# Patient Record
Sex: MALE | Race: WHITE | NOT HISPANIC OR LATINO | Employment: OTHER | URBAN - METROPOLITAN AREA
[De-identification: names, ages, dates, MRNs, and addresses within clinical notes are randomized per-mention and may not be internally consistent; named-entity substitution may affect disease eponyms.]

---

## 2017-02-14 ENCOUNTER — OFFICE VISIT (OUTPATIENT)
Dept: CARDIOLOGY | Facility: MEDICAL CENTER | Age: 77
End: 2017-02-14
Payer: MEDICARE

## 2017-02-14 VITALS
BODY MASS INDEX: 25.45 KG/M2 | HEIGHT: 73 IN | HEART RATE: 70 BPM | OXYGEN SATURATION: 90 % | SYSTOLIC BLOOD PRESSURE: 110 MMHG | DIASTOLIC BLOOD PRESSURE: 70 MMHG | WEIGHT: 192 LBS

## 2017-02-14 DIAGNOSIS — I49.3 PVC (PREMATURE VENTRICULAR CONTRACTION): Primary | ICD-10-CM

## 2017-02-14 DIAGNOSIS — E78.2 MIXED HYPERLIPIDEMIA: ICD-10-CM

## 2017-02-14 DIAGNOSIS — I25.10 CORONARY ARTERY CALCIFICATION SEEN ON CAT SCAN: ICD-10-CM

## 2017-02-14 DIAGNOSIS — I10 ESSENTIAL HYPERTENSION: ICD-10-CM

## 2017-02-14 PROCEDURE — G8420 CALC BMI NORM PARAMETERS: HCPCS | Performed by: INTERNAL MEDICINE

## 2017-02-14 PROCEDURE — G8432 DEP SCR NOT DOC, RNG: HCPCS | Performed by: INTERNAL MEDICINE

## 2017-02-14 PROCEDURE — 4040F PNEUMOC VAC/ADMIN/RCVD: CPT | Performed by: INTERNAL MEDICINE

## 2017-02-14 PROCEDURE — 1036F TOBACCO NON-USER: CPT | Performed by: INTERNAL MEDICINE

## 2017-02-14 PROCEDURE — 99213 OFFICE O/P EST LOW 20 MIN: CPT | Performed by: INTERNAL MEDICINE

## 2017-02-14 PROCEDURE — 1101F PT FALLS ASSESS-DOCD LE1/YR: CPT | Performed by: INTERNAL MEDICINE

## 2017-02-14 PROCEDURE — G8599 NO ASA/ANTIPLAT THER USE RNG: HCPCS | Performed by: INTERNAL MEDICINE

## 2017-02-14 PROCEDURE — G8482 FLU IMMUNIZE ORDER/ADMIN: HCPCS | Performed by: INTERNAL MEDICINE

## 2017-02-14 ASSESSMENT — ENCOUNTER SYMPTOMS
PND: 0
SHORTNESS OF BREATH: 1
PALPITATIONS: 0
SPUTUM PRODUCTION: 1
HEARTBURN: 1
CLAUDICATION: 0
WEAKNESS: 1
ORTHOPNEA: 0
BRUISES/BLEEDS EASILY: 1

## 2017-02-14 NOTE — Clinical Note
Name:          Barry Torres   YOB: 1940  Date:     2/14/2017      Lizzie Soto M.D.  [address]  [place] [address]     Maxwell Krishnamurthy MD  [address], [address]  [place] [address]  Phone: [phone]  Back Line: [phone]  Fax: [phone]  E-mail: [email or web address]   Dear Dr. Soto,    We had the pleasure of seeing your patient, Barry Torres, in Cardiology Clinic at [facility] today.    As you know, he is a 76-year-old man with a history of chronic kidney disease stage IIIB, rheumatoid arthritis, dyslipidemia, and essential hypertension referred for evaluation of frequent PVCs on his Holter.    He is doing very well today on his cardiovascular regimen including Crestor, and atenolol. His blood pressure is well controlled, and his renal function is stable by at least his last chemistry panel in November. I have not changed his cardiovascular regimen at this time.     We will have the patient follow-up in 6 months.    Thank you for the referral and please do not hesitate to contact me at any time. My contact information is listed above.    This note was dictated using Dragon speech recognition software.     A full note including my physical examination and a full list of rectified medications is available in our medical record, and can be faxed as well.    Maxwell Krishnamurthy MD  Cardiologist  [facility]

## 2017-02-14 NOTE — MR AVS SNAPSHOT
"        Barry Tran Brian   2017 8:30 AM   Office Visit   MRN: [id number]    Department:  [facility]   Dept Phone:  [phone]    Description:  Male : 1940   Provider:  Maxwell Krishnamurthy M.D.           Reason for Visit     Follow-Up           Allergies as of 2017     Allergen Noted Reactions    Cipro Xr 2010       Muscle aches.    Levaquin 2010       Muscle aches    Pcn [Penicillins] 09/15/2009   Hives      Vital Signs     Blood Pressure Pulse Height Weight Body Mass Index Oxygen Saturation    110/70 mmHg 70 1.854 m (6' 0.99\") 87.091 kg (192 lb) 25.34 kg/m2 90%    Smoking Status                   Former Smoker           Basic Information     Date Of Birth Sex Race Ethnicity Preferred Language    1940 Male White Non- English      Your appointments     Mar 06, 2017  8:40 AM   Established Patient with Lizzie Soto M.D.   [facility] - Internal Medicine (--)    [address] [place] [id number] [phone]           You will be receiving a confirmation call a few days before your appointment from our automated call confirmation system.              Problem List              ICD-10-CM Priority Class Noted - Resolved    Hypoxia R09.02   2013 - Present    Pleural plaque J92.9   2013 - Present    Chronic obstructive pulmonary disease (CMS-HCC) J44.9   Unknown - Present    Antrochoanal polyp J33.0   10/6/2015 - Present    Hyperlipidemia E78.5   6/3/2016 - Present    RA (rheumatoid arthritis) (CMS-HCC) M06.9   6/3/2016 - Present    SOB (shortness of breath) R06.02   6/3/2016 - Present    Fatigue R53.83   6/3/2016 - Present    Orthostasis I95.1   2016 - Present    Hyperlipidemia E78.5   2016 - Present    Essential hypertension I10   2016 - Present    CKD (chronic kidney disease), stage III N18.3   2016 - Present    Coronary artery calcification seen on CAT scan I25.10   2016 - Present    Elevated TSH R94.6 "   12/6/2016 - Present    Mixed dyslipidemia E78.2   12/6/2016 - Present      Health Maintenance        Date Due Completion Dates    IMM DTaP/Tdap/Td Vaccine (1 - Tdap) 4/14/1958 ---    IMM ZOSTER VACCINE 4/14/1999 ---    IMM PNEUMOCOCCAL 65+ (ADULT) LOW/MEDIUM RISK SERIES (2 of 2 - PCV13) 5/8/2016 5/8/2015    COLONOSCOPY 11/20/2022 11/20/2012 (Done)    Override on 11/20/2012: Done (3 polyps, DHA)            Current Immunizations     Influenza Vaccine Adult HD 9/26/2016    Pneumococcal polysaccharide vaccine (PPSV-23) 5/8/2015    Tuberculin Skin Test 2/23/2015      Below and/or attached are the medications your provider expects you to take. Review all of your home medications and newly ordered medications with your provider and/or pharmacist. Follow medication instructions as directed by your provider and/or pharmacist. Please keep your medication list with you and share with your provider. Update the information when medications are discontinued, doses are changed, or new medications (including over-the-counter products) are added; and carry medication information at all times in the event of emergency situations     Allergies:  CIPRO XR - (reactions not documented)     LEVAQUIN - (reactions not documented)     PCN - Hives               Medications  Valid as of: February 14, 2017 -  8:54 AM    Generic Name Brand Name Tablet Size Instructions for use    Acetaminophen (Tab) TYLENOL 325 MG Take 2 Tabs by mouth 3 times a day as needed.        Albuterol Sulfate (Aero Soln) albuterol 108 (90 BASE) MCG/ACT Inhale 2 Puffs by mouth every 6 hours as needed for Shortness of Breath.        Atenolol (Tab) TENORMIN 25 MG Take 1 Tab by mouth every day.        Budesonide-Formoterol Fumarate (Aerosol) SYMBICORT 80-4.5 MCG/ACT Inhale 2 Puffs by mouth 2 Times a Day.        Calcium Carb-Cholecalciferol (Tab) Calcium-Vitamin D 600-400 MG-UNIT Take 1 Tab by mouth 2 Times a Day.        Certolizumab Pegol   Inject  as instructed.         Cholecalciferol (Cap) Vitamin D3 2000 UNIT Take  by mouth.        Clotrimazole-Betamethasone (Cream) LOTRISONE 1-0.05 % To affected area bid PRN        Ferrous Sulfate (Tablet Delayed Response) ferrous sulfate 325 (65 FE) MG Take 1 Tab by mouth every day.        Fexofenadine HCl (Tab) ALLEGRA 180 MG Take 180 mg by mouth every day.        Fluticasone Propionate (Suspension) FLONASE 50 MCG/ACT Spray 1 Spray in nose every day.        Gabapentin (Cap) NEURONTIN 300 MG TAKE 1 CAPSULE BY MOUTH THREE TIMES A DAY        Glucosamine-Chondroitin   Take  by mouth.        GuaiFENesin (TABLET SR 12 HR) MUCINEX 600 MG Take 600 mg by mouth every 12 hours.        Influenza Vac Split High-Dose (Suspension Prefilled Syringe) FLUZONE HIGH-DOSE 0.5 ML ADM 0.5ML IM UTD        Leflunomide (Tab) ARAVA 20 MG Take 20 mg by mouth every day.        Loperamide HCl (Tab) IMODIUM A-D 2 MG Take 2 mg by mouth 4 times a day as needed for Diarrhea.        Magnesium (Tab) Magnesium 250 MG Take 1 Tab by mouth every day.        Multiple Vitamins-Minerals   Take  by mouth.        Omega-3 Fatty Acids (Cap) Fish Oil 1200 MG 1 tab orally daily        Rosuvastatin Calcium (Tab) CRESTOR 20 MG Take 1 Tab by mouth every evening.        Tiotropium Bromide Monohydrate (Cap) SPIRIVA 18 MCG Inhale 1 Cap by mouth every day.        .                 Medicines prescribed today were sent to:     [facility] [id number] [place] [address] - [address] [place] [address] OF [facility]. & [facility]    [address] [facility] [address]    Phone: [phone] Fax: [phone]    Open 24 Hours?: No    [facility] - [place], [place] - [address] [place] [address] [facility] [address]    Phone: [phone] Fax: [phone]    Open 24 Hours?: No      Medication refill instructions:       If your prescription bottle indicates you have medication refills left, it is not necessary to call your provider’s office. Please contact your  pharmacy and they will refill your medication.    If your prescription bottle indicates you do not have any refills left, you may request refills at any time through one of the following ways: The online [facility] system (except Urgent Care), by calling your provider’s office, or by asking your pharmacy to contact your provider’s office with a refill request. Medication refills are processed only during regular business hours and may not be available until the next business day. Your provider may request additional information or to have a follow-up visit with you prior to refilling your medication.   *Please Note: Medication refills are assigned a new Rx number when refilled electronically. Your pharmacy may indicate that no refills were authorized even though a new prescription for the same medication is available at the pharmacy. Please request the medicine by name with the pharmacy before contacting your provider for a refill.           [facility] Access Code: Activation code not generated  Current [facility] Status: Active

## 2017-02-15 NOTE — PROGRESS NOTES
Name:          Barry Torres   YOB: 1940  Date:     2/14/2017      Lizzie Soto M.D.  [address]  [place] [address]     Maxwell Krishnamurthy MD  [address]  [place] [address]  Phone: [phone]  Back Line: [phone]  Fax: [phone]  E-mail: [email or web address]   Dear Dr. Soto,    We had the pleasure of seeing your patient, Barry Torres, in Cardiology Clinic at [facility] today.    As you know, he is a 76-year-old man with a history of chronic kidney disease stage IIIB, rheumatoid arthritis, dyslipidemia, and essential hypertension referred for evaluation of frequent PVCs on his Holter.    He is doing very well today on his cardiovascular regimen including Crestor, and atenolol. His blood pressure is well controlled, and his renal function is stable by at least his last chemistry panel in November. I have not changed his cardiovascular regimen at this time.     We will have the patient follow-up in 6 months.    Thank you for the referral and please do not hesitate to contact me at any time. My contact information is listed above.    This note was dictated using Dragon speech recognition software.     A full note including my physical examination and a full list of rectified medications is available in our medical record, and can be faxed as well.    Maxwell Krishnamurthy MD  Cardiologist  [facility]

## 2017-02-15 NOTE — PROGRESS NOTES
Subjective:   Barry Torres is a 76-year-old man with a history of chronic kidney disease stage IIIB, rheumatoid arthritis, dyslipidemia, and essential hypertension referred for evaluation of frequent PVCs on his Holter.    He is doing very well today, and has no cardiovascular complaints. He is tolerating his medication regimen well, and has no side effects with that. He is exercising without limitation.    Past Medical History   Diagnosis Date   • Hypertension    • Hypercholesteremia    • Hypertriglyceridemia    • [place] (hard of hearing)      declines hearing aids   • GERD (gastroesophageal reflux disease)    • History of hemorrhoids    • Solitary kidney      history of kidney cyst 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-functioning kidney s/p nephrectomy    • Epididymitis 2009     h/o listed in chart   • Pleural plaque 2005      CT [place]   • Chronic lung disease      asbestos related   • Sleep apnea      obstructive and central - not adherent to autopap   • RA (rheumatoid arthritis) (CMS-[facility])    • History of skin cancer      non melanoma   • Indigestion    • Light headedness      2/2 orthostasis?   • Peripheral neuropathy (CMS-[facility])    • Abnormal thyroid stimulating hormone (TSH) level    • CKD (chronic kidney disease), stage III    • Chronic diarrhea      declines eval; takes immodium once a day; see previous notes; aware of risk    • Lightheadedness 6/23/2016   • Orthostasis 6/23/2016   • Coronary artery calcification seen on CAT scan 8/2/2016     Past Surgical History   Procedure Laterality Date   • Testicle exploration  2004   • Cystoscopy  2/1/2010     Performed by ANGIE VERDUZCO at SURGERY [facility] ORS   • Retrogrades  2/1/2010     Performed by ANGIE VERDUZCO at SURGERY [facility] ORS   • Pyelogram  2/1/2010     Performed by ANGIE VERDUZCO at SURGERY [facility] ORS   • Ureteroscopy  2/1/2010     Performed by ANGIE VERDUZCO at SURGERY [facility] ORS   • Nephrectomy laparoscopic  2009      left kidney   • Nasal polypectomy Bilateral 10/6/2015     Procedure: NASAL POLYPECTOMY WITH SCOTT ENDOSCOPIC NASAL DEBRIDEMENT;  Surgeon: Param Maurer M.D.;  Location: SURGERY SAME DAY [facility];  Service:      Family History   Problem Relation Age of Onset   • Genetic Father      ALS   • Heart Attack Neg Hx    • Heart Disease Neg Hx    • Heart Failure Neg Hx      History   Smoking status   • Former Smoker -- 1.00 packs/day for 31 years   Smokeless tobacco   • Never Used     Comment: 1 pk a day for 35 yrs     Allergies   Allergen Reactions   • Cipro Xr      Muscle aches.   • Levaquin      Muscle aches   • Pcn [Penicillins] Hives     Outpatient Encounter Prescriptions as of 2/14/2017   Medication Sig Dispense Refill   • gabapentin (NEURONTIN) 300 MG Cap TAKE 1 CAPSULE BY MOUTH THREE TIMES A  Cap 3   • budesonide-formoterol (SYMBICORT) 80-4.5 MCG/ACT Aerosol Inhale 2 Puffs by mouth 2 Times a Day. 3 Inhaler 1   • rosuvastatin (CRESTOR) 20 MG Tab Take 1 Tab by mouth every evening. 90 Tab 3   • Omega-3 Fatty Acids (FISH OIL) 1200 MG Cap 1 tab orally daily     • clotrimazole-betamethasone (LOTRISONE) 1-0.05 % Cream To affected area bid PRN     • Calcium Carb-Cholecalciferol (CALCIUM-VITAMIN D) 600-400 MG-UNIT Tab Take 1 Tab by mouth 2 Times a Day.     • Magnesium 250 MG Tab Take 1 Tab by mouth every day.     • ferrous sulfate (FE TABS) 325 (65 FE) MG EC tablet Take 1 Tab by mouth every day.     • acetaminophen (TYLENOL) 325 MG Tab Take 2 Tabs by mouth 3 times a day as needed.     • atenolol (TENORMIN) 25 MG Tab Take 1 Tab by mouth every day. 90 Tab 3   • albuterol 108 (90 BASE) MCG/ACT Aero Soln inhalation aerosol Inhale 2 Puffs by mouth every 6 hours as needed for Shortness of Breath. 8.5 g 3   • fluticasone (FLONASE) 50 MCG/ACT nasal spray Spray 1 Spray in nose every day.     • tiotropium (SPIRIVA HANDIHALER) 18 MCG Cap Inhale 1 Cap by mouth every day. 90 Cap 3   • Cholecalciferol (VITAMIN D3) 2000 UNIT Cap  "Take  by mouth.     • loperamide (IMODIUM A-D) 2 MG tablet Take 2 mg by mouth 4 times a day as needed for Diarrhea.     • leflunomide (ARAVA) 20 MG Tab Take 20 mg by mouth every day.     • guaifenesin LA (MUCINEX) 600 MG TABLET SR 12 HR Take 600 mg by mouth every 12 hours.     • Certolizumab Pegol (CIMZIA PREFILLED SC) Inject  as instructed.     • Multiple Vitamin (MULTIVITAMIN PO) Take  by mouth.     • fexofenadine (ALLEGRA) 180 MG tablet Take 180 mg by mouth every day.     • FLUZONE HIGH-DOSE 0.5 ML Suspension Prefilled Syringe injection ADM 0.5ML IM UTD  0   • Glucosamine-Chondroitin (GLUCOSAMINE CHONDR COMPLEX PO) Take  by mouth.       No facility-administered encounter medications on file as of 2/14/2017.     Review of Systems   HENT: Positive for hearing loss and tinnitus.    Respiratory: Positive for sputum production and shortness of breath.    Cardiovascular: Positive for leg swelling (mild and unchanged for 20 years). Negative for chest pain, palpitations, orthopnea, claudication and PND.   Gastrointestinal: Positive for heartburn.   Musculoskeletal: Positive for joint pain.   Skin: Positive for rash.   Neurological: Positive for weakness.   Endo/Heme/Allergies: Bruises/bleeds easily.   All other systems reviewed and are negative.       Objective:   /70 mmHg  Pulse 70  Ht 1.854 m (6' 0.99\")  Wt 87.091 kg (192 lb)  BMI 25.34 kg/m2  SpO2 90%    Physical Exam   Constitutional: He is oriented to person, place, and time. He appears well-developed and well-nourished. No distress.   Very pleasant, elderly man accompanied by his wife in no distress   HENT:   Head: Normocephalic and atraumatic.   Eyes: Conjunctivae and EOM are normal. Pupils are equal, round, and reactive to light. No scleral icterus.   Neck: Neck supple. No JVD present. No tracheal deviation present.   Cardiovascular: Normal rate, regular rhythm, normal heart sounds and intact distal pulses.  Exam reveals no gallop and no friction rub.  "   No murmur heard.  Pulses:       Dorsalis pedis pulses are 2+ on the right side, and 2+ on the left side.   No carotid bruits   Pulmonary/Chest: Effort normal and breath sounds normal. No stridor. No respiratory distress. He has no wheezes. He has no rales.   Abdominal: Soft. Bowel sounds are normal. He exhibits no distension.   Musculoskeletal: He exhibits no edema.   Neurological: He is alert and oriented to person, place, and time.   Skin: Skin is warm and dry. No rash noted. He is not diaphoretic. No erythema. No pallor.   Psychiatric: He has a normal mood and affect. Judgment and thought content normal.   Vitals reviewed.       8/25/2016 11/1/2016 11/23/2016    Sodium 138  138   Potassium 4.7  4.6   Chloride 106  106   Co2 25  24   Anion Gap 7.0  8.0   Glucose 83  100 (H)   Bun 21  19   Creatinine 1.64 (H) 1.64 (H) 1.61 (H)   Calcium 9.6  9.4        11/13/2015 06:28 1/4/2016 06:38 3/1/2016 06:40 4/15/2016 07:19 7/19/2016 07:23   Cholesterol,Tot 231 (H) 220 (H) 206 (H) 200 (H) 114   Triglycerides 327 (H) 416 (H) 345 (H) 429 (H) 240 (H)   HDL 34 (A) 34 (A) 30 (A) 32 (A) 33 (A)    (H) see below 107 (H) see below 33       Assessment:     1. PVC (premature ventricular contraction)     2. Mixed hyperlipidemia     3. Essential hypertension     4. Coronary artery calcification seen on CAT scan         Medical Decision Making:  Today's Assessment / Status / Plan:     Medical Decision Making:    He is doing very well today on his cardiovascular regimen including Crestor, and atenolol. His blood pressure is well controlled, and his renal function is stable by at least his last chemistry panel in November. I have not changed his cardiovascular regimen at this time.     Maxwell Krishnamurthy MD  Cardiologist, [facility] [place]

## 2017-02-16 ENCOUNTER — HOSPITAL ENCOUNTER (OUTPATIENT)
Dept: LAB | Facility: MEDICAL CENTER | Age: 77
End: 2017-02-16
Attending: INTERNAL MEDICINE
Payer: MEDICARE

## 2017-02-16 LAB
25(OH)D3 SERPL-MCNC: 62 NG/ML (ref 30–100)
ALBUMIN SERPL BCP-MCNC: 3.9 G/DL (ref 3.2–4.9)
APPEARANCE UR: CLEAR
BASOPHILS # BLD AUTO: 0.07 K/UL (ref 0–0.12)
BASOPHILS NFR BLD AUTO: 1.5 % (ref 0–1.8)
BILIRUB UR QL STRIP.AUTO: NEGATIVE
BUN SERPL-MCNC: 21 MG/DL (ref 8–22)
CALCIUM SERPL-MCNC: 9 MG/DL (ref 8.5–10.5)
CHLORIDE SERPL-SCNC: 109 MMOL/L (ref 96–112)
CO2 SERPL-SCNC: 23 MMOL/L (ref 20–33)
COLOR UR AUTO: YELLOW
CREAT SERPL-MCNC: 1.5 MG/DL (ref 0.5–1.4)
CREAT UR-MCNC: 113.7 MG/DL
EOSINOPHIL # BLD: 0.42 K/UL (ref 0–0.51)
EOSINOPHIL NFR BLD AUTO: 8.8 % (ref 0–6.9)
ERYTHROCYTE [DISTWIDTH] IN BLOOD BY AUTOMATED COUNT: 56.9 FL (ref 35.9–50)
FERRITIN SERPL-MCNC: 154 NG/ML (ref 22–322)
GLUCOSE SERPL-MCNC: 93 MG/DL (ref 65–99)
GLUCOSE UR STRIP.AUTO-MCNC: NEGATIVE MG/DL
HCT VFR BLD AUTO: 43.1 % (ref 42–52)
HGB BLD-MCNC: 13.5 G/DL (ref 14–18)
IMM GRANULOCYTES # BLD AUTO: 0.01 K/UL (ref 0–0.11)
IMM GRANULOCYTES NFR BLD AUTO: 0.2 % (ref 0–0.9)
IRON SATN MFR SERPL: 23 % (ref 15–55)
IRON SERPL-MCNC: 73 UG/DL (ref 50–180)
KETONES UR STRIP.AUTO-MCNC: NEGATIVE MG/DL
LEUKOCYTE ESTERASE UR QL STRIP.AUTO: NEGATIVE
LYMPHOCYTES # BLD: 1.49 K/UL (ref 1–4.8)
LYMPHOCYTES NFR BLD AUTO: 31.2 % (ref 22–41)
MAGNESIUM SERPL-MCNC: 2 MG/DL (ref 1.5–2.5)
MCH RBC QN AUTO: 31.8 PG (ref 27–33)
MCHC RBC AUTO-ENTMCNC: 31.3 G/DL (ref 33.7–35.3)
MCV RBC AUTO: 101.7 FL (ref 81.4–97.8)
MICRO URNS: NORMAL
MONOCYTES # BLD: 0.81 K/UL (ref 0–0.85)
MONOCYTES NFR BLD AUTO: 16.9 % (ref 0–13.4)
NEUTROPHILS # BLD: 1.98 K/UL (ref 1.82–7.42)
NEUTROPHILS NFR BLD AUTO: 41.4 % (ref 44–72)
NITRITE UR QL STRIP.AUTO: NEGATIVE
NRBC # BLD AUTO: 0 K/UL
NRBC BLD-RTO: 0 /100 WBC
PH UR: 5.5 [PH]
PHOSPHATE SERPL-MCNC: 2.7 MG/DL (ref 2.5–4.5)
PLATELET # BLD AUTO: 206 K/UL (ref 164–446)
PMV BLD AUTO: 9.3 FL (ref 9–12.9)
POTASSIUM SERPL-SCNC: 4.5 MMOL/L (ref 3.6–5.5)
PROT 24H UR-MRATE: 19.3 MG/DL (ref 0–15)
PROT UR QL STRIP: NEGATIVE MG/DL
PROT/CREAT UR: 170 MG/G (ref 15–68)
PTH-INTACT SERPL-MCNC: 86.1 PG/ML (ref 14–72)
RBC # BLD AUTO: 4.24 M/UL (ref 4.7–6.1)
RBC UR QL AUTO: NEGATIVE
SODIUM SERPL-SCNC: 142 MMOL/L (ref 135–145)
SP GR UR STRIP.AUTO: 1.02
TIBC SERPL-MCNC: 322 UG/DL (ref 250–450)
URATE SERPL-MCNC: 4.8 MG/DL (ref 2.5–8.3)
WBC # BLD AUTO: 4.8 K/UL (ref 4.8–10.8)

## 2017-02-16 PROCEDURE — 83735 ASSAY OF MAGNESIUM: CPT

## 2017-02-16 PROCEDURE — 83550 IRON BINDING TEST: CPT

## 2017-02-16 PROCEDURE — 81003 URINALYSIS AUTO W/O SCOPE: CPT

## 2017-02-16 PROCEDURE — 82728 ASSAY OF FERRITIN: CPT

## 2017-02-16 PROCEDURE — 83540 ASSAY OF IRON: CPT

## 2017-02-16 PROCEDURE — 85025 COMPLETE CBC W/AUTO DIFF WBC: CPT

## 2017-02-16 PROCEDURE — 84550 ASSAY OF BLOOD/URIC ACID: CPT

## 2017-02-16 PROCEDURE — 82306 VITAMIN D 25 HYDROXY: CPT

## 2017-02-16 PROCEDURE — 83970 ASSAY OF PARATHORMONE: CPT

## 2017-02-16 PROCEDURE — 36415 COLL VENOUS BLD VENIPUNCTURE: CPT

## 2017-02-16 PROCEDURE — 82570 ASSAY OF URINE CREATININE: CPT

## 2017-02-16 PROCEDURE — 80069 RENAL FUNCTION PANEL: CPT

## 2017-02-16 PROCEDURE — 84156 ASSAY OF PROTEIN URINE: CPT

## 2017-03-06 ENCOUNTER — OFFICE VISIT (OUTPATIENT)
Dept: INTERNAL MEDICINE | Facility: MEDICAL CENTER | Age: 77
End: 2017-03-06
Payer: MEDICARE

## 2017-03-06 VITALS
WEIGHT: 198.25 LBS | HEART RATE: 66 BPM | DIASTOLIC BLOOD PRESSURE: 84 MMHG | HEIGHT: 73 IN | OXYGEN SATURATION: 94 % | SYSTOLIC BLOOD PRESSURE: 106 MMHG | TEMPERATURE: 96.6 F | BODY MASS INDEX: 26.27 KG/M2

## 2017-03-06 DIAGNOSIS — M06.9 RHEUMATOID ARTHRITIS, INVOLVING UNSPECIFIED SITE, UNSPECIFIED RHEUMATOID FACTOR PRESENCE: ICD-10-CM

## 2017-03-06 DIAGNOSIS — J44.9 CHRONIC OBSTRUCTIVE PULMONARY DISEASE, UNSPECIFIED COPD TYPE (HCC): ICD-10-CM

## 2017-03-06 DIAGNOSIS — D64.9 ANEMIA, UNSPECIFIED TYPE: ICD-10-CM

## 2017-03-06 DIAGNOSIS — D75.89 MACROCYTOSIS: ICD-10-CM

## 2017-03-06 DIAGNOSIS — R79.89 ELEVATED TSH: ICD-10-CM

## 2017-03-06 PROCEDURE — G8420 CALC BMI NORM PARAMETERS: HCPCS | Performed by: INTERNAL MEDICINE

## 2017-03-06 PROCEDURE — G8482 FLU IMMUNIZE ORDER/ADMIN: HCPCS | Performed by: INTERNAL MEDICINE

## 2017-03-06 PROCEDURE — 4040F PNEUMOC VAC/ADMIN/RCVD: CPT | Performed by: INTERNAL MEDICINE

## 2017-03-06 PROCEDURE — 1101F PT FALLS ASSESS-DOCD LE1/YR: CPT | Performed by: INTERNAL MEDICINE

## 2017-03-06 PROCEDURE — 1036F TOBACCO NON-USER: CPT | Performed by: INTERNAL MEDICINE

## 2017-03-06 PROCEDURE — 99214 OFFICE O/P EST MOD 30 MIN: CPT | Performed by: INTERNAL MEDICINE

## 2017-03-06 PROCEDURE — G8599 NO ASA/ANTIPLAT THER USE RNG: HCPCS | Performed by: INTERNAL MEDICINE

## 2017-03-06 PROCEDURE — G8432 DEP SCR NOT DOC, RNG: HCPCS | Performed by: INTERNAL MEDICINE

## 2017-03-06 NOTE — PROGRESS NOTES
Follow-up    Chief Complaint   Patient presents with   • Labs Only     results   • Patient Question     RE: Shingles immunization       HPI   History was obtained from the patient, family (wife), and a medical record review.   Doing well.   Wants to talk about vaccines.  Since last visit saw cards, rheum.  Able to walk more than 200# - no more SOB.     No dizziness/LH.      THREE CHRONIC CONDITIONS  HTN, stable  Lung disease, stable  DL stable    Past Medical History   Diagnosis Date   • Hypertension    • Hypercholesteremia    • Hypertriglyceridemia    • [place] (hard of hearing)      declines hearing aids   • GERD (gastroesophageal reflux disease)    • History of hemorrhoids    • Solitary kidney      history of kidney cyst 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-functioning kidney s/p nephrectomy    • Epididymitis 2009     h/o listed in chart   • Pleural plaque 2005      CT [place]   • Chronic lung disease      asbestos related   • Sleep apnea      obstructive and central - not adherent to autopap   • RA (rheumatoid arthritis) (CMS-HCC)      on meds, sees rheum   • History of skin cancer      non melanoma   • Indigestion    • Light headedness      2/2 orthostasis? now better off hctz   • Peripheral neuropathy (CMS-[facility])    • Abnormal thyroid stimulating hormone (TSH) level    • CKD (chronic kidney disease), stage III      sees neph   • Chronic diarrhea      declines eval; takes immodium once a day usually and sometimes less; see previous notes; aware of risk    • Lightheadedness 6/23/2016   • Orthostasis 6/23/2016     better off HCTZ   • Coronary artery calcification seen on CAT scan 8/2/2016     sees cards       Past Surgical History   Procedure Laterality Date   • Testicle exploration  2004   • Cystoscopy  2/1/2010     Performed by ANGIE VERDUZCO at SURGERY [facility] ORS   • Retrogrades  2/1/2010     Performed by ANGIE VERDUZCO at SURGERY [facility] ORS   • Pyelogram  2/1/2010     Performed by ANGIE VERDUZCO at SURGERY  [facility] ORS   • Ureteroscopy  2/1/2010     Performed by ANGIE VERDUZCO at SURGERY [facility] ORS   • Nephrectomy laparoscopic  2009     left kidney   • Nasal polypectomy Bilateral 10/6/2015     Procedure: NASAL POLYPECTOMY WITH SCOTT ENDOSCOPIC NASAL DEBRIDEMENT;  Surgeon: Param Maurer M.D.;  Location: SURGERY SAME DAY [facility];  Service:        Current Outpatient Prescriptions   Medication Sig Dispense Refill   • gabapentin (NEURONTIN) 300 MG Cap TAKE 1 CAPSULE BY MOUTH THREE TIMES A  Cap 3   • budesonide-formoterol (SYMBICORT) 80-4.5 MCG/ACT Aerosol Inhale 2 Puffs by mouth 2 Times a Day. 3 Inhaler 1   • rosuvastatin (CRESTOR) 20 MG Tab Take 1 Tab by mouth every evening. 90 Tab 3   • clotrimazole-betamethasone (LOTRISONE) 1-0.05 % Cream To affected area bid PRN     • Calcium Carb-Cholecalciferol (CALCIUM-VITAMIN D) 600-400 MG-UNIT Tab Take 1 Tab by mouth 2 Times a Day.     • Magnesium 250 MG Tab Take 1 Tab by mouth every day.     • ferrous sulfate (FE TABS) 325 (65 FE) MG EC tablet Take 1 Tab by mouth every day.     • acetaminophen (TYLENOL) 325 MG Tab Take 2 Tabs by mouth 3 times a day as needed.     • atenolol (TENORMIN) 25 MG Tab Take 1 Tab by mouth every day. 90 Tab 3   • albuterol 108 (90 BASE) MCG/ACT Aero Soln inhalation aerosol Inhale 2 Puffs by mouth every 6 hours as needed for Shortness of Breath. 8.5 g 3   • fluticasone (FLONASE) 50 MCG/ACT nasal spray Spray 1 Spray in nose every day.     • tiotropium (SPIRIVA HANDIHALER) 18 MCG Cap Inhale 1 Cap by mouth every day. 90 Cap 3   • Cholecalciferol (VITAMIN D3) 2000 UNIT Cap Take  by mouth.     • loperamide (IMODIUM A-D) 2 MG tablet Take 2 mg by mouth 4 times a day as needed for Diarrhea.     • leflunomide (ARAVA) 20 MG Tab Take 20 mg by mouth every day.     • guaifenesin LA (MUCINEX) 600 MG TABLET SR 12 HR Take 600 mg by mouth every 12 hours.     • Certolizumab Pegol (CIMZIA PREFILLED SC) Inject  as instructed.     • Multiple Vitamin  "(MULTIVITAMIN PO) Take  by mouth.     • fexofenadine (ALLEGRA) 180 MG tablet Take 180 mg by mouth every day.       No current facility-administered medications for this visit.       Allergies as of 03/06/2017 - Sanju as Reviewed 03/06/2017   Allergen Reaction Noted   • Cipro xr  01/25/2010   • Levaquin  01/25/2010   • Pcn [penicillins] Hives 09/15/2009        Social History     Social History   • Marital Status:      Spouse Name: N/A   • Number of Children: N/A   • Years of Education: N/A     Occupational History   • Not on file.     Social History Main Topics   • Smoking status: Former Smoker -- 1.00 packs/day for 31 years   • Smokeless tobacco: Never Used      Comment: 1 pk a day for 35 yrs   • Alcohol Use: No      Comment: 2 a week   • Drug Use: No   • Sexual Activity: Not on file      Comment: retired      Other Topics Concern   • Not on file     Social History Narrative    See H&P    Lives with wife       Family History   Problem Relation Age of Onset   • Genetic Father      ALS   • Heart Attack Neg Hx    • Heart Disease Neg Hx    • Heart Failure Neg Hx        ROS:   No cough or SOB  No CP or heart palp   No dizziness/LH     /84 mmHg  Pulse 66  Temp(Src) 35.9 °C (96.6 °F)  Ht 1.854 m (6' 0.99\")  Wt 89.926 kg (198 lb 4 oz)  BMI 26.16 kg/m2  SpO2 94%    Physical Exam  General:  Alert and oriented.  No apparent distress.    Eyes: No scleral icterus. No conjunctival injection.      ENMT:  MMM OP clear    Neck: Neck supple.  Trachea midline.      Resp: Clear to auscultation bilaterally. No rales, rhonchi, or wheezes.    Cardiovascular: Regular rate and normal rhythm. No murmurs, rubs or gallops. 2+ radial pulses.     Abdomen: Soft, nontender, nondistended. Obese.  Positive bowel sounds.     Musculoskeletal: No clubbing, cyanosis. Trace edema present    Skin: Sun exposure changes on head    Lymph:     Neuro:     Psych: Mood euthymic. Affect congruent.    Other:     Labs/Studies  Hospital Outpatient " Visit on 02/16/2017   Component Date Value Ref Range Status   • Uric Acid 02/16/2017 4.8  2.5 - 8.3 mg/dL Final   • Magnesium 02/16/2017 2.0  1.5 - 2.5 mg/dL Final   • Color 02/16/2017 Yellow   Final   • Character 02/16/2017 Clear   Final   • Specific Gravity 02/16/2017 1.020  <1.035 Final   • Ph 02/16/2017 5.5  5.0-8.0 Final   • Glucose 02/16/2017 Negative  Negative mg/dL Final   • Ketones 02/16/2017 Negative  Negative mg/dL Final   • Protein 02/16/2017 Negative  Negative mg/dL Final   • Bilirubin 02/16/2017 Negative  Negative Final   • Nitrite 02/16/2017 Negative  Negative Final   • Leukocyte Esterase 02/16/2017 Negative  Negative Final   • Occult Blood 02/16/2017 Negative  Negative Final   • Micro Urine Req 02/16/2017 see below   Final    Comment: Microscopic examination not performed when specimen is clear  and chemically negative for protein, blood, leukocyte esterase  and nitrite.     • WBC 02/16/2017 4.8  4.8 - 10.8 K/uL Final   • RBC 02/16/2017 4.24* 4.70 - 6.10 M/uL Final   • Hemoglobin 02/16/2017 13.5* 14.0 - 18.0 g/dL Final   • Hematocrit 02/16/2017 43.1  42.0 - 52.0 % Final   • MCV 02/16/2017 101.7* 81.4 - 97.8 fL Final   • MCH 02/16/2017 31.8  27.0 - 33.0 pg Final   • MCHC 02/16/2017 31.3* 33.7 - 35.3 g/dL Final   • RDW 02/16/2017 56.9* 35.9 - 50.0 fL Final   • Platelet Count 02/16/2017 206  164 - 446 K/uL Final   • MPV 02/16/2017 9.3  9.0 - 12.9 fL Final   • Neutrophils-Polys 02/16/2017 41.40* 44.00 - 72.00 % Final   • Lymphocytes 02/16/2017 31.20  22.00 - 41.00 % Final   • Monocytes 02/16/2017 16.90* 0.00 - 13.40 % Final   • Eosinophils 02/16/2017 8.80* 0.00 - 6.90 % Final   • Basophils 02/16/2017 1.50  0.00 - 1.80 % Final   • Immature Granulocytes 02/16/2017 0.20  0.00 - 0.90 % Final   • Nucleated RBC 02/16/2017 0.00   Final   • Neutrophils (Absolute) 02/16/2017 1.98  1.82 - 7.42 K/uL Final    Includes immature neutrophils, if present.   • Lymphs (Absolute) 02/16/2017 1.49  1.00 - 4.80 K/uL Final   •  Monos (Absolute) 02/16/2017 0.81  0.00 - 0.85 K/uL Final   • Eos (Absolute) 02/16/2017 0.42  0.00 - 0.51 K/uL Final   • Baso (Absolute) 02/16/2017 0.07  0.00 - 0.12 K/uL Final   • Immature Granulocytes (abs) 02/16/2017 0.01  0.00 - 0.11 K/uL Final   • NRBC (Absolute) 02/16/2017 0.00   Final   • Pth, Intact 02/16/2017 86.1* 14.0 - 72.0 pg/mL Final   • Calcium 02/16/2017 9.0  8.5 - 10.5 mg/dL Final   • 25-Hydroxy   Vitamin D 25 02/16/2017 62  30 - 100 ng/mL Final    Comment: Adult Ranges:   <20 ng/mL - Deficiency  20-29 ng/mL - Insufficiency   ng/mL - Sufficiency  The [employer] Vitamin D Assay is standardized to the  [facility] reference measurement procedures, a  reference method for the Vitamin D Standardization Program  (VDSP).  The VDSP aligns patient results among 25 (OH)  Vitamin D methods.     • Sodium 02/16/2017 142  135 - 145 mmol/L Final   • Potassium 02/16/2017 4.5  3.6 - 5.5 mmol/L Final   • Chloride 02/16/2017 109  96 - 112 mmol/L Final   • Co2 02/16/2017 23  20 - 33 mmol/L Final   • Glucose 02/16/2017 93  65 - 99 mg/dL Final   • Creatinine 02/16/2017 1.50* 0.50 - 1.40 mg/dL Final   • Bun 02/16/2017 21  8 - 22 mg/dL Final   • Phosphorus 02/16/2017 2.7  2.5 - 4.5 mg/dL Final   • Albumin 02/16/2017 3.9  3.2 - 4.9 g/dL Final   • Total Protein, Urine 02/16/2017 19.3* 0.0 - 15.0 mg/dL Final   • Creatinine, Random Urine 02/16/2017 113.70   Final    Comment: Reference ranges have not been established for this specimen type.  Result interpretation should include consideration of patient's  medical condition and clinical presentation.     • Protein Creatinine Ratio 02/16/2017 170* 15 - 68 mg/g Final   • Ferritin 02/16/2017 154.0  22.0 - 322.0 ng/mL Final   • Iron 02/16/2017 73  50 - 180 ug/dL Final   • Total Iron Binding 02/16/2017 322  250 - 450 ug/dL Final   • % Saturation 02/16/2017 23  15 - 55 % Final   • GFR If  02/16/2017 55* >60 mL/min/1.73 m 2 Final   • GFR If Non African  American 02/16/2017 45* >60 mL/min/1.73 m 2 Final       Assessment and Plan   Elevated TSH  With normal t4  Monitoring for now  - TSH WITH REFLEX TO FT4; Future    Anemia, unspecified type  Macrocytosis  Re: anemia - Hgb13.5 with normal iron studies although on iron that he has been taking for years per his preference  No e/o gross bleeding  Due for B12 and folic acid level   - VITAMIN B12; Future  - FOLATE; Future  He declines further eval of the anemia  Monitor for now    Rheumatoid arthritis, involving unspecified site, unspecified rheumatoid factor presence (CMS-HCC)  Stable on meds  Sees rheum    Chronic obstructive pulmonary disease, unspecified COPD type (CMS-HCC)  Asbestos induced b/l diffuse pleural thickening   NYASIA  Sees pulm -- needs to f/u with them  Cont current inhalers  Declines CPAP    Essential hypertension  Orthostasis  BP controlled and pt without orthostatic sxs  Off HCTZ  On ANI    SOB (shortness of breath)  Resolved    Mixed dyslipidemia  TAG was high, HDL still low   Cont Crestor    Debility  Using DMV handicap placard form - unable to walk more than 200 feet without resting at times 2/2 RA     CKD (chronic kidney disease), stage 3 (moderate)  Sees neph  Avoid nephrotox agents  Monitor    Neuropathy  Seen by neuro  Cont presley    Loose stool  30+ years controlled with Immodium  Declines further eval (e.g, GI eval, colo, stool studies) aware of risk    Preventative health care  Sees derm and eye doctor regularly  Declines hearing aid  Flu annually  PNA x 2 2014  TDAP 2014  Shingles 2010  [place] 2009 - told to repeat in 10 years  PSA 2014; in past, reviewed risks and benefits and will hold off  DEXA done 2014 was normal per pt     Patient Instructions   Labs in coming months.   Come back in 3 months or sooner if needed.    Follow up with the lung doctors.        Follow-up  Return in about 3 months (around 6/6/2017).    Signed by: Lizzie Soto M.D.

## 2017-03-06 NOTE — MR AVS SNAPSHOT
"        Barry Chinnolvia   3/6/2017 8:40 AM   Office Visit   MRN: [id number]    Department:  [facility] Med - Internal Med   Dept Phone:  [phone]    Description:  Male : 1940   Provider:  Lizzie Soto M.D.           Reason for Visit     Labs Only results    Patient Question RE: Shingles immunization      Allergies as of 3/6/2017     Allergen Noted Reactions    Cipro Xr 2010       Muscle aches.    Levaquin 2010       Muscle aches    Pcn [Penicillins] 09/15/2009   Hives      You were diagnosed with     Elevated TSH   [591871]       Anemia, unspecified type   [5452997]       Macrocytosis   [039232]         Vital Signs     Blood Pressure Pulse Temperature Height Weight Body Mass Index    106/84 mmHg 66 35.9 °C (96.6 °F) 1.854 m (6' 0.99\") 89.926 kg (198 lb 4 oz) 26.16 kg/m2    Oxygen Saturation Smoking Status                94% Former Smoker          Basic Information     Date Of Birth Sex Race Ethnicity Preferred Language    1940 Male White Non- English      Your appointments     Jul 10, 2017  8:20 AM   Established Patient with Lizzie Soto M.D.   [facility] Med - Internal Medicine (--)    [address] [place] [id number] [phone]           You will be receiving a confirmation call a few days before your appointment from our automated call confirmation system.              Problem List              ICD-10-CM Priority Class Noted - Resolved    Hypoxia R09.02   2013 - Present    Pleural plaque J92.9   2013 - Present    Chronic obstructive pulmonary disease (CMS-HCC) J44.9   Unknown - Present    Antrochoanal polyp J33.0   10/6/2015 - Present    Hyperlipidemia E78.5   6/3/2016 - Present    RA (rheumatoid arthritis) (CMS-HCC) M06.9   6/3/2016 - Present    SOB (shortness of breath) R06.02   6/3/2016 - Present    Fatigue R53.83   6/3/2016 - Present    Orthostasis I95.1   2016 - Present    Hyperlipidemia E78.5   2016 - Present   " Essential hypertension I10   8/2/2016 - Present    CKD (chronic kidney disease), stage III N18.3   8/2/2016 - Present    Coronary artery calcification seen on CAT scan I25.10   8/2/2016 - Present    Elevated TSH R94.6   12/6/2016 - Present    Mixed dyslipidemia E78.2   12/6/2016 - Present      Health Maintenance        Date Due Completion Dates    IMM DTaP/Tdap/Td Vaccine (1 - Tdap) 4/14/1958 ---    IMM ZOSTER VACCINE 4/14/1999 ---    IMM PNEUMOCOCCAL 65+ (ADULT) LOW/MEDIUM RISK SERIES (2 of 2 - PCV13) 5/8/2016 5/8/2015    COLONOSCOPY 11/20/2022 11/20/2012 (Done)    Override on 11/20/2012: Done (3 polyps, DHA)            Current Immunizations     13-VALENT PCV PREVNAR 10/13/2014    Dtap Vaccine 10/13/2014    Influenza Vaccine Adult HD 9/26/2016    Pneumococcal polysaccharide vaccine (PPSV-23) 5/8/2015    SHINGLES VACCINE 3/8/2010    Tuberculin Skin Test 2/23/2015      Below and/or attached are the medications your provider expects you to take. Review all of your home medications and newly ordered medications with your provider and/or pharmacist. Follow medication instructions as directed by your provider and/or pharmacist. Please keep your medication list with you and share with your provider. Update the information when medications are discontinued, doses are changed, or new medications (including over-the-counter products) are added; and carry medication information at all times in the event of emergency situations     Allergies:  CIPRO XR - (reactions not documented)     LEVAQUIN - (reactions not documented)     PCN - Hives               Medications  Valid as of: March 06, 2017 -  9:00 AM    Generic Name Brand Name Tablet Size Instructions for use    Acetaminophen (Tab) TYLENOL 325 MG Take 2 Tabs by mouth 3 times a day as needed.        Albuterol Sulfate (Aero Soln) albuterol 108 (90 BASE) MCG/ACT Inhale 2 Puffs by mouth every 6 hours as needed for Shortness of Breath.        Atenolol (Tab) TENORMIN 25 MG Take 1  Tab by mouth every day.        Budesonide-Formoterol Fumarate (Aerosol) SYMBICORT 80-4.5 MCG/ACT Inhale 2 Puffs by mouth 2 Times a Day.        Calcium Carb-Cholecalciferol (Tab) Calcium-Vitamin D 600-400 MG-UNIT Take 1 Tab by mouth 2 Times a Day.        Certolizumab Pegol   Inject  as instructed.        Cholecalciferol (Cap) Vitamin D3 2000 UNIT Take  by mouth.        Clotrimazole-Betamethasone (Cream) LOTRISONE 1-0.05 % To affected area bid PRN        Ferrous Sulfate (Tablet Delayed Response) ferrous sulfate 325 (65 FE) MG Take 1 Tab by mouth every day.        Fexofenadine HCl (Tab) ALLEGRA 180 MG Take 180 mg by mouth every day.        Fluticasone Propionate (Suspension) FLONASE 50 MCG/ACT Spray 1 Spray in nose every day.        Gabapentin (Cap) NEURONTIN 300 MG TAKE 1 CAPSULE BY MOUTH THREE TIMES A DAY        GuaiFENesin (TABLET SR 12 HR) MUCINEX 600 MG Take 600 mg by mouth every 12 hours.        Leflunomide (Tab) ARAVA 20 MG Take 20 mg by mouth every day.        Loperamide HCl (Tab) IMODIUM A-D 2 MG Take 2 mg by mouth 4 times a day as needed for Diarrhea.        Magnesium (Tab) Magnesium 250 MG Take 1 Tab by mouth every day.        Multiple Vitamins-Minerals   Take  by mouth.        Rosuvastatin Calcium (Tab) CRESTOR 20 MG Take 1 Tab by mouth every evening.        Tiotropium Bromide Monohydrate (Cap) SPIRIVA 18 MCG Inhale 1 Cap by mouth every day.        .                 Medicines prescribed today were sent to:     [id number] - [place] - [address] AT [facility]    [address] [facility] "[facility] [address]    Phone: [phone] Fax: [phone]    Open 24 Hours?: No    [facility] - [place], [place] - [address] [place] [address] [address] [facility] [address]    Phone: [phone] Fax: [phone]    Open 24 Hours?: No      Medication refill instructions:       If your prescription bottle indicates you have medication refills  left, it is not necessary to call your provider’s office. Please contact your pharmacy and they will refill your medication.    If your prescription bottle indicates you do not have any refills left, you may request refills at any time through one of the following ways: The online Snapsort system (except Urgent Care), by calling your provider’s office, or by asking your pharmacy to contact your provider’s office with a refill request. Medication refills are processed only during regular business hours and may not be available until the next business day. Your provider may request additional information or to have a follow-up visit with you prior to refilling your medication.   *Please Note: Medication refills are assigned a new Rx number when refilled electronically. Your pharmacy may indicate that no refills were authorized even though a new prescription for the same medication is available at the pharmacy. Please request the medicine by name with the pharmacy before contacting your provider for a refill.        Your To Do List     Future Labs/Procedures Complete By Expires    FOLATE  As directed 3/6/2018    TSH WITH REFLEX TO FT4  As directed 3/6/2018    VITAMIN B12  As directed 3/6/2018      Instructions    Labs in coming months.   Come back in 3 months or sooner if needed.    Follow up with the lung doctors.            Snapsort Access Code: Activation code not generated  Current Snapsort Status: Active

## 2017-03-06 NOTE — PATIENT INSTRUCTIONS
Labs in coming months.   Come back in 3 months or sooner if needed.    Follow up with the lung doctors.

## 2017-04-27 ENCOUNTER — HOSPITAL ENCOUNTER (OUTPATIENT)
Dept: LAB | Facility: MEDICAL CENTER | Age: 77
End: 2017-04-27
Attending: INTERNAL MEDICINE
Payer: MEDICARE

## 2017-04-27 LAB
ALBUMIN SERPL BCP-MCNC: 4.1 G/DL (ref 3.2–4.9)
ALP SERPL-CCNC: 65 U/L (ref 30–99)
ALT SERPL-CCNC: 26 U/L (ref 2–50)
AST SERPL-CCNC: 29 U/L (ref 12–45)
BASOPHILS # BLD AUTO: 1.1 % (ref 0–1.8)
BASOPHILS # BLD: 0.09 K/UL (ref 0–0.12)
BILIRUB CONJ SERPL-MCNC: 0.2 MG/DL (ref 0.1–0.5)
BILIRUB INDIRECT SERPL-MCNC: 0.9 MG/DL (ref 0–1)
BILIRUB SERPL-MCNC: 1.1 MG/DL (ref 0.1–1.5)
CREAT SERPL-MCNC: 1.64 MG/DL (ref 0.5–1.4)
CRP SERPL HS-MCNC: 0.11 MG/DL (ref 0–0.75)
EOSINOPHIL # BLD AUTO: 0.28 K/UL (ref 0–0.51)
EOSINOPHIL NFR BLD: 3.5 % (ref 0–6.9)
ERYTHROCYTE [DISTWIDTH] IN BLOOD BY AUTOMATED COUNT: 53 FL (ref 35.9–50)
ERYTHROCYTE [SEDIMENTATION RATE] IN BLOOD BY WESTERGREN METHOD: 10 MM/HOUR (ref 0–20)
GFR SERPL CREATININE-BSD FRML MDRD: 41 ML/MIN/1.73 M 2
HCT VFR BLD AUTO: 42.4 % (ref 42–52)
HGB BLD-MCNC: 13.9 G/DL (ref 14–18)
IMM GRANULOCYTES # BLD AUTO: 0.02 K/UL (ref 0–0.11)
IMM GRANULOCYTES NFR BLD AUTO: 0.3 % (ref 0–0.9)
LYMPHOCYTES # BLD AUTO: 1.54 K/UL (ref 1–4.8)
LYMPHOCYTES NFR BLD: 19.4 % (ref 22–41)
MCH RBC QN AUTO: 32.1 PG (ref 27–33)
MCHC RBC AUTO-ENTMCNC: 32.8 G/DL (ref 33.7–35.3)
MCV RBC AUTO: 97.9 FL (ref 81.4–97.8)
MONOCYTES # BLD AUTO: 0.97 K/UL (ref 0–0.85)
MONOCYTES NFR BLD AUTO: 12.2 % (ref 0–13.4)
NEUTROPHILS # BLD AUTO: 5.02 K/UL (ref 1.82–7.42)
NEUTROPHILS NFR BLD: 63.5 % (ref 44–72)
NRBC # BLD AUTO: 0 K/UL
NRBC BLD AUTO-RTO: 0 /100 WBC
PLATELET # BLD AUTO: 209 K/UL (ref 164–446)
PMV BLD AUTO: 9.3 FL (ref 9–12.9)
PROT SERPL-MCNC: 6.8 G/DL (ref 6–8.2)
RBC # BLD AUTO: 4.33 M/UL (ref 4.7–6.1)
WBC # BLD AUTO: 7.9 K/UL (ref 4.8–10.8)

## 2017-04-27 PROCEDURE — 85025 COMPLETE CBC W/AUTO DIFF WBC: CPT

## 2017-04-27 PROCEDURE — 82565 ASSAY OF CREATININE: CPT

## 2017-04-27 PROCEDURE — 36415 COLL VENOUS BLD VENIPUNCTURE: CPT

## 2017-04-27 PROCEDURE — 86140 C-REACTIVE PROTEIN: CPT

## 2017-04-27 PROCEDURE — 85652 RBC SED RATE AUTOMATED: CPT

## 2017-04-27 PROCEDURE — 80076 HEPATIC FUNCTION PANEL: CPT

## 2017-05-25 RX ORDER — DILTIAZEM HYDROCHLORIDE 60 MG/1
TABLET, FILM COATED ORAL
Qty: 3 INHALER | Refills: 1 | Status: SHIPPED | OUTPATIENT
Start: 2017-05-25 | End: 2017-10-26 | Stop reason: SDUPTHER

## 2017-05-25 NOTE — TELEPHONE ENCOUNTER
Last seen: 03/06/17 by Dr. Soto  Next appt: 07/410/17 with Dr. Soto    Was the patient seen in the last year in this department? Yes   Does patient have an active prescription for medications requested? No   Received Request Via: Pharmacy

## 2017-06-26 RX ORDER — ROSUVASTATIN CALCIUM 20 MG/1
TABLET, COATED ORAL
Qty: 90 TAB | Refills: 0 | Status: SHIPPED | OUTPATIENT
Start: 2017-06-26 | End: 2017-08-24 | Stop reason: SDUPTHER

## 2017-06-26 NOTE — TELEPHONE ENCOUNTER
Last seen: 03/06/17 by Dr. Soto  Next appt: 07/10/17 with Dr. Soto    Was the patient seen in the last year in this department? Yes   Does patient have an active prescription for medications requested? No   Received Request Via: Pharmacy

## 2017-06-27 ENCOUNTER — HOSPITAL ENCOUNTER (OUTPATIENT)
Dept: LAB | Facility: MEDICAL CENTER | Age: 77
End: 2017-06-27
Attending: INTERNAL MEDICINE
Payer: MEDICARE

## 2017-06-27 DIAGNOSIS — D75.89 MACROCYTOSIS: ICD-10-CM

## 2017-06-27 DIAGNOSIS — D64.9 ANEMIA, UNSPECIFIED TYPE: ICD-10-CM

## 2017-06-27 DIAGNOSIS — R79.89 ELEVATED TSH: ICD-10-CM

## 2017-06-27 LAB
FOLATE SERPL-MCNC: >23.7 NG/ML
TSH SERPL DL<=0.005 MIU/L-ACNC: 3.57 UIU/ML (ref 0.3–3.7)
VIT B12 SERPL-MCNC: >1500 PG/ML (ref 211–911)

## 2017-06-27 PROCEDURE — 84443 ASSAY THYROID STIM HORMONE: CPT

## 2017-06-27 PROCEDURE — 36415 COLL VENOUS BLD VENIPUNCTURE: CPT

## 2017-06-27 PROCEDURE — 82607 VITAMIN B-12: CPT

## 2017-06-27 PROCEDURE — 82746 ASSAY OF FOLIC ACID SERUM: CPT

## 2017-07-03 RX ORDER — TIOTROPIUM BROMIDE 18 UG/1
CAPSULE ORAL; RESPIRATORY (INHALATION)
Qty: 90 CAP | Refills: 3 | Status: SHIPPED | OUTPATIENT
Start: 2017-07-03 | End: 2018-04-28 | Stop reason: SDUPTHER

## 2017-07-10 ENCOUNTER — OFFICE VISIT (OUTPATIENT)
Dept: INTERNAL MEDICINE | Facility: MEDICAL CENTER | Age: 77
End: 2017-07-10
Payer: MEDICARE

## 2017-07-10 VITALS
SYSTOLIC BLOOD PRESSURE: 110 MMHG | HEIGHT: 73 IN | DIASTOLIC BLOOD PRESSURE: 78 MMHG | TEMPERATURE: 96.8 F | BODY MASS INDEX: 25.84 KG/M2 | WEIGHT: 195 LBS | HEART RATE: 65 BPM | OXYGEN SATURATION: 92 %

## 2017-07-10 DIAGNOSIS — D64.9 ANEMIA, UNSPECIFIED TYPE: ICD-10-CM

## 2017-07-10 DIAGNOSIS — I10 BENIGN ESSENTIAL HTN: ICD-10-CM

## 2017-07-10 DIAGNOSIS — M70.22 OLECRANON BURSITIS OF LEFT ELBOW: ICD-10-CM

## 2017-07-10 DIAGNOSIS — R26.9 IMPAIRED GAIT: ICD-10-CM

## 2017-07-10 DIAGNOSIS — M54.50 ACUTE LEFT-SIDED LOW BACK PAIN WITHOUT SCIATICA: ICD-10-CM

## 2017-07-10 DIAGNOSIS — M79.672 BILATERAL FOOT PAIN: ICD-10-CM

## 2017-07-10 DIAGNOSIS — M79.671 BILATERAL FOOT PAIN: ICD-10-CM

## 2017-07-10 DIAGNOSIS — G62.9 NEUROPATHY: ICD-10-CM

## 2017-07-10 PROCEDURE — 99214 OFFICE O/P EST MOD 30 MIN: CPT | Performed by: INTERNAL MEDICINE

## 2017-07-10 ASSESSMENT — PATIENT HEALTH QUESTIONNAIRE - PHQ9: CLINICAL INTERPRETATION OF PHQ2 SCORE: 0

## 2017-07-10 NOTE — PATIENT INSTRUCTIONS
Let me know if back pain does not improve.  Ok to use ice or heat packs - which ever is most comfortable.    Ok to use Tylenol.    Ok to hold atenolol.    Check your blood pressure four times a week the same time every day and at least 30 to 60 minutes after you have taken your blood pressure medications if you are on any. Keep the log and bring it to your next appointment. Call the office sooner if your blood pressure is > 180/90 on 1 day, > 160/90 on 3 days, or > 140/90 persistently. Follow a low-salt diet.  Consider the DASH diet as well.    Watch heart rate.  If heart rate > 90, please call.      Follow up with lung clinic.   Come back in 3 months or sooner if needed.

## 2017-07-10 NOTE — PROGRESS NOTES
Follow-up    Chief Complaint   Patient presents with   • Labs Only     Results       HPI   History was obtained from the patient, family (wife), and a medical record review.   Here for FU.   Notes some L sided back pain since doing yard work several days ago.  Getting better.  Worse with movement.  No rads.  No b/b incont.  No numbness.  Notes occ dizziness dav after being over.  BP 110s-130s at home.   Got b12 injections into feet to help with pain.  Got orthotics after seeing pods.  Foot pain better.   Note L elbow swelling.  Applies pressure to L elbow while using comp.    THREE CHRONIC CONDITIONS  HTN, stable  Lung disease, stable  DL stable    Past Medical History   Diagnosis Date   • Hypertension    • Hypercholesteremia    • Hypertriglyceridemia    • [place] (hard of hearing)      declines hearing aids   • GERD (gastroesophageal reflux disease)    • History of hemorrhoids    • Solitary kidney      history of kidney cyst 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-functioning kidney s/p nephrectomy    • Epididymitis 2009     h/o listed in chart   • Pleural plaque 2005      CT [place]   • Chronic lung disease      asbestos related   • Sleep apnea      obstructive and central - not adherent to autopap   • RA (rheumatoid arthritis) (CMS-HCC)      on meds, sees rheum   • History of skin cancer      non melanoma   • Indigestion    • Light headedness      2/2 orthostasis? now better off hctz   • Peripheral neuropathy    • Abnormal thyroid stimulating hormone (TSH) level    • CKD (chronic kidney disease), stage III      sees neph   • Chronic diarrhea      declines eval; takes immodium once a day usually and sometimes less; see previous notes; aware of risk    • Lightheadedness 6/23/2016   • Orthostasis 6/23/2016     better off HCTZ   • Coronary artery calcification seen on CAT scan 8/2/2016     sees cards       Past Surgical History   Procedure Laterality Date   • Testicle exploration  2004   • Cystoscopy  2/1/2010     Performed by ANGIE VERDUZCO  S at [facility]   • Retrogrades  2/1/2010     Performed by ANGIE VERDUZCO at [facility]   • Pyelogram  2/1/2010     Performed by ANGIE VERDUZCO at [facility]   • Ureteroscopy  2/1/2010     Performed by ANGIE VERDUZCO at [facility]   • Nephrectomy laparoscopic  2009     left kidney   • Nasal polypectomy Bilateral 10/6/2015     Procedure: NASAL POLYPECTOMY WITH SCOTT ENDOSCOPIC NASAL DEBRIDEMENT;  Surgeon: Param Maurer M.D.;  Location: SURGERY SAME DAY [facility];  Service:        Current Outpatient Prescriptions   Medication Sig Dispense Refill   • NON SPECIFIED EB N5 - has L methylfolate, ALA, B12, B6, and D3.     • SPIRIVA HANDIHALER 18 MCG Cap INHALE THE CONTENTS OF 1 CAPSULE VIA HANDIHALER BY MOUTH DAILY 90 Cap 3   • rosuvastatin (CRESTOR) 20 MG Tab TAKE 1 TABLET BY MOUTH EVERY EVENING 90 Tab 0   • SYMBICORT 80-4.5 MCG/ACT Aerosol INHALE 2 PUFFS BY MOUTH TWO TIMES A DAY 3 Inhaler 1   • gabapentin (NEURONTIN) 300 MG Cap TAKE 1 CAPSULE BY MOUTH THREE TIMES A  Cap 3   • clotrimazole-betamethasone (LOTRISONE) 1-0.05 % Cream To affected area bid PRN     • Calcium Carb-Cholecalciferol (CALCIUM-VITAMIN D) 600-400 MG-UNIT Tab Take 1 Tab by mouth 2 Times a Day.     • Magnesium 250 MG Tab Take 1 Tab by mouth every day.     • ferrous sulfate (FE TABS) 325 (65 FE) MG EC tablet Take 1 Tab by mouth every day.     • acetaminophen (TYLENOL) 325 MG Tab Take 2 Tabs by mouth 3 times a day as needed.     • albuterol 108 (90 BASE) MCG/ACT Aero Soln inhalation aerosol Inhale 2 Puffs by mouth every 6 hours as needed for Shortness of Breath. 8.5 g 3   • fluticasone (FLONASE) 50 MCG/ACT nasal spray Spray 1 Spray in nose every day.     • Cholecalciferol (VITAMIN D3) 2000 UNIT Cap Take  by mouth.     • loperamide (IMODIUM A-D) 2 MG tablet Take 2 mg by mouth every day.     • leflunomide (ARAVA) 20 MG Tab Take 20 mg by mouth every day.     • guaifenesin LA  "(MUCINEX) 600 MG TABLET SR 12 HR Take 600 mg by mouth every 12 hours.     • Certolizumab Pegol (CIMZIA PREFILLED SC) Inject  as instructed.     • Multiple Vitamin (MULTIVITAMIN PO) Take  by mouth.     • fexofenadine (ALLEGRA) 180 MG tablet Take 180 mg by mouth every day.     • atenolol (TENORMIN) 25 MG Tab Take 1 Tab by mouth every day. 90 Tab 3     No current facility-administered medications for this visit.       Allergies as of 07/10/2017 - Sanju as Reviewed 07/10/2017   Allergen Reaction Noted   • Cipro xr  01/25/2010   • Levaquin  01/25/2010   • Pcn [penicillins] Hives 09/15/2009        Social History     Social History   • Marital Status:      Spouse Name: N/A   • Number of Children: N/A   • Years of Education: N/A     Occupational History   • Not on file.     Social History Main Topics   • Smoking status: Former Smoker -- 1.00 packs/day for 31 years   • Smokeless tobacco: Never Used      Comment: 1 pk a day for 35 yrs   • Alcohol Use: No      Comment: 2 a week   • Drug Use: No   • Sexual Activity: Not on file      Comment: retired      Other Topics Concern   • Not on file     Social History Narrative    See H&P    Lives with wife       Family History   Problem Relation Age of Onset   • Genetic Father      ALS   • Heart Attack Neg Hx    • Heart Disease Neg Hx    • Heart Failure Neg Hx        ROS:   No cough or SOB  No CP or heart palp   Above re: dizziness and LH     /78 mmHg  Pulse 65  Temp(Src) 36 °C (96.8 °F)  Ht 1.854 m (6' 0.99\")  Wt 88.451 kg (195 lb)  BMI 25.73 kg/m2  SpO2 92%    Physical Exam  General:  Alert and oriented.  No apparent distress.    Eyes: No scleral icterus. No conjunctival injection.      ENMT:  MMM OP clear    Neck: Neck supple.  Trachea midline.      Resp: Clear to auscultation bilaterally. No rales, rhonchi, or wheezes.    Cardiovascular: Regular rate and normal rhythm. No murmurs, rubs or gallops. 2+ radial pulses.     Abdomen: Soft, nontender, nondistended. " Obese.  Positive bowel sounds.     Musculoskeletal: No clubbing, cyanosis. Trace edema present  No point TTP paraspinal or spinal muscles  Soft moveable lump L elbow    Skin: Sun exposure changes on head    Lymph:     Neuro:     Psych: Mood euthymic. Affect congruent.    Skin: no back rash    Other:     Labs/Studies  Hospital Outpatient Visit on 06/27/2017   Component Date Value Ref Range Status   • TSH 06/27/2017 3.570  0.300 - 3.700 uIU/mL Final   • Vitamin B12 -True Cobalamin 06/27/2017 >1500* 211 - 911 pg/mL Final   • Folate -Folic Acid 06/27/2017 >23.7  >4.0 ng/mL Final       Hospital Outpatient Visit on 02/16/2017   Component Date Value Ref Range Status   • Uric Acid 02/16/2017 4.8  2.5 - 8.3 mg/dL Final   • Magnesium 02/16/2017 2.0  1.5 - 2.5 mg/dL Final   • Color 02/16/2017 Yellow   Final   • Character 02/16/2017 Clear   Final   • Specific Gravity 02/16/2017 1.020  <1.035 Final   • Ph 02/16/2017 5.5  5.0-8.0 Final   • Glucose 02/16/2017 Negative  Negative mg/dL Final   • Ketones 02/16/2017 Negative  Negative mg/dL Final   • Protein 02/16/2017 Negative  Negative mg/dL Final   • Bilirubin 02/16/2017 Negative  Negative Final   • Nitrite 02/16/2017 Negative  Negative Final   • Leukocyte Esterase 02/16/2017 Negative  Negative Final   • Occult Blood 02/16/2017 Negative  Negative Final   • Micro Urine Req 02/16/2017 see below   Final    Comment: Microscopic examination not performed when specimen is clear  and chemically negative for protein, blood, leukocyte esterase  and nitrite.     • WBC 02/16/2017 4.8  4.8 - 10.8 K/uL Final   • RBC 02/16/2017 4.24* 4.70 - 6.10 M/uL Final   • Hemoglobin 02/16/2017 13.5* 14.0 - 18.0 g/dL Final   • Hematocrit 02/16/2017 43.1  42.0 - 52.0 % Final   • MCV 02/16/2017 101.7* 81.4 - 97.8 fL Final   • MCH 02/16/2017 31.8  27.0 - 33.0 pg Final   • MCHC 02/16/2017 31.3* 33.7 - 35.3 g/dL Final   • RDW 02/16/2017 56.9* 35.9 - 50.0 fL Final   • Platelet Count 02/16/2017 206  164 - 446 K/uL  Final   • MPV 02/16/2017 9.3  9.0 - 12.9 fL Final   • Neutrophils-Polys 02/16/2017 41.40* 44.00 - 72.00 % Final   • Lymphocytes 02/16/2017 31.20  22.00 - 41.00 % Final   • Monocytes 02/16/2017 16.90* 0.00 - 13.40 % Final   • Eosinophils 02/16/2017 8.80* 0.00 - 6.90 % Final   • Basophils 02/16/2017 1.50  0.00 - 1.80 % Final   • Immature Granulocytes 02/16/2017 0.20  0.00 - 0.90 % Final   • Nucleated RBC 02/16/2017 0.00   Final   • Neutrophils (Absolute) 02/16/2017 1.98  1.82 - 7.42 K/uL Final    Includes immature neutrophils, if present.   • Lymphs (Absolute) 02/16/2017 1.49  1.00 - 4.80 K/uL Final   • Monos (Absolute) 02/16/2017 0.81  0.00 - 0.85 K/uL Final   • Eos (Absolute) 02/16/2017 0.42  0.00 - 0.51 K/uL Final   • Baso (Absolute) 02/16/2017 0.07  0.00 - 0.12 K/uL Final   • Immature Granulocytes (abs) 02/16/2017 0.01  0.00 - 0.11 K/uL Final   • NRBC (Absolute) 02/16/2017 0.00   Final   • Pth, Intact 02/16/2017 86.1* 14.0 - 72.0 pg/mL Final   • Calcium 02/16/2017 9.0  8.5 - 10.5 mg/dL Final   • 25-Hydroxy   Vitamin D 25 02/16/2017 62  30 - 100 ng/mL Final    Comment: Adult Ranges:   <20 ng/mL - Deficiency  20-29 ng/mL - Insufficiency   ng/mL - Sufficiency  The [employer] Vitamin D Assay is standardized to the  [employer] reference measurement procedures, a  reference method for the Vitamin D Standardization Program  (VDSP).  The VDSP aligns patient results among 25 (OH)  Vitamin D methods.     • Sodium 02/16/2017 142  135 - 145 mmol/L Final   • Potassium 02/16/2017 4.5  3.6 - 5.5 mmol/L Final   • Chloride 02/16/2017 109  96 - 112 mmol/L Final   • Co2 02/16/2017 23  20 - 33 mmol/L Final   • Glucose 02/16/2017 93  65 - 99 mg/dL Final   • Creatinine 02/16/2017 1.50* 0.50 - 1.40 mg/dL Final   • Bun 02/16/2017 21  8 - 22 mg/dL Final   • Phosphorus 02/16/2017 2.7  2.5 - 4.5 mg/dL Final   • Albumin 02/16/2017 3.9  3.2 - 4.9 g/dL Final   • Total Protein, Urine 02/16/2017 19.3* 0.0 - 15.0 mg/dL Final   •  Creatinine, Random Urine 02/16/2017 113.70   Final    Comment: Reference ranges have not been established for this specimen type.  Result interpretation should include consideration of patient's  medical condition and clinical presentation.     • Protein Creatinine Ratio 02/16/2017 170* 15 - 68 mg/g Final   • Ferritin 02/16/2017 154.0  22.0 - 322.0 ng/mL Final   • Iron 02/16/2017 73  50 - 180 ug/dL Final   • Total Iron Binding 02/16/2017 322  250 - 450 ug/dL Final   • % Saturation 02/16/2017 23  15 - 55 % Final   • GFR If  02/16/2017 55* >60 mL/min/1.73 m 2 Final   • GFR If Non  02/16/2017 45* >60 mL/min/1.73 m 2 Final       Assessment and Plan  1. Acute left-sided low back pain without sciatica  No alarm sxs  Muscle strain  Watch for now  Return and er ppx discussed  Avoid triggers  Tylenol for pain    2. Olecranon bursitis of left elbow  Getting better  Watch for now  Avoid triggers    3. Neuropathy (CMS-HCC)  In feet   Better after B12 injections and orthotics  tsh and folate fine  Watch for now  Sees pods and neuro  On [place]    4. Bilateral foot pain  See above    5. Impaired gait  Gait imbalance after change in position   Not orthostatic now but not to say it doesn't happen other times  BP low normal  DC ANI   Declines coming in in few weeks for bp check  Will monitor BP and HR at home and call if issue  - ORTHOSTATIC BLOOD PRESSURE    6. Benign essential HTN  See above    7. Anemia, unspecified type  Macrocytosis B12 and folate fine  Re: anemia - Hgb13s with normal iron studies although on iron that he has been taking for years per his preference  No e/o gross bleeding  He declines further eval of the anemia aware of risk of missing malignancy  Monitor for now    Elevated TSH  better    Rheumatoid arthritis, involving unspecified site, unspecified rheumatoid factor presence (CMS-HCC)  Stable on meds  Sees rheum    Chronic obstructive pulmonary disease, unspecified COPD type  (CMS-[facility])  Asbestos induced b/l diffuse pleural thickening   NYASIA  Sees pulm -- needs to f/u with them  Cont current inhalers  Declines CPAP    Mixed dyslipidemia  TAG was high, HDL still low   Cont Crestor    Debility  Using DMV handicap placard form - unable to walk more than 200 feet without resting at times 2/2 RA     CKD (chronic kidney disease), stage 3 (moderate)  Sees neph  Avoid nephrotox agents  Monitor    Loose stool  30+ years controlled with Immodium  Declines further eval (e.g, GI eval, colo, stool studies) aware of risk    Preventative health care  Sees derm and eye doctor regularly  Declines hearing aid  Flu annually  PNA x 2 2014  TDAP 2014  Shingles 2010  [place] 2009 - told to repeat in 10 years  PSA 2014; in past, reviewed risks and benefits and will hold off  DEXA done 2014 was normal per pt     Patient Instructions   Let me know if back pain does not improve.  Ok to use ice or heat packs - which ever is most comfortable.    Ok to use Tylenol.    Ok to hold atenolol.    Check your blood pressure four times a week the same time every day and at least 30 to 60 minutes after you have taken your blood pressure medications if you are on any. Keep the log and bring it to your next appointment. Call the office sooner if your blood pressure is > 180/90 on 1 day, > 160/90 on 3 days, or > 140/90 persistently. Follow a low-salt diet.  Consider the DASH diet as well.    Watch heart rate.  If heart rate > 90, please call.      Follow up with lung clinic.   Come back in 3 months or sooner if needed.          Follow-up  Return in about 3 months (around 10/10/2017).    Signed by: Lizzie Soto M.D.

## 2017-07-10 NOTE — MR AVS SNAPSHOT
"        Barry Chineramanuel   7/10/2017 8:20 AM   Office Visit   MRN: [id number]    Department:  [facility] - Internal Med   Dept Phone:  [phone]    Description:  Male : 1940   Provider:  Lizzie Soto M.D.           Reason for Visit     Labs Only Results      Allergies as of 7/10/2017     Allergen Noted Reactions    Cipro Xr 2010       Muscle aches.    Levaquin 2010       Muscle aches    Pcn [Penicillins] 09/15/2009   Hives      You were diagnosed with     Acute left-sided low back pain without sciatica   [9656752]       Olecranon bursitis of left elbow   [363005]       Neuropathy (CMS-HCC)   [311882]       Bilateral foot pain   [768995]       Impaired gait   [063983]       Benign essential HTN   [505524]       Anemia, unspecified type   [5738241]         Vital Signs     Blood Pressure Pulse Temperature Height Weight Body Mass Index    110/78 mmHg 65 36 °C (96.8 °F) 1.854 m (6' 0.99\") 88.451 kg (195 lb) 25.73 kg/m2    Oxygen Saturation Smoking Status                92% Former Smoker          Basic Information     Date Of Birth Sex Race Ethnicity Preferred Language    1940 Male White Non- English      Your appointments     Aug 14, 2017  8:15 AM   FOLLOW UP with Maxwell Krishnamurthy M.D.   [facility] (--)    [address] [place] [address] [address]  [facility] [address] [phone]            2017  8:20 AM   Established Patient with Lizzie Soto M.D.   [facility] - Internal Medicine (--)    [address] [place] [address] [address]  [facility] [address] [phone]           You will be receiving a confirmation call a few days before your appointment from our automated call confirmation system.              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   10/6/2015 - Present    Hyperlipidemia E78.5   6/3/2016 - Present    RA (rheumatoid arthritis) (CMS-HCC) M06.9   6/3/2016 - Present    SOB (shortness of breath) R06.02   6/3/2016 - Present    Fatigue R53.83   6/3/2016 - Present    Orthostasis I95.1   6/23/2016 - Present    Hyperlipidemia E78.5   6/23/2016 - Present    Essential hypertension I10   8/2/2016 - Present    CKD (chronic kidney disease), stage III N18.3   8/2/2016 - Present    Coronary artery calcification seen on CAT scan I25.10   8/2/2016 - Present    Elevated TSH R94.6   12/6/2016 - Present    Mixed dyslipidemia E78.2   12/6/2016 - Present      Health Maintenance        Date Due Completion Dates    IMM INFLUENZA (1) 9/1/2017 9/26/2016    COLONOSCOPY 11/20/2022 11/20/2012 (Done)    Override on 11/20/2012: Done (3 polyps, DHA)    IMM DTaP/Tdap/Td Vaccine (2 - Td) 10/13/2024 10/13/2014            Current Immunizations     13-VALENT PCV PREVNAR 10/13/2014    Dtap Vaccine 10/13/2014    Influenza Vaccine Adult HD 9/26/2016    Pneumococcal polysaccharide vaccine (PPSV-23) 5/8/2015    SHINGLES VACCINE 3/8/2010    Tuberculin Skin Test 2/23/2015      Below and/or attached are the medications your provider expects you to take. Review all of your home medications and newly ordered medications with your provider and/or pharmacist. Follow medication instructions as directed by your provider and/or pharmacist. Please keep your medication list with you and share with your provider. Update the information when medications are discontinued, doses are changed, or new medications (including over-the-counter products) are added; and carry medication information at all times in the event of emergency situations     Allergies:  CIPRO XR - (reactions not documented)     LEVAQUIN - (reactions not documented)     PCN - Hives               Medications  Valid as of: July 10, 2017 -  9:21 AM    Generic Name Brand Name Tablet Size Instructions for use     Acetaminophen (Tab) TYLENOL 325 MG Take 2 Tabs by mouth 3 times a day as needed.        Albuterol Sulfate (Aero Soln) albuterol 108 (90 BASE) MCG/ACT Inhale 2 Puffs by mouth every 6 hours as needed for Shortness of Breath.        Atenolol (Tab) TENORMIN 25 MG Take 1 Tab by mouth every day.        Budesonide-Formoterol Fumarate (Aerosol) SYMBICORT 80-4.5 MCG/ACT INHALE 2 PUFFS BY MOUTH TWO TIMES A DAY        Calcium Carb-Cholecalciferol (Tab) Calcium-Vitamin D 600-400 MG-UNIT Take 1 Tab by mouth 2 Times a Day.        Certolizumab Pegol   Inject  as instructed.        Cholecalciferol (Cap) Vitamin D3 2000 UNIT Take  by mouth.        Clotrimazole-Betamethasone (Cream) LOTRISONE 1-0.05 % To affected area bid PRN        Ferrous Sulfate (Tablet Delayed Response) ferrous sulfate 325 (65 FE) MG Take 1 Tab by mouth every day.        Fexofenadine HCl (Tab) ALLEGRA 180 MG Take 180 mg by mouth every day.        Fluticasone Propionate (Suspension) FLONASE 50 MCG/ACT Spray 1 Spray in nose every day.        Gabapentin (Cap) NEURONTIN 300 MG TAKE 1 CAPSULE BY MOUTH THREE TIMES A DAY        GuaiFENesin (TABLET SR 12 HR) MUCINEX 600 MG Take 600 mg by mouth every 12 hours.        Leflunomide (Tab) ARAVA 20 MG Take 20 mg by mouth every day.        Loperamide HCl (Tab) IMODIUM A-D 2 MG Take 2 mg by mouth every day.        Magnesium (Tab) Magnesium 250 MG Take 1 Tab by mouth every day.        Multiple Vitamins-Minerals   Take  by mouth.        NON SPECIFIED   EB N5 - has L methylfolate, ALA, B12, B6, and D3.        Rosuvastatin Calcium (Tab) CRESTOR 20 MG TAKE 1 TABLET BY MOUTH EVERY EVENING        Tiotropium Bromide Monohydrate (Cap) SPIRIVA HANDIHALER 18 MCG INHALE THE CONTENTS OF 1 CAPSULE VIA HANDIHALER BY MOUTH DAILY        .                 Medicines prescribed today were sent to:     [id number] - [place], [place] - [address] AT [facility] OF [facility] & [address]    [address] [employer] [place] [address]    Phone:  [phone] Fax: [phone]    Open 24 Hours?: No    [facility] - [place], [place] - [address] [facility]    [address] [facility] [address]    Phone: [phone] Fax: [phone]    Open 24 Hours?: No      Medication refill instructions:       If your prescription bottle indicates you have medication refills left, it is not necessary to call your provider’s office. Please contact your pharmacy and they will refill your medication.    If your prescription bottle indicates you do not have any refills left, you may request refills at any time through one of the following ways: The online [employer] system (except Urgent Care), by calling your provider’s office, or by asking your pharmacy to contact your provider’s office with a refill request. Medication refills are processed only during regular business hours and may not be available until the next business day. Your provider may request additional information or to have a follow-up visit with you prior to refilling your medication.   *Please Note: Medication refills are assigned a new Rx number when refilled electronically. Your pharmacy may indicate that no refills were authorized even though a new prescription for the same medication is available at the pharmacy. Please request the medicine by name with the pharmacy before contacting your provider for a refill.        Instructions    Let me know if back pain does not improve.  Ok to use ice or heat packs - which ever is most comfortable.    Ok to use Tylenol.    Ok to hold atenolol.    Check your blood pressure four times a week the same time every day and at least 30 to 60 minutes after you have taken your blood pressure medications if you are on any. Keep the log and bring it to your next appointment. Call the office sooner if your blood pressure is > 180/90 on 1 day, > 160/90 on 3 days, or > 140/90 persistently. Follow a low-salt diet.  Consider the DASH diet as well.       Watch heart rate.  If heart rate > 90, please call.      Follow up with lung clinic.   Come back in 3 months or sooner if needed.              [employer] Access Code: Activation code not generated  Current [employer] Status: Active

## 2017-07-24 RX ORDER — TIOTROPIUM BROMIDE 18 UG/1
CAPSULE ORAL; RESPIRATORY (INHALATION)
Refills: 3 | OUTPATIENT
Start: 2017-07-24

## 2017-07-24 RX ORDER — GABAPENTIN 300 MG/1
CAPSULE ORAL
Qty: 180 CAP | Refills: 2 | Status: SHIPPED | OUTPATIENT
Start: 2017-07-24 | End: 2017-08-28

## 2017-07-24 NOTE — TELEPHONE ENCOUNTER
Last seen: 07/10/17 by Dr. Soto  Next appt: 11/17/17 with Dr. Soto    Was the patient seen in the last year in this department? Yes   Does patient have an active prescription for medications requested? No   Received Request Via: Pharmacy

## 2017-07-25 ENCOUNTER — HOSPITAL ENCOUNTER (OUTPATIENT)
Dept: LAB | Facility: MEDICAL CENTER | Age: 77
End: 2017-07-25
Attending: INTERNAL MEDICINE
Payer: MEDICARE

## 2017-07-25 PROCEDURE — 84550 ASSAY OF BLOOD/URIC ACID: CPT

## 2017-07-25 PROCEDURE — 85025 COMPLETE CBC W/AUTO DIFF WBC: CPT

## 2017-07-25 PROCEDURE — 80069 RENAL FUNCTION PANEL: CPT

## 2017-07-25 PROCEDURE — 83970 ASSAY OF PARATHORMONE: CPT

## 2017-07-25 PROCEDURE — 84156 ASSAY OF PROTEIN URINE: CPT

## 2017-07-25 PROCEDURE — 82570 ASSAY OF URINE CREATININE: CPT

## 2017-07-25 PROCEDURE — 81003 URINALYSIS AUTO W/O SCOPE: CPT

## 2017-07-25 PROCEDURE — 82306 VITAMIN D 25 HYDROXY: CPT

## 2017-07-25 PROCEDURE — 82728 ASSAY OF FERRITIN: CPT

## 2017-07-25 PROCEDURE — 36415 COLL VENOUS BLD VENIPUNCTURE: CPT

## 2017-07-25 PROCEDURE — 83735 ASSAY OF MAGNESIUM: CPT

## 2017-07-25 PROCEDURE — 80061 LIPID PANEL: CPT

## 2017-07-25 PROCEDURE — 83540 ASSAY OF IRON: CPT

## 2017-07-25 PROCEDURE — 83550 IRON BINDING TEST: CPT

## 2017-07-26 LAB
25(OH)D3 SERPL-MCNC: 61 NG/ML (ref 30–100)
ALBUMIN SERPL BCP-MCNC: 3.1 G/DL (ref 3.2–4.9)
APPEARANCE UR: CLEAR
BASOPHILS # BLD AUTO: 1.5 % (ref 0–1.8)
BASOPHILS # BLD: 0.07 K/UL (ref 0–0.12)
BILIRUB UR QL STRIP.AUTO: NEGATIVE
BUN SERPL-MCNC: 21 MG/DL (ref 8–22)
CALCIUM SERPL-MCNC: 9.1 MG/DL (ref 8.5–10.5)
CHLORIDE SERPL-SCNC: 105 MMOL/L (ref 96–112)
CHOLEST SERPL-MCNC: 106 MG/DL (ref 100–199)
CO2 SERPL-SCNC: 26 MMOL/L (ref 20–33)
COLOR UR: YELLOW
CREAT SERPL-MCNC: 1.43 MG/DL (ref 0.5–1.4)
CREAT UR-MCNC: 108.4 MG/DL
EOSINOPHIL # BLD AUTO: 0.28 K/UL (ref 0–0.51)
EOSINOPHIL NFR BLD: 6.1 % (ref 0–6.9)
ERYTHROCYTE [DISTWIDTH] IN BLOOD BY AUTOMATED COUNT: 55.8 FL (ref 35.9–50)
FERRITIN SERPL-MCNC: 147.1 NG/ML (ref 22–322)
GFR SERPL CREATININE-BSD FRML MDRD: 48 ML/MIN/1.73 M 2
GLUCOSE SERPL-MCNC: 89 MG/DL (ref 65–99)
GLUCOSE UR STRIP.AUTO-MCNC: NEGATIVE MG/DL
HCT VFR BLD AUTO: 40.9 % (ref 42–52)
HDLC SERPL-MCNC: 39 MG/DL
HGB BLD-MCNC: 13.2 G/DL (ref 14–18)
IMM GRANULOCYTES # BLD AUTO: 0.01 K/UL (ref 0–0.11)
IMM GRANULOCYTES NFR BLD AUTO: 0.2 % (ref 0–0.9)
IRON SATN MFR SERPL: 21 % (ref 15–55)
IRON SERPL-MCNC: 62 UG/DL (ref 50–180)
KETONES UR STRIP.AUTO-MCNC: NEGATIVE MG/DL
LDLC SERPL CALC-MCNC: 41 MG/DL
LEUKOCYTE ESTERASE UR QL STRIP.AUTO: NEGATIVE
LYMPHOCYTES # BLD AUTO: 1.26 K/UL (ref 1–4.8)
LYMPHOCYTES NFR BLD: 27.6 % (ref 22–41)
MAGNESIUM SERPL-MCNC: 2 MG/DL (ref 1.5–2.5)
MCH RBC QN AUTO: 32.5 PG (ref 27–33)
MCHC RBC AUTO-ENTMCNC: 32.3 G/DL (ref 33.7–35.3)
MCV RBC AUTO: 100.7 FL (ref 81.4–97.8)
MICRO URNS: NORMAL
MONOCYTES # BLD AUTO: 0.81 K/UL (ref 0–0.85)
MONOCYTES NFR BLD AUTO: 17.8 % (ref 0–13.4)
NEUTROPHILS # BLD AUTO: 2.13 K/UL (ref 1.82–7.42)
NEUTROPHILS NFR BLD: 46.8 % (ref 44–72)
NITRITE UR QL STRIP.AUTO: NEGATIVE
NRBC # BLD AUTO: 0 K/UL
NRBC BLD AUTO-RTO: 0 /100 WBC
PH UR STRIP.AUTO: 5.5 [PH]
PHOSPHATE SERPL-MCNC: 2.7 MG/DL (ref 2.5–4.5)
PLATELET # BLD AUTO: 195 K/UL (ref 164–446)
PMV BLD AUTO: 9.3 FL (ref 9–12.9)
POTASSIUM SERPL-SCNC: 4.2 MMOL/L (ref 3.6–5.5)
PROT UR QL STRIP: NEGATIVE MG/DL
PROT UR-MCNC: 23.3 MG/DL (ref 0–15)
PROT/CREAT UR: 215 MG/G (ref 15–68)
PTH-INTACT SERPL-MCNC: 58 PG/ML (ref 14–72)
RBC # BLD AUTO: 4.06 M/UL (ref 4.7–6.1)
RBC UR QL AUTO: NEGATIVE
SODIUM SERPL-SCNC: 138 MMOL/L (ref 135–145)
SP GR UR STRIP.AUTO: 1.02
TIBC SERPL-MCNC: 293 UG/DL (ref 250–450)
TRIGL SERPL-MCNC: 131 MG/DL (ref 0–149)
URATE SERPL-MCNC: 4.3 MG/DL (ref 2.5–8.3)
UROBILINOGEN UR STRIP.AUTO-MCNC: 0.2 MG/DL
WBC # BLD AUTO: 4.6 K/UL (ref 4.8–10.8)

## 2017-08-14 ENCOUNTER — OFFICE VISIT (OUTPATIENT)
Dept: CARDIOLOGY | Facility: MEDICAL CENTER | Age: 77
End: 2017-08-14
Payer: MEDICARE

## 2017-08-14 VITALS
SYSTOLIC BLOOD PRESSURE: 140 MMHG | HEIGHT: 73 IN | WEIGHT: 195 LBS | OXYGEN SATURATION: 90 % | HEART RATE: 80 BPM | DIASTOLIC BLOOD PRESSURE: 80 MMHG | BODY MASS INDEX: 25.84 KG/M2

## 2017-08-14 DIAGNOSIS — E78.2 MIXED DYSLIPIDEMIA: ICD-10-CM

## 2017-08-14 DIAGNOSIS — I10 ESSENTIAL HYPERTENSION, BENIGN: ICD-10-CM

## 2017-08-14 DIAGNOSIS — I49.3 PVC (PREMATURE VENTRICULAR CONTRACTION): Primary | ICD-10-CM

## 2017-08-14 PROCEDURE — 99214 OFFICE O/P EST MOD 30 MIN: CPT | Performed by: INTERNAL MEDICINE

## 2017-08-14 ASSESSMENT — ENCOUNTER SYMPTOMS
BRUISES/BLEEDS EASILY: 1
CLAUDICATION: 0
PND: 0
WEAKNESS: 1
SPUTUM PRODUCTION: 1
PALPITATIONS: 0
HEARTBURN: 1
SHORTNESS OF BREATH: 1
ORTHOPNEA: 0

## 2017-08-14 NOTE — MR AVS SNAPSHOT
"        Barry Chinnolvia   2017 8:15 AM   Office Visit   MRN: [id number]    Department:  [facility]   Dept Phone:  [phone]    Description:  Male : 1940   Provider:  Maxwell Krishnamurthy M.D.           Reason for Visit     Follow-Up           Allergies as of 2017     Allergen Noted Reactions    Cipro Xr 2010       Muscle aches.    Levaquin 2010       Muscle aches    Pcn [Penicillins] 09/15/2009   Hives      You were diagnosed with     PVC (premature ventricular contraction)   [212993]         Vital Signs     Blood Pressure Pulse Height Weight Body Mass Index Oxygen Saturation    140/80 mmHg 80 1.854 m (6' 0.99\") 88.451 kg (195 lb) 25.73 kg/m2 90%    Smoking Status                   Former Smoker           Basic Information     Date Of Birth Sex Race Ethnicity Preferred Language    1940 Male White Non- English      Your appointments     Aug 25, 2017  9:00 AM   HOLTER MONITOR 24 HOURS with [facility]TER[facility]   [facility] (--)    [address] [place] [address] [address]  [facility] [address] [phone]            2017  8:20 AM   Established Patient with Lizzie Soto M.D.   [facility] / [facility] - Internal Medicine (--)    [address] [place] [address]  [address]  [facility] [id number] [phone]           You will be receiving a confirmation call a few days before your appointment from our automated call confirmation system.              Problem List              ICD-10-CM Priority Class Noted - Resolved    Hypoxia R09.02   2013 - Present    Pleural plaque J92.9   2013 - Present    Chronic obstructive pulmonary disease (CMS-HCC) J44.9   Unknown - Present    Antrochoanal polyp J33.0   10/6/2015 - Present    Hyperlipidemia E78.5   6/3/2016 - Present    RA (rheumatoid arthritis) (CMS-HCC) M06.9   6/3/2016 - Present    SOB (shortness of breath) R06.02   6/3/2016 - Present    Fatigue R53.83   6/3/2016 - Present    Orthostasis I95.1   " 6/23/2016 - Present    Hyperlipidemia E78.5   6/23/2016 - Present    Essential hypertension I10   8/2/2016 - Present    CKD (chronic kidney disease), stage III N18.3   8/2/2016 - Present    Coronary artery calcification seen on CAT scan I25.10   8/2/2016 - Present    Elevated TSH R94.6   12/6/2016 - Present    Mixed dyslipidemia E78.2   12/6/2016 - Present    PVC (premature ventricular contraction) I49.3   8/14/2017 - Present      Health Maintenance        Date Due Completion Dates    IMM INFLUENZA (1) 9/1/2017 9/26/2016    COLONOSCOPY 11/20/2022 11/20/2012 (Done)    Override on 11/20/2012: Done (3 polyps, DHA)    IMM DTaP/Tdap/Td Vaccine (2 - Td) 10/13/2024 10/13/2014            Current Immunizations     13-VALENT PCV PREVNAR 10/13/2014    Dtap Vaccine 10/13/2014    Influenza Vaccine Adult HD 9/26/2016    Pneumococcal polysaccharide vaccine (PPSV-23) 5/8/2015    SHINGLES VACCINE 3/8/2010    Tuberculin Skin Test 2/23/2015      Below and/or attached are the medications your provider expects you to take. Review all of your home medications and newly ordered medications with your provider and/or pharmacist. Follow medication instructions as directed by your provider and/or pharmacist. Please keep your medication list with you and share with your provider. Update the information when medications are discontinued, doses are changed, or new medications (including over-the-counter products) are added; and carry medication information at all times in the event of emergency situations     Allergies:  CIPRO XR - (reactions not documented)     LEVAQUIN - (reactions not documented)     PCN - Hives               Medications  Valid as of: August 14, 2017 -  8:25 AM    Generic Name Brand Name Tablet Size Instructions for use    Acetaminophen (Tab) TYLENOL 325 MG Take 2 Tabs by mouth 3 times a day as needed.        Albuterol Sulfate (Aero Soln) albuterol 108 (90 BASE) MCG/ACT Inhale 2 Puffs by mouth every 6 hours as needed for  Shortness of Breath.        Budesonide-Formoterol Fumarate (Aerosol) SYMBICORT 80-4.5 MCG/ACT INHALE 2 PUFFS BY MOUTH TWO TIMES A DAY        Calcium Carb-Cholecalciferol (Tab) Calcium-Vitamin D 600-400 MG-UNIT Take 1 Tab by mouth 2 Times a Day.        Certolizumab Pegol   Inject  as instructed.        Cholecalciferol (Cap) Vitamin D3 2000 UNIT Take  by mouth.        Clotrimazole-Betamethasone (Cream) LOTRISONE 1-0.05 % To affected area bid PRN        Ferrous Sulfate (Tablet Delayed Response) ferrous sulfate 325 (65 FE) MG Take 1 Tab by mouth every day.        Fexofenadine HCl (Tab) ALLEGRA 180 MG Take 180 mg by mouth every day.        Fluticasone Propionate (Suspension) FLONASE 50 MCG/ACT Spray 1 Spray in nose every day.        Gabapentin (Cap) NEURONTIN 300 MG TAKE 1 CAPSULE BY MOUTH THREE TIMES A DAY        GuaiFENesin (TABLET SR 12 HR) MUCINEX 600 MG Take 600 mg by mouth every 12 hours.        Leflunomide (Tab) ARAVA 20 MG Take 20 mg by mouth every day.        Loperamide HCl (Tab) IMODIUM A-D 2 MG Take 2 mg by mouth every day.        Magnesium (Tab) Magnesium 250 MG Take 1 Tab by mouth every day.        Multiple Vitamins-Minerals   Take  by mouth.        NON SPECIFIED   EB N5 - has L methylfolate, ALA, B12, B6, and D3.        Rosuvastatin Calcium (Tab) CRESTOR 20 MG TAKE 1 TABLET BY MOUTH EVERY EVENING        Tiotropium Bromide Monohydrate (Cap) SPIRIVA HANDIHALER 18 MCG INHALE THE CONTENTS OF 1 CAPSULE VIA HANDIHALER BY MOUTH DAILY        .                 Medicines prescribed today were sent to:     [id number] - [place], [place] - [address] AT [facility] [address]    [address] [place] [address]    Phone: [phone] Fax: [phone]    Open 24 Hours?: No    [facility] - [place], [place] -  [address] [address] [address] [facility] [address]    Phone: [phone] Fax: [phone]    Open 24 Hours?: No      Medication refill  instructions:       If your prescription bottle indicates you have medication refills left, it is not necessary to call your provider’s office. Please contact your pharmacy and they will refill your medication.    If your prescription bottle indicates you do not have any refills left, you may request refills at any time through one of the following ways: The online [employer] system (except Urgent Care), by calling your provider’s office, or by asking your pharmacy to contact your provider’s office with a refill request. Medication refills are processed only during regular business hours and may not be available until the next business day. Your provider may request additional information or to have a follow-up visit with you prior to refilling your medication.   *Please Note: Medication refills are assigned a new Rx number when refilled electronically. Your pharmacy may indicate that no refills were authorized even though a new prescription for the same medication is available at the pharmacy. Please request the medicine by name with the pharmacy before contacting your provider for a refill.           [employer] Access Code: Activation code not generated  Current [employer] Status: Active

## 2017-08-14 NOTE — PROGRESS NOTES
Subjective:   Barry Torres is a 77 -year-old man with a history of chronic kidney disease stage IIIB, rheumatoid arthritis, dyslipidemia, and essential hypertension referred for evaluation of frequent PVCs on his Holter.    He continues to do well, and again has no cardiovascular complaint with the exception of moderate lower extremity edema that is unchanged. He exercises for 25 minutes at a time 3 days a week on a recumbent bicycle.    He did stop his atenolol in conjunction with some fatigue and in discussion with his primary care physician. He has felt better since then, and brings in a log of his blood pressures documenting predominantly blood pressures in the 120s to 130s mmHg systolic.    Past Medical History   Diagnosis Date   • Hypertension    • Hypercholesteremia    • Hypertriglyceridemia    • [place] (hard of hearing)      declines hearing aids   • GERD (gastroesophageal reflux disease)    • History of hemorrhoids    • Solitary kidney      history of kidney cyst 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-functioning kidney s/p nephrectomy    • Epididymitis 2009     h/o listed in chart   • Pleural plaque 2005      CT [place]   • Chronic lung disease      asbestos related   • Sleep apnea      obstructive and central - not adherent to autopap   • RA (rheumatoid arthritis) (CMS-HCC)      on meds, sees rheum   • History of skin cancer      non melanoma   • Indigestion    • Light headedness      2/2 orthostasis? now better off hctz   • Peripheral neuropathy    • Abnormal thyroid stimulating hormone (TSH) level    • CKD (chronic kidney disease), stage III      sees neph   • Chronic diarrhea      declines eval; takes immodium once a day usually and sometimes less; see previous notes; aware of risk    • Lightheadedness 6/23/2016   • Orthostasis 6/23/2016     better off HCTZ   • Coronary artery calcification seen on CAT scan 8/2/2016     sees cards     Past Surgical History   Procedure Laterality Date   • Testicle exploration  2004   •  Cystoscopy  2/1/2010     Performed by ANGIE VERDUZCO at SURGERY [facility] ORS   • Retrogrades  2/1/2010     Performed by ANGIE VERDUZCO at SURGERY [facility] ORS   • Pyelogram  2/1/2010     Performed by ANGIE VERDUZCO at SURGERY [facility] ORS   • Ureteroscopy  2/1/2010     Performed by ANGIE VERDUZCO at SURGERY [facility] ORS   • Nephrectomy laparoscopic  2009     left kidney   • Nasal polypectomy Bilateral 10/6/2015     Procedure: NASAL POLYPECTOMY WITH SCOTT ENDOSCOPIC NASAL DEBRIDEMENT;  Surgeon: Param Maurer M.D.;  Location: SURGERY SAME DAY [facility];  Service:      Family History   Problem Relation Age of Onset   • Genetic Father      ALS   • Heart Attack Neg Hx    • Heart Disease Neg Hx    • Heart Failure Neg Hx      History   Smoking status   • Former Smoker -- 1.00 packs/day for 31 years   Smokeless tobacco   • Never Used     Comment: 1 pk a day for 35 yrs     Allergies   Allergen Reactions   • Cipro Xr      Muscle aches.   • Levaquin      Muscle aches   • Pcn [Penicillins] Hives     Outpatient Encounter Prescriptions as of 8/14/2017   Medication Sig Dispense Refill   • gabapentin (NEURONTIN) 300 MG Cap TAKE 1 CAPSULE BY MOUTH THREE TIMES A  Cap 2   • NON SPECIFIED EB N5 - has L methylfolate, ALA, B12, B6, and D3.     • SPIRIVA HANDIHALER 18 MCG Cap INHALE THE CONTENTS OF 1 CAPSULE VIA HANDIHALER BY MOUTH DAILY 90 Cap 3   • rosuvastatin (CRESTOR) 20 MG Tab TAKE 1 TABLET BY MOUTH EVERY EVENING 90 Tab 0   • SYMBICORT 80-4.5 MCG/ACT Aerosol INHALE 2 PUFFS BY MOUTH TWO TIMES A DAY 3 Inhaler 1   • clotrimazole-betamethasone (LOTRISONE) 1-0.05 % Cream To affected area bid PRN     • Calcium Carb-Cholecalciferol (CALCIUM-VITAMIN D) 600-400 MG-UNIT Tab Take 1 Tab by mouth 2 Times a Day.     • Magnesium 250 MG Tab Take 1 Tab by mouth every day.     • acetaminophen (TYLENOL) 325 MG Tab Take 2 Tabs by mouth 3 times a day as needed.     • albuterol 108 (90 BASE) MCG/ACT  "Aero Soln inhalation aerosol Inhale 2 Puffs by mouth every 6 hours as needed for Shortness of Breath. 8.5 g 3   • fluticasone (FLONASE) 50 MCG/ACT nasal spray Spray 1 Spray in nose every day.     • loperamide (IMODIUM A-D) 2 MG tablet Take 2 mg by mouth every day.     • leflunomide (ARAVA) 20 MG Tab Take 20 mg by mouth every day.     • guaifenesin LA (MUCINEX) 600 MG TABLET SR 12 HR Take 600 mg by mouth every 12 hours.     • Certolizumab Pegol (CIMZIA PREFILLED SC) Inject  as instructed.     • Multiple Vitamin (MULTIVITAMIN PO) Take  by mouth.     • fexofenadine (ALLEGRA) 180 MG tablet Take 180 mg by mouth every day.     • ferrous sulfate (FE TABS) 325 (65 FE) MG EC tablet Take 1 Tab by mouth every day.     • [DISCONTINUED] atenolol (TENORMIN) 25 MG Tab Take 1 Tab by mouth every day. (Patient not taking: Reported on 8/14/2017) 90 Tab 3   • Cholecalciferol (VITAMIN D3) 2000 UNIT Cap Take  by mouth.       No facility-administered encounter medications on file as of 8/14/2017.     Review of Systems   HENT: Positive for hearing loss and tinnitus.    Respiratory: Positive for sputum production and shortness of breath.    Cardiovascular: Positive for leg swelling (mild and unchanged for 20 years). Negative for chest pain, palpitations, orthopnea, claudication and PND.   Gastrointestinal: Positive for heartburn.   Musculoskeletal: Positive for joint pain.   Skin: Positive for rash.   Neurological: Positive for weakness.   Endo/Heme/Allergies: Bruises/bleeds easily.   All other systems reviewed and are negative.       Objective:   /80 mmHg  Pulse 80  Ht 1.854 m (6' 0.99\")  Wt 88.451 kg (195 lb)  BMI 25.73 kg/m2  SpO2 90%    Physical Exam   Constitutional: He is oriented to person, place, and time. He appears well-developed and well-nourished. No distress.   Very pleasant, elderly man accompanied by his wife in no distress   HENT:   Head: Normocephalic and atraumatic.   Eyes: Conjunctivae and EOM are normal. " "Pupils are equal, round, and reactive to light. No scleral icterus.   Neck: Neck supple. No JVD present. No tracheal deviation present.   Cardiovascular: Normal rate, regular rhythm, normal heart sounds and intact distal pulses.  Exam reveals no gallop and no friction rub.    No murmur heard.  Pulses:       Dorsalis pedis pulses are 2+ on the right side, and 2+ on the left side.   No carotid bruits   Pulmonary/Chest: Effort normal and breath sounds normal. No stridor. No respiratory distress. He has no wheezes. He has no rales.   Abdominal: Soft. Bowel sounds are normal. He exhibits no distension.   Musculoskeletal: He exhibits no edema.   Neurological: He is alert and oriented to person, place, and time.   Skin: Skin is warm and dry. No rash noted. He is not diaphoretic. No erythema. No pallor.   Psychiatric: He has a normal mood and affect. Judgment and thought content normal.   Vitals reviewed.    Lab Results   Component Value Date/Time    WBC 4.6* 07/25/2017 06:50 AM    RBC 4.06* 07/25/2017 06:50 AM    HEMOGLOBIN 13.2* 07/25/2017 06:50 AM    HEMATOCRIT 40.9* 07/25/2017 06:50 AM    .7* 07/25/2017 06:50 AM    MCH 32.5 07/25/2017 06:50 AM    MCHC 32.3* 07/25/2017 06:50 AM    MPV 9.3 07/25/2017 06:50 AM        Lab Results   Component Value Date/Time    SODIUM 138 07/25/2017 06:50 AM    POTASSIUM 4.2 07/25/2017 06:50 AM    CHLORIDE 105 07/25/2017 06:50 AM    CO2 26 07/25/2017 06:50 AM    GLUCOSE 89 07/25/2017 06:50 AM    BUN 21 07/25/2017 06:50 AM    CREATININE 1.43* 07/25/2017 06:50 AM        Lab Results   Component Value Date/Time    AST(SGOT) 29 04/27/2017 01:46 PM    ALT(SGPT) 26 04/27/2017 01:46 PM        Lab Results   Component Value Date/Time    CHOLESTEROL, 07/25/2017 06:50 AM    LDL 41 07/25/2017 06:50 AM    HDL 39* 07/25/2017 06:50 AM    TRIGLYCERIDES 131 07/25/2017 06:50 AM       Holter monitor, 6/14/2016:  \"Interpretive Statements   *Monitoring started at 2:05 PM and continued for 23 hr 59 " "min. The   underlying rhythm was sinus with first degree   atrioventricular block. The average heart rate was 69 BPM. The minimum heart   rate was 50 BPM, occurring at 4:03:37 AM. The   maximum heart rate was 89 BPM, occurring at 6:02:58 AM. The minimum and   maximum heart rates, were gradual onset and   offsets.   The longest R-R interval was 1.6 seconds occurring at 4:27:47 AM.   *Supraventricular ectopic activity consisted of 4686 beats, of which, 11   were in 3 runs, 454 were in atrial couplets, 40 were   late beats, 4181 were single PACs. The longest supraventricular run occurred   at 5:25:03 PM consisting of 5 beats, with   maximum heart rate of 107 BPM. The fastest supraventricular run occurred at   11:34:57 PM, consisting of 3 beats, with   maximum heart rate of 113 BPM.   *Ventricular ectopic activity consisted of 3024 beats, of which, 2 were in   couplets, 2591 were in single PVCs, 336 were in   interpolated PVCs, 5 were single VEs, 10 were in bigeminy, 80 were in   trigeminy.   *The patient's rhythm included 3 hr 34 min 45 sec of bradycardia. The   slowest single episode of bradycardia occurred at   4:02:10 AM, lasting 4 min 46 sec, with minimum heart rate of 50 BPM.   *No diary entries.\"    Echocardiogram, 7/1/2016:  \"CONCLUSIONS  Normal left ventricular chamber size and systolic function. Left   ventricular ejection fraction is 60%.   Mild left ventricular hypertrophy.  Mild mitral regurgitation.    Mild tricuspid regurgitation.   Mild to moderate pulmonic insufficiency.  Right ventricular systolic pressure is mildly elevated \"    CT chest, 11/12/2015, images reviewed: Demonstrates moderate calcification of his left main, LAD and circumflex coronary arteries    Assessment:     1. PVC (premature ventricular contraction)  HOLTER MONITOR STUDY   2. Mixed dyslipidemia     3. Essential hypertension         Medical Decision Making:  Today's Assessment / Status / Plan:     He is doing well today, and has no " cardiovascular complaint I think with quite reasonable control of his blood pressures in the 120s and 130s mmHg range predominantly after having stopped atenolol. I did repeat a 24-hour Holter monitor to reevaluate the frequency of his PVCs. I reviewed with him his lipids with an LDL most recently measured at 41 mg/dL on 20 mg by mouth daily at bedtime of Crestor, which I have not changed. I also again reviewed the images of his CT chest from November 2015 demonstrating moderate calcification of his coronary arteries. In the setting of no angina, and normal left ventricular ejection fraction I do not think that we need further evaluation of his coronaries though he probably does have at least moderate coronary artery disease.    Maxwell Krishnamurthy MD  Cardiologist, [facility] [place]

## 2017-08-14 NOTE — PROGRESS NOTES
Name:          Barry Torres   YOB: 1940  Date:     8/14/2017      Lizzie Soto M.D.  [address]  [place] [address]     Maxwell Krishnamurthy MD  [address]  [place] [address]  Phone: [phone]  Back Line: [phone]  Fax: [phone]  E-mail: [email or web address]   Dear Dr. Soto,    We had the pleasure of seeing your patient, Barry Torres, in Cardiology Clinic at [facility] today.    As you know, he is a 77-year-old man with a history of chronic kidney disease stage IIIB, rheumatoid arthritis, dyslipidemia, and essential hypertension referred for evaluation of frequent PVCs on his Holter.    He is doing well today, and has no cardiovascular complaint I think with quite reasonable control of his blood pressures in the 120s and 130s mmHg range predominantly after having stopped atenolol. I did repeat a 24-hour Holter monitor to reevaluate the frequency of his PVCs. I reviewed with him his lipids with an LDL most recently measured at 41 mg/dL on 20 mg by mouth daily at bedtime of Crestor, which I have not changed. I also again reviewed the images of his CT chest from November 2015 demonstrating moderate calcification of his coronary arteries. In the setting of no angina, and normal left ventricular ejection fraction I do not think that we need further evaluation of his coronaries though he probably does have at least moderate coronary artery disease.    We will have the patient follow-up in one year.    Thank you for the referral and please do not hesitate to contact me at any time. My contact information is listed above.    This note was dictated using Dragon speech recognition software.     A full note including my physical examination and a full list of rectified medications is available in our medical record, and can be faxed as well.    Maxwell Krishnamurthy MD  Cardiologist  [facility]

## 2017-08-14 NOTE — Clinical Note
Name:          Barry Torres   YOB: 1940  Date:     8/14/2017      Lizzie Soto M.D.  [address]  [place] [address]     Maxwell Krishnamurthy MD  [address]  [place] [address]  Phone: [phone]  Back Line: [phone]  Fax: [phone]  E-mail: [email or web address]   Dear Dr. Soto,    We had the pleasure of seeing your patient, Barry Torres, in Cardiology Clinic at [facility] today.    As you know, he is a 77-year-old man with a history of chronic kidney disease stage IIIB, rheumatoid arthritis, dyslipidemia, and essential hypertension referred for evaluation of frequent PVCs on his Holter.    He is doing well today, and has no cardiovascular complaint I think with quite reasonable control of his blood pressures in the 120s and 130s mmHg range predominantly after having stopped atenolol. I did repeat a 24-hour Holter monitor to reevaluate the frequency of his PVCs. I reviewed with him his lipids with an LDL most recently measured at 41 mg/dL on 20 mg by mouth daily at bedtime of Crestor, which I have not changed. I also again reviewed the images of his CT chest from November 2015 demonstrating moderate calcification of his coronary arteries. In the setting of no angina, and normal left ventricular ejection fraction I do not think that we need further evaluation of his coronaries though he probably does have at least moderate coronary artery disease.    We will have the patient follow-up in one year.    Thank you for the referral and please do not hesitate to contact me at any time. My contact information is listed above.    This note was dictated using Dragon speech recognition software.     A full note including my physical examination and a full list of rectified medications is available in our medical record, and can be faxed as well.    Maxwell Krishnamurthy MD  Cardiologist  [facility]

## 2017-08-24 RX ORDER — ROSUVASTATIN CALCIUM 20 MG/1
20 TABLET, COATED ORAL
Qty: 90 TAB | Refills: 2 | Status: SHIPPED | OUTPATIENT
Start: 2017-08-24 | End: 2018-04-28 | Stop reason: SDUPTHER

## 2017-08-24 NOTE — TELEPHONE ENCOUNTER
Was the patient seen in the last year in this department? Yes   Last seen: 07/10/17 by Dr. Soto  Next appt: 11/17/17 with Dr. Soto      Does patient have an active prescription for medications requested? No -Pt has no more refills.      Received Request Via: Patient

## 2017-08-28 ENCOUNTER — APPOINTMENT (OUTPATIENT)
Dept: RADIOLOGY | Facility: MEDICAL CENTER | Age: 77
DRG: 558 | End: 2017-08-28
Attending: EMERGENCY MEDICINE
Payer: MEDICARE

## 2017-08-28 ENCOUNTER — RESOLUTE PROFESSIONAL BILLING HOSPITAL PROF FEE (OUTPATIENT)
Dept: HOSPITALIST | Facility: MEDICAL CENTER | Age: 77
End: 2017-08-28
Payer: MEDICARE

## 2017-08-28 ENCOUNTER — HOSPITAL ENCOUNTER (INPATIENT)
Facility: MEDICAL CENTER | Age: 77
LOS: 1 days | DRG: 558 | End: 2017-08-29
Attending: EMERGENCY MEDICINE | Admitting: INTERNAL MEDICINE
Payer: MEDICARE

## 2017-08-28 DIAGNOSIS — M71.122 SEPTIC BURSITIS OF ELBOW, LEFT: ICD-10-CM

## 2017-08-28 PROBLEM — M00.9 SEPTIC ARTHRITIS (HCC): Status: ACTIVE | Noted: 2017-08-28

## 2017-08-28 LAB
ALBUMIN SERPL BCP-MCNC: 4 G/DL (ref 3.2–4.9)
ALBUMIN/GLOB SERPL: 1.3 G/DL
ALP SERPL-CCNC: 71 U/L (ref 30–99)
ALT SERPL-CCNC: 30 U/L (ref 2–50)
ANION GAP SERPL CALC-SCNC: 9 MMOL/L (ref 0–11.9)
AST SERPL-CCNC: 37 U/L (ref 12–45)
BASOPHILS # BLD AUTO: 1 % (ref 0–1.8)
BASOPHILS # BLD: 0.07 K/UL (ref 0–0.12)
BILIRUB SERPL-MCNC: 0.8 MG/DL (ref 0.1–1.5)
BUN SERPL-MCNC: 21 MG/DL (ref 8–22)
CALCIUM SERPL-MCNC: 9.2 MG/DL (ref 8.5–10.5)
CHLORIDE SERPL-SCNC: 106 MMOL/L (ref 96–112)
CK SERPL-CCNC: 329 U/L (ref 0–154)
CO2 SERPL-SCNC: 22 MMOL/L (ref 20–33)
CREAT SERPL-MCNC: 1.51 MG/DL (ref 0.5–1.4)
CRP SERPL HS-MCNC: 17.34 MG/DL (ref 0–0.75)
EOSINOPHIL # BLD AUTO: 0.1 K/UL (ref 0–0.51)
EOSINOPHIL NFR BLD: 1.4 % (ref 0–6.9)
ERYTHROCYTE [DISTWIDTH] IN BLOOD BY AUTOMATED COUNT: 53.5 FL (ref 35.9–50)
ERYTHROCYTE [SEDIMENTATION RATE] IN BLOOD BY WESTERGREN METHOD: 39 MM/HOUR (ref 0–20)
GFR SERPL CREATININE-BSD FRML MDRD: 45 ML/MIN/1.73 M 2
GLOBULIN SER CALC-MCNC: 3.2 G/DL (ref 1.9–3.5)
GLUCOSE SERPL-MCNC: 95 MG/DL (ref 65–99)
HCT VFR BLD AUTO: 39.8 % (ref 42–52)
HGB BLD-MCNC: 12.7 G/DL (ref 14–18)
IMM GRANULOCYTES # BLD AUTO: 0 K/UL (ref 0–0.11)
IMM GRANULOCYTES NFR BLD AUTO: 0 % (ref 0–0.9)
LACTATE BLD-SCNC: 1.3 MMOL/L (ref 0.5–2)
LYMPHOCYTES # BLD AUTO: 1.55 K/UL (ref 1–4.8)
LYMPHOCYTES NFR BLD: 21.7 % (ref 22–41)
MAGNESIUM SERPL-MCNC: 2 MG/DL (ref 1.5–2.5)
MCH RBC QN AUTO: 31.6 PG (ref 27–33)
MCHC RBC AUTO-ENTMCNC: 31.9 G/DL (ref 33.7–35.3)
MCV RBC AUTO: 99 FL (ref 81.4–97.8)
MONOCYTES # BLD AUTO: 1.23 K/UL (ref 0–0.85)
MONOCYTES NFR BLD AUTO: 17.2 % (ref 0–13.4)
NEUTROPHILS # BLD AUTO: 4.19 K/UL (ref 1.82–7.42)
NEUTROPHILS NFR BLD: 58.7 % (ref 44–72)
NRBC # BLD AUTO: 0 K/UL
NRBC BLD AUTO-RTO: 0 /100 WBC
PLATELET # BLD AUTO: 190 K/UL (ref 164–446)
PMV BLD AUTO: 9.6 FL (ref 9–12.9)
POTASSIUM SERPL-SCNC: 3.9 MMOL/L (ref 3.6–5.5)
PROT SERPL-MCNC: 7.2 G/DL (ref 6–8.2)
RBC # BLD AUTO: 4.02 M/UL (ref 4.7–6.1)
SODIUM SERPL-SCNC: 137 MMOL/L (ref 135–145)
WBC # BLD AUTO: 7.1 K/UL (ref 4.8–10.8)

## 2017-08-28 PROCEDURE — 770001 HCHG ROOM/CARE - MED/SURG/GYN PRIV*

## 2017-08-28 PROCEDURE — 71010 DX-CHEST-PORTABLE (1 VIEW): CPT

## 2017-08-28 PROCEDURE — 96365 THER/PROPH/DIAG IV INF INIT: CPT

## 2017-08-28 PROCEDURE — 82550 ASSAY OF CK (CPK): CPT

## 2017-08-28 PROCEDURE — A9270 NON-COVERED ITEM OR SERVICE: HCPCS | Performed by: INTERNAL MEDICINE

## 2017-08-28 PROCEDURE — 87040 BLOOD CULTURE FOR BACTERIA: CPT | Mod: 91

## 2017-08-28 PROCEDURE — 700105 HCHG RX REV CODE 258: Performed by: EMERGENCY MEDICINE

## 2017-08-28 PROCEDURE — 85025 COMPLETE CBC W/AUTO DIFF WBC: CPT

## 2017-08-28 PROCEDURE — 36415 COLL VENOUS BLD VENIPUNCTURE: CPT

## 2017-08-28 PROCEDURE — 86140 C-REACTIVE PROTEIN: CPT

## 2017-08-28 PROCEDURE — 83605 ASSAY OF LACTIC ACID: CPT

## 2017-08-28 PROCEDURE — 99285 EMERGENCY DEPT VISIT HI MDM: CPT

## 2017-08-28 PROCEDURE — 700102 HCHG RX REV CODE 250 W/ 637 OVERRIDE(OP): Performed by: INTERNAL MEDICINE

## 2017-08-28 PROCEDURE — 96375 TX/PRO/DX INJ NEW DRUG ADDON: CPT

## 2017-08-28 PROCEDURE — 83735 ASSAY OF MAGNESIUM: CPT

## 2017-08-28 PROCEDURE — 700111 HCHG RX REV CODE 636 W/ 250 OVERRIDE (IP): Performed by: EMERGENCY MEDICINE

## 2017-08-28 PROCEDURE — 80053 COMPREHEN METABOLIC PANEL: CPT

## 2017-08-28 PROCEDURE — 85652 RBC SED RATE AUTOMATED: CPT

## 2017-08-28 RX ORDER — CEFTRIAXONE 2 G/1
2 INJECTION, POWDER, FOR SOLUTION INTRAMUSCULAR; INTRAVENOUS ONCE
Status: COMPLETED | OUTPATIENT
Start: 2017-08-28 | End: 2017-08-28

## 2017-08-28 RX ORDER — FLUTICASONE PROPIONATE 50 MCG
1 SPRAY, SUSPENSION (ML) NASAL DAILY
Status: DISCONTINUED | OUTPATIENT
Start: 2017-08-29 | End: 2017-08-29 | Stop reason: HOSPADM

## 2017-08-28 RX ORDER — POLYETHYLENE GLYCOL 3350 17 G/17G
1 POWDER, FOR SOLUTION ORAL
Status: DISCONTINUED | OUTPATIENT
Start: 2017-08-28 | End: 2017-08-29 | Stop reason: HOSPADM

## 2017-08-28 RX ORDER — ACETAMINOPHEN 325 MG/1
650 TABLET ORAL EVERY 6 HOURS PRN
Status: DISCONTINUED | OUTPATIENT
Start: 2017-08-28 | End: 2017-08-29 | Stop reason: HOSPADM

## 2017-08-28 RX ORDER — CEFTRIAXONE 2 G/1
2 INJECTION, POWDER, FOR SOLUTION INTRAMUSCULAR; INTRAVENOUS EVERY 12 HOURS
Status: DISCONTINUED | OUTPATIENT
Start: 2017-08-28 | End: 2017-08-28

## 2017-08-28 RX ORDER — AMOXICILLIN 250 MG
2 CAPSULE ORAL 2 TIMES DAILY
Status: DISCONTINUED | OUTPATIENT
Start: 2017-08-28 | End: 2017-08-29 | Stop reason: HOSPADM

## 2017-08-28 RX ORDER — BUDESONIDE AND FORMOTEROL FUMARATE DIHYDRATE 80; 4.5 UG/1; UG/1
2 AEROSOL RESPIRATORY (INHALATION)
Status: DISCONTINUED | OUTPATIENT
Start: 2017-08-28 | End: 2017-08-29 | Stop reason: HOSPADM

## 2017-08-28 RX ORDER — LABETALOL HYDROCHLORIDE 5 MG/ML
10 INJECTION, SOLUTION INTRAVENOUS EVERY 4 HOURS PRN
Status: DISCONTINUED | OUTPATIENT
Start: 2017-08-28 | End: 2017-08-29 | Stop reason: HOSPADM

## 2017-08-28 RX ORDER — FEXOFENADINE HCL 60 MG/1
180 TABLET, FILM COATED ORAL DAILY
Status: DISCONTINUED | OUTPATIENT
Start: 2017-08-29 | End: 2017-08-29 | Stop reason: HOSPADM

## 2017-08-28 RX ORDER — ROSUVASTATIN CALCIUM 20 MG/1
20 TABLET, COATED ORAL
Status: DISCONTINUED | OUTPATIENT
Start: 2017-08-28 | End: 2017-08-29 | Stop reason: HOSPADM

## 2017-08-28 RX ORDER — ONDANSETRON 4 MG/1
4 TABLET, ORALLY DISINTEGRATING ORAL EVERY 4 HOURS PRN
Status: DISCONTINUED | OUTPATIENT
Start: 2017-08-28 | End: 2017-08-29 | Stop reason: HOSPADM

## 2017-08-28 RX ORDER — TIOTROPIUM BROMIDE 18 UG/1
1 CAPSULE ORAL; RESPIRATORY (INHALATION)
Status: DISCONTINUED | OUTPATIENT
Start: 2017-08-29 | End: 2017-08-29 | Stop reason: HOSPADM

## 2017-08-28 RX ORDER — BISACODYL 10 MG
10 SUPPOSITORY, RECTAL RECTAL
Status: DISCONTINUED | OUTPATIENT
Start: 2017-08-28 | End: 2017-08-29 | Stop reason: HOSPADM

## 2017-08-28 RX ORDER — LEFLUNOMIDE 20 MG/1
20 TABLET ORAL DAILY
Status: DISCONTINUED | OUTPATIENT
Start: 2017-08-29 | End: 2017-08-29 | Stop reason: HOSPADM

## 2017-08-28 RX ORDER — GABAPENTIN 300 MG/1
300 CAPSULE ORAL 3 TIMES DAILY
Status: DISCONTINUED | OUTPATIENT
Start: 2017-08-28 | End: 2017-08-29 | Stop reason: HOSPADM

## 2017-08-28 RX ORDER — SODIUM CHLORIDE, SODIUM LACTATE, POTASSIUM CHLORIDE, CALCIUM CHLORIDE 600; 310; 30; 20 MG/100ML; MG/100ML; MG/100ML; MG/100ML
1000 INJECTION, SOLUTION INTRAVENOUS CONTINUOUS
Status: CANCELLED | OUTPATIENT
Start: 2017-08-28

## 2017-08-28 RX ORDER — GABAPENTIN 300 MG/1
300 CAPSULE ORAL 3 TIMES DAILY
COMMUNITY
End: 2018-01-22

## 2017-08-28 RX ORDER — ONDANSETRON 2 MG/ML
4 INJECTION INTRAMUSCULAR; INTRAVENOUS EVERY 4 HOURS PRN
Status: DISCONTINUED | OUTPATIENT
Start: 2017-08-28 | End: 2017-08-29 | Stop reason: HOSPADM

## 2017-08-28 RX ADMIN — ROSUVASTATIN CALCIUM 20 MG: 20 TABLET, FILM COATED ORAL at 23:38

## 2017-08-28 RX ADMIN — CEFTRIAXONE SODIUM 2 G: 2 INJECTION, POWDER, FOR SOLUTION INTRAMUSCULAR; INTRAVENOUS at 16:00

## 2017-08-28 RX ADMIN — GABAPENTIN 300 MG: 300 CAPSULE ORAL at 23:38

## 2017-08-28 RX ADMIN — BUDESONIDE AND FORMOTEROL FUMARATE DIHYDRATE 2 PUFF: 80; 4.5 AEROSOL RESPIRATORY (INHALATION) at 23:39

## 2017-08-28 RX ADMIN — VANCOMYCIN HYDROCHLORIDE 2200 MG: 100 INJECTION, POWDER, LYOPHILIZED, FOR SOLUTION INTRAVENOUS at 17:23

## 2017-08-28 ASSESSMENT — ENCOUNTER SYMPTOMS
VOMITING: 0
CHILLS: 0
FEVER: 1
PHOTOPHOBIA: 0
NAUSEA: 0
CHILLS: 1
STRIDOR: 0
BACK PAIN: 0
FALLS: 0
ORTHOPNEA: 0
HEARTBURN: 0
SHORTNESS OF BREATH: 0
TINGLING: 0
DOUBLE VISION: 0
SORE THROAT: 0
DEPRESSION: 0
DIAPHORESIS: 0
WEIGHT LOSS: 0
TREMORS: 0
BLURRED VISION: 0
DIZZINESS: 0
PALPITATIONS: 0
BRUISES/BLEEDS EASILY: 0
COUGH: 0
HEADACHES: 0

## 2017-08-28 ASSESSMENT — PAIN SCALES - GENERAL: PAINLEVEL_OUTOF10: 2

## 2017-08-28 NOTE — ED PROVIDER NOTES
ED Provider Note    Scribed for Arcadio Bright M.D. by Tyshawn Lawrence. 8/28/2017, 2:55 PM.    Primary care provider: Lizzie Soto M.D.  Means of arrival: Walk In  History obtained from: Patient   History limited by: None    CHIEF COMPLAINT  Chief Complaint   Patient presents with   • Elbow Pain     swelling left elbow since thursday       HPI  Barry Torres is a 77 y.o. male who presents to the Emergency Department referred from [facility] for left elbow bursitis with possible infection. Patient reports pain worsened and swelling increased 4 days ago. There is redness to the area. No alleviating or exacerbating factors noted. He has experienced associated fevers and chills. Per family, fever started 5 days ago which has been treated. Patient denies chest pain, shortness of breath, cough, or sore throat.  Patient denies any out patient antibiotics  He notes Ciproflaxin and Penicillin allergy. Current past medical history can be reviewed under nursing notes. Patient denies any other acute concerns or complaints.    REVIEW OF SYSTEMS  Review of Systems   Constitutional: Positive for chills and fever.   HENT: Negative for sore throat.    Respiratory: Negative for cough and shortness of breath.    Cardiovascular: Negative for chest pain.   Musculoskeletal: Positive for joint pain.        + Left elbow pain  + Left elbow swelling  + Left elbow redness   All other systems reviewed and are negative.      PAST MEDICAL HISTORY   has a past medical history of Abnormal thyroid stimulating hormone (TSH) level; Chronic diarrhea; Chronic lung disease; CKD (chronic kidney disease), stage III; Coronary artery calcification seen on CAT scan (8/2/2016); Epididymitis (2009); GERD (gastroesophageal reflux disease); History of hemorrhoids; History of skin cancer; Tribal (hard of hearing); Hypercholesteremia; Hypertension; Hypertriglyceridemia; Indigestion; Light headedness; Lightheadedness (6/23/2016); Orthostasis (6/23/2016);  Peripheral neuropathy; Pleural plaque (2005 ); RA (rheumatoid arthritis) (CMS-[facility]); Sleep apnea; and Solitary kidney.    SURGICAL HISTORY   has a past surgical history that includes testicle exploration (2004); cystoscopy (2/1/2010); retrogrades (2/1/2010); pyelogram (2/1/2010); ureteroscopy (2/1/2010); nephrectomy laparoscopic (2009); and nasal polypectomy (Bilateral, 10/6/2015).    SOCIAL HISTORY  Social History   Substance Use Topics   • Smoking status: Former Smoker     Packs/day: 1.00     Years: 31.00   • Smokeless tobacco: Never Used      Comment: 1 pk a day for 35 yrs   • Alcohol use No      Comment: 2 a week      History   Drug Use No       FAMILY HISTORY  Family History   Problem Relation Age of Onset   • Genetic Father      ALS   • Heart Attack Neg Hx    • Heart Disease Neg Hx    • Heart Failure Neg Hx        CURRENT MEDICATIONS  No current facility-administered medications on file prior to encounter.      Current Outpatient Prescriptions on File Prior to Encounter   Medication Sig Dispense Refill   • rosuvastatin (CRESTOR) 20 MG Tab Take 1 Tab by mouth every bedtime. 90 Tab 2   • gabapentin (NEURONTIN) 300 MG Cap TAKE 1 CAPSULE BY MOUTH THREE TIMES A  Cap 2   • NON SPECIFIED EB N5 - has L methylfolate, ALA, B12, B6, and D3.     • SPIRIVA HANDIHALER 18 MCG Cap INHALE THE CONTENTS OF 1 CAPSULE VIA HANDIHALER BY MOUTH DAILY 90 Cap 3   • SYMBICORT 80-4.5 MCG/ACT Aerosol INHALE 2 PUFFS BY MOUTH TWO TIMES A DAY 3 Inhaler 1   • clotrimazole-betamethasone (LOTRISONE) 1-0.05 % Cream To affected area bid PRN     • Calcium Carb-Cholecalciferol (CALCIUM-VITAMIN D) 600-400 MG-UNIT Tab Take 1 Tab by mouth 2 Times a Day.     • Magnesium 250 MG Tab Take 1 Tab by mouth every day.     • ferrous sulfate (FE TABS) 325 (65 FE) MG EC tablet Take 1 Tab by mouth every day.     • acetaminophen (TYLENOL) 325 MG Tab Take 2 Tabs by mouth 3 times a day as needed.     • albuterol 108 (90 BASE) MCG/ACT Aero Soln inhalation  "aerosol Inhale 2 Puffs by mouth every 6 hours as needed for Shortness of Breath. 8.5 g 3   • fluticasone (FLONASE) 50 MCG/ACT nasal spray Spray 1 Spray in nose every day.     • Cholecalciferol (VITAMIN D3) 2000 UNIT Cap Take  by mouth.     • loperamide (IMODIUM A-D) 2 MG tablet Take 2 mg by mouth every day.     • leflunomide (ARAVA) 20 MG Tab Take 20 mg by mouth every day.     • guaifenesin LA (MUCINEX) 600 MG TABLET SR 12 HR Take 600 mg by mouth every 12 hours.     • Certolizumab Pegol (CIMZIA PREFILLED SC) Inject  as instructed.     • Multiple Vitamin (MULTIVITAMIN PO) Take  by mouth.     • fexofenadine (ALLEGRA) 180 MG tablet Take 180 mg by mouth every day.         ALLERGIES  Allergies   Allergen Reactions   • Cipro Xr      Muscle aches.   • Levaquin      Muscle aches   • Pcn [Penicillins] Hives       PHYSICAL EXAM  VITAL SIGNS: /76   Pulse 94   Temp 36.9 °C (98.5 °F) (Temporal)   Resp 18   Ht 1.854 m (6' 1\")   Wt 87.4 kg (192 lb 10.9 oz)   SpO2 92%   BMI 25.42 kg/m²   Vitals reviewed.  Constitutional: Awake, alert, appears in Mild Distress  HENT: Normocephalic, Atraumatic, Bilateral external ears normal, Oropharynx moist, No oral exudates, Nose normal.   Eyes: PERRL, EOMI, Conjunctiva normal, No discharge.   Neck: Normal range of motion, No tenderness, Supple, No stridor.   Cardiovascular: Normal heart rate, Normal rhythm, No murmurs, No rubs, No gallops.   Thorax & Lungs: Normal breath sounds, No respiratory distress, No wheezing, No chest tenderness.   Abdomen: Bowel sounds normal, Soft, No tenderness  Skin: Warm, Dry, No erythema, No rash.   Back: No tenderness, No CVA tenderness.   Musculoskeletal: LUE: Tenderness, swelling and redness to leftOlecranon bursa with surrounding cellulitis.  Range of motion normal neurovascular exam.  Remainder of his exam is unremarkable.    Neurologic: Alert, No focal deficits noted.   Psychiatric: Affect normal    LABS  DX-CHEST-PORTABLE (1 VIEW)   Final Result "      Bilateral linear scarring versus atelectasis.          All labs reviewed by me.    Results for orders placed or performed during the hospital encounter of 08/28/17   LACTIC ACID   Result Value Ref Range    Lactic Acid 1.3 0.5 - 2.0 mmol/L   CBC WITH DIFFERENTIAL   Result Value Ref Range    WBC 7.1 4.8 - 10.8 K/uL    RBC 4.02 (L) 4.70 - 6.10 M/uL    Hemoglobin 12.7 (L) 14.0 - 18.0 g/dL    Hematocrit 39.8 (L) 42.0 - 52.0 %    MCV 99.0 (H) 81.4 - 97.8 fL    MCH 31.6 27.0 - 33.0 pg    MCHC 31.9 (L) 33.7 - 35.3 g/dL    RDW 53.5 (H) 35.9 - 50.0 fL    Platelet Count 190 164 - 446 K/uL    MPV 9.6 9.0 - 12.9 fL    Neutrophils-Polys 58.70 44.00 - 72.00 %    Lymphocytes 21.70 (L) 22.00 - 41.00 %    Monocytes 17.20 (H) 0.00 - 13.40 %    Eosinophils 1.40 0.00 - 6.90 %    Basophils 1.00 0.00 - 1.80 %    Immature Granulocytes 0.00 0.00 - 0.90 %    Nucleated RBC 0.00 /100 WBC    Neutrophils (Absolute) 4.19 1.82 - 7.42 K/uL    Lymphs (Absolute) 1.55 1.00 - 4.80 K/uL    Monos (Absolute) 1.23 (H) 0.00 - 0.85 K/uL    Eos (Absolute) 0.10 0.00 - 0.51 K/uL    Baso (Absolute) 0.07 0.00 - 0.12 K/uL    Immature Granulocytes (abs) 0.00 0.00 - 0.11 K/uL    NRBC (Absolute) 0.00 K/uL   COMP METABOLIC PANEL   Result Value Ref Range    Sodium 137 135 - 145 mmol/L    Potassium 3.9 3.6 - 5.5 mmol/L    Chloride 106 96 - 112 mmol/L    Co2 22 20 - 33 mmol/L    Anion Gap 9.0 0.0 - 11.9    Glucose 95 65 - 99 mg/dL    Bun 21 8 - 22 mg/dL    Creatinine 1.51 (H) 0.50 - 1.40 mg/dL    Calcium 9.2 8.5 - 10.5 mg/dL    AST(SGOT) 37 12 - 45 U/L    ALT(SGPT) 30 2 - 50 U/L    Alkaline Phosphatase 71 30 - 99 U/L    Total Bilirubin 0.8 0.1 - 1.5 mg/dL    Albumin 4.0 3.2 - 4.9 g/dL    Total Protein 7.2 6.0 - 8.2 g/dL    Globulin 3.2 1.9 - 3.5 g/dL    A-G Ratio 1.3 g/dL   CREATINE KINASE   Result Value Ref Range    CPK Total 329 (H) 0 - 154 U/L   CRP QUANTITIVE (NON-CARDIAC)   Result Value Ref Range    Stat C-Reactive Protein 17.34 (H) 0.00 - 0.75 mg/dL    ESTIMATED GFR   Result Value Ref Range    GFR If  54 (A) >60 mL/min/1.73 m 2    GFR If Non African American 45 (A) >60 mL/min/1.73 m 2      RADIOLOGY  DX-CHEST-PORTABLE (1 VIEW)   Final Result      Bilateral linear scarring versus atelectasis.        The radiologist's interpretation of all radiological studies have been reviewed by me.    COURSE & MEDICAL DECISION MAKING  Pertinent Labs & Imaging studies reviewed. (See chart for details)    2:55 PM Patient seen and examined at bedside. The patient presents with left elbow pain, and the differential diagnosis includes but is not limited to , Septic bursitis versus inflammatory bursitis.. Ordered for chest x-ray, lactic acid, CBC with differential, CMP, UA, Urine culture, blood culture, Creatine Kinase, westergren sed rate, CRP quantitive to evaluate. Patient will be treated with *appropriate antibiotics per the septic protocol.  For his symptoms. Patient advised I will consult with Dr. Yates and order appropriate blood work.     2:58 PM Paged Dr. Yates, Orthopedics     3:09 PM I consulted with Dr. Yates, Orthopedics.     She is admitted [facility] for workup and treatment.    DISPOSITION:  Patient will be [facility] to [facility] with orthopedic consultation in guarded condition.      FINAL IMPRESSION  1. Septic bursitis of elbow, left          ITyshawn (Aiibe), am scribing for, and in the presence of, Arcadio Bright M.D..    Electronically signed by: Tyshawn Lawrence (Kalani), 8/28/2017    Arcadio GUO M.D. personally performed the services described in this documentation, as scribed by Tyshawn Lawrence in my presence, and it is both accurate and complete.    The note accurately reflects work and decisions made by me.  Arcadio Bright  8/28/2017  4:23 PM

## 2017-08-28 NOTE — ED NOTES
Chief Complaint   Patient presents with   • Elbow Pain     swelling left elbow since thursday     Pt ambulated to triage , sent here from [facility] for left elbow bursitis possible infection . Per pt ,  is expecting him.   Informed charge rn.

## 2017-08-28 NOTE — ED NOTES
Patient ambulatory to [facility]. Patient ambulatory w/ steady gait. Patient alert and oriented x 4. Patient states 7 week history of left elbow pain and swelling, diagnosed w/ bursitis. Patient states that swelling and pain had been improving over the last 7 weeks, but received flu shot on Wednesday and on Thursday noted increased redness, swelling, pain, and area was warm to touch. Patient states redness began to radiate down left forearm. Patient states he has had fever and chills at home.

## 2017-08-29 VITALS
RESPIRATION RATE: 17 BRPM | BODY MASS INDEX: 25.54 KG/M2 | WEIGHT: 192.68 LBS | OXYGEN SATURATION: 98 % | DIASTOLIC BLOOD PRESSURE: 90 MMHG | HEIGHT: 73 IN | TEMPERATURE: 97.6 F | SYSTOLIC BLOOD PRESSURE: 162 MMHG | HEART RATE: 98 BPM

## 2017-08-29 PROBLEM — M71.129 SEPTIC BURSITIS OF ELBOW: Status: ACTIVE | Noted: 2017-08-28

## 2017-08-29 LAB
ANION GAP SERPL CALC-SCNC: 7 MMOL/L (ref 0–11.9)
BASOPHILS # BLD AUTO: 0.8 % (ref 0–1.8)
BASOPHILS # BLD: 0.04 K/UL (ref 0–0.12)
BUN SERPL-MCNC: 19 MG/DL (ref 8–22)
CALCIUM SERPL-MCNC: 8.4 MG/DL (ref 8.5–10.5)
CHLORIDE SERPL-SCNC: 108 MMOL/L (ref 96–112)
CO2 SERPL-SCNC: 24 MMOL/L (ref 20–33)
CREAT SERPL-MCNC: 1.36 MG/DL (ref 0.5–1.4)
EOSINOPHIL # BLD AUTO: 0.13 K/UL (ref 0–0.51)
EOSINOPHIL NFR BLD: 2.5 % (ref 0–6.9)
ERYTHROCYTE [DISTWIDTH] IN BLOOD BY AUTOMATED COUNT: 53.1 FL (ref 35.9–50)
GFR SERPL CREATININE-BSD FRML MDRD: 51 ML/MIN/1.73 M 2
GLUCOSE SERPL-MCNC: 88 MG/DL (ref 65–99)
HCT VFR BLD AUTO: 34.5 % (ref 42–52)
HGB BLD-MCNC: 11.2 G/DL (ref 14–18)
IMM GRANULOCYTES # BLD AUTO: 0.01 K/UL (ref 0–0.11)
IMM GRANULOCYTES NFR BLD AUTO: 0.2 % (ref 0–0.9)
LYMPHOCYTES # BLD AUTO: 1.47 K/UL (ref 1–4.8)
LYMPHOCYTES NFR BLD: 28.3 % (ref 22–41)
MCH RBC QN AUTO: 32.5 PG (ref 27–33)
MCHC RBC AUTO-ENTMCNC: 32.5 G/DL (ref 33.7–35.3)
MCV RBC AUTO: 100 FL (ref 81.4–97.8)
MONOCYTES # BLD AUTO: 1.12 K/UL (ref 0–0.85)
MONOCYTES NFR BLD AUTO: 21.6 % (ref 0–13.4)
NEUTROPHILS # BLD AUTO: 2.42 K/UL (ref 1.82–7.42)
NEUTROPHILS NFR BLD: 46.6 % (ref 44–72)
NRBC # BLD AUTO: 0 K/UL
NRBC BLD AUTO-RTO: 0 /100 WBC
PLATELET # BLD AUTO: 167 K/UL (ref 164–446)
PMV BLD AUTO: 9.7 FL (ref 9–12.9)
POTASSIUM SERPL-SCNC: 4 MMOL/L (ref 3.6–5.5)
RBC # BLD AUTO: 3.45 M/UL (ref 4.7–6.1)
SODIUM SERPL-SCNC: 139 MMOL/L (ref 135–145)
WBC # BLD AUTO: 5.2 K/UL (ref 4.8–10.8)

## 2017-08-29 PROCEDURE — 36415 COLL VENOUS BLD VENIPUNCTURE: CPT

## 2017-08-29 PROCEDURE — 85025 COMPLETE CBC W/AUTO DIFF WBC: CPT

## 2017-08-29 PROCEDURE — 99223 1ST HOSP IP/OBS HIGH 75: CPT | Mod: AI,GC | Performed by: INTERNAL MEDICINE

## 2017-08-29 PROCEDURE — 80048 BASIC METABOLIC PNL TOTAL CA: CPT

## 2017-08-29 PROCEDURE — 700105 HCHG RX REV CODE 258: Performed by: INTERNAL MEDICINE

## 2017-08-29 PROCEDURE — 94760 N-INVAS EAR/PLS OXIMETRY 1: CPT

## 2017-08-29 PROCEDURE — 700111 HCHG RX REV CODE 636 W/ 250 OVERRIDE (IP): Performed by: INTERNAL MEDICINE

## 2017-08-29 PROCEDURE — 700102 HCHG RX REV CODE 250 W/ 637 OVERRIDE(OP): Performed by: INTERNAL MEDICINE

## 2017-08-29 PROCEDURE — A9270 NON-COVERED ITEM OR SERVICE: HCPCS | Performed by: INTERNAL MEDICINE

## 2017-08-29 RX ORDER — ALBUTEROL SULFATE 90 UG/1
2 AEROSOL, METERED RESPIRATORY (INHALATION) EVERY 4 HOURS PRN
Status: DISCONTINUED | OUTPATIENT
Start: 2017-08-29 | End: 2017-08-29 | Stop reason: HOSPADM

## 2017-08-29 RX ORDER — LINEZOLID 600 MG/1
600 TABLET, FILM COATED ORAL EVERY 12 HOURS
Status: DISCONTINUED | OUTPATIENT
Start: 2017-08-29 | End: 2017-08-29 | Stop reason: HOSPADM

## 2017-08-29 RX ORDER — LINEZOLID 600 MG/1
600 TABLET, FILM COATED ORAL 2 TIMES DAILY
Qty: 20 TAB | Refills: 0 | Status: SHIPPED | OUTPATIENT
Start: 2017-08-29 | End: 2017-09-01

## 2017-08-29 RX ORDER — CEFDINIR 300 MG/1
300 CAPSULE ORAL 2 TIMES DAILY
Qty: 20 CAP | Refills: 0 | Status: SHIPPED | OUTPATIENT
Start: 2017-08-29 | End: 2017-11-16

## 2017-08-29 RX ADMIN — LINEZOLID 600 MG: 600 TABLET, FILM COATED ORAL at 09:43

## 2017-08-29 RX ADMIN — BUDESONIDE AND FORMOTEROL FUMARATE DIHYDRATE 2 PUFF: 80; 4.5 AEROSOL RESPIRATORY (INHALATION) at 09:42

## 2017-08-29 RX ADMIN — GABAPENTIN 300 MG: 300 CAPSULE ORAL at 09:44

## 2017-08-29 RX ADMIN — FEXOFENADINE HCL 180 MG: 60 TABLET, FILM COATED ORAL at 09:44

## 2017-08-29 RX ADMIN — TIOTROPIUM BROMIDE 1 CAPSULE: 18 CAPSULE ORAL; RESPIRATORY (INHALATION) at 09:42

## 2017-08-29 RX ADMIN — CEFTRIAXONE 2 G: 2 INJECTION, POWDER, FOR SOLUTION INTRAMUSCULAR; INTRAVENOUS at 09:45

## 2017-08-29 RX ADMIN — LEFLUNOMIDE 20 MG: 20 TABLET, FILM COATED ORAL at 09:44

## 2017-08-29 RX ADMIN — FLUTICASONE PROPIONATE 50 MCG: 50 SPRAY, METERED NASAL at 09:45

## 2017-08-29 ASSESSMENT — COPD QUESTIONNAIRES
COPD SCREENING SCORE: 6
HAVE YOU SMOKED AT LEAST 100 CIGARETTES IN YOUR ENTIRE LIFE: YES
DO YOU EVER COUGH UP ANY MUCUS OR PHLEGM?: NO/ONLY WITH OCCASIONAL COLDS OR INFECTIONS
DURING THE PAST 4 WEEKS HOW MUCH DID YOU FEEL SHORT OF BREATH: SOME OF THE TIME

## 2017-08-29 ASSESSMENT — PATIENT HEALTH QUESTIONNAIRE - PHQ9
2. FEELING DOWN, DEPRESSED, IRRITABLE, OR HOPELESS: NOT AT ALL
SUM OF ALL RESPONSES TO PHQ QUESTIONS 1-9: 0
1. LITTLE INTEREST OR PLEASURE IN DOING THINGS: NOT AT ALL
SUM OF ALL RESPONSES TO PHQ9 QUESTIONS 1 AND 2: 0

## 2017-08-29 ASSESSMENT — LIFESTYLE VARIABLES: EVER_SMOKED: YES

## 2017-08-29 ASSESSMENT — PAIN SCALES - GENERAL
PAINLEVEL_OUTOF10: 0
PAINLEVEL_OUTOF10: 2

## 2017-08-29 NOTE — CONSULTS
DATE OF SERVICE:  08/28/2017    CHIEF COMPLAINT:  Left elbow pain.    HISTORY OF PRESENT ILLNESS:  The patient is a pleasant 77-year-old male who   was sent over from our urgent care today for an olecranon bursitis.  He has   been having problems with this over the past few weeks and initially been   treated conservatively with ice and compression.  This resolved, but on   Thursday August 24th, he began having more swelling and erythema on the   posterior aspect of his elbow.  He is beginning to feel weak with malaise and   chills and also notes subjective fevers.  He presented to our clinic today and   was referred to the ER.  He has not had any attempted antibiotic management.    He has some erythema spreading over his forearm.  Other than elbow   tenderness, he does not have any other complaints aside from fever and chills.    REVIEW OF SYSTEMS:  As per HPI, otherwise negative.    PAST MEDICAL HISTORY:  1.  Coronary artery disease.  2.  Chronic diarrhea.  3.  Chronic lung disease.  4.  CKD stage III.  5.  GERD.  6.  Hypercholesterolemia.  7.  Hypertension.  8.  Hypertriglyceridemia.  9.  Peripheral neuropathy.  10.  Rheumatoid arthritis.  11.  Sleep apnea.  12.  Solitary kidney.    PAST SURGICAL HISTORY:  1.  Nasal polypectomy.  2.  Cystoscopy.  3.  Laparoscopic nephrectomy.  4.  _____.    HOME MEDICATIONS:  1.  Albuterol.  2.  Cimzia.  3.  Cholecalciferol.  4.  Fexofenadine.  5.  Fluticasone.  6.  Gabapentin.  7.  Leflunomide.  8.  Loperamide.  9.  Magnesium.  10.  Multivitamin.  11.  Rosuvastatin.  12.  Spiriva HandiHaler.  13.  Symbicort    ALLERGIES:  1.  CIPROFLOXACIN.  2.  LEVAQUIN.  3.  PENICILLIN.    The first 2 cause muscle aches, penicillin causes a rash.  He has had Keflex   in the past with no problems.    FAMILY HISTORY:  History of ALS in his father.    SOCIAL HISTORY:  , former smoker with 31-pack-year history of smoking.    Two alcoholic beverages a week.  No illicit drugs.  Retired, lives  with his   wife.    PHYSICAL EXAMINATION:  VITAL SIGNS:  Blood pressure 150/76, pulse 101, temperature 98.5 degrees   Fahrenheit, respirations 21, O2 saturation 96% on supplemental oxygen.  Height   6 feet 1 inches tall, weight 192 pounds.  GENERAL:  Well-appearing, in no distress.  PSYCHIATRIC:  Appropriate response to question.  Alert, oriented x3, pleasant   mood and affect.  HEENT:  Normocephalic, atraumatic.  Eyes:  Pupils equal, round, reactive to   light.  Extraocular muscles intact.  NECK:  Pain free range of motion.  Midline trachea.  CHEST:  Symmetric expansion.  No respiratory distress.  HEART:  Regular rate and rhythm.  ABDOMEN:  Soft, nontender, nondistended.  LYMPHATICS:  No bilateral upper extremity lymphedema.  SKIN:  Skin overlying the left elbow is remarkable for posterior swelling and   erythema consistent with olecranon bursitis.  There is also some faint   erythema spreading under the forearm.  No erythema spreading proximally.  VASCULAR:  Warm, well perfused feet and hand.  Palpable radial pulse.  Brisk   capillary refill.  NEUROLOGIC:  Intact light touch and motor function in the median, radial,   ulnar, and axillary nerve distribution of left upper extremity.  MUSCULOSKELETAL:  Pain free elbow range of motion.  No pain with pronation,   supination, flexion and extension.  Maximal flexion causes some posterior   discomfort, but otherwise passive range of motion is nonpainful.  He is tender   to palpation posteriorly over the area of swelling.  There is a fluid filled   area overlying the olecranon posteriorly consistent with olecranon bursitis.    There is no drainage from this.    IMPRESSION:  Left elbow olecranon bursitis.    PLAN:  Despite reports otherwise in the chart, there is no plan for surgery   for this patient.  He should not be kept n.p.o. and should be allowed a diet.    Septic olecranon bursitis is typically quite amenable to nonoperative   management and there is no need to rush  to surgery particularly when   conservative measures have not been exhausted.  I recommend placing the   patient on IV antibiotic regimen and if there is clinical improvement,   transition this to oral regimen.  I would recommend a brief period of   immobilization in a sling with an Ace wrap for edema control and regular use   of ice.  If the patient can tolerate, then nonsteroidal anti-inflammatory   medications are helpful, but with his kidney disease is probably not the best   option.  Otherwise, I did advise the patient that if he worsens clinically or   if he is not improving over a few days on antibiotics, we could always   consider surgery later, but that medical management first is typically   appropriate and the vast majority these can be treated successfully medically.    He is happy with the plan.       ____________________________________     MD VINCENT Gomez / NTS    DD:  08/28/2017 20:45:26  DT:  08/28/2017 21:51:57    D#:  [id number]  Job#:  [id number]

## 2017-08-29 NOTE — PROGRESS NOTES
Seen and examined, consult dictated.    #1 Left septic olecranon bursitis    Plan:  - Non-operative management - ABX, compression, rest, ICE  - Diet OK  - See consult note for further details

## 2017-08-29 NOTE — ASSESSMENT & PLAN NOTE
- PMH of Rheumatoid arthritis   - left elbow joint pain, and swelling.  - Orthopedic surgeon on board.  Plan  - Discharge the patient on oral ABX with outpatient follow up. Dr. Yates (orthopedic surgeon) recommend using sling, ice, compression and no need to do any surgical intervention, the patient has been adviced to come back to the hospital if the condition did not improved.

## 2017-08-29 NOTE — CARE PLAN
Problem: Bowel/Gastric:  Goal: Normal bowel function is maintained or improved  Outcome: PROGRESSING AS EXPECTED  Pt refused stool softeners pt reports chronic diarrhea     Problem: Pain Management  Goal: Pain level will decrease to patient's comfort goal    Intervention: Follow pain managment plan developed in collaboration with patient and Interdisciplinary Team  Pt reports pain manageable declines. Pharmacological intervention

## 2017-08-29 NOTE — DISCHARGE PLANNING
Care Transition Team Assessment    SW met with pt at bedside to complete assessment. Pt plans to dc home with his spouse. No needs anticipated.     Information Source  Orientation : Oriented x 4  Information Given By: Patient  Informant's Name:  (Barry Torres)  Who is responsible for making decisions for patient? : Patient    Readmission Evaluation  Is this a readmission?: No    Elopement Risk  Legal Hold: No  Ambulatory or Self Mobile in Wheelchair: No-Not an Elopement Risk    Interdisciplinary Discharge Planning  Primary Care Physician: Lizzie Soto  Lives with - Patient's Self Care Capacity: Spouse  Support Systems: Spouse / Significant Other  Patient Expects to be Discharged to:: Home  Durable Medical Equipment: Not Applicable  DME Provider / Phone: N/A    Discharge Preparedness  What is your plan after discharge?: Home with help  What are your discharge supports?: Spouse  Prior Functional Level: Ambulatory, Independent with Activities of Daily Living, Independent with Medication Management  Difficulity with ADLs: None  Difficulity with IADLs: None    Functional Assesment  Prior Functional Level: Ambulatory, Independent with Activities of Daily Living, Independent with Medication Management    Finances  Financial Barriers to Discharge: No  Prescription Coverage: Yes (Mail orders prescriptions through [facility])    Advance Directive  Advance Directive?: None  Advance Directive offered?: AD Booklet refused    Psychological Assessment  History of Substance Abuse: None  History of Psychiatric Problems: No    Discharge Risks or Barriers  Discharge risks or barriers?: No    Anticipated Discharge Information  Anticipated discharge disposition: Home  Discharge Address: [address] [place], [place] [address] [address]  Discharge Contact Phone Number: Lola ANTHONY: [phone] C: [phone]

## 2017-08-29 NOTE — PROGRESS NOTES
"Seen and examined.  Doing OK today.  Pain controlled, not feeling ill.  Would like to d/c home in care of his wife, a retired nurse practitioner.    Blood pressure (!) 162/90, pulse 98, temperature 36.4 °C (97.6 °F), resp. rate 17, height 1.854 m (6' 1\"), weight 87.4 kg (192 lb 10.9 oz), SpO2 98 %.      Exam:  L arm erythema and olecranon swelling stable.    #1 Left septic olecranon bursitis    Plan:  - ACE for compression, sling for immobilization x1 week  - Transition to PO antibiotics when appropriate  - Patient would like to d/c home to make Cardiology appointment tomorrow.  I advised patient that I would leave that decision to the medical team, but I would be OK with it given he would be monitored closely by his wife  - Specifically recommend AGAINST aspiration, as this can lead to persistent draining sinus formation  "

## 2017-08-29 NOTE — NON-PROVIDER
[facility] Admitting History and Physical    Name Barry Torres     1940   Age/Sex 77 y.o. male   MRN [id number]   Code Status Full code     After 5PM or if no immediate response to page, please call for cross-coverage  Attending/Team: Dr. Onofre Call [phone] to page   1st Call - Day Intern (R1):   Dr. Moran 2nd Call - Day Sr. Resident (R2/R3):   Dr. Wilson       Chief Complaint:  Elbow pain    HPI:  Barry Torres is a 77 year old male who presents to the ED referred from [facility] with left elbow bursitis and possible infection. Mr. Torres had visited the [facility] for bursitis 6 weeks ago and was instructed to treat it with icepack application. The bursitis resolved until Thursday, , when he began to have a return of swelling and erythema in his elbow. By the following day, he felt weak, subjectively feverish, and was having chills and difficulty ambulating. His wife stated that she wanted to bring him to the hospital but he had refused at that time. Mr. Torres's systemic symptoms have improved somewhat and he is now capable of ambulation and generally feels better. However, the erythema has expanded from his elbow down to his mid-forearm over the past day and feels hot to the touch. His elbow is mildly tender and he states that he has restricted range of movement and pain with flexion.    No other joints are affected and he does not have a history of septic arthritis.     Review of Systems   Constitutional: Positive for chills and fever.   Respiratory: Negative for shortness of breath.    Cardiovascular: Negative for chest pain.   Gastrointestinal: Negative for nausea and vomiting.             Past Medical History:   Past Medical History:   Diagnosis Date   • Coronary artery calcification seen on CAT scan 2016    sees cards   • Lightheadedness 2016   • Orthostasis 2016    better off HCTZ   • Epididymitis     h/o listed in chart   • Pleural  plaque 2005     CT [place]   • Abnormal thyroid stimulating hormone (TSH) level    • Chronic diarrhea     declines eval; takes immodium once a day usually and sometimes less; see previous notes; aware of risk    • Chronic lung disease     asbestos related   • CKD (chronic kidney disease), stage III     sees neph   • GERD (gastroesophageal reflux disease)    • History of hemorrhoids    • History of skin cancer     non melanoma   • [place] (hard of hearing)     declines hearing aids   • Hypercholesteremia    • Hypertension    • Hypertriglyceridemia    • Indigestion    • Light headedness     2/2 orthostasis? now better off hctz   • Peripheral neuropathy    • RA (rheumatoid arthritis) (CMS-HCC)     on meds, sees rheum   • Sleep apnea     obstructive and central - not adherent to autopap   • Solitary kidney     history of kidney cyst 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-functioning kidney s/p nephrectomy        Past Surgical History:  Past Surgical History:   Procedure Laterality Date   • NASAL POLYPECTOMY Bilateral 10/6/2015    Procedure: NASAL POLYPECTOMY WITH SCOTT ENDOSCOPIC NASAL DEBRIDEMENT;  Surgeon: Param Maurer M.D.;  Location: SURGERY SAME DAY [facility] ORS;  Service:    • CYSTOSCOPY  2/1/2010    Performed by ANGIE VERDUZCO at SURGERY [facility] ORS   • RETROGRADES  2/1/2010    Performed by ANGIE VERDUZCO at SURGERY [facility] ORS   • PYELOGRAM  2/1/2010    Performed by ANGIE VERDUZCO at SURGERY [facility] ORS   • URETEROSCOPY  2/1/2010    Performed by ANGIE VERDUZCO at SURGERY [facility] ORS   • NEPHRECTOMY LAPAROSCOPIC  2009    left kidney   • TESTICLE EXPLORATION  2004       Current Outpatient Medications:  Home Medications     Reviewed by Beto Tony (Pharmacy Tech) on 08/28/17 at 1606  Med List Status: Complete   Medication Last Dose Status   albuterol 108 (90 BASE) MCG/ACT Aero Soln inhalation aerosol >week Active   certolizumab pegol (CIMZIA PREFILLED) 2 X 200 MG/ML Kit 8/19/2017 Active  "  Cholecalciferol (VITAMIN D3) 2000 UNIT Cap 8/28/2017 Active   fexofenadine (ALLEGRA) 180 MG tablet 8/28/2017 Active   fluticasone (FLONASE) 50 MCG/ACT nasal spray 8/28/2017 Active   gabapentin (NEURONTIN) 300 MG Cap 8/28/2017 Active   leflunomide (ARAVA) 20 MG Tab 8/28/2017 Active   loperamide (IMODIUM A-D) 2 MG tablet 8/28/2017 Active   Magnesium 250 MG Tab 8/28/2017 Active   Multiple Vitamin (MULTIVITAMIN PO) 8/28/2017 Active   rosuvastatin (CRESTOR) 20 MG Tab 8/27/2017 Active   SPIRIVA HANDIHALER 18 MCG Cap 8/27/2017 Active   SYMBICORT 80-4.5 MCG/ACT Aerosol 8/28/2017 Active                Medication Allergy/Sensitivities:  Allergies   Allergen Reactions   • Cipro Xr      Muscle aches.   • Levaquin      Muscle aches   • Pcn [Penicillins] Hives     Rash and asthma attack when a child - wife states he has had Keflex in the past and tolerated         Family History:  Family History   Problem Relation Age of Onset   • Genetic Father      ALS   • Heart Attack Neg Hx    • Heart Disease Neg Hx    • Heart Failure Neg Hx        Social History:  Social History     Social History   • Marital status:      Spouse name: N/A   • Number of children: N/A   • Years of education: N/A     Occupational History   • Not on file.     Social History Main Topics   • Smoking status: Former Smoker     Packs/day: 1.00     Years: 31.00   • Smokeless tobacco: Never Used      Comment: 1 pk a day for 35 yrs   • Alcohol use No      Comment: 2 a week   • Drug use: No   • Sexual activity: Not on file      Comment: retired      Other Topics Concern   • Not on file     Social History Narrative    See H&P    Lives with wife     Living situation: Lives with wife  PCP : Lizzie Soto M.D.        Physical Exam     Vitals:    08/28/17 1422 08/28/17 1600 08/28/17 1700 08/28/17 1730   BP:       Pulse:  85 87 (!) 101   Resp:  16 18 (!) 21   Temp:       TempSrc:       SpO2:  90% 93% 96%   Weight: 87.4 kg (192 lb 10.9 oz)      Height: 1.854 m (6' 1\")  " "      Body mass index is 25.42 kg/m².  /76   Pulse (!) 101   Temp 36.9 °C (98.5 °F) (Temporal)   Resp (!) 21   Ht 1.854 m (6' 1\")   Wt 87.4 kg (192 lb 10.9 oz)   SpO2 96%   BMI 25.42 kg/m²   O2 therapy: Pulse Oximetry: 96 %    Physical Exam  Gen: NAD  CV: Regular rate and rhythm, no murmurs. 2+ pulses in bilateral upper extremities  Resp: Rhonchi in bases, diminished breath sounds throughout  Abd: +BS, distended  MSK: Edematous, erythematous left elbow. Hot to the touch. Erythema extends to the mid forearm.  Extremities: 1+ Pitting edema to mid-shin        Data Review       Old Records Request:   Deferred  Current Records review and summary: Completed    Lab Data Review:  Recent Results (from the past 24 hour(s))   LACTIC ACID    Collection Time: 08/28/17  3:07 PM   Result Value Ref Range    Lactic Acid 1.3 0.5 - 2.0 mmol/L   CBC WITH DIFFERENTIAL    Collection Time: 08/28/17  3:07 PM   Result Value Ref Range    WBC 7.1 4.8 - 10.8 K/uL    RBC 4.02 (L) 4.70 - 6.10 M/uL    Hemoglobin 12.7 (L) 14.0 - 18.0 g/dL    Hematocrit 39.8 (L) 42.0 - 52.0 %    MCV 99.0 (H) 81.4 - 97.8 fL    MCH 31.6 27.0 - 33.0 pg    MCHC 31.9 (L) 33.7 - 35.3 g/dL    RDW 53.5 (H) 35.9 - 50.0 fL    Platelet Count 190 164 - 446 K/uL    MPV 9.6 9.0 - 12.9 fL    Neutrophils-Polys 58.70 44.00 - 72.00 %    Lymphocytes 21.70 (L) 22.00 - 41.00 %    Monocytes 17.20 (H) 0.00 - 13.40 %    Eosinophils 1.40 0.00 - 6.90 %    Basophils 1.00 0.00 - 1.80 %    Immature Granulocytes 0.00 0.00 - 0.90 %    Nucleated RBC 0.00 /100 WBC    Neutrophils (Absolute) 4.19 1.82 - 7.42 K/uL    Lymphs (Absolute) 1.55 1.00 - 4.80 K/uL    Monos (Absolute) 1.23 (H) 0.00 - 0.85 K/uL    Eos (Absolute) 0.10 0.00 - 0.51 K/uL    Baso (Absolute) 0.07 0.00 - 0.12 K/uL    Immature Granulocytes (abs) 0.00 0.00 - 0.11 K/uL    NRBC (Absolute) 0.00 K/uL   COMP METABOLIC PANEL    Collection Time: 08/28/17  3:07 PM   Result Value Ref Range    Sodium 137 135 - 145 mmol/L    " Potassium 3.9 3.6 - 5.5 mmol/L    Chloride 106 96 - 112 mmol/L    Co2 22 20 - 33 mmol/L    Anion Gap 9.0 0.0 - 11.9    Glucose 95 65 - 99 mg/dL    Bun 21 8 - 22 mg/dL    Creatinine 1.51 (H) 0.50 - 1.40 mg/dL    Calcium 9.2 8.5 - 10.5 mg/dL    AST(SGOT) 37 12 - 45 U/L    ALT(SGPT) 30 2 - 50 U/L    Alkaline Phosphatase 71 30 - 99 U/L    Total Bilirubin 0.8 0.1 - 1.5 mg/dL    Albumin 4.0 3.2 - 4.9 g/dL    Total Protein 7.2 6.0 - 8.2 g/dL    Globulin 3.2 1.9 - 3.5 g/dL    A-G Ratio 1.3 g/dL   CREATINE KINASE    Collection Time: 08/28/17  3:07 PM   Result Value Ref Range    CPK Total 329 (H) 0 - 154 U/L   WESTERGREN SED RATE    Collection Time: 08/28/17  3:07 PM   Result Value Ref Range    Sed Rate Westergren 39 (H) 0 - 20 mm/hour   CRP QUANTITIVE (NON-CARDIAC)    Collection Time: 08/28/17  3:07 PM   Result Value Ref Range    Stat C-Reactive Protein 17.34 (H) 0.00 - 0.75 mg/dL   ESTIMATED GFR    Collection Time: 08/28/17  3:07 PM   Result Value Ref Range    GFR If  54 (A) >60 mL/min/1.73 m 2    GFR If Non African American 45 (A) >60 mL/min/1.73 m 2       Imaging/Procedures Review:    ndependant Imaging Review: Completed     DX-CHEST-PORTABLE (1 VIEW)   Final Result      Bilateral linear scarring versus atelectasis.                 Assessment/Plan     #Sepsis 2/2 left elbow Septic Arthritis  --Systemic symptoms over the past week, restricted ROM, and spreading erythema, warmth, and edema indicative combined with monoarticular presentation indicates likely septic arthritis  -SIRS 2/4 (RR 21 + ) + source (left elbow)  -Lactic acid normal at 1.3. WBC normal  -Will give 1L bolus LR for sepsis  -NPO at midnight for surgery tomorrow  -Blood cultures/urine cultures pending  -Start Vancomycin/Ceftriaxone    #Dyslipidemia  -Continue home rosuvastatin 20mg    #COPD  -Continue home tiotropium, symbicort    #Rheumatoid arthritis  -continue home leflunomide    #CKD Stage III  -Creatinine at baseline  -Avoid  NSAIDs    Anticipated Hospital stay:  >2 midnights        Quality Measures    Reviewed items::  Labs reviewed and Medications reviewed  Lozano catheter::  No Lozano  DVT prophylaxis - mechanical:  SCDs

## 2017-08-29 NOTE — PROGRESS NOTES
Patient discharged home with wife. All belongings with patient. Patient and wife verbalized understanding of discharge and follow up appointments. RX sent to patients pharmacy by MD. D/C complete.

## 2017-08-29 NOTE — DISCHARGE INSTRUCTIONS
Please visit your primary care doctor to follow up on your elbow joint swelling after the discharge  - ACE for compression, sling for immobilization x1 week  - Transition to PO antibiotics when appropriate  - Patient would like to d/c home to make Cardiology appointment tomorrow.  I advised patient that I would leave that decision to the medical team, but I would be OK with it given he would be monitored closely by his wife  - Specifically recommend AGAINST aspiration, as this can lead to persistent draining sinus formation      Discharge Instructions    Discharged to home by car with relative. Discharged via wheelchair, hospital escort: Yes.  Special equipment needed: Wheelchair    Be sure to schedule a follow-up appointment with your primary care doctor or any specialists as instructed.     Discharge Plan:   Diet Plan: Discussed  Activity Level: Discussed  Confirmed Follow up Appointment: Patient to Call and Schedule Appointment  Medication Reconciliation Updated: Yes    I understand that a diet low in cholesterol, fat, and sodium is recommended for good health. Unless I have been given specific instructions below for another diet, I accept this instruction as my diet prescription.   Other diet: As tolerated    Special Instructions: Discharge instructions for the Orthopedic Patient    Follow up with Primary Care Physician within 2 weeks of discharge to home, regarding:  Review of medications and diagnostic testing.  Surveillance for medical complications.  Workup and treatment of osteoporosis, if appropriate.     -Is this a Joint Replacement patient? No    -Is this patient being discharged with medication to prevent blood clots?  No    · Is patient discharged on Warfarin / Coumadin?   No     · Is patient Post Blood Transfusion?  No    Depression / Suicide Risk    As you are discharged from this [facility] facility, it is important to learn how to keep safe from harming yourself.    Recognize the warning  signs:  · Abrupt changes in personality, positive or negative- including increase in energy   · Giving away possessions  · Change in eating patterns- significant weight changes-  positive or negative  · Change in sleeping patterns- unable to sleep or sleeping all the time   · Unwillingness or inability to communicate  · Depression  · Unusual sadness, discouragement and loneliness  · Talk of wanting to die  · Neglect of personal appearance   · Rebelliousness- reckless behavior  · Withdrawal from people/activities they love  · Confusion- inability to concentrate     If you or a loved one observes any of these behaviors or has concerns about self-harm, here's what you can do:  · Talk about it- your feelings and reasons for harming yourself  · Remove any means that you might use to hurt yourself (examples: pills, rope, extension cords, firearm)  · Get professional help from the community (Mental Health, Substance Abuse, psychological counseling)  · Do not be alone:Call your Safe Contact- someone whom you trust who will be there for you.  · Call your local CRISIS HOTLINE 916-1403 or 475-612-4217  · Call your local Children's Mobile Crisis Response Team [place] (167) 529-7987 or www.[facility]  · Call the toll free National Suicide Prevention Hotlines   · National Suicide Prevention Lifeline 825-474-NFNT (7189)  · National Hope Line Network 800-SUICIDE (707-2747)

## 2017-08-29 NOTE — ASSESSMENT & PLAN NOTE
- PMH of COPD.  - NOT on O2 therapy with WNL O2 saturation  - no wheezes or rhonchi on examination  Plan  Follow up

## 2017-08-29 NOTE — PROGRESS NOTES
Patient denies need for pain medications at this time, left elbow red, swollen and warm. IV and PO abx. Patients wife at bedside Patient calls for assistance.

## 2017-08-29 NOTE — H&P
Internal Medicine Admitting History and Physical    Name Barry Torres     1940   Age/Sex 77 y.o. male   MRN [id number]   Code Status FULL     After 5PM or if no immediate response to page, please call for cross-coverage  Attending/Team: Rickey Onofre/ Romario See Patient List for primary contact information  Call [phone] to page    1st Call - Day Intern (R1):   Francisco Barrett 2nd Call - Day Sr. Resident (R2/R3):   Steve       Chief Complaint:  Left elbow pain and swelling     HPI:  76 yo male with past medical history of COPD, and rheumatoid arthritis. Presented to the ER for left elbow pain and swelling. The swelling and pain started about 6 weeks ago when the patient visited the [facility]. The diagnosis was bursitis and the patient received conservative treatment in which his left elbow swelling and tenderness subsides.  On the 2017, the patient start having the same swelling and pain in the same left elbow joint. The patient came in to the hospital for getting help in treating the swelling and pain in his left elbow. The left elbow joint is painful, swelling (especially on the back of the joint, circular to oval lump of 3 cm radius), limitation of movement especially the elbow extension, 5 out of 10 in in pain severity scale, warm on touch, red skin that extended below the elbow joint reaching the wrist joint, no radiation of the pain, the pain aggravated by movement and improve with rest.  Patient is positive for mild fever at home (99) and chills.  Patient denies trauma, similar previous episodes.    Review of Systems   Constitutional: Negative for chills, diaphoresis and weight loss.   HENT: Negative for hearing loss, sore throat and tinnitus.    Eyes: Negative for blurred vision, double vision and photophobia.   Respiratory: Negative for cough and stridor.    Cardiovascular: Negative for chest pain, palpitations and orthopnea.   Gastrointestinal: Negative for heartburn and nausea.    Genitourinary: Negative for dysuria, frequency and urgency.   Musculoskeletal: Positive for joint pain. Negative for back pain and falls.   Skin: Negative for itching.   Neurological: Negative for dizziness, tingling, tremors and headaches.   Endo/Heme/Allergies: Negative for environmental allergies. Does not bruise/bleed easily.   Psychiatric/Behavioral: Negative for depression.             Past Medical History:   Past Medical History:   Diagnosis Date   • Coronary artery calcification seen on CAT scan 8/2/2016    sees cards   • Lightheadedness 6/23/2016   • Orthostasis 6/23/2016    better off HCTZ   • Epididymitis 2009    h/o listed in chart   • Pleural plaque 2005     CT [place]   • Abnormal thyroid stimulating hormone (TSH) level    • Chronic diarrhea     declines eval; takes immodium once a day usually and sometimes less; see previous notes; aware of risk    • Chronic lung disease     asbestos related   • CKD (chronic kidney disease), stage III     sees neph   • GERD (gastroesophageal reflux disease)    • History of hemorrhoids    • History of skin cancer     non melanoma   • [place] (hard of hearing)     declines hearing aids   • Hypercholesteremia    • Hypertension    • Hypertriglyceridemia    • Indigestion    • Light headedness     2/2 orthostasis? now better off hctz   • Peripheral neuropathy    • RA (rheumatoid arthritis) (CMS-HCC)     on meds, sees rheum   • Sleep apnea     obstructive and central - not adherent to autopap   • Solitary kidney     history of kidney cyst 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-functioning kidney s/p nephrectomy        Past Surgical History:  Past Surgical History:   Procedure Laterality Date   • NASAL POLYPECTOMY Bilateral 10/6/2015    Procedure: NASAL POLYPECTOMY WITH SCOTT ENDOSCOPIC NASAL DEBRIDEMENT;  Surgeon: Param Maurer M.D.;  Location: SURGERY SAME DAY [facility];  Service:    • CYSTOSCOPY  2/1/2010    Performed by ANGIE VERDUZCO at SURGERY [facility] ORS   • RETROGRADES  2/1/2010     Performed by ANGIE VERDUZCO at SURGERY [facility] ORS   • PYELOGRAM  2/1/2010    Performed by ANGIE VERDUZCO at SURGERY [facility] ORS   • URETEROSCOPY  2/1/2010    Performed by ANGIE VERDUZCO at SURGERY [facility] ORS   • NEPHRECTOMY LAPAROSCOPIC  2009    left kidney   • TESTICLE EXPLORATION  2004       Current Outpatient Medications:  Home Medications     Reviewed by Beto Tony (Pharmacy Tech) on 08/28/17 at 1606  Med List Status: Complete   Medication Last Dose Status   albuterol 108 (90 BASE) MCG/ACT Aero Soln inhalation aerosol >week Active   certolizumab pegol (CIMZIA PREFILLED) 2 X 200 MG/ML Kit 8/19/2017 Active   Cholecalciferol (VITAMIN D3) 2000 UNIT Cap 8/28/2017 Active   fexofenadine (ALLEGRA) 180 MG tablet 8/28/2017 Active   fluticasone (FLONASE) 50 MCG/ACT nasal spray 8/28/2017 Active   gabapentin (NEURONTIN) 300 MG Cap 8/28/2017 Active   leflunomide (ARAVA) 20 MG Tab 8/28/2017 Active   loperamide (IMODIUM A-D) 2 MG tablet 8/28/2017 Active   Magnesium 250 MG Tab 8/28/2017 Active   Multiple Vitamin (MULTIVITAMIN PO) 8/28/2017 Active   rosuvastatin (CRESTOR) 20 MG Tab 8/27/2017 Active   SPIRIVA HANDIHALER 18 MCG Cap 8/27/2017 Active   SYMBICORT 80-4.5 MCG/ACT Aerosol 8/28/2017 Active                Medication Allergy/Sensitivities:  Allergies   Allergen Reactions   • Cipro Xr      Muscle aches.   • Levaquin      Muscle aches   • Pcn [Penicillins] Hives     Rash and asthma attack when a child - wife states he has had Keflex in the past and tolerated         Family History:  Family History   Problem Relation Age of Onset   • Genetic Father      ALS   • Heart Attack Neg Hx    • Heart Disease Neg Hx    • Heart Failure Neg Hx        Social History:  Social History     Social History   • Marital status:      Spouse name: N/A   • Number of children: N/A   • Years of education: N/A     Occupational History   • Not on file.     Social History Main Topics   • Smoking  "status: Former Smoker     Packs/day: 1.00     Years: 31.00   • Smokeless tobacco: Never Used      Comment: 1 pk a day for 35 yrs   • Alcohol use No      Comment: 2 a week   • Drug use: No   • Sexual activity: Not on file      Comment: retired      Other Topics Concern   • Not on file     Social History Narrative    See H&P    Lives with wife     Living situation: home  PCP : Lizzie Soto M.D.        Physical Exam     Vitals:    08/28/17 1730 08/28/17 1800 08/28/17 1830 08/28/17 1915   BP:    (!) 162/92   Pulse: (!) 101 84 88 63   Resp: (!) 21 15 16 18   Temp:    36.2 °C (97.1 °F)   TempSrc:       SpO2: 96% 90% 92% 91%   Weight:       Height:         Body mass index is 25.42 kg/m².  BP (!) 162/92   Pulse 63   Temp 36.2 °C (97.1 °F)   Resp 18   Ht 1.854 m (6' 1\")   Wt 87.4 kg (192 lb 10.9 oz)   SpO2 91%   BMI 25.42 kg/m²   O2 therapy: Pulse Oximetry: 91 %, O2 (LPM): 0, O2 Delivery: None (Room Air)    Physical Exam   Constitutional: He is oriented to person, place, and time.   HENT:   Head: Normocephalic.   Eyes: Pupils are equal, round, and reactive to light. Right eye exhibits no discharge. Left eye exhibits no discharge. No scleral icterus.   Neck: Neck supple. No JVD present. No tracheal deviation present. No thyromegaly present.   Cardiovascular: Normal rate.    No murmur heard.  Pulmonary/Chest: No stridor. No respiratory distress. He has no wheezes. He has no rales.   Abdominal: He exhibits no distension. There is no tenderness. There is no rebound.   Genitourinary: Rectal exam shows guaiac negative stool. No discharge found.   Musculoskeletal: He exhibits edema and tenderness.   Neurological: He is alert and oriented to person, place, and time.   Skin: Skin is warm. He is not diaphoretic. There is erythema.             Data Review       Old Records Request:   Completed  Current Records review and summary: Completed    Lab Data Review:  Recent Results (from the past 24 hour(s))   LACTIC ACID    " Collection Time: 08/28/17  3:07 PM   Result Value Ref Range    Lactic Acid 1.3 0.5 - 2.0 mmol/L   CBC WITH DIFFERENTIAL    Collection Time: 08/28/17  3:07 PM   Result Value Ref Range    WBC 7.1 4.8 - 10.8 K/uL    RBC 4.02 (L) 4.70 - 6.10 M/uL    Hemoglobin 12.7 (L) 14.0 - 18.0 g/dL    Hematocrit 39.8 (L) 42.0 - 52.0 %    MCV 99.0 (H) 81.4 - 97.8 fL    MCH 31.6 27.0 - 33.0 pg    MCHC 31.9 (L) 33.7 - 35.3 g/dL    RDW 53.5 (H) 35.9 - 50.0 fL    Platelet Count 190 164 - 446 K/uL    MPV 9.6 9.0 - 12.9 fL    Neutrophils-Polys 58.70 44.00 - 72.00 %    Lymphocytes 21.70 (L) 22.00 - 41.00 %    Monocytes 17.20 (H) 0.00 - 13.40 %    Eosinophils 1.40 0.00 - 6.90 %    Basophils 1.00 0.00 - 1.80 %    Immature Granulocytes 0.00 0.00 - 0.90 %    Nucleated RBC 0.00 /100 WBC    Neutrophils (Absolute) 4.19 1.82 - 7.42 K/uL    Lymphs (Absolute) 1.55 1.00 - 4.80 K/uL    Monos (Absolute) 1.23 (H) 0.00 - 0.85 K/uL    Eos (Absolute) 0.10 0.00 - 0.51 K/uL    Baso (Absolute) 0.07 0.00 - 0.12 K/uL    Immature Granulocytes (abs) 0.00 0.00 - 0.11 K/uL    NRBC (Absolute) 0.00 K/uL   COMP METABOLIC PANEL    Collection Time: 08/28/17  3:07 PM   Result Value Ref Range    Sodium 137 135 - 145 mmol/L    Potassium 3.9 3.6 - 5.5 mmol/L    Chloride 106 96 - 112 mmol/L    Co2 22 20 - 33 mmol/L    Anion Gap 9.0 0.0 - 11.9    Glucose 95 65 - 99 mg/dL    Bun 21 8 - 22 mg/dL    Creatinine 1.51 (H) 0.50 - 1.40 mg/dL    Calcium 9.2 8.5 - 10.5 mg/dL    AST(SGOT) 37 12 - 45 U/L    ALT(SGPT) 30 2 - 50 U/L    Alkaline Phosphatase 71 30 - 99 U/L    Total Bilirubin 0.8 0.1 - 1.5 mg/dL    Albumin 4.0 3.2 - 4.9 g/dL    Total Protein 7.2 6.0 - 8.2 g/dL    Globulin 3.2 1.9 - 3.5 g/dL    A-G Ratio 1.3 g/dL   CREATINE KINASE    Collection Time: 08/28/17  3:07 PM   Result Value Ref Range    CPK Total 329 (H) 0 - 154 U/L   WESTERGREN SED RATE    Collection Time: 08/28/17  3:07 PM   Result Value Ref Range    Sed Rate Westergren 39 (H) 0 - 20 mm/hour   CRP QUANTITIVE  (NON-CARDIAC)    Collection Time: 08/28/17  3:07 PM   Result Value Ref Range    Stat C-Reactive Protein 17.34 (H) 0.00 - 0.75 mg/dL   ESTIMATED GFR    Collection Time: 08/28/17  3:07 PM   Result Value Ref Range    GFR If  54 (A) >60 mL/min/1.73 m 2    GFR If Non African American 45 (A) >60 mL/min/1.73 m 2   Magnesium    Collection Time: 08/28/17  3:07 PM   Result Value Ref Range    Magnesium 2.0 1.5 - 2.5 mg/dL       Imaging/Procedures Review:    ndependant Imaging Review: Completed  DX-CHEST-PORTABLE (1 VIEW)   Final Result      Bilateral linear scarring versus atelectasis.                 Assessment/Plan     Septic Bursitis   Assessment & Plan    - PMH of Rheumatoid arthritis   - left elbow joint pain, swelling (especially on the back of the joint, circular to oval lump of 3 cm radius), tender, limitation of movement especially the elbow extension, 5 out of 10 in severity, warm on touch, skin redness that extended below the elbow joint reaching the wrist joint, no radiation of the pain, the pain aggravated by movement and improve with rest.  - No trauma history, consider infection  - Orthopedic surgeon on board.  Plan  - IV antibiotics of Ceftriaxone and Vancomycin (Pt is allergic to penicillin)  - By Dr. Salinas (Orthopedic surgeon), Non-operative management - ABX, compression, rest, ICE  - follow up        RA (rheumatoid arthritis) (CMS-HCC)- (present on admission)   Assessment & Plan    - PMH of RA.  - RA is followed up by his PCP Dr. Lizzie Soto M.D.  Plan  Follow up          Chronic obstructive pulmonary disease (CMS-HCC)- (present on admission)   Assessment & Plan    - PMH of COPD.  - NOT on O2 therapy with WNL O2 saturation  - no wheezes or rhonchi on examination  Plan  Follow up            Anticipated Hospital stay:  >2 midnights        Quality Measures    Reviewed items::  Labs reviewed, Radiology images reviewed and Medications reviewed  Lozano catheter::  No Lozano  DVT prophylaxis  pharmacological::  Not indicated at this time, ambulatory

## 2017-08-29 NOTE — DISCHARGE SUMMARY
Internal Medicine Discharge Summary      Admit Date:  8/28/2017       Discharge Date:   8/29/2017    Service:   [facility] Internal Medicine [place] Team  Attending Physician(s):   Rickey Onofre       Senior Resident(s):   Juvencio Wilson  Ramirez Resident(s):   Francisco Barrett      Primary Diagnosis:   Left elbow pain and swelling     Secondary Diagnoses:                Active Problems:    Chronic obstructive pulmonary disease (CMS-HCC) POA: Stable      Overview: absestos exposure      ICD-10 transition    RA (rheumatoid arthritis) (CMS-HCC) POA: Stable      Overview: BOTH HANDS     Mixed dyslipidemia POA: Yes    Septic bursitis of elbow POA: Improving        Hospital Summary (Brief Narrative):       Barry Olivares is 78 yo male with past medical history of chronic obstructive pulmonary disease, and rheumatoid arthritis. Presented to the ER for left elbow pain and swelling. The swelling and pain started about 6 weeks ago when the patient visited [place] orthopedic clinic. The diagnosis was bursitis and the patient received conservative treatment in which his left elbow swelling and tenderness subsides.  On the 8/24/2017, the patient start having the same swelling and pain in the same left elbow joint. The patient came in to the hospital for getting help in treating the swelling and pain. The left elbow joint is painful, swollen (especially on the back of the joint), mild limitation of movement, the patient rated his pain as 5 out of 10 in severity, warm on touch, red skin that extended below the elbow joint reaching the middle arm, no radiation of the pain, the pain aggravated by movement and improve with rest.  Patient is positive for mild fever at home (99) and chills.  Patient denies trauma, or similar previous episodes.  The patient received antibiotics and pain medications and now his condition is improving, the orthopedic surgeon (Dr. Yates) did not recommend the surgery and recommend treating the  condition with oral antibiotics, ice, compression if the patient can tolerate that, he also recommend out patient follow up on his condition. The patient has been advised to come to the hospital if the condition did not improve or if it is getting worse.  The patient is fully aware about his condition and he is agreeing with the plan and he is eager to go home.    Patient /Hospital Summary (Details -- Problem Oriented) :          Septic bursitis of elbow   Assessment & Plan    - PMH of Rheumatoid arthritis   - left elbow joint pain, and swelling.  - Orthopedic surgeon on board.  Plan  - Discharge the patient on oral ABX with outpatient follow up. Dr. Yates (orthopedic surgeon) recommend using sling, ice, compression and no need to do any surgical intervention, the patient has been adviced to come back to the hospital if the condition did not improved.          RA (rheumatoid arthritis) (CMS-HCC)   Assessment & Plan    - PMH of RA.  - RA is followed up by his PCP Dr. Lizzie Soto M.D.  Plan  Follow up          Chronic obstructive pulmonary disease (CMS-HCC)   Assessment & Plan    - PMH of COPD.  - NOT on O2 therapy with WNL O2 saturation  - no wheezes or rhonchi on examination  Plan  Follow up            Consultants:     Orthopedic sugeon    Procedures:        none    Imaging/ Testing:      DX-CHEST-PORTABLE (1 VIEW)   Final Result      Bilateral linear scarring versus atelectasis.            Discharge Medications:         Medication Reconciliation: Completed       Medication List      START taking these medications      Instructions   cefdinir 300 MG Caps  Commonly known as:  OMNICEF   Doctor's comments:  Oral Capsule BID for 10 days.  Take 1 Cap by mouth 2 times a day.  Dose:  300 mg     linezolid 600 MG Tabs  Commonly known as:  ZYVOX   Doctor's comments:  Take one tab BID for 10 days  Take 1 Tab by mouth 2 times a day.  Dose:  600 mg        CONTINUE taking these medications      Instructions   albuterol 108 (90  Base) MCG/ACT Aers inhalation aerosol   Inhale 2 Puffs by mouth every 6 hours as needed for Shortness of Breath.  Dose:  2 Puff     CIMZIA PREFILLED 2 X 200 MG/ML Kit  Generic drug:  certolizumab pegol   Inject 200 mg as instructed every 14 days.  Dose:  200 mg     fexofenadine 180 MG tablet  Commonly known as:  ALLEGRA   Take 180 mg by mouth every day.  Dose:  180 mg     fluticasone 50 MCG/ACT nasal spray  Commonly known as:  FLONASE   Spray 1 Spray in nose every day.  Dose:  1 Spray     gabapentin 300 MG Caps  Commonly known as:  NEURONTIN   Take 300 mg by mouth 3 times a day.  Dose:  300 mg     leflunomide 20 MG Tabs  Commonly known as:  ARAVA   Take 20 mg by mouth every day.  Dose:  20 mg     loperamide 2 MG tablet  Commonly known as:  IMODIUM A-D   Take 2 mg by mouth every day.  Dose:  2 mg     Magnesium 250 MG Tabs   Take 1 Tab by mouth every day.  Dose:  250 mg     MULTIVITAMIN PO   Take  by mouth.     rosuvastatin 20 MG Tabs  Commonly known as:  CRESTOR   Take 1 Tab by mouth every bedtime.  Dose:  20 mg     SPIRIVA HANDIHALER 18 MCG Caps  Generic drug:  tiotropium   INHALE THE CONTENTS OF 1 CAPSULE VIA HANDIHALER BY MOUTH DAILY     SYMBICORT 80-4.5 MCG/ACT Aero  Generic drug:  budesonide-formoterol   INHALE 2 PUFFS BY MOUTH TWO TIMES A DAY     Vitamin D3 2000 UNIT Caps   Take 1 Cap by mouth every day.  Dose:  1 Cap                Disposition:   Home    Diet:   Healthy    Activity:   As tolerated    Instructions:      The patient has been advised to follow up as an out patient with his primary care doctor   The patient was instructed to return to the ER in the event of worsening symptoms. I have counseled the patient on the importance of compliance and the patient has agreed to proceed with all medical recommendations and follow up plan indicated above.   The patient understands that all medications come with benefits and risks. Risks may include permanent injury or death and these risks can be minimized with  close reassessment and monitoring.        Primary Care Provider:    Lizzie Soto M.D.    Discharge summary faxed to primary care provider:  Completed  Copy of discharge summary given to the patient: Deferred      Pending Studies:        none    Time spent on discharge day patient visit, preparing discharge paperwork and arranging for patient follow up.      Discharge Time (Minutes) :    60 minutes        Condition on Discharge    ______________________________________________________________________    Interval history/exam for day of discharge:     The patient is sitting comfortable on the bed with mild to moderate pain induced on movement, the patient condition is improving and he has been discharged on oral antibiotics. The patient is doing well and he is eager to go home.    Vitals:    08/29/17 0326 08/29/17 0440 08/29/17 0800 08/29/17 1200   BP:  131/68 151/82 (!) 162/90   Pulse: 81 83 84 98   Resp: 18 18 17 17   Temp:  36.2 °C (97.1 °F) 36.2 °C (97.1 °F) 36.4 °C (97.6 °F)   TempSrc:       SpO2: 89% 94% 97% 98%   Weight:       Height:         Weight/BMI: Body mass index is 25.42 kg/m².  Pulse Oximetry: 98 %, O2 (LPM): 0, O2 Delivery: None (Room Air)    General: NAD  CVS: RRR  PULM: CTAD      Most Recent Labs:    Lab Results   Component Value Date/Time    WBC 5.2 08/29/2017 01:43 AM    RBC 3.45 (L) 08/29/2017 01:43 AM    HEMOGLOBIN 11.2 (L) 08/29/2017 01:43 AM    HEMATOCRIT 34.5 (L) 08/29/2017 01:43 AM    .0 (H) 08/29/2017 01:43 AM    MCH 32.5 08/29/2017 01:43 AM    MCHC 32.5 (L) 08/29/2017 01:43 AM    MPV 9.7 08/29/2017 01:43 AM    NEUTSPOLYS 46.60 08/29/2017 01:43 AM    LYMPHOCYTES 28.30 08/29/2017 01:43 AM    MONOCYTES 21.60 (H) 08/29/2017 01:43 AM    EOSINOPHILS 2.50 08/29/2017 01:43 AM    BASOPHILS 0.80 08/29/2017 01:43 AM      Lab Results   Component Value Date/Time    SODIUM 139 08/29/2017 01:43 AM    POTASSIUM 4.0 08/29/2017 01:43 AM    CHLORIDE 108 08/29/2017 01:43 AM    CO2 24 08/29/2017 01:43  AM    GLUCOSE 88 08/29/2017 01:43 AM    BUN 19 08/29/2017 01:43 AM    CREATININE 1.36 08/29/2017 01:43 AM    CREATININE 1.5 (H) 04/11/2005 10:13 AM      Lab Results   Component Value Date/Time    ALTSGPT 30 08/28/2017 03:07 PM    ASTSGOT 37 08/28/2017 03:07 PM    ALKPHOSPHAT 71 08/28/2017 03:07 PM    TBILIRUBIN 0.8 08/28/2017 03:07 PM    DBILIRUBIN 0.2 04/27/2017 01:46 PM    ALBUMIN 4.0 08/28/2017 03:07 PM    ALBUMIN 4.24 09/25/2013 08:11 AM    GLOBULIN 3.2 08/28/2017 03:07 PM     No results found for: PROTHROMBTM, INR

## 2017-08-30 ENCOUNTER — PATIENT OUTREACH (OUTPATIENT)
Dept: HEALTH INFORMATION MANAGEMENT | Facility: OTHER | Age: 77
End: 2017-08-30

## 2017-08-30 ENCOUNTER — NON-PROVIDER VISIT (OUTPATIENT)
Dept: CARDIOLOGY | Facility: MEDICAL CENTER | Age: 77
End: 2017-08-30
Payer: MEDICARE

## 2017-08-30 DIAGNOSIS — I49.3 PVC (PREMATURE VENTRICULAR CONTRACTION): ICD-10-CM

## 2017-08-30 DIAGNOSIS — I44.0 FIRST DEGREE AV BLOCK: ICD-10-CM

## 2017-08-30 DIAGNOSIS — R00.0 SINUS TACHYCARDIA: ICD-10-CM

## 2017-09-01 ENCOUNTER — OFFICE VISIT (OUTPATIENT)
Dept: INTERNAL MEDICINE | Facility: MEDICAL CENTER | Age: 77
End: 2017-09-01
Payer: MEDICARE

## 2017-09-01 VITALS
SYSTOLIC BLOOD PRESSURE: 140 MMHG | BODY MASS INDEX: 25.58 KG/M2 | RESPIRATION RATE: 16 BRPM | HEART RATE: 70 BPM | WEIGHT: 193 LBS | HEIGHT: 73 IN | OXYGEN SATURATION: 90 % | TEMPERATURE: 97.4 F | DIASTOLIC BLOOD PRESSURE: 88 MMHG

## 2017-09-01 DIAGNOSIS — M71.122 SEPTIC BURSITIS OF ELBOW, LEFT: ICD-10-CM

## 2017-09-01 PROCEDURE — 99213 OFFICE O/P EST LOW 20 MIN: CPT | Mod: GE | Performed by: INTERNAL MEDICINE

## 2017-09-01 RX ORDER — SULFAMETHOXAZOLE AND TRIMETHOPRIM 800; 160 MG/1; MG/1
1 TABLET ORAL 2 TIMES DAILY
Qty: 20 TAB | Refills: 0 | Status: SHIPPED | OUTPATIENT
Start: 2017-09-01 | End: 2017-09-11

## 2017-09-01 RX ORDER — INFLUENZA A VIRUS A/MICHIGAN/45/2015 X-275 (H1N1) ANTIGEN (FORMALDEHYDE INACTIVATED), INFLUENZA A VIRUS A/SINGAPORE/INFIMH-16-0019/2016 IVR-186 (H3N2) ANTIGEN (FORMALDEHYDE INACTIVATED), AND INFLUENZA B VIRUS B/MARYLAND/15/2016 BX-69A (A B/COLORADO/6/2017-LIKE VIRUS) ANTIGEN (FORMALDEHYDE INACTIVATED) 60; 60; 60 UG/.5ML; UG/.5ML; UG/.5ML
INJECTION, SUSPENSION INTRAMUSCULAR
Refills: 0 | COMMUNITY
Start: 2017-08-24 | End: 2017-11-16

## 2017-09-01 ASSESSMENT — PAIN SCALES - GENERAL: PAINLEVEL: 1=MINIMAL PAIN

## 2017-09-01 NOTE — PROGRESS NOTES
Established Patient    Barry presents today with the following:    CC: Septic bursitis.    HPI: 77 y.o male recently DCed from [facility] where he was treated for septic bursitis is here because of trouble filling the Linezolid prescription. Linezolid requires prior auth and is not covered by Medicare. The pt is here seeking an alternative therapy that is covered.     Otherwise the pt is ok. Reports mild improvement in his elbow with Cefdinir - the swelling is less than before and the pain has subsided. Reports worsening of discomfort with physical activity and improvement with rest. Denies fever, chills, nausea, vomiting, abdominal pain/cramping.    Patient Active Problem List    Diagnosis Date Noted   • RA (rheumatoid arthritis) (CMS-HCC) 06/03/2016     Priority: Low   • Chronic obstructive pulmonary disease (CMS-HCC)      Priority: Low   • Septic bursitis of elbow 08/28/2017   • PVC (premature ventricular contraction) 08/14/2017   • Elevated TSH 12/06/2016   • Mixed dyslipidemia 12/06/2016   • Essential hypertension, benign 08/02/2016   • CKD (chronic kidney disease), stage III 08/02/2016   • Coronary artery calcification seen on CAT scan 08/02/2016   • Orthostasis 06/23/2016   • Hyperlipidemia 06/23/2016   • Hyperlipidemia 06/03/2016   • SOB (shortness of breath) 06/03/2016   • Fatigue 06/03/2016   • Antrochoanal polyp 10/06/2015   • Hypoxia 06/25/2013   • Pleural plaque 06/25/2013       Current Outpatient Prescriptions   Medication Sig Dispense Refill   • sulfamethoxazole-trimethoprim (BACTRIM DS) 800-160 MG tablet Take 1 Tab by mouth 2 times a day for 10 days. 20 Tab 0   • cefdinir (OMNICEF) 300 MG Cap Take 1 Cap by mouth 2 times a day. 20 Cap 0   • certolizumab pegol (CIMZIA PREFILLED) 2 X 200 MG/ML Kit Inject 200 mg as instructed every 14 days.     • gabapentin (NEURONTIN) 300 MG Cap Take 300 mg by mouth 3 times a day.     • rosuvastatin (CRESTOR) 20 MG Tab Take 1 Tab by mouth every bedtime. 90 Tab 2   •  "SPIRIVA HANDIHALER 18 MCG Cap INHALE THE CONTENTS OF 1 CAPSULE VIA HANDIHALER BY MOUTH DAILY 90 Cap 3   • SYMBICORT 80-4.5 MCG/ACT Aerosol INHALE 2 PUFFS BY MOUTH TWO TIMES A DAY 3 Inhaler 1   • Magnesium 250 MG Tab Take 1 Tab by mouth every day.     • albuterol 108 (90 BASE) MCG/ACT Aero Soln inhalation aerosol Inhale 2 Puffs by mouth every 6 hours as needed for Shortness of Breath. 8.5 g 3   • fluticasone (FLONASE) 50 MCG/ACT nasal spray Spray 1 Spray in nose every day.     • Cholecalciferol (VITAMIN D3) 2000 UNIT Cap Take 1 Cap by mouth every day.     • loperamide (IMODIUM A-D) 2 MG tablet Take 2 mg by mouth every day.     • leflunomide (ARAVA) 20 MG Tab Take 20 mg by mouth every day.     • Multiple Vitamin (MULTIVITAMIN PO) Take  by mouth.     • fexofenadine (ALLEGRA) 180 MG tablet Take 180 mg by mouth every day.       No current facility-administered medications for this visit.        ROS: As per HPI. Additional pertinent symptoms as noted below.    All others negative    /88   Pulse 70   Temp 36.3 °C (97.4 °F)   Resp 16   Ht 1.854 m (6' 1\")   Wt 87.5 kg (193 lb)   SpO2 90%   BMI 25.46 kg/m²     Physical Exam   Constitutional:  oriented to person, place, and time. No distress.   Eyes: Pupils are equal, round, and reactive to light. No scleral icterus.  Neck: Neck supple. No thyromegaly present.   Cardiovascular: Normal rate, regular rhythm and normal heart sounds.  Exam reveals no gallop and no friction rub.  No murmur heard.  Pulmonary/Chest: Breath sounds normal. Chest wall is not dull to percussion.   Musculoskeletal:   no edema.   Lymphadenopathy: no cervical adenopathy  Neurological: alert and oriented to person, place, and time.   Skin: No cyanosis. Nails show no clubbing.  Other: Left elbow with swelling the size of a golf ball, erythematous, hot to touch, non tender to palpation. Full ROM.      Note: I have reviewed all pertinent labs and diagnostic tests associated with this visit with " specific comments listed under the assessment and plan below    Assessment and Plan    1. Septic bursitis of elbow, left  Pt is a recent DC from hospital where he was diagnosed with septic bursitis and treated with IV ABT. Discharged in stable condition on Cefdinir and Linezolid. As linezolid requires prior authorization and is not covered by pt's insurance, they were not able to get it from pharmacy.  Spoke with [facility] pharmacist about alternative treatment and Bactrim 800-160mg is an acceptable substitute for gram positive coverage.  Start Bactrim BID x 10 days.  Continue Cefdinir.  Continue probiotic. Pt instructed to take probiotic 1-2 hours after ABT dose.      Followup: Return if symptoms worsen or fail to improve.      Signed by: Bella Munson M.D..

## 2017-09-02 LAB
BACTERIA BLD CULT: NORMAL
BACTERIA BLD CULT: NORMAL
SIGNIFICANT IND 70042: NORMAL
SIGNIFICANT IND 70042: NORMAL
SITE SITE: NORMAL
SITE SITE: NORMAL
SOURCE SOURCE: NORMAL
SOURCE SOURCE: NORMAL

## 2017-09-05 ENCOUNTER — TELEPHONE (OUTPATIENT)
Dept: CARDIOLOGY | Facility: MEDICAL CENTER | Age: 77
End: 2017-09-05

## 2017-09-05 ENCOUNTER — OFFICE VISIT (OUTPATIENT)
Dept: INTERNAL MEDICINE | Facility: MEDICAL CENTER | Age: 77
End: 2017-09-05
Payer: MEDICARE

## 2017-09-05 VITALS
DIASTOLIC BLOOD PRESSURE: 90 MMHG | HEART RATE: 73 BPM | WEIGHT: 193.25 LBS | TEMPERATURE: 97.1 F | SYSTOLIC BLOOD PRESSURE: 146 MMHG | OXYGEN SATURATION: 89 % | HEIGHT: 73 IN | BODY MASS INDEX: 25.61 KG/M2

## 2017-09-05 DIAGNOSIS — E78.5 DYSLIPIDEMIA: ICD-10-CM

## 2017-09-05 DIAGNOSIS — I49.3 PVC (PREMATURE VENTRICULAR CONTRACTION): ICD-10-CM

## 2017-09-05 DIAGNOSIS — M71.122 SEPTIC BURSITIS OF ELBOW, LEFT: ICD-10-CM

## 2017-09-05 DIAGNOSIS — G62.9 NEUROPATHY: ICD-10-CM

## 2017-09-05 DIAGNOSIS — J44.9 CHRONIC OBSTRUCTIVE PULMONARY DISEASE, UNSPECIFIED COPD TYPE (HCC): ICD-10-CM

## 2017-09-05 DIAGNOSIS — M06.9 RHEUMATOID ARTHRITIS, INVOLVING UNSPECIFIED SITE, UNSPECIFIED RHEUMATOID FACTOR PRESENCE: ICD-10-CM

## 2017-09-05 LAB — EKG IMPRESSION: NORMAL

## 2017-09-05 PROCEDURE — 99213 OFFICE O/P EST LOW 20 MIN: CPT | Mod: GE | Performed by: INTERNAL MEDICINE

## 2017-09-05 PROCEDURE — 93224 XTRNL ECG REC UP TO 48 HRS: CPT | Performed by: INTERNAL MEDICINE

## 2017-09-05 NOTE — PATIENT INSTRUCTIONS
Please continue antibiotics for total of 10 days each.  Stop Cefdinir after 10 days of total duration (9/7/17)  Stop Bactrim after 10 days of total duration ( 9/11/17)  Follow up with PCP Dr Soto in 2-4 weeks.      Elbow Bursitis  Elbow bursitis is inflammation of the fluid-filled sac (bursa) between the tip of your elbow bone (olecranon) and your skin. Elbow bursitis may also be called olecranon bursitis.  Normally, the olecranon bursa has only a small amount of fluid in it to cushion and protect your elbow bone. Elbow bursitis causes fluid to build up inside the bursa. Over time, this swelling and inflammation can cause pain when you bend or lean on your elbow.   CAUSES  Elbow bursitis may be caused by:   · Elbow injury (acute trauma).  · Leaning on hard surfaces for long periods of time.  · Infection from an injury that breaks the skin near your elbow.  · A bone growth (spur) that forms at the tip of your elbow.  · A medical condition that causes inflammation in your body, such as gout or rheumatoid arthritis.    The cause may also be unknown.   SIGNS AND SYMPTOMS   The first sign of elbow bursitis is usually swelling over the tip of your elbow. This can grow to be the size of a golf ball. This may start suddenly or develop gradually. You may also have:  · Pain when bending or leaning on your elbow.  · Restricted movement of your elbow.    If your bursitis is caused by an infection, symptoms may also include:  · Redness, warmth, and tenderness of the elbow.  · Drainage of pus from the swollen area over your elbow, if the skin breaks open.  DIAGNOSIS   Your health care provider may be able to diagnose elbow bursitis based on your signs and symptoms, especially if you have recently been injured. Your health care provider will also do a physical exam. This may include:  · X-rays to look for a bone spur or a bone fracture.  · Draining fluid from the bursa to test it for infection.  · Blood tests to rule out gout or  rheumatoid arthritis.  TREATMENT   Treatment for elbow bursitis depends on the cause. Treatment may include:  · Medicines. These may include:  ¨ Over-the-counter medicines to relieve pain and inflammation.  ¨ Antibiotic medicines to fight infection.  ¨ Injections of anti-inflammatory medicines (steroids).  · Wrapping your elbow with a bandage.  · Draining fluid from the bursa.  · Wearing elbow pads.    If your bursitis does not get better with treatment, surgery may be needed to remove the bursa.   HOME CARE INSTRUCTIONS   · Take medicines only as directed by your health care provider.  · If you were prescribed an antibiotic medicine, finish all of it even if you start to feel better.  · If your bursitis is caused by an injury, rest your elbow and wear your bandage as directed by your health care provider. You may also apply ice to the injured area as directed by your health care provider:  ¨ Put ice in a plastic bag.  ¨ Place a towel between your skin and the bag.  ¨ Leave the ice on for 20 minutes, 2-3 times per day.  · Avoid any activities that cause elbow pain.  · Use elbow pads or elbow wraps to cushion your elbow.  SEEK MEDICAL CARE IF:  · You have a fever.    · Your symptoms do not get better with treatment.  · Your pain or swelling gets worse.  · Your elbow pain or swelling goes away and then returns.  · You have drainage of pus from the swollen area over your elbow.     This information is not intended to replace advice given to you by your health care provider. Make sure you discuss any questions you have with your health care provider.     Document Released: 01/17/2008 Document Revised: 01/08/2016 Document Reviewed: 08/26/2015  [employer] Interactive Patient Education ©2016 [employer]

## 2017-09-05 NOTE — TELEPHONE ENCOUNTER
Holter monitor from 8/30/2017 read by Dr. Maxwell Krishnamurthy:    Summary: Frequent PVCs (extrasystoles), not dangerous     Electronically Signed On 9-5-2017 13:53:47 PDT by Maxwell Krishnamurthy MD     Called patient and advised him of results.    DIANE GONZALEZ

## 2017-09-05 NOTE — PROGRESS NOTES
Established Patient    Barry presents today with the following:    CC: left elbow swelling    HPI: 77 yr old male patient with PMHx of rheumatoid arthritis, COPD, CKD, dyslipidemia discharged from [facility] last week for septic bursitis of left elbow comes in today for follow up for the same.  Patient was discharged on Cefdinir and Linezolid for total duration of 10 days but had trouble filling the Linezolid prescription last week.So patient was here on Friday for alternative prescription and was started on bactrim.   Otherwise the patient reports improvement in his elbow with both antibiotics - the swelling is less than before, discoloration has gone and the pain is improving. Reports worsening of discomfort with physical activity and improvement with rest. Denies fever, chills, nausea, vomiting, abdominal pain/cramping. States he moves his bowels 2-3 times on average since on the antibiotics, with no blood or mucous in the stools and continues to take probiotics ( started on Wednesday last week)    Patient Active Problem List    Diagnosis Date Noted   • RA (rheumatoid arthritis) (CMS-HCC) 06/03/2016     Priority: Low   • Chronic obstructive pulmonary disease (CMS-HCC)      Priority: Low   • Septic bursitis of elbow 08/28/2017   • PVC (premature ventricular contraction) 08/14/2017   • Elevated TSH 12/06/2016   • Mixed dyslipidemia 12/06/2016   • Essential hypertension, benign 08/02/2016   • CKD (chronic kidney disease), stage III 08/02/2016   • Coronary artery calcification seen on CAT scan 08/02/2016   • Orthostasis 06/23/2016   • Hyperlipidemia 06/23/2016   • Hyperlipidemia 06/03/2016   • SOB (shortness of breath) 06/03/2016   • Fatigue 06/03/2016   • Antrochoanal polyp 10/06/2015   • Hypoxia 06/25/2013   • Pleural plaque 06/25/2013       Current Outpatient Prescriptions   Medication Sig Dispense Refill   • Probiotic Product (PROBIOTIC PO) Take  by mouth.     • sulfamethoxazole-trimethoprim (BACTRIM DS) 800-160 MG  tablet Take 1 Tab by mouth 2 times a day for 10 days. 20 Tab 0   • cefdinir (OMNICEF) 300 MG Cap Take 1 Cap by mouth 2 times a day. 20 Cap 0   • certolizumab pegol (CIMZIA PREFILLED) 2 X 200 MG/ML Kit Inject 200 mg as instructed every 14 days.     • gabapentin (NEURONTIN) 300 MG Cap Take 300 mg by mouth 3 times a day.     • rosuvastatin (CRESTOR) 20 MG Tab Take 1 Tab by mouth every bedtime. 90 Tab 2   • SPIRIVA HANDIHALER 18 MCG Cap INHALE THE CONTENTS OF 1 CAPSULE VIA HANDIHALER BY MOUTH DAILY 90 Cap 3   • SYMBICORT 80-4.5 MCG/ACT Aerosol INHALE 2 PUFFS BY MOUTH TWO TIMES A DAY 3 Inhaler 1   • Magnesium 250 MG Tab Take 1 Tab by mouth every day.     • albuterol 108 (90 BASE) MCG/ACT Aero Soln inhalation aerosol Inhale 2 Puffs by mouth every 6 hours as needed for Shortness of Breath. 8.5 g 3   • fluticasone (FLONASE) 50 MCG/ACT nasal spray Spray 1 Spray in nose every day.     • leflunomide (ARAVA) 20 MG Tab Take 20 mg by mouth every day.     • Multiple Vitamin (MULTIVITAMIN PO) Take  by mouth.     • fexofenadine (ALLEGRA) 180 MG tablet Take 180 mg by mouth every day.     • FLUZONE HIGH-DOSE 0.5 ML Suspension Prefilled Syringe injection ADM 0.5ML IM UTD  0   • Cholecalciferol (VITAMIN D3) 2000 UNIT Cap Take 1 Cap by mouth every day.     • loperamide (IMODIUM A-D) 2 MG tablet Take 2 mg by mouth every day.       No current facility-administered medications for this visit.        Social History     Social History   • Marital status:      Spouse name: N/A   • Number of children: N/A   • Years of education: N/A     Occupational History   • Not on file.     Social History Main Topics   • Smoking status: Former Smoker     Packs/day: 1.00     Years: 31.00   • Smokeless tobacco: Never Used      Comment: 1 pk a day for 35 yrs   • Alcohol use No      Comment: 2 a week   • Drug use: No   • Sexual activity: Not on file      Comment: retired      Other Topics Concern   • Not on file     Social History Narrative    See H&P     "Lives with wife       Family History   Problem Relation Age of Onset   • Genetic Father      ALS   • Heart Attack Neg Hx    • Heart Disease Neg Hx    • Heart Failure Neg Hx        ROS: As per HPI. Additional pertinent symptoms as noted below.    Constitutional: Denies fever/chills. Positive for fatigue  Eyes: Denies changes/pain in vision  ENT: Denies sore throat, congestion, ear pain  Cardiovascular: Denies chest pain /Palpitations/Edema  Respiratory: Positive for baseline SOB, cough which is unchanged  Abdomen: Denies abdominal pain/difficulty swallowing/constipation.   Genitourinary: Normal urinary habits  Musculo-skeletal: Denies joint/muscle pain  Skin: Denies rash/lesions.   Neurological: Denies weakness/tingling/numbness  Psychological: Normal mood, Cooperative.     /90   Pulse 73   Temp 36.2 °C (97.1 °F)   Ht 1.854 m (6' 1\")   Wt 87.7 kg (193 lb 4 oz)   SpO2 89%   BMI 25.50 kg/m²     Physical Exam  General:  Alert and oriented, No apparent distress.    Eyes: Pupils equal and reactive. No scleral icterus.    Throat: Clear no erythema or exudates noted.    Neck: Supple. No lymphadenopathy noted. Thyroid not enlarged.    Lungs: Decreased bilateral bibasilar breath sounds    Cardiovascular: Regular rate and rhythm. No murmurs, rubs or gallops.    Abdomen:  Benign. No rebound or guarding noted.    Extremities: No clubbing, cyanosis, edema.    Skin: Clear. No rash or suspicious skin lesions noted.    Neurological: Oriented to time, place, and person .Cranial nerves intact. No motor or sensory deficits.Reflexes were normal and symmetrical in both upper and lower extremities     Musculoskeletal :  Left elbow with swelling, mild warmth to touch, non tender to palpation. NROM of all other extremities. No spinal/vertebral tenderness.          Assessment and Plan    1. Septic bursitis of elbow, left  - symptoms/signs are improving  - will continue both antibiotics for total of 10 days each  - patient is " currently on day 5th of bactrim and 9th of cefdinir with no reported side effects  - educated patient to discontinue bactrim after 9/11/17 and cefdinir after 9/7/17.  - advised to continue to take probiotics  - follow up with PCP in 2-4 weeks.    2. Rheumatoid arthritis, involving unspecified site, unspecified rheumatoid factor presence (CMS-[facility])  - stable and at baseline  - currently on leflunomide, Cimzia and continued  - will continue to follow    3. Neuropathy (CMS-HCC)  - patient is currently on gabapentin and symptoms unchanged from the baseline.    4. Chronic obstructive pulmonary disease, unspecified COPD type (CMS-HCC)  - symptoms are at baseline and unchanged  - continued on spiriva ,symbicort, albuterol inhalers.    5. Dyslipidemia  - no related symptoms  - last lipid profile 7/25/17 with LDL 41  - currently on crestor 20 mg tab which is continued.    Follow up with PCP, Dr Soto in 2-4 weeks or early if symptoms worsen    Signed by: Gosia Murillo M.D.

## 2017-09-25 ENCOUNTER — OFFICE VISIT (OUTPATIENT)
Dept: INTERNAL MEDICINE | Facility: MEDICAL CENTER | Age: 77
End: 2017-09-25
Payer: MEDICARE

## 2017-09-25 VITALS
HEART RATE: 71 BPM | OXYGEN SATURATION: 89 % | BODY MASS INDEX: 25.84 KG/M2 | WEIGHT: 195 LBS | TEMPERATURE: 97.2 F | SYSTOLIC BLOOD PRESSURE: 148 MMHG | DIASTOLIC BLOOD PRESSURE: 96 MMHG | HEIGHT: 73 IN

## 2017-09-25 DIAGNOSIS — R79.82 ELEVATED C-REACTIVE PROTEIN (CRP): ICD-10-CM

## 2017-09-25 DIAGNOSIS — M71.122 SEPTIC BURSITIS OF ELBOW, LEFT: ICD-10-CM

## 2017-09-25 DIAGNOSIS — J92.9 PLEURAL PLAQUE: ICD-10-CM

## 2017-09-25 DIAGNOSIS — I10 ESSENTIAL HYPERTENSION, BENIGN: ICD-10-CM

## 2017-09-25 DIAGNOSIS — J98.4 CHRONIC LUNG DISEASE: ICD-10-CM

## 2017-09-25 PROCEDURE — 99214 OFFICE O/P EST MOD 30 MIN: CPT | Mod: GC | Performed by: INTERNAL MEDICINE

## 2017-09-25 NOTE — PATIENT INSTRUCTIONS
Elbow Bursitis  Elbow bursitis is inflammation of the fluid-filled sac (bursa) between the tip of your elbow bone (olecranon) and your skin. Elbow bursitis may also be called olecranon bursitis.  Normally, the olecranon bursa has only a small amount of fluid in it to cushion and protect your elbow bone. Elbow bursitis causes fluid to build up inside the bursa. Over time, this swelling and inflammation can cause pain when you bend or lean on your elbow.   CAUSES  Elbow bursitis may be caused by:   · Elbow injury (acute trauma).  · Leaning on hard surfaces for long periods of time.  · Infection from an injury that breaks the skin near your elbow.  · A bone growth (spur) that forms at the tip of your elbow.  · A medical condition that causes inflammation in your body, such as gout or rheumatoid arthritis.    The cause may also be unknown.   SIGNS AND SYMPTOMS   The first sign of elbow bursitis is usually swelling over the tip of your elbow. This can grow to be the size of a golf ball. This may start suddenly or develop gradually. You may also have:  · Pain when bending or leaning on your elbow.  · Restricted movement of your elbow.    If your bursitis is caused by an infection, symptoms may also include:  · Redness, warmth, and tenderness of the elbow.  · Drainage of pus from the swollen area over your elbow, if the skin breaks open.  DIAGNOSIS   Your health care provider may be able to diagnose elbow bursitis based on your signs and symptoms, especially if you have recently been injured. Your health care provider will also do a physical exam. This may include:  · X-rays to look for a bone spur or a bone fracture.  · Draining fluid from the bursa to test it for infection.  · Blood tests to rule out gout or rheumatoid arthritis.  TREATMENT   Treatment for elbow bursitis depends on the cause. Treatment may include:  · Medicines. These may include:  ¨ Over-the-counter medicines to relieve pain and  inflammation.  ¨ Antibiotic medicines to fight infection.  ¨ Injections of anti-inflammatory medicines (steroids).  · Wrapping your elbow with a bandage.  · Draining fluid from the bursa.  · Wearing elbow pads.    If your bursitis does not get better with treatment, surgery may be needed to remove the bursa.   HOME CARE INSTRUCTIONS   · Take medicines only as directed by your health care provider.  · If you were prescribed an antibiotic medicine, finish all of it even if you start to feel better.  · If your bursitis is caused by an injury, rest your elbow and wear your bandage as directed by your health care provider. You may also apply ice to the injured area as directed by your health care provider:  ¨ Put ice in a plastic bag.  ¨ Place a towel between your skin and the bag.  ¨ Leave the ice on for 20 minutes, 2-3 times per day.  · Avoid any activities that cause elbow pain.  · Use elbow pads or elbow wraps to cushion your elbow.  SEEK MEDICAL CARE IF:  · You have a fever.    · Your symptoms do not get better with treatment.  · Your pain or swelling gets worse.  · Your elbow pain or swelling goes away and then returns.  · You have drainage of pus from the swollen area over your elbow.     This information is not intended to replace advice given to you by your health care provider. Make sure you discuss any questions you have with your health care provider.     Document Released: 01/17/2008 Document Revised: 01/08/2016 Document Reviewed: 08/26/2015  [employer] Interactive Patient Education ©2016 [employer]      Follow up with pulmonary clinic.

## 2017-09-25 NOTE — PROGRESS NOTES
Established Patient    Barry presents today with the following:    CC: Follow up visit.  Elbow welling    HPI: 78 yo  with PMH of RA, COPD, HTN, presents to the clinic accompanied with his wife for f/u on the Left elbow septic bursitis.  History was obtained from the patient, family, and a medical record review.     L elbow septic bursitis: The infection started following he received flu shot this year. As per the patient he already finished his po antibiotic course ( bactrim and cefdinir for ~ 10 days) almost 2 weeks ago. He c/o diarrhea following the abx course which is now resolving.His swelling, erythema and pain is improving slowly day by day. He is using wraps for the edema of the ext. He denies any fever, chills. He mentioned that the orthopedic surgeon () who evaluated him during the hospitalization recommend against the drainage of the abscess/infected bursitis and conservative treatment. He added that it may take 6 mo to resolve. He advised him to f/u with Rheumatology for further management.    He offers no acute issues at this point. He is reluctant to go to the pulmonary clinic for his lung issues due to his previous experience in 's office.   Past Medical History:   Diagnosis Date   • Coronary artery calcification seen on CAT scan 8/2/2016    sees cards   • Lightheadedness 6/23/2016   • Orthostasis 6/23/2016    better off HCTZ   • Epididymitis 2009    h/o listed in chart   • Pleural plaque 2005     CT [place]   • Abnormal thyroid stimulating hormone (TSH) level    • Chronic diarrhea     declines eval; takes immodium once a day usually and sometimes less; see previous notes; aware of risk    • Chronic lung disease     asbestos related   • CKD (chronic kidney disease), stage III     sees neph   • GERD (gastroesophageal reflux disease)    • History of hemorrhoids    • History of skin cancer     non melanoma   • [place] (hard of hearing)     declines hearing aids   •  Hypercholesteremia    • Hypertension    • Hypertriglyceridemia    • Indigestion    • Light headedness     2/2 orthostasis? 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     • certolizumab pegol (CIMZIA PREFILLED) 2 X 200 MG/ML Kit Inject 200 mg as instructed every 14 days.     • gabapentin (NEURONTIN) 300 MG Cap Take 300 mg by mouth 3 times a day.     • rosuvastatin (CRESTOR) 20 MG Tab Take 1 Tab by mouth every bedtime. 90 Tab 2   • SPIRIVA HANDIHALER 18 MCG Cap INHALE THE CONTENTS OF 1 CAPSULE VIA HANDIHALER BY MOUTH DAILY 90 Cap 3   • SYMBICORT 80-4.5 MCG/ACT Aerosol INHALE 2 PUFFS BY MOUTH TWO TIMES A DAY 3 Inhaler 1   • Magnesium 250 MG Tab Take 1 Tab by mouth every day.     • albuterol 108 (90 BASE) MCG/ACT Aero Soln inhalation aerosol Inhale 2 Puffs by mouth every 6 hours as needed for Shortness of Breath. 8.5 g 3   • fluticasone (FLONASE) 50 MCG/ACT nasal spray Spray 1 Spray in nose every day.     • loperamide (IMODIUM A-D) 2 MG tablet Take 2 mg by mouth every day.     • leflunomide (ARAVA) 20 MG Tab Take 20 mg by mouth every day.     • Multiple Vitamin (MULTIVITAMIN PO) Take  by mouth.     • fexofenadine (ALLEGRA) 180 MG tablet Take 180 mg by mouth every day.     • FLUZONE HIGH-DOSE 0.5 ML Suspension Prefilled Syringe injection ADM 0.5ML IM UTD  0   • cefdinir (OMNICEF) 300 MG Cap Take 1 Cap by mouth 2 times a day. 20 Cap 0   • Cholecalciferol (VITAMIN D3) 2000 UNIT Cap Take 1 Cap by mouth every day.       No current facility-administered medications for this visit.        ROS: As per HPI.     /96   Pulse 71   Temp 36.2 °C (97.2 °F)   Ht 1.854 m  "(6' 1\")   Wt 88.5 kg (195 lb)   SpO2 89%   BMI 25.73 kg/m²     Physical Exam   Physical Exam   Constitutional: He is oriented to person, place, and time.   Pleasant,  elderly male, not in acute distress.   HENT:   Head: Normocephalic and atraumatic.   Eyes: EOM are normal. Pupils are equal, round, and reactive to light.   Neck: Neck supple.   Cardiovascular: Normal rate and regular rhythm.    Pulmonary/Chest: Effort normal and breath sounds normal. He has no wheezes.   Musculoskeletal:   Presence of erythema and localized fluctuating swelling over the posterior aspect of the Left elbow joint. No skin breakdown or red streak noted. Presence of overall 2+ pitting edema present in the LUE. Peripheral pulse intact.   Neurological: He is alert and oriented to person, place, and time.   No gross motor or sensory deficit noted.   Skin: Skin is warm and dry.   Psychiatric: Affect normal.         Note: I have reviewed all pertinent labs and diagnostic tests associated with this visit with specific comments listed under the assessment and plan below  crp 14    Assessment and Plan    1. Septic bursitis of elbow, left  Improving.  Cont to apply crepe bandage and limb elevation for edema of the ext  F/U with Rheum as scheduled.    2. Chronic obstructive pulmonary disease, unspecified COPD type (CMS-HCC)  No sign of acute exacerbation.  Referral made to pulmonary clinic for the CT findings.    3. Essential hypertension, benign  Diastolic BP is slightly elevated.  Asked patient to monitor BP at home.      Followup: Return for f/u with pcp as scheduled..      Signed by: Elzbieta Rockwell M.D.  "

## 2017-10-03 VITALS
BODY MASS INDEX: 25.71 KG/M2 | RESPIRATION RATE: 16 BRPM | SYSTOLIC BLOOD PRESSURE: 118 MMHG | WEIGHT: 194 LBS | TEMPERATURE: 97.5 F | HEIGHT: 73 IN | HEART RATE: 79 BPM | DIASTOLIC BLOOD PRESSURE: 60 MMHG

## 2017-10-09 ENCOUNTER — OFFICE VISIT (OUTPATIENT)
Dept: PULMONOLOGY | Facility: HOSPICE | Age: 77
End: 2017-10-09
Payer: MEDICARE

## 2017-10-09 VITALS
SYSTOLIC BLOOD PRESSURE: 120 MMHG | HEART RATE: 82 BPM | OXYGEN SATURATION: 90 % | HEIGHT: 73 IN | RESPIRATION RATE: 16 BRPM | BODY MASS INDEX: 25.84 KG/M2 | DIASTOLIC BLOOD PRESSURE: 76 MMHG | TEMPERATURE: 97.2 F | WEIGHT: 195 LBS

## 2017-10-09 DIAGNOSIS — G47.33 OSA (OBSTRUCTIVE SLEEP APNEA): ICD-10-CM

## 2017-10-09 DIAGNOSIS — J44.9 CHRONIC OBSTRUCTIVE PULMONARY DISEASE, UNSPECIFIED COPD TYPE (HCC): ICD-10-CM

## 2017-10-09 DIAGNOSIS — M06.9 RHEUMATOID ARTHRITIS OF HAND, UNSPECIFIED LATERALITY, UNSPECIFIED RHEUMATOID FACTOR PRESENCE: ICD-10-CM

## 2017-10-09 PROCEDURE — 99214 OFFICE O/P EST MOD 30 MIN: CPT | Performed by: INTERNAL MEDICINE

## 2017-10-09 NOTE — PROGRESS NOTES
Chief Complaint   Patient presents with   • COPD     Referred by        HPI: This patient is a 77 y.o. male returns after a prolonged hiatus for follow-up of COPD. His last visit with Dr. Higginbotham was in December 2015 at which time pulmonary function testing showed stage III COPD with FEV1 1.49 L or 44% predicted, DLCO 49% predicted. He had been on Spiriva and was started on Symbicort 160 µg at that time, which he feels has significantly improved his respiratory symptoms. He denies AECOPD over the past year. He exercises on a stationary bicycle for 25 minutes daily without difficulty. He has periodic wheezing, and mild cough in the morning without any significant sputum production. He feels his exertional dyspnea is stable. Chest CAT scan from 2015 showed mild bronchiectasis the left lower lobe. Chest x-ray from August 2017 showed stable bibasilar atelectasis/scarring.  He has rheumatoid arthritis, on Cimzia.  He has a history of obstructive sleep apnea, with intolerance to CPAP. He denies supplemental oxygen use.    Past Medical History:   Diagnosis Date   • Coronary artery calcification seen on CAT scan 8/2/2016    sees cards   • Lightheadedness 6/23/2016   • Orthostasis 6/23/2016    better off HCTZ   • Epididymitis 2009    h/o listed in chart   • Pleural plaque 2005     CT [place]   • Abnormal thyroid stimulating hormone (TSH) level    • Allergic rhinitis    • Asbestosis (CMS-[facility])    • Asthma    • Bronchitis    • Chronic diarrhea     declines eval; takes immodium once a day usually and sometimes less; see previous notes; aware of risk    • Chronic lung disease     asbestos related   • CKD (chronic kidney disease), stage III     sees neph   • COPD (chronic obstructive pulmonary disease) (CMS-[facility])    • GERD (gastroesophageal reflux disease)    • History of hemorrhoids    • History of skin cancer     non melanoma   • [place] (hard of hearing)     declines hearing aids   • Hypercholesteremia    • Hypertension    •  Hypertriglyceridemia    • Indigestion    • Light headedness     2/2 orthostasis? now better off hctz   • Low back pain    • Nasal drainage    • Peripheral neuropathy (CMS-[facility])    • Post-nasal drip    • Pulmonary emphysema (CMS-[facility])    • RA (rheumatoid arthritis) (CMS-[facility])     on meds, sees rheum   • Restless leg syndrome    • Rheumatoid arthritis (CMS-[facility])    • Sleep apnea     obstructive and central - not adherent to autopap   • Solitary kidney     history of kidney cyst 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-functioning kidney s/p nephrectomy        Social History     Social History   • Marital status:      Spouse name: N/A   • Number of children: N/A   • Years of education: N/A     Occupational History   • Not on file.     Social History Main Topics   • Smoking status: Former Smoker     Packs/day: 1.00     Years: 40.00     Types: Cigarettes     Quit date: 1/1/1980   • Smokeless tobacco: Never Used      Comment: 1 pk a day for 40 yrs   • Alcohol use No      Comment: 2 a week   • Drug use: No   • Sexual activity: Not on file      Comment: retired      Other Topics Concern   • Not on file     Social History Narrative    See H&P    Lives with wife       Family History   Problem Relation Age of Onset   • Genetic Father      ALS   • No Known Problems Sister    • No Known Problems Son    • No Known Problems Daughter    • Heart Attack Neg Hx    • Heart Disease Neg Hx    • Heart Failure Neg Hx        Current Outpatient Prescriptions on File Prior to Visit   Medication Sig Dispense Refill   • Probiotic Product (PROBIOTIC PO) Take  by mouth.     • certolizumab pegol (CIMZIA PREFILLED) 2 X 200 MG/ML Kit Inject 200 mg as instructed every 14 days.     • gabapentin (NEURONTIN) 300 MG Cap Take 300 mg by mouth 3 times a day.     • rosuvastatin (CRESTOR) 20 MG Tab Take 1 Tab by mouth every bedtime. 90 Tab 2   • SPIRIVA HANDIHALER 18 MCG Cap INHALE THE CONTENTS OF 1 CAPSULE VIA HANDIHALER BY MOUTH DAILY 90 Cap 3   • SYMBICORT 80-4.5 MCG/ACT Aerosol  "INHALE 2 PUFFS BY MOUTH TWO TIMES A DAY 3 Inhaler 1   • Magnesium 250 MG Tab Take 1 Tab by mouth every day.     • albuterol 108 (90 BASE) MCG/ACT Aero Soln inhalation aerosol Inhale 2 Puffs by mouth every 6 hours as needed for Shortness of Breath. 8.5 g 3   • fluticasone (FLONASE) 50 MCG/ACT nasal spray Spray 1 Spray in nose every day.     • Cholecalciferol (VITAMIN D3) 2000 UNIT Cap Take 1 Cap by mouth every day.     • loperamide (IMODIUM A-D) 2 MG tablet Take 2 mg by mouth every day.     • leflunomide (ARAVA) 20 MG Tab Take 20 mg by mouth every day.     • Multiple Vitamin (MULTIVITAMIN PO) Take  by mouth.     • fexofenadine (ALLEGRA) 180 MG tablet Take 180 mg by mouth every day.     • FLUZONE HIGH-DOSE 0.5 ML Suspension Prefilled Syringe injection ADM 0.5ML IM UTD  0   • cefdinir (OMNICEF) 300 MG Cap Take 1 Cap by mouth 2 times a day. 20 Cap 0     No current facility-administered medications on file prior to visit.        Allergies: Cipro xr; Levaquin; and Pcn [penicillins]    ROS:   Constitutional: Denies fevers, chills, night sweats, fatigue or weight loss  Eyes: Denies vision loss, pain, drainage, double vision  Ears, Nose, Throat: Denies earache, difficulty hearing, +tinnitus, denies nasal congestion, hoarseness  Cardiovascular: Denies chest pain, tightness, palpitations, orthopnea or edema  Respiratory: As in history of present illness  Sleep: + daytime sleepiness, +snoring, +apneas, insomnia, morning headaches  GI: +heartburn, dysphagia, nausea, abdominal pain, diarrhea or constipation  : Denies frequent urination, hematuria, discharge or painful urination  Musculoskeletal: +back pain, painful joints, sore muscles  Neurological: +weakness, denies headaches  Skin: No rashes    Blood pressure 120/76, pulse 82, temperature 36.2 °C (97.2 °F), resp. rate 16, height 1.854 m (6' 1\"), weight 88.5 kg (195 lb), SpO2 90 %.    Physical Exam:  Appearance: Well-nourished, well-developed, in no acute distress  HEENT: " Normocephalic, atraumatic, white sclera, PERRLA, oropharynx clear  Neck: No adenopathy or masses  Respiratory: no intercostal retractions or accessory muscle use  Lungs auscultation: Bibasilar rales  Cardiovascular: Regular rate rhythm. No murmurs, rubs or gallops.  No LE edema  Abdomen: soft, nondistended  Gait: Normal  Digits: No clubbing, cyanosis  Motor: No focal deficits  Orientation: Oriented to time, person and place    Diagnosis:  1. Chronic obstructive pulmonary disease, unspecified COPD type (CMS-HCC)  AMB PULMONARY FUNCTION TEST/LAB    OVERNIGHT OXIMETRY   2. NYASIA (obstructive sleep apnea)      intolerant CPAP   3. Rheumatoid arthritis of hand, unspecified laterality, unspecified rheumatoid factor presence (CMS-[facility])         Plan:  The patient has severe COPD, additionally with findings of bronchiectasis on former chest CAT scan. He has a history of asthma since childhood. Clinically, his pulmonary status appears stable.  Recommend updating pulmonary function testing with DLCO as well as overnight oximetry on room air. He declines CPAP therapy, however is likely a candidate for supplemental oxygen.  He is up-to-date on influenza and pneumococcal vaccines.  Continue Symbicort 160 µg and Spiriva.  Return for after PFT, after CNOX.

## 2017-10-26 RX ORDER — DILTIAZEM HYDROCHLORIDE 60 MG/1
TABLET, FILM COATED ORAL
Qty: 3 INHALER | Refills: 1 | Status: SHIPPED | OUTPATIENT
Start: 2017-10-26 | End: 2019-01-07 | Stop reason: SDUPTHER

## 2017-10-26 NOTE — TELEPHONE ENCOUNTER
Last seen: 09/25/17 by Dr. Soto  Next appt: 11/17/17 with Dr. Soto    Was the patient seen in the last year in this department? Yes   Does patient have an active prescription for medications requested? No   Received Request Via: Pharmacy

## 2017-11-02 ENCOUNTER — HOSPITAL ENCOUNTER (OUTPATIENT)
Dept: LAB | Facility: MEDICAL CENTER | Age: 77
End: 2017-11-02
Attending: INTERNAL MEDICINE
Payer: MEDICARE

## 2017-11-02 LAB
ALBUMIN SERPL BCP-MCNC: 3.9 G/DL (ref 3.2–4.9)
ALT SERPL-CCNC: 22 U/L (ref 2–50)
AST SERPL-CCNC: 26 U/L (ref 12–45)
BASOPHILS # BLD AUTO: 1.3 % (ref 0–1.8)
BASOPHILS # BLD: 0.06 K/UL (ref 0–0.12)
CREAT SERPL-MCNC: 1.41 MG/DL (ref 0.5–1.4)
CRP SERPL HS-MCNC: 0.09 MG/DL (ref 0–0.75)
EOSINOPHIL # BLD AUTO: 0.4 K/UL (ref 0–0.51)
EOSINOPHIL NFR BLD: 8.7 % (ref 0–6.9)
ERYTHROCYTE [DISTWIDTH] IN BLOOD BY AUTOMATED COUNT: 57.7 FL (ref 35.9–50)
ERYTHROCYTE [SEDIMENTATION RATE] IN BLOOD BY WESTERGREN METHOD: 10 MM/HOUR (ref 0–20)
GFR SERPL CREATININE-BSD FRML MDRD: 49 ML/MIN/1.73 M 2
HCT VFR BLD AUTO: 39.7 % (ref 42–52)
HGB BLD-MCNC: 12.7 G/DL (ref 14–18)
IMM GRANULOCYTES # BLD AUTO: 0.01 K/UL (ref 0–0.11)
IMM GRANULOCYTES NFR BLD AUTO: 0.2 % (ref 0–0.9)
LYMPHOCYTES # BLD AUTO: 1.31 K/UL (ref 1–4.8)
LYMPHOCYTES NFR BLD: 28.6 % (ref 22–41)
MCH RBC QN AUTO: 32.6 PG (ref 27–33)
MCHC RBC AUTO-ENTMCNC: 32 G/DL (ref 33.7–35.3)
MCV RBC AUTO: 101.8 FL (ref 81.4–97.8)
MONOCYTES # BLD AUTO: 0.69 K/UL (ref 0–0.85)
MONOCYTES NFR BLD AUTO: 15.1 % (ref 0–13.4)
NEUTROPHILS # BLD AUTO: 2.11 K/UL (ref 1.82–7.42)
NEUTROPHILS NFR BLD: 46.1 % (ref 44–72)
NRBC # BLD AUTO: 0 K/UL
NRBC BLD AUTO-RTO: 0 /100 WBC
PLATELET # BLD AUTO: 214 K/UL (ref 164–446)
PMV BLD AUTO: 9.5 FL (ref 9–12.9)
RBC # BLD AUTO: 3.9 M/UL (ref 4.7–6.1)
WBC # BLD AUTO: 4.6 K/UL (ref 4.8–10.8)

## 2017-11-02 PROCEDURE — 85652 RBC SED RATE AUTOMATED: CPT

## 2017-11-02 PROCEDURE — 86140 C-REACTIVE PROTEIN: CPT

## 2017-11-02 PROCEDURE — 84460 ALANINE AMINO (ALT) (SGPT): CPT

## 2017-11-02 PROCEDURE — 85025 COMPLETE CBC W/AUTO DIFF WBC: CPT

## 2017-11-02 PROCEDURE — 82565 ASSAY OF CREATININE: CPT

## 2017-11-02 PROCEDURE — 84450 TRANSFERASE (AST) (SGOT): CPT

## 2017-11-02 PROCEDURE — 82040 ASSAY OF SERUM ALBUMIN: CPT

## 2017-11-02 PROCEDURE — 36415 COLL VENOUS BLD VENIPUNCTURE: CPT

## 2017-11-13 ENCOUNTER — APPOINTMENT (RX ONLY)
Dept: URBAN - METROPOLITAN AREA CLINIC 20 | Facility: CLINIC | Age: 77
Setting detail: DERMATOLOGY
End: 2017-11-13

## 2017-11-13 DIAGNOSIS — D18.0 HEMANGIOMA: ICD-10-CM

## 2017-11-13 DIAGNOSIS — D22 MELANOCYTIC NEVI: ICD-10-CM

## 2017-11-13 DIAGNOSIS — L81.4 OTHER MELANIN HYPERPIGMENTATION: ICD-10-CM

## 2017-11-13 DIAGNOSIS — L57.0 ACTINIC KERATOSIS: ICD-10-CM

## 2017-11-13 DIAGNOSIS — L82.1 OTHER SEBORRHEIC KERATOSIS: ICD-10-CM

## 2017-11-13 PROBLEM — I10 ESSENTIAL (PRIMARY) HYPERTENSION: Status: ACTIVE | Noted: 2017-11-13

## 2017-11-13 PROBLEM — D22.62 MELANOCYTIC NEVI OF LEFT UPPER LIMB, INCLUDING SHOULDER: Status: ACTIVE | Noted: 2017-11-13

## 2017-11-13 PROBLEM — D22.5 MELANOCYTIC NEVI OF TRUNK: Status: ACTIVE | Noted: 2017-11-13

## 2017-11-13 PROBLEM — J45.909 UNSPECIFIED ASTHMA, UNCOMPLICATED: Status: ACTIVE | Noted: 2017-11-13

## 2017-11-13 PROBLEM — D48.5 NEOPLASM OF UNCERTAIN BEHAVIOR OF SKIN: Status: ACTIVE | Noted: 2017-11-13

## 2017-11-13 PROBLEM — E78.5 HYPERLIPIDEMIA, UNSPECIFIED: Status: ACTIVE | Noted: 2017-11-13

## 2017-11-13 PROBLEM — J44.9 CHRONIC OBSTRUCTIVE PULMONARY DISEASE, UNSPECIFIED: Status: ACTIVE | Noted: 2017-11-13

## 2017-11-13 PROBLEM — D18.01 HEMANGIOMA OF SKIN AND SUBCUTANEOUS TISSUE: Status: ACTIVE | Noted: 2017-11-13

## 2017-11-13 PROBLEM — D22.61 MELANOCYTIC NEVI OF RIGHT UPPER LIMB, INCLUDING SHOULDER: Status: ACTIVE | Noted: 2017-11-13

## 2017-11-13 PROCEDURE — 17000 DESTRUCT PREMALG LESION: CPT

## 2017-11-13 PROCEDURE — 17003 DESTRUCT PREMALG LES 2-14: CPT

## 2017-11-13 PROCEDURE — 99214 OFFICE O/P EST MOD 30 MIN: CPT | Mod: 25

## 2017-11-13 PROCEDURE — ? LIQUID NITROGEN

## 2017-11-13 PROCEDURE — ? BIOPSY BY SHAVE METHOD

## 2017-11-13 PROCEDURE — ? MEDICATION COUNSELING

## 2017-11-13 PROCEDURE — ? SUNSCREEN RECOMMENDATIONS

## 2017-11-13 PROCEDURE — ? COUNSELING

## 2017-11-13 PROCEDURE — ? PRESCRIPTION

## 2017-11-13 PROCEDURE — 11100: CPT | Mod: 59

## 2017-11-13 RX ORDER — FLUOROURACIL 50 MG/G
CREAM TOPICAL
Qty: 1 | Refills: 0 | Status: ERX | COMMUNITY
Start: 2017-11-13

## 2017-11-13 RX ADMIN — FLUOROURACIL: 50 CREAM TOPICAL at 00:00

## 2017-11-13 ASSESSMENT — LOCATION DETAILED DESCRIPTION DERM
LOCATION DETAILED: LEFT PROXIMAL DORSAL FOREARM
LOCATION DETAILED: RIGHT PROXIMAL DORSAL FOREARM
LOCATION DETAILED: LEFT SUPERIOR CENTRAL MALAR CHEEK
LOCATION DETAILED: RIGHT DORSAL WRIST
LOCATION DETAILED: LEFT MEDIAL FRONTAL SCALP
LOCATION DETAILED: RIGHT RADIAL DORSAL HAND
LOCATION DETAILED: RIGHT FOREHEAD
LOCATION DETAILED: LEFT RADIAL DORSAL HAND
LOCATION DETAILED: LEFT DISTAL DORSAL FOREARM
LOCATION DETAILED: LEFT PROXIMAL RADIAL DORSAL FOREARM
LOCATION DETAILED: LEFT VENTRAL PROXIMAL FOREARM
LOCATION DETAILED: RIGHT MEDIAL UPPER BACK
LOCATION DETAILED: RIGHT INFERIOR MEDIAL FOREHEAD
LOCATION DETAILED: RIGHT CENTRAL POSTERIOR NECK
LOCATION DETAILED: SUPERIOR THORACIC SPINE
LOCATION DETAILED: INFERIOR THORACIC SPINE
LOCATION DETAILED: RIGHT VENTRAL DISTAL FOREARM
LOCATION DETAILED: RIGHT INFERIOR UPPER BACK

## 2017-11-13 ASSESSMENT — LOCATION ZONE DERM
LOCATION ZONE: SCALP
LOCATION ZONE: FACE
LOCATION ZONE: TRUNK
LOCATION ZONE: ARM
LOCATION ZONE: NECK
LOCATION ZONE: HAND

## 2017-11-13 ASSESSMENT — LOCATION SIMPLE DESCRIPTION DERM
LOCATION SIMPLE: RIGHT UPPER BACK
LOCATION SIMPLE: LEFT HAND
LOCATION SIMPLE: RIGHT WRIST
LOCATION SIMPLE: NECK
LOCATION SIMPLE: LEFT FOREARM
LOCATION SIMPLE: UPPER BACK
LOCATION SIMPLE: RIGHT FOREARM
LOCATION SIMPLE: LEFT SCALP
LOCATION SIMPLE: LEFT CHEEK
LOCATION SIMPLE: RIGHT HAND
LOCATION SIMPLE: RIGHT FOREHEAD

## 2017-11-13 NOTE — PROCEDURE: BIOPSY BY SHAVE METHOD
Type Of Destruction Used: Curettage
X Size Of Lesion In Cm: 0.7
Anesthesia Volume In Cc: 1
Additional Anesthesia Volume In Cc (Will Not Render If 0): 0
Anesthesia Type: 1% lidocaine with 1:100,000 epinephrine and 408mcg clindamycin/ml and a 1:10 solution of 8.4% sodium bicarbonate
Biopsy Type: H and E
Destruction After The Procedure: No
Biopsy Method: Personna blade
Post-Care Instructions: I reviewed with the patient in detail post-care instructions. Patient is to keep the biopsy site clean once daily and then apply petroleum and bandaid  until healed.
Hemostasis: Drysol and Electrocautery
Size Of Lesion In Cm: 0.8
Render Post-Care Instructions In Note?: yes
Lab Facility: 
Dressing: Band-Aid
Billing Type: Third-Party Bill
Consent: Written consent was obtained and risks were reviewed including but not limited to scarring, infection, bleeding, scabbing, incomplete removal, nerve damage and allergy to anesthesia.
Wound Care: Bacitracin
Detail Level: Detailed
Lab: [id number]
Notification Instructions: Patient will be notified of biopsy results. However, patient instructed to call the office if not contacted within 2 weeks.

## 2017-11-13 NOTE — PROCEDURE: LIQUID NITROGEN
Number Of Freeze-Thaw Cycles: 2 freeze-thaw cycles
Detail Level: Detailed
Post-Care Instructions: I reviewed with the patient in detail post-care instructions. Patient is to wear sunprotection, and avoid picking at any of the treated lesions. Pt may apply Vaseline to crusted or scabbing areas.
Consent: The patient's consent was obtained including but not limited to risks of crusting, scabbing, blistering, scarring, darker or lighter pigmentary change, recurrence, incomplete removal and infection. RTC in 2 months if lesion(s) persistent.
Render Post-Care Instructions In Note?: yes
Duration Of Freeze Thaw-Cycle (Seconds): 10

## 2017-11-16 ENCOUNTER — OFFICE VISIT (OUTPATIENT)
Dept: INTERNAL MEDICINE | Facility: MEDICAL CENTER | Age: 77
End: 2017-11-16
Payer: MEDICARE

## 2017-11-16 VITALS
HEART RATE: 79 BPM | OXYGEN SATURATION: 92 % | TEMPERATURE: 97 F | DIASTOLIC BLOOD PRESSURE: 98 MMHG | SYSTOLIC BLOOD PRESSURE: 150 MMHG | BODY MASS INDEX: 26.24 KG/M2 | HEIGHT: 73 IN | WEIGHT: 198 LBS

## 2017-11-16 DIAGNOSIS — J98.4 CHRONIC LUNG DISEASE: ICD-10-CM

## 2017-11-16 DIAGNOSIS — G62.9 NEUROPATHY: ICD-10-CM

## 2017-11-16 DIAGNOSIS — E78.5 DYSLIPIDEMIA: ICD-10-CM

## 2017-11-16 DIAGNOSIS — I10 BENIGN ESSENTIAL HTN: ICD-10-CM

## 2017-11-16 PROCEDURE — 99214 OFFICE O/P EST MOD 30 MIN: CPT | Performed by: INTERNAL MEDICINE

## 2017-11-16 RX ORDER — FLUOROURACIL 50 MG/G
CREAM TOPICAL 2 TIMES DAILY
Status: ON HOLD | COMMUNITY
End: 2018-09-29

## 2017-11-16 NOTE — PROGRESS NOTES
Follow-up    Chief Complaint   Patient presents with   • Hyperlipidemia       HPI   History was obtained from the patient, family (wife), and a medical record review.   Here for FU.   Doing ok.   Notes no longer driving as pt with neuropathy --- diff feeling pedals.  Told him I could refer to OT --- he is not interested.    Saw pulm.    Olecranon bursitis better.     THREE CHRONIC CONDITIONS  HTN, stable  Lung disease, stable  DL stable    Past Medical History:   asdfasdfads Date   • Abnormal thyroid stimulating hormone (TSH) level    • Allergic rhinitis    • Asbestosis (CMS-[facility])    • Asthma    • Bronchitis    • Chronic diarrhea     declines eval; takes immodium once a day usually and sometimes less; see previous notes; aware of risk    • Chronic lung disease     asbestos related   • CKD (chronic kidney disease), stage III     sees neph   • COPD (chronic obstructive pulmonary disease) (CMS-HCC)    • Coronary artery calcification seen on CAT scan 8/2/2016    sees cards   • Epididymitis 2009    h/o listed in chart   • GERD (gastroesophageal reflux disease)    • History of hemorrhoids    • History of skin cancer     non melanoma   • [place] (hard of hearing)     declines hearing aids   • Hypercholesteremia    • Hypertension    • Hypertriglyceridemia    • Indigestion    • Light headedness     2/2 orthostasis? now better off hctz   • Lightheadedness 6/23/2016   • Low back pain    • Nasal drainage    • Orthostasis 6/23/2016    better off HCTZ   • Peripheral neuropathy (CMS-HCC)    • Pleural plaque 2005     CT [place]   • Post-nasal drip    • Pulmonary emphysema (CMS-HCC)    • RA (rheumatoid arthritis) (CMS-HCC)     on meds, sees rheum   • Restless leg syndrome    • Rheumatoid arthritis (CMS-HCC)    • Sleep apnea     obstructive and central - not adherent to autopap   • Solitary kidney     history of kidney cyst 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-functioning kidney s/p nephrectomy        Past Surgical History:   Procedure Laterality Date   •  CYSTOSCOPY  2/1/2010    Performed by ANGIE VERDUZCO at SURGERY [facility]   • NASAL POLYPECTOMY Bilateral 10/6/2015    Procedure: NASAL POLYPECTOMY WITH SCOTT ENDOSCOPIC NASAL DEBRIDEMENT;  Surgeon: Param Maurer M.D.;  Location: SURGERY SAME DAY [facility];  Service:    • NEPHRECTOMY LAPAROSCOPIC  2009    left kidney   • PB REMV 2ND CATARACT,CORN-SCLER SECTN     • PYELOGRAM  2/1/2010    Performed by ANGIE VERDUZCO at SURGERY [facility]   • RETROGRADES  2/1/2010    Performed by ANGIE VERDUZCO at [facility]   • TESTICLE EXPLORATION  2004   • TONSILLECTOMY     • URETEROSCOPY  2/1/2010    Performed by ANGIE VERDUZCO at SURGERY [facility]       Current Outpatient Prescriptions   Medication Sig Dispense Refill   • fluorouracil (EFUDEX) 5 % cream Apply  to affected area(s) 2 times a day. For 28 days. One week on and one week off     • SYMBICORT 80-4.5 MCG/ACT Aerosol INHALE 2 PUFFS BY MOUTH TWO TIMES A DAY 3 Inhaler 1   • Probiotic Product (PROBIOTIC PO) Take  by mouth.     • gabapentin (NEURONTIN) 300 MG Cap Take 300 mg by mouth 3 times a day.     • rosuvastatin (CRESTOR) 20 MG Tab Take 1 Tab by mouth every bedtime. 90 Tab 2   • SPIRIVA HANDIHALER 18 MCG Cap INHALE THE CONTENTS OF 1 CAPSULE VIA HANDIHALER BY MOUTH DAILY 90 Cap 3   • Magnesium 250 MG Tab Take 1 Tab by mouth every day.     • albuterol 108 (90 BASE) MCG/ACT Aero Soln inhalation aerosol Inhale 2 Puffs by mouth every 6 hours as needed for Shortness of Breath. 8.5 g 3   • fluticasone (FLONASE) 50 MCG/ACT nasal spray Spray 1 Spray in nose every day.     • Cholecalciferol (VITAMIN D3) 2000 UNIT Cap Take 1 Cap by mouth every day.     • loperamide (IMODIUM A-D) 2 MG tablet Take 2 mg by mouth every day.     • leflunomide (ARAVA) 20 MG Tab Take 20 mg by mouth every day.     • Multiple Vitamin (MULTIVITAMIN PO) Take  by mouth.     • fexofenadine (ALLEGRA) 180 MG tablet Take 180 mg by mouth every day.     •  "certolizumab pegol (CIMZIA PREFILLED) 2 X 200 MG/ML Kit Inject 200 mg as instructed every 14 days.       No current facility-administered medications for this visit.        Allergies as of 11/16/2017 - Reviewed 11/16/2017   Allergen Reaction Noted   • Cipro xr  01/25/2010   • Levaquin  01/25/2010   • Pcn [penicillins] Hives 09/15/2009        Social History     Social History   • Marital status:      Spouse name: N/A   • Number of children: N/A   • Years of education: N/A     Occupational History   • Not on file.     Social History Main Topics   • Smoking status: Former Smoker     Packs/day: 1.00     Years: 40.00     Types: Cigarettes     Quit date: 1/1/1980   • Smokeless tobacco: Never Used      Comment: 1 pk a day for 40 yrs   • Alcohol use No      Comment: 2 a week   • Drug use: No   • Sexual activity: Not on file      Comment: retired      Other Topics Concern   • Not on file     Social History Narrative    See H&P    Lives with wife       Family History   Problem Relation Age of Onset   • Genetic Father      ALS   • No Known Problems Sister    • No Known Problems Son    • No Known Problems Daughter    • Heart Attack Neg Hx    • Heart Disease Neg Hx    • Heart Failure Neg Hx        ROS:   No cough or SOB  No CP or heart palp   No dizziness or LH     /98   Pulse 79   Temp 36.1 °C (97 °F)   Ht 1.854 m (6' 1\")   Wt 89.8 kg (198 lb)   SpO2 92%   BMI 26.12 kg/m²     Physical Exam  General:  Alert and oriented.  No apparent distress.    Eyes: No scleral icterus. No conjunctival injection.      ENMT:  MMM OP clear    Neck: Neck supple.  Trachea midline.      Resp: Clear to auscultation bilaterally. No rales, rhonchi, or wheezes.    Cardiovascular: Regular rate and normal rhythm. No murmurs, rubs or gallops. 2+ radial pulses.     Abdomen:     Musculoskeletal: No clubbing, cyanosis. Trace edema present  No point TTP paraspinal or spinal muscles  Soft moveable lump L elbow    Skin: Sun exposure changes " on head    Lymph:     Neuro: did not want to take off shoes    Psych: Mood euthymic. Affect congruent.    Skin:     Other:     Labs/Studies  Hospital Outpatient Visit on 11/02/2017   Component Date Value Ref Range Status   • WBC 11/02/2017 4.6* 4.8 - 10.8 K/uL Final   • RBC 11/02/2017 3.90* 4.70 - 6.10 M/uL Final   • Hemoglobin 11/02/2017 12.7* 14.0 - 18.0 g/dL Final   • Hematocrit 11/02/2017 39.7* 42.0 - 52.0 % Final   • MCV 11/02/2017 101.8* 81.4 - 97.8 fL Final   • MCH 11/02/2017 32.6  27.0 - 33.0 pg Final   • MCHC 11/02/2017 32.0* 33.7 - 35.3 g/dL Final   • RDW 11/02/2017 57.7* 35.9 - 50.0 fL Final   • Platelet Count 11/02/2017 214  164 - 446 K/uL Final   • MPV 11/02/2017 9.5  9.0 - 12.9 fL Final   • Neutrophils-Polys 11/02/2017 46.10  44.00 - 72.00 % Final   • Lymphocytes 11/02/2017 28.60  22.00 - 41.00 % Final   • Monocytes 11/02/2017 15.10* 0.00 - 13.40 % Final   • Eosinophils 11/02/2017 8.70* 0.00 - 6.90 % Final   • Basophils 11/02/2017 1.30  0.00 - 1.80 % Final   • Immature Granulocytes 11/02/2017 0.20  0.00 - 0.90 % Final   • Nucleated RBC 11/02/2017 0.00  /100 WBC Final   • Neutrophils (Absolute) 11/02/2017 2.11  1.82 - 7.42 K/uL Final   • Lymphs (Absolute) 11/02/2017 1.31  1.00 - 4.80 K/uL Final   • Monos (Absolute) 11/02/2017 0.69  0.00 - 0.85 K/uL Final   • Eos (Absolute) 11/02/2017 0.40  0.00 - 0.51 K/uL Final   • Baso (Absolute) 11/02/2017 0.06  0.00 - 0.12 K/uL Final   • Immature Granulocytes (abs) 11/02/2017 0.01  0.00 - 0.11 K/uL Final   • NRBC (Absolute) 11/02/2017 0.00  K/uL Final   • Sed Rate Westergren 11/02/2017 10  0 - 20 mm/hour Final   • Stat C-Reactive Protein 11/02/2017 0.09  0.00 - 0.75 mg/dL Final   • Creatinine 11/02/2017 1.41* 0.50 - 1.40 mg/dL Final   • ALT(SGPT) 11/02/2017 22  2 - 50 U/L Final   • AST(SGOT) 11/02/2017 26  12 - 45 U/L Final   • Albumin 11/02/2017 3.9  3.2 - 4.9 g/dL Final   • GFR If  11/02/2017 59* >60 mL/min/1.73 m 2 Final   • GFR If Non African  American 11/02/2017 49* >60 mL/min/1.73 m 2 Final         Hospital Outpatient Visit on 06/27/2017   Component Date Value Ref Range Status   • TSH 06/27/2017 3.570  0.300 - 3.700 uIU/mL Final   • Vitamin B12 -True Cobalamin 06/27/2017 >1500* 211 - 911 pg/mL Final   • Folate -Folic Acid 06/27/2017 >23.7  >4.0 ng/mL Final       Hospital Outpatient Visit on 02/16/2017   Component Date Value Ref Range Status   • Uric Acid 02/16/2017 4.8  2.5 - 8.3 mg/dL Final   • Magnesium 02/16/2017 2.0  1.5 - 2.5 mg/dL Final   • Color 02/16/2017 Yellow   Final   • Character 02/16/2017 Clear   Final   • Specific Gravity 02/16/2017 1.020  <1.035 Final   • Ph 02/16/2017 5.5  5.0-8.0 Final   • Glucose 02/16/2017 Negative  Negative mg/dL Final   • Ketones 02/16/2017 Negative  Negative mg/dL Final   • Protein 02/16/2017 Negative  Negative mg/dL Final   • Bilirubin 02/16/2017 Negative  Negative Final   • Nitrite 02/16/2017 Negative  Negative Final   • Leukocyte Esterase 02/16/2017 Negative  Negative Final   • Occult Blood 02/16/2017 Negative  Negative Final   • Micro Urine Req 02/16/2017 see below   Final    Comment: Microscopic examination not performed when specimen is clear  and chemically negative for protein, blood, leukocyte esterase  and nitrite.     • WBC 02/16/2017 4.8  4.8 - 10.8 K/uL Final   • RBC 02/16/2017 4.24* 4.70 - 6.10 M/uL Final   • Hemoglobin 02/16/2017 13.5* 14.0 - 18.0 g/dL Final   • Hematocrit 02/16/2017 43.1  42.0 - 52.0 % Final   • MCV 02/16/2017 101.7* 81.4 - 97.8 fL Final   • MCH 02/16/2017 31.8  27.0 - 33.0 pg Final   • MCHC 02/16/2017 31.3* 33.7 - 35.3 g/dL Final   • RDW 02/16/2017 56.9* 35.9 - 50.0 fL Final   • Platelet Count 02/16/2017 206  164 - 446 K/uL Final   • MPV 02/16/2017 9.3  9.0 - 12.9 fL Final   • Neutrophils-Polys 02/16/2017 41.40* 44.00 - 72.00 % Final   • Lymphocytes 02/16/2017 31.20  22.00 - 41.00 % Final   • Monocytes 02/16/2017 16.90* 0.00 - 13.40 % Final   • Eosinophils 02/16/2017 8.80* 0.00 -  6.90 % Final   • Basophils 02/16/2017 1.50  0.00 - 1.80 % Final   • Immature Granulocytes 02/16/2017 0.20  0.00 - 0.90 % Final   • Nucleated RBC 02/16/2017 0.00   Final   • Neutrophils (Absolute) 02/16/2017 1.98  1.82 - 7.42 K/uL Final    Includes immature neutrophils, if present.   • Lymphs (Absolute) 02/16/2017 1.49  1.00 - 4.80 K/uL Final   • Monos (Absolute) 02/16/2017 0.81  0.00 - 0.85 K/uL Final   • Eos (Absolute) 02/16/2017 0.42  0.00 - 0.51 K/uL Final   • Baso (Absolute) 02/16/2017 0.07  0.00 - 0.12 K/uL Final   • Immature Granulocytes (abs) 02/16/2017 0.01  0.00 - 0.11 K/uL Final   • NRBC (Absolute) 02/16/2017 0.00   Final   • Pth, Intact 02/16/2017 86.1* 14.0 - 72.0 pg/mL Final   • Calcium 02/16/2017 9.0  8.5 - 10.5 mg/dL Final   • 25-Hydroxy   Vitamin D 25 02/16/2017 62  30 - 100 ng/mL Final    Comment: Adult Ranges:   <20 ng/mL - Deficiency  20-29 ng/mL - Insufficiency   ng/mL - Sufficiency  The [employer] Centaur Vitamin D Assay is standardized to the  [facility] reference measurement procedures, a  reference method for the Vitamin D Standardization Program  (VDSP).  The VDSP aligns patient results among 25 (OH)  Vitamin D methods.     • Sodium 02/16/2017 142  135 - 145 mmol/L Final   • Potassium 02/16/2017 4.5  3.6 - 5.5 mmol/L Final   • Chloride 02/16/2017 109  96 - 112 mmol/L Final   • Co2 02/16/2017 23  20 - 33 mmol/L Final   • Glucose 02/16/2017 93  65 - 99 mg/dL Final   • Creatinine 02/16/2017 1.50* 0.50 - 1.40 mg/dL Final   • Bun 02/16/2017 21  8 - 22 mg/dL Final   • Phosphorus 02/16/2017 2.7  2.5 - 4.5 mg/dL Final   • Albumin 02/16/2017 3.9  3.2 - 4.9 g/dL Final   • Total Protein, Urine 02/16/2017 19.3* 0.0 - 15.0 mg/dL Final   • Creatinine, Random Urine 02/16/2017 113.70   Final    Comment: Reference ranges have not been established for this specimen type.  Result interpretation should include consideration of patient's  medical condition and clinical presentation.     • Protein  Creatinine Ratio 02/16/2017 170* 15 - 68 mg/g Final   • Ferritin 02/16/2017 154.0  22.0 - 322.0 ng/mL Final   • Iron 02/16/2017 73  50 - 180 ug/dL Final   • Total Iron Binding 02/16/2017 322  250 - 450 ug/dL Final   • % Saturation 02/16/2017 23  15 - 55 % Final   • GFR If  02/16/2017 55* >60 mL/min/1.73 m 2 Final   • GFR If Non  02/16/2017 45* >60 mL/min/1.73 m 2 Final       Assessment and Plan  1. Benign essential HTN  High today but generally fine at home   Allow for a little of permissive HTN given orthostasis sxs  Off hctz and other meds at this time    2. Dyslipidemia  Stable on statin     3. Neuropathy (CMS-[facility])  B12, folate, tsh are fine  Getting b12 injections by pods at times  Declines repeat eval at this time  rec'd OT for drivers eval given neuropathy makes it diff to drive--- he declines and is no longer driving  On [place]     4. Chronic lung disease  Chronic obstructive pulmonary disease, unspecified COPD type (CMS-[facility])  Asbestos induced b/l diffuse pleural thickening   NYASIA  Declines CPAP  est'd with szot  Needs a CNOx and pfts  Cont current meds for now       Acute left-sided low back pain without sciatica  better     Olecranon bursitis of left elbow  better    Bilateral foot pain  Better with orthotics     Impaired gait  Gait imbalance after change in position   Not orthostatic now but not to say it doesn't happen other times  Off all bp meds  OA/RA and neuropathy could be playing rolae    Anemia, unspecified type  Macrocytosis B12 and folate fine  Re: anemia - Liq44-15h with normal iron studies although on iron that he has been taking for years per his preference  No e/o gross bleeding  He declines further eval of the anemia aware of risk of missing malignancy  Monitor for now    Elevated TSH  better    Rheumatoid arthritis, involving unspecified site, unspecified rheumatoid factor presence (CMS-[facility])  Stable on meds  Sees rheum    Debility  Using DMV handicap placard  form - unable to walk more than 200 feet without resting at times 2/2 RA     CKD (chronic kidney disease), stage 3 (moderate)  Sees neph  Avoid nephrotox agents  Monitor    Loose stool  30+ years controlled with Immodium  Declines further eval (e.g, GI eval, colo, stool studies) aware of risk    Preventative health care  Sees derm and eye doctor regularly  Declines hearing aid  Flu annually  PNA x 2 2014  TDAP 2014  Shingles 2010  [place] 2009 - told to repeat in 10 years  PSA 2014; in past, reviewed risks and benefits and will hold off  DEXA done 2014 was normal per pt     Patient Instructions   Check your blood pressure four times a week the same time every day and at least 30 to 60 minutes after you have taken your blood pressure medications if you are on any. Keep the log and bring it to your next appointment. Call the office sooner if your blood pressure is > 180/90 on 1 day, > 160/90 on 3 days, or > 140/90 persistently. Follow a low-salt diet.  Consider the DASH diet as well.      Consider getting lung tests     Labs maybe next visit.      Come back in 4 months or sooner if needed.               Follow-up  Return in about 3 months (around 2/16/2018).    Signed by: Lizzie Soto M.D.

## 2017-11-16 NOTE — PATIENT INSTRUCTIONS
Check your blood pressure four times a week the same time every day and at least 30 to 60 minutes after you have taken your blood pressure medications if you are on any. Keep the log and bring it to your next appointment. Call the office sooner if your blood pressure is > 180/90 on 1 day, > 160/90 on 3 days, or > 140/90 persistently. Follow a low-salt diet.  Consider the DASH diet as well.      Consider getting lung tests     Labs maybe next visit.      Come back in 4 months or sooner if needed.

## 2017-12-07 ENCOUNTER — HOME STUDY (OUTPATIENT)
Dept: PULMONOLOGY | Facility: HOSPICE | Age: 77
End: 2017-12-07
Attending: INTERNAL MEDICINE
Payer: MEDICARE

## 2017-12-07 DIAGNOSIS — J44.9 CHRONIC OBSTRUCTIVE PULMONARY DISEASE, UNSPECIFIED COPD TYPE (HCC): ICD-10-CM

## 2017-12-07 PROCEDURE — 94762 N-INVAS EAR/PLS OXIMTRY CONT: CPT | Performed by: INTERNAL MEDICINE

## 2017-12-08 ENCOUNTER — NON-PROVIDER VISIT (OUTPATIENT)
Dept: PULMONOLOGY | Facility: HOSPICE | Age: 77
End: 2017-12-08
Payer: MEDICARE

## 2017-12-08 VITALS — HEIGHT: 72 IN | WEIGHT: 200 LBS | BODY MASS INDEX: 27.09 KG/M2

## 2017-12-08 DIAGNOSIS — J44.9 CHRONIC OBSTRUCTIVE PULMONARY DISEASE, UNSPECIFIED COPD TYPE (HCC): ICD-10-CM

## 2017-12-08 PROCEDURE — 94060 EVALUATION OF WHEEZING: CPT | Performed by: INTERNAL MEDICINE

## 2017-12-08 PROCEDURE — 94729 DIFFUSING CAPACITY: CPT | Performed by: INTERNAL MEDICINE

## 2017-12-08 PROCEDURE — 94726 PLETHYSMOGRAPHY LUNG VOLUMES: CPT | Performed by: INTERNAL MEDICINE

## 2017-12-08 ASSESSMENT — PULMONARY FUNCTION TESTS
FEV1_PERCENT_PREDICTED: 66
FEV1/FVC_PERCENT_CHANGE: 167
FEV1: 1.92
FEV1/FVC: 62.54
FEV1: 1.66
FEV1/FVC_PERCENT_PREDICTED: 84
FVC: 2.8
FEV1/FVC: 59
FEV1_PERCENT_CHANGE: 9
FVC: 3.07
FEV1/FVC_PERCENT_PREDICTED: 75
FVC_PERCENT_PREDICTED: 72
FEV1/FVC_PERCENT_PREDICTED: 79
FEV1_PERCENT_PREDICTED: 57
FEV1_PERCENT_CHANGE: 15
FVC_PREDICTED: 3.85
FEV1_PREDICTED: 2.9
FVC_PERCENT_PREDICTED: 79

## 2017-12-08 NOTE — PROCEDURES
Overnight oximetry was completed on December 7, 2017 for duration of 6 hours and 30 minutes. This was done on room air. Significant desaturation below 90% almost the entire time monitored. Clustering suggest sleep disordered breathing but there could also be baseline pulmonary contribution to hypoxemia, clinical correlation required

## 2017-12-08 NOTE — PROCEDURES
Technician: LISANDRO Mon    Technician Comment:  Good patient effort & cooperation.  The results of this test meet the ATS/ERS standards for acceptability & reproducibility.  Test was performed on the Define My Style Body Plethysmograph-Elite DX system.  Predicted values were [facility]-3 for spirometry, [facility] for DLCO, ITS for Lung Volumes.  The DLCO was uncorrected for Hgb.  A bronchodilator of Ventolin HFA -2puffs via spacer administered.  DLCO performed during dilation period.  Interpretation:    Lung function testing completed on December 8, 2017 reveals moderate to severe airflow obstruction, mid flows 40% predicted FEV1 is low at 1.66 L, 57% predicted moderate hyperinflation was seen on lung volume measurements. Bronchodilator response was seen. Oxygen transfer was mildly reduced at 61% predicted volume loop confirms the predominately obstructive pattern

## 2017-12-12 ENCOUNTER — OFFICE VISIT (OUTPATIENT)
Dept: PULMONOLOGY | Facility: HOSPICE | Age: 77
End: 2017-12-12
Payer: MEDICARE

## 2017-12-12 VITALS
HEART RATE: 68 BPM | BODY MASS INDEX: 27.09 KG/M2 | OXYGEN SATURATION: 88 % | DIASTOLIC BLOOD PRESSURE: 72 MMHG | SYSTOLIC BLOOD PRESSURE: 135 MMHG | HEIGHT: 72 IN | WEIGHT: 200 LBS | RESPIRATION RATE: 15 BRPM

## 2017-12-12 DIAGNOSIS — J96.11 CHRONIC RESPIRATORY FAILURE WITH HYPOXIA (HCC): ICD-10-CM

## 2017-12-12 DIAGNOSIS — J44.9 CHRONIC OBSTRUCTIVE PULMONARY DISEASE, UNSPECIFIED COPD TYPE (HCC): ICD-10-CM

## 2017-12-12 DIAGNOSIS — G47.33 OSA (OBSTRUCTIVE SLEEP APNEA): ICD-10-CM

## 2017-12-12 PROCEDURE — 99999 AMB MULTIPLE OXIMETRY: CPT | Performed by: NURSE PRACTITIONER

## 2017-12-12 PROCEDURE — 99214 OFFICE O/P EST MOD 30 MIN: CPT | Performed by: NURSE PRACTITIONER

## 2017-12-12 NOTE — PROGRESS NOTES
Chief Complaint   Patient presents with   • Results     PFT/ CNOX         HPI: This patient is a 77 y.o. male, who presents for Follow-up COPD/emphysema, PFT, overnight oximetry results. His wife accompanies him today. She is a former nurse practitioner. 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 Pt recently returned after a prolonged hiatus. He is a former smoker, 40 pk year hx quit in 1980, also has history of lifelong asthma.     In regards to COPD, PFTs indicate moderate to severe airflow obstruction with an FEV1 of 1.66 L 57% predicted, FEV1 FVC ratio of 59, DLCO 61% predicted. Positive bronchodilator response. Multiple oximetry confirms need for daytime O2. Patient drops to 87% with exertion. Patient is compliant with Spiriva daily, Symbicort 160/4.5 twice a day. He exercises regularly. He denies pulmonary complaints. Denies URIs. Chest x-ray August showed bilateral linear scarring versus atelectasis.Patient is up-to-date with influenza, Pneumovax 23 and Prevnar 13 vaccinations. Patient has been encouraged O2 use with exertion. He declines with exertion and nocturnal use. He is aware of potential risks of chronic hypoxia.    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 OPO reveals basal SPO2 of 87%, cyclic desaturations consistent with NYASIA.     Past Medical History:   Diagnosis Date   • Abnormal thyroid stimulating hormone (TSH) level    • Allergic rhinitis    • Asbestosis (CMS-[facility])    • Asthma    • Bronchitis    • Chronic diarrhea     declines eval; takes immodium once a day usually and sometimes less; see previous notes; aware of risk    • Chronic lung disease     asbestos related   • CKD (chronic kidney disease), stage III     sees neph   • COPD (chronic obstructive pulmonary disease) (CMS-[facility])    • Coronary artery calcification seen on CAT scan 8/2/2016    sees cards   • Epididymitis 2009    h/o listed in chart   • GERD (gastroesophageal reflux disease)    • History of hemorrhoids    •  History of skin cancer     non melanoma   • [place] (hard of hearing)     declines hearing aids   • Hypercholesteremia    • Hypertension    • Hypertriglyceridemia    • Indigestion    • Light headedness     2/2 orthostasis? now better off hctz   • Lightheadedness 6/23/2016   • Low back pain    • Nasal drainage    • Orthostasis 6/23/2016    better off HCTZ   • Peripheral neuropathy (CMS-[facility])    • Pleural plaque 2005     CT [place]   • Post-nasal drip    • Pulmonary emphysema (CMS-[facility])    • RA (rheumatoid arthritis) (CMS-HCC)     on meds, sees rheum   • Restless leg syndrome    • Rheumatoid arthritis (CMS-[facility])    • Sleep apnea     obstructive and central - not adherent to autopap   • Solitary kidney     history of kidney cyst 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-functioning kidney s/p nephrectomy        Social History   Substance Use Topics   • Smoking status: Former Smoker     Packs/day: 1.00     Years: 40.00     Types: Cigarettes     Quit date: 1/1/1980   • Smokeless tobacco: Never Used      Comment: 1 pk a day for 40 yrs   • Alcohol use No      Comment: 2 a week       Family History   Problem Relation Age of Onset   • Genetic Father      ALS   • No Known Problems Sister    • No Known Problems Son    • No Known Problems Daughter    • Heart Attack Neg Hx    • Heart Disease Neg Hx    • Heart Failure Neg Hx        Current medications as of today   Current Outpatient Prescriptions   Medication Sig Dispense Refill   • fluorouracil (EFUDEX) 5 % cream Apply  to affected area(s) 2 times a day. For 28 days. One week on and one week off     • SYMBICORT 80-4.5 MCG/ACT Aerosol INHALE 2 PUFFS BY MOUTH TWO TIMES A DAY 3 Inhaler 1   • Probiotic Product (PROBIOTIC PO) Take  by mouth.     • gabapentin (NEURONTIN) 300 MG Cap Take 300 mg by mouth 3 times a day.     • rosuvastatin (CRESTOR) 20 MG Tab Take 1 Tab by mouth every bedtime. 90 Tab 2   • SPIRIVA HANDIHALER 18 MCG Cap INHALE THE CONTENTS OF 1 CAPSULE VIA HANDIHALER BY MOUTH DAILY 90 Cap 3   • Magnesium 250  MG Tab Take 1 Tab by mouth every day.     • albuterol 108 (90 BASE) MCG/ACT Aero Soln inhalation aerosol Inhale 2 Puffs by mouth every 6 hours as needed for Shortness of Breath. 8.5 g 3   • fluticasone (FLONASE) 50 MCG/ACT nasal spray Spray 1 Spray in nose every day.     • loperamide (IMODIUM A-D) 2 MG tablet Take 2 mg by mouth every day.     • leflunomide (ARAVA) 20 MG Tab Take 20 mg by mouth every day.     • Multiple Vitamin (MULTIVITAMIN PO) Take  by mouth.     • fexofenadine (ALLEGRA) 180 MG tablet Take 180 mg by mouth every day.     • certolizumab pegol (CIMZIA PREFILLED) 2 X 200 MG/ML Kit Inject 200 mg as instructed every 14 days.     • Cholecalciferol (VITAMIN D3) 2000 UNIT Cap Take 1 Cap by mouth every day.       No current facility-administered medications for this visit.        Allergies: Cipro xr; Levaquin; and Pcn [penicillins]    Blood pressure 135/72, pulse 68, resp. rate 15, height 1.829 m (6'), weight 90.7 kg (200 lb), SpO2 88 %.      ROS:   Constitutional: Denies fevers, chills, night sweats, weight loss or fatigue  Eyes: Denies pain, discharge/drainage  ENT: Denies tinnitus, hearing loss, sinusitis, hoarseness, epistaxis  Allergic: Denies Allergic rhinitis or hayfever  Respiratory: See HPI  Cardiovascular: Denies chest pain, tightness, palpitations, orthopnea or edema  Sleep: Denies snoring, resuscitative gasps, frequent nocturnal awakenings, insomnia  GI/: Denies heartburn, nausea, vomiting, urinary incontinence, hematuria  Musculoskeletal: Denies back pain, painful joints  Neurological: Denies vertigo or headaches  Skin: Denies rashes, lesions  Psychiatric: Denies depression or anxiety      Physical exam:   Constitutional: Well-nourished, well-developed, in no acute distress  Eyes: PERRL, glasses  Mouth/Throat: Oropharynx is dry, clear, no lesions, Mallampati 3  Neck: supple, no masses  Respiratory: no intercostal retractions or accessory muscle use   Lungs auscultation: Clear to auscultation  bilaterally  Cardiovascular: Regular rate rhythm no murmurs, rubs or gallops  Musculoskeletal: Normal gait, no clubbing or cyanosis  Skin: No rashes or lesions noted on exposed skin  Neuro: No focal deficit noted  Psychiatric: Oriented to time, person and place.     Diagnosis:  1. Chronic obstructive pulmonary disease, unspecified COPD type (CMS-HCC)     2. Chronic respiratory failure with hypoxia (CMS-[facility])  AMB MULTIPLE OXIMETRY    AMB MULTIPLE OXIMETRY   3. NYASIA (obstructive sleep apnea)         Plan:  1.Continue Symbicort 160/4.5, 2 puffs, twice a day  2. Continue Spiriva Respimat 2 actuations daily  3. Continue albuterol if needed  4. Continue routine exercise  5. Permanent complete smoking cessation recommended  6. Multiple oximetry today confirms the need for O2 with exertion in addition to nocturnally. Patient declines O2 use. He understands the potential cardiovascular and neurologic risks associated with chronic hypoxia.  7. Patient is up-to-date with influenza, Pneumovax 23 and Prevnar 13 vaccinations.  8. Follow-up annually

## 2017-12-12 NOTE — PROCEDURES
Durable Medical Equipment-Specific Vitals  Vitals  Blood Pressure : 135/72  Pulse: 68  Respiration: 15  Pulse Oximetry: 88 %  Height: 182.9 cm (6')  Weight: 90.7 kg (200 lb)        Multi-Ox Readings  Multi Ox #1 Room air   O2 sat % at rest 88   O2 sat % on exertion 87   O2 sat average on exertion 87   Multi Ox #2 Room air   O2 sat % at rest     O2 sat % on exertion     O2 sat average on exertion       Oxygen Use     Oxygen Frequency     Duration of need     Is the patient mobile within the home?     CPAP Use?     BIPAP Use?     Servo Titration

## 2017-12-18 ENCOUNTER — APPOINTMENT (RX ONLY)
Dept: URBAN - METROPOLITAN AREA CLINIC 20 | Facility: CLINIC | Age: 77
Setting detail: DERMATOLOGY
End: 2017-12-18

## 2017-12-18 PROBLEM — C44.629 SQUAMOUS CELL CARCINOMA OF SKIN OF LEFT UPPER LIMB, INCLUDING SHOULDER: Status: ACTIVE | Noted: 2017-12-18

## 2017-12-18 PROCEDURE — 17272 DSTR MAL LES S/N/H/F/G 1.1-2: CPT

## 2017-12-18 PROCEDURE — ? CURETTAGE AND DESTRUCTION

## 2017-12-18 NOTE — PROCEDURE: CURETTAGE AND DESTRUCTION
Bill As A Line Item Or As Units: Line Item
Consent was obtained from the patient. The risks, benefits and alternatives to therapy were discussed in detail. Specifically, the risks of infection, scarring, bleeding, prolonged wound healing, nerve injury, incomplete removal, allergy to anesthesia and recurrence were addressed. Alternatives to ED&C, such as: surgical removal was also discussed.  Prior to the procedure, the treatment site was clearly identified and confirmed by the patient. All components of Universal Protocol/PAUSE Rule completed.
Bill For Surgical Tray: no
What Was Performed First?: Curettage
Size Of Lesion After Curettage: 1.2
Cautery Type: electrodesiccation
Additional Information: (Optional): The wound was cleaned, and a pressure dressing was applied.  The patient received detailed post-op instructions.
Number Of Curettages: 3
Size Of Lesion In Cm: 1
Detail Level: Detailed
Render Post-Care Instructions In Note?: yes
Anesthesia Type: 1% lidocaine with 1:100,000 epinephrine and 408mcg clindamycin/ml and a 1:10 solution of 8.4% sodium bicarbonate
Post-Care Instructions: I reviewed with the patient in detail post-care instructions. Patient is to keep the area dry for 48 hours, and not to engage in any swimming until the area is healed. Should the patient develop any fevers, chills, bleeding, severe pain patient will contact the office immediately.

## 2018-01-16 ENCOUNTER — HOSPITAL ENCOUNTER (OUTPATIENT)
Dept: LAB | Facility: MEDICAL CENTER | Age: 78
End: 2018-01-16
Attending: INTERNAL MEDICINE
Payer: MEDICARE

## 2018-01-16 LAB
ALBUMIN SERPL BCP-MCNC: 3.9 G/DL (ref 3.2–4.9)
ALP SERPL-CCNC: 58 U/L (ref 30–99)
ALT SERPL-CCNC: 23 U/L (ref 2–50)
AST SERPL-CCNC: 24 U/L (ref 12–45)
BASOPHILS # BLD AUTO: 1.7 % (ref 0–1.8)
BASOPHILS # BLD: 0.08 K/UL (ref 0–0.12)
BILIRUB CONJ SERPL-MCNC: 0.2 MG/DL (ref 0.1–0.5)
BILIRUB INDIRECT SERPL-MCNC: 0.5 MG/DL (ref 0–1)
BILIRUB SERPL-MCNC: 0.7 MG/DL (ref 0.1–1.5)
CREAT SERPL-MCNC: 1.65 MG/DL (ref 0.5–1.4)
CRP SERPL HS-MCNC: 0.19 MG/DL (ref 0–0.75)
EOSINOPHIL # BLD AUTO: 0.39 K/UL (ref 0–0.51)
EOSINOPHIL NFR BLD: 8.4 % (ref 0–6.9)
ERYTHROCYTE [DISTWIDTH] IN BLOOD BY AUTOMATED COUNT: 52.5 FL (ref 35.9–50)
ERYTHROCYTE [SEDIMENTATION RATE] IN BLOOD BY WESTERGREN METHOD: 7 MM/HOUR (ref 0–20)
HCT VFR BLD AUTO: 40.4 % (ref 42–52)
HGB BLD-MCNC: 13 G/DL (ref 14–18)
IMM GRANULOCYTES # BLD AUTO: 0 K/UL (ref 0–0.11)
IMM GRANULOCYTES NFR BLD AUTO: 0 % (ref 0–0.9)
LYMPHOCYTES # BLD AUTO: 1.55 K/UL (ref 1–4.8)
LYMPHOCYTES NFR BLD: 33.5 % (ref 22–41)
MCH RBC QN AUTO: 31.6 PG (ref 27–33)
MCHC RBC AUTO-ENTMCNC: 32.2 G/DL (ref 33.7–35.3)
MCV RBC AUTO: 98.1 FL (ref 81.4–97.8)
MONOCYTES # BLD AUTO: 0.63 K/UL (ref 0–0.85)
MONOCYTES NFR BLD AUTO: 13.6 % (ref 0–13.4)
NEUTROPHILS # BLD AUTO: 1.97 K/UL (ref 1.82–7.42)
NEUTROPHILS NFR BLD: 42.8 % (ref 44–72)
NRBC # BLD AUTO: 0 K/UL
NRBC BLD-RTO: 0 /100 WBC
PLATELET # BLD AUTO: 188 K/UL (ref 164–446)
PMV BLD AUTO: 9 FL (ref 9–12.9)
PROT SERPL-MCNC: 6.3 G/DL (ref 6–8.2)
RBC # BLD AUTO: 4.12 M/UL (ref 4.7–6.1)
WBC # BLD AUTO: 4.6 K/UL (ref 4.8–10.8)

## 2018-01-16 PROCEDURE — 86140 C-REACTIVE PROTEIN: CPT

## 2018-01-16 PROCEDURE — 85652 RBC SED RATE AUTOMATED: CPT

## 2018-01-16 PROCEDURE — 82565 ASSAY OF CREATININE: CPT

## 2018-01-16 PROCEDURE — 85025 COMPLETE CBC W/AUTO DIFF WBC: CPT

## 2018-01-16 PROCEDURE — 80076 HEPATIC FUNCTION PANEL: CPT

## 2018-01-16 PROCEDURE — 36415 COLL VENOUS BLD VENIPUNCTURE: CPT

## 2018-01-18 ENCOUNTER — APPOINTMENT (RX ONLY)
Dept: URBAN - METROPOLITAN AREA CLINIC 20 | Facility: CLINIC | Age: 78
Setting detail: DERMATOLOGY
End: 2018-01-18

## 2018-01-18 DIAGNOSIS — L57.0 ACTINIC KERATOSIS: ICD-10-CM

## 2018-01-18 DIAGNOSIS — Z85.828 PERSONAL HISTORY OF OTHER MALIGNANT NEOPLASM OF SKIN: ICD-10-CM

## 2018-01-18 PROBLEM — M12.9 ARTHROPATHY, UNSPECIFIED: Status: ACTIVE | Noted: 2018-01-18

## 2018-01-18 PROCEDURE — ? OBSERVATION

## 2018-01-18 PROCEDURE — 99213 OFFICE O/P EST LOW 20 MIN: CPT | Mod: 25

## 2018-01-18 PROCEDURE — 17003 DESTRUCT PREMALG LES 2-14: CPT

## 2018-01-18 PROCEDURE — ? LIQUID NITROGEN

## 2018-01-18 PROCEDURE — 17000 DESTRUCT PREMALG LESION: CPT

## 2018-01-18 ASSESSMENT — LOCATION DETAILED DESCRIPTION DERM
LOCATION DETAILED: LEFT FOREHEAD
LOCATION DETAILED: LEFT MEDIAL FRONTAL SCALP
LOCATION DETAILED: NASAL ROOT
LOCATION DETAILED: RIGHT RADIAL DORSAL HAND
LOCATION DETAILED: LEFT SUPERIOR LATERAL FOREHEAD
LOCATION DETAILED: LEFT SUPERIOR POSTAURICULAR SKIN
LOCATION DETAILED: LEFT RADIAL DORSAL HAND
LOCATION DETAILED: LEFT CENTRAL FRONTAL SCALP

## 2018-01-18 ASSESSMENT — LOCATION SIMPLE DESCRIPTION DERM
LOCATION SIMPLE: LEFT HAND
LOCATION SIMPLE: POSTERIOR SCALP
LOCATION SIMPLE: SCALP
LOCATION SIMPLE: NOSE
LOCATION SIMPLE: LEFT FOREHEAD
LOCATION SIMPLE: LEFT SCALP
LOCATION SIMPLE: RIGHT HAND

## 2018-01-18 ASSESSMENT — LOCATION ZONE DERM
LOCATION ZONE: FACE
LOCATION ZONE: HAND
LOCATION ZONE: NOSE
LOCATION ZONE: SCALP

## 2018-01-18 NOTE — PROCEDURE: LIQUID NITROGEN
Detail Level: Detailed
Duration Of Freeze Thaw-Cycle (Seconds): 10
Render Post-Care Instructions In Note?: yes
Post-Care Instructions: I reviewed with the patient in detail post-care instructions. Patient is to wear sunprotection, and avoid picking at any of the treated lesions. Pt may apply Vaseline to crusted or scabbing areas.
Number Of Freeze-Thaw Cycles: 2 freeze-thaw cycles
Consent: The patient's consent was obtained including but not limited to risks of crusting, scabbing, blistering, scarring, darker or lighter pigmentary change, recurrence, incomplete removal and infection. RTC in 2 months if lesion(s) persistent.

## 2018-01-22 RX ORDER — GABAPENTIN 300 MG/1
CAPSULE ORAL
Qty: 180 CAP | Refills: 1 | Status: SHIPPED | OUTPATIENT
Start: 2018-01-22 | End: 2018-06-24 | Stop reason: SDUPTHER

## 2018-01-23 ENCOUNTER — HOSPITAL ENCOUNTER (OUTPATIENT)
Dept: LAB | Facility: MEDICAL CENTER | Age: 78
End: 2018-01-23
Attending: INTERNAL MEDICINE
Payer: MEDICARE

## 2018-01-23 LAB
25(OH)D3 SERPL-MCNC: 82 NG/ML (ref 30–100)
ALBUMIN SERPL BCP-MCNC: 4 G/DL (ref 3.2–4.9)
APPEARANCE UR: ABNORMAL
BACTERIA #/AREA URNS HPF: NEGATIVE /HPF
BASOPHILS # BLD AUTO: 1.3 % (ref 0–1.8)
BASOPHILS # BLD: 0.06 K/UL (ref 0–0.12)
BILIRUB UR QL STRIP.AUTO: NEGATIVE
BUN SERPL-MCNC: 24 MG/DL (ref 8–22)
CALCIUM SERPL-MCNC: 9 MG/DL (ref 8.5–10.5)
CHLORIDE SERPL-SCNC: 106 MMOL/L (ref 96–112)
CO2 SERPL-SCNC: 28 MMOL/L (ref 20–33)
COLOR UR: YELLOW
CREAT SERPL-MCNC: 1.62 MG/DL (ref 0.5–1.4)
CREAT UR-MCNC: 173.8 MG/DL
EOSINOPHIL # BLD AUTO: 0.31 K/UL (ref 0–0.51)
EOSINOPHIL NFR BLD: 6.5 % (ref 0–6.9)
EPI CELLS #/AREA URNS HPF: NEGATIVE /HPF
ERYTHROCYTE [DISTWIDTH] IN BLOOD BY AUTOMATED COUNT: 52.9 FL (ref 35.9–50)
FERRITIN SERPL-MCNC: 113.4 NG/ML (ref 22–322)
GLUCOSE SERPL-MCNC: 99 MG/DL (ref 65–99)
GLUCOSE UR STRIP.AUTO-MCNC: NEGATIVE MG/DL
HCT VFR BLD AUTO: 41.3 % (ref 42–52)
HGB BLD-MCNC: 13.2 G/DL (ref 14–18)
HYALINE CASTS #/AREA URNS LPF: ABNORMAL /LPF
IMM GRANULOCYTES # BLD AUTO: 0.01 K/UL (ref 0–0.11)
IMM GRANULOCYTES NFR BLD AUTO: 0.2 % (ref 0–0.9)
IRON SATN MFR SERPL: 22 % (ref 15–55)
IRON SERPL-MCNC: 74 UG/DL (ref 50–180)
KETONES UR STRIP.AUTO-MCNC: NEGATIVE MG/DL
LEUKOCYTE ESTERASE UR QL STRIP.AUTO: NEGATIVE
LYMPHOCYTES # BLD AUTO: 1.21 K/UL (ref 1–4.8)
LYMPHOCYTES NFR BLD: 25.5 % (ref 22–41)
MAGNESIUM SERPL-MCNC: 2.1 MG/DL (ref 1.5–2.5)
MCH RBC QN AUTO: 31.9 PG (ref 27–33)
MCHC RBC AUTO-ENTMCNC: 32 G/DL (ref 33.7–35.3)
MCV RBC AUTO: 99.8 FL (ref 81.4–97.8)
MICRO URNS: ABNORMAL
MONOCYTES # BLD AUTO: 0.73 K/UL (ref 0–0.85)
MONOCYTES NFR BLD AUTO: 15.4 % (ref 0–13.4)
NEUTROPHILS # BLD AUTO: 2.43 K/UL (ref 1.82–7.42)
NEUTROPHILS NFR BLD: 51.1 % (ref 44–72)
NITRITE UR QL STRIP.AUTO: NEGATIVE
NRBC # BLD AUTO: 0 K/UL
NRBC BLD-RTO: 0 /100 WBC
PH UR STRIP.AUTO: 5 [PH]
PHOSPHATE SERPL-MCNC: 2.8 MG/DL (ref 2.5–4.5)
PLATELET # BLD AUTO: 206 K/UL (ref 164–446)
PMV BLD AUTO: 9.6 FL (ref 9–12.9)
POTASSIUM SERPL-SCNC: 4.8 MMOL/L (ref 3.6–5.5)
PROT UR QL STRIP: 30 MG/DL
PROT UR-MCNC: 42.8 MG/DL (ref 0–15)
PROT/CREAT UR: 246 MG/G (ref 15–68)
PTH-INTACT SERPL-MCNC: 78 PG/ML (ref 14–72)
RBC # BLD AUTO: 4.14 M/UL (ref 4.7–6.1)
RBC # URNS HPF: ABNORMAL /HPF
RBC UR QL AUTO: NEGATIVE
SODIUM SERPL-SCNC: 139 MMOL/L (ref 135–145)
SP GR UR STRIP.AUTO: 1.03
TIBC SERPL-MCNC: 340 UG/DL (ref 250–450)
URATE SERPL-MCNC: 5 MG/DL (ref 2.5–8.3)
UROBILINOGEN UR STRIP.AUTO-MCNC: 0.2 MG/DL
WBC # BLD AUTO: 4.8 K/UL (ref 4.8–10.8)
WBC #/AREA URNS HPF: ABNORMAL /HPF

## 2018-01-23 PROCEDURE — 82570 ASSAY OF URINE CREATININE: CPT

## 2018-01-23 PROCEDURE — 83970 ASSAY OF PARATHORMONE: CPT

## 2018-01-23 PROCEDURE — 85025 COMPLETE CBC W/AUTO DIFF WBC: CPT

## 2018-01-23 PROCEDURE — 36415 COLL VENOUS BLD VENIPUNCTURE: CPT

## 2018-01-23 PROCEDURE — 83540 ASSAY OF IRON: CPT | Mod: GA

## 2018-01-23 PROCEDURE — 83735 ASSAY OF MAGNESIUM: CPT

## 2018-01-23 PROCEDURE — 82728 ASSAY OF FERRITIN: CPT | Mod: GA

## 2018-01-23 PROCEDURE — 84550 ASSAY OF BLOOD/URIC ACID: CPT

## 2018-01-23 PROCEDURE — 81001 URINALYSIS AUTO W/SCOPE: CPT

## 2018-01-23 PROCEDURE — 82306 VITAMIN D 25 HYDROXY: CPT

## 2018-01-23 PROCEDURE — 83550 IRON BINDING TEST: CPT | Mod: GA

## 2018-01-23 PROCEDURE — 80069 RENAL FUNCTION PANEL: CPT

## 2018-01-23 PROCEDURE — 84156 ASSAY OF PROTEIN URINE: CPT

## 2018-03-02 ENCOUNTER — OFFICE VISIT (OUTPATIENT)
Dept: URGENT CARE | Facility: PHYSICIAN GROUP | Age: 78
End: 2018-03-02
Payer: MEDICARE

## 2018-03-02 ENCOUNTER — HOSPITAL ENCOUNTER (OUTPATIENT)
Dept: LAB | Facility: MEDICAL CENTER | Age: 78
End: 2018-03-02
Attending: EMERGENCY MEDICINE
Payer: MEDICARE

## 2018-03-02 ENCOUNTER — HOSPITAL ENCOUNTER (OUTPATIENT)
Dept: RADIOLOGY | Facility: MEDICAL CENTER | Age: 78
End: 2018-03-02
Attending: EMERGENCY MEDICINE
Payer: MEDICARE

## 2018-03-02 VITALS
OXYGEN SATURATION: 87 % | HEIGHT: 74 IN | WEIGHT: 200 LBS | BODY MASS INDEX: 25.67 KG/M2 | DIASTOLIC BLOOD PRESSURE: 84 MMHG | RESPIRATION RATE: 18 BRPM | TEMPERATURE: 98.2 F | HEART RATE: 97 BPM | SYSTOLIC BLOOD PRESSURE: 162 MMHG

## 2018-03-02 DIAGNOSIS — J41.0 SIMPLE CHRONIC BRONCHITIS (HCC): ICD-10-CM

## 2018-03-02 LAB
ALBUMIN SERPL BCP-MCNC: 4.2 G/DL (ref 3.2–4.9)
ALBUMIN/GLOB SERPL: 1.4 G/DL
ALP SERPL-CCNC: 73 U/L (ref 30–99)
ALT SERPL-CCNC: 28 U/L (ref 2–50)
ANION GAP SERPL CALC-SCNC: 11 MMOL/L (ref 0–11.9)
AST SERPL-CCNC: 35 U/L (ref 12–45)
BASOPHILS # BLD AUTO: 0.3 % (ref 0–1.8)
BASOPHILS # BLD: 0.03 K/UL (ref 0–0.12)
BILIRUB SERPL-MCNC: 1.1 MG/DL (ref 0.1–1.5)
BUN SERPL-MCNC: 22 MG/DL (ref 8–22)
CALCIUM SERPL-MCNC: 9.3 MG/DL (ref 8.5–10.5)
CHLORIDE SERPL-SCNC: 104 MMOL/L (ref 96–112)
CO2 SERPL-SCNC: 24 MMOL/L (ref 20–33)
CREAT SERPL-MCNC: 1.56 MG/DL (ref 0.5–1.4)
EOSINOPHIL # BLD AUTO: 0.01 K/UL (ref 0–0.51)
EOSINOPHIL NFR BLD: 0.1 % (ref 0–6.9)
ERYTHROCYTE [DISTWIDTH] IN BLOOD BY AUTOMATED COUNT: 53.5 FL (ref 35.9–50)
GLOBULIN SER CALC-MCNC: 2.9 G/DL (ref 1.9–3.5)
GLUCOSE SERPL-MCNC: 116 MG/DL (ref 65–99)
HCT VFR BLD AUTO: 40.6 % (ref 42–52)
HGB BLD-MCNC: 13.3 G/DL (ref 14–18)
IMM GRANULOCYTES # BLD AUTO: 0.01 K/UL (ref 0–0.11)
IMM GRANULOCYTES NFR BLD AUTO: 0.1 % (ref 0–0.9)
LYMPHOCYTES # BLD AUTO: 1.12 K/UL (ref 1–4.8)
LYMPHOCYTES NFR BLD: 12.1 % (ref 22–41)
MCH RBC QN AUTO: 32.1 PG (ref 27–33)
MCHC RBC AUTO-ENTMCNC: 32.8 G/DL (ref 33.7–35.3)
MCV RBC AUTO: 98.1 FL (ref 81.4–97.8)
MONOCYTES # BLD AUTO: 0.92 K/UL (ref 0–0.85)
MONOCYTES NFR BLD AUTO: 10 % (ref 0–13.4)
NEUTROPHILS # BLD AUTO: 7.14 K/UL (ref 1.82–7.42)
NEUTROPHILS NFR BLD: 77.4 % (ref 44–72)
NRBC # BLD AUTO: 0 K/UL
NRBC BLD-RTO: 0 /100 WBC
PLATELET # BLD AUTO: 156 K/UL (ref 164–446)
PMV BLD AUTO: 9.4 FL (ref 9–12.9)
POTASSIUM SERPL-SCNC: 4.3 MMOL/L (ref 3.6–5.5)
PROT SERPL-MCNC: 7.1 G/DL (ref 6–8.2)
RBC # BLD AUTO: 4.14 M/UL (ref 4.7–6.1)
SODIUM SERPL-SCNC: 139 MMOL/L (ref 135–145)
WBC # BLD AUTO: 9.2 K/UL (ref 4.8–10.8)

## 2018-03-02 PROCEDURE — 80053 COMPREHEN METABOLIC PANEL: CPT

## 2018-03-02 PROCEDURE — 99214 OFFICE O/P EST MOD 30 MIN: CPT | Performed by: EMERGENCY MEDICINE

## 2018-03-02 PROCEDURE — 36415 COLL VENOUS BLD VENIPUNCTURE: CPT

## 2018-03-02 PROCEDURE — 71046 X-RAY EXAM CHEST 2 VIEWS: CPT

## 2018-03-02 PROCEDURE — 85025 COMPLETE CBC W/AUTO DIFF WBC: CPT

## 2018-03-02 RX ORDER — BENZONATATE 100 MG/1
100 CAPSULE ORAL 3 TIMES DAILY PRN
Qty: 60 CAP | Refills: 0 | Status: SHIPPED | OUTPATIENT
Start: 2018-03-02 | End: 2018-08-14

## 2018-03-02 NOTE — PROGRESS NOTES
Subjective:      Barry Torres is a 77 y.o. male who presents with Cough (cough, congestion x1 week)            HPI  Pt with a cough  For the past week , always has low O2  High 80s  Recently increased albuterol  Pulmonary wanted him on O2. Smoked 30-35 yrs 1ppd Pt declined O2 2 months ago after recommendation made.  Allergies   Allergen Reactions   • Cipro Xr      Muscle aches.   • Levaquin      Muscle aches   • Pcn [Penicillins] Hives     Rash and asthma attack when a child - wife states he has had Keflex in the past and tolerated     Social History     Social History   • Marital status:      Spouse name: N/A   • Number of children: N/A   • Years of education: N/A     Occupational History   • Not on file.     Social History Main Topics   • Smoking status: Former Smoker     Packs/day: 1.00     Years: 40.00     Types: Cigarettes     Quit date: 1/1/1980   • Smokeless tobacco: Never Used      Comment: 1 pk a day for 40 yrs   • Alcohol use No      Comment: 2 a week   • Drug use: No   • Sexual activity: Not on file      Comment: retired      Other Topics Concern   • Not on file     Social History Narrative    See H&P    Lives with wife     Past Medical History:   Diagnosis Date   • Abnormal thyroid stimulating hormone (TSH) level    • Allergic rhinitis    • Asbestosis (CMS-[facility])    • Asthma    • Bronchitis    • Chronic diarrhea     declines eval; takes immodium once a day usually and sometimes less; see previous notes; aware of risk    • Chronic lung disease     asbestos related   • CKD (chronic kidney disease), stage III     sees neph   • COPD (chronic obstructive pulmonary disease) (CMS-[facility])    • Coronary artery calcification seen on CAT scan 8/2/2016    sees cards   • Epididymitis 2009    h/o listed in chart   • GERD (gastroesophageal reflux disease)    • History of hemorrhoids    • History of skin cancer     non melanoma   • [place] (hard of hearing)     declines hearing aids   • Hypercholesteremia    •  Hypertension    • Hypertriglyceridemia    • Indigestion    • Light headedness     2/2 orthostasis? 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 For 28 days. One week on and one week off     • SYMBICORT 80-4.5 MCG/ACT Aerosol INHALE 2 PUFFS BY MOUTH TWO TIMES A DAY 3 Inhaler 1   • Probiotic Product (PROBIOTIC PO) Take  by mouth.     • certolizumab pegol (CIMZIA PREFILLED) 2 X 200 MG/ML Kit Inject 200 mg as instructed every 14 days.     • rosuvastatin (CRESTOR) 20 MG Tab Take 1 Tab by mouth every bedtime. 90 Tab 2   • SPIRIVA HANDIHALER 18 MCG Cap INHALE THE CONTENTS OF 1 CAPSULE VIA HANDIHALER BY MOUTH DAILY 90 Cap 3   • Magnesium 250 MG Tab Take 1 Tab by mouth every day.     • albuterol 108 (90 BASE) MCG/ACT Aero Soln inhalation aerosol Inhale 2 Puffs by mouth every 6 hours as needed for Shortness of Breath. 8.5 g 3   • fluticasone (FLONASE) 50 MCG/ACT nasal spray Spray 1 Spray in nose every day.     • Cholecalciferol (VITAMIN D3) 2000 UNIT Cap Take 1 Cap by mouth every day.     • loperamide (IMODIUM A-D) 2 MG tablet Take 2 mg by mouth every day.     • leflunomide (ARAVA) 20 MG Tab Take 20 mg by mouth every day.     • Multiple Vitamin (MULTIVITAMIN PO) Take  " by mouth.     • fexofenadine (ALLEGRA) 180 MG tablet Take 180 mg by mouth every day.       No current facility-administered medications on file prior to visit.      Family History   Problem Relation Age of Onset   • Genetic Father      ALS   • No Known Problems Sister    • No Known Problems Son    • No Known Problems Daughter    • Heart Attack Neg Hx    • Heart Disease Neg Hx    • Heart Failure Neg Hx      Review of Systems   Constitutional: Positive for malaise/fatigue. Negative for chills and fever.   HENT: Negative for congestion and sinus pain.    Eyes: Negative for discharge and redness.   Respiratory: Positive for cough and shortness of breath.    Cardiovascular: Negative for chest pain and palpitations.   Gastrointestinal: Negative for diarrhea, nausea and vomiting.   Genitourinary: Positive for dysuria.   Skin: Negative for rash.   Neurological: Positive for weakness. Negative for sensory change, speech change and focal weakness.   Psychiatric/Behavioral: The patient is nervous/anxious.           Objective:     BP (!) 162/84   Pulse 97   Temp 36.8 °C (98.2 °F)   Resp 18   Ht 1.88 m (6' 2\")   Wt 90.7 kg (200 lb)   SpO2 (!) 87%   BMI 25.68 kg/m²      Physical Exam   Constitutional: He appears well-developed and well-nourished.   HENT:   Head: Normocephalic and atraumatic.   Right Ear: External ear normal.   Left Ear: External ear normal.   Eyes: Conjunctivae are normal. Right eye exhibits no discharge. Left eye exhibits no discharge.   Cardiovascular: Normal rate and regular rhythm.    Pulmonary/Chest: Effort normal.   Decreased breath sounds   Abdominal: He exhibits no distension. There is no tenderness.   Musculoskeletal: Normal range of motion.   Neurological: He is alert. Coordination normal.   Skin: Skin is warm and dry. No pallor.   Psychiatric: He has a normal mood and affect. His behavior is normal.   Nursing note and vitals reviewed.         CXR no change     Assessment/Plan:     DX: COPD      "      Chronic hypoxemia    I am recommending the patient initiate/ continue hydration efforts including the use of a vaporizer/humidifier/ netti pot. I also recommend the pt, initiate Mucinex . In addition the patient will initiate the prescribed prescription medication/s: medrol. If the patient's condition exacerbates with worsening dysphagia, shortness of breath, uncontrolled fever, headache or chest pressure he/she will return immediately to the urgent care or go to  the emergency department for further evaluation.    I consulted pulmonary he needs new visit and one was put in on urgent basis with Kourtney.  Piero De La Cruz    Addendum CBC and Metabolic no change, sees Nephrology for decreased GFR. I called pt and he has Pulmonary appointment in two days.

## 2018-03-03 ENCOUNTER — TELEPHONE (OUTPATIENT)
Dept: URGENT CARE | Facility: PHYSICIAN GROUP | Age: 78
End: 2018-03-03

## 2018-03-03 RX ORDER — METHYLPREDNISOLONE 4 MG/1
TABLET ORAL
Qty: 1 KIT | Refills: 0 | Status: SHIPPED | OUTPATIENT
Start: 2018-03-03 | End: 2018-04-04

## 2018-03-03 RX ORDER — AZITHROMYCIN 250 MG/1
TABLET, FILM COATED ORAL
Qty: 6 TAB | Refills: 0 | Status: SHIPPED | OUTPATIENT
Start: 2018-03-03 | End: 2018-04-04

## 2018-03-03 ASSESSMENT — ENCOUNTER SYMPTOMS
SENSORY CHANGE: 0
FEVER: 0
CHILLS: 0
EYE DISCHARGE: 0
DIARRHEA: 0
EYE REDNESS: 0
COUGH: 1
FOCAL WEAKNESS: 0
NERVOUS/ANXIOUS: 1
NAUSEA: 0
SHORTNESS OF BREATH: 1
SINUS PAIN: 0
WEAKNESS: 1
SPEECH CHANGE: 0
PALPITATIONS: 0
VOMITING: 0

## 2018-03-05 ENCOUNTER — OFFICE VISIT (OUTPATIENT)
Dept: PULMONOLOGY | Facility: HOSPICE | Age: 78
End: 2018-03-05
Payer: MEDICARE

## 2018-03-05 VITALS
HEART RATE: 94 BPM | DIASTOLIC BLOOD PRESSURE: 78 MMHG | OXYGEN SATURATION: 90 % | SYSTOLIC BLOOD PRESSURE: 149 MMHG | HEIGHT: 74 IN | RESPIRATION RATE: 15 BRPM | BODY MASS INDEX: 25.67 KG/M2 | WEIGHT: 200 LBS

## 2018-03-05 DIAGNOSIS — R09.02 HYPOXIA: ICD-10-CM

## 2018-03-05 DIAGNOSIS — G47.33 OSA (OBSTRUCTIVE SLEEP APNEA): ICD-10-CM

## 2018-03-05 DIAGNOSIS — J96.11 CHRONIC RESPIRATORY FAILURE WITH HYPOXIA (HCC): ICD-10-CM

## 2018-03-05 DIAGNOSIS — J44.9 CHRONIC OBSTRUCTIVE PULMONARY DISEASE, UNSPECIFIED COPD TYPE (HCC): ICD-10-CM

## 2018-03-05 PROCEDURE — 99214 OFFICE O/P EST MOD 30 MIN: CPT | Performed by: NURSE PRACTITIONER

## 2018-03-05 RX ORDER — ALBUTEROL SULFATE 2.5 MG/3ML
2.5 SOLUTION RESPIRATORY (INHALATION) EVERY 4 HOURS PRN
Qty: 120 ML | Refills: 11 | Status: SHIPPED | OUTPATIENT
Start: 2018-03-05 | End: 2018-04-04

## 2018-03-05 NOTE — PATIENT INSTRUCTIONS
1. Start oxygen 2 L 24/7 order to [facility]  2. Continue Medrol as prescribed  3. Continue Symbicort twice a day, Spiriva daily  4. Add albuterol nebulizer. Rx to pharmacy. Use nebulized treatment every 4-6 hours as needed for dyspnea or wheeze.  5. Permanent and complete smoking cessation recommended  6. Follow-up in December, sooner if needed

## 2018-03-05 NOTE — PROGRESS NOTES
Chief Complaint   Patient presents with   • Follow-Up     SOB / COUGH / AFTER URGENT CARE VISIT         HPI: This patient is a 77 y.o. male, who presents for follow-up COPD, chronic respiratory failure. Recently seen at urgent care for cough.  His wife accompanies him today. She is a former nurse practitioner. She worked endocrinology at the [employer] for 42 years.       In regards to COPD, He is a former smoker, 40 pk year hx quit in 1980, also has history of lifelong asthma.   PFTs indicate moderate to severe airflow obstruction with an FEV1 of 1.66 L 57% predicted, FEV1 FVC ratio of 59, DLCO 61% predicted. Positive bronchodilator response. Multiple oximetry confirms need for daytime O2. He declined O2 at his last visit but is willing to accept today. Patient is compliant with Spiriva daily, Symbicort 160/4.5 twice a day. He exercises regularly. He reports worsening cough and dyspnea over the past week. It is improving with Medrol Kalyan that he is taking. He has occasional wheeze. Denies fever, chills, night sweats, chest pain or pressure.       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 OPO reveals basal SPO2 of 87%, cyclic desaturations consistent with NYASIA.     Past Medical History:   Diagnosis Date   • Abnormal thyroid stimulating hormone (TSH) level    • Allergic rhinitis    • Asbestosis (CMS-[facility])    • Asthma    • Bronchitis    • Chronic diarrhea     declines eval; takes immodium once a day usually and sometimes less; see previous notes; aware of risk    • Chronic lung disease     asbestos related   • CKD (chronic kidney disease), stage III     sees neph   • COPD (chronic obstructive pulmonary disease) (CMS-[facility])    • Coronary artery calcification seen on CAT scan 8/2/2016    sees cards   • Epididymitis 2009    h/o listed in chart   • GERD (gastroesophageal reflux disease)    • History of hemorrhoids    • History of skin cancer     non melanoma   • [place] (hard of hearing)     declines hearing aids   •  Hypercholesteremia    • Hypertension    • Hypertriglyceridemia    • Indigestion    • Light headedness     2/2 orthostasis? now better off hctz   • Lightheadedness 6/23/2016   • Low back pain    • Nasal drainage    • Orthostasis 6/23/2016    better off HCTZ   • Peripheral neuropathy (CMS-[facility])    • Pleural plaque 2005     CT [place]   • Post-nasal drip    • Pulmonary emphysema (CMS-[facility])    • RA (rheumatoid arthritis) (CMS-[facility])     on meds, sees rheum   • Restless leg syndrome    • Rheumatoid arthritis (CMS-[facility])    • Sleep apnea     obstructive and central - not adherent to autopap   • Solitary kidney     history of kidney cyst 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-functioning kidney s/p nephrectomy        Social History   Substance Use Topics   • Smoking status: Former Smoker     Packs/day: 1.00     Years: 40.00     Types: Cigarettes     Quit date: 1/1/1980   • Smokeless tobacco: Never Used      Comment: 1 pk a day for 40 yrs   • Alcohol use No      Comment: 2 a week       Family History   Problem Relation Age of Onset   • Genetic Father      ALS   • No Known Problems Sister    • No Known Problems Son    • No Known Problems Daughter    • Heart Attack Neg Hx    • Heart Disease Neg Hx    • Heart Failure Neg Hx        Current medications as of today   Current Outpatient Prescriptions   Medication Sig Dispense Refill   • albuterol (PROVENTIL) 2.5mg/3ml Nebu Soln solution for nebulization 3 mL by Nebulization route every four hours as needed for Shortness of Breath. 120 mL 11   • MethylPREDNISolone (MEDROL DOSEPAK) 4 MG Tablet Therapy Pack Follow the directions on the pack. 1 Kit 0   • azithromycin (ZITHROMAX) 250 MG Tab UAD: 2 tabs  po on day 1 and 1 tab on days 2-5. 6 Tab 0   • benzonatate (TESSALON) 100 MG Cap Take 1 Cap by mouth 3 times a day as needed for Cough. 60 Cap 0   • gabapentin (NEURONTIN) 300 MG Cap TAKE 1 CAPSULE BY MOUTH THREE TIMES A  Cap 1   • fluorouracil (EFUDEX) 5 % cream Apply  to affected area(s) 2 times a day. For 28  "days. One week on and one week off     • SYMBICORT 80-4.5 MCG/ACT Aerosol INHALE 2 PUFFS BY MOUTH TWO TIMES A DAY 3 Inhaler 1   • Probiotic Product (PROBIOTIC PO) Take  by mouth.     • certolizumab pegol (CIMZIA PREFILLED) 2 X 200 MG/ML Kit Inject 200 mg as instructed every 14 days.     • rosuvastatin (CRESTOR) 20 MG Tab Take 1 Tab by mouth every bedtime. 90 Tab 2   • SPIRIVA HANDIHALER 18 MCG Cap INHALE THE CONTENTS OF 1 CAPSULE VIA HANDIHALER BY MOUTH DAILY 90 Cap 3   • Magnesium 250 MG Tab Take 1 Tab by mouth every day.     • albuterol 108 (90 BASE) MCG/ACT Aero Soln inhalation aerosol Inhale 2 Puffs by mouth every 6 hours as needed for Shortness of Breath. 8.5 g 3   • fluticasone (FLONASE) 50 MCG/ACT nasal spray Spray 1 Spray in nose every day.     • loperamide (IMODIUM A-D) 2 MG tablet Take 2 mg by mouth every day.     • leflunomide (ARAVA) 20 MG Tab Take 20 mg by mouth every day.     • Multiple Vitamin (MULTIVITAMIN PO) Take  by mouth.     • fexofenadine (ALLEGRA) 180 MG tablet Take 180 mg by mouth every day.     • Cholecalciferol (VITAMIN D3) 2000 UNIT Cap Take 1 Cap by mouth every day.       No current facility-administered medications for this visit.        Allergies: Cipro xr; Levaquin; and Pcn [penicillins]    Blood pressure 149/78, pulse 94, resp. rate 15, height 1.88 m (6' 2\"), weight 90.7 kg (200 lb), SpO2 90 %.      ROS: As per HPI and otherwise negative if not stated.    Physical exam:   Constitutional: Well-nourished, well-developed, in no acute distress  Eyes: PERRL  Mouth/Throat: Oropharynx is dry, clear, no lesions  Neck: supple, trachea midline  Respiratory: no intercostal retractions or accessory muscle use   Lungs auscultation: Faint expiratory wheezes bilateral bases, otherwise clear, prolonged expiratory phase  Cardiovascular: Regular rate rhythm no murmurs, rubs or gallops  Musculoskeletal:  no clubbing or cyanosis  Skin: No rashes or lesions noted on exposed skin  Neuro: No focal deficit " noted  Psychiatric: Oriented to time, person and place.     Diagnosis:  1. Hypoxia  AMB MULTIPLE OXIMETRY    CANCELED: DME O2 NEW SET UP   2. Chronic respiratory failure with hypoxia (CMS-HCC)  DME O2 NEW SET UP    CANCELED: DME O2 NEW SET UP   3. Chronic obstructive pulmonary disease, unspecified COPD type (CMS-[facility])  albuterol (PROVENTIL) 2.5mg/3ml Nebu Soln solution for nebulization    DME NEBULIZER    CANCELED: DME O2 NEW SET UP   4. NYASIA (obstructive sleep apnea)         Plan:  Patient was treated in urgent care for COPD exacerbation, his symptoms are improving gradually. He is willing to start O2. He understands the cardiovascular neurologic implications of chronic hypoxemia.    1. Start oxygen 2 L 24/7, order to [facility]  2. Continue Medrol as prescribed  3. Continue Symbicort twice a day, Spiriva daily  4. Add albuterol nebulizer. Rx to pharmacy. Use nebulized treatment every 4-6 hours as needed for dyspnea or wheeze.  5. Permanent and complete smoking cessation recommended  6. Follow-up in December, sooner if needed

## 2018-04-04 ENCOUNTER — OFFICE VISIT (OUTPATIENT)
Dept: INTERNAL MEDICINE | Facility: MEDICAL CENTER | Age: 78
End: 2018-04-04
Payer: MEDICARE

## 2018-04-04 VITALS
DIASTOLIC BLOOD PRESSURE: 78 MMHG | BODY MASS INDEX: 25.19 KG/M2 | HEIGHT: 74 IN | WEIGHT: 196.25 LBS | HEART RATE: 76 BPM | TEMPERATURE: 97.3 F | SYSTOLIC BLOOD PRESSURE: 120 MMHG | OXYGEN SATURATION: 91 %

## 2018-04-04 DIAGNOSIS — R09.02 HYPOXIA: ICD-10-CM

## 2018-04-04 DIAGNOSIS — E78.5 DYSLIPIDEMIA: ICD-10-CM

## 2018-04-04 DIAGNOSIS — G62.9 NEUROPATHY: ICD-10-CM

## 2018-04-04 DIAGNOSIS — E55.9 VITAMIN D DEFICIENCY: ICD-10-CM

## 2018-04-04 DIAGNOSIS — I10 BENIGN ESSENTIAL HTN: ICD-10-CM

## 2018-04-04 DIAGNOSIS — M06.9 RHEUMATOID ARTHRITIS, INVOLVING UNSPECIFIED SITE, UNSPECIFIED RHEUMATOID FACTOR PRESENCE: ICD-10-CM

## 2018-04-04 DIAGNOSIS — J98.4 CHRONIC LUNG DISEASE: ICD-10-CM

## 2018-04-04 PROCEDURE — 99214 OFFICE O/P EST MOD 30 MIN: CPT | Performed by: INTERNAL MEDICINE

## 2018-04-04 NOTE — PROGRESS NOTES
Follow-up    Chief Complaint   Patient presents with   • Hyperlipidemia       HPI   History was obtained from the patient, family (wife), and a medical record review.   Here for FU.   Doing ok.   Had flu.  Hypoxic so started on O2.  Still on it.   Slowly getting better.     THREE CHRONIC CONDITIONS  HTN, stable  Lung disease, stable  DL stable    Past Medical History:   Diagnosis Date   • Abnormal thyroid stimulating hormone (TSH) level    • Allergic rhinitis    • Asbestosis (CMS-Formerly [facility])    • Asthma    • Bronchitis    • Chronic diarrhea     declines eval; takes immodium once a day usually and sometimes less; see previous notes; aware of risk    • Chronic low back pain    • Chronic lung disease     asbestos related   • CKD (chronic kidney disease), stage III     sees neph   • COPD (chronic obstructive pulmonary disease) (CMS-Formerly [facility])    • Coronary artery calcification seen on CAT scan 8/2/2016    sees cards   • Epididymitis 2009    h/o listed in chart   • GERD (gastroesophageal reflux disease)    • History of hemorrhoids    • History of skin cancer     non melanoma   • [place] (hard of hearing)     declines hearing aids   • Hypercholesteremia    • Hypertension    • Hypertriglyceridemia    • Indigestion    • Light headedness     2/2 orthostasis? now better off hctz   • Lightheadedness 6/23/2016   • Low back pain    • Nasal drainage    • Olecranon bursitis    • Orthostasis 6/23/2016    better off HCTZ   • Peripheral neuropathy (CMS-Formerly [facility])    • Pleural plaque 2005     CT [place]   • Post-nasal drip    • Pulmonary emphysema (CMS-Formerly [facility])    • RA (rheumatoid arthritis) (CMS-Formerly [facility])     on meds, sees rheum   • Restless leg syndrome    • Rheumatoid arthritis (CMS-HCC)    • Sleep apnea     obstructive and central - not adherent to autopap   • Solitary kidney     history of kidney cyst 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-functioning kidney s/p nephrectomy        Past Surgical History:   Procedure Laterality Date   • CYSTOSCOPY  2/1/2010    Performed by LUBA  ANGIE HAYNES at SURGERY [facility] ORS   • NASAL POLYPECTOMY Bilateral 10/6/2015    Procedure: NASAL POLYPECTOMY WITH SCOTT ENDOSCOPIC NASAL DEBRIDEMENT;  Surgeon: Param Maurer M.D.;  Location: SURGERY SAME DAY [facility];  Service:    • NEPHRECTOMY LAPAROSCOPIC  2009    left kidney   • PB REMV 2ND CATARACT,CORN-SCLER SECTN     • PYELOGRAM  2/1/2010    Performed by ANGIE VERDUZCO at SURGERY [facility]   • RETROGRADES  2/1/2010    Performed by ANGIE VERDUZCO at SURGERY [facility]   • TESTICLE EXPLORATION  2004   • TONSILLECTOMY     • URETEROSCOPY  2/1/2010    Performed by ANGIE VERDUZCO at SURGERY [facility]       Current Outpatient Prescriptions   Medication Sig Dispense Refill   • benzonatate (TESSALON) 100 MG Cap Take 1 Cap by mouth 3 times a day as needed for Cough. 60 Cap 0   • gabapentin (NEURONTIN) 300 MG Cap TAKE 1 CAPSULE BY MOUTH THREE TIMES A DAY (Patient taking differently: TAKE 2 CAPSULE BY MOUTH ONCE DAY) 180 Cap 1   • fluorouracil (EFUDEX) 5 % cream Apply  to affected area(s) 2 times a day. For 28 days. One week on and one week off     • SYMBICORT 80-4.5 MCG/ACT Aerosol INHALE 2 PUFFS BY MOUTH TWO TIMES A DAY 3 Inhaler 1   • Probiotic Product (PROBIOTIC PO) Take  by mouth.     • certolizumab pegol (CIMZIA PREFILLED) 2 X 200 MG/ML Kit Inject 200 mg as instructed every 14 days.     • rosuvastatin (CRESTOR) 20 MG Tab Take 1 Tab by mouth every bedtime. 90 Tab 2   • SPIRIVA HANDIHALER 18 MCG Cap INHALE THE CONTENTS OF 1 CAPSULE VIA HANDIHALER BY MOUTH DAILY 90 Cap 3   • Magnesium 250 MG Tab Take 1 Tab by mouth every day.     • albuterol 108 (90 BASE) MCG/ACT Aero Soln inhalation aerosol Inhale 2 Puffs by mouth every 6 hours as needed for Shortness of Breath. 8.5 g 3   • fluticasone (FLONASE) 50 MCG/ACT nasal spray Spray 1 Spray in nose every day.     • Cholecalciferol (VITAMIN D3) 2000 UNIT Cap Take 1 Cap by mouth every day.     • loperamide (IMODIUM A-D) 2 MG tablet  "Take 2 mg by mouth every day.     • leflunomide (ARAVA) 20 MG Tab Take 20 mg by mouth every day.     • Multiple Vitamin (MULTIVITAMIN PO) Take  by mouth.     • fexofenadine (ALLEGRA) 180 MG tablet Take 180 mg by mouth every day.       No current facility-administered medications for this visit.        Allergies as of 04/04/2018 - Reviewed 04/04/2018   Allergen Reaction Noted   • Cipro xr  01/25/2010   • Levaquin  01/25/2010   • Pcn [penicillins] Hives 09/15/2009        Social History     Social History   • Marital status:      Spouse name: N/A   • Number of children: N/A   • Years of education: N/A     Occupational History   • Not on file.     Social History Main Topics   • Smoking status: Former Smoker     Packs/day: 1.00     Years: 40.00     Types: Cigarettes     Quit date: 1/1/1980   • Smokeless tobacco: Never Used      Comment: 1 pk a day for 40 yrs   • Alcohol use No      Comment: 2 a week   • Drug use: No   • Sexual activity: Not on file      Comment: retired      Other Topics Concern   • Not on file     Social History Narrative    See H&P    Lives with wife       Family History   Problem Relation Age of Onset   • Genetic Father      ALS   • No Known Problems Sister    • No Known Problems Son    • No Known Problems Daughter    • Heart Attack Neg Hx    • Heart Disease Neg Hx    • Heart Failure Neg Hx        ROS:   No cough or SOB  No CP or heart palp   No dizziness or LH  Fatigue getting better.     /78   Pulse 76   Temp 36.3 °C (97.3 °F)   Ht 1.88 m (6' 2\")   Wt 89 kg (196 lb 4 oz)   SpO2 91% Comment: at 2L with Nasal cannula  BMI 25.20 kg/m²     Physical Exam  General:  Alert and oriented.  No apparent distress.    Eyes: No scleral icterus. No conjunctival injection.      ENMT:  MMM OP clear    Neck: Neck supple.  Trachea midline.      Resp: Clear to auscultation bilaterally. No rales, rhonchi, or wheezes.    Cardiovascular: Regular rate and normal rhythm. No murmurs, rubs or gallops. 2+ " radial pulses.     Abdomen:     Musculoskeletal: No clubbing, cyanosis. Trace edema present  No point TTP paraspinal or spinal muscles  Soft moveable lump L elbow    Skin: Sun exposure changes on head    Lymph:     Neuro: did not want to take off shoes    Psych: Mood euthymic. Affect congruent.    Skin:     Other:     Labs/Studies  Hospital Outpatient Visit on 03/02/2018   Component Date Value Ref Range Status   • WBC 03/02/2018 9.2  4.8 - 10.8 K/uL Final   • RBC 03/02/2018 4.14* 4.70 - 6.10 M/uL Final   • Hemoglobin 03/02/2018 13.3* 14.0 - 18.0 g/dL Final   • Hematocrit 03/02/2018 40.6* 42.0 - 52.0 % Final   • MCV 03/02/2018 98.1* 81.4 - 97.8 fL Final   • MCH 03/02/2018 32.1  27.0 - 33.0 pg Final   • MCHC 03/02/2018 32.8* 33.7 - 35.3 g/dL Final   • RDW 03/02/2018 53.5* 35.9 - 50.0 fL Final   • Platelet Count 03/02/2018 156* 164 - 446 K/uL Final   • MPV 03/02/2018 9.4  9.0 - 12.9 fL Final   • Neutrophils-Polys 03/02/2018 77.40* 44.00 - 72.00 % Final   • Lymphocytes 03/02/2018 12.10* 22.00 - 41.00 % Final   • Monocytes 03/02/2018 10.00  0.00 - 13.40 % Final   • Eosinophils 03/02/2018 0.10  0.00 - 6.90 % Final   • Basophils 03/02/2018 0.30  0.00 - 1.80 % Final   • Immature Granulocytes 03/02/2018 0.10  0.00 - 0.90 % Final   • Nucleated RBC 03/02/2018 0.00  /100 WBC Final   • Neutrophils (Absolute) 03/02/2018 7.14  1.82 - 7.42 K/uL Final   • Lymphs (Absolute) 03/02/2018 1.12  1.00 - 4.80 K/uL Final   • Monos (Absolute) 03/02/2018 0.92* 0.00 - 0.85 K/uL Final   • Eos (Absolute) 03/02/2018 0.01  0.00 - 0.51 K/uL Final   • Baso (Absolute) 03/02/2018 0.03  0.00 - 0.12 K/uL Final   • Immature Granulocytes (abs) 03/02/2018 0.01  0.00 - 0.11 K/uL Final   • NRBC (Absolute) 03/02/2018 0.00  K/uL Final   • Sodium 03/02/2018 139  135 - 145 mmol/L Final   • Potassium 03/02/2018 4.3  3.6 - 5.5 mmol/L Final   • Chloride 03/02/2018 104  96 - 112 mmol/L Final   • Co2 03/02/2018 24  20 - 33 mmol/L Final   • Anion Gap 03/02/2018 11.0   0.0 - 11.9 Final   • Glucose 03/02/2018 116* 65 - 99 mg/dL Final   • Bun 03/02/2018 22  8 - 22 mg/dL Final   • Creatinine 03/02/2018 1.56* 0.50 - 1.40 mg/dL Final   • Calcium 03/02/2018 9.3  8.5 - 10.5 mg/dL Final   • AST(SGOT) 03/02/2018 35  12 - 45 U/L Final   • ALT(SGPT) 03/02/2018 28  2 - 50 U/L Final   • Alkaline Phosphatase 03/02/2018 73  30 - 99 U/L Final   • Total Bilirubin 03/02/2018 1.1  0.1 - 1.5 mg/dL Final   • Albumin 03/02/2018 4.2  3.2 - 4.9 g/dL Final   • Total Protein 03/02/2018 7.1  6.0 - 8.2 g/dL Final   • Globulin 03/02/2018 2.9  1.9 - 3.5 g/dL Final   • A-G Ratio 03/02/2018 1.4  g/dL Final   • GFR If  03/02/2018 52* >60 mL/min/1.73 m 2 Final   • GFR If Non  03/02/2018 43* >60 mL/min/1.73 m 2 Final   Hospital Outpatient Visit on 01/23/2018   Component Date Value Ref Range Status   • WBC 01/23/2018 0-2* /hpf Final    Comment: Female  <12 Yr 0-2  >12 Yr 0-5  Male   None     • RBC 01/23/2018 0-2* /hpf Final    Comment: Female  >12 Yr 0-2  Male   None     • Bacteria 01/23/2018 Negative  None /hpf Final   • Epithelial Cells 01/23/2018 Negative  /hpf Final   • Hyaline Cast 01/23/2018 0-2  /lpf Final   • Color 01/23/2018 Yellow   Final   • Character 01/23/2018 Cloudy*  Final   • Specific Gravity 01/23/2018 1.027  <1.035 Final   • Ph 01/23/2018 5.0  5.0 - 8.0 Final   • Glucose 01/23/2018 Negative  Negative mg/dL Final   • Ketones 01/23/2018 Negative  Negative mg/dL Final   • Protein 01/23/2018 30* Negative mg/dL Final   • Bilirubin 01/23/2018 Negative  Negative Final   • Urobilinogen, Urine 01/23/2018 0.2  Negative Final   • Nitrite 01/23/2018 Negative  Negative Final   • Leukocyte Esterase 01/23/2018 Negative  Negative Final   • Occult Blood 01/23/2018 Negative  Negative Final   • Micro Urine Req 01/23/2018 Microscopic   Final   • WBC 01/23/2018 4.8  4.8 - 10.8 K/uL Final   • RBC 01/23/2018 4.14* 4.70 - 6.10 M/uL Final   • Hemoglobin 01/23/2018 13.2* 14.0 - 18.0 g/dL  Final   • Hematocrit 01/23/2018 41.3* 42.0 - 52.0 % Final   • MCV 01/23/2018 99.8* 81.4 - 97.8 fL Final   • MCH 01/23/2018 31.9  27.0 - 33.0 pg Final   • MCHC 01/23/2018 32.0* 33.7 - 35.3 g/dL Final   • RDW 01/23/2018 52.9* 35.9 - 50.0 fL Final   • Platelet Count 01/23/2018 206  164 - 446 K/uL Final   • MPV 01/23/2018 9.6  9.0 - 12.9 fL Final   • Neutrophils-Polys 01/23/2018 51.10  44.00 - 72.00 % Final   • Lymphocytes 01/23/2018 25.50  22.00 - 41.00 % Final   • Monocytes 01/23/2018 15.40* 0.00 - 13.40 % Final   • Eosinophils 01/23/2018 6.50  0.00 - 6.90 % Final   • Basophils 01/23/2018 1.30  0.00 - 1.80 % Final   • Immature Granulocytes 01/23/2018 0.20  0.00 - 0.90 % Final   • Nucleated RBC 01/23/2018 0.00  /100 WBC Final   • Neutrophils (Absolute) 01/23/2018 2.43  1.82 - 7.42 K/uL Final   • Lymphs (Absolute) 01/23/2018 1.21  1.00 - 4.80 K/uL Final   • Monos (Absolute) 01/23/2018 0.73  0.00 - 0.85 K/uL Final   • Eos (Absolute) 01/23/2018 0.31  0.00 - 0.51 K/uL Final   • Baso (Absolute) 01/23/2018 0.06  0.00 - 0.12 K/uL Final   • Immature Granulocytes (abs) 01/23/2018 0.01  0.00 - 0.11 K/uL Final   • NRBC (Absolute) 01/23/2018 0.00  K/uL Final   • 25-Hydroxy   Vitamin D 25 01/23/2018 82  30 - 100 ng/mL Final    Comment: Adult Ranges:   <20 ng/mL - Deficiency  20-29 ng/mL - Insufficiency   ng/mL - Sufficiency  The Advia Centaur Vitamin D Assay is standardized to the  [facility] reference measurement procedures, a  reference method for the Vitamin D Standardization Program  (VDSP).  The VDSP aligns patient results among 25 (OH)  Vitamin D methods.     • Ferritin 01/23/2018 113.4  22.0 - 322.0 ng/mL Final   • Pth, Intact 01/23/2018 78.0* 14.0 - 72.0 pg/mL Final   • Calcium 01/23/2018 9.0  8.5 - 10.5 mg/dL Final   • Total Protein, Urine 01/23/2018 42.8* 0.0 - 15.0 mg/dL Final   • Creatinine, Random Urine 01/23/2018 173.80  mg/dL Final    Comment: Reference ranges have not been established for this specimen  type.  Result interpretation should include consideration of patient's  medical condition and clinical presentation.     • Protein Creatinine Ratio 01/23/2018 246* 15 - 68 mg/g Final   • GFR If  01/23/2018 50* >60 mL/min/1.73 m 2 Final   • GFR If Non  01/23/2018 41* >60 mL/min/1.73 m 2 Final   • Uric Acid 01/23/2018 5.0  2.5 - 8.3 mg/dL Final   • Sodium 01/23/2018 139  135 - 145 mmol/L Final   • Potassium 01/23/2018 4.8  3.6 - 5.5 mmol/L Final   • Chloride 01/23/2018 106  96 - 112 mmol/L Final   • Co2 01/23/2018 28  20 - 33 mmol/L Final   • Glucose 01/23/2018 99  65 - 99 mg/dL Final   • Creatinine 01/23/2018 1.62* 0.50 - 1.40 mg/dL Final   • Bun 01/23/2018 24* 8 - 22 mg/dL Final   • Phosphorus 01/23/2018 2.8  2.5 - 4.5 mg/dL Final   • Albumin 01/23/2018 4.0  3.2 - 4.9 g/dL Final   • Iron 01/23/2018 74  50 - 180 ug/dL Final   • Total Iron Binding 01/23/2018 340  250 - 450 ug/dL Final   • % Saturation 01/23/2018 22  15 - 55 % Final   • Magnesium 01/23/2018 2.1  1.5 - 2.5 mg/dL Final   Hospital Outpatient Visit on 01/16/2018   Component Date Value Ref Range Status   • WBC 01/16/2018 4.6* 4.8 - 10.8 K/uL Final   • RBC 01/16/2018 4.12* 4.70 - 6.10 M/uL Final   • Hemoglobin 01/16/2018 13.0* 14.0 - 18.0 g/dL Final   • Hematocrit 01/16/2018 40.4* 42.0 - 52.0 % Final   • MCV 01/16/2018 98.1* 81.4 - 97.8 fL Final   • MCH 01/16/2018 31.6  27.0 - 33.0 pg Final   • MCHC 01/16/2018 32.2* 33.7 - 35.3 g/dL Final   • RDW 01/16/2018 52.5* 35.9 - 50.0 fL Final   • Platelet Count 01/16/2018 188  164 - 446 K/uL Final   • MPV 01/16/2018 9.0  9.0 - 12.9 fL Final   • Neutrophils-Polys 01/16/2018 42.80* 44.00 - 72.00 % Final   • Lymphocytes 01/16/2018 33.50  22.00 - 41.00 % Final   • Monocytes 01/16/2018 13.60* 0.00 - 13.40 % Final   • Eosinophils 01/16/2018 8.40* 0.00 - 6.90 % Final   • Basophils 01/16/2018 1.70  0.00 - 1.80 % Final   • Immature Granulocytes 01/16/2018 0.00  0.00 - 0.90 % Final   • Nucleated  RBC 01/16/2018 0.00  /100 WBC Final   • Neutrophils (Absolute) 01/16/2018 1.97  1.82 - 7.42 K/uL Final   • Lymphs (Absolute) 01/16/2018 1.55  1.00 - 4.80 K/uL Final   • Monos (Absolute) 01/16/2018 0.63  0.00 - 0.85 K/uL Final   • Eos (Absolute) 01/16/2018 0.39  0.00 - 0.51 K/uL Final   • Baso (Absolute) 01/16/2018 0.08  0.00 - 0.12 K/uL Final   • Immature Granulocytes (abs) 01/16/2018 0.00  0.00 - 0.11 K/uL Final   • NRBC (Absolute) 01/16/2018 0.00  K/uL Final   • Sed Rate Westergren 01/16/2018 7  0 - 20 mm/hour Final   • Stat C-Reactive Protein 01/16/2018 0.19  0.00 - 0.75 mg/dL Final   • Alkaline Phosphatase 01/16/2018 58  30 - 99 U/L Final   • AST(SGOT) 01/16/2018 24  12 - 45 U/L Final   • ALT(SGPT) 01/16/2018 23  2 - 50 U/L Final   • Total Bilirubin 01/16/2018 0.7  0.1 - 1.5 mg/dL Final   • Direct Bilirubin 01/16/2018 0.2  0.1 - 0.5 mg/dL Final   • Indirect Bilirubin 01/16/2018 0.5  0.0 - 1.0 mg/dL Final   • Albumin 01/16/2018 3.9  3.2 - 4.9 g/dL Final   • Total Protein 01/16/2018 6.3  6.0 - 8.2 g/dL Final   • Creatinine 01/16/2018 1.65* 0.50 - 1.40 mg/dL Final   • GFR If  01/16/2018 49* >60 mL/min/1.73 m 2 Final   • GFR If Non  01/16/2018 40* >60 mL/min/1.73 m 2 Final         Hospital Outpatient Visit on 11/02/2017   Component Date Value Ref Range Status   • WBC 11/02/2017 4.6* 4.8 - 10.8 K/uL Final   • RBC 11/02/2017 3.90* 4.70 - 6.10 M/uL Final   • Hemoglobin 11/02/2017 12.7* 14.0 - 18.0 g/dL Final   • Hematocrit 11/02/2017 39.7* 42.0 - 52.0 % Final   • MCV 11/02/2017 101.8* 81.4 - 97.8 fL Final   • MCH 11/02/2017 32.6  27.0 - 33.0 pg Final   • MCHC 11/02/2017 32.0* 33.7 - 35.3 g/dL Final   • RDW 11/02/2017 57.7* 35.9 - 50.0 fL Final   • Platelet Count 11/02/2017 214  164 - 446 K/uL Final   • MPV 11/02/2017 9.5  9.0 - 12.9 fL Final   • Neutrophils-Polys 11/02/2017 46.10  44.00 - 72.00 % Final   • Lymphocytes 11/02/2017 28.60  22.00 - 41.00 % Final   • Monocytes 11/02/2017  15.10* 0.00 - 13.40 % Final   • Eosinophils 11/02/2017 8.70* 0.00 - 6.90 % Final   • Basophils 11/02/2017 1.30  0.00 - 1.80 % Final   • Immature Granulocytes 11/02/2017 0.20  0.00 - 0.90 % Final   • Nucleated RBC 11/02/2017 0.00  /100 WBC Final   • Neutrophils (Absolute) 11/02/2017 2.11  1.82 - 7.42 K/uL Final   • Lymphs (Absolute) 11/02/2017 1.31  1.00 - 4.80 K/uL Final   • Monos (Absolute) 11/02/2017 0.69  0.00 - 0.85 K/uL Final   • Eos (Absolute) 11/02/2017 0.40  0.00 - 0.51 K/uL Final   • Baso (Absolute) 11/02/2017 0.06  0.00 - 0.12 K/uL Final   • Immature Granulocytes (abs) 11/02/2017 0.01  0.00 - 0.11 K/uL Final   • NRBC (Absolute) 11/02/2017 0.00  K/uL Final   • [facility] 11/02/2017 10  0 - 20 mm/hour Final   • Stat C-Reactive Protein 11/02/2017 0.09  0.00 - 0.75 mg/dL Final   • Creatinine 11/02/2017 1.41* 0.50 - 1.40 mg/dL Final   • ALT(SGPT) 11/02/2017 22  2 - 50 U/L Final   • AST(SGOT) 11/02/2017 26  12 - 45 U/L Final   • Albumin 11/02/2017 3.9  3.2 - 4.9 g/dL Final   • GFR If  11/02/2017 59* >60 mL/min/1.73 m 2 Final   • GFR If Non  11/02/2017 49* >60 mL/min/1.73 m 2 Final     cxr March 2018    Linear bibasilar opacities likely represent atelectasis.    Hyperinflation.    Atherosclerotic plaque.    Calcified granuloma.    Hospital Outpatient Visit on 06/27/2017   Component Date Value Ref Range Status   • TSH 06/27/2017 3.570  0.300 - 3.700 uIU/mL Final   • Vitamin B12 -True Cobalamin 06/27/2017 >1500* 211 - 911 pg/mL Final   • Folate -Folic Acid 06/27/2017 >23.7  >4.0 ng/mL Final       Hospital Outpatient Visit on 02/16/2017   Component Date Value Ref Range Status   • Uric Acid 02/16/2017 4.8  2.5 - 8.3 mg/dL Final   • Magnesium 02/16/2017 2.0  1.5 - 2.5 mg/dL Final   • Color 02/16/2017 Yellow   Final   • Character 02/16/2017 Clear   Final   • Specific Gravity 02/16/2017 1.020  <1.035 Final   • Ph 02/16/2017 5.5  5.0-8.0 Final   • Glucose 02/16/2017 Negative   Negative mg/dL Final   • Ketones 02/16/2017 Negative  Negative mg/dL Final   • Protein 02/16/2017 Negative  Negative mg/dL Final   • Bilirubin 02/16/2017 Negative  Negative Final   • Nitrite 02/16/2017 Negative  Negative Final   • Leukocyte Esterase 02/16/2017 Negative  Negative Final   • Occult Blood 02/16/2017 Negative  Negative Final   • Micro Urine Req 02/16/2017 see below   Final    Comment: Microscopic examination not performed when specimen is clear  and chemically negative for protein, blood, leukocyte esterase  and nitrite.     • WBC 02/16/2017 4.8  4.8 - 10.8 K/uL Final   • RBC 02/16/2017 4.24* 4.70 - 6.10 M/uL Final   • Hemoglobin 02/16/2017 13.5* 14.0 - 18.0 g/dL Final   • Hematocrit 02/16/2017 43.1  42.0 - 52.0 % Final   • MCV 02/16/2017 101.7* 81.4 - 97.8 fL Final   • MCH 02/16/2017 31.8  27.0 - 33.0 pg Final   • MCHC 02/16/2017 31.3* 33.7 - 35.3 g/dL Final   • RDW 02/16/2017 56.9* 35.9 - 50.0 fL Final   • Platelet Count 02/16/2017 206  164 - 446 K/uL Final   • MPV 02/16/2017 9.3  9.0 - 12.9 fL Final   • Neutrophils-Polys 02/16/2017 41.40* 44.00 - 72.00 % Final   • Lymphocytes 02/16/2017 31.20  22.00 - 41.00 % Final   • Monocytes 02/16/2017 16.90* 0.00 - 13.40 % Final   • Eosinophils 02/16/2017 8.80* 0.00 - 6.90 % Final   • Basophils 02/16/2017 1.50  0.00 - 1.80 % Final   • Immature Granulocytes 02/16/2017 0.20  0.00 - 0.90 % Final   • Nucleated RBC 02/16/2017 0.00   Final   • Neutrophils (Absolute) 02/16/2017 1.98  1.82 - 7.42 K/uL Final    Includes immature neutrophils, if present.   • Lymphs (Absolute) 02/16/2017 1.49  1.00 - 4.80 K/uL Final   • Monos (Absolute) 02/16/2017 0.81  0.00 - 0.85 K/uL Final   • Eos (Absolute) 02/16/2017 0.42  0.00 - 0.51 K/uL Final   • Baso (Absolute) 02/16/2017 0.07  0.00 - 0.12 K/uL Final   • Immature Granulocytes (abs) 02/16/2017 0.01  0.00 - 0.11 K/uL Final   • NRBC (Absolute) 02/16/2017 0.00   Final   • Pth, Intact 02/16/2017 86.1* 14.0 - 72.0 pg/mL Final   • Calcium  02/16/2017 9.0  8.5 - 10.5 mg/dL Final   • 25-Hydroxy   Vitamin D 25 02/16/2017 62  30 - 100 ng/mL Final    Comment: Adult Ranges:   <20 ng/mL - Deficiency  20-29 ng/mL - Insufficiency   ng/mL - Sufficiency  The Advia Centaur Vitamin D Assay is standardized to the  [facility] reference measurement procedures, a  reference method for the Vitamin D Standardization Program  (VDSP).  The VDSP aligns patient results among 25 (OH)  Vitamin D methods.     • Sodium 02/16/2017 142  135 - 145 mmol/L Final   • Potassium 02/16/2017 4.5  3.6 - 5.5 mmol/L Final   • Chloride 02/16/2017 109  96 - 112 mmol/L Final   • Co2 02/16/2017 23  20 - 33 mmol/L Final   • Glucose 02/16/2017 93  65 - 99 mg/dL Final   • Creatinine 02/16/2017 1.50* 0.50 - 1.40 mg/dL Final   • Bun 02/16/2017 21  8 - 22 mg/dL Final   • Phosphorus 02/16/2017 2.7  2.5 - 4.5 mg/dL Final   • Albumin 02/16/2017 3.9  3.2 - 4.9 g/dL Final   • Total Protein, Urine 02/16/2017 19.3* 0.0 - 15.0 mg/dL Final   • Creatinine, Random Urine 02/16/2017 113.70   Final    Comment: Reference ranges have not been established for this specimen type.  Result interpretation should include consideration of patient's  medical condition and clinical presentation.     • Protein Creatinine Ratio 02/16/2017 170* 15 - 68 mg/g Final   • Ferritin 02/16/2017 154.0  22.0 - 322.0 ng/mL Final   • Iron 02/16/2017 73  50 - 180 ug/dL Final   • Total Iron Binding 02/16/2017 322  250 - 450 ug/dL Final   • % Saturation 02/16/2017 23  15 - 55 % Final   • GFR If  02/16/2017 55* >60 mL/min/1.73 m 2 Final   • GFR If Non  02/16/2017 45* >60 mL/min/1.73 m 2 Final       Assessment and Plan  1. Hypoxia  2. Chronic lung disease  Chronic obstructive pulmonary disease, unspecified COPD type (CMS-HCC)  Asbestos induced b/l diffuse pleural thickening   [place]ASIA  Declines CPAP  est'd with szot  On O2  Sees pulm    3. Rheumatoid arthritis, involving unspecified site, unspecified  rheumatoid factor presence (CMS-HCC)  Controlled on current meds  Sees rheum    4. Vitamin D deficiency  rec supplement    5. Dyslipidemia  On statin     5. Benign essential HTN  High today but generally fine at home   Allow for a little of permissive HTN given orthostasis sxs  Off hctz and other meds at this time       6. Neuropathy (CMS-HCC)  B12, folate, tsh are fine  Getting b12 injections by pods at times  Declines repeat eval at this time  On presley       Bilateral foot pain  Better with orthotics     Impaired gait  Gait imbalance after change in position   Not orthostatic now but not to say it doesn't happen other times  Off all bp meds  OA/RA and neuropathy could be playing rolae    Anemia, unspecified type  Macrocytosis B12 and folate fine  Re: anemia - [id number] with normal iron studies although on iron that he has been taking for years per his preference  No e/o gross bleeding  He declines further eval of the anemia aware of risk of missing malignancy  Monitor for now    Debility  Using [employer] handicap placard form - unable to walk more than 200 feet without resting at times 2/2 RA     CKD (chronic kidney disease), stage 3 (moderate)  Sees neph  Avoid nephrotox agents  Monitor    Loose stool  30+ years controlled with Immodium  Declines further eval (e.g, GI eval, colo, stool studies) aware of risk    Preventative health care  Sees derm and eye doctor regularly  Declines hearing aid  Flu annually  PNA x 2 2014  TDAP 2014  Shingles 2010  [place] 2009 - told to repeat in 10 years  PSA 2014; in past, reviewed risks and benefits and will hold off  DEXA done 2014 was normal per pt     Patient Instructions   Cont with O2.   Follow up with lung doctor.     Resume vitamin D.    I recommend the Shingrix vaccine series to prevent shingles.  Please get the vaccines at a local pharmacy.                   Follow-up  Return in about 4 months (around 8/4/2018).    Signed by: Lizzie Soto M.D.

## 2018-04-04 NOTE — PATIENT INSTRUCTIONS
Cont with O2.   Follow up with lung doctor.     Resume vitamin D.    I recommend the Shingrix vaccine series to prevent shingles.  Please get the vaccines at a local pharmacy.

## 2018-04-18 ENCOUNTER — HOSPITAL ENCOUNTER (OUTPATIENT)
Dept: LAB | Facility: MEDICAL CENTER | Age: 78
End: 2018-04-18
Attending: INTERNAL MEDICINE
Payer: MEDICARE

## 2018-04-18 LAB
ALBUMIN SERPL BCP-MCNC: 3.9 G/DL (ref 3.2–4.9)
ALP SERPL-CCNC: 62 U/L (ref 30–99)
ALT SERPL-CCNC: 17 U/L (ref 2–50)
AST SERPL-CCNC: 22 U/L (ref 12–45)
BASOPHILS # BLD AUTO: 1.1 % (ref 0–1.8)
BASOPHILS # BLD: 0.07 K/UL (ref 0–0.12)
BILIRUB CONJ SERPL-MCNC: 0.1 MG/DL (ref 0.1–0.5)
BILIRUB INDIRECT SERPL-MCNC: 0.5 MG/DL (ref 0–1)
BILIRUB SERPL-MCNC: 0.6 MG/DL (ref 0.1–1.5)
CREAT SERPL-MCNC: 1.55 MG/DL (ref 0.5–1.4)
CRP SERPL HS-MCNC: 0.21 MG/DL (ref 0–0.75)
EOSINOPHIL # BLD AUTO: 0.22 K/UL (ref 0–0.51)
EOSINOPHIL NFR BLD: 3.5 % (ref 0–6.9)
ERYTHROCYTE [DISTWIDTH] IN BLOOD BY AUTOMATED COUNT: 57 FL (ref 35.9–50)
ERYTHROCYTE [SEDIMENTATION RATE] IN BLOOD BY WESTERGREN METHOD: 24 MM/HOUR (ref 0–20)
HCT VFR BLD AUTO: 39 % (ref 42–52)
HGB BLD-MCNC: 12.2 G/DL (ref 14–18)
IMM GRANULOCYTES # BLD AUTO: 0.01 K/UL (ref 0–0.11)
IMM GRANULOCYTES NFR BLD AUTO: 0.2 % (ref 0–0.9)
LYMPHOCYTES # BLD AUTO: 1.45 K/UL (ref 1–4.8)
LYMPHOCYTES NFR BLD: 23.3 % (ref 22–41)
MCH RBC QN AUTO: 31.8 PG (ref 27–33)
MCHC RBC AUTO-ENTMCNC: 31.3 G/DL (ref 33.7–35.3)
MCV RBC AUTO: 101.6 FL (ref 81.4–97.8)
MONOCYTES # BLD AUTO: 0.77 K/UL (ref 0–0.85)
MONOCYTES NFR BLD AUTO: 12.4 % (ref 0–13.4)
NEUTROPHILS # BLD AUTO: 3.71 K/UL (ref 1.82–7.42)
NEUTROPHILS NFR BLD: 59.5 % (ref 44–72)
NRBC # BLD AUTO: 0 K/UL
NRBC BLD-RTO: 0 /100 WBC
PLATELET # BLD AUTO: 266 K/UL (ref 164–446)
PMV BLD AUTO: 9.5 FL (ref 9–12.9)
PROT SERPL-MCNC: 6.9 G/DL (ref 6–8.2)
RBC # BLD AUTO: 3.84 M/UL (ref 4.7–6.1)
WBC # BLD AUTO: 6.2 K/UL (ref 4.8–10.8)

## 2018-04-18 PROCEDURE — 85025 COMPLETE CBC W/AUTO DIFF WBC: CPT

## 2018-04-18 PROCEDURE — 82565 ASSAY OF CREATININE: CPT

## 2018-04-18 PROCEDURE — 85652 RBC SED RATE AUTOMATED: CPT

## 2018-04-18 PROCEDURE — 36415 COLL VENOUS BLD VENIPUNCTURE: CPT

## 2018-04-18 PROCEDURE — 86140 C-REACTIVE PROTEIN: CPT

## 2018-04-18 PROCEDURE — 80076 HEPATIC FUNCTION PANEL: CPT

## 2018-04-30 RX ORDER — ROSUVASTATIN CALCIUM 20 MG/1
TABLET, COATED ORAL
Qty: 90 TAB | Refills: 0 | Status: SHIPPED | OUTPATIENT
Start: 2018-04-30 | End: 2018-07-15 | Stop reason: SDUPTHER

## 2018-04-30 RX ORDER — TIOTROPIUM BROMIDE 18 UG/1
CAPSULE ORAL; RESPIRATORY (INHALATION)
Qty: 90 CAP | Refills: 0 | Status: SHIPPED | OUTPATIENT
Start: 2018-04-30 | End: 2018-08-23 | Stop reason: SDUPTHER

## 2018-04-30 NOTE — TELEPHONE ENCOUNTER
Last seen: 04/04/18 by Dr. Soto  Next appt: 08/15/18 with Dr. Soto    Was the patient seen in the last year in this department? Yes   Does patient have an active prescription for medications requested? No   Received Request Via: Pharmacy

## 2018-06-25 RX ORDER — GABAPENTIN 300 MG/1
CAPSULE ORAL
Qty: 180 CAP | Refills: 1 | Status: SHIPPED | OUTPATIENT
Start: 2018-06-25 | End: 2018-10-14 | Stop reason: SDUPTHER

## 2018-06-25 NOTE — TELEPHONE ENCOUNTER
Was the patient seen in the last year in this department? Yes   Last seen: 04/04/18 by Dr. Soto  Next appt: 08/15/18 with Dr. Soto      Does patient have an active prescription for medications requested? No     Received Request Via: Pharmacy

## 2018-07-18 ENCOUNTER — APPOINTMENT (RX ONLY)
Dept: URBAN - METROPOLITAN AREA CLINIC 20 | Facility: CLINIC | Age: 78
Setting detail: DERMATOLOGY
End: 2018-07-18

## 2018-07-18 DIAGNOSIS — D22 MELANOCYTIC NEVI: ICD-10-CM

## 2018-07-18 DIAGNOSIS — Z85.828 PERSONAL HISTORY OF OTHER MALIGNANT NEOPLASM OF SKIN: ICD-10-CM

## 2018-07-18 DIAGNOSIS — Z71.89 OTHER SPECIFIED COUNSELING: ICD-10-CM

## 2018-07-18 DIAGNOSIS — D18.0 HEMANGIOMA: ICD-10-CM

## 2018-07-18 DIAGNOSIS — L57.0 ACTINIC KERATOSIS: ICD-10-CM

## 2018-07-18 DIAGNOSIS — L81.4 OTHER MELANIN HYPERPIGMENTATION: ICD-10-CM

## 2018-07-18 DIAGNOSIS — L82.1 OTHER SEBORRHEIC KERATOSIS: ICD-10-CM

## 2018-07-18 PROBLEM — D18.01 HEMANGIOMA OF SKIN AND SUBCUTANEOUS TISSUE: Status: ACTIVE | Noted: 2018-07-18

## 2018-07-18 PROBLEM — D22.9 MELANOCYTIC NEVI, UNSPECIFIED: Status: ACTIVE | Noted: 2018-07-18

## 2018-07-18 PROCEDURE — ? LIQUID NITROGEN

## 2018-07-18 PROCEDURE — 17003 DESTRUCT PREMALG LES 2-14: CPT

## 2018-07-18 PROCEDURE — ? SUNSCREEN RECOMMENDATIONS

## 2018-07-18 PROCEDURE — ? COUNSELING

## 2018-07-18 PROCEDURE — 99214 OFFICE O/P EST MOD 30 MIN: CPT | Mod: 25

## 2018-07-18 PROCEDURE — 17000 DESTRUCT PREMALG LESION: CPT

## 2018-07-18 PROCEDURE — ? OBSERVATION

## 2018-07-18 ASSESSMENT — LOCATION SIMPLE DESCRIPTION DERM
LOCATION SIMPLE: RIGHT FOREHEAD
LOCATION SIMPLE: RIGHT UPPER BACK
LOCATION SIMPLE: RIGHT FOREARM
LOCATION SIMPLE: RIGHT HAND
LOCATION SIMPLE: LEFT TEMPLE
LOCATION SIMPLE: LEFT CHEEK
LOCATION SIMPLE: SCALP
LOCATION SIMPLE: LEFT FOREHEAD
LOCATION SIMPLE: UPPER BACK
LOCATION SIMPLE: LEFT HAND
LOCATION SIMPLE: LEFT EAR
LOCATION SIMPLE: LEFT FOREARM

## 2018-07-18 ASSESSMENT — LOCATION DETAILED DESCRIPTION DERM
LOCATION DETAILED: RIGHT INFERIOR MEDIAL FOREHEAD
LOCATION DETAILED: RIGHT VENTRAL DISTAL FOREARM
LOCATION DETAILED: RIGHT MEDIAL UPPER BACK
LOCATION DETAILED: LEFT INFERIOR LATERAL FOREHEAD
LOCATION DETAILED: LEFT VENTRAL PROXIMAL FOREARM
LOCATION DETAILED: SUPERIOR THORACIC SPINE
LOCATION DETAILED: LEFT SUPERIOR LATERAL MALAR CHEEK
LOCATION DETAILED: LEFT INFERIOR TEMPLE
LOCATION DETAILED: LEFT RADIAL DORSAL HAND
LOCATION DETAILED: LEFT SUPERIOR HELIX
LOCATION DETAILED: INFERIOR THORACIC SPINE
LOCATION DETAILED: RIGHT CENTRAL PARIETAL SCALP
LOCATION DETAILED: LEFT CENTRAL PARIETAL SCALP
LOCATION DETAILED: LEFT LATERAL FOREHEAD
LOCATION DETAILED: RIGHT RADIAL DORSAL HAND
LOCATION DETAILED: LEFT INFERIOR HELIX

## 2018-07-18 ASSESSMENT — LOCATION ZONE DERM
LOCATION ZONE: TRUNK
LOCATION ZONE: EAR
LOCATION ZONE: FACE
LOCATION ZONE: ARM
LOCATION ZONE: HAND
LOCATION ZONE: SCALP

## 2018-07-18 NOTE — PROCEDURE: LIQUID NITROGEN
Detail Level: Detailed
Consent: The patient's consent was obtained including but not limited to risks of crusting, scabbing, blistering, scarring, darker or lighter pigmentary change, recurrence, incomplete removal and infection. RTC in 2 months if lesion(s) persistent.
Number Of Freeze-Thaw Cycles: 2 freeze-thaw cycles
Render Post-Care Instructions In Note?: yes
Duration Of Freeze Thaw-Cycle (Seconds): 10
Post-Care Instructions: I reviewed with the patient in detail post-care instructions. Patient is to wear sunprotection, and avoid picking at any of the treated lesions. Pt may apply Vaseline to crusted or scabbing areas.

## 2018-07-22 ENCOUNTER — OFFICE VISIT (OUTPATIENT)
Dept: URGENT CARE | Facility: PHYSICIAN GROUP | Age: 78
End: 2018-07-22
Payer: MEDICARE

## 2018-07-22 VITALS
HEART RATE: 74 BPM | SYSTOLIC BLOOD PRESSURE: 144 MMHG | WEIGHT: 189 LBS | DIASTOLIC BLOOD PRESSURE: 84 MMHG | OXYGEN SATURATION: 92 % | BODY MASS INDEX: 24.26 KG/M2 | TEMPERATURE: 97.7 F | HEIGHT: 74 IN

## 2018-07-22 DIAGNOSIS — S46.211A RUPTURE OF PROXIMAL BICEPS TENDON, RIGHT, INITIAL ENCOUNTER: ICD-10-CM

## 2018-07-22 PROCEDURE — 99202 OFFICE O/P NEW SF 15 MIN: CPT | Performed by: FAMILY MEDICINE

## 2018-07-29 ASSESSMENT — ENCOUNTER SYMPTOMS
WEIGHT LOSS: 0
FOCAL WEAKNESS: 0
SENSORY CHANGE: 0
ROS SKIN COMMENTS: NO ABRASION OR LACERATION

## 2018-07-29 NOTE — PROGRESS NOTES
"Subjective:      Barry Torres is a 78 y.o. male who presents with Arm Pain (right arm pain and swelling, heard a snapin arm  when disrobing this am)            Onset this morning right anterior shoulder pain with associated change in the contour of his right bicep.  Pain is mild and less he pronates with internal rotation of the shoulder.  No prior injury.  No function loss that he is aware of.  There is mild bruising.  No over-the-counter medications.  No other aggravating or alleviating factors.        Review of Systems   Constitutional: Negative for malaise/fatigue and weight loss.   Skin:        No abrasion or laceration     Neurological: Negative for sensory change and focal weakness.          Objective:     /84   Pulse 74   Temp 36.5 °C (97.7 °F)   Ht 1.88 m (6' 2\")   Wt 85.7 kg (189 lb)   SpO2 92%   BMI 24.27 kg/m²      Physical Exam   Constitutional: He is oriented to person, place, and time. He appears well-developed and well-nourished. No distress.   HENT:   Head: Normocephalic and atraumatic.   Musculoskeletal:   Right upper extremity: Mildly tender anterior shoulder.  Contour of bicep has fallen to elbow with classic \"Gt\" findings of proximal bicep tendon tear.  Mild ecchymosis.  Distal neurovascular is intact.   Neurological: He is alert and oriented to person, place, and time.   Skin: Skin is warm and dry. No rash noted.               Assessment/Plan:     1. Rupture of proximal biceps tendon, right, initial encounter         Differential diagnosis, natural history, supportive care, and indications for immediate follow-up discussed at length.     Discussed with patient that this will unlikely be surgical.  He does have an orthopedic surgeon is established with and will follow up with.  Activity as tolerated.  "

## 2018-08-14 ENCOUNTER — TELEPHONE (OUTPATIENT)
Dept: CARDIOLOGY | Facility: MEDICAL CENTER | Age: 78
End: 2018-08-14

## 2018-08-14 ENCOUNTER — OFFICE VISIT (OUTPATIENT)
Dept: CARDIOLOGY | Facility: MEDICAL CENTER | Age: 78
End: 2018-08-14
Payer: MEDICARE

## 2018-08-14 VITALS
WEIGHT: 190 LBS | HEIGHT: 74 IN | DIASTOLIC BLOOD PRESSURE: 80 MMHG | BODY MASS INDEX: 24.38 KG/M2 | OXYGEN SATURATION: 92 % | SYSTOLIC BLOOD PRESSURE: 124 MMHG | HEART RATE: 87 BPM

## 2018-08-14 DIAGNOSIS — I25.10 CORONARY ARTERY CALCIFICATION SEEN ON CAT SCAN: ICD-10-CM

## 2018-08-14 DIAGNOSIS — E78.5 DYSLIPIDEMIA: ICD-10-CM

## 2018-08-14 DIAGNOSIS — R60.0 LOWER EXTREMITY EDEMA: Chronic | ICD-10-CM

## 2018-08-14 DIAGNOSIS — I10 ESSENTIAL HYPERTENSION, BENIGN: ICD-10-CM

## 2018-08-14 DIAGNOSIS — I49.3 PVC (PREMATURE VENTRICULAR CONTRACTION): Primary | ICD-10-CM

## 2018-08-14 PROCEDURE — 99213 OFFICE O/P EST LOW 20 MIN: CPT | Performed by: INTERNAL MEDICINE

## 2018-08-14 ASSESSMENT — ENCOUNTER SYMPTOMS
BRUISES/BLEEDS EASILY: 1
HEARTBURN: 1
PALPITATIONS: 0
PND: 0
WEAKNESS: 1
CLAUDICATION: 0
ORTHOPNEA: 0

## 2018-08-14 NOTE — PROGRESS NOTES
Subjective:   Barry Torres is a 78 -year-old man with a history of chronic kidney disease stage IIIB (solitary kidney), moderately calcified LAD and circumflex on CT scan from 2015, rheumatoid arthritis, dyslipidemia, essential hypertension, and frequent, asymptomatic PVCs.    He is doing very well today, and has no cardiovascular complaints nor side effects with his medical regimen.  He tells me that he continues exercising 25 minutes a day 3 days per week on his recumbent bicycle.    He brings in a log of his home blood pressures documenting systolic blood pressures ranging between 100s and 130s mmHg on his current medical regimen.    He reviews with me that he previously was on a different statin, and had no side effects with it.  That was changed to Crestor with no follow-up lipid panel is nearly as he can remember and from review of his records.    He has unchanged, chronic lower extremity edema in conjunction with his stage IIIb kidney disease.  He reviewed with me that he had previously been on diuretics, but stopped those in the past in an effort to protect the function again of his solitary kidney.    Finally, he reviews with me that he was placed on nocturnal oxygen.  He believes that that was related to borderline oxygen saturations in the office.    Past Medical History:   Diagnosis Date   • Abnormal thyroid stimulating hormone (TSH) level    • Allergic rhinitis    • Asbestosis (HCC)    • Asthma    • Bronchitis    • Chronic diarrhea     declines eval; takes immodium once a day usually and sometimes less; see previous notes; aware of risk    • Chronic low back pain    • Chronic lung disease     asbestos related   • CKD (chronic kidney disease), stage III     sees neph   • COPD (chronic obstructive pulmonary disease) (HCC)    • Coronary artery calcification seen on CAT scan 8/2/2016    sees cards   • Epididymitis 2009    h/o listed in chart   • GERD (gastroesophageal reflux disease)    • History  of hemorrhoids    • History of skin cancer     non melanoma   • [place] (hard of hearing)     declines hearing aids   • Hypercholesteremia    • Hypertension    • Hypertriglyceridemia    • Indigestion    • Light headedness     2/2 orthostasis? now better off hctz   • Lightheadedness 6/23/2016   • Low back pain    • Nasal drainage    • Olecranon bursitis    • Orthostasis 6/23/2016    better off HCTZ   • Peripheral neuropathy (HCC)    • Pleural plaque 2005     CT [place]   • Post-nasal drip    • Pulmonary emphysema (HCC)    • RA (rheumatoid arthritis) (HCC)     on meds, sees rheum   • Restless leg syndrome    • Rheumatoid arthritis (HCC)    • Sleep apnea     obstructive and central - not adherent to autopap   • Solitary kidney     history of kidney cyst 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-functioning kidney s/p nephrectomy      Past Surgical History:   Procedure Laterality Date   • NASAL POLYPECTOMY Bilateral 10/6/2015    Procedure: NASAL POLYPECTOMY WITH SCOTT ENDOSCOPIC NASAL DEBRIDEMENT;  Surgeon: Param Maurer M.D.;  Location: SURGERY SAME DAY [facility];  Service:    • CYSTOSCOPY  2/1/2010    Performed by ANGIE VERDUZCO at SURGERY [facility] ORS   • RETROGRADES  2/1/2010    Performed by ANGIE VERDUZCO at SURGERY [facility] ORS   • PYELOGRAM  2/1/2010    Performed by ANGIE VERDUZCO at SURGERY [facility] ORS   • URETEROSCOPY  2/1/2010    Performed by ANGIE VERDUZCO at SURGERY [facility]   • NEPHRECTOMY LAPAROSCOPIC  2009    left kidney   • TESTICLE EXPLORATION  2004   • PB REMV 2ND CATARACT,CORN-SCLER SECTN     • TONSILLECTOMY       Family History   Problem Relation Age of Onset   • Genetic Father         ALS   • No Known Problems Sister    • No Known Problems Son    • No Known Problems Daughter    • Heart Attack Neg Hx    • Heart Disease Neg Hx    • Heart Failure Neg Hx      History   Smoking Status   • Former Smoker   • Packs/day: 1.00   • Years: 40.00   • Types: Cigarettes   • Quit date: 1/1/1980    Smokeless Tobacco   • Never Used     Comment: 1 pk a day for 40 yrs     Allergies   Allergen Reactions   • Cipro Xr      Muscle aches.   • Levaquin      Muscle aches   • Pcn [Penicillins] Hives     Rash and asthma attack when a child - wife states he has had Keflex in the past and tolerated     Outpatient Encounter Prescriptions as of 8/14/2018   Medication Sig Dispense Refill   • rosuvastatin (CRESTOR) 20 MG Tab TAKE 1 TABLET BY MOUTH EVERY NIGHT AT BEDTIME 90 Tab 0   • gabapentin (NEURONTIN) 300 MG Cap TAKE 1 CAPSULE BY MOUTH THREE TIMES A  Cap 1   • SPIRIVA HANDIHALER 18 MCG Cap INHALE THE CONTENTS OF 1 CAPSULE VIA HANDIHALER BY MOUTH DAILY 90 Cap 0   • fluorouracil (EFUDEX) 5 % cream Apply  to affected area(s) 2 times a day. For 28 days. One week on and one week off     • SYMBICORT 80-4.5 MCG/ACT Aerosol INHALE 2 PUFFS BY MOUTH TWO TIMES A DAY 3 Inhaler 1   • Probiotic Product (PROBIOTIC PO) Take  by mouth.     • certolizumab pegol (CIMZIA PREFILLED) 2 X 200 MG/ML Kit Inject 200 mg as instructed every 14 days.     • Magnesium 250 MG Tab Take 1 Tab by mouth every day.     • albuterol 108 (90 BASE) MCG/ACT Aero Soln inhalation aerosol Inhale 2 Puffs by mouth every 6 hours as needed for Shortness of Breath. 8.5 g 3   • fluticasone (FLONASE) 50 MCG/ACT nasal spray Spray 1 Spray in nose every day.     • loperamide (IMODIUM A-D) 2 MG tablet Take 2 mg by mouth every day.     • leflunomide (ARAVA) 20 MG Tab Take 20 mg by mouth every day.     • Multiple Vitamin (MULTIVITAMIN PO) Take  by mouth.     • fexofenadine (ALLEGRA) 180 MG tablet Take 180 mg by mouth every day.     • [DISCONTINUED] benzonatate (TESSALON) 100 MG Cap Take 1 Cap by mouth 3 times a day as needed for Cough. 60 Cap 0   • [DISCONTINUED] Cholecalciferol (VITAMIN D3) 2000 UNIT Cap Take 1 Cap by mouth every day.       No facility-administered encounter medications on file as of 8/14/2018.      Review of Systems   HENT: Positive for hearing loss and  "tinnitus.    Cardiovascular: Positive for leg swelling (Unchanged for 20 years). Negative for chest pain, palpitations, orthopnea, claudication and PND.   Gastrointestinal: Positive for heartburn.   Musculoskeletal: Positive for joint pain.   Skin: Positive for rash.   Neurological: Positive for weakness.   Endo/Heme/Allergies: Bruises/bleeds easily.   All other systems reviewed and are negative.       Objective:   /80   Pulse 87   Ht 1.88 m (6' 2\")   Wt 86.2 kg (190 lb)   SpO2 92%   BMI 24.39 kg/m²     Physical Exam   Constitutional: He is oriented to person, place, and time. He appears well-developed and well-nourished. No distress.   Very pleasant, elderly man accompanied by his wife in no distress.  Physical examination is unchanged except as noted.   HENT:   Head: Normocephalic and atraumatic.   Eyes: Pupils are equal, round, and reactive to light. Conjunctivae and EOM are normal. No scleral icterus.   Neck: Neck supple. No JVD present. No tracheal deviation present.   Cardiovascular: Normal rate, regular rhythm, normal heart sounds and intact distal pulses.  Exam reveals no gallop and no friction rub.    No murmur heard.  Pulses:       Dorsalis pedis pulses are 2+ on the right side, and 2+ on the left side.   No carotid bruits   Pulmonary/Chest: Effort normal and breath sounds normal. No stridor. No respiratory distress. He has no wheezes. He has no rales.   Abdominal: Soft. Bowel sounds are normal. He exhibits no distension.   Musculoskeletal: He exhibits edema (2+ bilateral lower extremity edema, unchanged from previous).   Neurological: He is alert and oriented to person, place, and time.   Skin: Skin is warm and dry. No rash noted. He is not diaphoretic. No erythema. No pallor.   Psychiatric: He has a normal mood and affect. Judgment and thought content normal.   Vitals reviewed.    Lab Results   Component Value Date/Time    WBC 6.2 04/18/2018 09:11 AM    RBC 3.84 (L) 04/18/2018 09:11 AM    " "HEMOGLOBIN 12.2 (L) 04/18/2018 09:11 AM    HEMATOCRIT 39.0 (L) 04/18/2018 09:11 AM    .6 (H) 04/18/2018 09:11 AM    MCH 31.8 04/18/2018 09:11 AM    MCHC 31.3 (L) 04/18/2018 09:11 AM    MPV 9.5 04/18/2018 09:11 AM        Lab Results   Component Value Date/Time    SODIUM 139 03/02/2018 10:41 AM    POTASSIUM 4.3 03/02/2018 10:41 AM    CHLORIDE 104 03/02/2018 10:41 AM    CO2 24 03/02/2018 10:41 AM    GLUCOSE 116 (H) 03/02/2018 10:41 AM    BUN 22 03/02/2018 10:41 AM    CREATININE 1.55 (H) 04/18/2018 09:11 AM    CREATININE 1.5 (H) 04/11/2005 10:13 AM        Lab Results   Component Value Date/Time    ASTSGOT 22 04/18/2018 09:11 AM    ALTSGPT 17 04/18/2018 09:11 AM        Lab Results   Component Value Date/Time    CHOLSTRLTOT 106 07/25/2017 06:50 AM    LDL 41 07/25/2017 06:50 AM    HDL 39 (A) 07/25/2017 06:50 AM    TRIGLYCERIDE 131 07/25/2017 06:50 AM       Holter monitor, 6/14/2016: Showed 3000 PVCs in 24 hours, and no significant arrhythmia    Echocardiogram, 7/1/2016:  \"CONCLUSIONS  Normal left ventricular chamber size and systolic function. Left   ventricular ejection fraction is 60%.   Mild left ventricular hypertrophy.  Mild mitral regurgitation.    Mild tricuspid regurgitation.   Mild to moderate pulmonic insufficiency.  Right ventricular systolic pressure is mildly elevated \"    CT chest, 11/12/2015, images and report reviewed: Demonstrates moderate calcification of his left main, LAD and circumflex coronary arteries    Assessment:     1. PVC (premature ventricular contraction)     2. Dyslipidemia  LIPID PANEL   3. Calcified LAD and LCx on CT chest 2015     4. Essential hypertension, benign     5. Chronic lower extremity edema         Medical Decision Making:  Today's Assessment / Status / Plan:     He continues to do very well from a cardiovascular perspective with no complaints, and excellent control of his blood pressure and cholesterol.  I reviewed with him the images of his CT scan showing calcification " in the LAD and circumflex distributions from 2015.  He has no new symptoms, and I do not think that stress testing is necessary or appropriate at this time.  I have made no changes to his medical regimen.    From the standpoint of his upcoming orthopedic surgeries, he is low to intermediate risk, and I recommend no testing prior to surgery.    Maxwell Krishnamurthy MD  Cardiologist, [facility] [place]     Return in about 1 year (around 8/14/2019).      Physical Exam   Constitutional: He is oriented to person, place, and time. He appears well-developed and well-nourished. No distress.   Very pleasant, elderly man accompanied by his wife in no distress.  Physical examination is unchanged except as noted.   HENT:   Head: Normocephalic and atraumatic.   Eyes: Pupils are equal, round, and reactive to light. Conjunctivae and EOM are normal. No scleral icterus.   Neck: Neck supple. No JVD present. No tracheal deviation present.   Cardiovascular: Normal rate, regular rhythm, normal heart sounds and intact distal pulses.  Exam reveals no gallop and no friction rub.    No murmur heard.  Pulses:       Dorsalis pedis pulses are 2+ on the right side, and 2+ on the left side.   No carotid bruits   Pulmonary/Chest: Effort normal and breath sounds normal. No stridor. No respiratory distress. He has no wheezes. He has no rales.   Abdominal: Soft. Bowel sounds are normal. He exhibits no distension.   Musculoskeletal: He exhibits edema (2+ bilateral lower extremity edema, unchanged from previous).   Neurological: He is alert and oriented to person, place, and time.   Skin: Skin is warm and dry. No rash noted. He is not diaphoretic. No erythema. No pallor.   Psychiatric: He has a normal mood and affect. Judgment and thought content normal.   Vitals reviewed.

## 2018-08-14 NOTE — LETTER
Name:          Barry Torres   YOB: 1940  Date:     08/14/2018      Lizzie Soto M.D.  [address]  [place] [address]     Maxwell Krishnamurthy MD  [address], [address]  [place] [address]  Phone: [phone]  Back Line: [phone]  Fax: [phone]  E-mail: [email or web address]   Dear Dr. Soto,    We had the pleasure of seeing your patient, Barry Torres, in Cardiology Clinic at [facility] and Vascular today.    As you know, he is a 78-year-old man with a history of chronic kidney disease stage IIIB (solitary kidney), moderately calcified LAD and circumflex on CT scan from 2015, rheumatoid arthritis, dyslipidemia, essential hypertension, and frequent, asymptomatic PVCs.    He continues to do very well from a cardiovascular perspective with no complaints, and excellent control of his blood pressure and cholesterol.  I reviewed with him the images of his CT scan showing calcification in the LAD and circumflex distributions from 2015.  He has no new symptoms, and I do not think that stress testing is necessary or appropriate at this time.  I have made no changes to his medical regimen.    From the standpoint of his upcoming orthopedic surgeries, he is low to intermediate risk, and I recommend no testing prior to surgery.    Return in about 1 year (around 8/14/2019).    Thank you for the referral and please do not hesitate to contact me at any time. My contact information is listed above.    This note was dictated using Dragon speech recognition software.     A full note including my physical examination and a full list of rectified medications is available in our medical record, and can be faxed as well.    Maxwell Krishnamurthy MD  Cardiologist  [facility]    cc: Marc Chowdhury MD

## 2018-08-15 ENCOUNTER — HOSPITAL ENCOUNTER (OUTPATIENT)
Dept: LAB | Facility: MEDICAL CENTER | Age: 78
End: 2018-08-15
Attending: INTERNAL MEDICINE
Payer: MEDICARE

## 2018-08-15 LAB
ALBUMIN SERPL BCP-MCNC: 3.9 G/DL (ref 3.2–4.9)
ALP SERPL-CCNC: 54 U/L (ref 30–99)
ALT SERPL-CCNC: 23 U/L (ref 2–50)
AST SERPL-CCNC: 28 U/L (ref 12–45)
BASOPHILS # BLD AUTO: 2 % (ref 0–1.8)
BASOPHILS # BLD: 0.09 K/UL (ref 0–0.12)
BILIRUB CONJ SERPL-MCNC: 0.2 MG/DL (ref 0.1–0.5)
BILIRUB INDIRECT SERPL-MCNC: 0.5 MG/DL (ref 0–1)
BILIRUB SERPL-MCNC: 0.7 MG/DL (ref 0.1–1.5)
CHOLEST SERPL-MCNC: 117 MG/DL (ref 100–199)
CREAT SERPL-MCNC: 1.52 MG/DL (ref 0.5–1.4)
CRP SERPL HS-MCNC: 0.07 MG/DL (ref 0–0.75)
EOSINOPHIL # BLD AUTO: 0.35 K/UL (ref 0–0.51)
EOSINOPHIL NFR BLD: 7.8 % (ref 0–6.9)
ERYTHROCYTE [DISTWIDTH] IN BLOOD BY AUTOMATED COUNT: 59.4 FL (ref 35.9–50)
ERYTHROCYTE [SEDIMENTATION RATE] IN BLOOD BY WESTERGREN METHOD: 7 MM/HOUR (ref 0–20)
HCT VFR BLD AUTO: 41.9 % (ref 42–52)
HDLC SERPL-MCNC: 42 MG/DL
HGB BLD-MCNC: 13.1 G/DL (ref 14–18)
IMM GRANULOCYTES # BLD AUTO: 0 K/UL (ref 0–0.11)
IMM GRANULOCYTES NFR BLD AUTO: 0 % (ref 0–0.9)
LDLC SERPL CALC-MCNC: 42 MG/DL
LYMPHOCYTES # BLD AUTO: 1.71 K/UL (ref 1–4.8)
LYMPHOCYTES NFR BLD: 38 % (ref 22–41)
MCH RBC QN AUTO: 31.8 PG (ref 27–33)
MCHC RBC AUTO-ENTMCNC: 31.3 G/DL (ref 33.7–35.3)
MCV RBC AUTO: 101.7 FL (ref 81.4–97.8)
MONOCYTES # BLD AUTO: 0.8 K/UL (ref 0–0.85)
MONOCYTES NFR BLD AUTO: 17.8 % (ref 0–13.4)
NEUTROPHILS # BLD AUTO: 1.55 K/UL (ref 1.82–7.42)
NEUTROPHILS NFR BLD: 34.4 % (ref 44–72)
NRBC # BLD AUTO: 0 K/UL
NRBC BLD-RTO: 0 /100 WBC
PLATELET # BLD AUTO: 180 K/UL (ref 164–446)
PMV BLD AUTO: 9.9 FL (ref 9–12.9)
PROT SERPL-MCNC: 6.6 G/DL (ref 6–8.2)
RBC # BLD AUTO: 4.12 M/UL (ref 4.7–6.1)
TRIGL SERPL-MCNC: 164 MG/DL (ref 0–149)
WBC # BLD AUTO: 4.5 K/UL (ref 4.8–10.8)

## 2018-08-15 PROCEDURE — 86140 C-REACTIVE PROTEIN: CPT

## 2018-08-15 PROCEDURE — 80061 LIPID PANEL: CPT

## 2018-08-15 PROCEDURE — 85025 COMPLETE CBC W/AUTO DIFF WBC: CPT

## 2018-08-15 PROCEDURE — 82565 ASSAY OF CREATININE: CPT

## 2018-08-15 PROCEDURE — 80076 HEPATIC FUNCTION PANEL: CPT

## 2018-08-15 PROCEDURE — 85652 RBC SED RATE AUTOMATED: CPT

## 2018-08-15 PROCEDURE — 36415 COLL VENOUS BLD VENIPUNCTURE: CPT

## 2018-08-22 ENCOUNTER — OFFICE VISIT (OUTPATIENT)
Dept: INTERNAL MEDICINE | Facility: MEDICAL CENTER | Age: 78
End: 2018-08-22
Payer: MEDICARE

## 2018-08-22 VITALS
OXYGEN SATURATION: 93 % | HEART RATE: 71 BPM | WEIGHT: 190 LBS | HEIGHT: 74 IN | TEMPERATURE: 96.7 F | SYSTOLIC BLOOD PRESSURE: 146 MMHG | BODY MASS INDEX: 24.38 KG/M2 | DIASTOLIC BLOOD PRESSURE: 70 MMHG

## 2018-08-22 DIAGNOSIS — N18.30 CKD (CHRONIC KIDNEY DISEASE) STAGE 3, GFR 30-59 ML/MIN (HCC): ICD-10-CM

## 2018-08-22 DIAGNOSIS — J98.4 CHRONIC LUNG DISEASE: ICD-10-CM

## 2018-08-22 DIAGNOSIS — E78.5 DYSLIPIDEMIA: ICD-10-CM

## 2018-08-22 DIAGNOSIS — R79.89 ELEVATED TSH: ICD-10-CM

## 2018-08-22 PROCEDURE — 99214 OFFICE O/P EST MOD 30 MIN: CPT | Performed by: INTERNAL MEDICINE

## 2018-08-22 RX ORDER — ALBUTEROL SULFATE 90 UG/1
2 AEROSOL, METERED RESPIRATORY (INHALATION) EVERY 6 HOURS PRN
Qty: 8.5 G | Refills: 3 | Status: ON HOLD | OUTPATIENT
Start: 2018-08-22 | End: 2018-09-29

## 2018-08-22 RX ORDER — ALBUTEROL SULFATE 90 UG/1
2 AEROSOL, METERED RESPIRATORY (INHALATION) EVERY 6 HOURS PRN
Qty: 8.5 G | Refills: 3 | Status: SHIPPED | OUTPATIENT
Start: 2018-08-22 | End: 2018-08-22 | Stop reason: SDUPTHER

## 2018-08-22 NOTE — PROGRESS NOTES
Follow-up    Chief Complaint   Patient presents with   • Medication Refill     Ventolin inhaler   • Hyperlipidemia       HPI   History was obtained from the patient, family (wife), and a medical record review.   Doing ok.   Breathing ok.   Wants rx for inh.   Saw Raghu.   To get CTS surgery/repair.     THREE CHRONIC CONDITIONS  HTN, stable  Lung disease, stable  DL stable    Past Medical History:   Diagnosis Date   • Abnormal thyroid stimulating hormone (TSH) level    • Allergic rhinitis    • Asbestosis (HCC)    • Asthma    • Bronchitis    • Chronic diarrhea     declines eval; takes immodium once a day usually and sometimes less; see previous notes; aware of risk    • Chronic low back pain    • Chronic lung disease     asbestos related   • CKD (chronic kidney disease), stage III     sees neph   • COPD (chronic obstructive pulmonary disease) (HCC)    • Coronary artery calcification seen on CAT scan 8/2/2016    sees cards   • Epididymitis 2009    h/o listed in chart   • GERD (gastroesophageal reflux disease)    • History of hemorrhoids    • History of skin cancer     non melanoma   • [place] (hard of hearing)     declines hearing aids   • Hypercholesteremia    • Hypertension    • Hypertriglyceridemia    • Indigestion    • Light headedness     2/2 orthostasis? now better off hctz   • Lightheadedness 6/23/2016   • Low back pain    • Nasal drainage    • Olecranon bursitis    • Orthostasis 6/23/2016    better off HCTZ   • Peripheral neuropathy (HCC)    • Pleural plaque 2005     CT [place]   • Post-nasal drip    • Pulmonary emphysema (HCC)    • RA (rheumatoid arthritis) (HCC)     on meds, sees rheum   • Restless leg syndrome    • Rheumatoid arthritis (HCC)    • Sleep apnea     obstructive and central - not adherent to autopap   • Solitary kidney     history of kidney cyst 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-functioning kidney s/p nephrectomy        Past Surgical History:   Procedure Laterality Date   • NASAL POLYPECTOMY Bilateral 10/6/2015     Procedure: NASAL POLYPECTOMY WITH SCOTT ENDOSCOPIC NASAL DEBRIDEMENT;  Surgeon: Param Maurer M.D.;  Location: SURGERY SAME DAY [facility];  Service:    • CYSTOSCOPY  2/1/2010    Performed by ANGIE VERDUZCO at SURGERY [facility] ORS   • RETROGRADES  2/1/2010    Performed by ANGIE VERDUZCO at SURGERY [facility] ORS   • PYELOGRAM  2/1/2010    Performed by ANGIE VERDUZCO at SURGERY [facility] ORS   • URETEROSCOPY  2/1/2010    Performed by ANGIE VERDUZCO at SURGERY [facility] ORS   • NEPHRECTOMY LAPAROSCOPIC  2009    left kidney   • TESTICLE EXPLORATION  2004   • PB REMV 2ND CATARACT,CORN-SCLER SECTN     • TONSILLECTOMY         Current Outpatient Prescriptions   Medication Sig Dispense Refill   • albuterol 108 (90 Base) MCG/ACT Aero Soln inhalation aerosol Inhale 2 Puffs by mouth every 6 hours as needed for Shortness of Breath. 8.5 g 3   • rosuvastatin (CRESTOR) 20 MG Tab TAKE 1 TABLET BY MOUTH EVERY NIGHT AT BEDTIME 90 Tab 0   • gabapentin (NEURONTIN) 300 MG Cap TAKE 1 CAPSULE BY MOUTH THREE TIMES A  Cap 1   • SPIRIVA HANDIHALER 18 MCG Cap INHALE THE CONTENTS OF 1 CAPSULE VIA HANDIHALER BY MOUTH DAILY 90 Cap 0   • fluorouracil (EFUDEX) 5 % cream Apply  to affected area(s) 2 times a day. For 28 days. One week on and one week off     • SYMBICORT 80-4.5 MCG/ACT Aerosol INHALE 2 PUFFS BY MOUTH TWO TIMES A DAY 3 Inhaler 1   • Probiotic Product (PROBIOTIC PO) Take  by mouth.     • certolizumab pegol (CIMZIA PREFILLED) 2 X 200 MG/ML Kit Inject 200 mg as instructed every 14 days.     • Magnesium 250 MG Tab Take 1 Tab by mouth every day.     • fluticasone (FLONASE) 50 MCG/ACT nasal spray Spray 1 Spray in nose every day.     • loperamide (IMODIUM A-D) 2 MG tablet Take 2 mg by mouth every day.     • leflunomide (ARAVA) 20 MG Tab Take 20 mg by mouth every day.     • Multiple Vitamin (MULTIVITAMIN PO) Take  by mouth.     • fexofenadine (ALLEGRA) 180 MG tablet Take 180 mg by mouth every  "day.       No current facility-administered medications for this visit.        Allergies as of 08/22/2018 - Reviewed 08/22/2018   Allergen Reaction Noted   • Cipro xr  01/25/2010   • Levaquin  01/25/2010   • Pcn [penicillins] Hives 09/15/2009        Social History     Social History   • Marital status:      Spouse name: N/A   • Number of children: N/A   • Years of education: N/A     Occupational History   • Not on file.     Social History Main Topics   • Smoking status: Former Smoker     Packs/day: 1.00     Years: 40.00     Types: Cigarettes     Quit date: 1/1/1980   • Smokeless tobacco: Never Used      Comment: 1 pk a day for 40 yrs   • Alcohol use No      Comment: 2 a week   • Drug use: No   • Sexual activity: No      Comment: retired      Other Topics Concern   • Not on file     Social History Narrative    See H&P    Lives with wife       Family History   Problem Relation Age of Onset   • Genetic Father         ALS   • No Known Problems Sister    • No Known Problems Son    • No Known Problems Daughter    • Heart Attack Neg Hx    • Heart Disease Neg Hx    • Heart Failure Neg Hx        ROS:   No cough or SOB  No CP or heart palp   No dizziness or LH     /70   Pulse 71   Temp 35.9 °C (96.7 °F)   Ht 1.88 m (6' 2\")   Wt 86.2 kg (190 lb)   SpO2 93%   BMI 24.39 kg/m²     Physical Exam  General:  Alert and oriented.  No apparent distress.    Eyes: No scleral icterus. No conjunctival injection.      ENMT:  MMM OP clear    Neck: Neck supple.  Trachea midline.      Resp: Clear to auscultation bilaterally. No rales, rhonchi, or wheezes.    Cardiovascular: Regular rate and normal rhythm. No murmurs, rubs or gallops. 2+ radial pulses.     Abdomen:     Musculoskeletal: No clubbing, cyanosis. Trace edema present  R biceps protuberant distally  Diminished arm elevation strength    Skin: Sun exposure changes on head    Lymph:     Neuro:    Psych: Mood euthymic. Affect congruent.    Skin:     Other: "     Labs/Studies  Hospital Outpatient Visit on 08/15/2018   Component Date Value Ref Range Status   • WBC 08/15/2018 4.5* 4.8 - 10.8 K/uL Final   • RBC 08/15/2018 4.12* 4.70 - 6.10 M/uL Final   • Hemoglobin 08/15/2018 13.1* 14.0 - 18.0 g/dL Final   • Hematocrit 08/15/2018 41.9* 42.0 - 52.0 % Final   • MCV 08/15/2018 101.7* 81.4 - 97.8 fL Final   • MCH 08/15/2018 31.8  27.0 - 33.0 pg Final   • MCHC 08/15/2018 31.3* 33.7 - 35.3 g/dL Final   • RDW 08/15/2018 59.4* 35.9 - 50.0 fL Final   • Platelet Count 08/15/2018 180  164 - 446 K/uL Final   • MPV 08/15/2018 9.9  9.0 - 12.9 fL Final   • Neutrophils-Polys 08/15/2018 34.40* 44.00 - 72.00 % Final   • Lymphocytes 08/15/2018 38.00  22.00 - 41.00 % Final   • Monocytes 08/15/2018 17.80* 0.00 - 13.40 % Final   • Eosinophils 08/15/2018 7.80* 0.00 - 6.90 % Final   • Basophils 08/15/2018 2.00* 0.00 - 1.80 % Final   • Immature Granulocytes 08/15/2018 0.00  0.00 - 0.90 % Final   • Nucleated RBC 08/15/2018 0.00  /100 WBC Final   • Neutrophils (Absolute) 08/15/2018 1.55* 1.82 - 7.42 K/uL Final    Includes immature neutrophils, if present.   • Lymphs (Absolute) 08/15/2018 1.71  1.00 - 4.80 K/uL Final   • Monos (Absolute) 08/15/2018 0.80  0.00 - 0.85 K/uL Final   • Eos (Absolute) 08/15/2018 0.35  0.00 - 0.51 K/uL Final   • Baso (Absolute) 08/15/2018 0.09  0.00 - 0.12 K/uL Final   • Immature Granulocytes (abs) 08/15/2018 0.00  0.00 - 0.11 K/uL Final   • NRBC (Absolute) 08/15/2018 0.00  K/uL Final   • GFR If  08/15/2018 54* >60 mL/min/1.73 m 2 Final   • GFR If Non  08/15/2018 44* >60 mL/min/1.73 m 2 Final   • Stat C-Reactive Protein 08/15/2018 0.07  0.00 - 0.75 mg/dL Final   • Alkaline Phosphatase 08/15/2018 54  30 - 99 U/L Final   • AST(SGOT) 08/15/2018 28  12 - 45 U/L Final   • ALT(SGPT) 08/15/2018 23  2 - 50 U/L Final   • Total Bilirubin 08/15/2018 0.7  0.1 - 1.5 mg/dL Final   • Direct Bilirubin 08/15/2018 0.2  0.1 - 0.5 mg/dL Final   • Indirect  Bilirubin 08/15/2018 0.5  0.0 - 1.0 mg/dL Final   • Albumin 08/15/2018 3.9  3.2 - 4.9 g/dL Final   • Total Protein 08/15/2018 6.6  6.0 - 8.2 g/dL Final   • Creatinine 08/15/2018 1.52* 0.50 - 1.40 mg/dL Final   • Sed Rate Kierraren 08/15/2018 7  0 - 20 mm/hour Final   Hospital Outpatient Visit on 08/15/2018   Component Date Value Ref Range Status   • Cholesterol,Tot 08/15/2018 117  100 - 199 mg/dL Final   • Triglycerides 08/15/2018 164* 0 - 149 mg/dL Final   • HDL 08/15/2018 42  >=40 mg/dL Final   • LDL 08/15/2018 42  <100 mg/dL Final         Hospital Outpatient Visit on 03/02/2018   Component Date Value Ref Range Status   • WBC 03/02/2018 9.2  4.8 - 10.8 K/uL Final   • RBC 03/02/2018 4.14* 4.70 - 6.10 M/uL Final   • Hemoglobin 03/02/2018 13.3* 14.0 - 18.0 g/dL Final   • Hematocrit 03/02/2018 40.6* 42.0 - 52.0 % Final   • MCV 03/02/2018 98.1* 81.4 - 97.8 fL Final   • MCH 03/02/2018 32.1  27.0 - 33.0 pg Final   • MCHC 03/02/2018 32.8* 33.7 - 35.3 g/dL Final   • RDW 03/02/2018 53.5* 35.9 - 50.0 fL Final   • Platelet Count 03/02/2018 156* 164 - 446 K/uL Final   • MPV 03/02/2018 9.4  9.0 - 12.9 fL Final   • Neutrophils-Polys 03/02/2018 77.40* 44.00 - 72.00 % Final   • Lymphocytes 03/02/2018 12.10* 22.00 - 41.00 % Final   • Monocytes 03/02/2018 10.00  0.00 - 13.40 % Final   • Eosinophils 03/02/2018 0.10  0.00 - 6.90 % Final   • Basophils 03/02/2018 0.30  0.00 - 1.80 % Final   • Immature Granulocytes 03/02/2018 0.10  0.00 - 0.90 % Final   • Nucleated RBC 03/02/2018 0.00  /100 WBC Final   • Neutrophils (Absolute) 03/02/2018 7.14  1.82 - 7.42 K/uL Final   • Lymphs (Absolute) 03/02/2018 1.12  1.00 - 4.80 K/uL Final   • Monos (Absolute) 03/02/2018 0.92* 0.00 - 0.85 K/uL Final   • Eos (Absolute) 03/02/2018 0.01  0.00 - 0.51 K/uL Final   • Baso (Absolute) 03/02/2018 0.03  0.00 - 0.12 K/uL Final   • Immature Granulocytes (abs) 03/02/2018 0.01  0.00 - 0.11 K/uL Final   • NRBC (Absolute) 03/02/2018 0.00  K/uL Final   • Sodium  03/02/2018 139  135 - 145 mmol/L Final   • Potassium 03/02/2018 4.3  3.6 - 5.5 mmol/L Final   • Chloride 03/02/2018 104  96 - 112 mmol/L Final   • Co2 03/02/2018 24  20 - 33 mmol/L Final   • Anion Gap 03/02/2018 11.0  0.0 - 11.9 Final   • Glucose 03/02/2018 116* 65 - 99 mg/dL Final   • Bun 03/02/2018 22  8 - 22 mg/dL Final   • Creatinine 03/02/2018 1.56* 0.50 - 1.40 mg/dL Final   • Calcium 03/02/2018 9.3  8.5 - 10.5 mg/dL Final   • AST(SGOT) 03/02/2018 35  12 - 45 U/L Final   • ALT(SGPT) 03/02/2018 28  2 - 50 U/L Final   • Alkaline Phosphatase 03/02/2018 73  30 - 99 U/L Final   • Total Bilirubin 03/02/2018 1.1  0.1 - 1.5 mg/dL Final   • Albumin 03/02/2018 4.2  3.2 - 4.9 g/dL Final   • Total Protein 03/02/2018 7.1  6.0 - 8.2 g/dL Final   • Globulin 03/02/2018 2.9  1.9 - 3.5 g/dL Final   • A-G Ratio 03/02/2018 1.4  g/dL Final   • GFR If  03/02/2018 52* >60 mL/min/1.73 m 2 Final   • GFR If Non  03/02/2018 43* >60 mL/min/1.73 m 2 Final   Hospital Outpatient Visit on 01/23/2018   Component Date Value Ref Range Status   • WBC 01/23/2018 0-2* /hpf Final    Comment: Female  <12 Yr 0-2  >12 Yr 0-5  Male   None     • RBC 01/23/2018 0-2* /hpf Final    Comment: Female  >12 Yr 0-2  Male   None     • Bacteria 01/23/2018 Negative  None /hpf Final   • Epithelial Cells 01/23/2018 Negative  /hpf Final   • Hyaline Cast 01/23/2018 0-2  /lpf Final   • Color 01/23/2018 Yellow   Final   • Character 01/23/2018 Cloudy*  Final   • Specific Gravity 01/23/2018 1.027  <1.035 Final   • Ph 01/23/2018 5.0  5.0 - 8.0 Final   • Glucose 01/23/2018 Negative  Negative mg/dL Final   • Ketones 01/23/2018 Negative  Negative mg/dL Final   • Protein 01/23/2018 30* Negative mg/dL Final   • Bilirubin 01/23/2018 Negative  Negative Final   • Urobilinogen, Urine 01/23/2018 0.2  Negative Final   • Nitrite 01/23/2018 Negative  Negative Final   • Leukocyte Esterase 01/23/2018 Negative  Negative Final   • Occult Blood 01/23/2018  Negative  Negative Final   • Micro Urine Req 01/23/2018 Microscopic   Final   • WBC 01/23/2018 4.8  4.8 - 10.8 K/uL Final   • RBC 01/23/2018 4.14* 4.70 - 6.10 M/uL Final   • Hemoglobin 01/23/2018 13.2* 14.0 - 18.0 g/dL Final   • Hematocrit 01/23/2018 41.3* 42.0 - 52.0 % Final   • MCV 01/23/2018 99.8* 81.4 - 97.8 fL Final   • MCH 01/23/2018 31.9  27.0 - 33.0 pg Final   • MCHC 01/23/2018 32.0* 33.7 - 35.3 g/dL Final   • RDW 01/23/2018 52.9* 35.9 - 50.0 fL Final   • Platelet Count 01/23/2018 206  164 - 446 K/uL Final   • MPV 01/23/2018 9.6  9.0 - 12.9 fL Final   • Neutrophils-Polys 01/23/2018 51.10  44.00 - 72.00 % Final   • Lymphocytes 01/23/2018 25.50  22.00 - 41.00 % Final   • Monocytes 01/23/2018 15.40* 0.00 - 13.40 % Final   • Eosinophils 01/23/2018 6.50  0.00 - 6.90 % Final   • Basophils 01/23/2018 1.30  0.00 - 1.80 % Final   • Immature Granulocytes 01/23/2018 0.20  0.00 - 0.90 % Final   • Nucleated RBC 01/23/2018 0.00  /100 WBC Final   • Neutrophils (Absolute) 01/23/2018 2.43  1.82 - 7.42 K/uL Final   • Lymphs (Absolute) 01/23/2018 1.21  1.00 - 4.80 K/uL Final   • Monos (Absolute) 01/23/2018 0.73  0.00 - 0.85 K/uL Final   • Eos (Absolute) 01/23/2018 0.31  0.00 - 0.51 K/uL Final   • Baso (Absolute) 01/23/2018 0.06  0.00 - 0.12 K/uL Final   • Immature Granulocytes (abs) 01/23/2018 0.01  0.00 - 0.11 K/uL Final   • NRBC (Absolute) 01/23/2018 0.00  K/uL Final   • 25-Hydroxy   Vitamin D 25 01/23/2018 82  30 - 100 ng/mL Final    Comment: Adult Ranges:   <20 ng/mL - Deficiency  20-29 ng/mL - Insufficiency   ng/mL - Sufficiency  The [employer] Vitamin D Assay is standardized to the  [facility] reference measurement procedures, a  reference method for the Vitamin D Standardization Program  (VDSP).  The VDSP aligns patient results among 25 (OH)  Vitamin D methods.     • Ferritin 01/23/2018 113.4  22.0 - 322.0 ng/mL Final   • Pth, Intact 01/23/2018 78.0* 14.0 - 72.0 pg/mL Final   • Calcium 01/23/2018 9.0  8.5  - 10.5 mg/dL Final   • Total Protein, Urine 01/23/2018 42.8* 0.0 - 15.0 mg/dL Final   • Creatinine, Random Urine 01/23/2018 173.80  mg/dL Final    Comment: Reference ranges have not been established for this specimen type.  Result interpretation should include consideration of patient's  medical condition and clinical presentation.     • Protein Creatinine Ratio 01/23/2018 246* 15 - 68 mg/g Final   • GFR If  01/23/2018 50* >60 mL/min/1.73 m 2 Final   • GFR If Non  01/23/2018 41* >60 mL/min/1.73 m 2 Final   • Uric Acid 01/23/2018 5.0  2.5 - 8.3 mg/dL Final   • Sodium 01/23/2018 139  135 - 145 mmol/L Final   • Potassium 01/23/2018 4.8  3.6 - 5.5 mmol/L Final   • Chloride 01/23/2018 106  96 - 112 mmol/L Final   • Co2 01/23/2018 28  20 - 33 mmol/L Final   • Glucose 01/23/2018 99  65 - 99 mg/dL Final   • Creatinine 01/23/2018 1.62* 0.50 - 1.40 mg/dL Final   • Bun 01/23/2018 24* 8 - 22 mg/dL Final   • Phosphorus 01/23/2018 2.8  2.5 - 4.5 mg/dL Final   • Albumin 01/23/2018 4.0  3.2 - 4.9 g/dL Final   • Iron 01/23/2018 74  50 - 180 ug/dL Final   • Total Iron Binding 01/23/2018 340  250 - 450 ug/dL Final   • % Saturation 01/23/2018 22  15 - 55 % Final   • Magnesium 01/23/2018 2.1  1.5 - 2.5 mg/dL Final   Hospital Outpatient Visit on 01/16/2018   Component Date Value Ref Range Status   • WBC 01/16/2018 4.6* 4.8 - 10.8 K/uL Final   • RBC 01/16/2018 4.12* 4.70 - 6.10 M/uL Final   • Hemoglobin 01/16/2018 13.0* 14.0 - 18.0 g/dL Final   • Hematocrit 01/16/2018 40.4* 42.0 - 52.0 % Final   • MCV 01/16/2018 98.1* 81.4 - 97.8 fL Final   • MCH 01/16/2018 31.6  27.0 - 33.0 pg Final   • MCHC 01/16/2018 32.2* 33.7 - 35.3 g/dL Final   • RDW 01/16/2018 52.5* 35.9 - 50.0 fL Final   • Platelet Count 01/16/2018 188  164 - 446 K/uL Final   • MPV 01/16/2018 9.0  9.0 - 12.9 fL Final   • Neutrophils-Polys 01/16/2018 42.80* 44.00 - 72.00 % Final   • Lymphocytes 01/16/2018 33.50  22.00 - 41.00 % Final   • Monocytes  01/16/2018 13.60* 0.00 - 13.40 % Final   • Eosinophils 01/16/2018 8.40* 0.00 - 6.90 % Final   • Basophils 01/16/2018 1.70  0.00 - 1.80 % Final   • Immature Granulocytes 01/16/2018 0.00  0.00 - 0.90 % Final   • Nucleated RBC 01/16/2018 0.00  /100 WBC Final   • Neutrophils (Absolute) 01/16/2018 1.97  1.82 - 7.42 K/uL Final   • Lymphs (Absolute) 01/16/2018 1.55  1.00 - 4.80 K/uL Final   • Monos (Absolute) 01/16/2018 0.63  0.00 - 0.85 K/uL Final   • Eos (Absolute) 01/16/2018 0.39  0.00 - 0.51 K/uL Final   • Baso (Absolute) 01/16/2018 0.08  0.00 - 0.12 K/uL Final   • Immature Granulocytes (abs) 01/16/2018 0.00  0.00 - 0.11 K/uL Final   • NRBC (Absolute) 01/16/2018 0.00  K/uL Final   • Sed Rate Kierraren 01/16/2018 7  0 - 20 mm/hour Final   • Stat C-Reactive Protein 01/16/2018 0.19  0.00 - 0.75 mg/dL Final   • Alkaline Phosphatase 01/16/2018 58  30 - 99 U/L Final   • AST(SGOT) 01/16/2018 24  12 - 45 U/L Final   • ALT(SGPT) 01/16/2018 23  2 - 50 U/L Final   • Total Bilirubin 01/16/2018 0.7  0.1 - 1.5 mg/dL Final   • Direct Bilirubin 01/16/2018 0.2  0.1 - 0.5 mg/dL Final   • Indirect Bilirubin 01/16/2018 0.5  0.0 - 1.0 mg/dL Final   • Albumin 01/16/2018 3.9  3.2 - 4.9 g/dL Final   • Total Protein 01/16/2018 6.3  6.0 - 8.2 g/dL Final   • Creatinine 01/16/2018 1.65* 0.50 - 1.40 mg/dL Final   • GFR If  01/16/2018 49* >60 mL/min/1.73 m 2 Final   • GFR If Non  01/16/2018 40* >60 mL/min/1.73 m 2 Final         Hospital Outpatient Visit on 11/02/2017   Component Date Value Ref Range Status   • WBC 11/02/2017 4.6* 4.8 - 10.8 K/uL Final   • RBC 11/02/2017 3.90* 4.70 - 6.10 M/uL Final   • Hemoglobin 11/02/2017 12.7* 14.0 - 18.0 g/dL Final   • Hematocrit 11/02/2017 39.7* 42.0 - 52.0 % Final   • MCV 11/02/2017 101.8* 81.4 - 97.8 fL Final   • MCH 11/02/2017 32.6  27.0 - 33.0 pg Final   • MCHC 11/02/2017 32.0* 33.7 - 35.3 g/dL Final   • RDW 11/02/2017 57.7* 35.9 - 50.0 fL Final   • Platelet Count  11/02/2017 214  164 - 446 K/uL Final   • MPV 11/02/2017 9.5  9.0 - 12.9 fL Final   • Neutrophils-Polys 11/02/2017 46.10  44.00 - 72.00 % Final   • Lymphocytes 11/02/2017 28.60  22.00 - 41.00 % Final   • Monocytes 11/02/2017 15.10* 0.00 - 13.40 % Final   • Eosinophils 11/02/2017 8.70* 0.00 - 6.90 % Final   • Basophils 11/02/2017 1.30  0.00 - 1.80 % Final   • Immature Granulocytes 11/02/2017 0.20  0.00 - 0.90 % Final   • Nucleated RBC 11/02/2017 0.00  /100 WBC Final   • Neutrophils (Absolute) 11/02/2017 2.11  1.82 - 7.42 K/uL Final   • Lymphs (Absolute) 11/02/2017 1.31  1.00 - 4.80 K/uL Final   • Monos (Absolute) 11/02/2017 0.69  0.00 - 0.85 K/uL Final   • Eos (Absolute) 11/02/2017 0.40  0.00 - 0.51 K/uL Final   • Baso (Absolute) 11/02/2017 0.06  0.00 - 0.12 K/uL Final   • Immature Granulocytes (abs) 11/02/2017 0.01  0.00 - 0.11 K/uL Final   • NRBC (Absolute) 11/02/2017 0.00  K/uL Final   • Sed Rate Westergren 11/02/2017 10  0 - 20 mm/hour Final   • Stat C-Reactive Protein 11/02/2017 0.09  0.00 - 0.75 mg/dL Final   • Creatinine 11/02/2017 1.41* 0.50 - 1.40 mg/dL Final   • ALT(SGPT) 11/02/2017 22  2 - 50 U/L Final   • AST(SGOT) 11/02/2017 26  12 - 45 U/L Final   • Albumin 11/02/2017 3.9  3.2 - 4.9 g/dL Final   • GFR If  11/02/2017 59* >60 mL/min/1.73 m 2 Final   • GFR If Non  11/02/2017 49* >60 mL/min/1.73 m 2 Final     cxr March 2018    Linear bibasilar opacities likely represent atelectasis.    Hyperinflation.    Atherosclerotic plaque.    Calcified granuloma.    Hospital Outpatient Visit on 06/27/2017   Component Date Value Ref Range Status   • TSH 06/27/2017 3.570  0.300 - 3.700 uIU/mL Final   • Vitamin B12 -True Cobalamin 06/27/2017 >1500* 211 - 911 pg/mL Final   • Folate -Folic Acid 06/27/2017 >23.7  >4.0 ng/mL Final       Hospital Outpatient Visit on 02/16/2017   Component Date Value Ref Range Status   • Uric Acid 02/16/2017 4.8  2.5 - 8.3 mg/dL Final   • Magnesium 02/16/2017 2.0   1.5 - 2.5 mg/dL Final   • Color 02/16/2017 Yellow   Final   • Character 02/16/2017 Clear   Final   • Specific Gravity 02/16/2017 1.020  <1.035 Final   • Ph 02/16/2017 5.5  5.0-8.0 Final   • Glucose 02/16/2017 Negative  Negative mg/dL Final   • Ketones 02/16/2017 Negative  Negative mg/dL Final   • Protein 02/16/2017 Negative  Negative mg/dL Final   • Bilirubin 02/16/2017 Negative  Negative Final   • Nitrite 02/16/2017 Negative  Negative Final   • Leukocyte Esterase 02/16/2017 Negative  Negative Final   • Occult Blood 02/16/2017 Negative  Negative Final   • Micro Urine Req 02/16/2017 see below   Final    Comment: Microscopic examination not performed when specimen is clear  and chemically negative for protein, blood, leukocyte esterase  and nitrite.     • WBC 02/16/2017 4.8  4.8 - 10.8 K/uL Final   • RBC 02/16/2017 4.24* 4.70 - 6.10 M/uL Final   • Hemoglobin 02/16/2017 13.5* 14.0 - 18.0 g/dL Final   • Hematocrit 02/16/2017 43.1  42.0 - 52.0 % Final   • MCV 02/16/2017 101.7* 81.4 - 97.8 fL Final   • MCH 02/16/2017 31.8  27.0 - 33.0 pg Final   • MCHC 02/16/2017 31.3* 33.7 - 35.3 g/dL Final   • RDW 02/16/2017 56.9* 35.9 - 50.0 fL Final   • Platelet Count 02/16/2017 206  164 - 446 K/uL Final   • MPV 02/16/2017 9.3  9.0 - 12.9 fL Final   • Neutrophils-Polys 02/16/2017 41.40* 44.00 - 72.00 % Final   • Lymphocytes 02/16/2017 31.20  22.00 - 41.00 % Final   • Monocytes 02/16/2017 16.90* 0.00 - 13.40 % Final   • Eosinophils 02/16/2017 8.80* 0.00 - 6.90 % Final   • Basophils 02/16/2017 1.50  0.00 - 1.80 % Final   • Immature Granulocytes 02/16/2017 0.20  0.00 - 0.90 % Final   • Nucleated RBC 02/16/2017 0.00   Final   • Neutrophils (Absolute) 02/16/2017 1.98  1.82 - 7.42 K/uL Final    Includes immature neutrophils, if present.   • Lymphs (Absolute) 02/16/2017 1.49  1.00 - 4.80 K/uL Final   • Monos (Absolute) 02/16/2017 0.81  0.00 - 0.85 K/uL Final   • Eos (Absolute) 02/16/2017 0.42  0.00 - 0.51 K/uL Final   • Baso (Absolute)  02/16/2017 0.07  0.00 - 0.12 K/uL Final   • Immature Granulocytes (abs) 02/16/2017 0.01  0.00 - 0.11 K/uL Final   • NRBC (Absolute) 02/16/2017 0.00   Final   • Pth, Intact 02/16/2017 86.1* 14.0 - 72.0 pg/mL Final   • Calcium 02/16/2017 9.0  8.5 - 10.5 mg/dL Final   • 25-Hydroxy   Vitamin D 25 02/16/2017 62  30 - 100 ng/mL Final    Comment: Adult Ranges:   <20 ng/mL - Deficiency  20-29 ng/mL - Insufficiency   ng/mL - Sufficiency  The [employer] Vitamin D Assay is standardized to the  [facility] reference measurement procedures, a  reference method for the Vitamin D Standardization Program  (VDSP).  The VDSP aligns patient results among 25 (OH)  Vitamin D methods.     • Sodium 02/16/2017 142  135 - 145 mmol/L Final   • Potassium 02/16/2017 4.5  3.6 - 5.5 mmol/L Final   • Chloride 02/16/2017 109  96 - 112 mmol/L Final   • Co2 02/16/2017 23  20 - 33 mmol/L Final   • Glucose 02/16/2017 93  65 - 99 mg/dL Final   • Creatinine 02/16/2017 1.50* 0.50 - 1.40 mg/dL Final   • Bun 02/16/2017 21  8 - 22 mg/dL Final   • Phosphorus 02/16/2017 2.7  2.5 - 4.5 mg/dL Final   • Albumin 02/16/2017 3.9  3.2 - 4.9 g/dL Final   • Total Protein, Urine 02/16/2017 19.3* 0.0 - 15.0 mg/dL Final   • Creatinine, Random Urine 02/16/2017 113.70   Final    Comment: Reference ranges have not been established for this specimen type.  Result interpretation should include consideration of patient's  medical condition and clinical presentation.     • Protein Creatinine Ratio 02/16/2017 170* 15 - 68 mg/g Final   • Ferritin 02/16/2017 154.0  22.0 - 322.0 ng/mL Final   • Iron 02/16/2017 73  50 - 180 ug/dL Final   • Total Iron Binding 02/16/2017 322  250 - 450 ug/dL Final   • % Saturation 02/16/2017 23  15 - 55 % Final   • GFR If  02/16/2017 55* >60 mL/min/1.73 m 2 Final   • GFR If Non  02/16/2017 45* >60 mL/min/1.73 m 2 Final       Assessment and Plan  1. Chronic lung disease  Hypoxia    Chronic obstructive  pulmonary disease, unspecified COPD type (CMS-HCC)  Asbestos induced b/l diffuse pleural thickening   [place]ASIA  Declines CPAP  On O2  Sees pulm  On inhalers  ok'd - albuterol 108 (90 Base) MCG/ACT Aero Soln inhalation aerosol; Inhale 2 Puffs by mouth every 6 hours as needed for Shortness of Breath.  Dispense: 8.5 g; Refill: 3    2. Elevated TSH  Ok last year  Due for repeat  - TSH WITH REFLEX TO FT4; Future    3. CKD (chronic kidney disease) stage 3, GFR 30-59 ml/min  Stable   Monitor   Avoid nephrotoxic agents  Renally dose medications   Sees renal     4. Dyslipidemia  Ok on statin   Sees Raghu     Rheumatoid arthritis, involving unspecified site, unspecified rheumatoid factor presence (CMS-HCC)  Controlled on current meds  Sees rheum    Vitamin D deficiency  rec supplement    Benign essential HTN  High today but generally fine at home   Allow for a little of permissive HTN given orthostasis sxs  Off hctz and other meds at this time     Neuropathy (CMS-HCC)  B12, folate, tsh are fine  Getting b12 injections by pods at times  Declines repeat eval at this time  On presley     Bilateral foot pain  Better with orthotics     Impaired gait  Gait imbalance after change in position   Not orthostatic now but not to say it doesn't happen other times  Off all bp meds  OA/RA and neuropathy could be playing rolae    Anemia, unspecified type  Macrocytosis B12 and folate fine  Re: anemia - Wcu01-19d with normal iron studies although on iron that he has been taking for years per his preference  No e/o gross bleeding  He declines further eval of the anemia aware of risk of missing malignancy  Monitor for now    Debility  Using DMV handicap placard form - unable to walk more than 200 feet without resting at times 2/2 RA    Loose stool  30+ years controlled with Immodium  Declines further eval (e.g, GI eval, colo, stool studies) aware of risk    LUTS  Sees urology    Preventative health care  Sees derm and eye doctor regularly  Declines  hearing aid  Flu annually  PNA x 2 2014  TDAP 2014  Shingles 2010  Y Shingrix   [place] 2009 - told to repeat in 10 years  PSA 2014; in past, reviewed risks and benefits and will hold off  DEXA done 2014 was normal per pt     Patient Instructions   One lab.        Follow-up  Return in about 4 months (around 12/22/2018).    Signed by: Lizzie Soto M.D.

## 2018-08-24 ENCOUNTER — HOSPITAL ENCOUNTER (OUTPATIENT)
Dept: LAB | Facility: MEDICAL CENTER | Age: 78
End: 2018-08-24
Attending: INTERNAL MEDICINE
Payer: MEDICARE

## 2018-08-24 DIAGNOSIS — R79.89 ELEVATED TSH: ICD-10-CM

## 2018-08-24 LAB — TSH SERPL DL<=0.005 MIU/L-ACNC: 4.1 UIU/ML (ref 0.38–5.33)

## 2018-08-24 PROCEDURE — 84443 ASSAY THYROID STIM HORMONE: CPT

## 2018-08-24 PROCEDURE — 36415 COLL VENOUS BLD VENIPUNCTURE: CPT

## 2018-08-24 RX ORDER — TIOTROPIUM BROMIDE 18 UG/1
CAPSULE ORAL; RESPIRATORY (INHALATION)
Qty: 90 CAP | Refills: 3 | Status: SHIPPED | OUTPATIENT
Start: 2018-08-24 | End: 2018-11-13

## 2018-08-24 NOTE — TELEPHONE ENCOUNTER
Last seen: 08/22/18 by Dr. Soto  Next appt: 01/11/18 with Dr. Soto    Was the patient seen in the last year in this department? Yes   Does patient have an active prescription for medications requested? No   Received Request Via: Pharmacy

## 2018-09-28 ENCOUNTER — APPOINTMENT (OUTPATIENT)
Dept: RADIOLOGY | Facility: MEDICAL CENTER | Age: 78
DRG: 862 | End: 2018-09-28
Attending: EMERGENCY MEDICINE
Payer: MEDICARE

## 2018-09-28 ENCOUNTER — HOSPITAL ENCOUNTER (INPATIENT)
Facility: MEDICAL CENTER | Age: 78
LOS: 6 days | DRG: 862 | End: 2018-10-04
Attending: EMERGENCY MEDICINE | Admitting: INTERNAL MEDICINE
Payer: MEDICARE

## 2018-09-28 DIAGNOSIS — Z74.09 LIMITED MOBILITY: ICD-10-CM

## 2018-09-28 DIAGNOSIS — A41.9 SEPSIS, DUE TO UNSPECIFIED ORGANISM: ICD-10-CM

## 2018-09-28 LAB
ALBUMIN SERPL BCP-MCNC: 3.9 G/DL (ref 3.2–4.9)
ALBUMIN/GLOB SERPL: 1.2 G/DL
ALP SERPL-CCNC: 63 U/L (ref 30–99)
ALT SERPL-CCNC: 26 U/L (ref 2–50)
ANION GAP SERPL CALC-SCNC: 10 MMOL/L (ref 0–11.9)
AST SERPL-CCNC: 28 U/L (ref 12–45)
BASOPHILS # BLD AUTO: 0.6 % (ref 0–1.8)
BASOPHILS # BLD: 0.08 K/UL (ref 0–0.12)
BILIRUB SERPL-MCNC: 1.2 MG/DL (ref 0.1–1.5)
BUN SERPL-MCNC: 21 MG/DL (ref 8–22)
CALCIUM SERPL-MCNC: 9.3 MG/DL (ref 8.5–10.5)
CHLORIDE SERPL-SCNC: 104 MMOL/L (ref 96–112)
CO2 SERPL-SCNC: 22 MMOL/L (ref 20–33)
CREAT SERPL-MCNC: 1.55 MG/DL (ref 0.5–1.4)
EKG IMPRESSION: NORMAL
EOSINOPHIL # BLD AUTO: 0.01 K/UL (ref 0–0.51)
EOSINOPHIL NFR BLD: 0.1 % (ref 0–6.9)
ERYTHROCYTE [DISTWIDTH] IN BLOOD BY AUTOMATED COUNT: 55.7 FL (ref 35.9–50)
GLOBULIN SER CALC-MCNC: 3.3 G/DL (ref 1.9–3.5)
GLUCOSE SERPL-MCNC: 129 MG/DL (ref 65–99)
HCT VFR BLD AUTO: 38.7 % (ref 42–52)
HGB BLD-MCNC: 12.9 G/DL (ref 14–18)
IMM GRANULOCYTES # BLD AUTO: 0.06 K/UL (ref 0–0.11)
IMM GRANULOCYTES NFR BLD AUTO: 0.5 % (ref 0–0.9)
LACTATE BLD-SCNC: 1.5 MMOL/L (ref 0.5–2)
LYMPHOCYTES # BLD AUTO: 1.03 K/UL (ref 1–4.8)
LYMPHOCYTES NFR BLD: 7.9 % (ref 22–41)
MCH RBC QN AUTO: 32.4 PG (ref 27–33)
MCHC RBC AUTO-ENTMCNC: 33.3 G/DL (ref 33.7–35.3)
MCV RBC AUTO: 97.2 FL (ref 81.4–97.8)
MONOCYTES # BLD AUTO: 1.79 K/UL (ref 0–0.85)
MONOCYTES NFR BLD AUTO: 13.7 % (ref 0–13.4)
NEUTROPHILS # BLD AUTO: 10.1 K/UL (ref 1.82–7.42)
NEUTROPHILS NFR BLD: 77.2 % (ref 44–72)
NRBC # BLD AUTO: 0 K/UL
NRBC BLD-RTO: 0 /100 WBC
PLATELET # BLD AUTO: 151 K/UL (ref 164–446)
PMV BLD AUTO: 10.1 FL (ref 9–12.9)
POTASSIUM SERPL-SCNC: 4.2 MMOL/L (ref 3.6–5.5)
PROT SERPL-MCNC: 7.2 G/DL (ref 6–8.2)
RBC # BLD AUTO: 3.98 M/UL (ref 4.7–6.1)
SODIUM SERPL-SCNC: 136 MMOL/L (ref 135–145)
WBC # BLD AUTO: 13.1 K/UL (ref 4.8–10.8)

## 2018-09-28 PROCEDURE — 80053 COMPREHEN METABOLIC PANEL: CPT

## 2018-09-28 PROCEDURE — A9270 NON-COVERED ITEM OR SERVICE: HCPCS | Performed by: EMERGENCY MEDICINE

## 2018-09-28 PROCEDURE — 71045 X-RAY EXAM CHEST 1 VIEW: CPT

## 2018-09-28 PROCEDURE — 87040 BLOOD CULTURE FOR BACTERIA: CPT | Mod: 91

## 2018-09-28 PROCEDURE — 84145 PROCALCITONIN (PCT): CPT

## 2018-09-28 PROCEDURE — 700101 HCHG RX REV CODE 250: Performed by: EMERGENCY MEDICINE

## 2018-09-28 PROCEDURE — 85610 PROTHROMBIN TIME: CPT

## 2018-09-28 PROCEDURE — 83605 ASSAY OF LACTIC ACID: CPT

## 2018-09-28 PROCEDURE — 93005 ELECTROCARDIOGRAM TRACING: CPT | Performed by: EMERGENCY MEDICINE

## 2018-09-28 PROCEDURE — 85025 COMPLETE CBC W/AUTO DIFF WBC: CPT

## 2018-09-28 PROCEDURE — 99285 EMERGENCY DEPT VISIT HI MDM: CPT

## 2018-09-28 PROCEDURE — 700105 HCHG RX REV CODE 258: Performed by: EMERGENCY MEDICINE

## 2018-09-28 PROCEDURE — 700102 HCHG RX REV CODE 250 W/ 637 OVERRIDE(OP): Performed by: EMERGENCY MEDICINE

## 2018-09-28 PROCEDURE — 73110 X-RAY EXAM OF WRIST: CPT | Mod: LT

## 2018-09-28 PROCEDURE — 36415 COLL VENOUS BLD VENIPUNCTURE: CPT

## 2018-09-28 PROCEDURE — 83735 ASSAY OF MAGNESIUM: CPT

## 2018-09-28 PROCEDURE — 85730 THROMBOPLASTIN TIME PARTIAL: CPT

## 2018-09-28 PROCEDURE — 93005 ELECTROCARDIOGRAM TRACING: CPT

## 2018-09-28 PROCEDURE — 770020 HCHG ROOM/CARE - TELE (206)

## 2018-09-28 PROCEDURE — 96365 THER/PROPH/DIAG IV INF INIT: CPT

## 2018-09-28 RX ORDER — FEXOFENADINE HCL 60 MG/1
180 TABLET, FILM COATED ORAL DAILY
Status: DISCONTINUED | OUTPATIENT
Start: 2018-09-29 | End: 2018-10-04 | Stop reason: HOSPADM

## 2018-09-28 RX ORDER — GABAPENTIN 300 MG/1
300 CAPSULE ORAL 3 TIMES DAILY
Status: DISCONTINUED | OUTPATIENT
Start: 2018-09-29 | End: 2018-10-04 | Stop reason: HOSPADM

## 2018-09-28 RX ORDER — ACETAMINOPHEN 325 MG/1
650 TABLET ORAL ONCE
Status: COMPLETED | OUTPATIENT
Start: 2018-09-28 | End: 2018-09-28

## 2018-09-28 RX ORDER — SODIUM CHLORIDE 9 MG/ML
500 INJECTION, SOLUTION INTRAVENOUS
Status: DISCONTINUED | OUTPATIENT
Start: 2018-09-28 | End: 2018-10-04 | Stop reason: HOSPADM

## 2018-09-28 RX ORDER — SODIUM CHLORIDE 9 MG/ML
30 INJECTION, SOLUTION INTRAVENOUS ONCE
Status: COMPLETED | OUTPATIENT
Start: 2018-09-28 | End: 2018-09-28

## 2018-09-28 RX ORDER — OXYCODONE HYDROCHLORIDE 5 MG/1
5 TABLET ORAL EVERY 4 HOURS PRN
Status: DISCONTINUED | OUTPATIENT
Start: 2018-09-28 | End: 2018-10-04 | Stop reason: HOSPADM

## 2018-09-28 RX ORDER — CEFAZOLIN SODIUM 1 G/50ML
1 INJECTION, SOLUTION INTRAVENOUS EVERY 8 HOURS
Status: CANCELLED | OUTPATIENT
Start: 2018-09-29

## 2018-09-28 RX ORDER — FLUTICASONE PROPIONATE 50 MCG
1 SPRAY, SUSPENSION (ML) NASAL DAILY
Status: DISCONTINUED | OUTPATIENT
Start: 2018-09-29 | End: 2018-10-04 | Stop reason: HOSPADM

## 2018-09-28 RX ORDER — ALBUTEROL SULFATE 90 UG/1
2 AEROSOL, METERED RESPIRATORY (INHALATION) EVERY 6 HOURS PRN
Status: DISCONTINUED | OUTPATIENT
Start: 2018-09-28 | End: 2018-10-04 | Stop reason: HOSPADM

## 2018-09-28 RX ORDER — SODIUM CHLORIDE 9 MG/ML
INJECTION, SOLUTION INTRAVENOUS CONTINUOUS
Status: DISCONTINUED | OUTPATIENT
Start: 2018-09-29 | End: 2018-09-30

## 2018-09-28 RX ORDER — LEFLUNOMIDE 20 MG/1
20 TABLET ORAL DAILY
Status: DISCONTINUED | OUTPATIENT
Start: 2018-09-29 | End: 2018-10-03

## 2018-09-28 RX ORDER — SODIUM CHLORIDE 9 MG/ML
30 INJECTION, SOLUTION INTRAVENOUS
Status: DISCONTINUED | OUTPATIENT
Start: 2018-09-28 | End: 2018-10-04 | Stop reason: HOSPADM

## 2018-09-28 RX ORDER — TIOTROPIUM BROMIDE 18 UG/1
1 CAPSULE ORAL; RESPIRATORY (INHALATION)
Status: DISCONTINUED | OUTPATIENT
Start: 2018-09-29 | End: 2018-09-29

## 2018-09-28 RX ORDER — ROSUVASTATIN CALCIUM 20 MG/1
20 TABLET, COATED ORAL EVERY EVENING
Status: DISCONTINUED | OUTPATIENT
Start: 2018-09-29 | End: 2018-10-04 | Stop reason: HOSPADM

## 2018-09-28 RX ORDER — BUDESONIDE AND FORMOTEROL FUMARATE DIHYDRATE 80; 4.5 UG/1; UG/1
2 AEROSOL RESPIRATORY (INHALATION) 2 TIMES DAILY
Status: DISCONTINUED | OUTPATIENT
Start: 2018-09-29 | End: 2018-10-04 | Stop reason: HOSPADM

## 2018-09-28 RX ORDER — CLINDAMYCIN PHOSPHATE 600 MG/50ML
600 INJECTION, SOLUTION INTRAVENOUS ONCE
Status: COMPLETED | OUTPATIENT
Start: 2018-09-28 | End: 2018-09-28

## 2018-09-28 RX ADMIN — ACETAMINOPHEN 650 MG: 325 TABLET, FILM COATED ORAL at 22:00

## 2018-09-28 RX ADMIN — SODIUM CHLORIDE 2580 ML: 9 INJECTION, SOLUTION INTRAVENOUS at 21:59

## 2018-09-28 RX ADMIN — CLINDAMYCIN IN 5 PERCENT DEXTROSE 600 MG: 12 INJECTION, SOLUTION INTRAVENOUS at 23:01

## 2018-09-28 ASSESSMENT — PAIN SCALES - GENERAL: PAINLEVEL_OUTOF10: 0

## 2018-09-29 PROBLEM — L03.90 SEPSIS DUE TO CELLULITIS (HCC): Status: ACTIVE | Noted: 2018-09-29

## 2018-09-29 PROBLEM — A41.9 SEPSIS DUE TO CELLULITIS (HCC): Status: ACTIVE | Noted: 2018-09-29

## 2018-09-29 PROBLEM — J30.89 ENVIRONMENTAL AND SEASONAL ALLERGIES: Status: ACTIVE | Noted: 2018-09-29

## 2018-09-29 PROBLEM — D64.9 ANEMIA: Status: ACTIVE | Noted: 2018-09-29

## 2018-09-29 PROBLEM — G62.9 NEUROPATHY: Status: ACTIVE | Noted: 2018-09-29

## 2018-09-29 LAB
ANION GAP SERPL CALC-SCNC: 7 MMOL/L (ref 0–11.9)
APPEARANCE UR: CLEAR
APTT PPP: 35.8 SEC (ref 24.7–36)
BACTERIA #/AREA URNS HPF: NEGATIVE /HPF
BASOPHILS # BLD AUTO: 0.6 % (ref 0–1.8)
BASOPHILS # BLD: 0.08 K/UL (ref 0–0.12)
BILIRUB UR QL STRIP.AUTO: NEGATIVE
BUN SERPL-MCNC: 18 MG/DL (ref 8–22)
CALCIUM SERPL-MCNC: 8.2 MG/DL (ref 8.5–10.5)
CHLORIDE SERPL-SCNC: 108 MMOL/L (ref 96–112)
CO2 SERPL-SCNC: 22 MMOL/L (ref 20–33)
COLOR UR: ABNORMAL
CREAT SERPL-MCNC: 1.49 MG/DL (ref 0.5–1.4)
EOSINOPHIL # BLD AUTO: 0.03 K/UL (ref 0–0.51)
EOSINOPHIL NFR BLD: 0.2 % (ref 0–6.9)
EPI CELLS #/AREA URNS HPF: NEGATIVE /HPF
ERYTHROCYTE [DISTWIDTH] IN BLOOD BY AUTOMATED COUNT: 56.8 FL (ref 35.9–50)
GLUCOSE SERPL-MCNC: 117 MG/DL (ref 65–99)
GLUCOSE UR STRIP.AUTO-MCNC: NEGATIVE MG/DL
HCT VFR BLD AUTO: 34.8 % (ref 42–52)
HGB BLD-MCNC: 11.5 G/DL (ref 14–18)
HYALINE CASTS #/AREA URNS LPF: ABNORMAL /LPF
IMM GRANULOCYTES # BLD AUTO: 0.06 K/UL (ref 0–0.11)
IMM GRANULOCYTES NFR BLD AUTO: 0.5 % (ref 0–0.9)
INR PPP: 1.15 (ref 0.87–1.13)
KETONES UR STRIP.AUTO-MCNC: ABNORMAL MG/DL
LACTATE BLD-SCNC: 1 MMOL/L (ref 0.5–2)
LEUKOCYTE ESTERASE UR QL STRIP.AUTO: NEGATIVE
LYMPHOCYTES # BLD AUTO: 0.89 K/UL (ref 1–4.8)
LYMPHOCYTES NFR BLD: 7 % (ref 22–41)
MAGNESIUM SERPL-MCNC: 1.7 MG/DL (ref 1.5–2.5)
MCH RBC QN AUTO: 32.7 PG (ref 27–33)
MCHC RBC AUTO-ENTMCNC: 33 G/DL (ref 33.7–35.3)
MCV RBC AUTO: 98.9 FL (ref 81.4–97.8)
MICRO URNS: ABNORMAL
MONOCYTES # BLD AUTO: 1.65 K/UL (ref 0–0.85)
MONOCYTES NFR BLD AUTO: 12.9 % (ref 0–13.4)
NEUTROPHILS # BLD AUTO: 10.05 K/UL (ref 1.82–7.42)
NEUTROPHILS NFR BLD: 78.8 % (ref 44–72)
NITRITE UR QL STRIP.AUTO: NEGATIVE
NRBC # BLD AUTO: 0 K/UL
NRBC BLD-RTO: 0 /100 WBC
PH UR STRIP.AUTO: 5 [PH]
PLATELET # BLD AUTO: 134 K/UL (ref 164–446)
PMV BLD AUTO: 9.9 FL (ref 9–12.9)
POTASSIUM SERPL-SCNC: 4 MMOL/L (ref 3.6–5.5)
PROCALCITONIN SERPL-MCNC: 0.56 NG/ML
PROT UR QL STRIP: 100 MG/DL
PROTHROMBIN TIME: 14.8 SEC (ref 12–14.6)
RBC # BLD AUTO: 3.52 M/UL (ref 4.7–6.1)
RBC # URNS HPF: ABNORMAL /HPF
RBC UR QL AUTO: NEGATIVE
SODIUM SERPL-SCNC: 137 MMOL/L (ref 135–145)
SP GR UR STRIP.AUTO: 1.02
UROBILINOGEN UR STRIP.AUTO-MCNC: 0.2 MG/DL
WBC # BLD AUTO: 12.8 K/UL (ref 4.8–10.8)
WBC #/AREA URNS HPF: ABNORMAL /HPF

## 2018-09-29 PROCEDURE — 700111 HCHG RX REV CODE 636 W/ 250 OVERRIDE (IP): Performed by: STUDENT IN AN ORGANIZED HEALTH CARE EDUCATION/TRAINING PROGRAM

## 2018-09-29 PROCEDURE — A9270 NON-COVERED ITEM OR SERVICE: HCPCS | Performed by: STUDENT IN AN ORGANIZED HEALTH CARE EDUCATION/TRAINING PROGRAM

## 2018-09-29 PROCEDURE — 80048 BASIC METABOLIC PNL TOTAL CA: CPT

## 2018-09-29 PROCEDURE — 700102 HCHG RX REV CODE 250 W/ 637 OVERRIDE(OP): Performed by: STUDENT IN AN ORGANIZED HEALTH CARE EDUCATION/TRAINING PROGRAM

## 2018-09-29 PROCEDURE — 700105 HCHG RX REV CODE 258: Performed by: STUDENT IN AN ORGANIZED HEALTH CARE EDUCATION/TRAINING PROGRAM

## 2018-09-29 PROCEDURE — 87086 URINE CULTURE/COLONY COUNT: CPT

## 2018-09-29 PROCEDURE — 85025 COMPLETE CBC W/AUTO DIFF WBC: CPT

## 2018-09-29 PROCEDURE — 87077 CULTURE AEROBIC IDENTIFY: CPT

## 2018-09-29 PROCEDURE — 99223 1ST HOSP IP/OBS HIGH 75: CPT | Mod: GC | Performed by: INTERNAL MEDICINE

## 2018-09-29 PROCEDURE — 87186 SC STD MICRODIL/AGAR DIL: CPT

## 2018-09-29 PROCEDURE — 770020 HCHG ROOM/CARE - TELE (206)

## 2018-09-29 PROCEDURE — 81001 URINALYSIS AUTO W/SCOPE: CPT

## 2018-09-29 PROCEDURE — 36415 COLL VENOUS BLD VENIPUNCTURE: CPT

## 2018-09-29 RX ORDER — ACETAMINOPHEN 325 MG/1
650 TABLET ORAL EVERY 6 HOURS PRN
Status: DISCONTINUED | OUTPATIENT
Start: 2018-09-29 | End: 2018-10-04 | Stop reason: HOSPADM

## 2018-09-29 RX ORDER — TIOTROPIUM BROMIDE 18 UG/1
1 CAPSULE ORAL; RESPIRATORY (INHALATION) DAILY
Status: DISCONTINUED | OUTPATIENT
Start: 2018-09-30 | End: 2018-10-04 | Stop reason: HOSPADM

## 2018-09-29 RX ORDER — LISINOPRIL 5 MG/1
5 TABLET ORAL
Status: DISCONTINUED | OUTPATIENT
Start: 2018-09-29 | End: 2018-09-29

## 2018-09-29 RX ORDER — BISACODYL 10 MG
10 SUPPOSITORY, RECTAL RECTAL
Status: DISCONTINUED | OUTPATIENT
Start: 2018-09-29 | End: 2018-09-30

## 2018-09-29 RX ORDER — GUAIFENESIN/DEXTROMETHORPHAN 100-10MG/5
10 SYRUP ORAL EVERY 6 HOURS PRN
Status: DISCONTINUED | OUTPATIENT
Start: 2018-09-29 | End: 2018-10-04 | Stop reason: HOSPADM

## 2018-09-29 RX ORDER — ONDANSETRON 4 MG/1
4 TABLET, ORALLY DISINTEGRATING ORAL EVERY 4 HOURS PRN
Status: DISCONTINUED | OUTPATIENT
Start: 2018-09-29 | End: 2018-10-04 | Stop reason: HOSPADM

## 2018-09-29 RX ORDER — AMOXICILLIN 250 MG
2 CAPSULE ORAL 2 TIMES DAILY
Status: DISCONTINUED | OUTPATIENT
Start: 2018-09-29 | End: 2018-09-30

## 2018-09-29 RX ORDER — POLYETHYLENE GLYCOL 3350 17 G/17G
1 POWDER, FOR SOLUTION ORAL
Status: DISCONTINUED | OUTPATIENT
Start: 2018-09-29 | End: 2018-09-30

## 2018-09-29 RX ORDER — TRAZODONE HYDROCHLORIDE 50 MG/1
50 TABLET ORAL ONCE
Status: COMPLETED | OUTPATIENT
Start: 2018-09-29 | End: 2018-09-29

## 2018-09-29 RX ORDER — ONDANSETRON 2 MG/ML
4 INJECTION INTRAMUSCULAR; INTRAVENOUS EVERY 4 HOURS PRN
Status: DISCONTINUED | OUTPATIENT
Start: 2018-09-29 | End: 2018-10-04 | Stop reason: HOSPADM

## 2018-09-29 RX ADMIN — TRAZODONE HYDROCHLORIDE 50 MG: 50 TABLET ORAL at 20:35

## 2018-09-29 RX ADMIN — LEFLUNOMIDE 20 MG: 20 TABLET ORAL at 05:39

## 2018-09-29 RX ADMIN — BUDESONIDE AND FORMOTEROL FUMARATE DIHYDRATE 2 PUFF: 80; 4.5 AEROSOL RESPIRATORY (INHALATION) at 17:01

## 2018-09-29 RX ADMIN — GABAPENTIN 300 MG: 300 CAPSULE ORAL at 11:14

## 2018-09-29 RX ADMIN — SODIUM CHLORIDE: 900 INJECTION INTRAVENOUS at 20:43

## 2018-09-29 RX ADMIN — GABAPENTIN 300 MG: 300 CAPSULE ORAL at 05:39

## 2018-09-29 RX ADMIN — SODIUM CHLORIDE: 900 INJECTION INTRAVENOUS at 01:08

## 2018-09-29 RX ADMIN — ROSUVASTATIN CALCIUM 20 MG: 20 TABLET, FILM COATED ORAL at 17:01

## 2018-09-29 RX ADMIN — FLUTICASONE PROPIONATE 50 MCG: 50 SPRAY, METERED NASAL at 09:35

## 2018-09-29 RX ADMIN — FEXOFENADINE HCL 180 MG: 60 TABLET, FILM COATED ORAL at 09:33

## 2018-09-29 RX ADMIN — VANCOMYCIN HYDROCHLORIDE 2200 MG: 100 INJECTION, POWDER, LYOPHILIZED, FOR SOLUTION INTRAVENOUS at 02:06

## 2018-09-29 RX ADMIN — CEFTRIAXONE SODIUM 2 G: 2 INJECTION, POWDER, FOR SOLUTION INTRAMUSCULAR; INTRAVENOUS at 01:11

## 2018-09-29 RX ADMIN — GABAPENTIN 300 MG: 300 CAPSULE ORAL at 17:01

## 2018-09-29 RX ADMIN — SODIUM CHLORIDE: 900 INJECTION INTRAVENOUS at 13:43

## 2018-09-29 ASSESSMENT — ENCOUNTER SYMPTOMS
CHILLS: 1
SINUS PAIN: 0
NAUSEA: 0
FEVER: 1
DIARRHEA: 0
SORE THROAT: 0
DIAPHORESIS: 0
ABDOMINAL PAIN: 0
SPUTUM PRODUCTION: 0
TREMORS: 1
DIZZINESS: 1
STRIDOR: 0
DOUBLE VISION: 0
WHEEZING: 0
SENSORY CHANGE: 0
VOMITING: 0
BACK PAIN: 0
WEIGHT LOSS: 0
FEVER: 0
BLOOD IN STOOL: 0
EYE PAIN: 0
TINGLING: 0
TREMORS: 0
DEPRESSION: 0
PALPITATIONS: 0
CHILLS: 0
ORTHOPNEA: 0
COUGH: 0
HEADACHES: 0
NECK PAIN: 0
WEAKNESS: 0
MYALGIAS: 1
NERVOUS/ANXIOUS: 0
SEIZURES: 0
FLANK PAIN: 0
MYALGIAS: 0
TINGLING: 1
SHORTNESS OF BREATH: 0
CONSTIPATION: 0
BLURRED VISION: 0
HEMOPTYSIS: 0
SPEECH CHANGE: 0
DIZZINESS: 0
HEARTBURN: 0

## 2018-09-29 ASSESSMENT — PATIENT HEALTH QUESTIONNAIRE - PHQ9
SUM OF ALL RESPONSES TO PHQ9 QUESTIONS 1 AND 2: 0
1. LITTLE INTEREST OR PLEASURE IN DOING THINGS: NOT AT ALL
2. FEELING DOWN, DEPRESSED, IRRITABLE, OR HOPELESS: NOT AT ALL

## 2018-09-29 ASSESSMENT — COGNITIVE AND FUNCTIONAL STATUS - GENERAL
DRESSING REGULAR LOWER BODY CLOTHING: A LOT
SUGGESTED CMS G CODE MODIFIER MOBILITY: CK
DAILY ACTIVITIY SCORE: 18
SUGGESTED CMS G CODE MODIFIER DAILY ACTIVITY: CK
WALKING IN HOSPITAL ROOM: A LOT
HELP NEEDED FOR BATHING: A LITTLE
PERSONAL GROOMING: A LITTLE
MOBILITY SCORE: 18
STANDING UP FROM CHAIR USING ARMS: A LITTLE
TOILETING: A LITTLE
CLIMB 3 TO 5 STEPS WITH RAILING: A LOT
MOVING TO AND FROM BED TO CHAIR: A LITTLE
DRESSING REGULAR UPPER BODY CLOTHING: A LITTLE

## 2018-09-29 ASSESSMENT — COPD QUESTIONNAIRES
COPD SCREENING SCORE: 4
DO YOU EVER COUGH UP ANY MUCUS OR PHLEGM?: NO/ONLY WITH OCCASIONAL COLDS OR INFECTIONS
HAVE YOU SMOKED AT LEAST 100 CIGARETTES IN YOUR ENTIRE LIFE: YES
DURING THE PAST 4 WEEKS HOW MUCH DID YOU FEEL SHORT OF BREATH: NONE/LITTLE OF THE TIME

## 2018-09-29 ASSESSMENT — LIFESTYLE VARIABLES
EVER_SMOKED: NEVER
EVER_SMOKED: YES
ALCOHOL_USE: NO
SUBSTANCE_ABUSE: 0

## 2018-09-29 ASSESSMENT — PAIN SCALES - GENERAL
PAINLEVEL_OUTOF10: 0

## 2018-09-29 NOTE — PROGRESS NOTES
"Pharmacy Kinetics 78 y.o. male on vancomycin day # 1 2018    Currently on Vancomycin 1300 mg iv q24hr    Indication for Treatment: SSTI    Pertinent history per medical record: Admitted on 2018 for L hand swelling and pain.  Patient recently had surgery for carpal tunnel release. He noted the day PTA that his L hand and distal forearm were swelling with worsening warmth, erythema and drainage. Empiric antibiotics initiated for SSTI. Orthopedics consulted.     Other antibiotics: ceftriaxone 2 g IV q24h    Allergies: Cipro xr; Levaquin; and Pcn [penicillins]     List concerns for renal function: age, CKD    Pertinent cultures to date:    PBC x 2: in process    Recent Labs      18   0649   WBC  13.1*  12.8*   NEUTSPOLYS  77.20*  78.80*     Recent Labs      18   0649   BUN  21  18   CREATININE  1.55*  1.49*   ALBUMIN  3.9   --       Blood pressure 155/82, pulse 96, temperature 36.9 °C (98.5 °F), resp. rate 19, height 1.854 m (6' 1\"), weight 86 kg (189 lb 9.5 oz), SpO2 92 %. Temp (24hrs), Av.4 °C (99.3 °F), Min:36.9 °C (98.4 °F), Max:38.5 °C (101.3 °F)      A/P   1. Vancomycin dose change: not indicated at this time  2. Next vancomycin level: ~2 days (not yet ordered)  3. Goal trough: 12-16 mcg/mL  4. Comments: dosing with vancomycin continues. Patient received loading dose, subsequent renal indices are stable. Will watch closely as patient is high risk for renal insult and accumulation. Continue current dose for now and plan for a level when closer to steady state. Will follow.    Pascale Browne, PharmD    "

## 2018-09-29 NOTE — PROGRESS NOTES
"Pharmacy Kinetics 78 y.o. male on vancomycin day # 1 2018    Currently on Vancomycin    Load: Vancomycin 2,200mg (25mg/kg) IV x1dose   Maintenance: Vancomycin 1,300mg (15mg/kg) IV q24h    Indication for Treatment: Skin and soft tissue infection    Pertinent history per medical record: Admitted on 2018 for Cellulitis of left hand.  Recently patient states undergoing carpal tunnel release, a procedure with no apparent complications but 5 days post procedure developing left hand pain, now patient notes swelling with purulent drainage from wound.      Other antibiotics: Ceftriaxone 2g IV q24h (-current), Clindamycin 600mg IV x1dose ()    Allergies: Cipro xr; Levaquin; and Pcn [penicillins]     List concerns for renal function (impaired renal function): SCr 1.55mg/dl with estimated CrCl 43.7 per EPIC    Pertinent cultures to date:   18  Blood Culture:   Pending results  18  Blood Culture:   Pending results      Recent Labs      18   WBC  13.1*   NEUTSPOLYS  77.20*     Recent Labs      18   BUN  21   CREATININE  1.55*   ALBUMIN  3.9     No results for input(s): VANCOTROUGH, VANCOPEAK, VANCORANDOM in the last 72 hours.No intake or output data in the 24 hours ending 18 0012   Blood pressure 153/71, pulse 92, temperature (!) 38.5 °C (101.3 °F), resp. rate 12, height 1.854 m (6' 1\"), weight 86 kg (189 lb 9.5 oz), SpO2 94 %. Temp (24hrs), Av.5 °C (101.3 °F), Min:38.5 °C (101.3 °F), Max:38.5 °C (101.3 °F)      A/P   1. Vancomycin dose change: New Start  a. Load: Vancomycin 2,200mg (25mg/kg) IV x1dose (02:06)  b. Maintenance: Vancomycin 1,300mg (15mg/kg) IV q24h (02:00)  Next vancomycin level: Clinical Pharmacist on rounds will evaluate patient and order trough when appropriate.  Since vancomycin can cause nephrotoxicity, vancomycin trough level and renal function labs are drawn to monitor efficacy and safety of vancomycin.  2. Goal trough: 12-16mcg/ml for " cellulitis, goal trough subject to change pending culture results  3. Comments: Pharmacy to recommend de-escelation of antibiotics when clinically appropriate.  [facility] am lab 9/30/18 active.      Ema Marks Pharm.D.,  9/29/18

## 2018-09-29 NOTE — NON-PROVIDER
Internal Medicine Interval Note  Note Author: Artem Rowe, Student     Name Barry Torres     1940   Age/Sex 78 y.o. male   MRN [id number]   Code Status Full     After 5PM or if no immediate response to page, please call for cross-coverage  Attending/Team: [place] See Patient List for primary contact information  Call [phone] to page    1st Call - Day Intern (R1):   [place] 2nd Call - Day Sr. Resident (R2/R3):   Simran         Reason for interval visit  (Principal Problem)   Sepsis due to cellulitis (HCC)    Interval Problem Daily Status Update  (24 hours)   Patient states that his hand swelling and pain has improved since his admission. He states that he continues to not have any pain with applied pressure or movement. He states that he has not noticed any new drainage since yesterday. Patient denies the onset of any new symptoms since being admitted.     Review of Systems   Constitutional: Negative for chills, diaphoresis, fever and weight loss.   HENT: Negative for congestion, ear pain, hearing loss, sinus pain and sore throat.    Eyes: Negative for blurred vision, double vision and pain.   Respiratory: Negative for cough, sputum production, shortness of breath, wheezing and stridor.    Cardiovascular: Negative for chest pain, palpitations and leg swelling.   Gastrointestinal: Negative for abdominal pain, blood in stool, constipation, diarrhea, heartburn, nausea and vomiting.   Genitourinary: Negative for dysuria, flank pain, frequency, hematuria and urgency.   Musculoskeletal: Negative for back pain, joint pain, myalgias and neck pain.   Skin: Negative for itching and rash.   Neurological: Positive for tingling (b/l feet, L fingers) and tremors. Negative for dizziness, sensory change, speech change, seizures, weakness and headaches.   Psychiatric/Behavioral: Negative for depression, substance abuse and suicidal ideas. The patient is not nervous/anxious.         Consultants/Specialty  Orthopedics: Dr. Ramirez  PCP: Lizzie Soto M.D.    Disposition  Inpatient for IV antibiotics and observation for continued improvement, if not ortho will discuss surgical intervention.    Quality Measures  Quality-Core Measures   Reviewed items::  EKG reviewed, Labs reviewed, Medications reviewed and Radiology images reviewed  Lozano catheter::  No Lozano  DVT prophylaxis pharmacological::  Not indicated at this time, ambulatory  DVT prophylaxis - mechanical:  Not indicated at this time, ambulatory  Ulcer Prophylaxis::  Not indicated          Physical Exam       Vitals:    09/29/18 0000 09/29/18 0415 09/29/18 0800 09/29/18 1155   BP:  (!) 177/87 145/71 155/82   Pulse: 78 (!) 112 90 96   Resp: (!) 23 18 18 19   Temp:  36.9 °C (98.4 °F) 37.3 °C (99.1 °F) 36.9 °C (98.5 °F)   SpO2: 94% 90% 95% 92%   Weight:       Height:         Body mass index is 25.01 kg/m². Weight: 86 kg (189 lb 9.5 oz)  Oxygen Therapy:  Pulse Oximetry: 92 %, O2 (LPM): 3, O2 Delivery: Silicone Nasal Cannula    Physical Exam   Constitutional: He is oriented to person, place, and time and well-developed, well-nourished, and in no distress. No distress.   HENT:   Head: Normocephalic and atraumatic.   Right Ear: External ear normal.   Left Ear: External ear normal.   Nose: Nose normal.   Mouth/Throat: Oropharynx is clear and moist. No oropharyngeal exudate.   Eyes: Pupils are equal, round, and reactive to light. Conjunctivae and EOM are normal. Right eye exhibits no discharge. Left eye exhibits no discharge. Scleral icterus is present.   Neck: Normal range of motion. Neck supple. No JVD present. No thyromegaly present.   Cardiovascular: Normal rate, regular rhythm and intact distal pulses.  Exam reveals no gallop and no friction rub.    No murmur heard.  Pulmonary/Chest: Effort normal and breath sounds normal. No stridor. No respiratory distress. He has no wheezes. He has no rales. He exhibits no tenderness.   Abdominal:  Soft. Bowel sounds are normal. He exhibits no distension and no mass. There is no tenderness. There is no rebound and no guarding.   Musculoskeletal:        Left wrist: He exhibits swelling and laceration (incision site s/p carpal tunnel release). He exhibits normal range of motion and no tenderness.        Left hand: He exhibits decreased range of motion and swelling. He exhibits no tenderness, no bony tenderness, normal capillary refill, no deformity and no laceration.   Skin is very warm to the touch from L hand to elbow. There is prominent erythema.   Lymphadenopathy:     He has no cervical adenopathy.   Neurological: He is alert and oriented to person, place, and time. No cranial nerve deficit. Gait normal. Coordination normal.   Skin: Skin is warm and dry. No rash noted. He is not diaphoretic. There is erythema (L forearm, wrist, and hand). No pallor.   Psychiatric: Memory, affect and judgment normal.         Lab Data Review:       Recent Labs      09/28/18 2114 09/28/18 2352 09/29/18   0649   SODIUM  136   --   137   POTASSIUM  4.2   --   4.0   CHLORIDE  104   --   108   CO2  22   --   22   BUN  21   --   18   CREATININE  1.55*   --   1.49*   MAGNESIUM   --   1.7   --    CALCIUM  9.3   --   8.2*       Recent Labs      09/28/18 2114 09/29/18   0649   ALTSGPT  26   --    ASTSGOT  28   --    ALKPHOSPHAT  63   --    TBILIRUBIN  1.2   --    GLUCOSE  129*  117*       Recent Labs      09/28/18 2114 09/29/18   0649   RBC  3.98*  3.52*   HEMOGLOBIN  12.9*  11.5*   HEMATOCRIT  38.7*  34.8*   PLATELETCT  151*  134*   PROTHROMBTM  14.8*   --    APTT  35.8   --    INR  1.15*   --        Recent Labs      09/28/18 2114 09/29/18   0649   WBC  13.1*  12.8*   NEUTSPOLYS  77.20*  78.80*   LYMPHOCYTES  7.90*  7.00*   MONOCYTES  13.70*  12.90   EOSINOPHILS  0.10  0.20   BASOPHILS  0.60  0.60   ASTSGOT  28   --    ALTSGPT  26   --    ALKPHOSPHAT  63   --    TBILIRUBIN  1.2   --            Assessment/Plan     No new  Assessment & Plan notes have been filed under this hospital service since the last note was generated.  Service: Internal Medicine    Barry Torres is a 79 y/o  male with a history of CKD, COPD, rheumatoid arthiritis, hyperlipidemia, neuropathy, and is 1 week s/p L carpal tunnel release who presented with L wrist and hand swelling and pain, currently being treated for sepsis secondary to cellulitis.     #1 Sepsis secondary to cellulitis of L forearm, wrist, and hand  - At admission patient met 4/4 SIRS criteria with an elevated procalcitonin, suggestive of sepsis with bacterial infection at his surgical site.   - Patient meets cellulitis clinical description with swollen, erythematous, painful, warm skin overlying his L forearm, wrist, and hand.   - Patient is currently on IV antibiotics of ceftriaxone and vancomycin, which should provide coverage for most likely cause of infection.   - Blood, urine, and wound cultures are pending  - Continue with IV fluids  - Lactic acid was 1.5 and repeat is 1.0, no need for further repeat studies  - Per orthopedics will continue IV fluids and abx and observation. If no improvement tomorrow will discuss I&D with the patient.   - OT was placed by orthopedics  - anti-edema glove ordered by orthopedics     #2 Anemia   - Presented with H/H of 12.9/38.7 and today is at 11.5/34.8, this is not far from patient's previous baseline  - Will continue to monitor for now     #3 Neuropathy  - Patient has never been diagnosed with diabetes and does not have blood sugars concerning for diabetes  - Continue with home gabapentin    #4 Stage III Chronic Kidney Disease  - Most recent creatinine (1.49), BUN (18), and GFR (45) are close to the patient's previous baseline.  - Continue to monitor BMP as current abx regimen can result in nephrotoxicity if excessive    #5 COPD  - Continue with home regimen for inhalers  - Continue to monitor patient's breathing  - He is normally on oxygen at  night, will continue while inpatient    #6 Rheumatoid arthritis  - Continue certolizumab and leflunomide    #7 Hyperlipidemia  - Continue rosuvastatin

## 2018-09-29 NOTE — ASSESSMENT & PLAN NOTE
Improving for 2 days. Patient s/p recent about 7 days ago left carpal tunnel release. 24 hours prior admission the hand and forearm started to become more swollen, red, and tender. The pus started to be produced from the incision site., SIRS 4/4, qSOFA 1/3; procalcitonin initially elevated. LA 1.0. WBC WNL now. Swelling initially improved. Patient was initially on vancomycin and ceftriaxone, but then switched to cefazolin. I discussed holding his immunosuppressant medications with his rheumatologist to improve wound healing.  - sepsis due to cellulitis  - Blood Cx x2 - negative  - Urine culture +S.aureus 50-100k  - OK to D/C by ortho  - anti-edema glove ordered by orthopedics   - Hold immunosuppressants by Rheum  - F/u ortho 1wk  - F/u rheum 1wk

## 2018-09-29 NOTE — ASSESSMENT & PLAN NOTE
Anemia of chronic disease (due to rheumatoid arteritis?) and Hypoproliferative disorder (sandra is on certolizumab,leflunomide weekly for RA).  Hgb 12.9 on admission. B12/folate/LDH normal. TSH 3.9. Ferritin elevate 632 with high TIBC and low saturation. FOBT was positive, but previously consistently negative, so GI bleed less likely to contribute to his anemia.  - Consider heme/onc outpatient follow up

## 2018-09-29 NOTE — PROGRESS NOTES
DISCHARGE CHECKLIST    • Is all DME in place? No                    If yes, has patient been educated on use of DME?   No    • Is consent for controlled substance signed and placed in chart? No    • Did patient receive all prescriptions? No    If electronically prescribed, did you call pharmacy to verify availability?   No    • Does the patient have transportation to  prescriptions? Yes  • Does the patient have the financial means to obtain prescriptions? Yes    • Have all necessary follow up appointments been scheduled?  PCP or D/C clinic- No  Specialty- No  No, please explain- not medically cleared       • Has home health referral been suggested or already sent? No    • Did you review notes from PT/OT/SLP and did you verify d/c plan is consistent with their recommendation? (ie diet education, supplies) - No      • Does patient need follow up wound care? Yes, not medically cleared, but will likely need wound care as an outpt  If yes, have post acute arrangements been made?   -Supplies  -Wound clinic follow up  -HH referral for wound care  -Home wound vac  Please specify if applicable- will likely need wound care    • Did all specialties sign off for discharge? No    • Has hand off report been called to receiving caregivers?   SNF/Rehab/LTAC    Home Health  PCP  Caregiver  Group Home  Acute to Acute    Please specify all applicable hand offs-     • Has outpatient dialysis been arranged?  Not Indicated  • Is patient on anticoagulation? No  If on coumadin, is f/u INR monitoring arranged? No    Education to be included in Discharge-  Did patient/caregiver receive home care instructions from:  • DM educator- Not Indicated  • COPD educator- Not Indicated    Did you include core measure specific education?  • CHF- Not Indicated  • PNA- Not Indicated  • Stroke- Not Indicated  • MI- Not Indicated    Are diagnosis specific instructions in AVS?  Yes    Educated  Patient/caregiver on symptom management with teach back?   Yes    Educated patient/caregiver on pain management? (see pcp for long term pain management)- Yes    If caregiver education has taken place, please specify topics discussed-

## 2018-09-29 NOTE — ED NOTES
Bedside report to Tavon GONZALEZ.  Pt transferred to [facility] unit

## 2018-09-29 NOTE — RESPIRATORY CARE
COPD EDUCATION by COPD CLINICAL EDUCATOR  9/29/2018  at  10:44 AM by Guillermina Durán     Patient interviewed by COPD education team.  Patient unable to participate in full program.  Short intervention has been conducted.  A comprehensive packet including information about COPD, treatments, and smoking cessation given. HE is an established patient with [facility]. He use Oxygen at night and prn with activity. H is comfortable and understands his respiratory medications. He has a nebulizer and we reviewed cleaning.

## 2018-09-29 NOTE — SENIOR ADMIT NOTE
[facility] INTERNAL MEDICINE SENIOR ADMIT NOTE:    Patient ID:   Name:             Barry Torres   YOB: 1940  Age:                 78 y.o.  male   MRN:               [id number]                                                          Chief Complaint:       Left hand pain and swelling      History of Present Illness:    Mr. Torres is a 78 y.o. male with a PMHx of dyslipidemia, rheumatoid arthritis, CKD stage III, COPD who presents for left hand pain. Patient was recently diagnosed with carpal tunnel and underwent a carpal tunnel release about a week ago. He states that the procedure went fairly well with no apparent complications. He notes that approximately 5 days after the procedure he developed some left hand pain. He states that the pain has progressively increased in severity. He also notes some swelling with purulent drainage from the wound. He does note a subjective fever associated. Denies any chills.    Patient is noted to have an allergy to penicillin and fluoroquinolones.    Active Ambulatory Problems     Diagnosis Date Noted   • Hypoxia 06/25/2013   • Pleural plaque 06/25/2013   • Chronic obstructive pulmonary disease (HCC)    • Antrochoanal polyp 10/06/2015   • Hyperlipidemia 06/03/2016   • RA (rheumatoid arthritis) (CMS-HCC) 06/03/2016   • Fatigue 06/03/2016   • Orthostasis 06/23/2016   • Hyperlipidemia 06/23/2016   • Essential hypertension, benign 08/02/2016   • CKD (chronic kidney disease), stage III 08/02/2016   • Calcified LAD and LCx on CT chest 2015 08/02/2016   • Elevated TSH 12/06/2016   • Mixed dyslipidemia 12/06/2016   • PVC (premature ventricular contraction) 08/14/2017   • Septic bursitis of elbow 08/28/2017   • Chronic respiratory failure with hypoxia (HCC) 12/12/2017   • NYASIA (obstructive sleep apnea) 12/12/2017   • Chronic lower extremity edema 08/14/2018     Resolved Ambulatory Problems     Diagnosis Date Noted   • CAD (coronary artery disease) 06/25/2013   • HTN  "(hypertension) 06/25/2013   • COPD (chronic obstructive pulmonary disease) (HCC) 06/03/2016   • SOB (shortness of breath) 06/03/2016   • Lightheadedness 06/23/2016   • Essential hypertension 06/23/2016     Past Medical History:   Diagnosis Date   • Abnormal thyroid stimulating hormone (TSH) level    • Allergic rhinitis    • Asbestosis ([facility])    • Asthma    • Bronchitis    • Chronic diarrhea    • Chronic low back pain    • Chronic lung disease    • CKD (chronic kidney disease), stage III    • COPD (chronic obstructive pulmonary disease) ([facility])    • Coronary artery calcification seen on CAT scan 8/2/2016   • Epididymitis 2009   • GERD (gastroesophageal reflux disease)    • History of hemorrhoids    • History of skin cancer    • [place] (hard of hearing)    • Hypercholesteremia    • Hypertension    • Hypertriglyceridemia    • Indigestion    • Light headedness    • Lightheadedness 6/23/2016   • Low back pain    • Nasal drainage    • Olecranon bursitis    • Orthostasis 6/23/2016   • Peripheral neuropathy ([facility])    • Pleural plaque 2005    • Post-nasal drip    • Pulmonary emphysema ([facility])    • RA (rheumatoid arthritis) ([facility])    • Restless leg syndrome    • Rheumatoid arthritis ([facility])    • Sleep apnea    • Solitary kidney        PHYSICAL EXAM  Vitals:   Weight/BMI: Body mass index is 25.01 kg/m².  Blood pressure 153/71, pulse 78, temperature (!) 38.5 °C (101.3 °F), resp. rate (!) 23, height 1.854 m (6' 1\"), weight 86 kg (189 lb 9.5 oz), SpO2 94 %.  Vitals:    09/28/18 2230 09/28/18 2300 09/28/18 2330 09/29/18 0000   BP:       Pulse: 96 80 92 78   Resp:  20 12 (!) 23   Temp:       SpO2: 94% 92% 94% 94%   Weight:       Height:         Oxygen Therapy:  Pulse Oximetry: 94 %, O2 (LPM): 2, O2 Delivery: Nasal Cannula      Physical Exam   Constitutional: He is oriented to person, place, and time and well-developed, well-nourished, and in no distress.   HENT:   Head: Normocephalic and atraumatic.   Mouth/Throat: No oropharyngeal exudate. "   Eyes: Pupils are equal, round, and reactive to light. Conjunctivae are normal. No scleral icterus.   Neck: Normal range of motion. Neck supple. No JVD present. No thyromegaly present.   Cardiovascular: Normal rate and regular rhythm.    No murmur heard.  Pulmonary/Chest: Effort normal and breath sounds normal. No respiratory distress. He has no wheezes.   Abdominal: Soft. Bowel sounds are normal. He exhibits no distension. There is no tenderness. There is no rebound.   Musculoskeletal: He exhibits deformity.   Left hand is significantly swollen, erythematous with some purulent drainage appreciated. Possible fluctuance versus edema appreciated.    Distal radial pulses 2+. Capillary refill less than 2 seconds.   Neurological: He is alert and oriented to person, place, and time. No cranial nerve deficit.   Skin: No rash noted. No erythema.   Psychiatric: Affect normal.       ASSESSMENT:  1) sepsis secondary to left hand cellulitis versus abscess      PLAN:  - Patient presents with 2 day history of worsening left hand pain and swelling, purulent drainage  - Status post carpal tunel release one week ago  - SIRS 4/4, qSOFA 0/3, lactic acid normal on admit  - IV hydration  - Discussed with pharmacist, given purulent drainage will cover for MRSA at this time with vancomycin, also recommends gram-negative coverage at this time given septic presentation  - Will start vancomycin, Rocephin - day team to consider de-escalation   - Wound culture pending  - ER physician discussed with ortho, will plan to see patient in morning  - Nothing by mouth for possible procedure   - Pain control

## 2018-09-29 NOTE — ASSESSMENT & PLAN NOTE
-continue home dose Arava  -consider continuing Cimzia q14d if hospitalization prolonged  -follows with rheum outpatient

## 2018-09-29 NOTE — CONSULTS
Ortho:    Please see forthcoming dictation for details. In brief, Mr. Torres is a 78M known to me for being 8 days s/p left open CTR. He has had several days of severe swelling with fever. His exam is consistent with severe disuse swelling and cellulitis. No evidence of abscess or flexor tenosynovitis. I recommend NSAID's and aggressive OT for finger ROM and anti-edema glove and iv Abx. I recommend nonoperative treatment but will reconsider I&D tomorrow if not improving clinically. The patient understands this plan and wishes to proceed.

## 2018-09-29 NOTE — PROGRESS NOTES
2 RN skin check    Incision site from previous surgery noted on left wrist. Redness and swelling up to mid forearm on left upper extremity. No numbness or tingling noted. Blanchable redness on the buttocks. Bilateral lower extremity swelling. No other open wounds, cuts noted.   Will continue to monitor

## 2018-09-29 NOTE — PROGRESS NOTES
Internal Medicine Interval Note  Note Author: Michael Hamm M.D.     Name Barry Torres     1940   Age/Sex 78 y.o. male   MRN [id number]   Code Status Full     After 5PM or if no immediate response to page, please call for cross-coverage  Attending/Team: Dr. Jackson See Patient List for primary contact information  Call [phone] to page    1st Call - Day Intern (R1):   Dr. Hamm 2nd Call - Day Sr. Resident (R2/R3):   Dr. Khalil     Reason for interval visit  (Principal Problem)   Sepsis 2/2 cellulitis    Interval Problem Daily Status Update  (24 hours, problem oriented, brief subjective history, new lab/imaging data pertinent to that problem)   Patient feels that his hand is getting worse. He has intermittent tingling at the tips of his fingers. He feels that the extent of the redness has spread further.    Review of Systems   Constitutional: Positive for chills. Negative for fever.   HENT: Negative for ear pain and hearing loss.    Eyes: Negative for blurred vision and pain.   Respiratory: Negative for cough, hemoptysis and sputum production.    Cardiovascular: Negative for chest pain, palpitations and orthopnea.   Gastrointestinal: Negative for abdominal pain, diarrhea, heartburn, nausea and vomiting.   Genitourinary: Negative for dysuria, frequency and urgency.   Musculoskeletal: Negative for back pain and myalgias.   Skin: Negative for itching and rash.        Left wrist swollen and swelling extends near the left elbow. There is a tenderness and erythema that extends along the swelling. Pulses are appropriate. Capillary refill within 2 seconds. No sensory deficit.   Neurological: Negative for dizziness, tingling, tremors and headaches.       Disposition/Barriers to discharge:   None    Consultants/Specialty  Orthopedics  PCP: Lizzie Soto M.D.      Quality Measures  Quality-Core Measures   Reviewed items::  EKG reviewed, Labs reviewed, Medications reviewed and  Radiology images reviewed  Lozano catheter::  No Lozano  DVT prophylaxis - mechanical:  SCDs  Ulcer Prophylaxis::  No    Physical Exam       Vitals:    09/29/18 0000 09/29/18 0415 09/29/18 0800 09/29/18 1155   BP:  (!) 177/87 145/71 155/82   Pulse: 78 (!) 112 90 96   Resp: (!) 23 18 18 19   Temp:  36.9 °C (98.4 °F) 37.3 °C (99.1 °F) 36.9 °C (98.5 °F)   SpO2: 94% 90% 95% 92%   Weight:       Height:         Body mass index is 25.01 kg/m². Weight: 86 kg (189 lb 9.5 oz)  Oxygen Therapy:  Pulse Oximetry: 92 %, O2 (LPM): 3, O2 Delivery: Silicone Nasal Cannula    Physical Exam   Constitutional: He is oriented to person, place, and time and well-developed, well-nourished, and in no distress. No distress.   HENT:   Head: Normocephalic.   Eyes: Conjunctivae and EOM are normal.   Neck: Normal range of motion. Neck supple. No JVD present. No tracheal deviation present. No thyromegaly present.   Cardiovascular: Normal rate, regular rhythm, normal heart sounds and intact distal pulses.  Exam reveals no gallop and no friction rub.    No murmur heard.  Pulmonary/Chest: Effort normal and breath sounds normal. No respiratory distress. He has no wheezes. He has no rales. He exhibits no tenderness.   Abdominal: Soft. Bowel sounds are normal. He exhibits no distension and no mass. There is no tenderness. There is no rebound and no guarding.   Musculoskeletal: Normal range of motion. He exhibits edema (right hand) and tenderness.   Neurological: He is oriented to person, place, and time. He has normal reflexes. He exhibits normal muscle tone. Coordination normal.   Skin: Skin is warm. He is not diaphoretic.   Left wrist swollen and swelling extends near the left elbow. There is a tenderness and erythema that extends along the swelling. Pulses are appropriate. Capillary refill within 2 seconds. No sensory deficit.    Psychiatric: Affect and judgment normal.       Assessment/Plan     * Sepsis due to cellulitis (HCC)   Assessment & Plan     Patient s/p recent about 7 days ago left carpal tunnel release. 24 hours prior admission the hand and forearm started to become more swollen, red, and tender. The pus started to be produced from the incision site., SIRS 4/4, qSOFA 1/3; procalcitonin elevated. LA 1.0.  -sepsis due to cellulitis  -Blood Cx x2, UA and Urine culture, wound culture  -IVF  -vancomycin, ceftriaxone (discussed with pharmacy for broad coverage)  -telemetry, RT/O2 protocol  -strict I/O, daily weights  -Orthopedics recommends medical management and if no improvement then possible I&D tomorrow.  -NPO for possible procedure at midnight  - OT by ortho        Anemia   Assessment & Plan    -per last office visit note on 8/22, iron studies/B12/folate wnl, hgb 12.9 on admission.  -patient at the time declined further workup        Neuropathy ([facility])   Assessment & Plan    -neuropathy to the foot/ankle  -continue home dose gabapentin        Environmental and seasonal allergies   Assessment & Plan    -continue home dose fluticasone, allegra        CKD (chronic kidney disease), stage III- (present on admission)   Assessment & Plan    -Cr 1.55, around baseline  -avoid nephrotoxic drugs  -renal dosing        COPD (chronic obstructive pulmonary disease) ([facility])- (present on admission)   Assessment & Plan    -continue home dose symbicort  -RT/O2 protocol        Hyperlipidemia- (present on admission)   Assessment & Plan    -continue home dose crestor        RA (rheumatoid arthritis) ([facility])- (present on admission)   Assessment & Plan    -continue home dose Arava  -consider continuing Cimzia q14d if hospitalization prolonged  -follows with rheum outpatient

## 2018-09-29 NOTE — ED PROVIDER NOTES
ED Provider Note    ED Provider Note    Primary care provider: Lizzie Soto M.D.  Means of arrival: EMS  History obtained from: Patient    CHIEF COMPLAINT  Chief Complaint   Patient presents with   • Weakness     X several days   • Fever     101.3 temp   • Arm Swelling     left wrist swelling, red and hot to the touch.  Recent carpal tunnel surgery     Seen at 9:51 PM.   HPI  Barry Torres is a 78 y.o. male who presents to the Emergency Department right-hand-dominant male who presents with gradually worsening left wrist pain, swelling, redness and generalized weakness.  He is approximate 1 week status post carpal tunnel release by Dr. Ramirez.    He has not yet been seen for follow-up.     He is unaware of any fevers/chills, he denies any headache, chest pain, cough, shortness of breath, nausea, vomiting, diarrhea, impaired immunity, bleeding diathesis, dysuria.  He does have some slight edema of the lower extremities that is at baseline.    REVIEW OF SYSTEMS  See HPI,   Remainder of ROS negative.     PAST MEDICAL HISTORY   has a past medical history of Abnormal thyroid stimulating hormone (TSH) level; Allergic rhinitis; Asbestosis (HCC); Asthma; Bronchitis; Chronic diarrhea; Chronic low back pain; Chronic lung disease; CKD (chronic kidney disease), stage III; COPD (chronic obstructive pulmonary disease) (HCC); Coronary artery calcification seen on CAT scan (8/2/2016); Epididymitis (2009); GERD (gastroesophageal reflux disease); History of hemorrhoids; History of skin cancer; [place] (hard of hearing); Hypercholesteremia; Hypertension; Hypertriglyceridemia; Indigestion; Light headedness; Lightheadedness (6/23/2016); Low back pain; Nasal drainage; Olecranon bursitis; Orthostasis (6/23/2016); Peripheral neuropathy ([facility]); Pleural plaque (2005 ); Post-nasal drip; Pulmonary emphysema (HCC); RA (rheumatoid arthritis) (HCC); Restless leg syndrome; Rheumatoid arthritis (HCC); Sleep apnea; and Solitary  "kidney.    SURGICAL HISTORY   has a past surgical history that includes testicle exploration (2004); cystoscopy (2/1/2010); retrogrades (2/1/2010); pyelogram (2/1/2010); ureteroscopy (2/1/2010); nephrectomy laparoscopic (2009); nasal polypectomy (Bilateral, 10/6/2015); tonsillectomy; and remv 2nd cataract,corn-scler sectn.    SOCIAL HISTORY  Social History   Substance Use Topics   • Smoking status: Former Smoker     Packs/day: 1.00     Years: 40.00     Types: Cigarettes     Quit date: 1/1/1980   • Smokeless tobacco: Never Used      Comment: 1 pk a day for 40 yrs   • Alcohol use No      Comment: 2 a week      History   Drug Use No       FAMILY HISTORY  Family History   Problem Relation Age of Onset   • Genetic Father         ALS   • No Known Problems Sister    • No Known Problems Son    • No Known Problems Daughter    • Heart Attack Neg Hx    • Heart Disease Neg Hx    • Heart Failure Neg Hx        CURRENT MEDICATIONS  Reviewed.  See Encounter Summary.     ALLERGIES  Allergies   Allergen Reactions   • Cipro Xr      Muscle aches.   • Levaquin      Muscle aches   • Pcn [Penicillins] Hives     Rash and asthma attack when a child - wife states he has had Keflex in the past and tolerated       PHYSICAL EXAM  VITAL SIGNS: /71   Pulse 91   Temp (!) 38.5 °C (101.3 °F)   Resp 20   Ht 1.854 m (6' 1\")   Wt 86 kg (189 lb 9.5 oz)   SpO2 93%   BMI 25.01 kg/m²   Constitutional: Awake, alert in no apparent distress.  HENT: Normocephalic, Bilateral external ears normal. Nose normal.   Eyes: Conjunctiva normal, non-icteric, EOMI.    Thorax & Lungs: Easy unlabored respirations, Clear to ascultation bilaterally.  Cardiovascular: Borderline tachycardic, No murmurs, rubs or gallops.  Abdomen:  Soft, nontender, nondistended, normal active bowel sounds.   :    Skin: Visualized skin is  Dry, No erythema, No rash.   Musculoskeletal:   Left upper extremity: There are sutures intact on the volar aspect of the wrist, the proximal " aspect of the incision has a slight amount of purulence.  There is mild surrounding erythema, the left wrist, hand and fingers are quite edematous.  The patient does have some pain with passive extension of the n ring finger, he has decreased range of motion of all of the fingers due to the swelling.  He does not have any tenderness on the flexor aspects of the fingers.  He does not have any palmar tenderness.    Eurologic: Alert, Grossly non-focal.   Psychiatric: Normal affect, Normal mood  Lymphatic:  No cervical LAD    RADIOLOGY  DX-WRIST-COMPLETE 3+ LEFT   Final Result      No radiographic evidence of acute traumatic injury.      DX-CHEST-PORTABLE (1 VIEW)   Final Result      Linear bibasilar atelectasis/fibrosis. There has been no significant interval change.            COURSE & MEDICAL DECISION MAKING  Pertinent Labs & Imaging studies reviewed. (See chart for details)    Differential diagnoses include but are not limited to: Sepsis, postoperative wound infection.    9:51 PM - Medical record reviewed, patient seen and examined at bedside.    10:04 PM  IV fluids will be administered due to possible sepsis.  In addition, Dr. Ramirez will be paged.  The patient has an allergy to penicillin and fluoroquinolones.  He has tolerated clindamycin and Keflex in the past.  Plan to give IV clindamycin at this time.     Following IV fluids the patient has improved heart rate.  He will need to be admitted for IV antibiotics and further resuscitation.    2300-the case was discussed with Dr. Ramirez.  He states that this is typical postoperative wound infection, usually this responds to p.o. antibiotics.  However given the leukocytosis and fever along with the patient's general symptomatology he agrees that admission would be reasonable and he will evaluate the patient as an inpatient tomorrow.    The case was discussed with [facility] who will evaluate the patient for admission.    Decision Making:  This is a 78 y.o. year  old male who presents with pain, fevers, swelling.  The patient has impressive edema of the left upper extremity in the region of his carpal tunnel release.  He has a slight leukocytosis and is febrile.  Given the patient's advanced age, 3 Sirs criteria I feel that he should be admitted for IV antibiotics.  I do not feel he requires emergent washout as this may respond to IV therapy alone.    The patient will be admitted to [facility] in stable condition.  FINAL IMPRESSION  1. Sepsis, due to unspecified organism (HCC)    2. Postoperative cellulitis of surgical wound, initial encounter

## 2018-09-29 NOTE — ED NOTES
Med rec complete per pt at bedside  Allergies have been verified and updated  No ABX in the last 30 days        Pt reports that his CIMZIA injection is  due today 9-

## 2018-09-29 NOTE — WOUND TEAM
Wound team consulted on this pt.  Incision on the left wrist CDI at this time.  Fingers, hand, wrist noted to have deeply pitting edema.  Placed a tubi  F over hand and wrist to the elbow to decrease edema.  Pt instructed to contact nursing immediately if tubigrip becomes too tight anywhere or becomes uncomfortable.  Spoke to RN about compression sleeve and requesting he check to make sure it is not binding or pinching the tissue.   No further wound team needs at this time.  Wound team signing off.

## 2018-09-29 NOTE — H&P
Internal Medicine Admitting History and Physical    Note Author: Jude Sandhu M.D.       Name Barry Torres     1940   Age/Sex 78 y.o. male   MRN [id number]   Code Status Full code     After 5PM or if no immediate response to page, please call for cross-coverage  Attending/Team: Manuel/Herminia See Patient List for primary contact information  Call [phone] to page    1st Call - Day Intern (R1):   Noris 2nd Call - Day Sr. Resident (R2/R3):   Ting       Chief Complaint:   Left hand swelling & pain    HPI:  78M, PMH HLD, CKD, COPD, Rheumatoid Arthritis, recent carpal tunnel release; presents with left hand swelling and pain. Left hand and distal forearm swelling began yesterday rapidly worsening today with warmth, erythema, purulent discharge, and is about twice the size of his right hand. Pain over left hand and wrist only aggravated with active movement, and left hand with passive ROM, and no persistent pain at rest, with some alleviation with tylenol and oxycodone. He's had malaise and lightheadedness today. He had a left carpal tunnel release 1 week ago.     Review of Systems   Constitutional: Positive for fever and malaise/fatigue. Negative for chills.   HENT: Negative for ear pain, hearing loss, sinus pain and sore throat.    Eyes: Negative for blurred vision, double vision and pain.   Respiratory: Negative for cough, shortness of breath and wheezing.    Cardiovascular: Negative for chest pain and palpitations.   Gastrointestinal: Negative for blood in stool, constipation, diarrhea, melena, nausea and vomiting.   Genitourinary: Negative for dysuria and hematuria.   Musculoskeletal: Positive for joint pain and myalgias.   Skin: Negative for itching and rash.   Neurological: Positive for dizziness. Negative for headaches.   Psychiatric/Behavioral: Negative for depression. The patient is not nervous/anxious.              Past Medical History (Chronic medical problem, known  complications and current treatment)    HLD  CKD  COPD  Rheumatoid Arthritis     Past Surgical History:  Past Surgical History:   Procedure Laterality Date   • NASAL POLYPECTOMY Bilateral 10/6/2015    Procedure: NASAL POLYPECTOMY WITH SCOTT ENDOSCOPIC NASAL DEBRIDEMENT;  Surgeon: Param Maurer M.D.;  Location: SURGERY SAME DAY [facility];  Service:    • CYSTOSCOPY  2/1/2010    Performed by ANGIE VERDUZCO at SURGERY [facility] ORS   • RETROGRADES  2/1/2010    Performed by ANGIE VERDUZCO at SURGERY [facility] ORS   • PYELOGRAM  2/1/2010    Performed by ANGIE VERDUZCO at SURGERY [facility] ORS   • URETEROSCOPY  2/1/2010    Performed by ANGIE VERDUZCO at SURGERY [facility] ORS   • NEPHRECTOMY LAPAROSCOPIC  2009    left kidney   • TESTICLE EXPLORATION  2004   • PB REMV 2ND CATARACT,CORN-SCLER SECTN     • TONSILLECTOMY         Current Outpatient Medications:  Home Medications     Reviewed by Uday He R.N. (Registered Nurse) on 09/29/18 at 0041  Med List Status: Partial   Medication Last Dose Status   albuterol 108 (90 Base) MCG/ACT Aero Soln inhalation aerosol  Active   certolizumab pegol (CIMZIA PREFILLED) 2 X 200 MG/ML Kit 8/22/2018 Active   fexofenadine (ALLEGRA) 180 MG tablet 8/22/2018 Active   fluorouracil (EFUDEX) 5 % cream 8/22/2018 Active   fluticasone (FLONASE) 50 MCG/ACT nasal spray 8/22/2018 Active   gabapentin (NEURONTIN) 300 MG Cap 8/22/2018 Active   leflunomide (ARAVA) 20 MG Tab 8/22/2018 Active   loperamide (IMODIUM A-D) 2 MG tablet 8/22/2018 Active   Magnesium 250 MG Tab 8/22/2018 Active   Multiple Vitamin (MULTIVITAMIN PO) 8/22/2018 Active   Probiotic Product (PROBIOTIC PO) 8/22/2018 Active   rosuvastatin (CRESTOR) 20 MG Tab 8/22/2018 Active   SPIRIVA HANDIHALER 18 MCG Cap  Active   SYMBICORT 80-4.5 MCG/ACT Aerosol 8/22/2018 Active                Medication Allergy/Sensitivities:  Allergies   Allergen Reactions   • Cipro Xr      Muscle aches.   • Levaquin       "Muscle aches   • Pcn [Penicillins] Hives     Rash and asthma attack when a child - wife states he has had Keflex in the past and tolerated         Family History (mandatory)   Family History   Problem Relation Age of Onset   • Genetic Father         ALS   • No Known Problems Sister    • No Known Problems Son    • No Known Problems Daughter    • Heart Attack Neg Hx    • Heart Disease Neg Hx    • Heart Failure Neg Hx        Social History (mandatory)   Social History     Social History   • Marital status:      Spouse name: N/A   • Number of children: N/A   • Years of education: N/A     Occupational History   • Not on file.     Social History Main Topics   • Smoking status: Former Smoker     Packs/day: 1.00     Years: 40.00     Types: Cigarettes     Quit date: 1/1/1980   • Smokeless tobacco: Never Used      Comment: 1 pk a day for 40 yrs   • Alcohol use No      Comment: 2 a week   • Drug use: No   • Sexual activity: No      Comment: retired      Other Topics Concern   • Not on file     Social History Narrative    See H&P    Lives with wife     Living situation: Lives in [place] with wife, retired  PCP : Lizzie Soto M.D.    Physical Exam     Vitals:    09/28/18 2300 09/28/18 2330 09/29/18 0000 09/29/18 0415   BP:    (!) 177/87   Pulse: 80 92 78 (!) 112   Resp: 20 12 (!) 23 18   Temp:    36.9 °C (98.4 °F)   SpO2: 92% 94% 94% 90%   Weight:       Height:         Body mass index is 25.01 kg/m².  BP (!) 177/87 Comment: rn notified  Pulse (!) 112   Temp 36.9 °C (98.4 °F)   Resp 18   Ht 1.854 m (6' 1\")   Wt 86 kg (189 lb 9.5 oz)   SpO2 90%   BMI 25.01 kg/m²   O2 therapy: Pulse Oximetry: 90 %, O2 (LPM): 4, O2 Delivery: Nasal Cannula    Physical Exam   Constitutional: He is oriented to person, place, and time and well-developed, well-nourished, and in no distress. No distress.   HENT:   Head: Normocephalic and atraumatic.   Mouth/Throat: Oropharynx is clear and moist. No oropharyngeal exudate.   MP3   Eyes: Pupils " are equal, round, and reactive to light. Conjunctivae and EOM are normal. Right eye exhibits no discharge. Left eye exhibits no discharge. No scleral icterus.   Neck: Normal range of motion. Neck supple. No tracheal deviation present.   Cardiovascular: Normal rate, regular rhythm, normal heart sounds and intact distal pulses.  Exam reveals no gallop and no friction rub.    No murmur heard.  Pulmonary/Chest: Effort normal and breath sounds normal. No respiratory distress. He has no wheezes. He has no rales. He exhibits no tenderness.   Abdominal: Soft. Bowel sounds are normal. He exhibits no distension and no mass. There is no tenderness. There is no rebound and no guarding.   Genitourinary:   Genitourinary Comments: CAPRICE deferred, discussed risk of DVT and ppx with anticoagulation and further risk of bleeds/GI bleeds, patient would like to avoid CAPRICE, FOBT ordered   Musculoskeletal: He exhibits edema. He exhibits no tenderness or deformity.   Edema over left hand, wrist, and proximal forearm; 2+ lower extremity pitting edema   Neurological: He is alert and oriented to person, place, and time. He exhibits normal muscle tone. Coordination normal.   Skin: Skin is warm and dry. No rash noted. He is not diaphoretic. There is erythema. No pallor.   Left hand, wrist, and forearm with warmth, erythema and purulent discharge from incision site   Psychiatric: Mood and affect normal.         Data Review       Old Records Request:   Deferred  Current Records review/summary: Completed    Lab Data Review:  Recent Results (from the past 24 hour(s))   EKG (NOW)    Collection Time: 18  9:01 PM   Result Value Ref Range    Report       [facility] Emergency Dept.    Test Date:  2018  Pt Name:    FIDELIA MERA             Department: ER  MRN:        [id number]                      Room:       [facility]  Gender:     Male                         Technician: [id number]  :        1940                    Requested By:ER TRIAGE PROTOCOL  Order #:    [id number]                    Reading MD:    Measurements  Intervals                                Axis  Rate:       117                          P:  CT:                                      QRS:        -17  QRSD:       86                           T:          156  QT:         320  QTc:        447    Interpretive Statements  A-FLUTTER W/ PREDOM 2:1 AV BLOCK, A-RATE 227  BORDERLINE LEFT AXIS DEVIATION  BORDERLINE LOW VOLTAGE IN FRONTAL LEADS  NONSPECIFIC T ABNORMALITIES, LATERAL LEADS  Compared to ECG 10/05/2015 11:13:37  2:1 AV block now present  Sinus bradycardia no longer present  Atrial premature complex(es) no longer present  First degree AV block no  longer present  T-wave abnormality still present     Lactic acid (lactate)    Collection Time: 09/28/18  9:14 PM   Result Value Ref Range    Lactic Acid 1.5 0.5 - 2.0 mmol/L   CBC WITH DIFFERENTIAL    Collection Time: 09/28/18  9:14 PM   Result Value Ref Range    WBC 13.1 (H) 4.8 - 10.8 K/uL    RBC 3.98 (L) 4.70 - 6.10 M/uL    Hemoglobin 12.9 (L) 14.0 - 18.0 g/dL    Hematocrit 38.7 (L) 42.0 - 52.0 %    MCV 97.2 81.4 - 97.8 fL    MCH 32.4 27.0 - 33.0 pg    MCHC 33.3 (L) 33.7 - 35.3 g/dL    RDW 55.7 (H) 35.9 - 50.0 fL    Platelet Count 151 (L) 164 - 446 K/uL    MPV 10.1 9.0 - 12.9 fL    Neutrophils-Polys 77.20 (H) 44.00 - 72.00 %    Lymphocytes 7.90 (L) 22.00 - 41.00 %    Monocytes 13.70 (H) 0.00 - 13.40 %    Eosinophils 0.10 0.00 - 6.90 %    Basophils 0.60 0.00 - 1.80 %    Immature Granulocytes 0.50 0.00 - 0.90 %    Nucleated RBC 0.00 /100 WBC    Neutrophils (Absolute) 10.10 (H) 1.82 - 7.42 K/uL    Lymphs (Absolute) 1.03 1.00 - 4.80 K/uL    Monos (Absolute) 1.79 (H) 0.00 - 0.85 K/uL    Eos (Absolute) 0.01 0.00 - 0.51 K/uL    Baso (Absolute) 0.08 0.00 - 0.12 K/uL    Immature Granulocytes (abs) 0.06 0.00 - 0.11 K/uL    NRBC (Absolute) 0.00 K/uL   COMP METABOLIC PANEL    Collection Time: 09/28/18  9:14 PM   Result Value Ref  Range    Sodium 136 135 - 145 mmol/L    Potassium 4.2 3.6 - 5.5 mmol/L    Chloride 104 96 - 112 mmol/L    Co2 22 20 - 33 mmol/L    Anion Gap 10.0 0.0 - 11.9    Glucose 129 (H) 65 - 99 mg/dL    Bun 21 8 - 22 mg/dL    Creatinine 1.55 (H) 0.50 - 1.40 mg/dL    Calcium 9.3 8.5 - 10.5 mg/dL    AST(SGOT) 28 12 - 45 U/L    ALT(SGPT) 26 2 - 50 U/L    Alkaline Phosphatase 63 30 - 99 U/L    Total Bilirubin 1.2 0.1 - 1.5 mg/dL    Albumin 3.9 3.2 - 4.9 g/dL    Total Protein 7.2 6.0 - 8.2 g/dL    Globulin 3.3 1.9 - 3.5 g/dL    A-G Ratio 1.2 g/dL   ESTIMATED GFR    Collection Time: 09/28/18  9:14 PM   Result Value Ref Range    GFR If  53 (A) >60 mL/min/1.73 m 2    GFR If Non  43 (A) >60 mL/min/1.73 m 2   APTT    Collection Time: 09/28/18  9:14 PM   Result Value Ref Range    APTT 35.8 24.7 - 36.0 sec   PROTHROMBIN TIME    Collection Time: 09/28/18  9:14 PM   Result Value Ref Range    PT 14.8 (H) 12.0 - 14.6 sec    INR 1.15 (H) 0.87 - 1.13   Lactic acid (lactate)    Collection Time: 09/28/18 11:52 PM   Result Value Ref Range    Lactic Acid 1.0 0.5 - 2.0 mmol/L   MAGNESIUM    Collection Time: 09/28/18 11:52 PM   Result Value Ref Range    Magnesium 1.7 1.5 - 2.5 mg/dL   PROCALCITONIN    Collection Time: 09/28/18 11:52 PM   Result Value Ref Range    Procalcitonin 0.56 (H) <0.25 ng/mL   URINALYSIS    Collection Time: 09/29/18  1:20 AM   Result Value Ref Range    Color DK Yellow     Character Clear     Specific Gravity 1.023 <1.035    Ph 5.0 5.0 - 8.0    Glucose Negative Negative mg/dL    Ketones Trace (A) Negative mg/dL    Protein 100 (A) Negative mg/dL    Bilirubin Negative Negative    Urobilinogen, Urine 0.2 Negative    Nitrite Negative Negative    Leukocyte Esterase Negative Negative    Occult Blood Negative Negative    Micro Urine Req Microscopic    URINE MICROSCOPIC (W/UA)    Collection Time: 09/29/18  1:20 AM   Result Value Ref Range    WBC 0-2 (A) /hpf    RBC 5-10 (A) /hpf    Bacteria Negative None  /hpf    Epithelial Cells Negative /hpf    Hyaline Cast 3-5 (A) /lpf       Imaging/Procedures Review:    Independant Imaging Review: Completed  DX-WRIST-COMPLETE 3+ LEFT   Final Result      No radiographic evidence of acute traumatic injury.      DX-CHEST-PORTABLE (1 VIEW)   Final Result      Linear bibasilar atelectasis/fibrosis. There has been no significant interval change.               EKG:   EKG Independant Review: Completed  QTc:447, HR: 117, Normal Sinus Rhythm, no ST/T changes     Records reviewed and summarized in current documentation :  Yes  [employer] teaching service handout given to patient:  Yes         Assessment/Plan     * Sepsis due to cellulitis ([facility])   Assessment & Plan    This is sepsis (without associated acute organ dysfunction).   -left hand/wrist/forearm warmth/erythema/purulent discharge; s/p left carpal tunnel release 1 week ago, SIRS 4/4, qSOFA 1/3; procalcitonin elevated  -sepsis due to cellulitis  -Blood Cx x2, UA and Urine culture, wound culture  -IVF bolus x2, maintenance fluids  -lactic acid not elevated  -given clindamycin in ED (d/c'd)  -vancomycin, ceftriaxone (discussed with pharmacy for broad coverage)  -telemetry, RT/O2 protocol  -strict I/O, daily weights  -ortho paged in ED, to follow  -NPO for possible procedure        Anemia   Assessment & Plan    -per last office visit note on 8/22, iron studies/B12/folate wnl  -patient at the time declined further workup        Neuropathy ([facility])   Assessment & Plan    -neuropathy to the foot/ankle  -continue home dose gabapentin        Environmental and seasonal allergies   Assessment & Plan    -continue home dose fluticasone, allegra        CKD (chronic kidney disease), stage III- (present on admission)   Assessment & Plan    -Cr 1.55, around baseline  -avoid nephrotoxic drugs  -renal dosing        COPD (chronic obstructive pulmonary disease) ([facility])- (present on admission)   Assessment & Plan    -continue home dose symbicort  -RT/O2 protocol         Hyperlipidemia- (present on admission)   Assessment & Plan    -continue home dose crestor        RA (rheumatoid arthritis) (CMS-HCC)- (present on admission)   Assessment & Plan    -continue home dose Arava  -consider continuing Cimzia q14d if hospitalization prolonged  -follows with rheum outpatient            Anticipated Hospital stay:  >2 midnights        Quality Measures  Quality-Core Measures   Reviewed items::  EKG reviewed, Labs reviewed, Medications reviewed and Radiology images reviewed    PCP: Lizzie Soto M.D.

## 2018-09-29 NOTE — ED TRIAGE NOTES
Barry Torres  78 y.o. male  Chief Complaint   Patient presents with   • Weakness     X several days   • Fever     101.3 temp   • Arm Swelling     left wrist swelling, red and hot to the touch.  Recent carpal tunnel surgery       Bib ems for above.  +cardiac monitor.  Sepsis protocols activated.  Chart up for ERP

## 2018-09-30 PROBLEM — Z74.09 LIMITED MOBILITY: Status: ACTIVE | Noted: 2018-09-30

## 2018-09-30 LAB
ANION GAP SERPL CALC-SCNC: 8 MMOL/L (ref 0–11.9)
BUN SERPL-MCNC: 20 MG/DL (ref 8–22)
CALCIUM SERPL-MCNC: 7.6 MG/DL (ref 8.5–10.5)
CHLORIDE SERPL-SCNC: 110 MMOL/L (ref 96–112)
CO2 SERPL-SCNC: 20 MMOL/L (ref 20–33)
CREAT SERPL-MCNC: 1.47 MG/DL (ref 0.5–1.4)
ERYTHROCYTE [DISTWIDTH] IN BLOOD BY AUTOMATED COUNT: 56.3 FL (ref 35.9–50)
GLUCOSE SERPL-MCNC: 118 MG/DL (ref 65–99)
HCT VFR BLD AUTO: 31.2 % (ref 42–52)
HGB BLD-MCNC: 10.2 G/DL (ref 14–18)
LACTATE BLD-SCNC: 0.9 MMOL/L (ref 0.5–2)
MCH RBC QN AUTO: 32.2 PG (ref 27–33)
MCHC RBC AUTO-ENTMCNC: 32.7 G/DL (ref 33.7–35.3)
MCV RBC AUTO: 98.4 FL (ref 81.4–97.8)
PLATELET # BLD AUTO: 111 K/UL (ref 164–446)
PMV BLD AUTO: 10.5 FL (ref 9–12.9)
POTASSIUM SERPL-SCNC: 3.6 MMOL/L (ref 3.6–5.5)
RBC # BLD AUTO: 3.17 M/UL (ref 4.7–6.1)
SODIUM SERPL-SCNC: 138 MMOL/L (ref 135–145)
WBC # BLD AUTO: 8.4 K/UL (ref 4.8–10.8)

## 2018-09-30 PROCEDURE — 700105 HCHG RX REV CODE 258: Performed by: STUDENT IN AN ORGANIZED HEALTH CARE EDUCATION/TRAINING PROGRAM

## 2018-09-30 PROCEDURE — A9270 NON-COVERED ITEM OR SERVICE: HCPCS | Performed by: INTERNAL MEDICINE

## 2018-09-30 PROCEDURE — 90662 IIV NO PRSV INCREASED AG IM: CPT | Performed by: INTERNAL MEDICINE

## 2018-09-30 PROCEDURE — 99233 SBSQ HOSP IP/OBS HIGH 50: CPT | Mod: GC | Performed by: INTERNAL MEDICINE

## 2018-09-30 PROCEDURE — 700111 HCHG RX REV CODE 636 W/ 250 OVERRIDE (IP): Performed by: STUDENT IN AN ORGANIZED HEALTH CARE EDUCATION/TRAINING PROGRAM

## 2018-09-30 PROCEDURE — 700102 HCHG RX REV CODE 250 W/ 637 OVERRIDE(OP): Performed by: STUDENT IN AN ORGANIZED HEALTH CARE EDUCATION/TRAINING PROGRAM

## 2018-09-30 PROCEDURE — 3E0234Z INTRODUCTION OF SERUM, TOXOID AND VACCINE INTO MUSCLE, PERCUTANEOUS APPROACH: ICD-10-PCS | Performed by: INTERNAL MEDICINE

## 2018-09-30 PROCEDURE — 90471 IMMUNIZATION ADMIN: CPT

## 2018-09-30 PROCEDURE — 80048 BASIC METABOLIC PNL TOTAL CA: CPT

## 2018-09-30 PROCEDURE — 85027 COMPLETE CBC AUTOMATED: CPT

## 2018-09-30 PROCEDURE — A9270 NON-COVERED ITEM OR SERVICE: HCPCS | Performed by: STUDENT IN AN ORGANIZED HEALTH CARE EDUCATION/TRAINING PROGRAM

## 2018-09-30 PROCEDURE — 36415 COLL VENOUS BLD VENIPUNCTURE: CPT

## 2018-09-30 PROCEDURE — 700102 HCHG RX REV CODE 250 W/ 637 OVERRIDE(OP): Performed by: INTERNAL MEDICINE

## 2018-09-30 PROCEDURE — 83605 ASSAY OF LACTIC ACID: CPT

## 2018-09-30 PROCEDURE — 700111 HCHG RX REV CODE 636 W/ 250 OVERRIDE (IP): Performed by: INTERNAL MEDICINE

## 2018-09-30 PROCEDURE — 770006 HCHG ROOM/CARE - MED/SURG/GYN SEMI*

## 2018-09-30 RX ORDER — IBUPROFEN 600 MG/1
600 TABLET ORAL 3 TIMES DAILY
Status: DISCONTINUED | OUTPATIENT
Start: 2018-09-30 | End: 2018-09-30

## 2018-09-30 RX ADMIN — FLUTICASONE PROPIONATE 50 MCG: 50 SPRAY, METERED NASAL at 06:06

## 2018-09-30 RX ADMIN — SODIUM CHLORIDE: 900 INJECTION INTRAVENOUS at 06:15

## 2018-09-30 RX ADMIN — INFLUENZA A VIRUS A/MICHIGAN/45/2015 X-275 (H1N1) ANTIGEN (FORMALDEHYDE INACTIVATED), INFLUENZA A VIRUS A/SINGAPORE/INFIMH-16-0019/2016 IVR-186 (H3N2) ANTIGEN (FORMALDEHYDE INACTIVATED), AND INFLUENZA B VIRUS B/MARYLAND/15/2016 BX-69A (A B/COLORADO/6/2017-LIKE VIRUS) ANTIGEN (FORMALDEHYDE INACTIVATED) 0.5 ML: 60; 60; 60 INJECTION, SUSPENSION INTRAMUSCULAR at 01:36

## 2018-09-30 RX ADMIN — GABAPENTIN 300 MG: 300 CAPSULE ORAL at 18:37

## 2018-09-30 RX ADMIN — GABAPENTIN 300 MG: 300 CAPSULE ORAL at 13:58

## 2018-09-30 RX ADMIN — GABAPENTIN 300 MG: 300 CAPSULE ORAL at 06:12

## 2018-09-30 RX ADMIN — LEFLUNOMIDE 20 MG: 20 TABLET ORAL at 06:12

## 2018-09-30 RX ADMIN — CEFTRIAXONE SODIUM 2 G: 2 INJECTION, POWDER, FOR SOLUTION INTRAMUSCULAR; INTRAVENOUS at 06:12

## 2018-09-30 RX ADMIN — ROSUVASTATIN CALCIUM 20 MG: 20 TABLET, FILM COATED ORAL at 18:37

## 2018-09-30 RX ADMIN — TIOTROPIUM BROMIDE 1 CAPSULE: 18 CAPSULE ORAL; RESPIRATORY (INHALATION) at 06:06

## 2018-09-30 RX ADMIN — FEXOFENADINE HCL 180 MG: 60 TABLET, FILM COATED ORAL at 06:12

## 2018-09-30 RX ADMIN — VANCOMYCIN HYDROCHLORIDE 1300 MG: 100 INJECTION, POWDER, LYOPHILIZED, FOR SOLUTION INTRAVENOUS at 01:33

## 2018-09-30 RX ADMIN — BUDESONIDE AND FORMOTEROL FUMARATE DIHYDRATE 2 PUFF: 80; 4.5 AEROSOL RESPIRATORY (INHALATION) at 06:06

## 2018-09-30 ASSESSMENT — ENCOUNTER SYMPTOMS
SPUTUM PRODUCTION: 0
BLURRED VISION: 0
BACK PAIN: 0
DIZZINESS: 0
TREMORS: 0
FEVER: 0
PALPITATIONS: 0
HEMOPTYSIS: 0
MYALGIAS: 0
HEADACHES: 0
NAUSEA: 0
DIARRHEA: 0
HEARTBURN: 0
EYE PAIN: 0
TINGLING: 0
ORTHOPNEA: 0
COUGH: 0
ABDOMINAL PAIN: 0
VOMITING: 0

## 2018-09-30 ASSESSMENT — PAIN SCALES - GENERAL
PAINLEVEL_OUTOF10: 0

## 2018-09-30 ASSESSMENT — PATIENT HEALTH QUESTIONNAIRE - PHQ9
1. LITTLE INTEREST OR PLEASURE IN DOING THINGS: NOT AT ALL
SUM OF ALL RESPONSES TO PHQ9 QUESTIONS 1 AND 2: 0
2. FEELING DOWN, DEPRESSED, IRRITABLE, OR HOPELESS: NOT AT ALL

## 2018-09-30 NOTE — PROGRESS NOTES
Internal Medicine Interval Note  Note Author: Michael Hamm M.D.     Name Barry Torres     1940   Age/Sex 78 y.o. male   MRN [id number]   Code Status Full     After 5PM or if no immediate response to page, please call for cross-coverage  Attending/Team: Dr. Jackson See Patient List for primary contact information  Call [phone] to page    1st Call - Day Intern (R1):   Dr. Hamm 2nd Call - Day Sr. Resident (R2/R3):   Dr. Khalil     Reason for interval visit  (Principal Problem)   Sepsis 2/2 cellulitis    Interval Problem Daily Status Update  (24 hours, problem oriented, brief subjective history, new lab/imaging data pertinent to that problem)   Patient had swelling has gone down, and the pain is minimal. Denies numbness or tingling in the left hand. He denies having fevers chills or nausea over night. His wife is concerned about his limited mobility which is even less in hospital. Also he has diarrhea x3 nurse states they are 4-5 grade. Patient normally diarrhea and takes imodium daily, but wife feels that his diarrhea is worse today. Patient is due for his certolizumab weekly dose and wife states that she will bring that medication from home.    Review of Systems   Constitutional: Negative for fever.   HENT: Negative for ear pain and hearing loss.    Eyes: Negative for blurred vision and pain.   Respiratory: Negative for cough, hemoptysis and sputum production.    Cardiovascular: Negative for chest pain, palpitations and orthopnea.   Gastrointestinal: Negative for abdominal pain, diarrhea, heartburn, nausea and vomiting.   Genitourinary: Negative for dysuria, frequency and urgency.   Musculoskeletal: Negative for back pain and myalgias.   Skin: Negative for itching and rash.        Decreased left wrist swelling. There is a decreased tenderness and erythema that extends along the swelling. Pulses are appropriate. Capillary refill within 2 seconds. No sensory deficit.    Neurological: Negative for dizziness, tingling, tremors and headaches.       Disposition/Barriers to discharge:   None    Consultants/Specialty  Orthopedics  PCP: Lizzie Soto M.D.      Quality Measures  Quality-Core Measures   Reviewed items::  EKG reviewed, Labs reviewed, Medications reviewed and Radiology images reviewed  Lozano catheter::  No Lozano  DVT prophylaxis - mechanical:  SCDs  Ulcer Prophylaxis::  No    Physical Exam       Vitals:    09/29/18 2053 09/30/18 0000 09/30/18 0432 09/30/18 0800   BP:  121/52 140/68 135/85   Pulse:  87 89 (!) 50   Resp:  16 18 19   Temp: 37.4 °C (99.4 °F) 36.6 °C (97.8 °F) 36.4 °C (97.5 °F) 36.8 °C (98.2 °F)   SpO2:  93% 96% 88%   Weight:  90.4 kg (199 lb 4.7 oz)     Height:         Body mass index is 26.29 kg/m². Weight: 90.4 kg (199 lb 4.7 oz)  Oxygen Therapy:  Pulse Oximetry: 88 %, O2 (LPM): 3, O2 Delivery: Silicone Nasal Cannula    Physical Exam   Constitutional: He is oriented to person, place, and time and well-developed, well-nourished, and in no distress. No distress.   HENT:   Head: Normocephalic.   Eyes: Conjunctivae and EOM are normal.   Neck: Normal range of motion. Neck supple. No JVD present. No tracheal deviation present. No thyromegaly present.   Cardiovascular: Normal rate, regular rhythm, normal heart sounds and intact distal pulses.  Exam reveals no gallop and no friction rub.    No murmur heard.  Pulmonary/Chest: Effort normal and breath sounds normal. No respiratory distress. He has no wheezes. He has no rales. He exhibits no tenderness.   Abdominal: Soft. Bowel sounds are normal. He exhibits no distension and no mass. There is no tenderness. There is no rebound and no guarding.   Musculoskeletal: Normal range of motion. He exhibits edema (right hand) and tenderness.   Neurological: He is oriented to person, place, and time. He has normal reflexes. He exhibits normal muscle tone. Coordination normal.   Skin: Skin is warm. He is not diaphoretic.   Left  wrist swollen and swelling extends near the left elbow. There is a tenderness and erythema that extends along the swelling. Pulses are appropriate. Capillary refill within 2 seconds. No sensory deficit.    Psychiatric: Affect and judgment normal.       Assessment/Plan     * Sepsis due to cellulitis ([facility])   Assessment & Plan    RESOLVING. Patient s/p recent about 7 days ago left carpal tunnel release. 24 hours prior admission the hand and forearm started to become more swollen, red, and tender. The pus started to be produced from the incision site., SIRS 4/4, qSOFA 1/3; procalcitonin elevated. LA 1.0. WBC decreased to 8.4. Swelling had decreased.  -sepsis due to cellulitis  -Blood Cx x2, UA and Urine culture, wound culture  -IVF  -vancomycin, ceftriaxone (discussed with pharmacy for broad coverage)  -telemetry, RT/O2 protocol  -strict I/O, daily weights  - Awaiting orthopedics recommends   - NPO for possible procedure at midnight  - OT by ortho  - anti-edema glove ordered by orthopedics         Limited mobility   Assessment & Plan    Wife states he has decreased mobility and even more exacerbated by his . His oxygen baseline is 88% at home, and he has been in mid 80's on 2L. He recovered to 90% with 5L.  - PT  - Nursing to walk patient  - IS          Anemia   Assessment & Plan    -per last office visit note on 8/22, iron studies/B12/folate wnl, hgb 12.9 on admission.  -patient at the time declined further workup        Neuropathy ([facility])   Assessment & Plan    -neuropathy to the foot/ankle  -continue home dose gabapentin        Environmental and seasonal allergies   Assessment & Plan    -continue home dose fluticasone, allegra        CKD (chronic kidney disease), stage III- (present on admission)   Assessment & Plan    -Cr 1.55, around baseline  -avoid nephrotoxic drugs  -renal dosing        COPD (chronic obstructive pulmonary disease) ([facility])- (present on admission)   Assessment & Plan    -continue home dose  symbicort  -RT/O2 protocol        RA (rheumatoid arthritis) (CMS-HCC)- (present on admission)   Assessment & Plan    -continue home dose Arava  -consider continuing Cimzia q14d if hospitalization prolonged  -follows with rheum outpatient        Hyperlipidemia- (present on admission)   Assessment & Plan    -continue home dose crestor

## 2018-09-30 NOTE — CARE PLAN
Problem: Safety  Goal: Will remain free from falls  Outcome: PROGRESSING AS EXPECTED  Pt is a x2 assist to the chair. Pt has an unsteady gait. Chair/bed alarm on, call bell in reach, wife at the bedside, side rails up x2, nonslip socks on. Will continue to monitor.     Problem: Infection  Goal: Will remain free from infection  Outcome: PROGRESSING AS EXPECTED  Left arm is swollen, redness. Pt denies any pain at this time. Sutures on interior wrist are intact, no drainage noted. Will continue to monitor.     Problem: Urinary Elimination:  Goal: Ability to reestablish a normal urinary elimination pattern will improve  Outcome: PROGRESSING AS EXPECTED  Condom cath in place. Pt denies any issues at this time. Pt is voiding adequately.  Will continue to monitor.

## 2018-09-30 NOTE — PROGRESS NOTES
"Pharmacy Kinetics 78 y.o. male on vancomycin day # 2 2018    Currently on Vancomycin 1300 mg iv q24hr    Indication for Treatment: SSTI     Pertinent history per medical record: Admitted on 2018 for L hand swelling and pain.  Patient recently had surgery for carpal tunnel release. He noted the day PTA that his L hand and distal forearm were swelling with worsening warmth, erythema and drainage. Empiric antibiotics initiated for SSTI. Orthopedics consulted.      Other antibiotics: ceftriaxone 2 g IV q24h     Allergies: Cipro xr; Levaquin; and Pcn [penicillins]      List concerns for renal function: age, CKD     Pertinent cultures to date:    PBC x 2: NGTD    urine: Staphylococcus aureus     Recent Labs      18   0649  18   0219   WBC  13.1*  12.8*  8.4   NEUTSPOLYS  77.20*  78.80*   --      Recent Labs      18   0649  18   0219   BUN  21  18  20   CREATININE  1.55*  1.49*  1.47*   ALBUMIN  3.9   --    --       Blood pressure 131/73, pulse 92, temperature 37 °C (98.6 °F), resp. rate 20, height 1.854 m (6' 1\"), weight 90.4 kg (199 lb 4.7 oz), SpO2 96 %. Temp (24hrs), Av °C (98.6 °F), Min:36.4 °C (97.5 °F), Max:37.7 °C (99.9 °F)      A/P   1. Vancomycin dose change: continue current dose  2. Next vancomycin level: 10/1 @ 0130  3. Goal trough: 12-16 mcg/mL  4. Comments: dosing with vancomycin continues. Cellulitis improving. No current plans for surgery. Will plan for level prior to the 3rd total dose to ensure clearance of vancomycin. PBC without growth thus far. Urine positive for staph aureus, unclear source. Patient afebrile. Will keep current dose for now. Will continue to follow.    Pascale Browne, PharmD    "

## 2018-09-30 NOTE — ASSESSMENT & PLAN NOTE
Wife states he has decreased mobility and even more exacerbated by his hospital stay. His oxygen baseline is 88% at home, and he has been as low as in mid 80's on 2L during sepsis. He recovered to 90% with 5L during sepsis. PT and OT seen patient. OT recommends home health for mobility and adl care for home. PT states that no equipment needed, but patient benefit from physical therapy 3/wk. Nursing is to walk patient during the day and offers to patient, but patient often refuses. I spoke to PT and they think skilled nursing would be helpful, but patient is adamant about going home today. Patient has a fall one day prior discharge, but decides to still go home and family agrees.  - PT deems patient very high risk for falling, but after multiple discussion between staff and family members, cognitive evaluation, patient and his family state that they want to leave home anyway despite fall risks which were verbalized by the family and patient  - PT agrees to home health and will consider physical therapy  - Recommend continuous 1L at home patient was able to sat 88-90 during test  - PT states that skilled nursing facility is a better option, patient disagrees

## 2018-09-30 NOTE — PROGRESS NOTES
RAC PIV was removed due to leaking. Attempted insertion of PIV x3 which was unsuccessful. US was then used and 20g RFA PIV was inserted.

## 2018-09-30 NOTE — CONSULTS
DATE OF SERVICE:  09/29/2018    ORTHOPEDIC CONSULTATION    CHIEF COMPLAINT:  Left hand pain, swelling.    HISTORY OF PRESENT ILLNESS:  The patient is a 78-year-old gentleman well known   to me for being 8 days status post left open carpal tunnel release.  He   states he was doing well for the first couple of days; however, over the last   3-4 days, he has had progressive hand, wrist and forearm swelling, got rapidly   worse yesterday.  He was brought to [facility], admitted   to the hospitalist.  I was consulted as the [facility] surgeon   on-call.  Upon seeing the patient, he has been on IV antibiotics overnight, he   states his pain is somewhat improved, approximately 5/10 in severity at this   time.  The preoperative numbness in his hand is slightly improved.  He denies   any subjective fever or chills, but has had some slight malaise.  He has been   somewhat amenable to pain medication, but he has not yet tried any anti-edema   compression or ice.    PAST MEDICAL HISTORY:  Hyperlipidemia, chronic kidney disease, COPD,   rheumatoid arthritis.    PAST SURGICAL HISTORY:  Eight days status post left open carpal tunnel release   with a history of left nephrectomy, tonsillectomy, cystoscopy, nasal   polypectomy.    MEDICATIONS:  Polypharmacy, please see chart.    ALLERGIES:  CIPROFLOXACIN, LEVOFLOXACIN, PENICILLIN.    FAMILY HISTORY:  Negative for bleeding disorders or anesthetic reactions.    SOCIAL HISTORY:  Lives locally.  He is .  He denies current tobacco,   alcohol, or drug use.  He does have a 40 pack-year history of smoking.    REVIEW OF SYSTEMS:  Thorough review of systems was attempted and negative   except for past medical history and history of present illness.    PHYSICAL EXAMINATION:  GENERAL:  He is overall well in appearance, nontoxic, alert and oriented x3.  HEENT:  Normocephalic, atraumatic.  NEUROLOGIC:  Cranial nerves II-XII are grossly  intact.  CARDIOVASCULAR:  2+ distal pulses.  EXTREMITIES:  No cyanosis, clubbing, or edema.  PULMONARY:  Breathing comfortably on room air without labor.  ABDOMEN:  Flat and unremarkable.  SKIN:  No rashes, jaundice, or cyanosis.  SPINE:  Clinically midline.  GAIT:  Currently, nonambulatory in a hospital bed.  MUSCULOSKELETAL:  Focused examination of his left hand, the carpal tunnel   surgical site has nylon sutures in place, approximately 1 cm rim of erythema,   no drainage whatsoever, no palpable fluctuance.  He has diffuse edema of all   digits, but no Kanavel sign.  He is able to fire his all flexor tendons with   half composite , with only mild discomfort.  He has some swelling   extending into his forearm.  NEUROLOGIC:  Sensation present in median, radial, ulnar nerves.    IMAGING:  No imaging.    ASSESSMENT:  A 78-year-old male with multiple medical comorbidities, 8 days   status post left open carpal tunnel release with moderate-to-severe soft   tissue swelling, mild cellulitis.  No obvious deep abscess or evidence of   flexor tenosynovitis.    PLAN:  I had a thorough discussion with the patient regarding his diagnosis.    To me, his hand has the appearance of a hand that just had surgery and had no   attempt at motion or sedentary use.  I therefore recommended aggressive finger   range of motion, anti-edema stocking, ice, anti-inflammatory medications, and   IV antibiotics.  If he is not improving clinically in 1-2 days, then we may   consider irrigation and debridement of his surgical site, but I do think most   of this is edema from lack of motion.  The patient states he understands this   plan.  Continue admission to hospitalist for IV antibiotics until clinically   improved.       ____________________________________     MD FATUMA Jenkins / NIKKIE    DD:  09/30/2018 11:27:13  DT:  09/30/2018 14:53:09    D#:  [id number]  Job#:  [id number]

## 2018-09-30 NOTE — PROGRESS NOTES
Pt was assisted x2 into the chair. Pt has an wide unsteady gait. Chair alarm is on, O2 3LNC, call bell in reach, wife at bedside.

## 2018-09-30 NOTE — PROGRESS NOTES
"   Orthopaedic PA Progress Note    Interval changes:better today , slightly less swelling , afebrile. In chair and eating. Requesting trial antihistamine for swelling, we discussed fall risk and tremor exacerbation concerns. Pt agrees on a one-time dose.    ROS - Patient denies any new issues. No chest pain, dyspnea, or fever.  Pain well controlled.    Blood pressure 131/73, pulse 92, temperature 37 °C (98.6 °F), resp. rate 20, height 1.854 m (6' 1\"), weight 90.4 kg (199 lb 4.7 oz), SpO2 96 %.    Patient seen and examined  No acute distress  Breathing non labored  RRR  Surgical dressing is clean, dry, and intact. Patient clearly fires forearm flexors, forearm extensors, and moves all five fingers without issue . Sensation is intact to light touch throughout median, ulnar, and radial nerve distributions. Strong and palpable radial pulses with capillary refill less than 2 seconds. No arm or hand discomfort.    Recent Labs      09/28/18   2114  09/29/18   0649  09/30/18   0219   WBC  13.1*  12.8*  8.4   RBC  3.98*  3.52*  3.17*   HEMOGLOBIN  12.9*  11.5*  10.2*   HEMATOCRIT  38.7*  34.8*  31.2*   MCV  97.2  98.9*  98.4*   MCH  32.4  32.7  32.2   MCHC  33.3*  33.0*  32.7*   RDW  55.7*  56.8*  56.3*   PLATELETCT  151*  134*  111*   MPV  10.1  9.9  10.5     Active Hospital Problems    Diagnosis   • Sepsis due to cellulitis ([facility]) [L03.90, A41.9]     Priority: High   • Limited mobility [Z74.09]     Priority: Medium   • Environmental and seasonal allergies [J30.89]     Priority: Low   • Neuropathy (HCC) [G62.9]     Priority: Low   • Anemia [D64.9]     Priority: Low   • CKD (chronic kidney disease), stage III [N18.3]     Priority: Low   • COPD (chronic obstructive pulmonary disease) ([facility]) [J44.9]     Priority: Low   • Hyperlipidemia [E78.5]     Priority: Low   • RA (rheumatoid arthritis) ([facility]) [M06.9]     Priority: Low     BOTH HANDS        Assessment/Plan:  9 days post-op CTR LH  Wt bearing status - AT, anti-edema " glove  PT/OT-initiated  Wound care:incision CDWA  Drains - no  Lozano-no  Sutures/Staples out- 10-14 days post operatively  Antibiotics: rocephin IV per med team  DVT Prophylaxis- TEDS/SCDs/Foot pumps.   Future Procedures - OK to eat, clinically improving  Case Coordination for Discharge Planning - Disposition will recheck tomorrow

## 2018-09-30 NOTE — CARE PLAN
Problem: Infection  Goal: Will remain free from infection  Outcome: PROGRESSING AS EXPECTED  Teach proper hand hygiene to the patient and family members

## 2018-09-30 NOTE — PROGRESS NOTES
Informed Dr Hamm pt will not have surgery per and certolizumab to be held while pt receiving antibiotics per Dr tena. Diet was reordered. Urine culture positive for staph aureus

## 2018-10-01 PROBLEM — R19.7 DIARRHEA: Status: ACTIVE | Noted: 2018-10-01

## 2018-10-01 LAB
ALBUMIN SERPL BCP-MCNC: 2.9 G/DL (ref 3.2–4.9)
ALBUMIN/GLOB SERPL: 0.9 G/DL
ALP SERPL-CCNC: 53 U/L (ref 30–99)
ALT SERPL-CCNC: 24 U/L (ref 2–50)
ANION GAP SERPL CALC-SCNC: 7 MMOL/L (ref 0–11.9)
AST SERPL-CCNC: 44 U/L (ref 12–45)
BASOPHILS # BLD AUTO: 0.6 % (ref 0–1.8)
BASOPHILS # BLD: 0.04 K/UL (ref 0–0.12)
BILIRUB SERPL-MCNC: 0.6 MG/DL (ref 0.1–1.5)
BUN SERPL-MCNC: 20 MG/DL (ref 8–22)
C DIFF DNA SPEC QL NAA+PROBE: NEGATIVE
C DIFF TOX A+B STL QL IA: NEGATIVE
C DIFF TOX GENS STL QL NAA+PROBE: NORMAL
CALCIUM SERPL-MCNC: 7.8 MG/DL (ref 8.5–10.5)
CHLORIDE SERPL-SCNC: 108 MMOL/L (ref 96–112)
CO2 SERPL-SCNC: 21 MMOL/L (ref 20–33)
CREAT SERPL-MCNC: 1.42 MG/DL (ref 0.5–1.4)
EOSINOPHIL # BLD AUTO: 0.13 K/UL (ref 0–0.51)
EOSINOPHIL NFR BLD: 1.9 % (ref 0–6.9)
ERYTHROCYTE [DISTWIDTH] IN BLOOD BY AUTOMATED COUNT: 54.9 FL (ref 35.9–50)
FERRITIN SERPL-MCNC: 632.5 NG/ML (ref 22–322)
FOLATE SERPL-MCNC: 24 NG/ML
GLOBULIN SER CALC-MCNC: 3.1 G/DL (ref 1.9–3.5)
GLUCOSE SERPL-MCNC: 102 MG/DL (ref 65–99)
HCT VFR BLD AUTO: 32.1 % (ref 42–52)
HEMOCCULT STL QL: POSITIVE
HGB BLD-MCNC: 10.6 G/DL (ref 14–18)
HGB RETIC QN AUTO: 27.2 PG/CELL (ref 29–35)
IMM GRANULOCYTES # BLD AUTO: 0.02 K/UL (ref 0–0.11)
IMM GRANULOCYTES NFR BLD AUTO: 0.3 % (ref 0–0.9)
IMM RETICS NFR: 9.8 % (ref 9.3–17.4)
IRON SATN MFR SERPL: 7 % (ref 15–55)
IRON SERPL-MCNC: 15 UG/DL (ref 50–180)
LDH SERPL L TO P-CCNC: 224 U/L (ref 107–266)
LYMPHOCYTES # BLD AUTO: 1.02 K/UL (ref 1–4.8)
LYMPHOCYTES NFR BLD: 14.7 % (ref 22–41)
MAGNESIUM SERPL-MCNC: 2 MG/DL (ref 1.5–2.5)
MCH RBC QN AUTO: 32.1 PG (ref 27–33)
MCHC RBC AUTO-ENTMCNC: 33 G/DL (ref 33.7–35.3)
MCV RBC AUTO: 97.3 FL (ref 81.4–97.8)
MONOCYTES # BLD AUTO: 1.09 K/UL (ref 0–0.85)
MONOCYTES NFR BLD AUTO: 15.7 % (ref 0–13.4)
NEUTROPHILS # BLD AUTO: 4.66 K/UL (ref 1.82–7.42)
NEUTROPHILS NFR BLD: 66.8 % (ref 44–72)
NRBC # BLD AUTO: 0 K/UL
NRBC BLD-RTO: 0 /100 WBC
PHOSPHATE SERPL-MCNC: 2.4 MG/DL (ref 2.5–4.5)
PLATELET # BLD AUTO: 129 K/UL (ref 164–446)
PMV BLD AUTO: 10.5 FL (ref 9–12.9)
POTASSIUM SERPL-SCNC: 3.5 MMOL/L (ref 3.6–5.5)
PROT SERPL-MCNC: 6 G/DL (ref 6–8.2)
RBC # BLD AUTO: 3.3 M/UL (ref 4.7–6.1)
RETICS # AUTO: 0.03 M/UL (ref 0.04–0.06)
RETICS/RBC NFR: 1 % (ref 0.8–2.1)
SODIUM SERPL-SCNC: 136 MMOL/L (ref 135–145)
TIBC SERPL-MCNC: 204 UG/DL (ref 250–450)
TSH SERPL DL<=0.005 MIU/L-ACNC: 3.94 UIU/ML (ref 0.38–5.33)
VANCOMYCIN TROUGH SERPL-MCNC: 12.8 UG/ML (ref 10–20)
VIT B12 SERPL-MCNC: 1002 PG/ML (ref 211–911)
WBC # BLD AUTO: 7 K/UL (ref 4.8–10.8)

## 2018-10-01 PROCEDURE — 700105 HCHG RX REV CODE 258: Performed by: STUDENT IN AN ORGANIZED HEALTH CARE EDUCATION/TRAINING PROGRAM

## 2018-10-01 PROCEDURE — 85046 RETICYTE/HGB CONCENTRATE: CPT

## 2018-10-01 PROCEDURE — 83615 LACTATE (LD) (LDH) ENZYME: CPT

## 2018-10-01 PROCEDURE — 82272 OCCULT BLD FECES 1-3 TESTS: CPT

## 2018-10-01 PROCEDURE — 83735 ASSAY OF MAGNESIUM: CPT

## 2018-10-01 PROCEDURE — 82746 ASSAY OF FOLIC ACID SERUM: CPT

## 2018-10-01 PROCEDURE — 700102 HCHG RX REV CODE 250 W/ 637 OVERRIDE(OP): Performed by: STUDENT IN AN ORGANIZED HEALTH CARE EDUCATION/TRAINING PROGRAM

## 2018-10-01 PROCEDURE — A9270 NON-COVERED ITEM OR SERVICE: HCPCS | Performed by: STUDENT IN AN ORGANIZED HEALTH CARE EDUCATION/TRAINING PROGRAM

## 2018-10-01 PROCEDURE — 99232 SBSQ HOSP IP/OBS MODERATE 35: CPT | Mod: GC | Performed by: STUDENT IN AN ORGANIZED HEALTH CARE EDUCATION/TRAINING PROGRAM

## 2018-10-01 PROCEDURE — 83550 IRON BINDING TEST: CPT

## 2018-10-01 PROCEDURE — 700111 HCHG RX REV CODE 636 W/ 250 OVERRIDE (IP): Performed by: STUDENT IN AN ORGANIZED HEALTH CARE EDUCATION/TRAINING PROGRAM

## 2018-10-01 PROCEDURE — 36415 COLL VENOUS BLD VENIPUNCTURE: CPT

## 2018-10-01 PROCEDURE — 83010 ASSAY OF HAPTOGLOBIN QUANT: CPT

## 2018-10-01 PROCEDURE — 83540 ASSAY OF IRON: CPT

## 2018-10-01 PROCEDURE — 87493 C DIFF AMPLIFIED PROBE: CPT

## 2018-10-01 PROCEDURE — 80053 COMPREHEN METABOLIC PANEL: CPT

## 2018-10-01 PROCEDURE — 87324 CLOSTRIDIUM AG IA: CPT

## 2018-10-01 PROCEDURE — 770006 HCHG ROOM/CARE - MED/SURG/GYN SEMI*

## 2018-10-01 PROCEDURE — 82728 ASSAY OF FERRITIN: CPT

## 2018-10-01 PROCEDURE — 82607 VITAMIN B-12: CPT

## 2018-10-01 PROCEDURE — 97166 OT EVAL MOD COMPLEX 45 MIN: CPT

## 2018-10-01 PROCEDURE — 84443 ASSAY THYROID STIM HORMONE: CPT

## 2018-10-01 PROCEDURE — G8987 SELF CARE CURRENT STATUS: HCPCS | Mod: CK

## 2018-10-01 PROCEDURE — G8988 SELF CARE GOAL STATUS: HCPCS | Mod: CI

## 2018-10-01 PROCEDURE — 84100 ASSAY OF PHOSPHORUS: CPT

## 2018-10-01 PROCEDURE — 85025 COMPLETE CBC W/AUTO DIFF WBC: CPT

## 2018-10-01 PROCEDURE — 80202 ASSAY OF VANCOMYCIN: CPT

## 2018-10-01 RX ORDER — LOPERAMIDE HYDROCHLORIDE 2 MG/1
2 CAPSULE ORAL 2 TIMES DAILY PRN
Status: DISCONTINUED | OUTPATIENT
Start: 2018-10-01 | End: 2018-10-04 | Stop reason: HOSPADM

## 2018-10-01 RX ORDER — CEFAZOLIN SODIUM 2 G/100ML
2 INJECTION, SOLUTION INTRAVENOUS EVERY 8 HOURS
Status: DISCONTINUED | OUTPATIENT
Start: 2018-10-01 | End: 2018-10-04 | Stop reason: HOSPADM

## 2018-10-01 RX ORDER — POTASSIUM CHLORIDE 20 MEQ/1
40 TABLET, EXTENDED RELEASE ORAL ONCE
Status: COMPLETED | OUTPATIENT
Start: 2018-10-01 | End: 2018-10-01

## 2018-10-01 RX ADMIN — POTASSIUM CHLORIDE 40 MEQ: 1500 TABLET, EXTENDED RELEASE ORAL at 05:00

## 2018-10-01 RX ADMIN — BUDESONIDE AND FORMOTEROL FUMARATE DIHYDRATE 2 PUFF: 80; 4.5 AEROSOL RESPIRATORY (INHALATION) at 16:38

## 2018-10-01 RX ADMIN — GABAPENTIN 300 MG: 300 CAPSULE ORAL at 16:38

## 2018-10-01 RX ADMIN — FLUTICASONE PROPIONATE 50 MCG: 50 SPRAY, METERED NASAL at 05:42

## 2018-10-01 RX ADMIN — GABAPENTIN 300 MG: 300 CAPSULE ORAL at 12:00

## 2018-10-01 RX ADMIN — VANCOMYCIN HYDROCHLORIDE 1300 MG: 100 INJECTION, POWDER, LYOPHILIZED, FOR SOLUTION INTRAVENOUS at 03:01

## 2018-10-01 RX ADMIN — LEFLUNOMIDE 20 MG: 20 TABLET ORAL at 08:04

## 2018-10-01 RX ADMIN — OXYCODONE HYDROCHLORIDE 5 MG: 5 TABLET ORAL at 15:02

## 2018-10-01 RX ADMIN — BUDESONIDE AND FORMOTEROL FUMARATE DIHYDRATE 2 PUFF: 80; 4.5 AEROSOL RESPIRATORY (INHALATION) at 05:43

## 2018-10-01 RX ADMIN — TIOTROPIUM BROMIDE 1 CAPSULE: 18 CAPSULE ORAL; RESPIRATORY (INHALATION) at 06:00

## 2018-10-01 RX ADMIN — CEFAZOLIN SODIUM 2 G: 2 INJECTION, SOLUTION INTRAVENOUS at 12:01

## 2018-10-01 RX ADMIN — FEXOFENADINE HCL 180 MG: 60 TABLET, FILM COATED ORAL at 05:43

## 2018-10-01 RX ADMIN — GABAPENTIN 300 MG: 300 CAPSULE ORAL at 05:43

## 2018-10-01 RX ADMIN — CEFAZOLIN SODIUM 2 G: 2 INJECTION, SOLUTION INTRAVENOUS at 22:24

## 2018-10-01 RX ADMIN — ROSUVASTATIN CALCIUM 20 MG: 20 TABLET, FILM COATED ORAL at 16:38

## 2018-10-01 RX ADMIN — DIBASIC SODIUM PHOSPHATE, MONOBASIC POTASSIUM PHOSPHATE AND MONOBASIC SODIUM PHOSPHATE 2 TABLET: 852; 155; 130 TABLET ORAL at 05:15

## 2018-10-01 RX ADMIN — CEFTRIAXONE SODIUM 2 G: 2 INJECTION, POWDER, FOR SOLUTION INTRAMUSCULAR; INTRAVENOUS at 05:44

## 2018-10-01 ASSESSMENT — ENCOUNTER SYMPTOMS
DOUBLE VISION: 0
DIARRHEA: 1
TINGLING: 1
DIZZINESS: 0
TREMORS: 0
FEVER: 0
PALPITATIONS: 0
DIAPHORESIS: 0
SHORTNESS OF BREATH: 0
ABDOMINAL PAIN: 0
HEADACHES: 0
TINGLING: 0
WHEEZING: 0
BLOOD IN STOOL: 0
NECK PAIN: 0
TREMORS: 1
EYE PAIN: 0
BLURRED VISION: 0
VOMITING: 0
CHILLS: 0
SEIZURES: 0
SPEECH CHANGE: 0
SENSORY CHANGE: 0
SPUTUM PRODUCTION: 0
MYALGIAS: 0
BACK PAIN: 0
NAUSEA: 0
DEPRESSION: 0
CONSTIPATION: 0
HEMOPTYSIS: 0
SORE THROAT: 0
FLANK PAIN: 0
NERVOUS/ANXIOUS: 0
ORTHOPNEA: 0
COUGH: 0
HEARTBURN: 0
STRIDOR: 0
WEAKNESS: 0

## 2018-10-01 ASSESSMENT — LIFESTYLE VARIABLES: SUBSTANCE_ABUSE: 0

## 2018-10-01 ASSESSMENT — COGNITIVE AND FUNCTIONAL STATUS - GENERAL
DRESSING REGULAR LOWER BODY CLOTHING: A LOT
TOILETING: A LOT
PERSONAL GROOMING: A LITTLE
HELP NEEDED FOR BATHING: A LOT
DRESSING REGULAR UPPER BODY CLOTHING: A LOT
DAILY ACTIVITIY SCORE: 14
SUGGESTED CMS G CODE MODIFIER DAILY ACTIVITY: CK
EATING MEALS: A LITTLE

## 2018-10-01 ASSESSMENT — PAIN SCALES - GENERAL
PAINLEVEL_OUTOF10: 0
PAINLEVEL_OUTOF10: 2
PAINLEVEL_OUTOF10: 0
PAINLEVEL_OUTOF10: 0

## 2018-10-01 ASSESSMENT — ACTIVITIES OF DAILY LIVING (ADL): TOILETING: INDEPENDENT

## 2018-10-01 NOTE — PROGRESS NOTES
Report received from nightshift RN. Patient resting in bed, alert and oriented. Complains of frequent, loose, mucusy stools this am. Contact isolation placed, pending results of toxin screen. Swelling in L hand improved, but extremity is warm to touch and red. Denies pain at this time. Barrier wipes in use. Continue to monitor.

## 2018-10-01 NOTE — CARE PLAN
Problem: Bowel/Gastric:  Goal: Normal bowel function is maintained or improved  Outcome: PROGRESSING SLOWER THAN EXPECTED  Pt has little control of BM's and is often incontinent, or asks for Bedpan after he has had movement.

## 2018-10-01 NOTE — PROGRESS NOTES
Internal Medicine Interval Note  Note Author: Michael Hamm M.D.     Name Barry Torres     1940   Age/Sex 78 y.o. male   MRN [id number]   Code Status Full     After 5PM or if no immediate response to page, please call for cross-coverage  Attending/Team: Dr. Smyth See Patient List for primary contact information  Call [phone] to page    1st Call - Day Intern (R1):   Dr. Hamm 2nd Call - Day Sr. Resident (R2/R3):   Dr. Khalil     Reason for interval visit  (Principal Problem)   Sepsis 2/2 cellulitis    Interval Problem Daily Status Update  (24 hours, problem oriented, brief subjective history, new lab/imaging data pertinent to that problem)   Patient feels that his swelling is not improving. It is more swollen and red than yesterday. Patient still denies tingling or numbness. The redness extends beyond the initial demarcation. He denies any fevers or chills. He has been developing more frequent diarrhea and it is becoming more loose. He has loose bowel movements at home and takes loperamide. His diarrhea is non-bloody. Denies nausea or vomiting.    Review of Systems   Constitutional: Negative for chills and fever.   HENT: Negative for ear pain and hearing loss.    Eyes: Negative for blurred vision and pain.   Respiratory: Negative for cough, hemoptysis and sputum production.    Cardiovascular: Negative for chest pain, palpitations and orthopnea.   Gastrointestinal: Positive for diarrhea. Negative for abdominal pain, heartburn, nausea and vomiting.   Genitourinary: Negative for dysuria, frequency and urgency.   Musculoskeletal: Negative for back pain and myalgias.   Skin: Negative for itching and rash.        Left wrist swollen and erythematous. Erythema spreads to elbow. Mild pain. No numbness. No tingling.   Neurological: Negative for dizziness, tingling, tremors and headaches.       Disposition/Barriers to discharge:   None    Consultants/Specialty  Orthopedics  PCP: Lizzie  LAMAR Soto M.D.      Quality Measures  Quality-Core Measures   Reviewed items::  EKG reviewed, Labs reviewed, Medications reviewed and Radiology images reviewed  Lozano catheter::  No Lozano  DVT prophylaxis - mechanical:  SCDs  Ulcer Prophylaxis::  No    Physical Exam       Vitals:    10/01/18 0248 10/01/18 0800 10/01/18 0855 10/01/18 1300   BP: (!) 161/91  160/93 150/86   Pulse: (!) 102  (!) 108 89   Resp: 18 18 18   Temp: 36.7 °C (98.1 °F)  36.8 °C (98.2 °F) 36.7 °C (98.1 °F)   SpO2: 92% 92% 92% 91%   Weight:       Height:         Body mass index is 26.47 kg/m². Weight: 91 kg (200 lb 9.9 oz)  Oxygen Therapy:  Pulse Oximetry: 91 %, O2 (LPM): 2.5, O2 Delivery: Silicone Nasal Cannula    Physical Exam   Constitutional: He is oriented to person, place, and time and well-developed, well-nourished, and in no distress. No distress.   HENT:   Head: Normocephalic.   Eyes: Conjunctivae and EOM are normal.   Neck: Normal range of motion. Neck supple. No JVD present. No tracheal deviation present. No thyromegaly present.   Cardiovascular: Normal rate, regular rhythm, normal heart sounds and intact distal pulses.  Exam reveals no gallop and no friction rub.    No murmur heard.  Pulmonary/Chest: Effort normal and breath sounds normal. No respiratory distress. He has no wheezes. He has no rales. He exhibits no tenderness.   Abdominal: Soft. Bowel sounds are normal. He exhibits no distension and no mass. There is no tenderness. There is no rebound and no guarding.   Musculoskeletal: Normal range of motion. He exhibits edema (right hand) and tenderness.   Neurological: He is oriented to person, place, and time. He has normal reflexes. He exhibits normal muscle tone. Coordination normal.   Skin: Skin is warm. He is not diaphoretic.   Increased left wrist swelling. Swelling extends near the left elbow. There is a tenderness and erythema that extends along the swelling. Pulses are appropriate. Capillary refill within 2 seconds. No  sensory deficit.    Psychiatric: Affect and judgment normal.       Assessment/Plan     * Sepsis due to cellulitis (HCC)   Assessment & Plan    Worseneing. Sepsis has resoled but cellulitis is getting worse. Patient s/p recent about 7 days ago left carpal tunnel release. 24 hours prior admission the hand and forearm started to become more swollen, red, and tender. The pus started to be produced from the incision site., SIRS 4/4, qSOFA 1/3; procalcitonin elevated. LA 1.0. WBC decreased to 8.4. Swelling initially improved, but it became worse now. Patient was initially on vancomycin and ceftriaxone, but then switched to cefazolin.  - sepsis due to cellulitis  - Blood Cx x2, UA and Urine culture, wound culture, urine culture +S.aureus 50-100k  - IVF  - cefazolin  - RT/O2 protocol  - strict I/O, daily weights  - Awaiting orthopedics recommends   - anti-edema glove ordered by orthopedics         Diarrhea   Assessment & Plan    Patient has baseline diarrhea at home and take loperamide. However his diarrhea worsened during hospital stay. Patient was on vancomycin IV and ceftriaxone. FOBT+ (previous consistently negative including FOBT on admission).   - C. Diff pending  - Replete electrolyte abnormalities  - Consider IVF  - De-escalation of antibiotics to Cefazolin        Limited mobility   Assessment & Plan    Wife states he has decreased mobility and even more exacerbated by his hospital stay. His oxygen baseline is 88% at home, and he has been in mid 80's on 2L. He recovered to 90% with 5L.  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  Hgb 12.9 on admission. B12/folate/LDH normal. TSH 3.9. Ferritin elevate 632 with high TIBC and low saturation. FOBT was positive, but previously consistently negative, so GI bleed less likely to  contribute to his anemia.  - Consider heme/onc outpatient follow up        Neuropathy ([facility])   Assessment & Plan    -neuropathy to the foot/ankle  -continue home dose gabapentin        Environmental and seasonal allergies   Assessment & Plan    -continue home dose fluticasone, allegra        CKD (chronic kidney disease), stage III ([facility])- (present on admission)   Assessment & Plan    -Cr 1.55, around baseline  -avoid nephrotoxic drugs  -renal dosing        COPD (chronic obstructive pulmonary disease) ([facility])- (present on admission)   Assessment & Plan    -continue home dose symbicort  -RT/O2 protocol        RA (rheumatoid arthritis) ([facility])- (present on admission)   Assessment & Plan    -continue home dose Arava  -consider continuing Cimzia q14d if hospitalization prolonged  -follows with rheum outpatient        Hyperlipidemia- (present on admission)   Assessment & Plan    -continue home dose crestor

## 2018-10-01 NOTE — PROGRESS NOTES
Call placed to [employer] [facility] team re: frequent loose stools this shift, which is not his norm per spouse. Spec. Collected. RDonoghue<RN

## 2018-10-01 NOTE — ASSESSMENT & PLAN NOTE
RESOLVING. Most likely baseline exacerbation with antibiotics. Patient has baseline diarrhea at home and take loperamide. However his diarrhea worsened during hospital stay. Patient was on vancomycin IV and ceftriaxone. FOBT+ (previous consistently negative including FOBT on admission). C. Diff Toxin negative. After administration of loperamide diarrhea has resolved. Lactoferrin+.

## 2018-10-01 NOTE — DISCHARGE PLANNING
Anticipated Discharge Disposition: Home    Action: Pt lives with spouse and is independent at home.  Pt wears 2L O2 at night through [employer].  Pt has walker but doesn't use it.  Pharmacy is [facility] on [address] and [address] [facility] mail order.  Pt states no needs.     Barriers to Discharge: medical clearance    Plan: Home

## 2018-10-01 NOTE — PROGRESS NOTES
0200 Lab was called RE; pt was due to have vanco trough at 0130. Phlebotomist will be up shortly.     0245 lab was called re: vanco trough still not resulted.

## 2018-10-01 NOTE — PROGRESS NOTES
Dr Morales returned call, orders rec'd. Pt placed on Special Contact Precautions. MD informed of Heme Positive stool. RDregina<RN

## 2018-10-01 NOTE — PROGRESS NOTES
Patient rolled onto bed pan this morning, sacrum pink and blanching. Patient incontinent, mepilex not appropriate at this time. Left hand with 4+ edema, patient has generalized edema throughout. Skin intact.

## 2018-10-01 NOTE — PROGRESS NOTES
"   Orthopaedic PA Progress Note    Interval changes:C. Dificile dx - new. Hand better.    ROS - Patient denies any new issues. No chest pain, dyspnea, or fever.  Pain well controlled.    Blood pressure 150/86, pulse 89, temperature 36.7 °C (98.1 °F), resp. rate 18, height 1.854 m (6' 1\"), weight 91 kg (200 lb 9.9 oz), SpO2 91 %.    Patient seen and examined  No acute distress  Breathing non labored  RRR  Surgical dressing is clean, dry, and intact. Reduced LUE hand edema today.Patient clearly fires forearm flexors, forearm extensors, and moves all five fingers without issue . Sensation is intact to light touch throughout median, ulnar, and radial nerve distributions. Strong and palpable radial pulses with capillary refill less than 2 seconds. No arm or hand discomfort.      Recent Labs      09/29/18   0649  09/30/18   0219  10/01/18   0209   WBC  12.8*  8.4  7.0   RBC  3.52*  3.17*  3.30*   HEMOGLOBIN  11.5*  10.2*  10.6*   HEMATOCRIT  34.8*  31.2*  32.1*   MCV  98.9*  98.4*  97.3   MCH  32.7  32.2  32.1   MCHC  33.0*  32.7*  33.0*   RDW  56.8*  56.3*  54.9*   PLATELETCT  134*  111*  129*   MPV  9.9  10.5  10.5       Active Hospital Problems    Diagnosis   • Diarrhea [R19.7]     Priority: High   • Sepsis due to cellulitis ([facility]) [L03.90, A41.9]     Priority: High   • Limited mobility [Z74.09]     Priority: Medium   • Environmental and seasonal allergies [J30.89]     Priority: Low   • Neuropathy ([facility]) [G62.9]     Priority: Low   • Anemia [D64.9]     Priority: Low   • CKD (chronic kidney disease), stage III ([facility]) [N18.3]     Priority: Low   • COPD (chronic obstructive pulmonary disease) ([facility]) [J44.9]     Priority: Low   • Hyperlipidemia [E78.5]     Priority: Low   • RA (rheumatoid arthritis) ([facility]) [M06.9]     Priority: Low     BOTH HANDS        Assessment/Plan:  10 days post-op CTR LH  Swelling improved  New Dx. C. Dificile, with diarrhea    Wt bearing status - AT, anti-edema glove, encourage ROM qHr " WA  PT/OT-initiated  Wound care:incision CDWA  Drains - no  Lozano-no  Sutures/Staples out- 10-14 days post operatively  Antibiotics: rocephin IV per med team  DVT Prophylaxis- TEDS/SCDs/Foot pumps.   Future Procedures - OK to eat, clinically improving  Case Coordination for Discharge Planning - Disposition will recheck tomorrow

## 2018-10-01 NOTE — DISCHARGE PLANNING
DISCHARGE CHECKLIST     · Is all DME in place? No                     If yes, has patient been educated on use of DME?   No     · Is consent for controlled substance signed and placed in chart? No     · Did patient receive all prescriptions? No     If electronically prescribed, did you call pharmacy to verify availability?   No     · Does the patient have transportation to  prescriptions? Yes  · Does the patient have the financial means to obtain prescriptions? Yes     · Have all necessary follow up appointments been scheduled?  PCP or D/C clinic- No  Specialty- No - needs a F/U appt to remove sutures/staples 10-14 days post-op  No, please explain- not medically cleared         · Has home health referral been suggested or already sent? No     · Did you review notes from PT/OT/SLP and did you verify d/c plan is consistent with their recommendation? (ie diet education, supplies) - No        · Does patient need follow up wound care? Yes, not medically cleared, but will likely need wound care as an outpt  If yes, have post acute arrangements been made?   -Supplies  -Wound clinic follow up  -HH referral for wound care  -Home wound vac  Please specify if applicable- will likely need wound care     · Did all specialties sign off for discharge? No     · Has hand off report been called to receiving caregivers?   SNF/Rehab/LTAC                          Home Health  PCP  Caregiver  Group Home  Acute to Acute     Please specify all applicable hand offs-      · Has outpatient dialysis been arranged?  Not Indicated  · Is patient on anticoagulation? No  If on coumadin, is f/u INR monitoring arranged? No     Education to be included in Discharge-  Did patient/caregiver receive home care instructions from:  · DM educator- Not Indicated  · COPD educator- Not Indicated     Did you include core measure specific education?  · CHF- Not Indicated  · PNA- Not Indicated  · Stroke- Not Indicated  · MI- Not Indicated     Are diagnosis  specific instructions in AVS?  Yes     Educated  Patient/caregiver on symptom management with teach back?  Yes     Educated patient/caregiver on pain management? (see pcp for long term pain management)- Yes     If caregiver education has taken place, please specify topics discussed-

## 2018-10-01 NOTE — THERAPY
"Occupational Therapy Evaluation completed.   Functional Status:  Pt is a 77 y/o male admitted for celulitis/sepsis of L hand following CTR sx last week. Pt has intense swelling of L hand above and below compressive sleeve. Pt reports elevating hand, but swelling still high. Pt also has decreased mobility and ADL performance d/t sepsis. Pt usually uses a 4WW, however could only stand for 30 seconds with FWW. Pt reports this has happened before and his strength increased significantly as medical condition improved. Orders were placed for aggressive ROM of L hand. PROM of hand following AROM performed. Pt tolerated well, increased pain. Educated on elevation and ice, given handout on tendon glides to perform in room. Pt will benefit from outpatient OT for ROM and exercise of L hand, will likely need home health for mobility and ADL care for home.   Plan of Care: Will benefit from Occupational Therapy 3 times per week  Discharge Recommendations:  Equipment: Will Continue to Assess for Equipment Needs. Post-acute therapy Discharge to home with outpatient or home health for additional skilled therapy services.    See \"Rehab Therapy-Acute\" Patient Summary Report for complete documentation.    "

## 2018-10-01 NOTE — PROGRESS NOTES
DISCHARGE CHECKLIST    Update 10/1:  Patient pending evaluation by PT/OT.  PT may need a home walker.  Pt has home O2 already, so no need for this.  Pt pending D/C home, unless PT/OT evaluation indicates otherwise.      · Is all DME in place? No    Based on PT/OT evaluation, patient may need a walker for home.  Pt has home O2.                If yes, has patient been educated on use of DME?   No     · Is consent for controlled substance signed and placed in chart? No     · Did patient receive all prescriptions? No     If electronically prescribed, did you call pharmacy to verify availability?   No     · Does the patient have transportation to  prescriptions? Yes  · Does the patient have the financial means to obtain prescriptions? Yes     · Have all necessary follow up appointments been scheduled?  PCP or D/C clinic- No  Specialty- No - needs a F/U appt to remove sutures/staples 10-14 days post-op  No, please explain- not medically cleared         · Has home health referral been suggested or already sent? No     Did you review notes from PT/OT/SLP and did you verify d/c plan is consistent with their recommendation? (ie diet education, supplies) -  PT/OT has not seen this patient yet.       Does patient need follow up wound care? Yes, not medically cleared, but will likely need wound care as an outpt  If yes, have post acute arrangements been made?   -Supplies  -Wound clinic follow up  - referral for wound care  -Home wound vac  Please specify if applicable- will likely need wound care     · Did all specialties sign off for discharge? No     · Has hand off report been called to receiving caregivers?   SNF/Rehab/LT[facility]                          Home Health  PCP  Caregiver  Group Home  Acute to Acute     Please specify all applicable hand offs-      · Has outpatient dialysis been arranged?  Not Indicated  · Is patient on anticoagulation? No  If on coumadin, is f/u INR monitoring arranged? No     Education to be  included in Discharge-  Did patient/caregiver receive home care instructions from:  · DM educator- Not Indicated  · COPD educator- Not Indicated     Did you include core measure specific education?  · CHF- Not Indicated  · PNA- Not Indicated  · Stroke- Not Indicated  · MI- Not Indicated     Are diagnosis specific instructions in AVS?  Yes     Educated  Patient/caregiver on symptom management with teach back?  Yes     Educated patient/caregiver on pain management? (see pcp for long term pain management)- Yes     If caregiver education has taken place, please specify topics discussed-

## 2018-10-01 NOTE — NON-PROVIDER
Internal Medicine Interval Note  Note Author: Artem Rowe, Student     Name Barry Torres     1940   Age/Sex 78 y.o. male   MRN [id number]   Code Status Full     After 5PM or if no immediate response to page, please call for cross-coverage  Attending/Team: [place] See Patient List for primary contact information  Call [phone] to page    1st Call - Day Intern (R1):   Noris 2nd Call - Day Sr. Resident (R2/R3):   Simran         Reason for interval visit  (Principal Problem)   Sepsis due to cellulitis (HCC)    Interval Problem Daily Status Update  (24 hours)   Patient states that he doesn't feel that there is much change in the symptoms of his hand. He states that it is still swollen and red. He denies any worsening of symptoms. He denies tenderness to the hand. He states that there is some drainage from the incision site, which is showing through the anti-edema glove. He does mention that his diarrhea continues to worsen. It is non-bloody, but occurs almost immediately following intake of fluids. He does mention that prior to his admission he took imodium regularly and does experience similar diarrhea when he is off of it. He does not complain of any abdominal pain or pain with urination or defecation.     Review of Systems   Constitutional: Negative for chills, diaphoresis and fever.   HENT: Negative for congestion, hearing loss, sore throat and tinnitus.    Eyes: Negative for blurred vision, double vision and pain.   Respiratory: Negative for cough, sputum production, shortness of breath, wheezing and stridor.    Cardiovascular: Negative for chest pain, palpitations and leg swelling.   Gastrointestinal: Positive for diarrhea. Negative for abdominal pain, blood in stool, constipation, heartburn, melena, nausea and vomiting.   Genitourinary: Negative for dysuria, flank pain, frequency, hematuria and urgency.   Musculoskeletal: Negative for back pain, joint pain, myalgias  and neck pain.   Skin: Negative for itching and rash.   Neurological: Positive for tingling (b/l feet) and tremors. Negative for dizziness, sensory change, speech change, seizures, weakness and headaches.   Psychiatric/Behavioral: Negative for depression and substance abuse. The patient is not nervous/anxious.        Consultants/Specialty  Orthopedics: Dr. Ramirez  PCP: Lizzie Soto M.D.     Disposition  Inpatient for IV antibiotics and observation for continued improvement, if no improvement I&D may be considered with orthopedics. C. Diff PCR is pending.     Quality Measures  Quality-Core Measures   Reviewed items::  Labs reviewed and Medications reviewed  Lozano catheter::  No Lozano  DVT prophylaxis pharmacological::  Not indicated at this time, ambulatory  DVT prophylaxis - mechanical:  Not indicated at this time, ambulatory          Physical Exam       Vitals:    09/30/18 2355 10/01/18 0248 10/01/18 0800 10/01/18 0855   BP: 147/49 (!) 161/91  160/93   Pulse: 91 (!) 102  (!) 108   Resp: 18 18 18   Temp: 36.2 °C (97.1 °F) 36.7 °C (98.1 °F)  36.8 °C (98.2 °F)   SpO2:  92% 92% 92%   Weight:       Height:         Body mass index is 26.47 kg/m². Weight: 91 kg (200 lb 9.9 oz)  Oxygen Therapy:  Pulse Oximetry: 92 %, O2 (LPM): 0, O2 Delivery: Silicone Nasal Cannula    Physical Exam   Constitutional: He is oriented to person, place, and time. He appears distressed.   HENT:   Head: Normocephalic and atraumatic.   Right Ear: External ear normal.   Left Ear: External ear normal.   Nose: Nose normal.   Mouth/Throat: No oropharyngeal exudate.   Eyes: Pupils are equal, round, and reactive to light. Conjunctivae and EOM are normal. Right eye exhibits no discharge. Left eye exhibits no discharge. No scleral icterus.   Neck: Normal range of motion. No thyromegaly present.   Cardiovascular: Normal rate, regular rhythm and intact distal pulses.  Exam reveals no gallop and no friction rub.    No murmur heard.  Pulmonary/Chest:  Effort normal and breath sounds normal. No stridor. No respiratory distress. He has no wheezes. He has no rales. He exhibits no tenderness.   Abdominal: Soft. Bowel sounds are normal. He exhibits no distension. There is no tenderness. There is no rebound and no guarding.   Musculoskeletal: Normal range of motion. He exhibits edema (L forearm to wrist and fingers) and tenderness (mild tenderness to palpation over L wrist and hand). He exhibits no deformity.   Lymphadenopathy:     He has no cervical adenopathy.   Neurological: He is alert and oriented to person, place, and time. No cranial nerve deficit.   Skin: Skin is warm and dry. He is not diaphoretic. There is erythema (L forearm, wrist, hand). No pallor.   Psychiatric: Memory, affect and judgment normal.         Lab Data Review:         Recent Labs      09/28/18   2352  09/29/18   0649  09/30/18   0219  10/01/18   0209   SODIUM   --   137  138  136   POTASSIUM   --   4.0  3.6  3.5*   CHLORIDE   --   108  110  108   CO2   --   22  20  21   BUN   --   18  20  20   CREATININE   --   1.49*  1.47*  1.42*   MAGNESIUM  1.7   --    --   2.0   PHOSPHORUS   --    --    --   2.4*   CALCIUM   --   8.2*  7.6*  7.8*       Recent Labs      09/28/18   2114  09/29/18   0649  09/30/18   0219  10/01/18   0209   ALTSGPT  26   --    --   24   ASTSGOT  28   --    --   44   ALKPHOSPHAT  63   --    --   53   TBILIRUBIN  1.2   --    --   0.6   GLUCOSE  129*  117*  118*  102*       Recent Labs      09/28/18   2114  09/29/18   0649  09/30/18   0219  10/01/18   0209  10/01/18   0914   RBC  3.98*  3.52*  3.17*  3.30*   --    HEMOGLOBIN  12.9*  11.5*  10.2*  10.6*   --    HEMATOCRIT  38.7*  34.8*  31.2*  32.1*   --    PLATELETCT  151*  134*  111*  129*   --    PROTHROMBTM  14.8*   --    --    --    --    APTT  35.8   --    --    --    --    INR  1.15*   --    --    --    --    IRON   --    --    --    --   15*   FERRITIN   --    --    --    --   632.5*   TOTIRONBC   --    --    --    --    204*       Recent Labs      09/28/18   2114  09/29/18   0649  09/30/18   0219  10/01/18   0209   WBC  13.1*  12.8*  8.4  7.0   NEUTSPOLYS  77.20*  78.80*   --   66.80   LYMPHOCYTES  7.90*  7.00*   --   14.70*   MONOCYTES  13.70*  12.90   --   15.70*   EOSINOPHILS  0.10  0.20   --   1.90   BASOPHILS  0.60  0.60   --   0.60   ASTSGOT  28   --    --   44   ALTSGPT  26   --    --   24   ALKPHOSPHAT  63   --    --   53   TBILIRUBIN  1.2   --    --   0.6           Assessment/Plan     No new Assessment & Plan notes have been filed under this hospital service since the last note was generated.  Service: Internal Medicine    Barry Torres is a 77 y/o  male with a history of CKD, COPD, rheumatoid arthiritis, hyperlipidemia, neuropathy, and is 10 days s/p L carpal tunnel release who presented with L wrist and hand swelling and pain, currently being treated for sepsis secondary to cellulitis and now showing concerns for C. diff.      #1 Sepsis secondary to cellulitis of L forearm, wrist, and hand  - At admission patient met 4/4 SIRS criteria with an elevated procalcitonin, suggestive of sepsis with bacterial infection at his surgical site.   - Patient meets cellulitis clinical description with swollen, erythematous, painful, warm skin overlying his L forearm, wrist, and hand.   - Urine culture collected 9/29 grew S. aureus on 9/30  - Patient is currently on IV antibiotics of ceftriaxone and vancomycin, which will provide coverage for ID specimen, but will await blood and wound cultures before change. If C. Diff PCR is positive will change to cefazolin for wound infection.   - Blood and wound cultures are pending  - Vancomycin trough is 12.8, continue to monitor and keep below 15 preferably, as these levels have higher incidence of adverse effects in patients with CKD.   - Continue with IV fluids  - Per orthopedics will continue IV fluids and abx and observation. If no improvement tomorrow will discuss I&D with the  patient.   - OT was placed by orthopedics  - anti-edema glove ordered by orthopedics      #2 Acute onset diarrhea  - Symptom started 3AM this morning and has been very frequent. Both IV vancomycin and IV ceftriaxone can be associated with C. Diff-associated diarrhea.   - C. Diff PCR was ordered, collected, and pending as of today. If returns negative will keep on same antibiotic regimen, if positive can switch to cefazolin for S. Aureus coverage and oral vancomycin for C. Diff coverage.   - Patient does have history of imodium use at home to help with regular diarrhea. This may be an exacerbation of normal stools in combination with diarrhea induced by the antibiotics.     #3 Anemia   - Presented with H/H of 12.9/38.7, 9/30 11.5/34.8, 10.2/31.2and today was 10.6/32.1. This is not far from patient's previous baseline  - Will continue to monitor for now, could be secondary to fluid overload     #4 Neuropathy  - Patient has never been diagnosed with diabetes and does not have blood sugars concerning for diabetes  - Continue with home gabapentin     #5 Stage III Chronic Kidney Disease  - Most recent creatinine (1.44), BUN (20), and GFR (45) are close to the patient's previous baseline and continue to improve following admission.  - Continue to monitor BMP as current abx regimen can result in nephrotoxicity if excessive     #6 COPD  - Continue with home regimen for inhalers  - Continue to monitor patient's breathing  - He is normally on oxygen at night, will continue while inpatient     #7 Rheumatoid arthritis  - Continue leflunomide. Per ortho hold certolizumab.      #8 Hyperlipidemia  - Continue rosuvastatin

## 2018-10-02 LAB
ALBUMIN SERPL BCP-MCNC: 2.8 G/DL (ref 3.2–4.9)
ALBUMIN/GLOB SERPL: 1.1 G/DL
ALP SERPL-CCNC: 51 U/L (ref 30–99)
ALT SERPL-CCNC: 21 U/L (ref 2–50)
ANION GAP SERPL CALC-SCNC: 7 MMOL/L (ref 0–11.9)
AST SERPL-CCNC: 40 U/L (ref 12–45)
BACTERIA UR CULT: ABNORMAL
BACTERIA UR CULT: ABNORMAL
BASOPHILS # BLD AUTO: 0.6 % (ref 0–1.8)
BASOPHILS # BLD: 0.04 K/UL (ref 0–0.12)
BILIRUB SERPL-MCNC: 0.4 MG/DL (ref 0.1–1.5)
BUN SERPL-MCNC: 16 MG/DL (ref 8–22)
CALCIUM SERPL-MCNC: 7.2 MG/DL (ref 8.5–10.5)
CHLORIDE SERPL-SCNC: 109 MMOL/L (ref 96–112)
CO2 SERPL-SCNC: 21 MMOL/L (ref 20–33)
CREAT SERPL-MCNC: 1.3 MG/DL (ref 0.5–1.4)
EKG IMPRESSION: NORMAL
EOSINOPHIL # BLD AUTO: 0.34 K/UL (ref 0–0.51)
EOSINOPHIL NFR BLD: 5 % (ref 0–6.9)
ERYTHROCYTE [DISTWIDTH] IN BLOOD BY AUTOMATED COUNT: 55.6 FL (ref 35.9–50)
GLOBULIN SER CALC-MCNC: 2.6 G/DL (ref 1.9–3.5)
GLUCOSE SERPL-MCNC: 95 MG/DL (ref 65–99)
HAPTOGLOB SERPL-MCNC: 354 MG/DL (ref 30–200)
HCT VFR BLD AUTO: 31.1 % (ref 42–52)
HGB BLD-MCNC: 9.8 G/DL (ref 14–18)
IMM GRANULOCYTES # BLD AUTO: 0.02 K/UL (ref 0–0.11)
IMM GRANULOCYTES NFR BLD AUTO: 0.3 % (ref 0–0.9)
LYMPHOCYTES # BLD AUTO: 1.3 K/UL (ref 1–4.8)
LYMPHOCYTES NFR BLD: 19 % (ref 22–41)
MAGNESIUM SERPL-MCNC: 2.1 MG/DL (ref 1.5–2.5)
MCH RBC QN AUTO: 30.6 PG (ref 27–33)
MCHC RBC AUTO-ENTMCNC: 31.5 G/DL (ref 33.7–35.3)
MCV RBC AUTO: 97.2 FL (ref 81.4–97.8)
MONOCYTES # BLD AUTO: 0.93 K/UL (ref 0–0.85)
MONOCYTES NFR BLD AUTO: 13.6 % (ref 0–13.4)
NEUTROPHILS # BLD AUTO: 4.21 K/UL (ref 1.82–7.42)
NEUTROPHILS NFR BLD: 61.5 % (ref 44–72)
NRBC # BLD AUTO: 0 K/UL
NRBC BLD-RTO: 0 /100 WBC
PHOSPHATE SERPL-MCNC: 2.3 MG/DL (ref 2.5–4.5)
PLATELET # BLD AUTO: 136 K/UL (ref 164–446)
PMV BLD AUTO: 10 FL (ref 9–12.9)
POTASSIUM SERPL-SCNC: 3.5 MMOL/L (ref 3.6–5.5)
PROT SERPL-MCNC: 5.4 G/DL (ref 6–8.2)
RBC # BLD AUTO: 3.2 M/UL (ref 4.7–6.1)
SIGNIFICANT IND 70042: ABNORMAL
SITE SITE: ABNORMAL
SODIUM SERPL-SCNC: 137 MMOL/L (ref 135–145)
SOURCE SOURCE: ABNORMAL
WBC # BLD AUTO: 6.8 K/UL (ref 4.8–10.8)

## 2018-10-02 PROCEDURE — 99232 SBSQ HOSP IP/OBS MODERATE 35: CPT | Performed by: STUDENT IN AN ORGANIZED HEALTH CARE EDUCATION/TRAINING PROGRAM

## 2018-10-02 PROCEDURE — 87899 AGENT NOS ASSAY W/OPTIC: CPT

## 2018-10-02 PROCEDURE — 83630 LACTOFERRIN FECAL (QUAL): CPT

## 2018-10-02 PROCEDURE — G8978 MOBILITY CURRENT STATUS: HCPCS | Mod: CJ

## 2018-10-02 PROCEDURE — 83735 ASSAY OF MAGNESIUM: CPT

## 2018-10-02 PROCEDURE — A9270 NON-COVERED ITEM OR SERVICE: HCPCS | Performed by: STUDENT IN AN ORGANIZED HEALTH CARE EDUCATION/TRAINING PROGRAM

## 2018-10-02 PROCEDURE — 700102 HCHG RX REV CODE 250 W/ 637 OVERRIDE(OP): Performed by: STUDENT IN AN ORGANIZED HEALTH CARE EDUCATION/TRAINING PROGRAM

## 2018-10-02 PROCEDURE — 97535 SELF CARE MNGMENT TRAINING: CPT

## 2018-10-02 PROCEDURE — G8979 MOBILITY GOAL STATUS: HCPCS | Mod: CI

## 2018-10-02 PROCEDURE — 93005 ELECTROCARDIOGRAM TRACING: CPT | Performed by: STUDENT IN AN ORGANIZED HEALTH CARE EDUCATION/TRAINING PROGRAM

## 2018-10-02 PROCEDURE — 770001 HCHG ROOM/CARE - MED/SURG/GYN PRIV*

## 2018-10-02 PROCEDURE — 97162 PT EVAL MOD COMPLEX 30 MIN: CPT

## 2018-10-02 PROCEDURE — 80053 COMPREHEN METABOLIC PANEL: CPT

## 2018-10-02 PROCEDURE — 87045 FECES CULTURE AEROBIC BACT: CPT

## 2018-10-02 PROCEDURE — 700111 HCHG RX REV CODE 636 W/ 250 OVERRIDE (IP): Performed by: STUDENT IN AN ORGANIZED HEALTH CARE EDUCATION/TRAINING PROGRAM

## 2018-10-02 PROCEDURE — 36415 COLL VENOUS BLD VENIPUNCTURE: CPT

## 2018-10-02 PROCEDURE — 84100 ASSAY OF PHOSPHORUS: CPT

## 2018-10-02 PROCEDURE — 87046 STOOL CULTR AEROBIC BACT EA: CPT

## 2018-10-02 PROCEDURE — 93010 ELECTROCARDIOGRAM REPORT: CPT | Performed by: INTERNAL MEDICINE

## 2018-10-02 PROCEDURE — 85025 COMPLETE CBC W/AUTO DIFF WBC: CPT

## 2018-10-02 RX ORDER — POTASSIUM CHLORIDE 20 MEQ/1
40 TABLET, EXTENDED RELEASE ORAL ONCE
Status: COMPLETED | OUTPATIENT
Start: 2018-10-02 | End: 2018-10-02

## 2018-10-02 RX ADMIN — CEFAZOLIN SODIUM 2 G: 2 INJECTION, SOLUTION INTRAVENOUS at 05:41

## 2018-10-02 RX ADMIN — LOPERAMIDE HYDROCHLORIDE 2 MG: 2 CAPSULE ORAL at 05:42

## 2018-10-02 RX ADMIN — CEFAZOLIN SODIUM 2 G: 2 INJECTION, SOLUTION INTRAVENOUS at 21:45

## 2018-10-02 RX ADMIN — FLUTICASONE PROPIONATE 50 MCG: 50 SPRAY, METERED NASAL at 05:41

## 2018-10-02 RX ADMIN — BUDESONIDE AND FORMOTEROL FUMARATE DIHYDRATE 2 PUFF: 80; 4.5 AEROSOL RESPIRATORY (INHALATION) at 05:41

## 2018-10-02 RX ADMIN — GABAPENTIN 300 MG: 300 CAPSULE ORAL at 05:42

## 2018-10-02 RX ADMIN — CEFAZOLIN SODIUM 2 G: 2 INJECTION, SOLUTION INTRAVENOUS at 14:52

## 2018-10-02 RX ADMIN — TIOTROPIUM BROMIDE 1 CAPSULE: 18 CAPSULE ORAL; RESPIRATORY (INHALATION) at 05:43

## 2018-10-02 RX ADMIN — FEXOFENADINE HCL 180 MG: 60 TABLET, FILM COATED ORAL at 05:42

## 2018-10-02 RX ADMIN — GABAPENTIN 300 MG: 300 CAPSULE ORAL at 11:46

## 2018-10-02 RX ADMIN — LEFLUNOMIDE 20 MG: 20 TABLET ORAL at 05:42

## 2018-10-02 RX ADMIN — BUDESONIDE AND FORMOTEROL FUMARATE DIHYDRATE 2 PUFF: 80; 4.5 AEROSOL RESPIRATORY (INHALATION) at 17:58

## 2018-10-02 RX ADMIN — GABAPENTIN 300 MG: 300 CAPSULE ORAL at 17:58

## 2018-10-02 RX ADMIN — POTASSIUM CHLORIDE 40 MEQ: 1500 TABLET, EXTENDED RELEASE ORAL at 08:04

## 2018-10-02 RX ADMIN — POTASSIUM CHLORIDE 40 MEQ: 1500 TABLET, EXTENDED RELEASE ORAL at 11:46

## 2018-10-02 RX ADMIN — LOPERAMIDE HYDROCHLORIDE 2 MG: 2 CAPSULE ORAL at 11:46

## 2018-10-02 RX ADMIN — ROSUVASTATIN CALCIUM 20 MG: 20 TABLET, FILM COATED ORAL at 17:58

## 2018-10-02 ASSESSMENT — ENCOUNTER SYMPTOMS
EYE PAIN: 0
MYALGIAS: 0
WHEEZING: 0
WEAKNESS: 0
CHILLS: 0
HEMOPTYSIS: 0
BACK PAIN: 0
TREMORS: 0
SEIZURES: 0
DIARRHEA: 1
SPEECH CHANGE: 0
TREMORS: 1
ORTHOPNEA: 0
CONSTIPATION: 0
BLURRED VISION: 0
NAUSEA: 0
HEARTBURN: 0
ABDOMINAL PAIN: 0
VOMITING: 0
SPUTUM PRODUCTION: 0
COUGH: 0
SHORTNESS OF BREATH: 0
SORE THROAT: 0
FEVER: 0
HEADACHES: 0
PALPITATIONS: 0
DIZZINESS: 0
DIARRHEA: 0
STRIDOR: 0
DIAPHORESIS: 0
BLOOD IN STOOL: 0
TINGLING: 0

## 2018-10-02 ASSESSMENT — PAIN SCALES - GENERAL
PAINLEVEL_OUTOF10: 0

## 2018-10-02 ASSESSMENT — GAIT ASSESSMENTS
DISTANCE (FEET): 3
GAIT LEVEL OF ASSIST: CONTACT GUARD ASSIST
ASSISTIVE DEVICE: FRONT WHEEL WALKER

## 2018-10-02 ASSESSMENT — COGNITIVE AND FUNCTIONAL STATUS - GENERAL
STANDING UP FROM CHAIR USING ARMS: A LITTLE
MOBILITY SCORE: 18
MOVING TO AND FROM BED TO CHAIR: A LITTLE
SUGGESTED CMS G CODE MODIFIER MOBILITY: CK
CLIMB 3 TO 5 STEPS WITH RAILING: A LITTLE
TURNING FROM BACK TO SIDE WHILE IN FLAT BAD: A LITTLE
WALKING IN HOSPITAL ROOM: A LITTLE
MOVING FROM LYING ON BACK TO SITTING ON SIDE OF FLAT BED: A LITTLE

## 2018-10-02 NOTE — NON-PROVIDER
Internal Medicine Interval Note  Note Author: Artem Rowe, Student     Name Barry Torres     1940   Age/Sex 78 y.o. male   MRN [id number]   Code Status Full     After 5PM or if no immediate response to page, please call for cross-coverage  Attending/Team: [place] See Patient List for primary contact information  Call [phone] to page    1st Call - Day Intern (R1):   [place] 2nd Call - Day Sr. Resident (R2/R3):   Simran         Reason for interval visit  (Principal Problem)   Sepsis due to cellulitis (HCC)    Interval Problem Daily Status Update  (24 hours)   Patient states that overall he feels that he is improving. He states that his swelling in his left arm and hand has decreased as well as the pain. He states that it still appears red. He states that his diarrhea has almost completely resolved. The last episode was yesterday afternoon around 4PM and it was mostly solid. He denies any pain with defecation or blood present in stools.     Review of Systems   Constitutional: Negative for chills, diaphoresis and fever.   HENT: Negative for ear pain, hearing loss and sore throat.    Eyes: Negative for blurred vision and pain.   Respiratory: Negative for cough, sputum production, shortness of breath, wheezing and stridor.    Cardiovascular: Negative for chest pain, palpitations and leg swelling.   Gastrointestinal: Positive for diarrhea (mild, more solid then yesterday). Negative for abdominal pain, blood in stool, constipation, nausea and vomiting.   Genitourinary: Negative for dysuria, frequency, hematuria and urgency.   Musculoskeletal: Negative for myalgias.   Skin: Negative for itching and rash.   Neurological: Positive for tremors (b/l hands). Negative for dizziness, speech change, seizures, weakness and headaches.       Consultants/Specialty  Orthopedics: Dr. Ramirez  PCP: Lizzie Soto M.D.    Disposition  Inpatient, continues to improve symptomatically. Awaiting  ortho consult and can perpare for discharge tomorrow. Repeat discussion about PT tomorrow before discharge.    Quality Measures  Quality-Core Measures   Reviewed items::  Labs reviewed and Medications reviewed  Lozano catheter::  No Lozano  DVT prophylaxis pharmacological::  Not indicated at this time, ambulatory  DVT prophylaxis - mechanical:  Not indicated at this time, ambulatory  Ulcer Prophylaxis::  Not indicated      Physical Exam       Vitals:    10/01/18 2305 10/02/18 0000 10/02/18 0315 10/02/18 0745   BP: 153/86 153/86 148/79 129/67   Pulse: 78 78 82 76   Resp: 18 18 16 18   Temp: 36.7 °C (98 °F) 36.7 °C (98 °F) 36.2 °C (97.2 °F) 36.7 °C (98.1 °F)   SpO2: 93% 93% 94% 92%   Weight:       Height:         Body mass index is 27.22 kg/m². Weight: 93.6 kg (206 lb 5.6 oz)  Oxygen Therapy:  Pulse Oximetry: 92 %, O2 (LPM): 2.5, O2 Delivery: Silicone Nasal Cannula    Physical Exam   Constitutional: He is oriented to person, place, and time and well-developed, well-nourished, and in no distress.   HENT:   Head: Normocephalic and atraumatic.   Eyes: Pupils are equal, round, and reactive to light. Conjunctivae are normal. Right eye exhibits no discharge. Left eye exhibits no discharge.   Neck: Normal range of motion. No thyromegaly present.   Cardiovascular: Normal rate, regular rhythm, normal heart sounds and intact distal pulses.  Exam reveals no gallop and no friction rub.    No murmur heard.  Pulmonary/Chest: Effort normal and breath sounds normal. No respiratory distress. He has no wheezes. He has no rales. He exhibits no tenderness.   Abdominal: Soft. He exhibits no distension. There is no tenderness. There is no rebound and no guarding.   Musculoskeletal: Normal range of motion. He exhibits edema (L forearm, wrist, hand, fingers) and tenderness (mild in wrist around surgical site). He exhibits no deformity.   Lymphadenopathy:     He has no cervical adenopathy.   Neurological: He is alert and oriented to person,  place, and time.   Skin: Skin is warm and dry. No rash noted. There is erythema (L forearm, wrist, hand, finger). No pallor.   Psychiatric: Mood, memory, affect and judgment normal.         Lab Data Review:       Recent Labs      09/30/18   0219  10/01/18   0209  10/02/18   0245   SODIUM  138  136  137   POTASSIUM  3.6  3.5*  3.5*   CHLORIDE  110  108  109   CO2  20  21  21   BUN  20  20  16   CREATININE  1.47*  1.42*  1.30   MAGNESIUM   --   2.0  2.1   PHOSPHORUS   --   2.4*  2.3*   CALCIUM  7.6*  7.8*  7.2*       Recent Labs      09/30/18   0219  10/01/18   0209  10/02/18   0245   ALTSGPT   --   24  21   ASTSGOT   --   44  40   ALKPHOSPHAT   --   53  51   TBILIRUBIN   --   0.6  0.4   GLUCOSE  118*  102*  95       Recent Labs      09/30/18   0219  10/01/18   0209  10/01/18   0914  10/02/18   0247   RBC  3.17*  3.30*   --   3.20*   HEMOGLOBIN  10.2*  10.6*   --   9.8*   HEMATOCRIT  31.2*  32.1*   --   31.1*   PLATELETCT  111*  129*   --   136*   IRON   --    --   15*   --    FERRITIN   --    --   632.5*   --    TOTIRONBC   --    --   204*   --        Recent Labs      09/30/18   0219  10/01/18   0209  10/02/18   0245  10/02/18   0247   WBC  8.4  7.0   --   6.8   NEUTSPOLYS   --   66.80   --   61.50   LYMPHOCYTES   --   14.70*   --   19.00*   MONOCYTES   --   15.70*   --   13.60*   EOSINOPHILS   --   1.90   --   5.00   BASOPHILS   --   0.60   --   0.60   ASTSGOT   --   44  40   --    ALTSGPT   --   24  21   --    ALKPHOSPHAT   --   53  51   --    TBILIRUBIN   --   0.6  0.4   --        Assessment/Plan     No new Assessment & Plan notes have been filed under this hospital service since the last note was generated.  Service: Internal Medicine      Barry Torres is a 79 y/o  male with a history of CKD, COPD, rheumatoid arthiritis, hyperlipidemia, neuropathy, and is 10 days s/p L carpal tunnel release who presented with L wrist and hand swelling and pain, currently being treated for sepsis secondary to  cellulitis.      #1 Sepsis secondary to cellulitis of L forearm, wrist, and hand  - At admission patient met 4/4 SIRS criteria with an elevated procalcitonin, suggestive of sepsis with bacterial infection at his surgical site. As of today sepsis is resolved.   - Patient meets cellulitis clinical description with swollen, erythematous, painful, warm skin overlying his L forearm, wrist, and hand. Symptoms continue to improve with edema greatly reduced in comparison to previous days. There is still some erythema from mid forearm to hand and finger.   - Urine culture collected 9/29 grew S. aureus on 9/30. Blood culture was negative.  - Patient was switched to cefazolin 10/1 IV and will continue for 7 days of a 10 day cycle.  - Per orthopedics will continue IV fluids and abx and observation. Will observe for continued improvement tomorrow and if improving and ok with orthopedics will discuss discharge.   - OT was placed by orthopedics  - anti-edema glove ordered by orthopedics      #2 Acute onset diarrhea  - Diarrhea symptoms have resolved as of 4PM 10/1. Symptoms started 3AM 10/1 and at the time was very frequent. Both IV vancomycin and IV ceftriaxone can be associated with C. Diff-associated diarrhea, so PCR was ordered, which came back negative today.  - Patient does have history of imodium use at home to help with regular diarrhea. This may be an exacerbation of normal stools in combination with diarrhea induced by the antibiotics. Patient was started on loperamide today following negative C. Diff.     #3 Anemia   - Presented with H/H of 12.9/38.7, 9/30 11.5/34.8, 10.2/31.2, 10.6/32.1, and today was 9.8/31.1  - Iron studies suggest that this may be an anemia of chronic disease. These values are not too far off of his normal baseline and patient has been off of his rheumatoid medications while he was being treated for sepsis.   - Continue to monitor and can follow up outpatient once abx are completed.      #4  Neuropathy  - Patient has never been diagnosed with diabetes and does not have blood sugars concerning for diabetes  - Continue with home gabapentin     #5 Stage III Chronic Kidney Disease  - Most recent creatinine (1.3), BUN (16), and GFR (53) are close to the patient's previous baseline and continue to improve following admission.  - Continue to monitor BMP as current abx regimen can result in nephrotoxicity if excessive     #6 COPD  - Continue with home regimen for inhalers  - Continue to monitor patient's breathing  - He is normally on oxygen at night, will continue while inpatient     #7 Rheumatoid arthritis  - Continue leflunomide. Per ortho hold certolizumab while being treated for sepsis     #8 Hyperlipidemia  - Continue rosuvastatin

## 2018-10-02 NOTE — THERAPY
"Physical Therapy Evaluation completed.   Bed Mobility:  Supine to Sit: Minimal Assist  Transfers: Sit to Stand: Minimal Assist (cues not to pull on FWW, bed height raised.)  Gait: Level Of Assist: Contact Guard Assist with Front-Wheel Walker       Plan of Care: Will benefit from Physical Therapy 3 times per week  Discharge Recommendations: Equipment: No Equipment Needed.   Pt presents with L hand swelling, diagnosed with sepsis/cellulitis after L Carpal tunnel release surgery. Pt was seen by Dr. Ramirez who reports that hand had appearance of no attempt at motion or sedentary use. OT has seen pt and given an exercise handout. Today, pt was reluctant to get out of bed, but when returned in pm, pt was agreeable with encouragement. Pt agreed to short march in place and transfer to bs chair. Pt will need encouragement to get out of bed and walk longer distances if he is to be strong enough to d/c home. Pt will benefit from inpt PT to address problems and assist with d/c plan. Pt lives in 1 story house with wife and will have assist as needed at home. See \"Rehab Therapy-Acute\" Patient Summary Report for complete documentation.     "

## 2018-10-02 NOTE — PROGRESS NOTES
"   Orthopaedic PA Progress Note    Interval changes:Improved. No further diarrhea. Less hand swelling.    ROS - Patient denies any new issues. No chest pain, dyspnea, or fever.  Pain well controlled.    Blood pressure 129/67, pulse 76, temperature 36.7 °C (98.1 °F), resp. rate 18, height 1.854 m (6' 1\"), weight 93.6 kg (206 lb 5.6 oz), SpO2 92 %.    Patient seen and examined  No acute distress  Breathing non labored  RRR  Surgical dressing is clean, dry, and intact. Underlying incision clean dry and well approximated. Anti-edema glove on. Less swelling today, flexion lines/creases reappering. Patient clearly fires forearm flexors, forearm extensors, and moves all five fingers without issue . Sensation is intact to light touch throughout median, ulnar, and radial nerve distributions. Strong and palpable radial pulses with capillary refill less than 2 seconds. No arm or hand discomfort.    Recent Labs      09/30/18   0219  10/01/18   0209  10/02/18   0247   WBC  8.4  7.0  6.8   RBC  3.17*  3.30*  3.20*   HEMOGLOBIN  10.2*  10.6*  9.8*   HEMATOCRIT  31.2*  32.1*  31.1*   MCV  98.4*  97.3  97.2   MCH  32.2  32.1  30.6   MCHC  32.7*  33.0*  31.5*   RDW  56.3*  54.9*  55.6*   PLATELETCT  111*  129*  136*   MPV  10.5  10.5  10.0     Active Hospital Problems    Diagnosis   • Diarrhea [R19.7]     Priority: High   • Sepsis due to cellulitis (HCC) [L03.90, A41.9]     Priority: High   • Limited mobility [Z74.09]     Priority: Medium   • Environmental and seasonal allergies [J30.89]     Priority: Low   • Neuropathy ([facility]) [G62.9]     Priority: Low   • Anemia [D64.9]     Priority: Low   • CKD (chronic kidney disease), stage III ([facility]) [N18.3]     Priority: Low   • COPD (chronic obstructive pulmonary disease) ([facility]) [J44.9]     Priority: Low   • Hyperlipidemia [E78.5]     Priority: Low   • RA (rheumatoid arthritis) ([facility]) [M06.9]     Priority: Low     BOTH HANDS        Assessment/Plan:  11 days post-op CTR LH  Swelling " improved  New Dx. C. Dificile, with diarrhea - resolving     Wt bearing status - AT, anti-edema glove, encourage ROM qHr WA  PT/OT-initiated  Wound care:incision CDWA  Drains - no  Lozano-no  Sutures/Staples out- in 2-3 days  Antibiotics: Ancef IV per med team  DVT Prophylaxis- TEDS/SCDs/Foot pumps.   Future Procedures - not anticipated  Case Coordination for Discharge Planning - Ortho PA or MD will recheck tomorrow

## 2018-10-02 NOTE — PROGRESS NOTES
Bedside report received from LISA Arthur. Pt sitting up in bed with wife bedside. Reports that he slept well last night. O2 sats between 84-94% on 2L O2 NC. No complaints, needs, concerns at this time. Bed rails up x2. Bed alarm in use. Call light, phone, and bedside table within reach. Will continue to monitor.

## 2018-10-02 NOTE — PROGRESS NOTES
Internal Medicine Interval Note  Note Author: Michael Hamm M.D.     Name Barry Torres     1940   Age/Sex 78 y.o. male   MRN [id number]   Code Status Full     After 5PM or if no immediate response to page, please call for cross-coverage  Attending/Team: Dr. Smyth See Patient List for primary contact information  Call [phone] to page    1st Call - Day Intern (R1):   Dr. Hamm 2nd Call - Day Sr. Resident (R2/R3):   Dr. Khalil     Reason for interval visit  (Principal Problem)   Sepsis 2/2 cellulitis    Interval Problem Daily Status Update  (24 hours, problem oriented, brief subjective history, new lab/imaging data pertinent to that problem)   Patient thinks his hand improved today. There is a less swelling and pain associated. He denies fevers, chills, and night sweats. He did not have any diarrhea over night. No abdominal pain and nausea.     Review of Systems   Constitutional: Negative for chills and fever.   HENT: Negative for ear pain and hearing loss.    Eyes: Negative for blurred vision and pain.   Respiratory: Negative for cough, hemoptysis and sputum production.    Cardiovascular: Negative for chest pain, palpitations and orthopnea.   Gastrointestinal: Negative for abdominal pain, diarrhea, heartburn, nausea and vomiting.   Genitourinary: Negative for dysuria, frequency and urgency.   Musculoskeletal: Negative for back pain and myalgias.   Skin: Negative for itching and rash.        Left wrist swollen and erythematous. Erythema spreads to elbow. Mild pain. No numbness. No tingling.   Neurological: Negative for dizziness, tingling, tremors and headaches.     Disposition/Barriers to discharge:   None    Consultants/Specialty  Orthopedics  PCP: Lizzie Soto M.D.    Quality Measures  Quality-Core Measures   Reviewed items::  EKG reviewed, Labs reviewed, Medications reviewed and Radiology images reviewed  Lozano catheter::  No Lozano  DVT prophylaxis - mechanical:   SCDs  Ulcer Prophylaxis::  No    Physical Exam       Vitals:    10/01/18 2305 10/02/18 0000 10/02/18 0315 10/02/18 0745   BP: 153/86 153/86 148/79 129/67   Pulse: 78 78 82 76   Resp: 18 18 16 18   Temp: 36.7 °C (98 °F) 36.7 °C (98 °F) 36.2 °C (97.2 °F) 36.7 °C (98.1 °F)   SpO2: 93% 93% 94% 92%   Weight:       Height:         Body mass index is 27.22 kg/m². Weight: 93.6 kg (206 lb 5.6 oz)  Oxygen Therapy:  Pulse Oximetry: 92 %, O2 (LPM): 2.5, O2 Delivery: Silicone Nasal Cannula    Physical Exam   Constitutional: He is oriented to person, place, and time and well-developed, well-nourished, and in no distress. No distress.   HENT:   Head: Normocephalic.   Eyes: Conjunctivae and EOM are normal.   Neck: Normal range of motion. Neck supple. No JVD present. No tracheal deviation present. No thyromegaly present.   Cardiovascular: Normal rate, regular rhythm, normal heart sounds and intact distal pulses.  Exam reveals no gallop and no friction rub.    No murmur heard.  Pulmonary/Chest: Effort normal and breath sounds normal. No respiratory distress. He has no wheezes. He has no rales. He exhibits no tenderness.   Abdominal: Soft. Bowel sounds are normal. He exhibits no distension and no mass. There is no tenderness. There is no rebound and no guarding.   Musculoskeletal: Normal range of motion. He exhibits edema (right hand) and tenderness.   Neurological: He is oriented to person, place, and time. He has normal reflexes. He exhibits normal muscle tone. Coordination normal.   Skin: Skin is warm. He is not diaphoretic.   Decreased left wrist swelling. Swelling extends near the left elbow. There is a tenderness and erythema that extends along the swelling. Pulses are appropriate. Capillary refill within 2 seconds. No sensory deficit.    Psychiatric: Affect and judgment normal.       Assessment/Plan     * Sepsis due to cellulitis (HCC)   Assessment & Plan    Improving. Sepsis has resoled but cellulitis is getting worse. Patient  s/p recent about 7 days ago left carpal tunnel release. 24 hours prior admission the hand and forearm started to become more swollen, red, and tender. The pus started to be produced from the incision site., SIRS 4/4, qSOFA 1/3; procalcitonin elevated. LA 1.0. WBC decreased to 8.4. Swelling initially improved, but it became worse now. Patient was initially on vancomycin and ceftriaxone, but then switched to cefazolin.  - sepsis due to cellulitis  - Blood Cx x2 - negative  - Urine culture +S.aureus 50-100k  - IVF  - cefazolin  - RT/O2 protocol  - strict I/O, daily weights  - Awaiting orthopedics recommends   - anti-edema glove ordered by orthopedics         Diarrhea   Assessment & Plan    RESOLVING. Most likely baseline exacerbation with antibiotics. Patient has baseline diarrhea at home and take loperamide. However his diarrhea worsened during hospital stay. Patient was on vancomycin IV and ceftriaxone. FOBT+ (previous consistently negative including FOBT on admission). C. Diff Toxin negative. After administration of loperamide diarrhea has resolved.  - Replete electrolyte abnormalities  - Stool Lactoferin  - Stool Fat        Limited mobility   Assessment & Plan    Wife states he has decreased mobility and even more exacerbated by his hospital stay. His oxygen baseline is 88% at home, and he has been in mid 80's on 2L. He recovered to 90% with 5L. PT and OT seen patient. OT recommends home health for mobility and adl care for home. PT states that no equipment needed, but patient benefit from physical therapy 3/wk. Nursing is to walk patient during the day and offers to patient, but patient often refuses.   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  Hgb 12.9 on admission. B12/folate/LDH normal. TSH 3.9. Ferritin elevate 632 with high TIBC and  low saturation. FOBT was positive, but previously consistently negative, so GI bleed less likely to contribute to his anemia.  - Consider heme/onc outpatient follow up        Neuropathy ([facility])   Assessment & Plan    -neuropathy to the foot/ankle  -continue home dose gabapentin        Environmental and seasonal allergies   Assessment & Plan    -continue home dose fluticasone, allegra        CKD (chronic kidney disease), stage III ([facility])- (present on admission)   Assessment & Plan    -Cr 1.55, around baseline  -avoid nephrotoxic drugs  -renal dosing        COPD (chronic obstructive pulmonary disease) ([facility])- (present on admission)   Assessment & Plan    -continue home dose symbicort. Per wife patient runs often in 88% range at home.  -RT/O2 protocol        RA (rheumatoid arthritis) ([facility])- (present on admission)   Assessment & Plan    -continue home dose Arava  -consider continuing Cimzia q14d if hospitalization prolonged  -follows with rheum outpatient        Hyperlipidemia- (present on admission)   Assessment & Plan    -continue home dose crestor

## 2018-10-02 NOTE — CARE PLAN
Problem: Safety  Goal: Will remain free from falls  Outcome: PROGRESSING AS EXPECTED  Pt OOB to chair for breakfast and lunch. Calls out appropriately. Bed and chair alarm in use.

## 2018-10-02 NOTE — DISCHARGE PLANNING
DISCHARGE CHECKLIST     Update 10/2:  Pt evaluated by OT recommends home health for therapy needs.  No DME needs per pt and wife.   Update 10/1:  Patient pending evaluation by PT/OT.  PT may need a home walker.  Pt has home O2 already, so no need for this.  Pt pending D/C home, unless PT/OT evaluation indicates otherwise.      · Is all DME in place? No    Based on PT/OT evaluation, patient may need a walker for home.  Pt has home O2.                If yes, has patient been educated on use of DME?   No     · Is consent for controlled substance signed and placed in chart? No     · Did patient receive all prescriptions? No     If electronically prescribed, did you call pharmacy to verify availability?   No     · Does the patient have transportation to  prescriptions? Yes  · Does the patient have the financial means to obtain prescriptions? Yes     · Have all necessary follow up appointments been scheduled?  PCP or D/C clinic- No  Specialty- No - needs a F/U appt to remove sutures/staples 10-14 days post-op  No, please explain- not medically cleared         · Has home health referral been suggested or already sent? Home health for therapy recommended, pending order placement.     Did you review notes from PT/OT/SLP and did you verify d/c plan is consistent with their recommendation? (ie diet education, supplies) -  PT/OT has not seen this patient yet.       Does patient need follow up wound care? Yes, not medically cleared, but will likely need wound care as an outpt  If yes, have post acute arrangements been made?   -Supplies  -Wound clinic follow up  - referral for wound care  -Home wound vac  Please specify if applicable- will likely need wound care     · Did all specialties sign off for discharge? No     · Has hand off report been called to receiving caregivers?   SNF/Rehab/LTAC                          Home Health  PCP  Caregiver  Group Home  Acute to Acute     Please specify all applicable hand offs-       · Has outpatient dialysis been arranged?  Not Indicated  · Is patient on anticoagulation? No  If on coumadin, is f/u INR monitoring arranged? No     Education to be included in Discharge-  Did patient/caregiver receive home care instructions from:  · DM educator- Not Indicated  · COPD educator- Not Indicated     Did you include core measure specific education?  · CHF- Not Indicated  · PNA- Not Indicated  · Stroke- Not Indicated  · MI- Not Indicated     Are diagnosis specific instructions in AVS?  Yes     Educated  Patient/caregiver on symptom management with teach back?  Yes     Educated patient/caregiver on pain management? (see pcp for long term pain management)- Yes     If caregiver education has taken place, please specify topics discussed-

## 2018-10-02 NOTE — PROGRESS NOTES
Report received at bedside, pt. Denies pain or discomfort at this time, left hand is currently elevated and on ice.  Wife is at bedside compliant with contact precautions. Pt. Is A&Ox4, 2.5 L NC saturating 90s with equal unlabored breathing. POC resumed

## 2018-10-03 LAB
ALBUMIN SERPL BCP-MCNC: 2.9 G/DL (ref 3.2–4.9)
ALBUMIN/GLOB SERPL: 1.1 G/DL
ALP SERPL-CCNC: 64 U/L (ref 30–99)
ALT SERPL-CCNC: 17 U/L (ref 2–50)
ANION GAP SERPL CALC-SCNC: 8 MMOL/L (ref 0–11.9)
AST SERPL-CCNC: 54 U/L (ref 12–45)
BACTERIA BLD CULT: NORMAL
BACTERIA BLD CULT: NORMAL
BASOPHILS # BLD AUTO: 0.9 % (ref 0–1.8)
BASOPHILS # BLD: 0.06 K/UL (ref 0–0.12)
BILIRUB SERPL-MCNC: 0.5 MG/DL (ref 0.1–1.5)
BUN SERPL-MCNC: 14 MG/DL (ref 8–22)
CALCIUM SERPL-MCNC: 7.9 MG/DL (ref 8.5–10.5)
CHLORIDE SERPL-SCNC: 108 MMOL/L (ref 96–112)
CO2 SERPL-SCNC: 23 MMOL/L (ref 20–33)
CREAT SERPL-MCNC: 1.33 MG/DL (ref 0.5–1.4)
E COLI SXT1+2 STL IA: NORMAL
EOSINOPHIL # BLD AUTO: 0.39 K/UL (ref 0–0.51)
EOSINOPHIL NFR BLD: 5.9 % (ref 0–6.9)
ERYTHROCYTE [DISTWIDTH] IN BLOOD BY AUTOMATED COUNT: 55.2 FL (ref 35.9–50)
GLOBULIN SER CALC-MCNC: 2.7 G/DL (ref 1.9–3.5)
GLUCOSE SERPL-MCNC: 99 MG/DL (ref 65–99)
HCT VFR BLD AUTO: 31.6 % (ref 42–52)
HGB BLD-MCNC: 10.1 G/DL (ref 14–18)
IMM GRANULOCYTES # BLD AUTO: 0.02 K/UL (ref 0–0.11)
IMM GRANULOCYTES NFR BLD AUTO: 0.3 % (ref 0–0.9)
LYMPHOCYTES # BLD AUTO: 1.23 K/UL (ref 1–4.8)
LYMPHOCYTES NFR BLD: 18.7 % (ref 22–41)
MAGNESIUM SERPL-MCNC: 2.2 MG/DL (ref 1.5–2.5)
MCH RBC QN AUTO: 30.9 PG (ref 27–33)
MCHC RBC AUTO-ENTMCNC: 32 G/DL (ref 33.7–35.3)
MCV RBC AUTO: 96.6 FL (ref 81.4–97.8)
MONOCYTES # BLD AUTO: 0.81 K/UL (ref 0–0.85)
MONOCYTES NFR BLD AUTO: 12.3 % (ref 0–13.4)
NEUTROPHILS # BLD AUTO: 4.07 K/UL (ref 1.82–7.42)
NEUTROPHILS NFR BLD: 61.9 % (ref 44–72)
NRBC # BLD AUTO: 0 K/UL
NRBC BLD-RTO: 0 /100 WBC
PHOSPHATE SERPL-MCNC: 1.8 MG/DL (ref 2.5–4.5)
PLATELET # BLD AUTO: 142 K/UL (ref 164–446)
PMV BLD AUTO: 9.9 FL (ref 9–12.9)
POTASSIUM SERPL-SCNC: 3.9 MMOL/L (ref 3.6–5.5)
PROT SERPL-MCNC: 5.6 G/DL (ref 6–8.2)
RBC # BLD AUTO: 3.27 M/UL (ref 4.7–6.1)
SIGNIFICANT IND 70042: NORMAL
SITE SITE: NORMAL
SODIUM SERPL-SCNC: 139 MMOL/L (ref 135–145)
SOURCE SOURCE: NORMAL
WBC # BLD AUTO: 6.6 K/UL (ref 4.8–10.8)

## 2018-10-03 PROCEDURE — 700111 HCHG RX REV CODE 636 W/ 250 OVERRIDE (IP): Performed by: STUDENT IN AN ORGANIZED HEALTH CARE EDUCATION/TRAINING PROGRAM

## 2018-10-03 PROCEDURE — 83735 ASSAY OF MAGNESIUM: CPT

## 2018-10-03 PROCEDURE — 700101 HCHG RX REV CODE 250: Performed by: STUDENT IN AN ORGANIZED HEALTH CARE EDUCATION/TRAINING PROGRAM

## 2018-10-03 PROCEDURE — 700102 HCHG RX REV CODE 250 W/ 637 OVERRIDE(OP): Performed by: STUDENT IN AN ORGANIZED HEALTH CARE EDUCATION/TRAINING PROGRAM

## 2018-10-03 PROCEDURE — 99239 HOSP IP/OBS DSCHRG MGMT >30: CPT | Performed by: STUDENT IN AN ORGANIZED HEALTH CARE EDUCATION/TRAINING PROGRAM

## 2018-10-03 PROCEDURE — A9270 NON-COVERED ITEM OR SERVICE: HCPCS | Performed by: STUDENT IN AN ORGANIZED HEALTH CARE EDUCATION/TRAINING PROGRAM

## 2018-10-03 PROCEDURE — 700105 HCHG RX REV CODE 258: Performed by: STUDENT IN AN ORGANIZED HEALTH CARE EDUCATION/TRAINING PROGRAM

## 2018-10-03 PROCEDURE — 97110 THERAPEUTIC EXERCISES: CPT

## 2018-10-03 PROCEDURE — 84100 ASSAY OF PHOSPHORUS: CPT

## 2018-10-03 PROCEDURE — 80053 COMPREHEN METABOLIC PANEL: CPT

## 2018-10-03 PROCEDURE — 770001 HCHG ROOM/CARE - MED/SURG/GYN PRIV*

## 2018-10-03 PROCEDURE — 36415 COLL VENOUS BLD VENIPUNCTURE: CPT

## 2018-10-03 PROCEDURE — 85025 COMPLETE CBC W/AUTO DIFF WBC: CPT

## 2018-10-03 RX ORDER — CEPHALEXIN 500 MG/1
500 CAPSULE ORAL 2 TIMES DAILY
Qty: 8 CAP | Refills: 0 | Status: SHIPPED | OUTPATIENT
Start: 2018-10-03 | End: 2018-10-29

## 2018-10-03 RX ADMIN — POTASSIUM PHOSPHATE, MONOBASIC AND POTASSIUM PHOSPHATE, DIBASIC 15 MMOL: 224; 236 INJECTION, SOLUTION INTRAVENOUS at 06:18

## 2018-10-03 RX ADMIN — GABAPENTIN 300 MG: 300 CAPSULE ORAL at 12:51

## 2018-10-03 RX ADMIN — CEFAZOLIN SODIUM 2 G: 2 INJECTION, SOLUTION INTRAVENOUS at 05:11

## 2018-10-03 RX ADMIN — DIBASIC SODIUM PHOSPHATE, MONOBASIC POTASSIUM PHOSPHATE AND MONOBASIC SODIUM PHOSPHATE 1 TABLET: 852; 155; 130 TABLET ORAL at 12:51

## 2018-10-03 RX ADMIN — CEFAZOLIN SODIUM 2 G: 2 INJECTION, SOLUTION INTRAVENOUS at 21:09

## 2018-10-03 RX ADMIN — OXYCODONE HYDROCHLORIDE 5 MG: 5 TABLET ORAL at 05:12

## 2018-10-03 RX ADMIN — GABAPENTIN 300 MG: 300 CAPSULE ORAL at 05:10

## 2018-10-03 RX ADMIN — FEXOFENADINE HCL 180 MG: 60 TABLET, FILM COATED ORAL at 05:10

## 2018-10-03 RX ADMIN — LOPERAMIDE HYDROCHLORIDE 2 MG: 2 CAPSULE ORAL at 05:10

## 2018-10-03 RX ADMIN — LEFLUNOMIDE 20 MG: 20 TABLET ORAL at 05:11

## 2018-10-03 RX ADMIN — TIOTROPIUM BROMIDE 1 CAPSULE: 18 CAPSULE ORAL; RESPIRATORY (INHALATION) at 05:10

## 2018-10-03 RX ADMIN — FLUTICASONE PROPIONATE 50 MCG: 50 SPRAY, METERED NASAL at 05:11

## 2018-10-03 RX ADMIN — BUDESONIDE AND FORMOTEROL FUMARATE DIHYDRATE 2 PUFF: 80; 4.5 AEROSOL RESPIRATORY (INHALATION) at 05:10

## 2018-10-03 RX ADMIN — DIBASIC SODIUM PHOSPHATE, MONOBASIC POTASSIUM PHOSPHATE AND MONOBASIC SODIUM PHOSPHATE 1 TABLET: 852; 155; 130 TABLET ORAL at 06:15

## 2018-10-03 ASSESSMENT — ENCOUNTER SYMPTOMS
BLOOD IN STOOL: 0
CONSTIPATION: 0
SPUTUM PRODUCTION: 0
PALPITATIONS: 0
HEMOPTYSIS: 0
ABDOMINAL PAIN: 0
CHILLS: 0
DEPRESSION: 0
EYE PAIN: 0
VOMITING: 0
TREMORS: 0
NAUSEA: 0
FEVER: 0
DIARRHEA: 0
DIZZINESS: 0
SPEECH CHANGE: 0
HEARTBURN: 0
WEAKNESS: 0
BACK PAIN: 0
NECK PAIN: 0
COUGH: 0
SENSORY CHANGE: 0
DIARRHEA: 1
WHEEZING: 0
BLURRED VISION: 0
TREMORS: 1
HEADACHES: 0
DIAPHORESIS: 0
SORE THROAT: 0
TINGLING: 0
SHORTNESS OF BREATH: 0
HALLUCINATIONS: 0
DOUBLE VISION: 0
ORTHOPNEA: 0
MYALGIAS: 0

## 2018-10-03 ASSESSMENT — COGNITIVE AND FUNCTIONAL STATUS - GENERAL
SUGGESTED CMS G CODE MODIFIER DAILY ACTIVITY: CK
EATING MEALS: A LITTLE
DRESSING REGULAR LOWER BODY CLOTHING: A LOT
HELP NEEDED FOR BATHING: A LOT
PERSONAL GROOMING: A LITTLE
DAILY ACTIVITIY SCORE: 14
TOILETING: A LOT
DRESSING REGULAR UPPER BODY CLOTHING: A LOT

## 2018-10-03 ASSESSMENT — PAIN SCALES - GENERAL
PAINLEVEL_OUTOF10: 0
PAINLEVEL_OUTOF10: 0
PAINLEVEL_OUTOF10: 3
PAINLEVEL_OUTOF10: 0

## 2018-10-03 ASSESSMENT — LIFESTYLE VARIABLES: SUBSTANCE_ABUSE: 0

## 2018-10-03 NOTE — DISCHARGE SUMMARY
Internal Medicine Discharge Summary  Note Author: Michael Hamm M.D.       Name Barry Torres     1940   Age/Sex 78 y.o. male   MRN [id number]         Admit Date:  2018       Discharge Date:   10/4/2018    Service:   [facility] [place] Internal Medicine White Team  Attending Physician(s):   Dr. Smyth       Senior Resident(s):   Dr. Khalil  Ramirez Resident(s):   Dr. Hamm  PCP: Lizzie Soto M.D.      Primary Diagnosis:   Sepsis 2/2 cellulits    Secondary Diagnoses:                Principal Problem:    Sepsis due to cellulitis ([facility]) POA: Unknown  Active Problems:    Limited mobility POA: Unknown    Diarrhea POA: Unknown    Hyperlipidemia POA: Yes    RA (rheumatoid arthritis) ([facility]) POA: Yes      Overview: BOTH HANDS     COPD (chronic obstructive pulmonary disease) ([facility]) POA: Yes    CKD (chronic kidney disease), stage III ([facility]) POA: Yes    Environmental and seasonal allergies POA: Unknown    Neuropathy ([facility]) POA: Unknown    Anemia POA: Unknown  Resolved Problems:    * No resolved hospital problems. *    Hospital Summary (Brief Narrative):       78M, PMH HLD, CKD, COPD, Rheumatoid Arthritis, recent carpal tunnel release presented with cellulitis secondary to sepsis. Patient had carpal tunnel release 1 week prior to admission and the incision on the right hand was significantly swollen, erythematous, and tender. Patient was initially started on ceftriaxone and vancomycin due to history of recent hospital admission. However, the patient developed significant diarrhea on day 2 and 3. He had positive occult blood. C. diff test was negative. Clinical pharmacist was consulted and antibiotics was changed to cefazolin. Patient was also restarted on home dose of loperamide. The diarrhea resolved the following day and following occult blood was negative. During the first few days the sepsis resolved and blood cultures were negative, however, there was mixed improvement and worsening of cellulitis.  "Outpatient rheumatologist was contacted and it was agreed that patient can stop his immunosuppresant medications to improve wound healing. He will need to follow up with rheumatologist in 1 week to address restarting them.     There has been a significant improvement in cellulitis on day 6 and 7. Orthopedics removed and stiches and cleared patient for discharge. Patient has been increasing adamant about going home despite his wife being ambivalent about this decision. They both eventually decided that patient will go home on day 7. Patient was oxygen evaluated and he was advised to use 1 liter of oxygen at home during the day. Patient has chronic significant weakness and mobility limitations at baseline, but was able to walk short distances with a walker. PT evaluated the patient and stated that he will need skilled nursing facility. The same evening, when patient felt urge to have bowel movement, he decided to run to bathroom without walker, and he felt in a bathroom. It was decided that in such situation it would not be wise to discharge patient, and alternative plan needed to be developed. On the day 8 of admission, his son arrived and the patient increasingly pressured us to leave to home. I performed minicog and MMSE on which patient scorred well. Patient was seen by speech therapy and signed off. Patient was able to verbalize risks of falling. PT again re-evaluated and found patient at \"90% risk of falling at home\". The entire family including wife, son, and the patient agreed that patient will go home despite despite this risk. This was discussed several times with [facility] physicians and physical therapy. Patient and his family were amendable to home health and outpatient physical therapy. Physical therapy was prescribed and home health was ordered. Patient was informed that home health might not be successfuly arranged at this point if patient decides to leave home. If this happens he will need to " re-ordered it though with his primary physician. Patient is adamant about leaving home, and family agrees. Orthopedics cleared patient again and noted wound being more open after fall. At this point, discharge order is placed. Patient will receive 4 day dose of keflex.    Patient /Hospital Summary (Details -- Problem Oriented) :          Limited mobility   Assessment & Plan    Wife states he has decreased mobility and even more exacerbated by his hospital stay. His oxygen baseline is 88% at home, and he has been as low as in mid 80's on 2L during sepsis. He recovered to 90% with 5L during sepsis. PT and OT seen patient. OT recommends home health for mobility and adl care for home. PT states that no equipment needed, but patient benefit from physical therapy 3/wk. Nursing is to walk patient during the day and offers to patient, but patient often refuses. I spoke to PT and they think skilled nursing would be helpful, but patient is adamant about going home today. Patient has a fall one day prior discharge, but decides to still go home and family agrees.  - PT deems patient very high risk for falling, but after multiple discussion between staff and family members, cognitive evaluation, patient and his family state that they want to leave home anyway despite fall risks which were verbalized by the family and patient  - PT agrees to home health and will consider physical therapy  - Recommend continuous 1L at home patient was able to sat 88-90 during test  - PT states that skilled nursing facility is a better option, patient disagrees        * Sepsis due to cellulitis (HCC)   Assessment & Plan    Improving for 2 days. Patient s/p recent about 7 days ago left carpal tunnel release. 24 hours prior admission the hand and forearm started to become more swollen, red, and tender. The pus started to be produced from the incision site., SIRS 4/4, qSOFA 1/3; procalcitonin initially elevated. LA 1.0. WBC WNL now. Swelling initially  improved. Patient was initially on vancomycin and ceftriaxone, but then switched to cefazolin. I discussed holding his immunosuppressant medications with his rheumatologist to improve wound healing.  - sepsis due to cellulitis  - Blood Cx x2 - negative  - Urine culture +S.aureus 50-100k  - OK to D/C by ortho  - anti-edema glove ordered by orthopedics   - Hold immunosuppressants by Rheum  - F/u ortho 1wk  - F/u rheum 1wk        Diarrhea   Assessment & Plan    RESOLVING. Most likely baseline exacerbation with antibiotics. Patient has baseline diarrhea at home and take loperamide. However his diarrhea worsened during hospital stay. Patient was on vancomycin IV and ceftriaxone. FOBT+ (previous consistently negative including FOBT on admission). C. Diff Toxin negative. After administration of loperamide diarrhea has resolved. Lactoferrin+.          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  Hgb 12.9 on admission. B12/folate/LDH normal. TSH 3.9. Ferritin elevate 632 with high TIBC and low saturation. FOBT was positive, but previously consistently negative, so GI bleed less likely to contribute to his anemia.  - Consider heme/onc outpatient follow up        Neuropathy ([facility])   Assessment & Plan    -neuropathy to the foot/ankle  -continue home dose gabapentin        Environmental and seasonal allergies   Assessment & Plan    -continue home dose fluticasone, allegra        CKD (chronic kidney disease), stage III ([facility])   Assessment & Plan    -Cr 1.55, around baseline  -avoid nephrotoxic drugs  -renal dosing        COPD (chronic obstructive pulmonary disease) ([facility])   Assessment & Plan    -continue home dose symbicort. Per wife patient runs often in 88% range at home.  -RT/O2 protocol        RA (rheumatoid arthritis) ([facility])   Assessment & Plan    -continue home dose Arava  -consider continuing  Cimzia q14d if hospitalization prolonged  -follows with rheum outpatient        Hyperlipidemia   Assessment & Plan    -continue home dose crestor            Consultants:     Physical Therapy  Orthopedics  Speech Therapy  Occupational Therapy    Procedures:        Suture Removal    Imaging/ Testing:      DX-WRIST-COMPLETE 3+ LEFT   Final Result      No radiographic evidence of acute traumatic injury.      DX-CHEST-PORTABLE (1 VIEW)   Final Result      Linear bibasilar atelectasis/fibrosis. There has been no significant interval change.            Discharge Medications:         Medication Reconciliation: Completed       Medication List      START taking these medications      Instructions   cephALEXin 500 MG Caps  Commonly known as:  KEFLEX   Take 1 Cap by mouth 2 times a day.  Dose:  500 mg        CONTINUE taking these medications      Instructions   CIMZIA PREFILLED 2 X 200 MG/ML Kit  Generic drug:  certolizumab pegol   Inject 200 mg as instructed every 14 days.  Dose:  200 mg     fexofenadine 180 MG tablet  Commonly known as:  ALLEGRA   Take 180 mg by mouth every day.  Dose:  180 mg     fluticasone 50 MCG/ACT nasal spray  Commonly known as:  FLONASE   Spray 1 Spray in nose every day.  Dose:  1 Spray     gabapentin 300 MG Caps  Commonly known as:  NEURONTIN   TAKE 1 CAPSULE BY MOUTH THREE TIMES A DAY     loperamide 2 MG tablet  Commonly known as:  IMODIUM A-D   Take 2 mg by mouth every day.  Dose:  2 mg     Magnesium 250 MG Tabs   Take 1 Tab by mouth every day.  Dose:  250 mg     MULTIVITAMIN PO   Take 1 Tab by mouth every day.  Dose:  1 Tab     PROBIOTIC PO   Take 1 Cap by mouth every day.  Dose:  1 Cap     rosuvastatin 20 MG Tabs  Commonly known as:  CRESTOR   TAKE 1 TABLET BY MOUTH EVERY NIGHT AT BEDTIME     SPIRIVA HANDIHALER 18 MCG Caps  Generic drug:  tiotropium   INHALE THE CONTENTS OF 1 CAPSULE BY MOUTH VIA HANDIHALER DAILY.     SYMBICORT 80-4.5 MCG/ACT Aero  Generic drug:  budesonide-formoterol   INHALE 2  PUFFS BY MOUTH TWO TIMES A DAY        STOP taking these medications    leflunomide 20 MG Tabs  Commonly known as:  ARAVA            Can use .DISCHARGEMEDSLIST if going to another facility         Disposition:  Home    Diet:   Health    Activity:   Ad penny. Patient requires significant assistance.     Instructions:      Make sure to take your antibiotics as prescribed  Do not take immunosuppressants until rheumatology clearance    The patient was instructed to return to the ER in the event of worsening symptoms. I have counseled the patient on the importance of compliance and the patient has agreed to proceed with all medical recommendations and follow up plan indicated above.   The patient understands that all medications come with benefits and risks. Risks may include permanent injury or death and these risks can be minimized with close reassessment and monitoring.        Primary Care Provider:     Discharge summary faxed to primary care provider:  Deferred  Copy of discharge summary given to the patient: Deferred    Follow up appointment details :      Orthopedics follow up in 1 week, patient will set up  Rheumatology follow up in 1 week, patient will set up  Primary care follow up within 2 weeks, patient will set up    Pending Studies:        None    Time spent on discharge day patient visit, preparing discharge paperwork and arranging for patient follow up.    Summary of follow up issues:   Orthopedics -- carpal tunnel release incision wound  Rheumatology -- restarting immunsuppressants  Primary care -- to address limited mobility, physical therapy, possibly home health if patient is not accepted with the initial request, possible need for GI evaluation due to positive FOBT/lactoferrin    Discharge Time (Minutes) :    120  Hospital Course Type:  Inpatient Stay >2 midnights      Condition on Discharge    ______________________________________________________________________    Interval history/exam for day of  discharge:    Patient states that his swelling and edema nearly fully resolved. His hand is non-tender. He denies fevers and chills. He still believes that despite the fall risks he is able to go home. He does not want to go to skilled nursing facility. The family agrees repeatedly with multiple staff members including physicians and physical therapy.      Most Recent Labs:    Lab Results   Component Value Date/Time    WBC 6.5 10/04/2018 03:25 AM    RBC 3.31 (L) 10/04/2018 03:25 AM    HEMOGLOBIN 10.2 (L) 10/04/2018 03:25 AM    HEMATOCRIT 31.7 (L) 10/04/2018 03:25 AM    MCV 95.8 10/04/2018 03:25 AM    MCH 30.8 10/04/2018 03:25 AM    MCHC 32.2 (L) 10/04/2018 03:25 AM    MPV 10.4 10/04/2018 03:25 AM    NEUTSPOLYS 59.00 10/04/2018 03:25 AM    LYMPHOCYTES 20.80 (L) 10/04/2018 03:25 AM    MONOCYTES 14.20 (H) 10/04/2018 03:25 AM    EOSINOPHILS 4.70 10/04/2018 03:25 AM    BASOPHILS 0.80 10/04/2018 03:25 AM      Lab Results   Component Value Date/Time    SODIUM 134 (L) 10/04/2018 03:25 AM    POTASSIUM 3.8 10/04/2018 03:25 AM    CHLORIDE 105 10/04/2018 03:25 AM    CO2 25 10/04/2018 03:25 AM    GLUCOSE 99 10/04/2018 03:25 AM    BUN 13 10/04/2018 03:25 AM    CREATININE 1.28 10/04/2018 03:25 AM    CREATININE 1.5 (H) 04/11/2005 10:13 AM      Lab Results   Component Value Date/Time    ALTSGPT 6 10/04/2018 03:25 AM    ASTSGOT 53 (H) 10/04/2018 03:25 AM    ALKPHOSPHAT 65 10/04/2018 03:25 AM    TBILIRUBIN 0.6 10/04/2018 03:25 AM    DBILIRUBIN 0.2 08/15/2018 06:36 AM    ALBUMIN 3.0 (L) 10/04/2018 03:25 AM    ALBUMIN 4.24 09/25/2013 08:11 AM    GLOBULIN 2.8 10/04/2018 03:25 AM    INR 1.15 (H) 09/28/2018 09:14 PM     Lab Results   Component Value Date/Time    PROTHROMBTM 14.8 (H) 09/28/2018 09:14 PM    INR 1.15 (H) 09/28/2018 09:14 PM

## 2018-10-03 NOTE — PROGRESS NOTES
Pt now agreeable to get to chair, pt stood up and leaned over, swaying, unable to keep balance, guided by author back to bed, pt used profanity to author stating also that he could have walked fine. Wife states this will be the first time he is walked since hosp admission.  Pt cont to refuse to have condom cath removed. CN made aware. Will page MD to update on mobility concerns.

## 2018-10-03 NOTE — PROGRESS NOTES
Report received at bedside, pt. Denies pain or discomfort at this time, left hand is currently elevated and on ice.  Pt. Assisted with bedpan and new condom cath.  Pt. Is A&Ox4, on 2L of oxygen via NC saturating 90s with equal unlabored breathing. POC resumed.

## 2018-10-03 NOTE — PROGRESS NOTES
Paging [facility] MD re mobility concern and per pt request, would like to speak to MD outside room as she does not feel she can care for pt by herself but he has apparently refused HH PT and rehab before. Waiting for call back.

## 2018-10-03 NOTE — NON-PROVIDER
"       Internal Medicine Interval Note  Note Author: Artem Rowe, Student     Name Barry Torres     1940   Age/Sex 78 y.o. male   MRN [id number]   Code Status Full     After 5PM or if no immediate response to page, please call for cross-coverage  Attending/Team: [place]/[facility] See Patient List for primary contact information  Call [phone] to page    1st Call - Day Intern (R1):   [place] 2nd Call - Day Sr. Resident (R2/R3):   Simran         Reason for interval visit  (Principal Problem)   Sepsis due to cellulitis (HCC)    Interval Problem Daily Status Update  (24 hours)   Patient feels that his arm swelling and pain has continued to improve. He reports increased increased range of motion. Discussed PT visit and he states that it did not go well and that \"they don't get along\". Patient is ready to leave the hospital and does not want anymore PT. States that his diarrhea remains resolved, but did have 2 episodes his morning and describes mostly as loose stool.     Review of Systems   Constitutional: Negative for chills, diaphoresis and fever.   HENT: Negative for congestion, hearing loss and sore throat.    Eyes: Negative for blurred vision, double vision and pain.   Respiratory: Negative for cough, shortness of breath and wheezing.    Cardiovascular: Negative for chest pain, palpitations and leg swelling.   Gastrointestinal: Positive for diarrhea (mild, but normal). Negative for abdominal pain, blood in stool, constipation, nausea and vomiting.   Genitourinary: Negative for frequency and urgency.   Musculoskeletal: Negative for back pain, joint pain and neck pain.   Skin: Negative for itching and rash.   Neurological: Positive for tremors (b/l hands, baseline). Negative for dizziness, tingling, sensory change, speech change, weakness and headaches.   Psychiatric/Behavioral: Negative for depression, hallucinations, substance abuse and suicidal ideas. "       Consultants/Specialty  Orthopedics: Dr. Ramirez  PCP: Lizzie Soto M.D.    Disposition  Inpatient, pending orthopedic clearance for home. Will continue to discuss PT while he is here.     Quality Measures  Quality-Core Measures   Reviewed items::  EKG reviewed, Labs reviewed and Medications reviewed  Lozano catheter::  No Lozano  DVT prophylaxis pharmacological::  Not indicated at this time, ambulatory  DVT prophylaxis - mechanical:  Not indicated at this time, ambulatory  Ulcer Prophylaxis::  Not indicated          Physical Exam       Vitals:    10/02/18 1530 10/02/18 2000 10/03/18 0400 10/03/18 0720   BP: 152/86 146/77 135/78 144/85   Pulse: 83 (!) 101 87 83   Resp: 14 20 20 15   Temp: 37.5 °C (99.5 °F) 36.8 °C (98.2 °F) 36.6 °C (97.9 °F) 36.4 °C (97.5 °F)   SpO2: 93% 88% 91% 92%   Weight:   94.3 kg (207 lb 14.3 oz)    Height:         Body mass index is 27.43 kg/m². Weight: 94.3 kg (207 lb 14.3 oz)  Oxygen Therapy:  Pulse Oximetry: 92 %, O2 (LPM): 1, O2 Delivery: Silicone Nasal Cannula    Physical Exam   Constitutional: He is oriented to person, place, and time and well-developed, well-nourished, and in no distress. No distress.   HENT:   Head: Normocephalic and atraumatic.   Right Ear: External ear normal.   Left Ear: External ear normal.   Nose: Nose normal.   Mouth/Throat: Oropharynx is clear and moist. No oropharyngeal exudate.   Eyes: Pupils are equal, round, and reactive to light. Conjunctivae and EOM are normal. Right eye exhibits no discharge. Left eye exhibits no discharge.   Neck: Normal range of motion. No thyromegaly present.   Cardiovascular: Normal rate, regular rhythm and intact distal pulses.  Exam reveals no gallop and no friction rub.    No murmur heard.  Pulmonary/Chest: Effort normal and breath sounds normal. No respiratory distress. He has no wheezes. He has no rales. He exhibits no tenderness.   Abdominal: Soft. Bowel sounds are normal. He exhibits no distension. There is no  tenderness. There is no rebound and no guarding.   Musculoskeletal: Normal range of motion. He exhibits edema (mainly localized to the L hand and wrist). He exhibits no tenderness or deformity.   Lymphadenopathy:     He has no cervical adenopathy.   Neurological: He is alert and oriented to person, place, and time.   Skin: Skin is warm. No rash noted. He is not diaphoretic. There is erythema (L forearm, wrist, and hand). No pallor.   Psychiatric: Memory, affect and judgment normal.         Lab Data Review:       Recent Labs      10/01/18   0209  10/02/18   0245  10/03/18   0307   SODIUM  136  137  139   POTASSIUM  3.5*  3.5*  3.9   CHLORIDE  108  109  108   CO2  21  21  23   BUN  20  16  14   CREATININE  1.42*  1.30  1.33   MAGNESIUM  2.0  2.1  2.2   PHOSPHORUS  2.4*  2.3*  1.8*   CALCIUM  7.8*  7.2*  7.9*       Recent Labs      10/01/18   0209  10/02/18   0245  10/03/18   0307   ALTSGPT  24  21  17   ASTSGOT  44  40  54*   ALKPHOSPHAT  53  51  64   TBILIRUBIN  0.6  0.4  0.5   GLUCOSE  102*  95  99       Recent Labs      10/01/18   0209  10/01/18   0914  10/02/18   0247  10/03/18   0307   RBC  3.30*   --   3.20*  3.27*   HEMOGLOBIN  10.6*   --   9.8*  10.1*   HEMATOCRIT  32.1*   --   31.1*  31.6*   PLATELETCT  129*   --   136*  142*   IRON   --   15*   --    --    FERRITIN   --   632.5*   --    --    TOTIRONBC   --   204*   --    --        Recent Labs      10/01/18   0209  10/02/18   0245  10/02/18   0247  10/03/18   0307   WBC  7.0   --   6.8  6.6   NEUTSPOLYS  66.80   --   61.50  61.90   LYMPHOCYTES  14.70*   --   19.00*  18.70*   MONOCYTES  15.70*   --   13.60*  12.30   EOSINOPHILS  1.90   --   5.00  5.90   BASOPHILS  0.60   --   0.60  0.90   ASTSGOT  44  40   --   54*   ALTSGPT  24  21   --   17   ALKPHOSPHAT  53  51   --   64   TBILIRUBIN  0.6  0.4   --   0.5         Assessment/Plan     No new Assessment & Plan notes have been filed under this hospital service since the last note was generated.  Service:  Internal Medicine    Barry Torres is a 79 y/o  male with a history of CKD, COPD, rheumatoid arthiritis, hyperlipidemia, neuropathy, and is 10 days s/p L carpal tunnel release who presented with L wrist and hand swelling and pain, currently being treated for sepsis secondary to cellulitis.      #1 Sepsis secondary to cellulitis of L forearm, wrist, and hand  - At admission patient met 4/4 SIRS criteria with an elevated procalcitonin, suggestive of sepsis with bacterial infection at his surgical site. As of today sepsis is resolved.   - Patient meets cellulitis clinical description with swollen, erythematous, painful, warm skin overlying his L forearm, wrist, and hand. Symptoms continue to improve with edema greatly reduced in comparison to previous days. There is still some erythema from mid forearm to hand and finger.   - Urine culture collected 9/29 grew S. aureus on 9/30. Blood culture was negative.  - Patient was switched to cefazolin 10/1 IV and will continue for 6 days of a 10 day cycle.  - Orthopedics will be back to talk with patient today and possibly take out his sutures. Following today's discussion can discuss plan for discharge.    - OT was placed by orthopedics.   - anti-edema glove ordered by orthopedics      #2 Acute onset diarrhea  - Diarrhea symptoms have resolved as of 4PM 10/1. Symptoms started 3AM 10/1 and at the time was very frequent. Both IV vancomycin and IV ceftriaxone can be associated with C. Diff-associated diarrhea, so PCR was ordered, which came back negative today.  - Patient does have history of imodium use at home to help with regular diarrhea. This may be an exacerbation of normal stools in combination with diarrhea induced by the antibiotics. Patient was started on loperamide today following negative C. Diff.  - Stools continue to remain at his normal baseline and has no complaints.      #3 Limited mobility  - Continue to discuss physical therapy with patient, but he  continues to refuse.   - PT suggests he would benefit from inpatient services, but he does not want to move forward.     #4 Anemia   - Presented with H/H of 12.9/38.7, 9/30 11.5/34.8, 10.2/31.2, 10.6/32.1, 9.8/31.1, and today was 10.1/31.6  - Iron studies suggest that this may be an anemia of chronic disease. These values are not too far off of his normal baseline and patient has been off of his rheumatoid medications while he was being treated for sepsis.   - Continue to monitor and can follow up outpatient once abx are completed.      #5 Neuropathy  - Patient has never been diagnosed with diabetes and does not have blood sugars concerning for diabetes  - Continue with home gabapentin     #6 Stage III Chronic Kidney Disease  - Most recent creatinine (1.33), BUN (14) are close to the patient's previous baseline and continue to improve following admission.  - Continue to monitor BMP as current abx regimen can result in nephrotoxicity if excessive     #7 COPD  - Continue with home regimen for inhalers  - Continue to monitor patient's breathing  - He is normally on oxygen at night, will continue while inpatient     #8 Rheumatoid arthritis  - Continue leflunomide. Per ortho hold certolizumab while being treated for sepsis. Discuss holding the leflunomide as well with his rheumatologist.      #9 Hyperlipidemia  - Continue rosuvastatin

## 2018-10-03 NOTE — FACE TO FACE
Face to Face Supporting Documentation - Home Health    The encounter with this patient was in whole or in part the primary reason for home health admission.    Date of encounter:   Patient:                    MRN:                       YOB: 2019  Barry Torres  [id number]  1940     Home health to see patient for:  Every tasks    Skilled need for:  Exacerbation of Chronic Disease State Limited mobility and Surgical Aftercare left hand cellulitis    Skilled nursing interventions to include:  Wound Care and Comment: Home safety    Homebound status evidenced by:  Need the aid of supportive devices such as crutches, canes, wheelchairs or walkers. Leaving home requires a considerable and taxing effort. There is a normal inability to leave the home.    Community Physician to provide follow up care: Lizzie Soto M.D.     Optional Interventions? No      I certify the face to face encounter for this home health care referral meets the CMS requirements and the encounter/clinical assessment with the patient was, in whole, or in part, for the medical condition(s) listed above, which is the primary reason for home health care. Based on my clinical findings: the service(s) are medically necessary, support the need for home health care, and the homebound criteria are met.  I certify that this patient has had a face to face encounter by myself.  Michael Hamm M.D. - NPI: [id number]

## 2018-10-03 NOTE — THERAPY
"Occupational Therapy Treatment completed with focus on patient education, caregiver training and upper extremity function.  Functional Status:  Max A for HEP for L hand  Plan of Care: Will benefit from Occupational Therapy 3 times per week  Discharge Recommendations:  Equipment Will Continue to Assess for Equipment Needs. Post-acute therapy Discharge to a transitional care facility for continued skilled therapy services.    See \"Rehab Therapy-Acute\" Patient Summary Report for complete documentation.     OT tx session with focus on tendon glides HEP for L hand. Pt had just gotten into chair with RN when tx session started. Pt appears to be limited due to cognition and attention. Pt required constant VC's to complete HEP with hand that had recently gotten CTR as pt defaulting to R hand. Pt required constant VC's for sequencing and required constant scaffolding of tendon glide exercises in order to complete correctly. Pt had minimal carryover of education on HEP and reducing edema in L hand. Pt re-educated on using elevation and ice as needed to reduce edema. Educated pt and SO on importance of hand therapy for continued therapy on L hand AROM and strengthening. Reviewed tendon glides HEP with SO as unsure of pt's ability to retain education. Based on pt's cognition and ability to attend during tx session, pt may benefit from post-acute placement for continued strengthening prior to d/c home. Will continue to follow for acute OT services while in-house.   "

## 2018-10-03 NOTE — PROGRESS NOTES
Pt requesting bedpan, pt states he does not feel like walking at this time. Pt also insistant to keep condom cath in place for same reason. Educated pt on importance of mobilization and independent as he states he walked prior to hospital admit, pt became irritated, states he will make author aware when he is ready to walk.

## 2018-10-03 NOTE — DISCHARGE PLANNING
10/3: Patient now has medical transfer orders, Non-compliant at times with nursing staff. Wife voiceing concern over not being able to care for  at home.     Update 10/2:  Pt evaluated by OT recommends home health for therapy needs.  No DME needs per pt and wife.   Update 10/1:  Patient pending evaluation by PT/OT.  PT may need a home walker.  Pt has home O2 already, so no need for this.  Pt pending D/C home, unless PT/OT evaluation indicates otherwise.      · Is all DME in place? No    Based on PT/OT evaluation, patient may need a walker for home.  Pt has home O2.                If yes, has patient been educated on use of DME?   No     · Is consent for controlled substance signed and placed in chart? No     · Did patient receive all prescriptions? No     If electronically prescribed, did you call pharmacy to verify availability?   No     · Does the patient have transportation to  prescriptions? Yes  · Does the patient have the financial means to obtain prescriptions? Yes     · Have all necessary follow up appointments been scheduled?  PCP or D/C clinic- No  Specialty- No - needs a F/U appt to remove sutures/staples 10-14 days post-op  No, please explain- not medically cleared         · Has home health referral been suggested or already sent? Home health for therapy recommended, patient currently refusing.      Did you review notes from PT/OT/SLP and did you verify d/c plan is consistent with their recommendation? (ie diet education, supplies) .  PT-OT has not seen patient yet.      Does patient need follow up wound care? Yes, not medically cleared, but will likely need wound care as an outpt  If yes, have post acute arrangements been made?   -Supplies  -Wound clinic follow up  - referral for wound care  -Home wound vac  Please specify if applicable- will likely need wound care     · Did all specialties sign off for discharge? No     · Has hand off report been called to receiving caregivers?    SNF/Rehab/LTAC                          Home Health  PCP  Caregiver  Group Home  Acute to Acute     Please specify all applicable hand offs-      · Has outpatient dialysis been arranged?  Not Indicated  · Is patient on anticoagulation? No  If on coumadin, is f/u INR monitoring arranged? No     Education to be included in Discharge-  Did patient/caregiver receive home care instructions from:  · DM educator- Not Indicated  · COPD educator- Not Indicated     Did you include core measure specific education?  · CHF- Not Indicated  · PNA- Not Indicated  · Stroke- Not Indicated  · MI- Not Indicated     Are diagnosis specific instructions in AVS?  Yes     Educated  Patient/caregiver on symptom management with teach back?  Yes     Educated patient/caregiver on pain management? (see pcp for long term pain management)- Yes     If caregiver education has taken place, please specify topics discussed-

## 2018-10-03 NOTE — PROGRESS NOTES
Ortho Hand:    S: pain much improved  O: left hand surgical site and swelling much improved; overall benign  A: resolving left hand swelling and cellulitis s/p open carpal tunnel release  P: d/c sutures today; continue ROM exercises and anti-edema glove; okay to d/c home on PO Abx; f/u early  next week as outpatient.

## 2018-10-03 NOTE — PROGRESS NOTES
"Pt telling author to \"go away\" when turning bed alarm on. Bed alarm on, call bell in reach, bed lowered position.   "

## 2018-10-03 NOTE — PROGRESS NOTES
Pt refusing iv access as he states he will be d/c'ing soon. Educated pt on importance of iv antbx, s/u

## 2018-10-04 VITALS
RESPIRATION RATE: 18 BRPM | OXYGEN SATURATION: 90 % | HEART RATE: 111 BPM | HEIGHT: 73 IN | WEIGHT: 197.75 LBS | TEMPERATURE: 98.8 F | SYSTOLIC BLOOD PRESSURE: 143 MMHG | DIASTOLIC BLOOD PRESSURE: 89 MMHG | BODY MASS INDEX: 26.21 KG/M2

## 2018-10-04 LAB
ALBUMIN SERPL BCP-MCNC: 3 G/DL (ref 3.2–4.9)
ALBUMIN/GLOB SERPL: 1.1 G/DL
ALP SERPL-CCNC: 65 U/L (ref 30–99)
ALT SERPL-CCNC: 6 U/L (ref 2–50)
ANION GAP SERPL CALC-SCNC: 4 MMOL/L (ref 0–11.9)
AST SERPL-CCNC: 53 U/L (ref 12–45)
BASOPHILS # BLD AUTO: 0.8 % (ref 0–1.8)
BASOPHILS # BLD: 0.05 K/UL (ref 0–0.12)
BILIRUB SERPL-MCNC: 0.6 MG/DL (ref 0.1–1.5)
BUN SERPL-MCNC: 13 MG/DL (ref 8–22)
CALCIUM SERPL-MCNC: 8.1 MG/DL (ref 8.5–10.5)
CHLORIDE SERPL-SCNC: 105 MMOL/L (ref 96–112)
CO2 SERPL-SCNC: 25 MMOL/L (ref 20–33)
CREAT SERPL-MCNC: 1.28 MG/DL (ref 0.5–1.4)
EKG IMPRESSION: NORMAL
EOSINOPHIL # BLD AUTO: 0.31 K/UL (ref 0–0.51)
EOSINOPHIL NFR BLD: 4.7 % (ref 0–6.9)
ERYTHROCYTE [DISTWIDTH] IN BLOOD BY AUTOMATED COUNT: 53.1 FL (ref 35.9–50)
GLOBULIN SER CALC-MCNC: 2.8 G/DL (ref 1.9–3.5)
GLUCOSE SERPL-MCNC: 99 MG/DL (ref 65–99)
HCT VFR BLD AUTO: 31.7 % (ref 42–52)
HGB BLD-MCNC: 10.2 G/DL (ref 14–18)
IMM GRANULOCYTES # BLD AUTO: 0.03 K/UL (ref 0–0.11)
IMM GRANULOCYTES NFR BLD AUTO: 0.5 % (ref 0–0.9)
LACTOFERRIN STL QL IA: POSITIVE
LYMPHOCYTES # BLD AUTO: 1.36 K/UL (ref 1–4.8)
LYMPHOCYTES NFR BLD: 20.8 % (ref 22–41)
MAGNESIUM SERPL-MCNC: 2.1 MG/DL (ref 1.5–2.5)
MCH RBC QN AUTO: 30.8 PG (ref 27–33)
MCHC RBC AUTO-ENTMCNC: 32.2 G/DL (ref 33.7–35.3)
MCV RBC AUTO: 95.8 FL (ref 81.4–97.8)
MONOCYTES # BLD AUTO: 0.93 K/UL (ref 0–0.85)
MONOCYTES NFR BLD AUTO: 14.2 % (ref 0–13.4)
NEUTROPHILS # BLD AUTO: 3.85 K/UL (ref 1.82–7.42)
NEUTROPHILS NFR BLD: 59 % (ref 44–72)
NRBC # BLD AUTO: 0 K/UL
NRBC BLD-RTO: 0 /100 WBC
PHOSPHATE SERPL-MCNC: 2.8 MG/DL (ref 2.5–4.5)
PLATELET # BLD AUTO: 153 K/UL (ref 164–446)
PMV BLD AUTO: 10.4 FL (ref 9–12.9)
POTASSIUM SERPL-SCNC: 3.8 MMOL/L (ref 3.6–5.5)
PROT SERPL-MCNC: 5.8 G/DL (ref 6–8.2)
RBC # BLD AUTO: 3.31 M/UL (ref 4.7–6.1)
SODIUM SERPL-SCNC: 134 MMOL/L (ref 135–145)
WBC # BLD AUTO: 6.5 K/UL (ref 4.8–10.8)

## 2018-10-04 PROCEDURE — G8979 MOBILITY GOAL STATUS: HCPCS | Mod: CJ

## 2018-10-04 PROCEDURE — 700102 HCHG RX REV CODE 250 W/ 637 OVERRIDE(OP): Performed by: STUDENT IN AN ORGANIZED HEALTH CARE EDUCATION/TRAINING PROGRAM

## 2018-10-04 PROCEDURE — 83735 ASSAY OF MAGNESIUM: CPT

## 2018-10-04 PROCEDURE — A9270 NON-COVERED ITEM OR SERVICE: HCPCS | Performed by: STUDENT IN AN ORGANIZED HEALTH CARE EDUCATION/TRAINING PROGRAM

## 2018-10-04 PROCEDURE — 93010 ELECTROCARDIOGRAM REPORT: CPT | Performed by: INTERNAL MEDICINE

## 2018-10-04 PROCEDURE — G9161 LANG COMP D/C STATUS: HCPCS | Mod: CH

## 2018-10-04 PROCEDURE — 92523 SPEECH SOUND LANG COMPREHEN: CPT

## 2018-10-04 PROCEDURE — 700111 HCHG RX REV CODE 636 W/ 250 OVERRIDE (IP): Performed by: STUDENT IN AN ORGANIZED HEALTH CARE EDUCATION/TRAINING PROGRAM

## 2018-10-04 PROCEDURE — 84100 ASSAY OF PHOSPHORUS: CPT

## 2018-10-04 PROCEDURE — 80053 COMPREHEN METABOLIC PANEL: CPT

## 2018-10-04 PROCEDURE — G9159 LANG COMP CURRENT STATUS: HCPCS | Mod: CH

## 2018-10-04 PROCEDURE — 85025 COMPLETE CBC W/AUTO DIFF WBC: CPT

## 2018-10-04 PROCEDURE — G9160 LANG COMP GOAL STATUS: HCPCS | Mod: CH

## 2018-10-04 PROCEDURE — 97164 PT RE-EVAL EST PLAN CARE: CPT

## 2018-10-04 PROCEDURE — 97535 SELF CARE MNGMENT TRAINING: CPT

## 2018-10-04 PROCEDURE — G8978 MOBILITY CURRENT STATUS: HCPCS | Mod: CJ

## 2018-10-04 PROCEDURE — 36415 COLL VENOUS BLD VENIPUNCTURE: CPT

## 2018-10-04 PROCEDURE — 93005 ELECTROCARDIOGRAM TRACING: CPT | Performed by: STUDENT IN AN ORGANIZED HEALTH CARE EDUCATION/TRAINING PROGRAM

## 2018-10-04 RX ADMIN — DIBASIC SODIUM PHOSPHATE, MONOBASIC POTASSIUM PHOSPHATE AND MONOBASIC SODIUM PHOSPHATE 1 TABLET: 852; 155; 130 TABLET ORAL at 12:30

## 2018-10-04 RX ADMIN — GABAPENTIN 300 MG: 300 CAPSULE ORAL at 12:30

## 2018-10-04 RX ADMIN — GABAPENTIN 300 MG: 300 CAPSULE ORAL at 05:08

## 2018-10-04 RX ADMIN — FLUTICASONE PROPIONATE 50 MCG: 50 SPRAY, METERED NASAL at 05:09

## 2018-10-04 RX ADMIN — BUDESONIDE AND FORMOTEROL FUMARATE DIHYDRATE 2 PUFF: 80; 4.5 AEROSOL RESPIRATORY (INHALATION) at 05:08

## 2018-10-04 RX ADMIN — FEXOFENADINE HCL 180 MG: 60 TABLET, FILM COATED ORAL at 05:08

## 2018-10-04 RX ADMIN — CEFAZOLIN SODIUM 2 G: 2 INJECTION, SOLUTION INTRAVENOUS at 05:07

## 2018-10-04 RX ADMIN — TIOTROPIUM BROMIDE 1 CAPSULE: 18 CAPSULE ORAL; RESPIRATORY (INHALATION) at 05:14

## 2018-10-04 RX ADMIN — DIBASIC SODIUM PHOSPHATE, MONOBASIC POTASSIUM PHOSPHATE AND MONOBASIC SODIUM PHOSPHATE 1 TABLET: 852; 155; 130 TABLET ORAL at 05:08

## 2018-10-04 ASSESSMENT — ENCOUNTER SYMPTOMS
SPEECH CHANGE: 0
WHEEZING: 0
PALPITATIONS: 0
ABDOMINAL PAIN: 0
NERVOUS/ANXIOUS: 0
VOMITING: 0
DOUBLE VISION: 0
DEPRESSION: 0
FALLS: 1
BLURRED VISION: 0
BLOOD IN STOOL: 0
BACK PAIN: 0
SHORTNESS OF BREATH: 0
DIARRHEA: 1
NECK PAIN: 0
MYALGIAS: 0
FEVER: 0
TREMORS: 1
DIZZINESS: 0
HEARTBURN: 0
TINGLING: 0
NAUSEA: 0
HEADACHES: 0
COUGH: 0
CHILLS: 0
SORE THROAT: 0
CLAUDICATION: 0
SENSORY CHANGE: 0
CONSTIPATION: 0
DIAPHORESIS: 0

## 2018-10-04 ASSESSMENT — PAIN SCALES - GENERAL
PAINLEVEL_OUTOF10: 0

## 2018-10-04 ASSESSMENT — COGNITIVE AND FUNCTIONAL STATUS - GENERAL
STANDING UP FROM CHAIR USING ARMS: A LITTLE
TURNING FROM BACK TO SIDE WHILE IN FLAT BAD: A LITTLE
MOVING TO AND FROM BED TO CHAIR: A LITTLE
CLIMB 3 TO 5 STEPS WITH RAILING: A LITTLE
WALKING IN HOSPITAL ROOM: A LITTLE
MOVING FROM LYING ON BACK TO SITTING ON SIDE OF FLAT BED: A LITTLE
MOBILITY SCORE: 18
SUGGESTED CMS G CODE MODIFIER MOBILITY: CK

## 2018-10-04 ASSESSMENT — GAIT ASSESSMENTS
DISTANCE (FEET): 75
ASSISTIVE DEVICE: FRONT WHEEL WALKER
GAIT LEVEL OF ASSIST: STAND BY ASSIST

## 2018-10-04 NOTE — PROGRESS NOTES
"   Orthopaedic Progress Note    Interval changes:  Sutures removed from left hand and seri strips placed with benzoin  Cleared by ortho for DC home with oral Abx pending medicine and therapy clearance    ROS - Patient denies any new issues.  Pain well controlled.    Blood pressure 144/85, pulse 83, temperature 36.4 °C (97.5 °F), resp. rate 15, height 1.854 m (6' 1\"), weight 94.3 kg (207 lb 14.3 oz), SpO2 92 %.      Patient seen and examined  No acute distress  Breathing non labored  RRR  Left hand sutures removed and seri strips placed with benzoin, moves all fingers, DNVI, cap refill < 2 sec.    Recent Labs      10/01/18   0209  10/02/18   0247  10/03/18   0307   WBC  7.0  6.8  6.6   RBC  3.30*  3.20*  3.27*   HEMOGLOBIN  10.6*  9.8*  10.1*   HEMATOCRIT  32.1*  31.1*  31.6*   MCV  97.3  97.2  96.6   MCH  32.1  30.6  30.9   MCHC  33.0*  31.5*  32.0*   RDW  54.9*  55.6*  55.2*   PLATELETCT  129*  136*  142*   MPV  10.5  10.0  9.9       Active Hospital Problems    Diagnosis   • Limited mobility [Z74.09]     Priority: High   • Sepsis due to cellulitis ([facility]) [L03.90, A41.9]     Priority: High   • Diarrhea [R19.7]     Priority: Medium   • Environmental and seasonal allergies [J30.89]     Priority: Low   • Neuropathy ([facility]) [G62.9]     Priority: Low   • Anemia [D64.9]     Priority: Low   • CKD (chronic kidney disease), stage III ([facility]) [N18.3]     Priority: Low   • COPD (chronic obstructive pulmonary disease) ([facility]) [J44.9]     Priority: Low   • Hyperlipidemia [E78.5]     Priority: Low   • RA (rheumatoid arthritis) ([facility]) [M06.9]     Priority: Low     BOTH HANDS          Assessment/Plan:  Patient cleared for DC by ortho pending medicine clearance  POD#12 S/P carpal tunnel release  Wt bearing status - WBAT  Wound care/Drains - open to air  Future Procedures - none planned  Sutures/Staples out- 10-14 days post operatively  PT/OT-initiated  Antibiotics: ancef 2g IV Q8  DVT Prophylaxis- TEDS/SCDs/Foot pumps  Lozano-none  Case " Coordination for Discharge Planning - Disposition pending therapy recs

## 2018-10-04 NOTE — PROGRESS NOTES
Wife left to go home to retrieve home 02 for d/c. Bed alarm on, callbell in reach, pt resting in bed, door/curtain open.

## 2018-10-04 NOTE — PROGRESS NOTES
Mr. Torres tolerated last night's procedures / treatments without incident. At end of shift patient laying in bed, denies any complaints and does not display any signs of distress. Patient care to be endorsed to AM nurse.

## 2018-10-04 NOTE — PROGRESS NOTES
Dr. Hamm notified that patient is getting anxious to go home. Patient becoming increasingly agitated.   Dr. Hamm will be in to discuss plan with patient and family.

## 2018-10-04 NOTE — THERAPY
Physical Therapy: Pt had a fall last night. Met with patient and wife today. Pt makes very clear that he does not want physical therapy and that he will go home today with assist from wife. Pt remains a fall risk, not safe to d/c home, would be a good candidate for SNF.  D/c PT at pt's request. -Tammy Barajas

## 2018-10-04 NOTE — DISCHARGE PLANNING
Anticipated Discharge Disposition: Home    Action: Pt refuses SNF and declined HHC.  Pt wife states that they have a friend that works in HHC that can help them.  Spoke to pt about his risk of falling but he states he is fine and wants to go home.  PT has walker at home.     Barriers to Discharge: medical clearance    Plan: TBD

## 2018-10-04 NOTE — THERAPY
"Speech Language Therapy Evaluation completed to address speech and cognition  Functional Status:  Pt seen on this date for cognitive-linguistic assessment. Pt A&Ox4 and agreeable to evaluation. Pt with fall yesterday on 10/3/18. Per pt, he felt like he \"urgently\" needed to use restroom and didn't think it would be an issue going to restroom by self without using call button. Non-standardized measures were used to assess an array of cognitive linguistic domains, which found all domains within functional limits. Pt with intermittent sarcastic responses during session, however, with cue, pt answered appropriately. Question safety concerns and impulsivity as pt refusing physical therapy and not using call light before getting up, however, pt reporting that PT and him \"just don't didn't get along\", which was the reason for refusal to work with them. Per pt's wife and son, no cognitive concerns at this time reporting pt is at baseline. Per pt and family report, pt lives with wife who is a retired nurse who would be able to assist with safe transition home. At this time, no further acute SLP services are needed and would recommend that pt have 24 hour care following d/c to help with safe transition home due to mobility concerns from PT.     Recommendations:  No further acute SLP services need, per pt and family, pt currently at baseline.  Plan of Care: Patient with no further skilled SLP needs in the acute care setting at this time  Post-Acute Therapy: Currently anticipate no further skilled therapy needs once patient is discharged from the inpatient setting.    See \"Rehab Therapy-Acute\" Patient Summary Report for complete documentation.   "

## 2018-10-04 NOTE — PROGRESS NOTES
Pt telling wife how to turn off bed alarm. Author educated pt and wife that it is against policy to touch hospital equipment. Near RNs made aware of pt non compliance, joanne fall risk.

## 2018-10-04 NOTE — PROGRESS NOTES
[facility] MD P. at bedside. Updated MD that pt has fell on knees and hands, right hand oozing covered in gauze and tape. Bilat knees red. MD P. States it is not necssary for orth surg to be notified.

## 2018-10-04 NOTE — NON-PROVIDER
Internal Medicine Interval Note  Note Author: Artem Rowe, Student     Name Barry Torres     1940   Age/Sex 78 y.o. male   MRN [id number]   Code Status Full     After 5PM or if no immediate response to page, please call for cross-coverage  Attending/Team: Wise/White See Patient List for primary contact information  Call [phone] to page    1st Call - Day Intern (R1):   Noris 2nd Call - Day Sr. Resident (R2/R3):   Simran         Reason for interval visit  (Principal Problem)   Sepsis due to cellulitis (HCC)    Interval Problem Daily Status Update  (24 hours)   Patient states that he is continuing to improve. He feels that the pain in his hand has resolved as well as the swelling. He denies any more falls or injuries aside from when he went to the restroom yesterday. He said that he had to use the restroom and didn't have time to get his walker and by the time he got to the toilet he fell down. He denies any pain or discomfort. He states that he is ready to go home today and denies PT inpatient.     Review of Systems   Constitutional: Negative for chills, diaphoresis and fever.   HENT: Negative for congestion, hearing loss and sore throat.    Eyes: Negative for blurred vision and double vision.   Respiratory: Negative for cough, shortness of breath and wheezing.    Cardiovascular: Negative for chest pain, palpitations, claudication and leg swelling.   Gastrointestinal: Positive for diarrhea (normal for him, is on loperamide). Negative for abdominal pain, blood in stool, constipation, heartburn, nausea and vomiting.   Genitourinary: Negative for dysuria, frequency, hematuria and urgency.   Musculoskeletal: Positive for falls. Negative for back pain, joint pain, myalgias and neck pain.   Skin: Negative for itching and rash.   Neurological: Positive for tremors (both hands). Negative for dizziness, tingling, sensory change, speech change and headaches.   Psychiatric/Behavioral:  Negative for depression. The patient is not nervous/anxious.        Consultants/Specialty  Orthopedics: Dr. Ramirez  PCP: Lizzie Soto M.D.    Disposition  Ready for discharge, but would like ortho consult once more regarding fall as surgical site appears opened.     Quality Measures  Quality-Core Measures   Reviewed items::  Labs reviewed and Medications reviewed  Lozano catheter::  No Lozano  DVT prophylaxis pharmacological::  Not indicated at this time, ambulatory  DVT prophylaxis - mechanical:  Not indicated at this time, ambulatory  Ulcer Prophylaxis::  Not indicated          Physical Exam       Vitals:    10/03/18 1900 10/04/18 0335 10/04/18 0805 10/04/18 1119   BP: 157/92 147/88 158/84 143/89   Pulse: 100 71 (!) 107 (!) 111   Resp: 18 16 18 18   Temp:  36.5 °C (97.7 °F) 37.4 °C (99.3 °F) 37.1 °C (98.8 °F)   SpO2: 93% 94% 90% 90%   Weight: 89.7 kg (197 lb 12 oz)      Height:         Body mass index is 26.09 kg/m². Weight: 89.7 kg (197 lb 12 oz)  Oxygen Therapy:  Pulse Oximetry: 90 %, O2 (LPM): 0, O2 Delivery: None (Room Air)    Physical Exam   Constitutional: He is oriented to person, place, and time and well-developed, well-nourished, and in no distress. No distress.   HENT:   Head: Normocephalic and atraumatic.   Right Ear: External ear normal.   Left Ear: External ear normal.   Nose: Nose normal.   Mouth/Throat: Oropharynx is clear and moist. No oropharyngeal exudate.   Eyes: Pupils are equal, round, and reactive to light. Conjunctivae and EOM are normal. Right eye exhibits no discharge. Left eye exhibits no discharge. No scleral icterus.   Neck: Normal range of motion. No thyromegaly present.   Cardiovascular: Normal rate, regular rhythm, normal heart sounds and intact distal pulses.  Exam reveals no gallop and no friction rub.    No murmur heard.  Pulmonary/Chest: Effort normal and breath sounds normal. No respiratory distress. He has no wheezes. He has no rales. He exhibits no tenderness.    Abdominal: Soft. Bowel sounds are normal. He exhibits no distension. There is no tenderness. There is no rebound and no guarding.   Musculoskeletal: Normal range of motion. He exhibits edema (mild edema in wrist and hand, forearm has resolvd). He exhibits no tenderness or deformity.   Lymphadenopathy:     He has no cervical adenopathy.   Neurological: He is alert and oriented to person, place, and time.   Difficulty walking, uses walker to ambulate   Skin: Skin is warm and dry. No rash noted. He is not diaphoretic. There is erythema (left wrist and hand). No pallor.   Psychiatric: Mood, memory, affect and judgment normal.         Lab Data Review:       Recent Labs      10/02/18   0245  10/03/18   0307  10/04/18   0325   SODIUM  137  139  134*   POTASSIUM  3.5*  3.9  3.8   CHLORIDE  109  108  105   CO2  21  23  25   BUN  16  14  13   CREATININE  1.30  1.33  1.28   MAGNESIUM  2.1  2.2  2.1   PHOSPHORUS  2.3*  1.8*  2.8   CALCIUM  7.2*  7.9*  8.1*       Recent Labs      10/02/18   0245  10/03/18   0307  10/04/18   0325   ALTSGPT  21  17  6   ASTSGOT  40  54*  53*   ALKPHOSPHAT  51  64  65   TBILIRUBIN  0.4  0.5  0.6   GLUCOSE  95  99  99       Recent Labs      10/02/18   0247  10/03/18   0307  10/04/18   0325   RBC  3.20*  3.27*  3.31*   HEMOGLOBIN  9.8*  10.1*  10.2*   HEMATOCRIT  31.1*  31.6*  31.7*   PLATELETCT  136*  142*  153*       Recent Labs      10/02/18   0245  10/02/18   0247  10/03/18   0307  10/04/18   0325   WBC   --   6.8  6.6  6.5   NEUTSPOLYS   --   61.50  61.90  59.00   LYMPHOCYTES   --   19.00*  18.70*  20.80*   MONOCYTES   --   13.60*  12.30  14.20*   EOSINOPHILS   --   5.00  5.90  4.70   BASOPHILS   --   0.60  0.90  0.80   ASTSGOT  40   --   54*  53*   ALTSGPT  21   --   17  6   ALKPHOSPHAT  51   --   64  65   TBILIRUBIN  0.4   --   0.5  0.6           Assessment/Plan     No new Assessment & Plan notes have been filed under this hospital service since the last note was generated.  Service:  Internal Medicine      Barry Torres is a 77 y/o  male with a history of CKD, COPD, rheumatoid arthiritis, hyperlipidemia, neuropathy, and is 10 days s/p L carpal tunnel release who presented with L wrist and hand swelling and pain, currently has resolved sepsis and diarrhea.      #1 Sepsis secondary to cellulitis of L forearm, wrist, and hand  - At admission patient met 4/4 SIRS criteria with an elevated procalcitonin, suggestive of sepsis with bacterial infection at his surgical site. As of today sepsis is resolved.   - Patient cellulitis has almost completely resolved. At the time of admission hand, wrist, and forearm were swollen, erythematous, painful, warm. Symptoms have greatly improved with mild erythema and swelling over L wrist and hand.   - Urine culture collected 9/29 grew S. aureus on 9/30. Blood culture was negative.  - Patient was switched to cefazolin 10/1 IV and will continue the remaining 5 days of 10 day cycle as PO outpatient.   - Orthopedics removed sutures yesterday. One more consult is requested today as the wound appears opened after fall. Can discharge with clearance.      #2 Acute onset diarrhea  - Diarrhea symptoms have resolved as of 4PM 10/1. Symptoms started 3AM 10/1 and at the time was very frequent. Both IV vancomycin and IV ceftriaxone can be associated with C. Diff-associated diarrhea, so PCR was ordered, which came back negative today.  - Patient does have history of imodium use at home to help with regular diarrhea. This may be an exacerbation of normal stools in combination with diarrhea induced by the antibiotics. Patient was started on loperamide today following negative C. Diff.  - Stools continue to remain at his normal baseline and has no complaints.      #3 Limited mobility  - Continue to discuss physical therapy with patient, but he continues to refuse.   - PT suggests he would benefit from inpatient services, but he does not want to move forward.   - Wife has  a friend who works home health who has agreed to help as needed.      #4 Anemia   - Current H/H is 10.2/31.7, which is not far from his baseline.  - Iron studies suggest that this may be an anemia of chronic disease. These values are not too far off of his normal baseline and patient has been off of his rheumatoid medications while he was being treated for sepsis.   - Follow up outpatient once abx are completed.      #5 Neuropathy  - Patient has never been diagnosed with diabetes and does not have blood sugars concerning for diabetes  - Continue with home gabapentin     #6 Stage III Chronic Kidney Disease  - Most recent creatinine (1.28), BUN (13) are close to the patient's previous baseline and continue to improve following admission.  - Follow up outpatient with PCP     #7 COPD  - Continue with home regimen for inhalers  - Continue to monitor patient's breathing  - He is normally on oxygen at night     #8 Rheumatoid arthritis  - Discussed with patient rheumatologist and ok to hold leflunomide and certolizumab while he is being treated with abx.     #9 Hyperlipidemia  - Continue rosuvastatin

## 2018-10-04 NOTE — PROGRESS NOTES
Per MD, patient needs to have PT re-eval before being discharged home. Message left for PT, unable to find any therapists on the floor. Patient states he might not wait for thearpy and might leave AMA. MD aware.    Addendum: Therapy called and said they are sending a PT to re-assess patient this evening.

## 2018-10-04 NOTE — PROGRESS NOTES
"   Orthopaedic Progress Note    Interval changes:  -Hand re examined after fall yesterday  -Incision more open than prior- will now move forward with secondary intention closure- wife instructed on daily and PRN dressing changes  -Follow up with Dr Chowdhury in one weeks time  -Cleared by ortho for DC home with oral Abx pending medicine and therapy clearance    ROS - Patient denies any new issues.  Pain well controlled.    Blood pressure 143/89, pulse (!) 111, temperature 37.1 °C (98.8 °F), resp. rate 18, height 1.854 m (6' 1\"), weight 89.7 kg (197 lb 12 oz), SpO2 90 %.      Patient seen and examined  No acute distress  Breathing non labored  RRR  Left hand incision open now to sub dermis, moves all fingers, DNVI, cap refill < 2 sec.    Recent Labs      10/02/18   0247  10/03/18   0307  10/04/18   0325   WBC  6.8  6.6  6.5   RBC  3.20*  3.27*  3.31*   HEMOGLOBIN  9.8*  10.1*  10.2*   HEMATOCRIT  31.1*  31.6*  31.7*   MCV  97.2  96.6  95.8   MCH  30.6  30.9  30.8   MCHC  31.5*  32.0*  32.2*   RDW  55.6*  55.2*  53.1*   PLATELETCT  136*  142*  153*   MPV  10.0  9.9  10.4       Active Hospital Problems    Diagnosis   • Limited mobility [Z74.09]     Priority: High   • Sepsis due to cellulitis ([facility]) [L03.90, A41.9]     Priority: High   • Diarrhea [R19.7]     Priority: Medium   • Environmental and seasonal allergies [J30.89]     Priority: Low   • Neuropathy (HCC) [G62.9]     Priority: Low   • Anemia [D64.9]     Priority: Low   • CKD (chronic kidney disease), stage III ([facility]) [N18.3]     Priority: Low   • COPD (chronic obstructive pulmonary disease) ([facility]) [J44.9]     Priority: Low   • Hyperlipidemia [E78.5]     Priority: Low   • RA (rheumatoid arthritis) (CMS-HCC) [M06.9]     Priority: Low     BOTH HANDS          Assessment/Plan:  Patient cleared for DC by ortho pending medicine clearance  Daily or PRN dressing changes by wife- instructed and demonstrated good understanding  POD#13 S/P carpal tunnel release  Wt bearing " status - WBAT  Wound care/Drains - open to air  Future Procedures - none planned  Sutures/Staples out- 10-14 days post operatively  PT/OT-initiated  Antibiotics: ancef 2g IV Q8 in house then transition to oral Abx  DVT Prophylaxis- TEDS/SCDs/Foot pumps  Lozano-none  Case Coordination for Discharge Planning - Disposition pending therapy recs

## 2018-10-04 NOTE — DISCHARGE INSTRUCTIONS
Discharge Instructions  Call to make an appointment with your primary care doctor in one week.     Discharged to home by car with relative. Discharged via wheelchair, hospital escort: Yes.  Special equipment needed: Not Applicable    Be sure to schedule a follow-up appointment with your primary care doctor or any specialists as instructed.     Discharge Plan:   Pneumococcal Vaccine Administered/Refused: Not given - Patient refused pneumococcal vaccine  Influenza Vaccine Indication: Indicated: 65 years and older  Influenza Vaccine Given - only chart on this line when given: Influenza Vaccine Given (See MAR)    I understand that a diet low in cholesterol, fat, and sodium is recommended for good health. Unless I have been given specific instructions below for another diet, I accept this instruction as my diet prescription.   Other diet: as directed    Special Instructions:     · Is patient discharged on Warfarin / Coumadin?   No     Depression / Suicide Risk    As you are discharged from this [facility], it is important to learn how to keep safe from harming yourself.    Recognize the warning signs:  · Abrupt changes in personality, positive or negative- including increase in energy   · Giving away possessions  · Change in eating patterns- significant weight changes-  positive or negative  · Change in sleeping patterns- unable to sleep or sleeping all the time   · Unwillingness or inability to communicate  · Depression  · Unusual sadness, discouragement and loneliness  · Talk of wanting to die  · Neglect of personal appearance   · Rebelliousness- reckless behavior  · Withdrawal from people/activities they love  · Confusion- inability to concentrate     If you or a loved one observes any of these behaviors or has concerns about self-harm, here's what you can do:  · Talk about it- your feelings and reasons for harming yourself  · Remove any means that you might use to hurt yourself (examples: pills, rope,  extension cords, firearm)  · Get professional help from the community (Mental Health, Substance Abuse, psychological counseling)  · Do not be alone:Call your Safe Contact- someone whom you trust who will be there for you.  · Call your local CRISIS HOTLINE 701-1929 or 152-875-1402  · Call your local Children's Mobile Crisis Response Team [place] (360) 171-0382 or www.[employer]  · Call the toll free National Suicide Prevention Hotlines   · National Suicide Prevention Lifeline 072-991-XNJW (9208)  · [employer] Line Network 800-SUICIDE (485-0928)        Cellulitis, Adult  Cellulitis is a skin infection. The infected area is usually red and tender. This condition occurs most often in the arms and lower legs. The infection can travel to the muscles, blood, and underlying tissue and become serious. It is very important to get treated for this condition.  What are the causes?  Cellulitis is caused by bacteria. The bacteria enter through a break in the skin, such as a cut, burn, insect bite, open sore, or crack.  What increases the risk?  This condition is more likely to occur in people who:  · Have a weak defense system (immune system).  · Have open wounds on the skin such as cuts, burns, bites, and scrapes. Bacteria can enter the body through these open wounds.  · Are older.  · Have diabetes.  · Have a type of long-lasting (chronic) liver disease (cirrhosis) or kidney disease.  · Use IV drugs.  What are the signs or symptoms?  Symptoms of this condition include:  · Redness, streaking, or spotting on the skin.  · Swollen area of the skin.  · Tenderness or pain when an area of the skin is touched.  · Warm skin.  · Fever.  · Chills.  · Blisters.  How is this diagnosed?  This condition is diagnosed based on a medical history and physical exam. You may also have tests, including:  · Blood tests.  · Lab tests.  · Imaging tests.  How is this treated?  Treatment for this condition may include:  · Medicines, such as  antibiotic medicines or antihistamines.  · Supportive care, such as rest and application of cold or warm cloths (cold or warm compresses) to the skin.  · Hospital care, if the condition is severe.  The infection usually gets better within 1-2 days of treatment.  Follow these instructions at home:  · Take over-the-counter and prescription medicines only as told by your health care provider.  · If you were prescribed an antibiotic medicine, take it as told by your health care provider. Do not stop taking the antibiotic even if you start to feel better.  · Drink enough fluid to keep your urine clear or pale yellow.  · Do not touch or rub the infected area.  · Raise (elevate) the infected area above the level of your heart while you are sitting or lying down.  · Apply warm or cold compresses to the affected area as told by your health care provider.  · Keep all follow-up visits as told by your health care provider. This is important. These visits let your health care provider make sure a more serious infection is not developing.  Contact a health care provider if:  · You have a fever.  · Your symptoms do not improve within 1-2 days of starting treatment.  · Your bone or joint underneath the infected area becomes painful after the skin has healed.  · Your infection returns in the same area or another area.  · You notice a swollen bump in the infected area.  · You develop new symptoms.  · You have a general ill feeling (malaise) with muscle aches and pains.  Get help right away if:  · Your symptoms get worse.  · You feel very sleepy.  · You develop vomiting or diarrhea that persists.  · You notice red streaks coming from the infected area.  · Your red area gets larger or turns dark in color.  This information is not intended to replace advice given to you by your health care provider. Make sure you discuss any questions you have with your health care provider.  Document Released: 09/27/2006 Document Revised: 04/27/2017  Document Reviewed: 10/26/2016  [employer] Patient Education © 2017 [employer]    Cephalexin tablets or capsules  What is this medicine?  CEPHALEXIN (sef a GINI in) is a cephalosporin antibiotic. It is used to treat certain kinds of bacterial infections It will not work for colds, flu, or other viral infections.  This medicine may be used for other purposes; ask your health care provider or pharmacist if you have questions.  COMMON BRAND NAME(S): Biocef, Daxbia, Keflex, Keftab  What should I tell my health care provider before I take this medicine?  They need to know if you have any of these conditions:  -kidney disease  -stomach or intestine problems, especially colitis  -an unusual or allergic reaction to cephalexin, other cephalosporins, penicillins, other antibiotics, medicines, foods, dyes or preservatives  -pregnant or trying to get pregnant  -breast-feeding  How should I use this medicine?  Take this medicine by mouth with a full glass of water. Follow the directions on the prescription label. This medicine can be taken with or without food. Take your medicine at regular intervals. Do not take your medicine more often than directed. Take all of your medicine as directed even if you think you are better. Do not skip doses or stop your medicine early.  Talk to your pediatrician regarding the use of this medicine in children. While this drug may be prescribed for selected conditions, precautions do apply.  Overdosage: If you think you have taken too much of this medicine contact a poison control center or emergency room at once.  NOTE: This medicine is only for you. Do not share this medicine with others.  What if I miss a dose?  If you miss a dose, take it as soon as you can. If it is almost time for your next dose, take only that dose. Do not take double or extra doses. There should be at least 4 to 6 hours between doses.  What may interact with this medicine?  -probenecid  -some other  antibiotics  This list may not describe all possible interactions. Give your health care provider a list of all the medicines, herbs, non-prescription drugs, or dietary supplements you use. Also tell them if you smoke, drink alcohol, or use illegal drugs. Some items may interact with your medicine.  What should I watch for while using this medicine?  Tell your doctor or health care professional if your symptoms do not begin to improve in a few days.  Do not treat diarrhea with over the counter products. Contact your doctor if you have diarrhea that lasts more than 2 days or if it is severe and watery.  If you have diabetes, you may get a false-positive result for sugar in your urine. Check with your doctor or health care professional.  What side effects may I notice from receiving this medicine?  Side effects that you should report to your doctor or health care professional as soon as possible:  -allergic reactions like skin rash, itching or hives, swelling of the face, lips, or tongue  -breathing problems  -pain or trouble passing urine  -redness, blistering, peeling or loosening of the skin, including inside the mouth  -severe or watery diarrhea  -unusually weak or tired  -yellowing of the eyes, skin  Side effects that usually do not require medical attention (report to your doctor or health care professional if they continue or are bothersome):  -gas or heartburn  -genital or anal irritation  -headache  -joint or muscle pain  -nausea, vomiting  This list may not describe all possible side effects. Call your doctor for medical advice about side effects. You may report side effects to FDA at 9-858-FWQ-2831.  Where should I keep my medicine?  Keep out of the reach of children.  Store at room temperature between 59 and 86 degrees F (15 and 30 degrees C). Throw away any unused medicine after the expiration date.  NOTE: This sheet is a summary. It may not cover all possible information. If you have questions about this  medicine, talk to your doctor, pharmacist, or health care provider.  © 2018 Elsevier/Gold Standard (2009-03-23 17:09:13)

## 2018-10-04 NOTE — PROGRESS NOTES
With the assistance of 4 staff, pt lifted from floor to wc then wc to bed, CN Tila present. Bed alarm and strip changed, tested to be working condition. 3 side rails up, call bell in reach. Re stressed importance of not getting up out of bed without calling for help.

## 2018-10-04 NOTE — PROGRESS NOTES
Author, NERISSA CALLAHAN walked pt for home 02 work up. Author followed pt with chair entire walk, pt stumbled  X2, realigned by MD.  Pt placed back in bed with alarm on, bed lowered, call bell in reach.

## 2018-10-04 NOTE — PROGRESS NOTES
Internal Medicine Interval Note  Note Author: Michael Hamm M.D.     Name Barry Torres     1940   Age/Sex 78 y.o. male   MRN [id number]   Code Status Full     After 5PM or if no immediate response to page, please call for cross-coverage  Attending/Team: Dr. Smyth See Patient List for primary contact information  Call [phone] to page    1st Call - Day Intern (R1):   Dr. Hamm 2nd Call - Day Sr. Resident (R2/R3):   Dr. Khalil     Reason for interval visit  (Principal Problem)   Sepsis 2/2 cellulitis    Interval Problem Daily Status Update  (24 hours, problem oriented, brief subjective history, new lab/imaging data pertinent to that problem)   The hand swelling has improved. Patient is not experiencing any pain now. The erythema has resolved for the most part. He is feeling overall better. He wishes to go home today. He denies fever, chills, night sweats, or shortness of breath.    Review of Systems   Constitutional: Negative for chills and fever.   HENT: Negative for ear pain and hearing loss.    Eyes: Negative for blurred vision and pain.   Respiratory: Negative for cough, hemoptysis and sputum production.    Cardiovascular: Negative for chest pain, palpitations and orthopnea.   Gastrointestinal: Negative for abdominal pain, diarrhea, heartburn, nausea and vomiting.   Genitourinary: Negative for dysuria, frequency and urgency.   Musculoskeletal: Negative for back pain and myalgias.   Skin: Negative for itching and rash.        Left wrist swollen and erythematous. Erythema spreads to elbow. Mild pain. No numbness. No tingling.   Neurological: Negative for dizziness, tingling, tremors and headaches.     Disposition/Barriers to discharge:   None    Consultants/Specialty  Orthopedics  PCP: Lizzie Soto M.D.    Quality Measures  Quality-Core Measures   Reviewed items::  EKG reviewed, Labs reviewed, Medications reviewed and Radiology images reviewed  Lozano catheter::  No  Blake  DVT prophylaxis - mechanical:  SCDs  Ulcer Prophylaxis::  No    Physical Exam       Vitals:    10/02/18 1530 10/02/18 2000 10/03/18 0400 10/03/18 0720   BP: 152/86 146/77 135/78 144/85   Pulse: 83 (!) 101 87 83   Resp: 14 20 20 15   Temp: 37.5 °C (99.5 °F) 36.8 °C (98.2 °F) 36.6 °C (97.9 °F) 36.4 °C (97.5 °F)   SpO2: 93% 88% 91% 92%   Weight:   94.3 kg (207 lb 14.3 oz)    Height:         Body mass index is 27.43 kg/m². Weight: 94.3 kg (207 lb 14.3 oz)  Oxygen Therapy:  Pulse Oximetry: 92 %, O2 (LPM): 1, O2 Delivery: Silicone Nasal Cannula    Physical Exam   Constitutional: He is oriented to person, place, and time and well-developed, well-nourished, and in no distress. No distress.   HENT:   Head: Normocephalic.   Eyes: Conjunctivae and EOM are normal.   Neck: Normal range of motion. Neck supple. No JVD present. No tracheal deviation present. No thyromegaly present.   Cardiovascular: Normal rate, regular rhythm, normal heart sounds and intact distal pulses.  Exam reveals no gallop and no friction rub.    No murmur heard.  Pulmonary/Chest: Effort normal and breath sounds normal. No respiratory distress. He has no wheezes. He has no rales. He exhibits no tenderness.   Abdominal: Soft. Bowel sounds are normal. He exhibits no distension and no mass. There is no tenderness. There is no rebound and no guarding.   Musculoskeletal: Normal range of motion. He exhibits edema (right hand) and tenderness.   Neurological: He is oriented to person, place, and time. He has normal reflexes. He exhibits normal muscle tone. Coordination normal.   Skin: Skin is warm. He is not diaphoretic.   Decreased left wrist swelling. Swelling extends near the left elbow. There is a tenderness and erythema that extends along the swelling. Pulses are appropriate. Capillary refill within 2 seconds. No sensory deficit.    Psychiatric: Affect and judgment normal.       Assessment/Plan     * Sepsis due to cellulitis (HCC)   Assessment & Plan     Improving for 2 days. Patient s/p recent about 7 days ago left carpal tunnel release. 24 hours prior admission the hand and forearm started to become more swollen, red, and tender. The pus started to be produced from the incision site., SIRS 4/4, qSOFA 1/3; procalcitonin initially elevated. LA 1.0. WBC WNL now. Swelling initially improved. Patient was initially on vancomycin and ceftriaxone, but then switched to cefazolin. I discussed holding his immunosuppressant medications with his rheumatologist to improve wound healing.  - sepsis due to cellulitis  - Blood Cx x2 - negative  - Urine culture +S.aureus 50-100k  - OK to D/C by ortho  - anti-edema glove ordered by orthopedics   - Hold immunosuppressants by Rheum  - F/u ortho 1wk  - F/u rheum 1wk        Limited mobility   Assessment & Plan    Wife states he has decreased mobility and even more exacerbated by his hospital stay. His oxygen baseline is 88% at home, and he has been as low as in mid 80's on 2L during sepsis. He recovered to 90% with 5L during sepsis. PT and OT seen patient. OT recommends home health for mobility and adl care for home. PT states that no equipment needed, but patient benefit from physical therapy 3/wk. Nursing is to walk patient during the day and offers to patient, but patient often refuses. I spoke to PT and they think skilled nursing would be helpful, but patient is adamant about going home today.  - Patient willing to consider outpatient PT  - PT agrees to home health  - Recommend continuous 1L at home patient was able to sat 88-90 during test  - I recommended skilled nursing facility, but patient is strongly against it at this point and wishes to go home instead, his wife is willing to assist and she agrees for him to go home         Diarrhea   Assessment & Plan    RESOLVING. Most likely baseline exacerbation with antibiotics. Patient has baseline diarrhea at home and take loperamide. However his diarrhea worsened during hospital stay.  Patient was on vancomycin IV and ceftriaxone. FOBT+ (previous consistently negative including FOBT on admission). C. Diff Toxin negative. After administration of loperamide diarrhea has resolved. Next FOBT-.  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  Hgb 12.9 on admission. B12/folate/LDH normal. TSH 3.9. Ferritin elevate 632 with high TIBC and low saturation. FOBT was positive, but previously consistently negative, so GI bleed less likely to contribute to his anemia.  - Consider heme/onc outpatient follow up        Neuropathy ([facility])   Assessment & Plan    -neuropathy to the foot/ankle  -continue home dose gabapentin        Environmental and seasonal allergies   Assessment & Plan    -continue home dose fluticasone, allegra        CKD (chronic kidney disease), stage III ([facility])- (present on admission)   Assessment & Plan    -Cr 1.55, around baseline  -avoid nephrotoxic drugs  -renal dosing        COPD (chronic obstructive pulmonary disease) ([facility])- (present on admission)   Assessment & Plan    -continue home dose symbicort. Per wife patient runs often in 88% range at home.  -RT/O2 protocol        RA (rheumatoid arthritis) (CMS-[facility])- (present on admission)   Assessment & Plan    -continue home dose Arava  -consider continuing Cimzia q14d if hospitalization prolonged  -follows with rheum outpatient        Hyperlipidemia- (present on admission)   Assessment & Plan    -continue home dose crestor

## 2018-10-05 ENCOUNTER — TELEPHONE (OUTPATIENT)
Dept: INTERNAL MEDICINE | Facility: MEDICAL CENTER | Age: 78
End: 2018-10-05

## 2018-10-05 ENCOUNTER — PATIENT OUTREACH (OUTPATIENT)
Dept: HEALTH INFORMATION MANAGEMENT | Facility: OTHER | Age: 78
End: 2018-10-05

## 2018-10-05 LAB
BACTERIA STL CULT: NORMAL
E COLI SXT1+2 STL IA: NORMAL
SIGNIFICANT IND 70042: NORMAL
SITE SITE: NORMAL
SOURCE SOURCE: NORMAL

## 2018-10-05 NOTE — DOCUMENTATION QUERY
DOCUMENTATION QUERY    PROVIDERS: Please select “Cosign w/ note”to reply to query.    To better represent the severity of illness of your patient, please review the following information and exercise your independent professional judgment in responding to this query.     There is conflicting documentation in the medical record.  1.  Per your 10/1; 10/2 progress notes - New diagnosis C. Difficile, with diarrhea is documented.  2.  Per the [facility] 10/2; 10/3 progress notes - C Difficile toxin is negative,  Diarrhea resolving, most likely baseline exacerbation with antibiotics.  Based upon the clinical findings, risk factors, and treatment, can this diagnosis of C. Dificile  be further specified?    • C difficile has been ruled in  • C difficile has been ruled out  • Other explanation of clinical findings  • Unable to determine    The medical record reflects the following:   Clinical Findings Conflicting documentation of new c difficile;  C difficile toxin is negative  Per stool sample- C difficile  by PCR - negative;  C difficile toxin - neg.  Patient has baseline diarrhea at home   Treatment on abx for cellulitis -  ancef, rocephin   Risk Factors Antibiotic therapy   Location within medical record History and Physical, Progress Notes, Discharge Summary and Lab Results      Thank you,  Shannan Cabrales RN, CCDS, CDIP, CCS  Sr. Clinical    ph- [phone]

## 2018-10-05 NOTE — PROGRESS NOTES
Discharge instructions given to patient at bedside, verbalizes understanding and states plans for follow-up with PCP as recommended. Individualized instruction about POC and patient awaiting Home Health acceptance. Okay to discharge either way. IV pulled, all belongings accounted for, and all questions answered at this time.

## 2018-10-05 NOTE — THERAPY
PT Re-Evaluation completed:    PT re-eval performed; pt ambulated w/ SBA using FWW for ~150 ft while utilizing head turns and VC to change pace. Pt exhibited no herbie lob during ambulation. Pt's son will be in town until Sunday and he will also have support from spouse at home. Pt and son report he is at baseline. Pt is a fall risk in setting of current health status, pt was educated on the risks associated with falls and likelihood of one occuring. He feels ready to d/c home at this time and is willing to accept home health PT to improve fall risk; son educated as well on high fall risk situation, such as when diarrhea occurs/rushing to the bathroom and is willing to provide assistance as needed. Will follow if remains in house; optimally would benefit from skilled however, pt declines and wishes to return home when medically appropriate to do so.     Suri Stover PT, DPT / Michael Smith [phone]

## 2018-10-05 NOTE — TELEPHONE ENCOUNTER
1. Caller Name: Barry Torres                      Call Back Number: [phone] (home)       2. Message: Patient left voice message requesting appointment sooner with Dr Soto for [facility] follow up. I called patient times three line busy. I was going to notify patient that Dr Soto is on vacation for three weeks. I was going to offer resident clinic appointment.    3. Patient approves office to leave a detailed voicemail/MyChart message: yes

## 2018-10-08 NOTE — TELEPHONE ENCOUNTER
I called patient left voice message to call back and schedule appointment with resident clinic or first available provider since Dr Soto not available.

## 2018-10-09 NOTE — TELEPHONE ENCOUNTER
If pt calls, I am doing inpatient consults and am not on vacation for 3 weeks.     Re: post hosp follow-up   Pt has an appt with Dr. Dunn this week and Dr. Bararza later this month.     MAs -- please call pt and let him know the appts above should be ok.

## 2018-10-11 ENCOUNTER — OFFICE VISIT (OUTPATIENT)
Dept: MEDICAL GROUP | Facility: MEDICAL CENTER | Age: 78
End: 2018-10-11
Payer: MEDICARE

## 2018-10-11 VITALS
DIASTOLIC BLOOD PRESSURE: 70 MMHG | TEMPERATURE: 97.2 F | BODY MASS INDEX: 24.64 KG/M2 | SYSTOLIC BLOOD PRESSURE: 138 MMHG | RESPIRATION RATE: 16 BRPM | HEART RATE: 79 BPM | WEIGHT: 192 LBS | HEIGHT: 74 IN | OXYGEN SATURATION: 88 %

## 2018-10-11 DIAGNOSIS — R19.5 OCCULT BLOOD POSITIVE STOOL: ICD-10-CM

## 2018-10-11 DIAGNOSIS — M06.9 RHEUMATOID ARTHRITIS INVOLVING MULTIPLE SITES, UNSPECIFIED RHEUMATOID FACTOR PRESENCE: ICD-10-CM

## 2018-10-11 DIAGNOSIS — L03.90 SEPSIS DUE TO CELLULITIS (HCC): ICD-10-CM

## 2018-10-11 DIAGNOSIS — Z74.09 LIMITED MOBILITY: ICD-10-CM

## 2018-10-11 DIAGNOSIS — Z09 HOSPITAL DISCHARGE FOLLOW-UP: ICD-10-CM

## 2018-10-11 DIAGNOSIS — J43.8 OTHER EMPHYSEMA (HCC): ICD-10-CM

## 2018-10-11 DIAGNOSIS — D50.8 OTHER IRON DEFICIENCY ANEMIA: ICD-10-CM

## 2018-10-11 DIAGNOSIS — J96.11 CHRONIC RESPIRATORY FAILURE WITH HYPOXIA (HCC): ICD-10-CM

## 2018-10-11 DIAGNOSIS — A41.9 SEPSIS DUE TO CELLULITIS (HCC): ICD-10-CM

## 2018-10-11 PROCEDURE — 99496 TRANSJ CARE MGMT HIGH F2F 7D: CPT | Performed by: FAMILY MEDICINE

## 2018-10-11 NOTE — PATIENT INSTRUCTIONS
If you need further assistance, or have any questions; concerns or lingering symptoms before seeing your Primary Care Provider or specialist.     Do not hesitate to contact us.     Please contact us at the Post-Hospital Follow Up Program at [phone] and ask for the Medical Assistant for Dr Dunn.   Our offices hours are Monday-Friday 8 am-5 pm.

## 2018-10-11 NOTE — PROGRESS NOTES
Subjective:     Barry Torres is a 78 y.o. male who presents for Hospital Follow-up.  Chart and discharge summary reviewed.   Date of discharge 10/4/18.  48- hour post discharge RN call completed on 10/5/18 and documented in the medical record by Margy Bernardo RN:   POST DISCHARGE CALL:  Discharge Date:10/4/2018   Date of Outreach Call: 10/5/2018  2:49 PM  Now that you're home, how are you doing? Good  Do you have questions about your medications? No    Did you fill your medications? Yes    Do you have a follow-up appointment scheduled?Yes  Comment:Shaun 10/11/18    Discharging Department: Telemetry 7    Number of Attempts: 1  Current or previous attempts completed within two business days of discharge? Yes  Provided education regarding treatment plan, medication, self-management, ADLs? Yes  Has patient completed Advance Directive? If yes, advise them to bring to appointment. No  Care Manager phone number provided? Yes  Is there anything else I can help you with? No        HPI: The patient is a 78-year-old white male with rheumatoid arthritis.  He had recently undergone a carpal tunnel release of his left wrist and presented to the hospital with sepsis secondary to cellulitis at the wound site.  He was started on ceftriaxone and vancomycin but developed diarrhea secondary to the vancomycin.  C. difficile test was negative.  Stool for occult blood was positive.  Patient has a history of anemia but was found to be slightly more anemic with iron deficiency.  Patient was on Cimzia for rheumatoid arthritis and this was held due to its immunosuppressive effect.    Patient also has COPD and has home oxygen although he only uses it at night.  He says his baseline oxygen saturation is 88% and he thinks that is fine.    He is now using a walker due to decreased mobility which he was not prior to his hospitalization.    Since returning home, patient reports feeling better.     The patient has questions  regarding hospitalization or discharge: Questions, mostly from his wife, were answered.  The patient has some weakness; denies difficulty taking care of self at home.  Patient reports taking medications as instructed.    Current Outpatient Prescriptions   Medication Sig Dispense Refill   • cephALEXin (KEFLEX) 500 MG Cap Take 1 Cap by mouth 2 times a day. 8 Cap 0   • SPIRIVA HANDIHALER 18 MCG Cap INHALE THE CONTENTS OF 1 CAPSULE BY MOUTH VIA HANDIHALER DAILY. 90 Cap 3   • rosuvastatin (CRESTOR) 20 MG Tab TAKE 1 TABLET BY MOUTH EVERY NIGHT AT BEDTIME 90 Tab 0   • gabapentin (NEURONTIN) 300 MG Cap TAKE 1 CAPSULE BY MOUTH THREE TIMES A  Cap 1   • SYMBICORT 80-4.5 MCG/ACT Aerosol INHALE 2 PUFFS BY MOUTH TWO TIMES A DAY 3 Inhaler 1   • Probiotic Product (PROBIOTIC PO) Take 1 Cap by mouth every day.     • certolizumab pegol (CIMZIA PREFILLED) 2 X 200 MG/ML Kit Inject 200 mg as instructed every 14 days.     • Magnesium 250 MG Tab Take 1 Tab by mouth every day.     • fluticasone (FLONASE) 50 MCG/ACT nasal spray Spray 1 Spray in nose every day.     • loperamide (IMODIUM A-D) 2 MG tablet Take 2 mg by mouth every day.     • Multiple Vitamin (MULTIVITAMIN PO) Take 1 Tab by mouth every day.     • fexofenadine (ALLEGRA) 180 MG tablet Take 180 mg by mouth every day.       No current facility-administered medications for this visit.        Allergies:   Cipro xr; Levaquin; and Pcn [penicillins]    Social History:  Social History   Substance Use Topics   • Smoking status: Former Smoker     Packs/day: 1.00     Years: 40.00     Types: Cigarettes     Quit date: 1/1/1980   • Smokeless tobacco: Never Used      Comment: 1 pk a day for 40 yrs   • Alcohol use No      Comment: 2 a week        Family History:  Family History   Problem Relation Age of Onset   • Genetic Father         ALS   • No Known Problems Sister    • No Known Problems Son    • No Known Problems Daughter    • Heart Attack Neg Hx    • Heart Disease Neg Hx    • Heart  Failure Neg Hx        Past Medical History:   Diagnosis Date   • Abnormal thyroid stimulating hormone (TSH) level    • Allergic rhinitis    • Asbestosis ([facility])    • Asthma    • Bronchitis    • Chronic diarrhea     declines eval; takes immodium once a day usually and sometimes less; see previous notes; aware of risk    • Chronic low back pain    • Chronic lung disease     asbestos related   • CKD (chronic kidney disease), stage III ([facility])     sees neph   • COPD (chronic obstructive pulmonary disease) ([facility])    • Coronary artery calcification seen on CAT scan 8/2/2016    sees cards   • Epididymitis 2009    h/o listed in chart   • GERD (gastroesophageal reflux disease)    • History of hemorrhoids    • History of skin cancer     non melanoma   • [place] (hard of hearing)     declines hearing aids   • Hypercholesteremia    • Hypertension    • Hypertriglyceridemia    • Indigestion    • Light headedness     2/2 orthostasis? now better off hctz   • Lightheadedness 6/23/2016   • Low back pain    • Nasal drainage    • Olecranon bursitis    • Orthostasis 6/23/2016    better off HCTZ   • Peripheral neuropathy    • Pleural plaque 2005     CT [place]   • Post-nasal drip    • Pulmonary emphysema ([facility])    • RA (rheumatoid arthritis) ([facility])     on meds, sees rheum   • Restless leg syndrome    • Rheumatoid arthritis ([facility])    • Sleep apnea     obstructive and central - not adherent to autopap   • Solitary kidney     history of kidney cyst 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-functioning kidney s/p nephrectomy        Surgical History  Past Surgical History:   Procedure Laterality Date   • NASAL POLYPECTOMY Bilateral 10/6/2015    Procedure: NASAL POLYPECTOMY WITH SCOTT ENDOSCOPIC NASAL DEBRIDEMENT;  Surgeon: Param Maurer M.D.;  Location: SURGERY SAME DAY [facility];  Service:    • CYSTOSCOPY  2/1/2010    Performed by ANGIE VERDUZCO at SURGERY [facility] ORS   • RETROGRADES  2/1/2010    Performed by ANGIE VERDUZCO at SURGERY [facility] ORS   •  "PYELOGRAM  2/1/2010    Performed by ANGIE VERDUZCO at SURGERY [facility] ORS   • URETEROSCOPY  2/1/2010    Performed by ANGIE VERDUZCO at SURGERY [facility] ORS   • NEPHRECTOMY LAPAROSCOPIC  2009    left kidney   • TESTICLE EXPLORATION  2004   • PB REMV 2ND CATARACT,CORN-SCLER SECTN     • TONSILLECTOMY         ROS:    Constitutional:  Denies fever, chills, night sweats, fatigue or malaise  HENT: Denies head congestion, ear pain or drainage, decreased hearing, sore throat  Neck: Denies pain, swollen glands, decreased range of motion  Lungs: Denies shortness of breath, wheezing, cough  Cardiovascular: Denies chest pain, orthopnea, lower extremity edema, palpitations  Abdominal: Denies abdominal pain, change in bladder habits, nausea or vomiting.  He states that his diarrhea has resolved.  Musculoskeletal: Joint pain and deformities from rheumatoid arthritis.  He is undergoing physical therapy for his left hand post carpal tunnel release.  Endocrine: Denies unexplained weight changes, heat or cold intolerance, hair loss, polyuria or polydipsia  Neurological: Denies dizziness, headache, confusion, focal weakness or numbness, memory loss  Psychiatric: Denies depression, anxiety, insomnia       Objective:     Blood pressure 138/70, pulse 79, temperature 36.2 °C (97.2 °F), temperature source Temporal, resp. rate 16, height 1.88 m (6' 2\"), weight 87.1 kg (192 lb), SpO2 88 %.     Physical Exam:    GEN:  Alert, oriented, in no distress  HEENT:  PERRLA, EOMI  LUNGS:  Clear to auscultation without rales, rhonci, or wheezes.  CV:  Heart RRR without murmurs or S3 or S4  EXT:  Without cyanosis, clubbing, mild bilateral pretibial edema.  Wound of left wrist appears clean and dry.  There is no surrounding erythema or swelling.  NEURO:  Cranial nerves II through XII intact.  Motor function and sensation grossly intact.  SKIN: No rashes or suspicious lesions.  PSYCH:  Behavior is appropriate.      Assessment and " Plan:     1. Hospital follow-up:   Hospitalization and results reviewed with patient. High risk conditions requiring teaching or care coordination were identified and addressed.The patient demonstrate understanding of admission and underlying conditions. The patient understands discharge instructions and when to seek medical attention. Medications reviewed including instructions regarding high risk medications, dosing and side effects.    The patient is able to safely adhere to ADL/IADL, treatment and medication regimen, self-manage of high-risk conditions?  With the help of his wife who is a retired nurse practitioner.  The patient requires physical therapy/home health/DME referral? Yes  The patient requires referral to care coordination/behavioral health/social work?  No   Patient requires referral for pharmacy consult? No   Advance directive/POLST on file?  Yes  Required counseling on advance directive?  No      - REFERRAL TO HOME HEALTH    2. Sepsis due to cellulitis (HCC)  -He is completing a course of Keflex.  Clinically his sepsis is resolved.    3. Chronic respiratory failure with hypoxia (HCC)  -He is strongly urged to use his oxygen 24/7.  - REFERRAL TO HOME HEALTH    4. Other emphysema (HCC)  -He is continuing Spiriva and Symbicort  - REFERRAL TO HOME HEALTH    5. Limited mobility    - REFERRAL TO HOME HEALTH physical therapy    6. Other iron deficiency anemia    - REFERRAL TO GASTROENTEROLOGY, Dr. Lucas, his gastroenterologist    7. Occult blood positive stool    - REFERRAL TO GASTROENTEROLOGY    8. Rheumatoid arthritis involving multiple sites, unspecified rheumatoid factor presence (HCC)    -Follow-up with Dr. Martinez.  He will be resuming his Cimzia when he has completed his course of antibiotics.        Medication Reconciliation  Medication list at end of encounter:   Current Outpatient Prescriptions   Medication Sig Dispense Refill   • cephALEXin (KEFLEX) 500 MG Cap Take 1 Cap by mouth 2 times a day.  8 Cap 0   • SPIRIVA HANDIHALER 18 MCG Cap INHALE THE CONTENTS OF 1 CAPSULE BY MOUTH VIA HANDIHALER DAILY. 90 Cap 3   • rosuvastatin (CRESTOR) 20 MG Tab TAKE 1 TABLET BY MOUTH EVERY NIGHT AT BEDTIME 90 Tab 0   • gabapentin (NEURONTIN) 300 MG Cap TAKE 1 CAPSULE BY MOUTH THREE TIMES A  Cap 1   • SYMBICORT 80-4.5 MCG/ACT Aerosol INHALE 2 PUFFS BY MOUTH TWO TIMES A DAY 3 Inhaler 1   • Probiotic Product (PROBIOTIC PO) Take 1 Cap by mouth every day.     • certolizumab pegol (CIMZIA PREFILLED) 2 X 200 MG/ML Kit Inject 200 mg as instructed every 14 days.     • Magnesium 250 MG Tab Take 1 Tab by mouth every day.     • fluticasone (FLONASE) 50 MCG/ACT nasal spray Spray 1 Spray in nose every day.     • loperamide (IMODIUM A-D) 2 MG tablet Take 2 mg by mouth every day.     • Multiple Vitamin (MULTIVITAMIN PO) Take 1 Tab by mouth every day.     • fexofenadine (ALLEGRA) 180 MG tablet Take 180 mg by mouth every day.       No current facility-administered medications for this visit.        Primary care follow-up:  New health conditions identified during hospitalization? Yes  Changes to medications during hospitalization or today? No     Recommended followup:  with Lizzie Soto M.D.   Future Appointments       Provider Department Center    10/29/2018 3:45 PM Marylu Barraza M.D. [facility] Med - Internal Medicine     1/11/2019 10:00 AM Lizzie Soto M.D. [facility] Med - Internal Medicine           Patient Instruction  Patient provided with educational material on discharge diagnosis and management of symptoms/red flags. Patient instructed to keep follow-up appointments and to bring written questions and and actual medications to each office visit. Patient instructed to call PCP/specialist with any problems/questions/concerns. Patient verbalizes understanding and has no further questions at this time.    Face-to-face transitional care management services with high medical decision complexity were  provided.

## 2018-10-12 ENCOUNTER — HOME HEALTH ADMISSION (OUTPATIENT)
Dept: HOME HEALTH SERVICES | Facility: HOME HEALTHCARE | Age: 78
End: 2018-10-12
Payer: MEDICARE

## 2018-10-15 ENCOUNTER — HOME CARE VISIT (OUTPATIENT)
Dept: HOME HEALTH SERVICES | Facility: HOME HEALTHCARE | Age: 78
End: 2018-10-15

## 2018-10-15 ENCOUNTER — HOME CARE VISIT (OUTPATIENT)
Dept: HOME HEALTH SERVICES | Facility: HOME HEALTHCARE | Age: 78
End: 2018-10-15
Payer: MEDICARE

## 2018-10-15 RX ORDER — ROSUVASTATIN CALCIUM 20 MG/1
TABLET, COATED ORAL
Qty: 90 TAB | Refills: 0 | Status: ON HOLD | OUTPATIENT
Start: 2018-10-15 | End: 2018-11-11

## 2018-10-15 RX ORDER — GABAPENTIN 300 MG/1
CAPSULE ORAL
Qty: 180 CAP | Refills: 0 | Status: SHIPPED | OUTPATIENT
Start: 2018-10-15 | End: 2018-11-07

## 2018-10-15 NOTE — TELEPHONE ENCOUNTER
Last seen: 08/22/18 by Dr. Soto  Next appt: 01/11/19 with Dr. Soto    Was the patient seen in the last year in this department? Yes   Does patient have an active prescription for medications requested? No   Received Request Via: Pharmacy

## 2018-10-23 ENCOUNTER — HOSPITAL ENCOUNTER (OUTPATIENT)
Dept: LAB | Facility: MEDICAL CENTER | Age: 78
End: 2018-10-23
Attending: INTERNAL MEDICINE
Payer: MEDICARE

## 2018-10-23 LAB
APPEARANCE UR: CLEAR
BACTERIA #/AREA URNS HPF: NEGATIVE /HPF
BASOPHILS # BLD AUTO: 0.9 % (ref 0–1.8)
BASOPHILS # BLD: 0.08 K/UL (ref 0–0.12)
BILIRUB UR QL STRIP.AUTO: NEGATIVE
COLOR UR: YELLOW
CREAT UR-MCNC: 120.8 MG/DL
EOSINOPHIL # BLD AUTO: 0.61 K/UL (ref 0–0.51)
EOSINOPHIL NFR BLD: 7.1 % (ref 0–6.9)
EPI CELLS #/AREA URNS HPF: NEGATIVE /HPF
ERYTHROCYTE [DISTWIDTH] IN BLOOD BY AUTOMATED COUNT: 55.5 FL (ref 35.9–50)
GLUCOSE UR STRIP.AUTO-MCNC: NEGATIVE MG/DL
HCT VFR BLD AUTO: 36 % (ref 42–52)
HGB BLD-MCNC: 11.2 G/DL (ref 14–18)
HYALINE CASTS #/AREA URNS LPF: ABNORMAL /LPF
IMM GRANULOCYTES # BLD AUTO: 0.04 K/UL (ref 0–0.11)
IMM GRANULOCYTES NFR BLD AUTO: 0.5 % (ref 0–0.9)
KETONES UR STRIP.AUTO-MCNC: NEGATIVE MG/DL
LEUKOCYTE ESTERASE UR QL STRIP.AUTO: NEGATIVE
LYMPHOCYTES # BLD AUTO: 1.26 K/UL (ref 1–4.8)
LYMPHOCYTES NFR BLD: 14.6 % (ref 22–41)
MCH RBC QN AUTO: 31.5 PG (ref 27–33)
MCHC RBC AUTO-ENTMCNC: 31.1 G/DL (ref 33.7–35.3)
MCV RBC AUTO: 101.1 FL (ref 81.4–97.8)
MICRO URNS: ABNORMAL
MONOCYTES # BLD AUTO: 1.02 K/UL (ref 0–0.85)
MONOCYTES NFR BLD AUTO: 11.8 % (ref 0–13.4)
NEUTROPHILS # BLD AUTO: 5.62 K/UL (ref 1.82–7.42)
NEUTROPHILS NFR BLD: 65.1 % (ref 44–72)
NITRITE UR QL STRIP.AUTO: NEGATIVE
NRBC # BLD AUTO: 0 K/UL
NRBC BLD-RTO: 0 /100 WBC
PH UR STRIP.AUTO: 5.5 [PH]
PLATELET # BLD AUTO: 345 K/UL (ref 164–446)
PMV BLD AUTO: 9.6 FL (ref 9–12.9)
PROT UR QL STRIP: 30 MG/DL
PROT UR-MCNC: 31.2 MG/DL (ref 0–15)
PROT/CREAT UR: 258 MG/G (ref 15–68)
RBC # BLD AUTO: 3.56 M/UL (ref 4.7–6.1)
RBC # URNS HPF: ABNORMAL /HPF
RBC UR QL AUTO: NEGATIVE
SP GR UR STRIP.AUTO: 1.02
UROBILINOGEN UR STRIP.AUTO-MCNC: 0.2 MG/DL
WBC # BLD AUTO: 8.6 K/UL (ref 4.8–10.8)
WBC #/AREA URNS HPF: ABNORMAL /HPF

## 2018-10-23 PROCEDURE — 84156 ASSAY OF PROTEIN URINE: CPT

## 2018-10-23 PROCEDURE — 36415 COLL VENOUS BLD VENIPUNCTURE: CPT

## 2018-10-23 PROCEDURE — 80069 RENAL FUNCTION PANEL: CPT

## 2018-10-23 PROCEDURE — 82570 ASSAY OF URINE CREATININE: CPT

## 2018-10-23 PROCEDURE — 85025 COMPLETE CBC W/AUTO DIFF WBC: CPT

## 2018-10-23 PROCEDURE — 83735 ASSAY OF MAGNESIUM: CPT

## 2018-10-23 PROCEDURE — 81001 URINALYSIS AUTO W/SCOPE: CPT

## 2018-10-23 PROCEDURE — 84550 ASSAY OF BLOOD/URIC ACID: CPT

## 2018-10-23 PROCEDURE — 82306 VITAMIN D 25 HYDROXY: CPT

## 2018-10-23 PROCEDURE — 83970 ASSAY OF PARATHORMONE: CPT

## 2018-10-24 LAB
25(OH)D3 SERPL-MCNC: 66 NG/ML (ref 30–100)
ALBUMIN SERPL BCP-MCNC: 3.8 G/DL (ref 3.2–4.9)
BUN SERPL-MCNC: 19 MG/DL (ref 8–22)
CALCIUM SERPL-MCNC: 9.3 MG/DL (ref 8.5–10.5)
CHLORIDE SERPL-SCNC: 103 MMOL/L (ref 96–112)
CO2 SERPL-SCNC: 24 MMOL/L (ref 20–33)
CREAT SERPL-MCNC: 1.33 MG/DL (ref 0.5–1.4)
GLUCOSE SERPL-MCNC: 94 MG/DL (ref 65–99)
MAGNESIUM SERPL-MCNC: 2.1 MG/DL (ref 1.5–2.5)
PHOSPHATE SERPL-MCNC: 2.9 MG/DL (ref 2.5–4.5)
POTASSIUM SERPL-SCNC: 4.2 MMOL/L (ref 3.6–5.5)
PTH-INTACT SERPL-MCNC: 63.8 PG/ML (ref 14–72)
SODIUM SERPL-SCNC: 138 MMOL/L (ref 135–145)
URATE SERPL-MCNC: 4.6 MG/DL (ref 2.5–8.3)

## 2018-10-29 ENCOUNTER — OFFICE VISIT (OUTPATIENT)
Dept: INTERNAL MEDICINE | Facility: MEDICAL CENTER | Age: 78
End: 2018-10-29
Payer: MEDICARE

## 2018-10-29 ENCOUNTER — HOSPITAL ENCOUNTER (OUTPATIENT)
Dept: LAB | Facility: MEDICAL CENTER | Age: 78
End: 2018-10-29
Attending: INTERNAL MEDICINE
Payer: MEDICARE

## 2018-10-29 VITALS
DIASTOLIC BLOOD PRESSURE: 78 MMHG | TEMPERATURE: 97.6 F | SYSTOLIC BLOOD PRESSURE: 142 MMHG | HEIGHT: 74 IN | BODY MASS INDEX: 24.77 KG/M2 | WEIGHT: 193 LBS | HEART RATE: 95 BPM | OXYGEN SATURATION: 90 %

## 2018-10-29 DIAGNOSIS — L03.114 CELLULITIS OF LEFT HAND: ICD-10-CM

## 2018-10-29 LAB
ALBUMIN SERPL BCP-MCNC: 3.6 G/DL (ref 3.2–4.9)
ALP SERPL-CCNC: 74 U/L (ref 30–99)
ALT SERPL-CCNC: 15 U/L (ref 2–50)
AST SERPL-CCNC: 19 U/L (ref 12–45)
BASOPHILS # BLD AUTO: 0.9 % (ref 0–1.8)
BASOPHILS # BLD: 0.11 K/UL (ref 0–0.12)
BILIRUB CONJ SERPL-MCNC: 0.1 MG/DL (ref 0.1–0.5)
BILIRUB INDIRECT SERPL-MCNC: 0.3 MG/DL (ref 0–1)
BILIRUB SERPL-MCNC: 0.4 MG/DL (ref 0.1–1.5)
CREAT SERPL-MCNC: 1.61 MG/DL (ref 0.5–1.4)
CRP SERPL HS-MCNC: 2.83 MG/DL (ref 0–0.75)
EOSINOPHIL # BLD AUTO: 0.3 K/UL (ref 0–0.51)
EOSINOPHIL NFR BLD: 2.6 % (ref 0–6.9)
ERYTHROCYTE [DISTWIDTH] IN BLOOD BY AUTOMATED COUNT: 55.8 FL (ref 35.9–50)
ERYTHROCYTE [SEDIMENTATION RATE] IN BLOOD BY WESTERGREN METHOD: 58 MM/HOUR (ref 0–20)
HCT VFR BLD AUTO: 33.8 % (ref 42–52)
HGB BLD-MCNC: 10.4 G/DL (ref 14–18)
IMM GRANULOCYTES # BLD AUTO: 0.04 K/UL (ref 0–0.11)
IMM GRANULOCYTES NFR BLD AUTO: 0.3 % (ref 0–0.9)
LYMPHOCYTES # BLD AUTO: 1.85 K/UL (ref 1–4.8)
LYMPHOCYTES NFR BLD: 15.8 % (ref 22–41)
MCH RBC QN AUTO: 31 PG (ref 27–33)
MCHC RBC AUTO-ENTMCNC: 30.8 G/DL (ref 33.7–35.3)
MCV RBC AUTO: 100.9 FL (ref 81.4–97.8)
MONOCYTES # BLD AUTO: 1.01 K/UL (ref 0–0.85)
MONOCYTES NFR BLD AUTO: 8.6 % (ref 0–13.4)
NEUTROPHILS # BLD AUTO: 8.39 K/UL (ref 1.82–7.42)
NEUTROPHILS NFR BLD: 71.8 % (ref 44–72)
NRBC # BLD AUTO: 0 K/UL
NRBC BLD-RTO: 0 /100 WBC
PLATELET # BLD AUTO: 352 K/UL (ref 164–446)
PMV BLD AUTO: 9.3 FL (ref 9–12.9)
PROT SERPL-MCNC: 7.1 G/DL (ref 6–8.2)
RBC # BLD AUTO: 3.35 M/UL (ref 4.7–6.1)
WBC # BLD AUTO: 11.7 K/UL (ref 4.8–10.8)

## 2018-10-29 PROCEDURE — 36415 COLL VENOUS BLD VENIPUNCTURE: CPT

## 2018-10-29 PROCEDURE — 99213 OFFICE O/P EST LOW 20 MIN: CPT | Mod: GE | Performed by: INTERNAL MEDICINE

## 2018-10-29 PROCEDURE — 85652 RBC SED RATE AUTOMATED: CPT

## 2018-10-29 PROCEDURE — 80076 HEPATIC FUNCTION PANEL: CPT

## 2018-10-29 PROCEDURE — 82565 ASSAY OF CREATININE: CPT

## 2018-10-29 PROCEDURE — 86140 C-REACTIVE PROTEIN: CPT

## 2018-10-29 PROCEDURE — 85025 COMPLETE CBC W/AUTO DIFF WBC: CPT

## 2018-10-29 RX ORDER — LEFLUNOMIDE 20 MG/1
20 TABLET ORAL DAILY
Status: ON HOLD | COMMUNITY
Start: 2018-10-15 | End: 2018-11-13

## 2018-10-29 NOTE — PROGRESS NOTES
Established Patient    Barry presents today with the following:    CC:   Chief Complaint   Patient presents with   • Hospital Follow-up     [facility]       HPI:   78-year-old male came in for hospital follow-up after admission for sepsis left wrist cellulitis.  During his hospital stay, he got debilitated and physical therapy recommended SNF.  However, he went home since he has a lot of support at home.  His wife is a retired nurse who can monitor his wound.  He did not even require home health physical therapy, did not have any fall and he is back on his feet without any cane/ walker right now.  The blood culture did not grow anything in hospital.  He finished keep last given on discharge and saw hand surgeon on the 22nd this month who gave another 10 days course of Keflex.  Wound is getting better per wife and closing 1/16 inch every day.  Denies any discharge, fever, chills.  They are leaving the wound open to air according to hand surgeons recommendation.  His Cimzia and arava are on hold until the wound heals and hospital contacted Dr Martinez about it who agreed with the plan.    ROS:   All other systems reviewed, negative except as stated above.    Patient Active Problem List    Diagnosis Date Noted   • Limited mobility 09/30/2018     Priority: High   • Sepsis due to cellulitis ([facility]) 09/29/2018     Priority: High   • Diarrhea 10/01/2018     Priority: Medium   • Environmental and seasonal allergies 09/29/2018     Priority: Low   • Neuropathy ([facility]) 09/29/2018     Priority: Low   • Anemia 09/29/2018     Priority: Low   • CKD (chronic kidney disease), stage III ([facility]) 08/02/2016     Priority: Low   • Hyperlipidemia 06/03/2016     Priority: Low   • RA (rheumatoid arthritis) ([facility]) 06/03/2016     Priority: Low   • COPD (chronic obstructive pulmonary disease) ([facility]) 06/03/2016     Priority: Low   • Chronic obstructive pulmonary disease ([facility])      Priority: Low   • Chronic lower extremity edema 08/14/2018   • Chronic  "respiratory failure with hypoxia (HCC) 12/12/2017   • NYASIA (obstructive sleep apnea) 12/12/2017   • Septic bursitis of elbow 08/28/2017   • PVC (premature ventricular contraction) 08/14/2017   • Elevated TSH 12/06/2016   • Mixed dyslipidemia 12/06/2016   • Essential hypertension, benign 08/02/2016   • Calcified LAD and LCx on CT chest 2015 08/02/2016   • Orthostasis 06/23/2016   • Hyperlipidemia 06/23/2016   • Fatigue 06/03/2016   • 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 90 Cap 3   • SYMBICORT 80-4.5 MCG/ACT Aerosol INHALE 2 PUFFS BY MOUTH TWO TIMES A DAY 3 Inhaler 1   • Probiotic Product (PROBIOTIC PO) Take 1 Cap by mouth every day.     • Magnesium 250 MG Tab Take 1 Tab by mouth every day.     • fluticasone (FLONASE) 50 MCG/ACT nasal spray Spray 1 Spray in nose every day.     • loperamide (IMODIUM A-D) 2 MG tablet Take 2 mg by mouth every day.     • Multiple Vitamin (MULTIVITAMIN PO) Take 1 Tab by mouth every day.     • fexofenadine (ALLEGRA) 180 MG tablet Take 180 mg by mouth every day.     • leflunomide (ARAVA) 20 MG Tab      • certolizumab pegol (CIMZIA PREFILLED) 2 X 200 MG/ML Kit Inject 200 mg as instructed every 14 days.       No current facility-administered medications for this visit.          /78 (BP Location: Left arm, Patient Position: Sitting, BP Cuff Size: Adult)   Pulse 95   Temp 36.4 °C (97.6 °F) (Temporal)   Ht 1.88 m (6' 2\")   Wt 87.5 kg (193 lb)   SpO2 90%   BMI 24.78 kg/m²     Physical Exam  General: Alert and oriented, No apparent distress.  Neck: Supple. No lymphadenopathy noted. Thyroid not " enlarged.  Lungs: Clear to auscultation bilaterally without any wheezing, crepitations.  Cardiovascular: Regular rate and rhythm. No murmurs, rubs or gallops.  Abdomen: Bowel sound +, soft, non tender, no rebound or guarding, no palpable organomegaly  Extremities: No clubbing, cyanosis, edema.  Skin: left hand wound about an inch, open and dry healing by secondary intention. Slight reactive erythema around the area, but better per wife.   Neuro: A & O x 3. Normal speech and memory. Motor and sensory grossly normal.     Note: I have reviewed all pertinent labs and diagnostic tests associated with this visit with specific comments listed under the assessment and plan below    Assessment and Plan    1. Cellulitis of left hand  - improving, current does not have any active signs of infection.   - Per surgeon, expect wound healing over the next 2 months.  They have follow-up appointment in December 3rd.  - He is going to hand PT regularly.   - wife understands to call clinic if there is any signs of worsening.    Follow up with Dr Soto in Jan, 2019. Sooner for any acute issue  Or worsening wound condition.    Signed by: Marylu Barraza M.D.

## 2018-11-04 ENCOUNTER — PATIENT OUTREACH (OUTPATIENT)
Dept: HEALTH INFORMATION MANAGEMENT | Facility: OTHER | Age: 78
End: 2018-11-04

## 2018-11-05 NOTE — PROGRESS NOTES
Barry Torres was admitted to [facility] on 9/28/18 for sepsis due to cellulitis.  The patient was discharged on 10/4/18 and instructed to follow up with his PCP, Orthopedic Surgeon and Rheumatology.  The patient successfully filled his discharge medications.  Patient followed up with his orthopedic surgeon on 10/5/18 and 11/5/18. Patient also followed up with the discharge clinic on 10/11/18 and with his PCP on 10/29/18.  The patient declined assistance scheduling with Rheumatology and no appointment has been scheduled. Additionally,  patient also had labs performed on 10/23/18 and on 10/29/18.  The patient is scheduled for 2 future appointments with his orthopedic surgeon on 12/3/18 and with his PCP on 1/11/19.  Patient's LACE score upon hospital discharge was 79, PPS screening was conducted at 90%.

## 2018-11-07 ENCOUNTER — HOSPITAL ENCOUNTER (INPATIENT)
Facility: MEDICAL CENTER | Age: 78
LOS: 6 days | DRG: 371 | End: 2018-11-13
Attending: EMERGENCY MEDICINE | Admitting: INTERNAL MEDICINE
Payer: MEDICARE

## 2018-11-07 ENCOUNTER — APPOINTMENT (OUTPATIENT)
Dept: RADIOLOGY | Facility: MEDICAL CENTER | Age: 78
DRG: 371 | End: 2018-11-07
Attending: EMERGENCY MEDICINE
Payer: MEDICARE

## 2018-11-07 ENCOUNTER — APPOINTMENT (OUTPATIENT)
Dept: RADIOLOGY | Facility: MEDICAL CENTER | Age: 78
DRG: 371 | End: 2018-11-07
Attending: STUDENT IN AN ORGANIZED HEALTH CARE EDUCATION/TRAINING PROGRAM
Payer: MEDICARE

## 2018-11-07 DIAGNOSIS — A04.72 PSEUDOMEMBRANOUS COLITIS: ICD-10-CM

## 2018-11-07 DIAGNOSIS — J96.11 CHRONIC RESPIRATORY FAILURE WITH HYPOXIA (HCC): ICD-10-CM

## 2018-11-07 DIAGNOSIS — N28.9 RENAL INSUFFICIENCY: ICD-10-CM

## 2018-11-07 DIAGNOSIS — R53.1 WEAKNESS: ICD-10-CM

## 2018-11-07 DIAGNOSIS — M62.82 NON-TRAUMATIC RHABDOMYOLYSIS: ICD-10-CM

## 2018-11-07 DIAGNOSIS — R19.7 DIARRHEA, UNSPECIFIED TYPE: ICD-10-CM

## 2018-11-07 DIAGNOSIS — R53.1 GENERALIZED WEAKNESS: ICD-10-CM

## 2018-11-07 PROBLEM — N17.9 ACUTE KIDNEY INJURY SUPERIMPOSED ON CHRONIC KIDNEY DISEASE (HCC): Status: ACTIVE | Noted: 2018-11-07

## 2018-11-07 PROBLEM — N18.9 ACUTE KIDNEY INJURY SUPERIMPOSED ON CHRONIC KIDNEY DISEASE (HCC): Status: ACTIVE | Noted: 2018-11-07

## 2018-11-07 PROBLEM — D72.829 LEUKOCYTOSIS: Status: ACTIVE | Noted: 2018-11-07

## 2018-11-07 LAB
ABO GROUP BLD: NORMAL
ABO GROUP BLD: NORMAL
ALBUMIN SERPL BCP-MCNC: 3.1 G/DL (ref 3.2–4.9)
ALBUMIN/GLOB SERPL: 1 G/DL
ALP SERPL-CCNC: 67 U/L (ref 30–99)
ALT SERPL-CCNC: 12 U/L (ref 2–50)
ANION GAP SERPL CALC-SCNC: 9 MMOL/L (ref 0–11.9)
APPEARANCE UR: ABNORMAL
AST SERPL-CCNC: 35 U/L (ref 12–45)
BACTERIA #/AREA URNS HPF: NEGATIVE /HPF
BASOPHILS # BLD AUTO: 0.3 % (ref 0–1.8)
BASOPHILS # BLD: 0.04 K/UL (ref 0–0.12)
BILIRUB SERPL-MCNC: 0.7 MG/DL (ref 0.1–1.5)
BILIRUB UR QL STRIP.AUTO: NEGATIVE
BLD GP AB SCN SERPL QL: NORMAL
BUN SERPL-MCNC: 22 MG/DL (ref 8–22)
CALCIUM SERPL-MCNC: 8.4 MG/DL (ref 8.5–10.5)
CHLORIDE SERPL-SCNC: 106 MMOL/L (ref 96–112)
CO2 SERPL-SCNC: 22 MMOL/L (ref 20–33)
COLOR UR: ABNORMAL
CREAT SERPL-MCNC: 1.65 MG/DL (ref 0.5–1.4)
CRP SERPL HS-MCNC: 24.78 MG/DL (ref 0–0.75)
EKG IMPRESSION: NORMAL
EOSINOPHIL # BLD AUTO: 0.02 K/UL (ref 0–0.51)
EOSINOPHIL NFR BLD: 0.2 % (ref 0–6.9)
EPI CELLS #/AREA URNS HPF: NEGATIVE /HPF
ERYTHROCYTE [DISTWIDTH] IN BLOOD BY AUTOMATED COUNT: 52.3 FL (ref 35.9–50)
ERYTHROCYTE [SEDIMENTATION RATE] IN BLOOD BY WESTERGREN METHOD: 75 MM/HOUR (ref 0–20)
FERRITIN SERPL-MCNC: 587.2 NG/ML (ref 22–322)
GLOBULIN SER CALC-MCNC: 3 G/DL (ref 1.9–3.5)
GLUCOSE SERPL-MCNC: 108 MG/DL (ref 65–99)
GLUCOSE UR STRIP.AUTO-MCNC: NEGATIVE MG/DL
HCT VFR BLD AUTO: 28.7 % (ref 42–52)
HGB BLD-MCNC: 9.3 G/DL (ref 14–18)
HIV 1+2 AB+HIV1 P24 AG SERPL QL IA: NON REACTIVE
HYALINE CASTS #/AREA URNS LPF: ABNORMAL /LPF
IMM GRANULOCYTES # BLD AUTO: 0.06 K/UL (ref 0–0.11)
IMM GRANULOCYTES NFR BLD AUTO: 0.5 % (ref 0–0.9)
INR PPP: 1.24 (ref 0.87–1.13)
IRON SATN MFR SERPL: ABNORMAL % (ref 15–55)
IRON SERPL-MCNC: <10 UG/DL (ref 50–180)
KETONES UR STRIP.AUTO-MCNC: NEGATIVE MG/DL
LACTATE BLD-SCNC: 1 MMOL/L (ref 0.5–2)
LACTATE BLD-SCNC: 2.3 MMOL/L (ref 0.5–2)
LEUKOCYTE ESTERASE UR QL STRIP.AUTO: NEGATIVE
LIPASE SERPL-CCNC: 10 U/L (ref 11–82)
LYMPHOCYTES # BLD AUTO: 0.57 K/UL (ref 1–4.8)
LYMPHOCYTES NFR BLD: 4.4 % (ref 22–41)
MAGNESIUM SERPL-MCNC: 2.1 MG/DL (ref 1.5–2.5)
MCH RBC QN AUTO: 31.3 PG (ref 27–33)
MCHC RBC AUTO-ENTMCNC: 32.4 G/DL (ref 33.7–35.3)
MCV RBC AUTO: 96.6 FL (ref 81.4–97.8)
MICRO URNS: ABNORMAL
MONOCYTES # BLD AUTO: 1.33 K/UL (ref 0–0.85)
MONOCYTES NFR BLD AUTO: 10.4 % (ref 0–13.4)
NEUTROPHILS # BLD AUTO: 10.82 K/UL (ref 1.82–7.42)
NEUTROPHILS NFR BLD: 84.2 % (ref 44–72)
NITRITE UR QL STRIP.AUTO: NEGATIVE
NRBC # BLD AUTO: 0 K/UL
NRBC BLD-RTO: 0 /100 WBC
PH UR STRIP.AUTO: 5 [PH]
PLATELET # BLD AUTO: 242 K/UL (ref 164–446)
PMV BLD AUTO: 9.5 FL (ref 9–12.9)
POTASSIUM SERPL-SCNC: 4.3 MMOL/L (ref 3.6–5.5)
PROT SERPL-MCNC: 6.1 G/DL (ref 6–8.2)
PROT UR QL STRIP: 100 MG/DL
PROTHROMBIN TIME: 15.7 SEC (ref 12–14.6)
RBC # BLD AUTO: 2.97 M/UL (ref 4.7–6.1)
RBC # URNS HPF: ABNORMAL /HPF
RBC UR QL AUTO: ABNORMAL
RH BLD: NORMAL
RH BLD: NORMAL
SODIUM SERPL-SCNC: 137 MMOL/L (ref 135–145)
SP GR UR STRIP.AUTO: 1.02
TIBC SERPL-MCNC: 193 UG/DL (ref 250–450)
TROPONIN I SERPL-MCNC: 0.01 NG/ML (ref 0–0.04)
TROPONIN I SERPL-MCNC: <0.01 NG/ML (ref 0–0.04)
TSH SERPL DL<=0.005 MIU/L-ACNC: 4.42 UIU/ML (ref 0.38–5.33)
UROBILINOGEN UR STRIP.AUTO-MCNC: 0.2 MG/DL
VIT B12 SERPL-MCNC: >1500 PG/ML (ref 211–911)
WBC # BLD AUTO: 12.8 K/UL (ref 4.8–10.8)
WBC #/AREA URNS HPF: ABNORMAL /HPF

## 2018-11-07 PROCEDURE — 74018 RADEX ABDOMEN 1 VIEW: CPT

## 2018-11-07 PROCEDURE — 83540 ASSAY OF IRON: CPT

## 2018-11-07 PROCEDURE — G0475 HIV COMBINATION ASSAY: HCPCS

## 2018-11-07 PROCEDURE — 86900 BLOOD TYPING SEROLOGIC ABO: CPT

## 2018-11-07 PROCEDURE — 85025 COMPLETE CBC W/AUTO DIFF WBC: CPT

## 2018-11-07 PROCEDURE — 85652 RBC SED RATE AUTOMATED: CPT

## 2018-11-07 PROCEDURE — 87086 URINE CULTURE/COLONY COUNT: CPT

## 2018-11-07 PROCEDURE — 83690 ASSAY OF LIPASE: CPT

## 2018-11-07 PROCEDURE — 82784 ASSAY IGA/IGD/IGG/IGM EACH: CPT

## 2018-11-07 PROCEDURE — 99223 1ST HOSP IP/OBS HIGH 75: CPT | Performed by: INTERNAL MEDICINE

## 2018-11-07 PROCEDURE — 83516 IMMUNOASSAY NONANTIBODY: CPT

## 2018-11-07 PROCEDURE — 93005 ELECTROCARDIOGRAM TRACING: CPT

## 2018-11-07 PROCEDURE — 93005 ELECTROCARDIOGRAM TRACING: CPT | Performed by: EMERGENCY MEDICINE

## 2018-11-07 PROCEDURE — 99285 EMERGENCY DEPT VISIT HI MDM: CPT

## 2018-11-07 PROCEDURE — 83605 ASSAY OF LACTIC ACID: CPT | Mod: 91

## 2018-11-07 PROCEDURE — 700102 HCHG RX REV CODE 250 W/ 637 OVERRIDE(OP): Performed by: STUDENT IN AN ORGANIZED HEALTH CARE EDUCATION/TRAINING PROGRAM

## 2018-11-07 PROCEDURE — 71045 X-RAY EXAM CHEST 1 VIEW: CPT

## 2018-11-07 PROCEDURE — 82728 ASSAY OF FERRITIN: CPT

## 2018-11-07 PROCEDURE — 83735 ASSAY OF MAGNESIUM: CPT

## 2018-11-07 PROCEDURE — 80053 COMPREHEN METABOLIC PANEL: CPT

## 2018-11-07 PROCEDURE — 700105 HCHG RX REV CODE 258: Performed by: EMERGENCY MEDICINE

## 2018-11-07 PROCEDURE — 87493 C DIFF AMPLIFIED PROBE: CPT

## 2018-11-07 PROCEDURE — 81001 URINALYSIS AUTO W/SCOPE: CPT

## 2018-11-07 PROCEDURE — 82607 VITAMIN B-12: CPT

## 2018-11-07 PROCEDURE — 87040 BLOOD CULTURE FOR BACTERIA: CPT | Mod: 91

## 2018-11-07 PROCEDURE — 87328 CRYPTOSPORIDIUM AG IA: CPT

## 2018-11-07 PROCEDURE — 700105 HCHG RX REV CODE 258: Performed by: STUDENT IN AN ORGANIZED HEALTH CARE EDUCATION/TRAINING PROGRAM

## 2018-11-07 PROCEDURE — A9270 NON-COVERED ITEM OR SERVICE: HCPCS | Performed by: STUDENT IN AN ORGANIZED HEALTH CARE EDUCATION/TRAINING PROGRAM

## 2018-11-07 PROCEDURE — 86901 BLOOD TYPING SEROLOGIC RH(D): CPT

## 2018-11-07 PROCEDURE — 83550 IRON BINDING TEST: CPT

## 2018-11-07 PROCEDURE — 89055 LEUKOCYTE ASSESSMENT FECAL: CPT

## 2018-11-07 PROCEDURE — 36415 COLL VENOUS BLD VENIPUNCTURE: CPT

## 2018-11-07 PROCEDURE — 85610 PROTHROMBIN TIME: CPT

## 2018-11-07 PROCEDURE — 87324 CLOSTRIDIUM AG IA: CPT

## 2018-11-07 PROCEDURE — 94760 N-INVAS EAR/PLS OXIMETRY 1: CPT

## 2018-11-07 PROCEDURE — 770020 HCHG ROOM/CARE - TELE (206)

## 2018-11-07 PROCEDURE — 86140 C-REACTIVE PROTEIN: CPT

## 2018-11-07 PROCEDURE — 84484 ASSAY OF TROPONIN QUANT: CPT

## 2018-11-07 PROCEDURE — 86850 RBC ANTIBODY SCREEN: CPT

## 2018-11-07 PROCEDURE — 304561 HCHG STAT O2

## 2018-11-07 PROCEDURE — 84443 ASSAY THYROID STIM HORMONE: CPT

## 2018-11-07 RX ORDER — ACETAMINOPHEN 325 MG/1
650 TABLET ORAL EVERY 6 HOURS PRN
Status: DISCONTINUED | OUTPATIENT
Start: 2018-11-07 | End: 2018-11-13 | Stop reason: HOSPADM

## 2018-11-07 RX ORDER — SODIUM CHLORIDE 9 MG/ML
INJECTION, SOLUTION INTRAVENOUS CONTINUOUS
Status: DISCONTINUED | OUTPATIENT
Start: 2018-11-07 | End: 2018-11-10

## 2018-11-07 RX ORDER — TIOTROPIUM BROMIDE 18 UG/1
1 CAPSULE ORAL; RESPIRATORY (INHALATION) DAILY
Status: DISCONTINUED | OUTPATIENT
Start: 2018-11-08 | End: 2018-11-13 | Stop reason: HOSPADM

## 2018-11-07 RX ORDER — CEPHALEXIN 500 MG/1
500 CAPSULE ORAL 2 TIMES DAILY
COMMUNITY
End: 2018-11-07

## 2018-11-07 RX ORDER — GABAPENTIN 300 MG/1
300 CAPSULE ORAL EVERY MORNING
Status: DISCONTINUED | OUTPATIENT
Start: 2018-11-08 | End: 2018-11-13 | Stop reason: HOSPADM

## 2018-11-07 RX ORDER — GABAPENTIN 300 MG/1
600 CAPSULE ORAL EVERY EVENING
Status: DISCONTINUED | OUTPATIENT
Start: 2018-11-07 | End: 2018-11-13 | Stop reason: HOSPADM

## 2018-11-07 RX ORDER — CEPHALEXIN 500 MG/1
500 CAPSULE ORAL 4 TIMES DAILY
COMMUNITY
End: 2018-11-07

## 2018-11-07 RX ORDER — GABAPENTIN 300 MG/1
300-600 CAPSULE ORAL 2 TIMES DAILY
Status: DISCONTINUED | OUTPATIENT
Start: 2018-11-07 | End: 2018-11-07

## 2018-11-07 RX ORDER — GABAPENTIN 300 MG/1
300-600 CAPSULE ORAL 2 TIMES DAILY
COMMUNITY
End: 2018-12-10

## 2018-11-07 RX ORDER — SODIUM CHLORIDE 9 MG/ML
1000 INJECTION, SOLUTION INTRAVENOUS ONCE
Status: COMPLETED | OUTPATIENT
Start: 2018-11-07 | End: 2018-11-07

## 2018-11-07 RX ORDER — BUDESONIDE AND FORMOTEROL FUMARATE DIHYDRATE 80; 4.5 UG/1; UG/1
2 AEROSOL RESPIRATORY (INHALATION) 2 TIMES DAILY
Status: DISCONTINUED | OUTPATIENT
Start: 2018-11-07 | End: 2018-11-11

## 2018-11-07 RX ORDER — SODIUM CHLORIDE 9 MG/ML
1000 INJECTION, SOLUTION INTRAVENOUS ONCE
Status: DISPENSED | OUTPATIENT
Start: 2018-11-07 | End: 2018-11-08

## 2018-11-07 RX ORDER — ROSUVASTATIN CALCIUM 20 MG/1
20 TABLET, COATED ORAL EVERY EVENING
Status: DISCONTINUED | OUTPATIENT
Start: 2018-11-07 | End: 2018-11-08

## 2018-11-07 RX ORDER — ENALAPRILAT 1.25 MG/ML
1.25 INJECTION INTRAVENOUS EVERY 6 HOURS PRN
Status: DISCONTINUED | OUTPATIENT
Start: 2018-11-07 | End: 2018-11-13 | Stop reason: HOSPADM

## 2018-11-07 RX ADMIN — GABAPENTIN 600 MG: 300 CAPSULE ORAL at 17:10

## 2018-11-07 RX ADMIN — SODIUM CHLORIDE: 9 INJECTION, SOLUTION INTRAVENOUS at 15:22

## 2018-11-07 RX ADMIN — BUDESONIDE AND FORMOTEROL FUMARATE DIHYDRATE 2 PUFF: 80; 4.5 AEROSOL RESPIRATORY (INHALATION) at 21:12

## 2018-11-07 RX ADMIN — SODIUM CHLORIDE 1000 ML: 9 INJECTION, SOLUTION INTRAVENOUS at 10:12

## 2018-11-07 RX ADMIN — SODIUM CHLORIDE: 9 INJECTION, SOLUTION INTRAVENOUS at 21:12

## 2018-11-07 RX ADMIN — ROSUVASTATIN CALCIUM 20 MG: 20 TABLET, FILM COATED ORAL at 17:10

## 2018-11-07 ASSESSMENT — PATIENT HEALTH QUESTIONNAIRE - PHQ9
2. FEELING DOWN, DEPRESSED, IRRITABLE, OR HOPELESS: NOT AT ALL
1. LITTLE INTEREST OR PLEASURE IN DOING THINGS: NOT AT ALL
SUM OF ALL RESPONSES TO PHQ9 QUESTIONS 1 AND 2: 0

## 2018-11-07 ASSESSMENT — COGNITIVE AND FUNCTIONAL STATUS - GENERAL
SUGGESTED CMS G CODE MODIFIER MOBILITY: CK
DRESSING REGULAR UPPER BODY CLOTHING: A LITTLE
TURNING FROM BACK TO SIDE WHILE IN FLAT BAD: A LITTLE
SUGGESTED CMS G CODE MODIFIER DAILY ACTIVITY: CK
STANDING UP FROM CHAIR USING ARMS: A LITTLE
DRESSING REGULAR LOWER BODY CLOTHING: A LITTLE
CLIMB 3 TO 5 STEPS WITH RAILING: A LOT
DAILY ACTIVITIY SCORE: 19
PERSONAL GROOMING: A LITTLE
TOILETING: A LITTLE
MOBILITY SCORE: 17
HELP NEEDED FOR BATHING: A LITTLE
MOVING FROM LYING ON BACK TO SITTING ON SIDE OF FLAT BED: A LITTLE
MOVING TO AND FROM BED TO CHAIR: A LITTLE
WALKING IN HOSPITAL ROOM: A LITTLE

## 2018-11-07 ASSESSMENT — ENCOUNTER SYMPTOMS
DIARRHEA: 1
PALPITATIONS: 0
NAUSEA: 0
VOMITING: 0
SEIZURES: 0
DEPRESSION: 0
WEIGHT LOSS: 1
CHILLS: 0
DOUBLE VISION: 0
FOCAL WEAKNESS: 0
SHORTNESS OF BREATH: 1
WEAKNESS: 1
SHORTNESS OF BREATH: 0
DIZZINESS: 1
HEMOPTYSIS: 0
SPUTUM PRODUCTION: 0
DIAPHORESIS: 0
BRUISES/BLEEDS EASILY: 0
BACK PAIN: 1
INSOMNIA: 0
TINGLING: 0
DIZZINESS: 0
HEARTBURN: 0
MYALGIAS: 0
LOSS OF CONSCIOUSNESS: 0
BLOOD IN STOOL: 0
NECK PAIN: 1
ABDOMINAL PAIN: 1
BLURRED VISION: 0
ABDOMINAL PAIN: 0
FEVER: 1
HEADACHES: 0
PSYCHIATRIC NEGATIVE: 1
COUGH: 0

## 2018-11-07 ASSESSMENT — PAIN SCALES - GENERAL
PAINLEVEL_OUTOF10: 0

## 2018-11-07 ASSESSMENT — COPD QUESTIONNAIRES
COPD SCREENING SCORE: 4
DURING THE PAST 4 WEEKS HOW MUCH DID YOU FEEL SHORT OF BREATH: NONE/LITTLE OF THE TIME
DO YOU EVER COUGH UP ANY MUCUS OR PHLEGM?: NO/ONLY WITH OCCASIONAL COLDS OR INFECTIONS
HAVE YOU SMOKED AT LEAST 100 CIGARETTES IN YOUR ENTIRE LIFE: YES

## 2018-11-07 ASSESSMENT — LIFESTYLE VARIABLES
EVER_SMOKED: YES
EVER_SMOKED: YES
DO YOU DRINK ALCOHOL: NO

## 2018-11-07 NOTE — ED NOTES
Med rec complete per pt at bedside & pt home pharmacy-[facility]  Allergies have been verified and updated      Pt reports that he finished a 5 day course of KEFLEX on 10-8-2018    Pt reports that he finished a 14 day course of KEFLEX on 10-

## 2018-11-07 NOTE — CARE PLAN
Problem: Knowledge Deficit  Goal: Knowledge of disease process/condition, treatment plan, diagnostic tests, and medications will improve    Intervention: Assess knowledge level of disease process/condition, treatment plan, diagnostic tests, and medications  Unit orientation, discuss plan of care, discuss use of call button.       Problem: Urinary Elimination:  Goal: Ability to reestablish a normal urinary elimination pattern will improve    Intervention: Encourage scheduled voiding  Dona care for incontinence, condom cath to protect skin.

## 2018-11-07 NOTE — ASSESSMENT & PLAN NOTE
Colonoscopy performed on this admission showed pseudomembranes although negative C. difficile- will treat for presumptive disease  However, other more rare causes of pseudomembranous colitis include Irritable Bowel Disease    Colonoscopy Biopsy results revealed acute colitis. Per GI, this is likely secondary to resolving C-diff infection.  GI recommends outpatient oral vancomycin to complete 14 day course.   We will discharge the patient with prescription for oral vancomycin to complete fourteen day course.

## 2018-11-07 NOTE — ASSESSMENT & PLAN NOTE
Creatinine baseline ~1.4-1.6; solitary kidney   Creatinine is down to 1.3  Concern for possible KERRY due to rhabdomyolysis as   Cont IVF until CPK <500  Monitor BMP  Avoid nephrotoxic drugs, all meds renally adjusted

## 2018-11-07 NOTE — ED PROVIDER NOTES
ED Provider Note    ED Provider Note    Primary care provider: Lizzie Soto M.D.  Means of arrival: EMS  History obtained from: Patient  History limited by: None    CHIEF COMPLAINT  Chief Complaint   Patient presents with   • Shortness of Breath   • Weakness   • Fever   • Dizziness       HPI  Barry Torres is a 78 y.o. male who presents to the Emergency Department via EMS with a chief complaint of weakness, diarrhea, fever and dizziness.  Patient denies any pain.  At this time, he denies any shortness of breath.  States he only feels weak.  States he says diarrhea for the last several days.  Recently treated for a hand infection.  Finished Keflex about 2 weeks ago.  He underwent 2 courses of it.  Reports a fever up to 100.2 at home.  No nausea, vomiting.  No urinary complaints.  No chest pain, headache.  He states he had some neck and back pain yesterday but that has since resolved.  He describes his back hurting.  Again, this is resolved.  No recent trauma or falls.  He fell about a month ago and was hospitalized but none since then.  He lives with his wife.    REVIEW OF SYSTEMS  Review of Systems   Constitutional: Positive for fever.   HENT: Negative for congestion.    Respiratory: Positive for shortness of breath. Negative for cough.    Cardiovascular: Negative for chest pain.   Gastrointestinal: Positive for diarrhea. Negative for abdominal pain, blood in stool, nausea and vomiting.   Musculoskeletal: Positive for back pain and neck pain.   Neurological: Positive for dizziness and weakness. Negative for tingling, focal weakness, seizures and headaches.   All other systems reviewed and are negative.      PAST MEDICAL HISTORY   has a past medical history of Abnormal thyroid stimulating hormone (TSH) level; Allergic rhinitis; Asbestosis ([facility]); Asthma; Bronchitis; Chronic diarrhea; Chronic low back pain; Chronic lung disease; CKD (chronic kidney disease), stage III ([facility]); COPD (chronic obstructive  pulmonary disease) (HCC); Coronary artery calcification seen on CAT scan (8/2/2016); Epididymitis (2009); GERD (gastroesophageal reflux disease); History of hemorrhoids; History of skin cancer; [place] (hard of hearing); Hypercholesteremia; Hypertension; Hypertriglyceridemia; Indigestion; Light headedness; Lightheadedness (6/23/2016); Low back pain; Nasal drainage; Olecranon bursitis; Orthostasis (6/23/2016); Peripheral neuropathy; Pleural plaque (2005 ); Post-nasal drip; Pulmonary emphysema (HCC); RA (rheumatoid arthritis) (HCC); Restless leg syndrome; Rheumatoid arthritis (HCC); Sleep apnea; and Solitary kidney.    SURGICAL HISTORY   has a past surgical history that includes testicle exploration (2004); cystoscopy (2/1/2010); retrogrades (2/1/2010); pyelogram (2/1/2010); ureteroscopy (2/1/2010); nephrectomy laparoscopic (2009); nasal polypectomy (Bilateral, 10/6/2015); tonsillectomy; and remv 2nd cataract,corn-scler sectn.    SOCIAL HISTORY  Social History   Substance Use Topics   • Smoking status: Former Smoker     Packs/day: 1.00     Years: 40.00     Types: Cigarettes     Quit date: 1/1/1980   • Smokeless tobacco: Never Used      Comment: 1 pk a day for 40 yrs   • Alcohol use No      Comment: 2 a week      History   Drug Use No       FAMILY HISTORY  Family History   Problem Relation Age of Onset   • Genetic Father         ALS   • No Known Problems Sister    • No Known Problems Son    • No Known Problems Daughter    • Heart Attack Neg Hx    • Heart Disease Neg Hx    • Heart Failure Neg Hx        CURRENT MEDICATIONS  Home Medications     Reviewed by Beto Fairchild (Pharmacy Tech) on 11/07/18 at 0923  Med List Status: Complete   Medication Last Dose Status   cephALEXin (KEFLEX) 500 MG Cap 10/8/2018 Active   cephALEXin (KEFLEX) 500 MG Cap 10/22/2018 Active   certolizumab pegol (CIMZIA PREFILLED) 2 X 200 MG/ML Kit 11/6/2018 Active   fexofenadine (ALLEGRA) 180 MG tablet 11/6/2018 Active   fluticasone (FLONASE) 50  "MCG/ACT nasal spray 11/6/2018 Active   gabapentin (NEURONTIN) 300 MG Cap 11/6/2018 Active   leflunomide (ARAVA) 20 MG Tab 11/6/2018 Active   loperamide (IMODIUM A-D) 2 MG tablet 11/6/2018 Active   Magnesium 250 MG Tab 11/6/2018 Active   Multiple Vitamin (MULTIVITAMIN PO) 11/6/2018 Active   Probiotic Product (PROBIOTIC PO) 11/6/2018 Active   rosuvastatin (CRESTOR) 20 MG Tab 11/6/2018 Active   SPIRIVA HANDIHALER 18 MCG Cap 11/7/2018 Active   SYMBICORT 80-4.5 MCG/ACT Aerosol 11/7/2018 Active                ALLERGIES  Allergies   Allergen Reactions   • Penicillins Hives     Rash and asthma attack when a child - wife states he has had Keflex in the past and tolerated   • Ciprofloxacin Hcl Unspecified     MUSCLE ACHES   • Levofloxacin Unspecified     MUSCLE ACHES       PHYSICAL EXAM  VITAL SIGNS: /62   Pulse 94   Temp 36.9 °C (98.4 °F)   Resp 18   Ht 1.854 m (6' 1\")   Wt 84 kg (185 lb 3 oz)   SpO2 96%   BMI 24.43 kg/m²   Vitals reviewed.  Constitutional: Patient is oriented to person, place, and time. Appears well-developed and well-nourished. No distress.    Head: Normocephalic and atraumatic.   Ears: Normal external ears bilaterally.   Mouth/Throat: Oropharynx is clear.  Dry lips and dry mucous membranes.    Eyes: Conjunctivae are normal. Pupils are equal, round, and reactive to light.   Neck: Normal range of motion. Neck supple.  Cardiovascular: Tachycardia, regular rhythm and normal heart sounds. Normal peripheral pulses.  Pulmonary/Chest: Effort normal and breath sounds normal. No respiratory distress, no wheezes, rhonchi, or rales. No chest wall tenderness.  Abdominal: Soft. Bowel sounds are normal. There is no tenderness. No rebound or guarding, or peritoneal signs.  Musculoskeletal: No edema and no tenderness.   Neurological: No focal deficits.   Skin: Skin is warm and dry. No erythema. No pallor.  Healing wound to the palm of his left hand.  Psychiatric: Patient has a normal mood and affect. "     LABS  Results for orders placed or performed during the hospital encounter of 11/07/18   Lactic acid (lactate)   Result Value Ref Range    Lactic Acid 2.3 (H) 0.5 - 2.0 mmol/L   Lactic acid (lactate)   Result Value Ref Range    Lactic Acid 1.0 0.5 - 2.0 mmol/L   CBC WITH DIFFERENTIAL   Result Value Ref Range    WBC 12.8 (H) 4.8 - 10.8 K/uL    RBC 2.97 (L) 4.70 - 6.10 M/uL    Hemoglobin 9.3 (L) 14.0 - 18.0 g/dL    Hematocrit 28.7 (L) 42.0 - 52.0 %    MCV 96.6 81.4 - 97.8 fL    MCH 31.3 27.0 - 33.0 pg    MCHC 32.4 (L) 33.7 - 35.3 g/dL    RDW 52.3 (H) 35.9 - 50.0 fL    Platelet Count 242 164 - 446 K/uL    MPV 9.5 9.0 - 12.9 fL    Neutrophils-Polys 84.20 (H) 44.00 - 72.00 %    Lymphocytes 4.40 (L) 22.00 - 41.00 %    Monocytes 10.40 0.00 - 13.40 %    Eosinophils 0.20 0.00 - 6.90 %    Basophils 0.30 0.00 - 1.80 %    Immature Granulocytes 0.50 0.00 - 0.90 %    Nucleated RBC 0.00 /100 WBC    Neutrophils (Absolute) 10.82 (H) 1.82 - 7.42 K/uL    Lymphs (Absolute) 0.57 (L) 1.00 - 4.80 K/uL    Monos (Absolute) 1.33 (H) 0.00 - 0.85 K/uL    Eos (Absolute) 0.02 0.00 - 0.51 K/uL    Baso (Absolute) 0.04 0.00 - 0.12 K/uL    Immature Granulocytes (abs) 0.06 0.00 - 0.11 K/uL    NRBC (Absolute) 0.00 K/uL   COMP METABOLIC PANEL   Result Value Ref Range    Sodium 137 135 - 145 mmol/L    Potassium 4.3 3.6 - 5.5 mmol/L    Chloride 106 96 - 112 mmol/L    Co2 22 20 - 33 mmol/L    Anion Gap 9.0 0.0 - 11.9    Glucose 108 (H) 65 - 99 mg/dL    Bun 22 8 - 22 mg/dL    Creatinine 1.65 (H) 0.50 - 1.40 mg/dL    Calcium 8.4 (L) 8.5 - 10.5 mg/dL    AST(SGOT) 35 12 - 45 U/L    ALT(SGPT) 12 2 - 50 U/L    Alkaline Phosphatase 67 30 - 99 U/L    Total Bilirubin 0.7 0.1 - 1.5 mg/dL    Albumin 3.1 (L) 3.2 - 4.9 g/dL    Total Protein 6.1 6.0 - 8.2 g/dL    Globulin 3.0 1.9 - 3.5 g/dL    A-G Ratio 1.0 g/dL   URINE CULTURE(NEW)   Result Value Ref Range    Significant Indicator NEG     Source UR     Site      Urine Culture No growth at 48 hours    BLOOD CULTURE    Result Value Ref Range    Significant Indicator NEG     Source BLD     Site PERIPHERAL     Blood Culture       No Growth    Note: Blood cultures are incubated for 5 days and  are monitored continuously.Positive blood cultures  are called to the RN and reported as soon as  they are identified.     BLOOD CULTURE   Result Value Ref Range    Significant Indicator NEG     Source BLD     Site PERIPHERAL     Blood Culture       No Growth    Note: Blood cultures are incubated for 5 days and  are monitored continuously.Positive blood cultures  are called to the RN and reported as soon as  they are identified.     ESTIMATED GFR   Result Value Ref Range    GFR If  49 (A) >60 mL/min/1.73 m 2    GFR If Non African American 40 (A) >60 mL/min/1.73 m 2   TROPONIN   Result Value Ref Range    Troponin I <0.01 0.00 - 0.04 ng/mL   TROPONIN   Result Value Ref Range    Troponin I 0.01 0.00 - 0.04 ng/mL   C Diff by PCR rflx Toxin   Result Value Ref Range    C Diff by PCR See Toxin Negative    [id number] Presumptive Negative Negative   TSH (Thyroid Stimulating Hormone)   Result Value Ref Range    TSH 4.420 0.380 - 5.330 uIU/mL   Magnesium   Result Value Ref Range    Magnesium 2.1 1.5 - 2.5 mg/dL   COD (Adult)   Result Value Ref Range    ABO Grouping Only A     Rh Grouping Only POS     Antibody Screen-Cod NEG    Prothrombin time (INR)   Result Value Ref Range    PT 15.7 (H) 12.0 - 14.6 sec    INR 1.24 (H) 0.87 - 1.13   Occult blood (Stool)   Result Value Ref Range    Occult Blood Feces Negative Negative   Lipase   Result Value Ref Range    Lipase 10 (L) 11 - 82 U/L   Urinalysis   Result Value Ref Range    Color DK Yellow     Character Cloudy (A)     Specific Gravity 1.022 <1.035    Ph 5.0 5.0 - 8.0    Glucose Negative Negative mg/dL    Ketones Negative Negative mg/dL    Protein 100 (A) Negative mg/dL    Bilirubin Negative Negative    Urobilinogen, Urine 0.2 Negative    Nitrite Negative Negative    Leukocyte Esterase  Negative Negative    Occult Blood Large (A) Negative    Micro Urine Req Microscopic    STOOL WBC'S   Result Value Ref Range    Stool WBC's Many (A) None seen   BODY FLUID FAT   Result Value Ref Range    Fluid Fat Absent Absent    Fluid Type Stool    CRP QUANTITIVE (NON-CARDIAC)   Result Value Ref Range    Stat C-Reactive Protein 24.78 (H) 0.00 - 0.75 mg/dL   WESTERGREN SED RATE   Result Value Ref Range    Sed Rate Westergren 75 (H) 0 - 20 mm/hour   IRON/TOTAL IRON BIND   Result Value Ref Range    Iron <10 (L) 50 - 180 ug/dL    Total Iron Binding 193 (L) 250 - 450 ug/dL    % Saturation see below 15 - 55 %   FERRITIN   Result Value Ref Range    Ferritin 587.2 (H) 22.0 - 322.0 ng/mL   CELIAC DISEASE AB PANEL   Result Value Ref Range    Immunoglobulin A 228 68 - 408 mg/dL   VITAMIN B12   Result Value Ref Range    Vitamin B12 -True Cobalamin >1500 (H) 211 - 911 pg/mL   HIV AG/AB COMBO ASSAY SCREENING   Result Value Ref Range    HIV Ag/Ab Combo Assay Non Reactive Non Reactive   ABO AND RH CONFIRMATION   Result Value Ref Range    ABO Confirm A     Second Rh Group POS    URINE MICROSCOPIC (W/UA)   Result Value Ref Range    WBC 0-2 (A) /hpf    RBC 2-5 (A) /hpf    Bacteria Negative None /hpf    Epithelial Cells Negative /hpf    Hyaline Cast 6-10 (A) /lpf   CBC with Differential   Result Value Ref Range    WBC 12.1 (H) 4.8 - 10.8 K/uL    RBC 2.75 (L) 4.70 - 6.10 M/uL    Hemoglobin 8.4 (L) 14.0 - 18.0 g/dL    Hematocrit 27.6 (L) 42.0 - 52.0 %    .4 (H) 81.4 - 97.8 fL    MCH 30.5 27.0 - 33.0 pg    MCHC 30.4 (L) 33.7 - 35.3 g/dL    RDW 55.7 (H) 35.9 - 50.0 fL    Platelet Count 237 164 - 446 K/uL    MPV 9.7 9.0 - 12.9 fL    Neutrophils-Polys 73.20 (H) 44.00 - 72.00 %    Lymphocytes 12.30 (L) 22.00 - 41.00 %    Monocytes 12.30 0.00 - 13.40 %    Eosinophils 1.10 0.00 - 6.90 %    Basophils 0.70 0.00 - 1.80 %    Immature Granulocytes 0.40 0.00 - 0.90 %    Nucleated RBC 0.00 /100 WBC    Neutrophils (Absolute) 8.84 (H) 1.82 -  7.42 K/uL    Lymphs (Absolute) 1.49 1.00 - 4.80 K/uL    Monos (Absolute) 1.49 (H) 0.00 - 0.85 K/uL    Eos (Absolute) 0.13 0.00 - 0.51 K/uL    Baso (Absolute) 0.08 0.00 - 0.12 K/uL    Immature Granulocytes (abs) 0.05 0.00 - 0.11 K/uL    NRBC (Absolute) 0.00 K/uL   Comp Metabolic Panel (CMP)   Result Value Ref Range    Sodium 137 135 - 145 mmol/L    Potassium 3.9 3.6 - 5.5 mmol/L    Chloride 108 96 - 112 mmol/L    Co2 21 20 - 33 mmol/L    Anion Gap 8.0 0.0 - 11.9    Glucose 96 65 - 99 mg/dL    Bun 16 8 - 22 mg/dL    Creatinine 1.45 (H) 0.50 - 1.40 mg/dL    Calcium 7.6 (L) 8.5 - 10.5 mg/dL    AST(SGOT) 142 (H) 12 - 45 U/L    ALT(SGPT) 29 2 - 50 U/L    Alkaline Phosphatase 64 30 - 99 U/L    Total Bilirubin 0.6 0.1 - 1.5 mg/dL    Albumin 2.9 (L) 3.2 - 4.9 g/dL    Total Protein 5.4 (L) 6.0 - 8.2 g/dL    Globulin 2.5 1.9 - 3.5 g/dL    A-G Ratio 1.2 g/dL   Lipid Profile (Lipid Panel) Fasting   Result Value Ref Range    Cholesterol,Tot 56 (L) 100 - 199 mg/dL    Triglycerides 102 0 - 149 mg/dL    HDL 18 (A) >=40 mg/dL    LDL 18 <100 mg/dL   ESTIMATED GFR   Result Value Ref Range    GFR If  57 (A) >60 mL/min/1.73 m 2    GFR If Non  47 (A) >60 mL/min/1.73 m 2   CREATINE KINASE   Result Value Ref Range    CPK Total 5150 (HH) 0 - 154 U/L   C DIFF TOXIN   Result Value Ref Range    C.Diff Toxin A&B Negative    CBC WITHOUT DIFFERENTIAL   Result Value Ref Range    WBC 14.4 (H) 4.8 - 10.8 K/uL    RBC 3.12 (L) 4.70 - 6.10 M/uL    Hemoglobin 9.8 (L) 14.0 - 18.0 g/dL    Hematocrit 30.6 (L) 42.0 - 52.0 %    MCV 98.1 (H) 81.4 - 97.8 fL    MCH 31.4 27.0 - 33.0 pg    MCHC 32.0 (L) 33.7 - 35.3 g/dL    RDW 54.3 (H) 35.9 - 50.0 fL    Platelet Count 241 164 - 446 K/uL    MPV 10.0 9.0 - 12.9 fL   COMP METABOLIC PANEL   Result Value Ref Range    Sodium 135 135 - 145 mmol/L    Potassium 3.8 3.6 - 5.5 mmol/L    Chloride 106 96 - 112 mmol/L    Co2 21 20 - 33 mmol/L    Anion Gap 8.0 0.0 - 11.9    Glucose 106 (H) 65 - 99  mg/dL    Bun 15 8 - 22 mg/dL    Creatinine 1.25 0.50 - 1.40 mg/dL    Calcium 7.5 (L) 8.5 - 10.5 mg/dL    AST(SGOT) 85 (H) 12 - 45 U/L    ALT(SGPT) 28 2 - 50 U/L    Alkaline Phosphatase 68 30 - 99 U/L    Total Bilirubin 0.5 0.1 - 1.5 mg/dL    Albumin 2.7 (L) 3.2 - 4.9 g/dL    Total Protein 5.5 (L) 6.0 - 8.2 g/dL    Globulin 2.8 1.9 - 3.5 g/dL    A-G Ratio 1.0 g/dL   CRYPTO/GIARDIA RAPID ASSAY   Result Value Ref Range    Significant Indicator NEG     Source STL     Site      Ova And Parasites Antigen Eia       Negative for Giardia lamblia antigen.  Negative for Cryptosporidium parvum antigen.  NOTE:  The Cryptosporidium/Giardia assay is a rapid test for the  presence or absence of these specific antigens.  In special  circumstances, a physician may need to request a complete  ova and parasite procedure when the patient meets certain  criteria. For example, recent travel abroad,immunosupression,  recent immigration, persistent undiagnosed diarrhea, or  persistent unexplained eosinophilia may be conditions to  warrant a complete ova and parasite examination.  In these  special cases, or if the physician suspects another specific  gastrointestinal parasite,the Microbiology Department can  perform a complete ova and parasite microscopic examination.  The request for a complete ova and parasite examination must  come directly from the physician (or designee) within the  seven days of the original stool specimen being received in  the Microbiology Department.  Stool specimens are discarded  after  seven days of storage.     ESTIMATED GFR   Result Value Ref Range    GFR If African American >60 >60 mL/min/1.73 m 2    GFR If Non  56 (A) >60 mL/min/1.73 m 2   CREATINE KINASE   Result Value Ref Range    CPK Total 2089 (HH) 0 - 154 U/L   T-TRANSGLUTAMINASE (TTG) IGA   Result Value Ref Range    t-TG IgA 1 0 - 3 U/mL   STOOL WBC'S   Result Value Ref Range    Stool WBC's None seen None seen   BASIC METABOLIC PANEL    Result Value Ref Range    Sodium 137 135 - 145 mmol/L    Potassium 3.9 3.6 - 5.5 mmol/L    Chloride 109 96 - 112 mmol/L    Co2 22 20 - 33 mmol/L    Glucose 120 (H) 65 - 99 mg/dL    Bun 16 8 - 22 mg/dL    Creatinine 1.36 0.50 - 1.40 mg/dL    Calcium 7.3 (L) 8.5 - 10.5 mg/dL    Anion Gap 6.0 0.0 - 11.9   ESTIMATED GFR   Result Value Ref Range    GFR If African American >60 >60 mL/min/1.73 m 2    GFR If Non African American 50 (A) >60 mL/min/1.73 m 2   ARTERIAL BLOOD GAS   Result Value Ref Range    Ph 7.38 (L) 7.40 - 7.50    Pco2 29.7 26.0 - 37.0 mmHg    Po2 87.0 64.0 - 87.0 mmHg    O2 Saturation 95.4 93.0 - 99.0 %    Hco3 17 17 - 25 mmol/L    Base Excess -7 (L) -4 - 3 mmol/L    Body Temp 35.7 Centigrade    O2 Therapy 10.0 2.0 - 10.0 L/min    Ph -TC 7.40 7.40 - 7.50    Pco2 -TC 28.1 26.0 - 37.0 mmHg    Po2 -TC 80.1 64.0 - 87.0 mmHg   CBC WITH DIFFERENTIAL   Result Value Ref Range    WBC 16.2 (H) 4.8 - 10.8 K/uL    RBC 3.03 (L) 4.70 - 6.10 M/uL    Hemoglobin 9.2 (L) 14.0 - 18.0 g/dL    Hematocrit 29.1 (L) 42.0 - 52.0 %    MCV 96.0 81.4 - 97.8 fL    MCH 30.4 27.0 - 33.0 pg    MCHC 31.6 (L) 33.7 - 35.3 g/dL    RDW 53.1 (H) 35.9 - 50.0 fL    Platelet Count 283 164 - 446 K/uL    MPV 9.8 9.0 - 12.9 fL    Neutrophils-Polys 81.00 (H) 44.00 - 72.00 %    Lymphocytes 6.80 (L) 22.00 - 41.00 %    Monocytes 9.80 0.00 - 13.40 %    Eosinophils 1.10 0.00 - 6.90 %    Basophils 0.70 0.00 - 1.80 %    Immature Granulocytes 0.60 0.00 - 0.90 %    Nucleated RBC 0.00 /100 WBC    Neutrophils (Absolute) 13.12 (H) 1.82 - 7.42 K/uL    Lymphs (Absolute) 1.10 1.00 - 4.80 K/uL    Monos (Absolute) 1.58 (H) 0.00 - 0.85 K/uL    Eos (Absolute) 0.17 0.00 - 0.51 K/uL    Baso (Absolute) 0.12 0.00 - 0.12 K/uL    Immature Granulocytes (abs) 0.10 0.00 - 0.11 K/uL    NRBC (Absolute) 0.00 K/uL   TROPONIN   Result Value Ref Range    Troponin I 0.02 0.00 - 0.04 ng/mL   EKG   Result Value Ref Range    Report       [facility] Emergency  Dept.    Test Date:  2018  Pt Name:    FIDELIA MERA             Department: ER  MRN:        [id number]                      Room:       RD 04  Gender:     Male                         Technician: [id number]  :        [id number]                   Requested By:ER TRIAGE PROTOCOL  Order #:    [id number]                    Reading MD:    Measurements  Intervals                                Axis  Rate:       119                          P:          0  NV:         132                          QRS:        16  QRSD:       92                           T:          75  QT:         324  QTc:        456    Interpretive Statements  SINUS TACHYCARDIA  LOW VOLTAGE IN FRONTAL LEADS  BORDERLINE T WAVE ABNORMALITIES  Compared to ECG 10/04/2018 14:29:40  Low QRS voltage now present  Sinus rhythm no longer present  First degree AV block no longer present  T-wave abnormality still present     EKG   Result Value Ref Range    Report       [facility]    Test Date:  2018  Pt Name:    FIDELIA MERA             Department[facility]  MRN:        [id number]                      Room:       T826  Gender:     Male                         Technician: [id number]  :        [id number]                   Requested By:AMOR HORN  Order #:    [id number]                    Reading MD:    Measurements  Intervals                                Axis  Rate:       102                          P:          57  NV:         208                          QRS:        -5  QRSD:       94                           T:          88  QT:         344  QTc:        449    Interpretive Statements  SINUS TACHYCARDIA  MULTIPLE ATRIAL PREMATURE COMPLEXES  BORDERLINE AV CONDUCTION DELAY  Compared to ECG 2018 08:38:02  Atrial premature complex(es) now present  T-wave abnormality no longer present         All labs reviewed by me.    EKG Interpretation  Interpreted by me    Rhythm: Sinus tachycardia  Rate: 119  Axis: normal  Ectopy: none  Conduction: normal  ST  Segments: no acute change  T Waves: no acute change  Q Waves: none    Clinical Impression: Comparison made to prior EKG from October 2018.  Other than rate, 99 at that time, no acute morphology changes noted.  Today's EKG shows a sinus tachycardia.      RADIOLOGY  DX-CHEST-PORTABLE (1 VIEW)   Final Result         Worsening airspace opacity in the left lung base could relate to worsening atelectasis or infection.      CT-ABDOMEN-PELVIS W/O   Final Result      Wall thickening of the descending and sigmoid colon with inflammatory changes most prominent adjacent to the distal descending and sigmoid colon. Findings may represent an infectious or inflammatory colitis. Given the length of involvement, this is less    likely to represent diverticulitis.      Air-fluid levels in the colon can be seen in the setting of diarrhea. Mild colonic distention is seen.      Nonvisualization of the appendix, limiting evaluation.      There appears to be mildly enlarged lymph node at the aortic bifurcation which is nonspecific and may be reactive.      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      Fat-containing left flank hernia.      Small right bladder diverticulum.      Small amount of free fluid in the abdomen and pelvis.      Tiny nonobstructing right renal calculus.      Density layering within the gallbladder likely represents gallstones.      Small bilateral pleural effusions with overlying atelectasis/consolidation.         [id number] VIEW   Final Result      Above-average stool volume is compatible with constipation      No radiographic evidence of bowel obstruction      DX-CHEST-PORTABLE (1 VIEW)   Final Result      Bibasilar atelectasis is mildly increased on the left and improved on the right.      Atherosclerotic plaque.           The radiologist's interpretation of all radiological studies have been reviewed by me.    COURSE & MEDICAL DECISION MAKING  Pertinent Labs & Imaging studies  reviewed. (See chart for details)    9:33 AM - Patient seen and examined at bedside.  This is a pleasant, 78-year-old male who presents via EMS.  He is accompanied by his wife, now at the bedside.  Complains of generalized weakness in the setting of recently having diarrhea.  No shortness of breath or chest pain at this time.  Temperature of 100.3.  Sepsis protocol initiated per nursing staff.      9:33 AM patient is given IV fluids for tachycardia, dry mucous membranes and a history of recent diarrhea.  Patient is given oral fluids as well but additionally given IV fluids because oral fluids will not correct the problem quickly enough.    11:36 AM, patient's reevaluated the bedside.  Feeling better, heart rates improved.  He still has dry mucous membranes.  Still feeling weak.  We discussed lab results which include a slightly elevated lactate of 2.3.  Creatinine is elevated.  This is likely prerenal I suspect and could improve with hydration.  Still denying any pain.  I suggested that he be admitted to the hospital overnight for observation and continued rehydration and he is agreeable.    11:45 AM discussed with [facility] hospitalist, who agrees to admit the patient to their service.    Discussed with the [facility] residents, this is their patient, the [facility] hospitalist, notified me.  He will be admitted to the [facility] service.    Patient is in stable condition at this time.    FINAL IMPRESSION  1. Generalized weakness    2. Diarrhea, unspecified type    3. Renal insufficiency

## 2018-11-07 NOTE — NON-PROVIDER
.        Internal Medicine Admitting History and Physical    Note Author: Ziggy Perea, Student       Name Barry Torres     1940   Age/Sex 78 y.o. male   MRN [id number]   Code Status FULL     After 5PM or if no immediate response to page, please call for cross-coverage  Attending/Team: White See Patient List for primary contact information  Call [phone] to page    1st Call - Day Intern (R1): Dr. Caban 2nd Call - Day Sr. Resident (R2/R3):   Dr. Fink       Chief Complaint:  Weakness and SOB following 1 month of diarrhea    HPI:  Patient is a 78 year old male presenting with weakness and SOB following 1 month of diarrhea. PMH significant for Rheumatoid Arthritis, COPD, HTN, Hypothyroidism, Hypercholesterolemia, and Sleep Apnea. Patient was recently admitted in October for sepsis secondary to cellulitis on his left hand. Patient was discharged with a 10 day course of Keflex. He says that his diarrhea began during his last hospitalization and has not subsided since then. He has 2-3 painful bowel movements daily, however not usually bloody or black, but + anal leakage. No abdominal pain reported. However, he reports 1 episode of a bloody bowel movement he attributes to his hx of hemorrhoids. He has been wearing a diaper due to incontinence. He feels weak about once a week since the diarrhea started. However today after a bowel movement he felt weak and could not stand up even with the help of his wife. His legs felt weak and thus he came to the ER. He reports a 10 lb weight loss over the past month and a decreased appetite. + fever yesterday and today at 100.6F. No chills.     His last colonoscopy was 9 years ago where they found polys. He was told to follow up in 10 years. He has a history. He denies eating seafood, no sick contacts with similar symptoms, traveling, or camping.  FOBT negative, however no stool felt in the vault. No masses appreciated. Denies nausea, change in urination, no abdominal  pain, or cough. He denies being short of breath, but is on 5L oxygen stating well. At home he is on oxygen at night 2L. Patient denies any medical history, but can answer questions when asked specifically.     Review of Systems   Constitutional: Positive for fever, malaise/fatigue and weight loss.   Respiratory: Negative for cough, hemoptysis, sputum production and shortness of breath.    Gastrointestinal: Positive for abdominal pain and diarrhea. Negative for vomiting.   Genitourinary: Negative for dysuria, hematuria and urgency.   Neurological: Positive for weakness. Negative for dizziness, loss of consciousness and headaches.   Psychiatric/Behavioral: Negative.              Past Medical History:   Rheumatoid Arthritis  Diverticulosis  Hypercholesterolemia  Hypothyroidism  COPD - Emphysema  Hypertension  CKD - Stage III  Hemorrhoids  Skin Cancer (non-melanoma)  Solitary Kidney  Sleep Apnea  Chronic Lower Extremity Edema  Calcified LAD and LCx on CT       Past Surgical History:  Past Surgical History:   Procedure Laterality Date   • NASAL POLYPECTOMY Bilateral 10/6/2015    Procedure: NASAL POLYPECTOMY WITH SCOTT ENDOSCOPIC NASAL DEBRIDEMENT;  Surgeon: Param Maurer M.D.;  Location: SURGERY SAME DAY [facility];  Service:    • CYSTOSCOPY  2/1/2010    Performed by ANGIE VERDUZCO at SURGERY [facility] ORS   • RETROGRADES  2/1/2010    Performed by ANGIE VERDUZCO at SURGERY [facility] ORS   • PYELOGRAM  2/1/2010    Performed by ANGIE VERDUZCO at SURGERY [facility] ORS   • URETEROSCOPY  2/1/2010    Performed by ANGIE VERDUZCO at [facility] ORS   • NEPHRECTOMY LAPAROSCOPIC  2009    left kidney   • TESTICLE EXPLORATION  2004   • PB REMV 2ND CATARACT,CORN-SCLER SECTN     • TONSILLECTOMY         Current Outpatient Medications:  Home Medications     Reviewed by Beto Fairchild (Pharmacy Tech) on 11/07/18 at 0923  Med List Status: Complete   Medication Last Dose Status  "  cephALEXin (KEFLEX) 500 MG Cap 10/8/2018 Active   cephALEXin (KEFLEX) 500 MG Cap 10/22/2018 Active   certolizumab pegol (CIMZIA PREFILLED) 2 X 200 MG/ML Kit 11/6/2018 Active   fexofenadine (ALLEGRA) 180 MG tablet 11/6/2018 Active   fluticasone (FLONASE) 50 MCG/ACT nasal spray 11/6/2018 Active   gabapentin (NEURONTIN) 300 MG Cap 11/6/2018 Active   leflunomide (ARAVA) 20 MG Tab 11/6/2018 Active   loperamide (IMODIUM A-D) 2 MG tablet 11/6/2018 Active   Magnesium 250 MG Tab 11/6/2018 Active   Multiple Vitamin (MULTIVITAMIN PO) 11/6/2018 Active   Probiotic Product (PROBIOTIC PO) 11/6/2018 Active   rosuvastatin (CRESTOR) 20 MG Tab 11/6/2018 Active   SPIRIVA HANDIHALER 18 MCG Cap 11/7/2018 Active   SYMBICORT 80-4.5 MCG/ACT Aerosol 11/7/2018 Active                Medication Allergy/Sensitivities:  Allergies   Allergen Reactions   • Penicillins Hives     Rash and asthma attack when a child - wife states he has had Keflex in the past and tolerated   • Ciprofloxacin Hcl Unspecified     MUSCLE ACHES   • Levofloxacin Unspecified     MUSCLE ACHES         Family History:  Family History   Problem Relation Age of Onset   • Genetic Father         ALS   • No Known Problems Sister    • No Known Problems Son    • No Known Problems Daughter    • Heart Attack Neg Hx    • Heart Disease Neg Hx    • Heart Failure Neg Hx        Social History:  Smoking Hx - 30 pack years  -  Quit 30 years ago  No Alcohol  No Drugs  Living situation: with wife  PCP : Lizzie Soto M.D.      Physical Exam     Vitals:    11/07/18 1200 11/07/18 1230 11/07/18 1300 11/07/18 1331   BP:       Pulse: 91 79 86 83   Resp: (!) 32 17 20 20   Temp:       SpO2: 95% 94% 92% 93%   Weight:       Height:         Body mass index is 24.28 kg/m².  /70   Pulse 83   Temp 37.9 °C (100.2 °F)   Resp 20   Ht 1.854 m (6' 1\")   Wt 83.5 kg (184 lb)   SpO2 93%   BMI 24.28 kg/m²   O2 therapy: Pulse Oximetry: 93 %, O2 (LPM): 3.5, O2 Delivery: Nasal Cannula    Physical Exam "   Constitutional: He is oriented to person, place, and time. No distress.   Sitting in bed, alert   Eyes: Pupils are equal, round, and reactive to light. Conjunctivae are normal.   Cardiovascular: Exam reveals no gallop and no friction rub.    No murmur heard.  Irregularly irregular   Pulmonary/Chest: Effort normal and breath sounds normal. No respiratory distress. He has no wheezes. He has no rales.   Abdominal: Soft. Bowel sounds are normal. He exhibits distension. There is tenderness. There is no rebound.   Mild diffuse tenderness   Genitourinary: Rectal exam shows guaiac negative stool.   Genitourinary Comments: Good rectal tone  No masses appreciated  No stool in vault   Musculoskeletal: Normal range of motion. He exhibits edema.   2+ edema in LE  Healed wound on Left Hand   Neurological: He is alert and oriented to person, place, and time. No cranial nerve deficit.   CN II-XII grossly intact  Strength 5/5 in LE  Diminished sensation left leg   Skin: Skin is warm and dry.             Data Review     Lab Data Review:  Recent Results (from the past 24 hour(s))   EKG    Collection Time: 18  8:38 AM   Result Value Ref Range    Report       [facility] Emergency Dept.    Test Date:  2018  Pt Name:    FIDELIA MERA             Department: ER  MRN:        [id number]                      Room:        04  Gender:     Male                         Technician: [id number]  :        1940                   Requested By:ER TRIAGE PROTOCOL  Order #:    [id number]                    Reading MD:    Measurements  Intervals                                Axis  Rate:       119                          P:          0  SD:         132                          QRS:        16  QRSD:       92                           T:          75  QT:         324  QTc:        456    Interpretive Statements  SINUS TACHYCARDIA  LOW VOLTAGE IN FRONTAL LEADS  BORDERLINE T WAVE ABNORMALITIES  Compared to ECG 10/04/2018  14:29:40  Low QRS voltage now present  Sinus rhythm no longer present  First degree AV block no longer present  T-wave abnormality still present     Lactic acid (lactate)    Collection Time: 11/07/18  8:53 AM   Result Value Ref Range    Lactic Acid 2.3 (H) 0.5 - 2.0 mmol/L   CBC WITH DIFFERENTIAL    Collection Time: 11/07/18  8:53 AM   Result Value Ref Range    WBC 12.8 (H) 4.8 - 10.8 K/uL    RBC 2.97 (L) 4.70 - 6.10 M/uL    Hemoglobin 9.3 (L) 14.0 - 18.0 g/dL    Hematocrit 28.7 (L) 42.0 - 52.0 %    MCV 96.6 81.4 - 97.8 fL    MCH 31.3 27.0 - 33.0 pg    MCHC 32.4 (L) 33.7 - 35.3 g/dL    RDW 52.3 (H) 35.9 - 50.0 fL    Platelet Count 242 164 - 446 K/uL    MPV 9.5 9.0 - 12.9 fL    Neutrophils-Polys 84.20 (H) 44.00 - 72.00 %    Lymphocytes 4.40 (L) 22.00 - 41.00 %    Monocytes 10.40 0.00 - 13.40 %    Eosinophils 0.20 0.00 - 6.90 %    Basophils 0.30 0.00 - 1.80 %    Immature Granulocytes 0.50 0.00 - 0.90 %    Nucleated RBC 0.00 /100 WBC    Neutrophils (Absolute) 10.82 (H) 1.82 - 7.42 K/uL    Lymphs (Absolute) 0.57 (L) 1.00 - 4.80 K/uL    Monos (Absolute) 1.33 (H) 0.00 - 0.85 K/uL    Eos (Absolute) 0.02 0.00 - 0.51 K/uL    Baso (Absolute) 0.04 0.00 - 0.12 K/uL    Immature Granulocytes (abs) 0.06 0.00 - 0.11 K/uL    NRBC (Absolute) 0.00 K/uL   COMP METABOLIC PANEL    Collection Time: 11/07/18  8:53 AM   Result Value Ref Range    Sodium 137 135 - 145 mmol/L    Potassium 4.3 3.6 - 5.5 mmol/L    Chloride 106 96 - 112 mmol/L    Co2 22 20 - 33 mmol/L    Anion Gap 9.0 0.0 - 11.9    Glucose 108 (H) 65 - 99 mg/dL    Bun 22 8 - 22 mg/dL    Creatinine 1.65 (H) 0.50 - 1.40 mg/dL    Calcium 8.4 (L) 8.5 - 10.5 mg/dL    AST(SGOT) 35 12 - 45 U/L    ALT(SGPT) 12 2 - 50 U/L    Alkaline Phosphatase 67 30 - 99 U/L    Total Bilirubin 0.7 0.1 - 1.5 mg/dL    Albumin 3.1 (L) 3.2 - 4.9 g/dL    Total Protein 6.1 6.0 - 8.2 g/dL    Globulin 3.0 1.9 - 3.5 g/dL    A-G Ratio 1.0 g/dL   ESTIMATED GFR    Collection Time: 11/07/18  8:53 AM   Result Value  Ref Range    GFR If  49 (A) >60 mL/min/1.73 m 2    GFR If Non African American 40 (A) >60 mL/min/1.73 m 2   TROPONIN    Collection Time: 11/07/18  8:53 AM   Result Value Ref Range    Troponin I <0.01 0.00 - 0.04 ng/mL   Lactic acid (lactate)    Collection Time: 11/07/18 11:32 AM   Result Value Ref Range    Lactic Acid 1.0 0.5 - 2.0 mmol/L   TROPONIN    Collection Time: 11/07/18 11:32 AM   Result Value Ref Range    Troponin I 0.01 0.00 - 0.04 ng/mL       Imaging/Procedures Review:         EKG: SINUS TACHYCARDIA   LOW VOLTAGE IN FRONTAL LEADS   BORDERLINE T WAVE ABNORMALITIES          Assessment/Plan     Patient is a 78 year old male admitted for weakness and SOB following 1 month of diarrhea likely due to C. Diff, Microscopic Colitis, Giardia, Malabsorption Syndrome (such as Celiacs), or Toxic Megacolon.     #Diarrhea  Patient has had 1 month of diarrhea that began in the hospital. Likely etiology includes C. Diff, Microscopic Colitis, Giardia, Malabsorption Syndrome (such as Celiacs), or Toxic Megacolon. Patient was treated with Keflax for sepsis due to cellulitis in the past month when diarrhea began. C. Diff pending. Important to rule out toxic megacolon although patient does not present with abdominal pain or change in pain with food. Abdominal X-Ray pending. Other infectious etiology such as giardia need to be ruled out via stool test. Patient could have malabsorption causing symptom such as Celiacs. IBD not likely due to age, but microscopic colitis important to consider if no other etiology found. Labs pending are iron deficiency, B12, stool leukocytes, fecal fat, Salmonella, Celiac's panel pending and SED/CRP. Abdominal X-Ray ordered. FOBT negative, good rectal tone, no masses, no stool in the vault.     Plan  - IV Fluids 175mL/hr  - Clear Liquid Diet  - Monitor Mag  - Abdominal X-Ray Pending  - Labs pending      #Atrial Fibrillation  Patient does not complain of any chest pain. However, he  reports a history of A. Fib but denies ever being on rate control or anticoagulation. His EKG does not show A. Fib (likely paroxysmal), but upon exam irregularly irregular heart rate.     Plan  - consider anti-coagulation/rate control       #KERRY superimposed on CKD Stage III  Patient has an elevated Cr from his baseline of 1.33. Currently, 1.65. GFR has decreased to 40. Likely etiology pre-renal due to dehydration with chronic diarrhea x1 month.     Plan  - IV Fluids 175mL/hr  - Monitor Cr and BUN      #Anemia  Patient's Hb has been declining over the past year. FOBT negative today. Etiology could be iron deficiency anemia, GI bleed, or nutritional deficiency.       #Leukocytosis  Possible infectious etiology for chronic diarrhea.       #Weakness  Patient's weakness likely due to chronic diarrhea and other comorbid conditions.        #COPD  Patient has a history of COPD. Uses 2L of oxygen at home. He is currently on 5L with good oxygen saturation.     Plan  - Continue symbicort 80-4.5mcg  - Continue Spiriva 18mcg  - Respiratory care protocol  - Continue oxygen wean as tolerated  - Continuous pulse ox      #Rheumatoid Arthritis  - Hold home medications      #Hyperlipidemia  Continue atrovastatin      Quality Measures  Quality-Core Measures  PCP: Lizzie Soto M.D.

## 2018-11-07 NOTE — ED NOTES
Bedside report to tele rN pt to floor. Tele Rn aware that RN was not able to draw blood for labs prior ot going to floor

## 2018-11-07 NOTE — ED TRIAGE NOTES
.  Chief Complaint   Patient presents with   • Shortness of Breath   • Weakness   • Fever   • Dizziness     biba from home. Pt had BM this am when he became weak and sob. Pt denies cough. Per report pt febrile last night. t 100.2 on arrival. Pt wears home o2 2l. Was not wearing while in the bathroom.   Received 500 ml ns.   Sepsis order set placed. Pt is A&O x 4, denies pain. Denies blood in stool. Reports loose stools x apx 1 month.

## 2018-11-08 ENCOUNTER — APPOINTMENT (OUTPATIENT)
Dept: RADIOLOGY | Facility: MEDICAL CENTER | Age: 78
DRG: 371 | End: 2018-11-08
Attending: STUDENT IN AN ORGANIZED HEALTH CARE EDUCATION/TRAINING PROGRAM
Payer: MEDICARE

## 2018-11-08 PROBLEM — R10.32 LEFT LOWER QUADRANT PAIN: Status: ACTIVE | Noted: 2018-10-01

## 2018-11-08 PROBLEM — M62.82 NON-TRAUMATIC RHABDOMYOLYSIS: Status: ACTIVE | Noted: 2018-11-08

## 2018-11-08 LAB
ALBUMIN SERPL BCP-MCNC: 2.9 G/DL (ref 3.2–4.9)
ALBUMIN/GLOB SERPL: 1.2 G/DL
ALP SERPL-CCNC: 64 U/L (ref 30–99)
ALT SERPL-CCNC: 29 U/L (ref 2–50)
ANION GAP SERPL CALC-SCNC: 8 MMOL/L (ref 0–11.9)
AST SERPL-CCNC: 142 U/L (ref 12–45)
BASOPHILS # BLD AUTO: 0.7 % (ref 0–1.8)
BASOPHILS # BLD: 0.08 K/UL (ref 0–0.12)
BILIRUB SERPL-MCNC: 0.6 MG/DL (ref 0.1–1.5)
BODY FLD TYPE: NORMAL
BUN SERPL-MCNC: 16 MG/DL (ref 8–22)
C DIFF DNA SPEC QL NAA+PROBE: NEGATIVE
C DIFF TOX A+B STL QL IA: NEGATIVE
C DIFF TOX GENS STL QL NAA+PROBE: NORMAL
CALCIUM SERPL-MCNC: 7.6 MG/DL (ref 8.5–10.5)
CHLORIDE SERPL-SCNC: 108 MMOL/L (ref 96–112)
CHOLEST SERPL-MCNC: 56 MG/DL (ref 100–199)
CK SERPL-CCNC: 5150 U/L (ref 0–154)
CO2 SERPL-SCNC: 21 MMOL/L (ref 20–33)
CREAT SERPL-MCNC: 1.45 MG/DL (ref 0.5–1.4)
EOSINOPHIL # BLD AUTO: 0.13 K/UL (ref 0–0.51)
EOSINOPHIL NFR BLD: 1.1 % (ref 0–6.9)
ERYTHROCYTE [DISTWIDTH] IN BLOOD BY AUTOMATED COUNT: 55.7 FL (ref 35.9–50)
FAT FLD QL: NORMAL
GLOBULIN SER CALC-MCNC: 2.5 G/DL (ref 1.9–3.5)
GLUCOSE SERPL-MCNC: 96 MG/DL (ref 65–99)
HCT VFR BLD AUTO: 27.6 % (ref 42–52)
HDLC SERPL-MCNC: 18 MG/DL
HEMOCCULT STL QL: NEGATIVE
HGB BLD-MCNC: 8.4 G/DL (ref 14–18)
IGA SERPL-MCNC: 228 MG/DL (ref 68–408)
IMM GRANULOCYTES # BLD AUTO: 0.05 K/UL (ref 0–0.11)
IMM GRANULOCYTES NFR BLD AUTO: 0.4 % (ref 0–0.9)
LDLC SERPL CALC-MCNC: 18 MG/DL
LYMPHOCYTES # BLD AUTO: 1.49 K/UL (ref 1–4.8)
LYMPHOCYTES NFR BLD: 12.3 % (ref 22–41)
MCH RBC QN AUTO: 30.5 PG (ref 27–33)
MCHC RBC AUTO-ENTMCNC: 30.4 G/DL (ref 33.7–35.3)
MCV RBC AUTO: 100.4 FL (ref 81.4–97.8)
MONOCYTES # BLD AUTO: 1.49 K/UL (ref 0–0.85)
MONOCYTES NFR BLD AUTO: 12.3 % (ref 0–13.4)
NEUTROPHILS # BLD AUTO: 8.84 K/UL (ref 1.82–7.42)
NEUTROPHILS NFR BLD: 73.2 % (ref 44–72)
NRBC # BLD AUTO: 0 K/UL
NRBC BLD-RTO: 0 /100 WBC
PLATELET # BLD AUTO: 237 K/UL (ref 164–446)
PMV BLD AUTO: 9.7 FL (ref 9–12.9)
POTASSIUM SERPL-SCNC: 3.9 MMOL/L (ref 3.6–5.5)
PROT SERPL-MCNC: 5.4 G/DL (ref 6–8.2)
RBC # BLD AUTO: 2.75 M/UL (ref 4.7–6.1)
SODIUM SERPL-SCNC: 137 MMOL/L (ref 135–145)
TRIGL SERPL-MCNC: 102 MG/DL (ref 0–149)
WBC # BLD AUTO: 12.1 K/UL (ref 4.8–10.8)
WBC STL QL MICRO: ABNORMAL

## 2018-11-08 PROCEDURE — 700105 HCHG RX REV CODE 258: Performed by: STUDENT IN AN ORGANIZED HEALTH CARE EDUCATION/TRAINING PROGRAM

## 2018-11-08 PROCEDURE — 80061 LIPID PANEL: CPT

## 2018-11-08 PROCEDURE — 700117 HCHG RX CONTRAST REV CODE 255: Performed by: STUDENT IN AN ORGANIZED HEALTH CARE EDUCATION/TRAINING PROGRAM

## 2018-11-08 PROCEDURE — 99233 SBSQ HOSP IP/OBS HIGH 50: CPT | Performed by: INTERNAL MEDICINE

## 2018-11-08 PROCEDURE — 80053 COMPREHEN METABOLIC PANEL: CPT

## 2018-11-08 PROCEDURE — 82550 ASSAY OF CK (CPK): CPT

## 2018-11-08 PROCEDURE — 770020 HCHG ROOM/CARE - TELE (206)

## 2018-11-08 PROCEDURE — 85025 COMPLETE CBC W/AUTO DIFF WBC: CPT

## 2018-11-08 PROCEDURE — 87045 FECES CULTURE AEROBIC BACT: CPT

## 2018-11-08 PROCEDURE — 89125 SPECIMEN FAT STAIN: CPT

## 2018-11-08 PROCEDURE — 36415 COLL VENOUS BLD VENIPUNCTURE: CPT

## 2018-11-08 PROCEDURE — A9270 NON-COVERED ITEM OR SERVICE: HCPCS | Performed by: STUDENT IN AN ORGANIZED HEALTH CARE EDUCATION/TRAINING PROGRAM

## 2018-11-08 PROCEDURE — 700111 HCHG RX REV CODE 636 W/ 250 OVERRIDE (IP): Performed by: STUDENT IN AN ORGANIZED HEALTH CARE EDUCATION/TRAINING PROGRAM

## 2018-11-08 PROCEDURE — 82272 OCCULT BLD FECES 1-3 TESTS: CPT

## 2018-11-08 PROCEDURE — 700102 HCHG RX REV CODE 250 W/ 637 OVERRIDE(OP): Performed by: STUDENT IN AN ORGANIZED HEALTH CARE EDUCATION/TRAINING PROGRAM

## 2018-11-08 PROCEDURE — 74176 CT ABD & PELVIS W/O CONTRAST: CPT

## 2018-11-08 PROCEDURE — 700101 HCHG RX REV CODE 250: Performed by: STUDENT IN AN ORGANIZED HEALTH CARE EDUCATION/TRAINING PROGRAM

## 2018-11-08 RX ORDER — ONDANSETRON 2 MG/ML
4 INJECTION INTRAMUSCULAR; INTRAVENOUS EVERY 4 HOURS PRN
Status: DISCONTINUED | OUTPATIENT
Start: 2018-11-08 | End: 2018-11-13 | Stop reason: HOSPADM

## 2018-11-08 RX ORDER — AMOXICILLIN 250 MG
2 CAPSULE ORAL 2 TIMES DAILY PRN
Status: DISCONTINUED | OUTPATIENT
Start: 2018-11-08 | End: 2018-11-10

## 2018-11-08 RX ORDER — OXYCODONE HYDROCHLORIDE AND ACETAMINOPHEN 5; 325 MG/1; MG/1
1 TABLET ORAL EVERY 4 HOURS PRN
Status: DISCONTINUED | OUTPATIENT
Start: 2018-11-08 | End: 2018-11-13 | Stop reason: HOSPADM

## 2018-11-08 RX ORDER — POLYETHYLENE GLYCOL 3350 17 G/17G
1 POWDER, FOR SOLUTION ORAL
Status: DISCONTINUED | OUTPATIENT
Start: 2018-11-08 | End: 2018-11-10

## 2018-11-08 RX ORDER — POLYETHYLENE GLYCOL 3350 17 G/17G
1 POWDER, FOR SOLUTION ORAL DAILY
Status: DISCONTINUED | OUTPATIENT
Start: 2018-11-08 | End: 2018-11-08

## 2018-11-08 RX ORDER — BISACODYL 10 MG
10 SUPPOSITORY, RECTAL RECTAL
Status: DISCONTINUED | OUTPATIENT
Start: 2018-11-08 | End: 2018-11-08

## 2018-11-08 RX ORDER — AMOXICILLIN 250 MG
2 CAPSULE ORAL 2 TIMES DAILY
Status: DISCONTINUED | OUTPATIENT
Start: 2018-11-08 | End: 2018-11-08

## 2018-11-08 RX ORDER — BISACODYL 10 MG
10 SUPPOSITORY, RECTAL RECTAL
Status: DISCONTINUED | OUTPATIENT
Start: 2018-11-08 | End: 2018-11-10

## 2018-11-08 RX ADMIN — ONDANSETRON 4 MG: 2 INJECTION INTRAMUSCULAR; INTRAVENOUS at 15:45

## 2018-11-08 RX ADMIN — BUDESONIDE AND FORMOTEROL FUMARATE DIHYDRATE 2 PUFF: 80; 4.5 AEROSOL RESPIRATORY (INHALATION) at 05:00

## 2018-11-08 RX ADMIN — IOHEXOL 50 ML: 240 INJECTION, SOLUTION INTRATHECAL; INTRAVASCULAR; INTRAVENOUS; ORAL at 16:42

## 2018-11-08 RX ADMIN — GABAPENTIN 600 MG: 300 CAPSULE ORAL at 17:14

## 2018-11-08 RX ADMIN — CEFTRIAXONE SODIUM 2 G: 2 INJECTION, POWDER, FOR SOLUTION INTRAMUSCULAR; INTRAVENOUS at 17:50

## 2018-11-08 RX ADMIN — BUDESONIDE AND FORMOTEROL FUMARATE DIHYDRATE 2 PUFF: 80; 4.5 AEROSOL RESPIRATORY (INHALATION) at 17:49

## 2018-11-08 RX ADMIN — TIOTROPIUM BROMIDE 1 CAPSULE: 18 CAPSULE ORAL; RESPIRATORY (INHALATION) at 06:10

## 2018-11-08 RX ADMIN — SODIUM CHLORIDE: 9 INJECTION, SOLUTION INTRAVENOUS at 14:30

## 2018-11-08 RX ADMIN — SODIUM CHLORIDE: 9 INJECTION, SOLUTION INTRAVENOUS at 09:24

## 2018-11-08 RX ADMIN — GABAPENTIN 300 MG: 300 CAPSULE ORAL at 05:00

## 2018-11-08 RX ADMIN — METRONIDAZOLE 500 MG: 500 INJECTION, SOLUTION INTRAVENOUS at 17:50

## 2018-11-08 ASSESSMENT — PAIN SCALES - GENERAL
PAINLEVEL_OUTOF10: 0

## 2018-11-08 ASSESSMENT — ENCOUNTER SYMPTOMS
BLOOD IN STOOL: 0
FLANK PAIN: 0
HEARTBURN: 0
MYALGIAS: 0
PALPITATIONS: 0
COUGH: 0
DIARRHEA: 0
CONSTIPATION: 0
CHILLS: 0
NAUSEA: 0
ABDOMINAL PAIN: 1
SHORTNESS OF BREATH: 0
VOMITING: 0
BACK PAIN: 0
WEAKNESS: 0
FEVER: 0

## 2018-11-08 NOTE — RESPIRATORY CARE
COPD EDUCATION by COPD CLINICAL EDUCATOR  11/8/2018 at 3:04 PM by [place] Carmen     Patient interviewed by COPD education team. Patient refused COPD program at this time.

## 2018-11-08 NOTE — H&P
Internal Medicine Admitting History and Physical    Note Author: Keisha Caban M.D.     Name Barry Torres     1940   Age/Sex 78 y.o. male   MRN [id number]   Code Status FULL     After 5PM or if no immediate response to page, please call for cross-coverage  Attending/Team: Dr. Dia Carney/[place] See Patient List for primary contact information  Call [phone] to page    1st Call - Day Intern (R1):   Dr. Keisha Caban  2nd Call - Day Sr. Resident (R2/R3):   Dr. Karlie Fink      Chief Complaint:   Diarrhea     HPI:  78 year old male presents with lower extremity weakness which occurred shortly after a bowel moevment. He had difficulty holding himself up and had to ask help from his wife. Denies speech changes, chest pain, visual changes, loss of consciousness, , palpitations. He has also had watery diarrhea for the past 5 weeks since being admitted to [facility] last month for cellulitis and sepsis. While admitted, patient's stool culture was negative and C. diff negative, he had vancomycin and ceftriaxone which was switched to cefazolin and then cephalexin on discharge. He has 2-3 painful bowel movements daily, no blood or dark stool. Patient had one isolated episode of bloody stool about 3 weeks ago, blood filled the toilet bowl. No intervention or consultation done at the time, episode self-resolved. Also reports a 10lb weight loss within the last month, unintentional, although he does state he has had decreased appetite as well. Denies abdominal pain, N/V, constipation, rashes, joint pain, hematemesis.     Denies sick contacts, traveling, camping, history of HIV, history of food allergies/intolerance, NSAID use     Last colonoscopy: 9 years ago with removal of polyps.     Review of Systems   Constitutional: Positive for fever and weight loss. Negative for chills, diaphoresis and malaise/fatigue.   HENT: Negative for hearing loss and tinnitus.    Eyes: Negative for blurred vision and  double vision.   Respiratory: Negative for cough, hemoptysis and shortness of breath.    Cardiovascular: Negative for chest pain, palpitations and leg swelling.   Gastrointestinal: Positive for diarrhea. Negative for abdominal pain, blood in stool, heartburn, melena, nausea and vomiting.   Genitourinary: Negative for dysuria and hematuria.   Musculoskeletal: Negative for joint pain and myalgias.   Skin: Negative for itching and rash.   Neurological: Positive for weakness. Negative for dizziness, focal weakness, loss of consciousness and headaches.   Endo/Heme/Allergies: Negative for environmental allergies. Does not bruise/bleed easily.   Psychiatric/Behavioral: Negative for depression. The patient does not have insomnia.      Past Medical History (Chronic medical problem, known complications and current treatment)    Rheumatoid Arthritis  Diverticulosis  Hypercholesterolemia  Hypothyroidism  COPD - Emphysema  Hypertension  CKD - Stage III  Hemorrhoids  Skin Cancer (non-melanoma)  Solitary Kidney  Sleep Apnea  Chronic Lower Extremity Edema  Calcified LAD and LCx on CT     Past Surgical History:  Past Surgical History:   Procedure Laterality Date   • NASAL POLYPECTOMY Bilateral 10/6/2015    Procedure: NASAL POLYPECTOMY WITH SCOTT ENDOSCOPIC NASAL DEBRIDEMENT;  Surgeon: Param Maurer M.D.;  Location: SURGERY SAME DAY [facility];  Service:    • CYSTOSCOPY  2/1/2010    Performed by ANGIE VERDUZCO at SURGERY [facility]   • RETROGRADES  2/1/2010    Performed by ANGIE VERDUZCO at [facility]   • PYELOGRAM  2/1/2010    Performed by ANGIE VERDUZCO at [facility] ORS   • URETEROSCOPY  2/1/2010    Performed by ANGIE VERDUZCO at [facility]   • NEPHRECTOMY LAPAROSCOPIC  2009    left kidney   • TESTICLE EXPLORATION  2004   • PB REMV 2ND CATARACT,CORN-SCLER SECTN     • TONSILLECTOMY       Current Outpatient Medications:  Home Medications     Reviewed by Stephanie MATHIS  Beto Cordova (Pharmacy Tech) on 11/07/18 at 0923  Med List Status: Complete   Medication Last Dose Status   cephALEXin (KEFLEX) 500 MG Cap 10/8/2018 Active   cephALEXin (KEFLEX) 500 MG Cap 10/22/2018 Active   certolizumab pegol (CIMZIA PREFILLED) 2 X 200 MG/ML Kit 11/6/2018 Active   fexofenadine (ALLEGRA) 180 MG tablet 11/6/2018 Active   fluticasone (FLONASE) 50 MCG/ACT nasal spray 11/6/2018 Active   gabapentin (NEURONTIN) 300 MG Cap 11/6/2018 Active   leflunomide (ARAVA) 20 MG Tab 11/6/2018 Active   loperamide (IMODIUM A-D) 2 MG tablet 11/6/2018 Active   Magnesium 250 MG Tab 11/6/2018 Active   Multiple Vitamin (MULTIVITAMIN PO) 11/6/2018 Active   Probiotic Product (PROBIOTIC PO) 11/6/2018 Active   rosuvastatin (CRESTOR) 20 MG Tab 11/6/2018 Active   SPIRIVA HANDIHALER 18 MCG Cap 11/7/2018 Active   SYMBICORT 80-4.5 MCG/ACT Aerosol 11/7/2018 Active              Medication Allergy/Sensitivities:  Allergies   Allergen Reactions   • Penicillins Hives     Rash and asthma attack when a child - wife states he has had Keflex in the past and tolerated   • Ciprofloxacin Hcl Unspecified     MUSCLE ACHES   • Levofloxacin Unspecified     MUSCLE ACHES     Family History (mandatory)   Family History   Problem Relation Age of Onset   • Genetic Father         ALS   • No Known Problems Sister    • No Known Problems Son    • No Known Problems Daughter    • Heart Attack Neg Hx    • Heart Disease Neg Hx    • Heart Failure Neg Hx      Social History (mandatory)   Social History     Social History   • Marital status:      Spouse name: N/A   • Number of children: N/A   • Years of education: N/A     Occupational History   • Not on file.     Social History Main Topics   • Smoking status: Former Smoker     Packs/day: 1.00     Years: 40.00     Types: Cigarettes     Quit date: 1/1/1980   • Smokeless tobacco: Never Used      Comment: 1 pk a day for 40 yrs   • Alcohol use No      Comment: 2 a week   • Drug use: No   • Sexual activity: No     "  Comment: retired      Other Topics Concern   • Not on file     Social History Narrative    See H&P    Lives with wife     Living situation: Lives with wife   PCP : Lizzie Soto M.D.    Physical Exam     Vitals:    11/07/18 1300 11/07/18 1331 11/07/18 1434 11/07/18 1525   BP:   135/72    Pulse: 86 83 88 86   Resp: 20 20 18 18   Temp:   37.2 °C (99 °F)    SpO2: 92% 93% 96% 98%   Weight:       Height:         Body mass index is 24.28 kg/m².  /72   Pulse 86   Temp 37.2 °C (99 °F)   Resp 18   Ht 1.854 m (6' 1\")   Wt 83.5 kg (184 lb)   SpO2 98%   BMI 24.28 kg/m²   O2 therapy: Pulse Oximetry: 98 %, O2 (LPM): 3, O2 Delivery: Silicone Nasal Cannula    Physical Exam   Constitutional: He is oriented to person, place, and time and well-developed, well-nourished, and in no distress.   HENT:   Head: Normocephalic and atraumatic.   Eyes: EOM are normal.   Neck: Normal range of motion.   Cardiovascular: Normal heart sounds and intact distal pulses.  An irregularly irregular rhythm present.   Pulmonary/Chest: Effort normal and breath sounds normal. No respiratory distress. He has no wheezes. He has no rales.   Abdominal: Soft. Bowel sounds are normal. He exhibits no distension and no mass. There is no tenderness. There is no rebound and no guarding.   Genitourinary:   Genitourinary Comments: (+) perianal pedunculated polyp, no signs of infection/bleeding   (-) FOBT   No masses appreciated    Musculoskeletal: Normal range of motion. He exhibits no edema or deformity.   Lymphadenopathy:     He has no cervical adenopathy.   Neurological: He is alert and oriented to person, place, and time.   Decreased sensation to light touch at the LLE   Skin: Skin is warm and dry.   Psychiatric: Affect and judgment normal.   Nursing note and vitals reviewed.    Data Review       Old Records Request:   Completed  Current Records review/summary: Completed    Lab Data Review:  Recent Results (from the past 24 hour(s))   EKG    Collection " Time: 18  8:38 AM   Result Value Ref Range    Report       [facility] Emergency Dept.    Test Date:  2018  Pt Name:    FIDELIA MERA             Department: ER  MRN:        [id number]                      Room:        04  Gender:     Male                         Technician: [id number]  :        1940                   Requested By:ER TRIAGE PROTOCOL  Order #:    [id number]                    Reading MD:    Measurements  Intervals                                Axis  Rate:       119                          P:          0  NY:         132                          QRS:        16  QRSD:       92                           T:          75  QT:         324  QTc:        456    Interpretive Statements  SINUS TACHYCARDIA  LOW VOLTAGE IN FRONTAL LEADS  BORDERLINE T WAVE ABNORMALITIES  Compared to ECG 10/04/2018 14:29:40  Low QRS voltage now present  Sinus rhythm no longer present  First degree AV block no longer present  T-wave abnormality still present     Lactic acid (lactate)    Collection Time: 18  8:53 AM   Result Value Ref Range    Lactic Acid 2.3 (H) 0.5 - 2.0 mmol/L   CBC WITH DIFFERENTIAL    Collection Time: 18  8:53 AM   Result Value Ref Range    WBC 12.8 (H) 4.8 - 10.8 K/uL    RBC 2.97 (L) 4.70 - 6.10 M/uL    Hemoglobin 9.3 (L) 14.0 - 18.0 g/dL    Hematocrit 28.7 (L) 42.0 - 52.0 %    MCV 96.6 81.4 - 97.8 fL    MCH 31.3 27.0 - 33.0 pg    MCHC 32.4 (L) 33.7 - 35.3 g/dL    RDW 52.3 (H) 35.9 - 50.0 fL    Platelet Count 242 164 - 446 K/uL    MPV 9.5 9.0 - 12.9 fL    Neutrophils-Polys 84.20 (H) 44.00 - 72.00 %    Lymphocytes 4.40 (L) 22.00 - 41.00 %    Monocytes 10.40 0.00 - 13.40 %    Eosinophils 0.20 0.00 - 6.90 %    Basophils 0.30 0.00 - 1.80 %    Immature Granulocytes 0.50 0.00 - 0.90 %    Nucleated RBC 0.00 /100 WBC    Neutrophils (Absolute) 10.82 (H) 1.82 - 7.42 K/uL    Lymphs (Absolute) 0.57 (L) 1.00 - 4.80 K/uL    Monos (Absolute) 1.33 (H) 0.00 - 0.85 K/uL    Eos  (Absolute) 0.02 0.00 - 0.51 K/uL    Baso (Absolute) 0.04 0.00 - 0.12 K/uL    Immature Granulocytes (abs) 0.06 0.00 - 0.11 K/uL    NRBC (Absolute) 0.00 K/uL   COMP METABOLIC PANEL    Collection Time: 11/07/18  8:53 AM   Result Value Ref Range    Sodium 137 135 - 145 mmol/L    Potassium 4.3 3.6 - 5.5 mmol/L    Chloride 106 96 - 112 mmol/L    Co2 22 20 - 33 mmol/L    Anion Gap 9.0 0.0 - 11.9    Glucose 108 (H) 65 - 99 mg/dL    Bun 22 8 - 22 mg/dL    Creatinine 1.65 (H) 0.50 - 1.40 mg/dL    Calcium 8.4 (L) 8.5 - 10.5 mg/dL    AST(SGOT) 35 12 - 45 U/L    ALT(SGPT) 12 2 - 50 U/L    Alkaline Phosphatase 67 30 - 99 U/L    Total Bilirubin 0.7 0.1 - 1.5 mg/dL    Albumin 3.1 (L) 3.2 - 4.9 g/dL    Total Protein 6.1 6.0 - 8.2 g/dL    Globulin 3.0 1.9 - 3.5 g/dL    A-G Ratio 1.0 g/dL   ESTIMATED GFR    Collection Time: 11/07/18  8:53 AM   Result Value Ref Range    GFR If  49 (A) >60 mL/min/1.73 m 2    GFR If Non African American 40 (A) >60 mL/min/1.73 m 2   TROPONIN    Collection Time: 11/07/18  8:53 AM   Result Value Ref Range    Troponin I <0.01 0.00 - 0.04 ng/mL   TSH (Thyroid Stimulating Hormone)    Collection Time: 11/07/18  8:53 AM   Result Value Ref Range    TSH 4.420 0.380 - 5.330 uIU/mL   COD (Adult)    Collection Time: 11/07/18  8:53 AM   Result Value Ref Range    ABO Grouping Only A     Rh Grouping Only POS     Antibody Screen-Cod NEG    WESTERGREN SED RATE    Collection Time: 11/07/18  8:53 AM   Result Value Ref Range    Sed Rate Westergren 75 (H) 0 - 20 mm/hour   FERRITIN    Collection Time: 11/07/18  8:53 AM   Result Value Ref Range    Ferritin 587.2 (H) 22.0 - 322.0 ng/mL   VITAMIN B12    Collection Time: 11/07/18  8:53 AM   Result Value Ref Range    Vitamin B12 -True Cobalamin >1500 (H) 211 - 911 pg/mL   HIV AG/AB COMBO ASSAY SCREENING    Collection Time: 11/07/18  8:53 AM   Result Value Ref Range    HIV Ag/Ab Combo Assay Non Reactive Non Reactive   Lactic acid (lactate)    Collection Time:  11/07/18 11:32 AM   Result Value Ref Range    Lactic Acid 1.0 0.5 - 2.0 mmol/L   TROPONIN    Collection Time: 11/07/18 11:32 AM   Result Value Ref Range    Troponin I 0.01 0.00 - 0.04 ng/mL   Magnesium    Collection Time: 11/07/18  1:54 PM   Result Value Ref Range    Magnesium 2.1 1.5 - 2.5 mg/dL   Prothrombin time (INR)    Collection Time: 11/07/18  1:54 PM   Result Value Ref Range    PT 15.7 (H) 12.0 - 14.6 sec    INR 1.24 (H) 0.87 - 1.13   Lipase    Collection Time: 11/07/18  1:54 PM   Result Value Ref Range    Lipase 10 (L) 11 - 82 U/L   CRP QUANTITIVE (NON-CARDIAC)    Collection Time: 11/07/18  1:54 PM   Result Value Ref Range    Stat C-Reactive Protein 24.78 (H) 0.00 - 0.75 mg/dL   IRON/TOTAL IRON BIND    Collection Time: 11/07/18  1:54 PM   Result Value Ref Range    Iron <10 (L) 50 - 180 ug/dL    Total Iron Binding 193 (L) 250 - 450 ug/dL    % Saturation see below 15 - 55 %   ABO AND RH CONFIRMATION    Collection Time: 11/07/18  3:20 PM   Result Value Ref Range    ABO Confirm A     Second Rh Group POS      Imaging/Procedures Review:    Independant Imaging Review: Completed     DX-CHEST-PORTABLE (1 VIEW)   Final Result      Bibasilar atelectasis is mildly increased on the left and improved on the right.      Atherosclerotic plaque.         [id number] VIEW    (Results Pending)      EKG:   EKG Independant Review: Completed  QTc:456, HR: 119, Sinus Tachycardia, no ST/T changes     Records reviewed and summarized in current documentation :  Yes         Assessment/Plan     * Diarrhea- (present on admission)   Assessment & Plan    5-week history of watery diarrhea with 1 isolated episode of bloody diarrhea (3 weeks ago)  He was recently admitted and given antibiotics including: Ceftriaxone and vancomycin which was switched to cefazolin upon (-) C. difficile test  Patient also complains of recent onset of fecal incontinence, rectal tenesmus, anal leakage between BM  Rectal exam on admission showed a perianal  pedunculated polyp, nonbleeding, no signs of infection, no fissures; good rectal tone, (-)FOBT, no masses palpated  Unclear etiology of diarrhea: May be related to patient's daily intake of magnesium, infectious, inflammatory or ischemic causes.   -Clear liquid diet, no reds  -D/C magnesium while inpatient  -Normal saline at 175 cc/h  -For stool WBCs, stool culture, C. difficile, Giardia/crypto assay, fecal fat  -For ESR/CRP  -For abdominal x-ray     Acute kidney injury superimposed on chronic kidney disease (HCC)- (present on admission)   Assessment & Plan    Creatinine baseline 1.3  On admission: 1.65  Patient was born with a sole kidney  -Normal saline at 175 cc/h       Leukocytosis- (present on admission)   Assessment & Plan    Unclear etiology  Possibly due to inflammatory or infectious causes especially given patient's history of diarrhea and 1 day history of fever  Patient does not appear toxic, SIRS: 1/4  -Monitor CBC  -For urinalysis  -For ESR/CRP  -For stool WBCs, stool culture, C. difficile analysis, Giardia/crypto assay         Anticipated Hospital stay: Observation admit    Quality Measures  Quality-Core Measures   Reviewed items::  Labs reviewed, Medications reviewed, Radiology images reviewed and EKG reviewed  Lozano catheter::  No Lozano  DVT prophylaxis pharmacological::  Contraindicated - High bleeding risk  DVT prophylaxis - mechanical:  SCDs  Ulcer Prophylaxis::  Not indicated    PCP: iLzzie Soto M.D.

## 2018-11-08 NOTE — ASSESSMENT & PLAN NOTE
Ferritin: 587  Likely multifactorial: Nutritional, inflammatory and CKD 3   No current signs of GI bleed, no ASA or NSAIDs, no warfarin or DOAC use  Transfuse if hemoglobin <7 or symptomatic

## 2018-11-08 NOTE — PROGRESS NOTES
Received report from Mejia, at pt bedside. Pt NPO waiting for GI consult for possible procedure today , POC discussed. Call light and belongings within reach. Bed locked and in low position. Alarm on and fall precautions in place.

## 2018-11-08 NOTE — ASSESSMENT & PLAN NOTE
Resolved  There was concern for possible KERRY due to rhabdomyolysis as CPK > 500  IV fluids were administered and creatinine back to baseline.

## 2018-11-08 NOTE — ASSESSMENT & PLAN NOTE
Strength 5/5 on all extremities  Fall precautions in place   Patient to be discharged with home health Physical Therapy.

## 2018-11-08 NOTE — H&P
SENIOR ADMIT NOTE:    Mr. Torres is a 78 year old male patient with PMHx of one functioning kidney, RA, emphysema, HTN, and hx of abnormal TSH testing, who presents with one month of watery diarrhea which began during his last hospitalization where he was being treated for cellulitis (with 14 day course of keflex). He did have C diff testing at that time which was negative. The diarrhea continued following discharge, however, with 3-4 episodes per day. It is not associated with n/v or abd pain but was associated with a 10# unintentional weight loss. He had a single episode of BRBPR 3 weeks PTA (he has prior hx of diverticulosis) but none since and no melena. He denies recent travel, camping, consumption of raw meat or seafood, or sick contacts. Last colonoscopy was 9 years PTA; it found polyps and he was told to f/u again in 10 years. In last several days he has been experiencing generalized weakness (lower > upper extremities), and was found to show signs of dehydration in ED. ED workup also notable for anemia with Hgb 9.3, leukocytosis with WBC 12.8, sinus tachy on EKG, lactic of 2.3 which improved to 1 with rehydration, trops <.01 x 2, Cr up to 1.65 (baseline around 1.3), normal K.     Physical exam: at this time, appears euvolemic, though conjunctivae are pale and lower extremities mildly cool. Abdomen is soft and non-distended, non-tender, without rebound or guarding. Rectal exam performed and FOBT was negative; however, there was no stool present in rectal vault so it may be faulty.     Assessment and Plan:   # Chronic diarrhea  -consider C diff vs other infectious diarrhea vs malabsorption syndrome  -ordering IVF (1.5x maintenance)  -ordering CRP, ESR, TSH, B12, Giardia/crypto, stool leukocytes, stool culture, stool fat, TSH, HIV, and abdominal X-ray  -consider GI consult tomorrow for colonoscopy/biopsy if indicated, otherwise rehydrate and discharge with f/u with PCP and GI for outpatient colonoscopy

## 2018-11-08 NOTE — PROGRESS NOTES
Assumed care of patient, A+Ox4, no c/o pain at this time. Trace LE edema. Skin intact. Reddened area between buttocks blanchable. Lungs clear. Sinus rhythm w/ frequent PAC's.

## 2018-11-08 NOTE — CARE PLAN
Problem: Communication  Goal: The ability to communicate needs accurately and effectively will improve  Outcome: PROGRESSING AS EXPECTED  Updated pt on POC waiting for GI consult with pt NPO for possible procedure today.     Problem: Safety  Goal: Will remain free from injury  Outcome: PROGRESSING AS EXPECTED  Pt educated to call for assistance before getting out of bed. Call light and belongings within reach. Non skid yellow socks on. Bed is locked in lowest position with bed alarm on.     Problem: Bowel/Gastric:  Goal: Normal bowel function is maintained or improved  Outcome: PROGRESSING AS EXPECTED  Pt having frequent inc loose small stools.     Problem: Knowledge Deficit  Goal: Knowledge of disease process/condition, treatment plan, diagnostic tests, and medications will improve  Outcome: PROGRESSING AS EXPECTED  Updated pt and pt family member on POC.

## 2018-11-08 NOTE — PROGRESS NOTES
Internal Medicine Interval Note  Note Author: Keisha Caban M.D.     Name Barry Torres     1940   Age/Sex 78 y.o. male   MRN [id number]   Code Status FULL     After 5PM or if no immediate response to page, please call for cross-coverage  Attending/Team: Dr. Dia Carney/[place] See Patient List for primary contact information  Call [phone] to page    1st Call - Day Intern (R1):   Dr. Keisha Caban  2nd Call - Day Sr. Resident (R2/R3):   Dr. Karlie Fink      Reason for interval visit  (Principal Problem)   Left lower quadrant pain    Interval Problem Daily Status Update  (24 hours, problem oriented, brief subjective history, new lab/imaging data pertinent to that problem)   No acute overnight events. Patient complains of abdominal pain this morning, with increased abdominal distension. He has not had a bowel movement since yesterday, patient feels like he is more constipated today. On PE: normoactive BS, firm, slightly distended, with tenderness at the LLQ, no rebound or guarding. Patient will have CT of the abdomen with contrast to evaluate further.   He no longer complains of weakness, although he admits he has not been up to the bathroom today.   KERRY is improving, creatinine     Review of Systems   Constitutional: Negative for chills and fever.   Respiratory: Negative for cough and shortness of breath.    Cardiovascular: Negative for chest pain, palpitations and leg swelling.   Gastrointestinal: Positive for abdominal pain. Negative for blood in stool, constipation, diarrhea, heartburn, melena, nausea and vomiting.   Genitourinary: Negative for dysuria, flank pain and hematuria.   Musculoskeletal: Negative for back pain, joint pain and myalgias.   Skin: Negative for itching and rash.   Neurological: Negative for weakness.     Disposition/Barriers to discharge:   Guarded     Consultants/Specialty  PCP: Lizzie Soto M.D.    Quality Measures  Quality-Core Measures   Reviewed items::   Labs reviewed, Medications reviewed and Radiology images reviewed  Lozano catheter::  Urinary Tract Retention or Urinary Tract Obstruction  DVT prophylaxis pharmacological::  Contraindicated - High bleeding risk  DVT prophylaxis - mechanical:  SCDs  Ulcer Prophylaxis::  Not indicated    Physical Exam     Vitals:    11/08/18 0027 11/08/18 0414 11/08/18 0837 11/08/18 1241   BP: 130/68 130/69 150/91 141/84   Pulse: 87 76 74 97   Resp: 16 16 17 17   Temp: 37.8 °C (100.1 °F) 36.8 °C (98.3 °F) 37.4 °C (99.4 °F) 37.8 °C (100.1 °F)   SpO2: 95% 93% 97% 91%   Weight:       Height:         Body mass index is 24.28 kg/m².    Oxygen Therapy:  Pulse Oximetry: 91 %, O2 (LPM): 3, O2 Delivery: Silicone Nasal Cannula    Physical Exam   Constitutional: He is well-developed, well-nourished, and in no distress.   Pulmonary/Chest: Effort normal and breath sounds normal. No respiratory distress. He has no wheezes. He has no rales.   Abdominal:   Firm distended abdomen, normoactive BS, LLQ tenderness to deep palpation. No tenderness at the rest of the abdomen    Nursing note and vitals reviewed.    Assessment/Plan     * Left lower quadrant pain- (present on admission)   Assessment & Plan    5-week history of watery diarrhea with 1 isolated episode of bloody diarrhea (3 weeks ago)  He was recently admitted and given antibiotics including: Ceftriaxone and vancomycin which was switched to cefazolin upon (-) C. difficile test  Patient also complains of recent onset of fecal incontinence, rectal tenesmus, anal leakage between BM  Rectal exam on admission showed a perianal pedunculated polyp, nonbleeding, no signs of infection, no fissures; good rectal tone, (-)FOBT, no masses palpated  Unclear etiology of diarrhea: May be related to patient's daily intake of magnesium, infectious, inflammatory or ischemic causes.     Currently with symptoms more suggestive of constipation -he has not had a bowel movement since yesterday, and abdominal x-ray  "shows \"above average fecal content\" suggestive of constipation  -Clear liquid diet, no reds  -For CT with oral contrast -patient's eGFR >30, less concerning for risk of NSF  -Continue IVF  -Await stool WBCs, stool culture, C. difficile, Giardia/crypto assay, fecal fat     Non-traumatic rhabdomyolysis   Assessment & Plan    CPK: 5150  Patient denies myalgias, dark urine.   -Discontinue rosuvastatin   -Increase IVF to 200 cc/h  -Concern for possible KERRY due to rhabdomyolysis as CPK > 500  -Monitor CPK, BMP      Acute kidney injury superimposed on chronic kidney disease (HCC)- (present on admission)   Assessment & Plan    Creatinine baseline 1.3, On admission: 1.65  Patient has a solitary kidney   -Concern for possible KERRY due to rhabdomyolysis as CPK > 500  -Increase IVF to 200 cc/h  -Monitor BMP  -Avoid nephrotoxic drugs       Weakness- (present on admission)   Assessment & Plan    Patient states he has not week currently, however has not gotten out of bed  Strength 5/5 on all extremities  -Fall precautions       Leukocytosis- (present on admission)   Assessment & Plan    Unclear etiology  Possibly due to inflammatory or infectious causes especially given patient's history of diarrhea and 1 day history of fever  Patient does not appear toxic  -Monitor CBC  -Awaiting stool WBCs, stool culture, C. difficile analysis, Giardia/crypto assay  -Start metronidazole and ceftriaxone        Normocytic normochromic anemia- (present on admission)   Assessment & Plan    Ferritin: 587  Likely multifactorial: Nutritional, inflammatory and CKD 3   No current signs of GI bleed, no ASA or NSAIDs, no warfarin or DOAC use  -CTM  -Transfuse if hemoglobin <7 or symptomatic  -No need for iron therapy at this time         "

## 2018-11-09 ENCOUNTER — APPOINTMENT (OUTPATIENT)
Dept: RADIOLOGY | Facility: MEDICAL CENTER | Age: 78
DRG: 371 | End: 2018-11-09
Attending: INTERNAL MEDICINE
Payer: MEDICARE

## 2018-11-09 LAB
ALBUMIN SERPL BCP-MCNC: 2.7 G/DL (ref 3.2–4.9)
ALBUMIN/GLOB SERPL: 1 G/DL
ALP SERPL-CCNC: 68 U/L (ref 30–99)
ALT SERPL-CCNC: 28 U/L (ref 2–50)
ANION GAP SERPL CALC-SCNC: 6 MMOL/L (ref 0–11.9)
ANION GAP SERPL CALC-SCNC: 8 MMOL/L (ref 0–11.9)
AST SERPL-CCNC: 85 U/L (ref 12–45)
BACTERIA UR CULT: NORMAL
BASE EXCESS BLDA CALC-SCNC: -7 MMOL/L (ref -4–3)
BASOPHILS # BLD AUTO: 0.7 % (ref 0–1.8)
BASOPHILS # BLD: 0.12 K/UL (ref 0–0.12)
BILIRUB SERPL-MCNC: 0.5 MG/DL (ref 0.1–1.5)
BODY TEMPERATURE: 35.7 CENTIGRADE
BUN SERPL-MCNC: 15 MG/DL (ref 8–22)
BUN SERPL-MCNC: 16 MG/DL (ref 8–22)
CALCIUM SERPL-MCNC: 7.3 MG/DL (ref 8.5–10.5)
CALCIUM SERPL-MCNC: 7.5 MG/DL (ref 8.5–10.5)
CHLORIDE SERPL-SCNC: 106 MMOL/L (ref 96–112)
CHLORIDE SERPL-SCNC: 109 MMOL/L (ref 96–112)
CK SERPL-CCNC: 2089 U/L (ref 0–154)
CO2 SERPL-SCNC: 21 MMOL/L (ref 20–33)
CO2 SERPL-SCNC: 22 MMOL/L (ref 20–33)
CREAT SERPL-MCNC: 1.25 MG/DL (ref 0.5–1.4)
CREAT SERPL-MCNC: 1.36 MG/DL (ref 0.5–1.4)
EOSINOPHIL # BLD AUTO: 0.17 K/UL (ref 0–0.51)
EOSINOPHIL NFR BLD: 1.1 % (ref 0–6.9)
ERYTHROCYTE [DISTWIDTH] IN BLOOD BY AUTOMATED COUNT: 53.1 FL (ref 35.9–50)
ERYTHROCYTE [DISTWIDTH] IN BLOOD BY AUTOMATED COUNT: 54.3 FL (ref 35.9–50)
G LAMBLIA+C PARVUM AG STL QL RAPID: NORMAL
GLOBULIN SER CALC-MCNC: 2.8 G/DL (ref 1.9–3.5)
GLUCOSE SERPL-MCNC: 106 MG/DL (ref 65–99)
GLUCOSE SERPL-MCNC: 120 MG/DL (ref 65–99)
HCO3 BLDA-SCNC: 17 MMOL/L (ref 17–25)
HCT VFR BLD AUTO: 29.1 % (ref 42–52)
HCT VFR BLD AUTO: 30.6 % (ref 42–52)
HGB BLD-MCNC: 9.2 G/DL (ref 14–18)
HGB BLD-MCNC: 9.8 G/DL (ref 14–18)
IMM GRANULOCYTES # BLD AUTO: 0.1 K/UL (ref 0–0.11)
IMM GRANULOCYTES NFR BLD AUTO: 0.6 % (ref 0–0.9)
INHALED O2 FLOW RATE: 10 L/MIN (ref 2–10)
LYMPHOCYTES # BLD AUTO: 1.1 K/UL (ref 1–4.8)
LYMPHOCYTES NFR BLD: 6.8 % (ref 22–41)
MCH RBC QN AUTO: 30.4 PG (ref 27–33)
MCH RBC QN AUTO: 31.4 PG (ref 27–33)
MCHC RBC AUTO-ENTMCNC: 31.6 G/DL (ref 33.7–35.3)
MCHC RBC AUTO-ENTMCNC: 32 G/DL (ref 33.7–35.3)
MCV RBC AUTO: 96 FL (ref 81.4–97.8)
MCV RBC AUTO: 98.1 FL (ref 81.4–97.8)
MONOCYTES # BLD AUTO: 1.58 K/UL (ref 0–0.85)
MONOCYTES NFR BLD AUTO: 9.8 % (ref 0–13.4)
NEUTROPHILS # BLD AUTO: 13.12 K/UL (ref 1.82–7.42)
NEUTROPHILS NFR BLD: 81 % (ref 44–72)
NRBC # BLD AUTO: 0 K/UL
NRBC BLD-RTO: 0 /100 WBC
PCO2 BLDA: 29.7 MMHG (ref 26–37)
PCO2 TEMP ADJ BLDA: 28.1 MMHG (ref 26–37)
PH BLDA: 7.38 [PH] (ref 7.4–7.5)
PH TEMP ADJ BLDA: 7.4 [PH] (ref 7.4–7.5)
PLATELET # BLD AUTO: 241 K/UL (ref 164–446)
PLATELET # BLD AUTO: 283 K/UL (ref 164–446)
PMV BLD AUTO: 10 FL (ref 9–12.9)
PMV BLD AUTO: 9.8 FL (ref 9–12.9)
PO2 BLDA: 87 MMHG (ref 64–87)
PO2 TEMP ADJ BLDA: 80.1 MMHG (ref 64–87)
POTASSIUM SERPL-SCNC: 3.8 MMOL/L (ref 3.6–5.5)
POTASSIUM SERPL-SCNC: 3.9 MMOL/L (ref 3.6–5.5)
PROT SERPL-MCNC: 5.5 G/DL (ref 6–8.2)
RBC # BLD AUTO: 3.03 M/UL (ref 4.7–6.1)
RBC # BLD AUTO: 3.12 M/UL (ref 4.7–6.1)
SAO2 % BLDA: 95.4 % (ref 93–99)
SIGNIFICANT IND 70042: NORMAL
SIGNIFICANT IND 70042: NORMAL
SITE SITE: NORMAL
SITE SITE: NORMAL
SODIUM SERPL-SCNC: 135 MMOL/L (ref 135–145)
SODIUM SERPL-SCNC: 137 MMOL/L (ref 135–145)
SOURCE SOURCE: NORMAL
SOURCE SOURCE: NORMAL
TROPONIN I SERPL-MCNC: 0.02 NG/ML (ref 0–0.04)
TTG IGA SER IA-ACNC: 1 U/ML (ref 0–3)
WBC # BLD AUTO: 14.4 K/UL (ref 4.8–10.8)
WBC # BLD AUTO: 16.2 K/UL (ref 4.8–10.8)
WBC STL QL MICRO: NORMAL

## 2018-11-09 PROCEDURE — A9270 NON-COVERED ITEM OR SERVICE: HCPCS | Performed by: INTERNAL MEDICINE

## 2018-11-09 PROCEDURE — 84484 ASSAY OF TROPONIN QUANT: CPT

## 2018-11-09 PROCEDURE — 700111 HCHG RX REV CODE 636 W/ 250 OVERRIDE (IP): Performed by: STUDENT IN AN ORGANIZED HEALTH CARE EDUCATION/TRAINING PROGRAM

## 2018-11-09 PROCEDURE — 700101 HCHG RX REV CODE 250: Performed by: INTERNAL MEDICINE

## 2018-11-09 PROCEDURE — 87329 GIARDIA AG IA: CPT

## 2018-11-09 PROCEDURE — 700105 HCHG RX REV CODE 258: Performed by: STUDENT IN AN ORGANIZED HEALTH CARE EDUCATION/TRAINING PROGRAM

## 2018-11-09 PROCEDURE — 82550 ASSAY OF CK (CPK): CPT

## 2018-11-09 PROCEDURE — 93005 ELECTROCARDIOGRAM TRACING: CPT | Performed by: PHYSICAL MEDICINE & REHABILITATION

## 2018-11-09 PROCEDURE — 80048 BASIC METABOLIC PNL TOTAL CA: CPT

## 2018-11-09 PROCEDURE — 700102 HCHG RX REV CODE 250 W/ 637 OVERRIDE(OP): Performed by: INTERNAL MEDICINE

## 2018-11-09 PROCEDURE — 94760 N-INVAS EAR/PLS OXIMETRY 1: CPT

## 2018-11-09 PROCEDURE — 770020 HCHG ROOM/CARE - TELE (206)

## 2018-11-09 PROCEDURE — 36415 COLL VENOUS BLD VENIPUNCTURE: CPT

## 2018-11-09 PROCEDURE — 87328 CRYPTOSPORIDIUM AG IA: CPT

## 2018-11-09 PROCEDURE — 89055 LEUKOCYTE ASSESSMENT FECAL: CPT

## 2018-11-09 PROCEDURE — 93010 ELECTROCARDIOGRAM REPORT: CPT | Performed by: INTERNAL MEDICINE

## 2018-11-09 PROCEDURE — 80053 COMPREHEN METABOLIC PANEL: CPT

## 2018-11-09 PROCEDURE — 87046 STOOL CULTR AEROBIC BACT EA: CPT

## 2018-11-09 PROCEDURE — 87045 FECES CULTURE AEROBIC BACT: CPT

## 2018-11-09 PROCEDURE — 94640 AIRWAY INHALATION TREATMENT: CPT

## 2018-11-09 PROCEDURE — 71045 X-RAY EXAM CHEST 1 VIEW: CPT

## 2018-11-09 PROCEDURE — A9270 NON-COVERED ITEM OR SERVICE: HCPCS | Performed by: STUDENT IN AN ORGANIZED HEALTH CARE EDUCATION/TRAINING PROGRAM

## 2018-11-09 PROCEDURE — 85027 COMPLETE CBC AUTOMATED: CPT

## 2018-11-09 PROCEDURE — 700102 HCHG RX REV CODE 250 W/ 637 OVERRIDE(OP): Performed by: STUDENT IN AN ORGANIZED HEALTH CARE EDUCATION/TRAINING PROGRAM

## 2018-11-09 PROCEDURE — 87899 AGENT NOS ASSAY W/OPTIC: CPT

## 2018-11-09 PROCEDURE — 700101 HCHG RX REV CODE 250: Performed by: STUDENT IN AN ORGANIZED HEALTH CARE EDUCATION/TRAINING PROGRAM

## 2018-11-09 PROCEDURE — 82803 BLOOD GASES ANY COMBINATION: CPT

## 2018-11-09 PROCEDURE — 85025 COMPLETE CBC W/AUTO DIFF WBC: CPT

## 2018-11-09 PROCEDURE — 99233 SBSQ HOSP IP/OBS HIGH 50: CPT | Performed by: INTERNAL MEDICINE

## 2018-11-09 PROCEDURE — 700101 HCHG RX REV CODE 250: Performed by: PHYSICAL MEDICINE & REHABILITATION

## 2018-11-09 RX ORDER — IPRATROPIUM BROMIDE AND ALBUTEROL SULFATE 2.5; .5 MG/3ML; MG/3ML
3 SOLUTION RESPIRATORY (INHALATION)
Status: DISCONTINUED | OUTPATIENT
Start: 2018-11-09 | End: 2018-11-13 | Stop reason: HOSPADM

## 2018-11-09 RX ORDER — SIMETHICONE 80 MG
80 TABLET,CHEWABLE ORAL 3 TIMES DAILY PRN
Status: DISCONTINUED | OUTPATIENT
Start: 2018-11-09 | End: 2018-11-13 | Stop reason: HOSPADM

## 2018-11-09 RX ORDER — METRONIDAZOLE 500 MG/1
500 TABLET ORAL EVERY 8 HOURS
Status: DISCONTINUED | OUTPATIENT
Start: 2018-11-09 | End: 2018-11-10

## 2018-11-09 RX ADMIN — METRONIDAZOLE 500 MG: 500 INJECTION, SOLUTION INTRAVENOUS at 02:57

## 2018-11-09 RX ADMIN — IPRATROPIUM BROMIDE AND ALBUTEROL SULFATE 3 ML: .5; 3 SOLUTION RESPIRATORY (INHALATION) at 21:49

## 2018-11-09 RX ADMIN — SODIUM CHLORIDE: 9 INJECTION, SOLUTION INTRAVENOUS at 17:22

## 2018-11-09 RX ADMIN — TIOTROPIUM BROMIDE 1 CAPSULE: 18 CAPSULE ORAL; RESPIRATORY (INHALATION) at 05:28

## 2018-11-09 RX ADMIN — CEFTRIAXONE SODIUM 2 G: 2 INJECTION, POWDER, FOR SOLUTION INTRAMUSCULAR; INTRAVENOUS at 05:28

## 2018-11-09 RX ADMIN — GABAPENTIN 300 MG: 300 CAPSULE ORAL at 05:28

## 2018-11-09 RX ADMIN — METRONIDAZOLE 500 MG: 500 TABLET ORAL at 11:20

## 2018-11-09 RX ADMIN — BUDESONIDE AND FORMOTEROL FUMARATE DIHYDRATE 2 PUFF: 80; 4.5 AEROSOL RESPIRATORY (INHALATION) at 05:28

## 2018-11-09 RX ADMIN — METRONIDAZOLE 500 MG: 500 TABLET ORAL at 17:29

## 2018-11-09 RX ADMIN — SODIUM CHLORIDE: 9 INJECTION, SOLUTION INTRAVENOUS at 12:30

## 2018-11-09 RX ADMIN — BUDESONIDE AND FORMOTEROL FUMARATE DIHYDRATE 2 PUFF: 80; 4.5 AEROSOL RESPIRATORY (INHALATION) at 17:30

## 2018-11-09 RX ADMIN — GABAPENTIN 600 MG: 300 CAPSULE ORAL at 17:29

## 2018-11-09 RX ADMIN — POLYETHYLENE GLYCOL 3350, SODIUM SULFATE ANHYDROUS, SODIUM BICARBONATE, SODIUM CHLORIDE, POTASSIUM CHLORIDE 4 L: 236; 22.74; 6.74; 5.86; 2.97 POWDER, FOR SOLUTION ORAL at 20:18

## 2018-11-09 RX ADMIN — SODIUM CHLORIDE: 9 INJECTION, SOLUTION INTRAVENOUS at 02:57

## 2018-11-09 ASSESSMENT — ENCOUNTER SYMPTOMS
CONSTIPATION: 0
BLOOD IN STOOL: 0
COUGH: 0
NAUSEA: 0
ABDOMINAL PAIN: 1
SHORTNESS OF BREATH: 0
WEIGHT LOSS: 1
FEVER: 0
DIZZINESS: 0
VOMITING: 0
DIARRHEA: 1
BACK PAIN: 0
PALPITATIONS: 0
WEAKNESS: 0
HEARTBURN: 0
SPUTUM PRODUCTION: 0
HEADACHES: 0
MYALGIAS: 0
HEMOPTYSIS: 0
FALLS: 0
CHILLS: 0

## 2018-11-09 ASSESSMENT — PAIN SCALES - GENERAL
PAINLEVEL_OUTOF10: 0

## 2018-11-09 NOTE — PROGRESS NOTES
Entered patient room. Nursing student and nurse helped patient onto the bedpan and assessed the patient head to toe (results are in designated flow sheet). Patient was resting comfortably with spouse at bedside. Bed locked and in lowest position with bed alarm on.

## 2018-11-09 NOTE — CONSULTS
Gastroenterology Consult   Note Author: Jenni Neely M.D.     Name Barry Torres     1940   Age/Sex 78 y.o. male   MRN [id number]   Code Status Full code        Reason for consult  5 weeks of diarrhea     HPI:   Mr Torres is a 77y/o male patient with PMHx of RA, COPD on 2L O2 at night, HTN, one functioning kidney that was admitted for evaluation of chronic diarrhea and weakness.     Patient reported that he has history of lose stools for +30 years. He reported that for the past 5 weeks the lose stools have become more watery. This started after the recent hospital admission for hand cellulitis. At that time he received Vancomycin and ceftriaxone and then was switched to cefazolin. Patient finished antibiotics 2 weeks ago and diarrhea has not improved. At that time he was tested for C diff and it was negative. Patient reported having diarrhea 4-5 times per day, but almost never during the night. This is associated with mild abdominal pain and distention. He denies any blood in stools, only reported one episode of bright blood in stool 3 weeks ago. He has history of hemorrhoids. Patient denies ay recent travel or sick contacts. He does not drink water from the stream and does not eat unpasteurized food.     Patient had a colonoscopy in 2019 and polyps were removed. Patient reported that he was told to repeat a colonoscopy in 10 years.     Patient had a CT abdomen without contrast that showed: Wall thickening of the descending and sigmoid colon with inflammatory changes most prominent adjacent to the distal descending and sigmoid colon. Mild colonic distention is seen.    Review of Systems   Constitutional: Positive for weight loss. Negative for chills and fever.   Gastrointestinal: Positive for abdominal pain and diarrhea. Negative for blood in stool, nausea and vomiting.         Quality Measures  Quality-Core Measures        Physical Exam       Vitals:    18 0500 18  0901 11/09/18 1100 11/09/18 1245   BP: 145/79 110/65  135/78   Pulse: 88 (!) 117 99 (!) 103   Resp: 18 14  14   Temp: 37.2 °C (99 °F) 37 °C (98.6 °F)  37.4 °C (99.3 °F)   SpO2: 95% 97%  94%   Weight:       Height:         Body mass index is 24.08 kg/m². Weight: 82.8 kg (182 lb 8.7 oz)  Oxygen Therapy:  Pulse Oximetry: 94 %, O2 (LPM): 3.5, O2 Delivery: Silicone Nasal Cannula    Physical Exam   Constitutional: He is well-developed, well-nourished, and in no distress.   HENT:   Head: Normocephalic and atraumatic.   Neck: Neck supple.   Cardiovascular: Normal rate and regular rhythm.    No murmur heard.  Pulmonary/Chest: Effort normal and breath sounds normal. No respiratory distress.   Abdominal: Bowel sounds are normal. He exhibits distension. There is tenderness. There is no rebound and no guarding.   Mild tenderness upon palpation on the LLQ    Musculoskeletal: He exhibits no edema.   Skin: Skin is warm. No erythema.   Psychiatric: Affect normal.   Nursing note and vitals reviewed.      Assessment/Plan     Patient with history of 5 weeks of worsening of chronic diarrhea with one episode of bloody diarrhea 3 weeks ago. Worsening started after recent antibiotic use, but it has not improved even after cessation of antibiotics 2 weeks ago. Patient denies any recent travel or sick contacts, no food or water exposures. CT abdomen showed wall thickening of the descending and sigmoid colon with inflammatory changes that could represent infectious vs inflammatory cases.   Patient had a colonoscopy in 2009 with recommended follow up in 10 years. His recent presentation could be infectious related after recent use of abx vs inflammatory.   Plan   - Colonoscopy plan for tomorrow (11/10/18) for evaluation and possible biopsies   - Continue clear liquids and NPO at midnight   - Start bowel prep   - Order stool test for salmonella, Campylobacter, Shigella, E coli, parasite and ova   - Giardia and Crypto assay - pending.   - C diff  - negative   - FOBT - negative on this admission   - Will continue to follow

## 2018-11-09 NOTE — PROGRESS NOTES
Internal Medicine Interval Note  Note Author: Keisha Caban M.D.     Name Barry Torres     1940   Age/Sex 78 y.o. male   MRN [id number]   Code Status FULL     After 5PM or if no immediate response to page, please call for cross-coverage  Attending/Team: Dr. Dia Carney/[place] See Patient List for primary contact information  Call [phone] to page    1st Call - Day Intern (R1):   Dr. Keisha Caban  2nd Call - Day Sr. Resident (R2/R3):   Dr. Karlie Fink      Reason for interval visit  (Principal Problem)   Left lower quadrant pain    Interval Problem Daily Status Update  (24 hours, problem oriented, brief subjective history, new lab/imaging data pertinent to that problem)   No acute overnight events.  Patient has had numerous episodes of small volume nonbloody watery stool since yesterday.  However on physical exam his abdomen remains slightly distended, firm, with mild tenderness at the left lower quadrant improved since yesterday.  His fever has improved since yesterday, however his white count has jumped slightly from 12-14.  I started him on metronidazole and ceftriaxone yesterday after CT results revealed some sigmoidal and descending colon wall thickening which may represent diverticulitis.  He has no other symptoms or signs of infection elsewhere.  Gastroenterology was consulted today and they recommend colonoscopy for tomorrow to further evaluate this patient's symptoms.  KERRY is improving, rhabdomyolysis is also improving however CPK remains above 500 so still concern for possible KERRY.  We are continuing fluids at 200 cc/h, patient has no signs of fluid overload.  We will continue to monitor BMP and CPK daily.  He denies weakness currently, however has not been up to walk around since admission.  I encouraged the patient to walk around with assistance if he feels able.  He states he will try to make it at least to the bathroom today.  Will order PT to evaluate and  treat.    Review of Systems   Constitutional: Negative for chills.   Respiratory: Negative for cough, sputum production and shortness of breath.    Cardiovascular: Negative for chest pain, palpitations and leg swelling.   Gastrointestinal: Positive for abdominal pain and diarrhea. Negative for blood in stool, constipation, heartburn, nausea and vomiting.   Genitourinary: Negative for dysuria and hematuria.   Musculoskeletal: Negative for back pain, falls and myalgias.   Neurological: Negative for dizziness, weakness and headaches.     Disposition/Barriers to discharge:   Guarded    Consultants/Specialty  PCP: Lizzie Soto M.D.    Quality Measures  Quality-Core Measures   Reviewed items::  Labs reviewed, Medications reviewed and Radiology images reviewed  DVT prophylaxis pharmacological::  Contraindicated - High bleeding risk  DVT prophylaxis - mechanical:  SCDs    Physical Exam     Vitals:    11/09/18 0500 11/09/18 0901 11/09/18 1100 11/09/18 1245   BP: 145/79 110/65  135/78   Pulse: 88 (!) 117 99 (!) 103   Resp: 18 14  14   Temp: 37.2 °C (99 °F) 37 °C (98.6 °F)  37.4 °C (99.3 °F)   SpO2: 95% 97%  94%   Weight:       Height:         Body mass index is 24.08 kg/m². Weight: 82.8 kg (182 lb 8.7 oz)  Oxygen Therapy:  Pulse Oximetry: 94 %, O2 (LPM): 3.5, O2 Delivery: Silicone Nasal Cannula    Physical Exam   Constitutional: He is oriented to person, place, and time and well-developed, well-nourished, and in no distress.   HENT:   Head: Normocephalic and atraumatic.   Eyes: EOM are normal.   Neck: Normal range of motion.   Cardiovascular: Normal rate, regular rhythm, normal heart sounds and intact distal pulses.    Pulmonary/Chest: Effort normal and breath sounds normal. No respiratory distress. He has no wheezes. He has no rales.   Abdominal: He exhibits no mass. There is no rebound and no guarding.   Slightly distended, normoactive bowel sounds, firm, mild tenderness at the left lower quadrant   Musculoskeletal: Normal  "range of motion. He exhibits no edema.   Lymphadenopathy:     He has no cervical adenopathy.   Neurological: He is alert and oriented to person, place, and time.   Skin: Skin is warm and dry.   Psychiatric: Affect and judgment normal.   Nursing note and vitals reviewed.    Assessment/Plan     * Left lower quadrant pain- (present on admission)   Assessment & Plan    5-week history of watery diarrhea with 1 isolated episode of bloody diarrhea (3 weeks ago)  He was recently admitted and given antibiotics including: Ceftriaxone and vancomycin which was switched to cefazolin upon (-) C. difficile test  Patient also complains of recent onset of fecal incontinence, rectal tenesmus, anal leakage between BM  Rectal exam on admission showed a perianal pedunculated polyp, nonbleeding, no signs of infection, no fissures; good rectal tone, (-)FOBT, no masses palpated  Unclear etiology of diarrhea: May be related to patient's daily intake of magnesium, infectious, inflammatory or ischemic causes.   Yesterday with symptoms more suggestive of constipation -he has not had a bowel movement since admission, and abdominal x-ray shows \"above average fecal content\" suggestive of constipation  (-) C. diff  CT showed: Wall thickening of the descending and sigmoid colon with inflammatory changes most prominent adjacent to the distal descending and sigmoid colon. Findings may represent an infectious or inflammatory colitis. Given the length of involvement, this is less likely to represent diverticulitis.    Currently patient has been having numerous small volume nonbloody episodes of watery stool.    -Hold magnesium for now  -Clear liquid diet, no reds  -Continue IVF  -Cont metronidazole and ceftriaxone   -Await stool WBCs, stool culture, Giardia/crypto assay, fecal fat  -Appreciate GI recommendations  -For possible colonoscopy tomorrow, NPO at midnight     Non-traumatic rhabdomyolysis   Assessment & Plan    CPK: 9920 --> 9289  Patient " denies myalgias, dark urine.   -Discontinue rosuvastatin   -Increase IVF to 200 cc/h  -Concern for possible KERRY due to rhabdomyolysis as CPK > 500  -Monitor CPK, BMP   -Decrease fluids to 125 cc/hr once CPK<1000     Acute kidney injury superimposed on chronic kidney disease (HCC)- (present on admission)   Assessment & Plan    Creatinine baseline 1.3, On admission: 1.65  Patient has a solitary kidney   Creatinine improving, CPK down-trending   -Concern for possible KERRY due to rhabdomyolysis as CPK > 500  -Cont IVF to 200 cc/h; watch for signs of fluid overload   -Monitor BMP  -Avoid nephrotoxic drugs       Weakness- (present on admission)   Assessment & Plan    Patient states he has not week currently, however has not gotten out of bed  Strength 5/5 on all extremities  -Fall precautions in place   -PT ordered     Leukocytosis- (present on admission)   Assessment & Plan    Unclear etiology  Possibly due to inflammatory or infectious causes especially given patient's history of diarrhea and 1 day history of fever  Patient does not appear toxic  -Monitor CBC  -Awaiting stool WBCs, stool culture, C. difficile analysis, Giardia/crypto assay  -Cont metronidazole and ceftriaxone        Normocytic normochromic anemia- (present on admission)   Assessment & Plan    Ferritin: 587  Likely multifactorial: Nutritional, inflammatory and CKD 3   No current signs of GI bleed, no ASA or NSAIDs, no warfarin or DOAC use  -CTM  -Transfuse if hemoglobin <7 or symptomatic  -No need for iron therapy at this time because of inflammatory component

## 2018-11-09 NOTE — CARE PLAN
Problem: Safety  Goal: Will remain free from injury    Intervention: Educate patient and significant other/support system about adaptive mobility strategies and safe transfers    Safety precautions reviewed with pt, pt verbalized understanding and denies questions.  Pt demonstrated ability to use call light for assistance.      Problem: Knowledge Deficit  Goal: Knowledge of disease process/condition, treatment plan, diagnostic tests, and medications will improve    Intervention: Explain information regarding disease process/condition, treatment plan, diagnostic tests, and medications and document in education    Safety precautions reviewed with pt, pt verbalized understanding and denies questions.  Pt demonstrated ability to use call light for assistance.

## 2018-11-09 NOTE — NON-PROVIDER
.         Internal Medicine Medical Student Note  Note Author: Ziggy Perea, Student    Name Barry Torres     1940   Age/Sex 78 y.o. male   MRN [id number]   Code Status FULL         Reason for interval visit  (Principal Problem)   Left lower quadrant pain    Interval Problem Daily Status Update  (problem status, last 24 hours, new history, new data )     Patient is a 78 year old male inpatient following 1x of diarrhea and weakness. Work up for C diff negative. CT scan indicated: Wall thickening of the descending and sigmoid colon with inflammatory changes most prominent adjacent to the distal descending and sigmoid colon. Findings may represent an infectious or inflammatory colitis. Patient started on metronidazole and ceftriaxone.     Patient's CPK continues to drop and is now at . KERRY resolving with Cr 1.25 and GFR 56.    He says abdominal pain has decreased since yesterday, but still present on his left side. He complains of continuous diarrhea today. He is unsure if he is feeling weak as he has not tried to get up and move. Denies fatigue or SOB.       .Review of Systems   Respiratory: Negative for cough, hemoptysis and shortness of breath.    Cardiovascular: Negative for chest pain.   Gastrointestinal: Positive for abdominal pain and diarrhea. Negative for vomiting.   Musculoskeletal: Negative for myalgias.   Neurological: Negative for weakness.       Physical Exam       Vitals:    18 1725 18 2000 18 0100 18 0500   BP: 156/85 134/79 143/69 145/79   Pulse: (!) 112 68 94 88   Resp:    Temp: 37.8 °C (100 °F) 37.8 °C (100 °F) 37.2 °C (99 °F) 37.2 °C (99 °F)   SpO2: 91% 92% 95% 95%   Weight:  82.8 kg (182 lb 8.7 oz)     Height:         Body mass index is 24.08 kg/m². Weight: 82.8 kg (182 lb 8.7 oz)  Oxygen Therapy:  Pulse Oximetry: 95 %, O2 (LPM): 3.5, O2 Delivery: Silicone Nasal Cannula    Physical Exam   Constitutional: He is oriented to person, place, and time.  No distress.   Laying in bed, No acute distress   Cardiovascular: Normal rate and regular rhythm.  Exam reveals no gallop and no friction rub.    No murmur heard.  Pulmonary/Chest: Effort normal and breath sounds normal.   Coarse breath sounds in lower quadrants   Abdominal: Bowel sounds are normal. He exhibits distension. There is tenderness.   TTP to LLQ  No TTP to rest of abdomen  Distended   Musculoskeletal: He exhibits no edema or deformity.   Neurological: He is alert and oriented to person, place, and time.   Skin: Skin is warm and dry.     Results for FIDELIA MERA (MRN [id number]) as of 11/9/2018 08:34   Ref. Range 11/9/2018 03:00   WBC Latest Ref Range: 4.8 - 10.8 K/uL 14.4 (H)   RBC Latest Ref Range: 4.70 - 6.10 M/uL 3.12 (L)   Hemoglobin Latest Ref Range: 14.0 - 18.0 g/dL 9.8 (L)   Hematocrit Latest Ref Range: 42.0 - 52.0 % 30.6 (L)   MCV Latest Ref Range: 81.4 - 97.8 fL 98.1 (H)   MCH Latest Ref Range: 27.0 - 33.0 pg 31.4   MCHC Latest Ref Range: 33.7 - 35.3 g/dL 32.0 (L)   RDW Latest Ref Range: 35.9 - 50.0 fL 54.3 (H)   Platelet Count Latest Ref Range: 164 - 446 K/uL 241   MPV Latest Ref Range: 9.0 - 12.9 fL 10.0   Results for FIDELIA MERA (MRN [id number]) as of 11/9/2018 08:34   Ref. Range 10/23/2018 07:38 10/29/2018 14:41 11/7/2018 08:53   Sed Rate Westergren Latest Ref Range: 0 - 20 mm/hour  58 (H) 75 (H)     CT:   Wall thickening of the descending and sigmoid colon with inflammatory changes most prominent adjacent to the distal descending and sigmoid colon. Findings may represent an infectious or inflammatory colitis. Given the length of involvement, this is less   likely to represent diverticulitis.    Air-fluid levels in the colon can be seen in the setting of diarrhea. Mild colonic distention is seen.    Nonvisualization of the appendix, limiting evaluation.    There appears to be mildly enlarged lymph node at the aortic bifurcation which is nonspecific and may be  reactive.    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    Fat-containing left flank hernia.    Small right bladder diverticulum.    Small amount of free fluid in the abdomen and pelvis.    Tiny nonobstructing right renal calculus.    Density layering within the gallbladder likely represents gallstones.    Small bilateral pleural effusions with overlying atelectasis/consolidation.    Assessment/Plan        Patient is a 78 year old male admitted for weakness and SOB following 1 month of diarrhea likely due to an infectious or inflammatory etiology.     #Diarrhea  Patient has had 1 month of diarrhea that began in the hospital. Likely etiology includes C. Diff, Microscopic Colitis, Giardia, Malabsorption Syndrome (such as Celiacs), or Toxic Megacolon. Patient was treated with Keflax for sepsis due to cellulitis in the past month when diarrhea began. C. Diff negative. Abdominal X-Ray indicated: Above-average stool volume is compatible with constipation. No radiographic evidence of bowel obstruction. Stool fat negative. Celiac panel is negative. FOBT negative. SED elevated to 24.78. HIV negative. Ferratin 587.2. CT indicates inflammatory or infectious etiology. Microscopic colitis is possible etiology. Consult GI about possible colonoscopy and further evaluation.    Plan  - IV Fluids 200mL/hr  - Clear Liquid Diet  - Monitor Mag  - Continue metronidazole and ceftriaxone   - Awaiting stool WBCs, stool culture, C. Difficile, Giardia/Crypto assay, fecal fat   - Consult GI        #KERRY superimposed on CKD Stage III  Patient was elevated Cr from his baseline of 1.33. Currently, 1.25. GFR is improving. Now 56. Likely etiology pre-renal due to dehydration with chronic diarrhea x1 month. Patient as solitary kidney post nephrectomy. Avoid nephrotoxic drugs.     Plan  - IV Fluids 200mL/hr  - Monitor Cr and BUN       #Non-traumatic Rhabdomyolysis  Patient's CPK was intially 5150, but now has decreased  to 2089. Patient denies myalgia or dark urine. Rosuvastatin was discontinued.     Plan  - IVF 200cc/h  - Monitor GFR and Cr due to concern for contribution to KERRY  - Monitor CPK, BMP    #Normocytic Normochromic Anemia  Patient's Hb has been declining over the past year. FOBT negative today. Ferritin 587.2. Etiology nutritional, GI bleed, or CKD. No signs of current GI bleeding.     Plan  - continue to monitor H & H  - Transfuse if <7 or symtomatic        #Leukocytosis  Possible infectious etiology for chronic diarrhea.     Plan  - Patient on metronidazole and ceftriaxone  - Monitor CBC  - Giardia/Crypto panel pending, Stool WBC pending        #Weakness  Patient's weakness likely due to chronic diarrhea and other comorbid conditions. Patient does not complain of weakness at this time, but has not gotten out of bed.         #COPD  Patient has a history of COPD. Uses 2L of oxygen at home. He is currently on 4L with good oxygen saturation.      Plan  - Continue symbicort 80-4.5mcg  - Continue Spiriva 18mcg  - Respiratory care protocol  - Continue oxygen wean as tolerated  - Continuous pulse ox        #Rheumatoid Arthritis  - Hold home medications        #Hyperlipidemia  - Discontinued rousuvastatin due to rhabdomyolysis.

## 2018-11-09 NOTE — PROGRESS NOTES
Julia (per NOC RN) from Lab called with critical result of CPK of 2098 at 0720 (per NOC RN). Critical lab result read back to Julia (per NOC RN).   Dr. Caban notified of critical lab result at 0738.  Critical lab result read back by Dr. Caban.

## 2018-11-09 NOTE — PROGRESS NOTES
Two Skin check with Sandhya HAYNES  Pt bilateral ears red and blanching applied silicone NC and barrier  Sacrum pink and blanching   Heals boggy, dry and calloused

## 2018-11-10 ENCOUNTER — APPOINTMENT (OUTPATIENT)
Dept: RADIOLOGY | Facility: MEDICAL CENTER | Age: 78
DRG: 371 | End: 2018-11-10
Attending: STUDENT IN AN ORGANIZED HEALTH CARE EDUCATION/TRAINING PROGRAM
Payer: MEDICARE

## 2018-11-10 PROBLEM — A04.72 C. DIFFICILE COLITIS: Status: ACTIVE | Noted: 2018-10-01

## 2018-11-10 LAB
ALBUMIN SERPL BCP-MCNC: 2.7 G/DL (ref 3.2–4.9)
ALBUMIN/GLOB SERPL: 1 G/DL
ALP SERPL-CCNC: 76 U/L (ref 30–99)
ALT SERPL-CCNC: 24 U/L (ref 2–50)
ANION GAP SERPL CALC-SCNC: 8 MMOL/L (ref 0–11.9)
AST SERPL-CCNC: 53 U/L (ref 12–45)
BILIRUB SERPL-MCNC: 0.5 MG/DL (ref 0.1–1.5)
BUN SERPL-MCNC: 18 MG/DL (ref 8–22)
CALCIUM SERPL-MCNC: 7.5 MG/DL (ref 8.5–10.5)
CHLORIDE SERPL-SCNC: 108 MMOL/L (ref 96–112)
CK SERPL-CCNC: 883 U/L (ref 0–154)
CO2 SERPL-SCNC: 22 MMOL/L (ref 20–33)
CREAT SERPL-MCNC: 1.6 MG/DL (ref 0.5–1.4)
E COLI SXT1+2 STL IA: NORMAL
EKG IMPRESSION: NORMAL
ERYTHROCYTE [DISTWIDTH] IN BLOOD BY AUTOMATED COUNT: 54.1 FL (ref 35.9–50)
GLOBULIN SER CALC-MCNC: 2.8 G/DL (ref 1.9–3.5)
GLUCOSE SERPL-MCNC: 111 MG/DL (ref 65–99)
HCT VFR BLD AUTO: 29.8 % (ref 42–52)
HGB BLD-MCNC: 9.5 G/DL (ref 14–18)
MCH RBC QN AUTO: 31.3 PG (ref 27–33)
MCHC RBC AUTO-ENTMCNC: 31.9 G/DL (ref 33.7–35.3)
MCV RBC AUTO: 98 FL (ref 81.4–97.8)
PLATELET # BLD AUTO: 276 K/UL (ref 164–446)
PMV BLD AUTO: 9.9 FL (ref 9–12.9)
POTASSIUM SERPL-SCNC: 3.8 MMOL/L (ref 3.6–5.5)
PROCALCITONIN SERPL-MCNC: 0.62 NG/ML
PROT SERPL-MCNC: 5.5 G/DL (ref 6–8.2)
RBC # BLD AUTO: 3.04 M/UL (ref 4.7–6.1)
SIGNIFICANT IND 70042: NORMAL
SITE SITE: NORMAL
SODIUM SERPL-SCNC: 138 MMOL/L (ref 135–145)
SOURCE SOURCE: NORMAL
WBC # BLD AUTO: 15.1 K/UL (ref 4.8–10.8)

## 2018-11-10 PROCEDURE — 700105 HCHG RX REV CODE 258: Performed by: STUDENT IN AN ORGANIZED HEALTH CARE EDUCATION/TRAINING PROGRAM

## 2018-11-10 PROCEDURE — 80053 COMPREHEN METABOLIC PANEL: CPT

## 2018-11-10 PROCEDURE — 700111 HCHG RX REV CODE 636 W/ 250 OVERRIDE (IP): Performed by: STUDENT IN AN ORGANIZED HEALTH CARE EDUCATION/TRAINING PROGRAM

## 2018-11-10 PROCEDURE — 0DDE8ZX EXTRACTION OF LARGE INTESTINE, VIA NATURAL OR ARTIFICIAL OPENING ENDOSCOPIC, DIAGNOSTIC: ICD-10-PCS | Performed by: INTERNAL MEDICINE

## 2018-11-10 PROCEDURE — 160002 HCHG RECOVERY MINUTES (STAT): Performed by: INTERNAL MEDICINE

## 2018-11-10 PROCEDURE — 700102 HCHG RX REV CODE 250 W/ 637 OVERRIDE(OP): Performed by: INTERNAL MEDICINE

## 2018-11-10 PROCEDURE — 700105 HCHG RX REV CODE 258: Performed by: INTERNAL MEDICINE

## 2018-11-10 PROCEDURE — 88305 TISSUE EXAM BY PATHOLOGIST: CPT

## 2018-11-10 PROCEDURE — 160035 HCHG PACU - 1ST 60 MINS PHASE I: Performed by: INTERNAL MEDICINE

## 2018-11-10 PROCEDURE — 160009 HCHG ANES TIME/MIN: Performed by: INTERNAL MEDICINE

## 2018-11-10 PROCEDURE — 36415 COLL VENOUS BLD VENIPUNCTURE: CPT

## 2018-11-10 PROCEDURE — 700111 HCHG RX REV CODE 636 W/ 250 OVERRIDE (IP): Performed by: INTERNAL MEDICINE

## 2018-11-10 PROCEDURE — 99233 SBSQ HOSP IP/OBS HIGH 50: CPT | Performed by: INTERNAL MEDICINE

## 2018-11-10 PROCEDURE — 87040 BLOOD CULTURE FOR BACTERIA: CPT

## 2018-11-10 PROCEDURE — 84145 PROCALCITONIN (PCT): CPT

## 2018-11-10 PROCEDURE — 85027 COMPLETE CBC AUTOMATED: CPT

## 2018-11-10 PROCEDURE — 700101 HCHG RX REV CODE 250: Performed by: STUDENT IN AN ORGANIZED HEALTH CARE EDUCATION/TRAINING PROGRAM

## 2018-11-10 PROCEDURE — A9270 NON-COVERED ITEM OR SERVICE: HCPCS | Performed by: STUDENT IN AN ORGANIZED HEALTH CARE EDUCATION/TRAINING PROGRAM

## 2018-11-10 PROCEDURE — 160204 HCHG ENDO MINUTES - 1ST 30 MINS LEVEL 5: Performed by: INTERNAL MEDICINE

## 2018-11-10 PROCEDURE — 74018 RADEX ABDOMEN 1 VIEW: CPT

## 2018-11-10 PROCEDURE — 700102 HCHG RX REV CODE 250 W/ 637 OVERRIDE(OP): Performed by: STUDENT IN AN ORGANIZED HEALTH CARE EDUCATION/TRAINING PROGRAM

## 2018-11-10 PROCEDURE — 302255 BARRIER CREAM MOISTURE BAZA PROTECT (ZINC) 5OZ

## 2018-11-10 PROCEDURE — 71045 X-RAY EXAM CHEST 1 VIEW: CPT

## 2018-11-10 PROCEDURE — 302135 SEQUENTIAL COMPRESSION MACHINE: Performed by: INTERNAL MEDICINE

## 2018-11-10 PROCEDURE — 160048 HCHG OR STATISTICAL LEVEL 1-5: Performed by: INTERNAL MEDICINE

## 2018-11-10 PROCEDURE — 770020 HCHG ROOM/CARE - TELE (206)

## 2018-11-10 PROCEDURE — A9270 NON-COVERED ITEM OR SERVICE: HCPCS | Performed by: INTERNAL MEDICINE

## 2018-11-10 PROCEDURE — 82550 ASSAY OF CK (CPK): CPT

## 2018-11-10 RX ORDER — SODIUM CHLORIDE 9 MG/ML
INJECTION, SOLUTION INTRAVENOUS CONTINUOUS
Status: DISCONTINUED | OUTPATIENT
Start: 2018-11-10 | End: 2018-11-13 | Stop reason: HOSPADM

## 2018-11-10 RX ORDER — SODIUM CHLORIDE, SODIUM LACTATE, POTASSIUM CHLORIDE, CALCIUM CHLORIDE 600; 310; 30; 20 MG/100ML; MG/100ML; MG/100ML; MG/100ML
INJECTION, SOLUTION INTRAVENOUS CONTINUOUS
Status: DISCONTINUED | OUTPATIENT
Start: 2018-11-10 | End: 2018-11-10 | Stop reason: HOSPADM

## 2018-11-10 RX ORDER — HALOPERIDOL 5 MG/ML
1 INJECTION INTRAMUSCULAR
Status: DISCONTINUED | OUTPATIENT
Start: 2018-11-10 | End: 2018-11-10 | Stop reason: HOSPADM

## 2018-11-10 RX ORDER — ONDANSETRON 2 MG/ML
4 INJECTION INTRAMUSCULAR; INTRAVENOUS
Status: DISCONTINUED | OUTPATIENT
Start: 2018-11-10 | End: 2018-11-10 | Stop reason: HOSPADM

## 2018-11-10 RX ORDER — FUROSEMIDE 10 MG/ML
20 INJECTION INTRAMUSCULAR; INTRAVENOUS ONCE
Status: COMPLETED | OUTPATIENT
Start: 2018-11-10 | End: 2018-11-10

## 2018-11-10 RX ADMIN — CEFEPIME 2 G: 2 INJECTION, POWDER, FOR SOLUTION INTRAVENOUS at 20:43

## 2018-11-10 RX ADMIN — VANCOMYCIN HYDROCHLORIDE 125 MG: 10 INJECTION, POWDER, LYOPHILIZED, FOR SOLUTION INTRAVENOUS at 17:13

## 2018-11-10 RX ADMIN — BUDESONIDE AND FORMOTEROL FUMARATE DIHYDRATE 2 PUFF: 80; 4.5 AEROSOL RESPIRATORY (INHALATION) at 06:23

## 2018-11-10 RX ADMIN — CEFTRIAXONE SODIUM 2 G: 2 INJECTION, POWDER, FOR SOLUTION INTRAMUSCULAR; INTRAVENOUS at 06:24

## 2018-11-10 RX ADMIN — GABAPENTIN 300 MG: 300 CAPSULE ORAL at 06:24

## 2018-11-10 RX ADMIN — METRONIDAZOLE 500 MG: 500 INJECTION, SOLUTION INTRAVENOUS at 22:19

## 2018-11-10 RX ADMIN — METRONIDAZOLE 500 MG: 500 TABLET ORAL at 00:42

## 2018-11-10 RX ADMIN — TIOTROPIUM BROMIDE 1 CAPSULE: 18 CAPSULE ORAL; RESPIRATORY (INHALATION) at 06:23

## 2018-11-10 RX ADMIN — FUROSEMIDE 20 MG: 10 INJECTION, SOLUTION INTRAMUSCULAR; INTRAVENOUS at 00:42

## 2018-11-10 RX ADMIN — GABAPENTIN 600 MG: 300 CAPSULE ORAL at 17:12

## 2018-11-10 RX ADMIN — VANCOMYCIN HYDROCHLORIDE 2100 MG: 100 INJECTION, POWDER, LYOPHILIZED, FOR SOLUTION INTRAVENOUS at 14:16

## 2018-11-10 RX ADMIN — METRONIDAZOLE 500 MG: 500 INJECTION, SOLUTION INTRAVENOUS at 09:34

## 2018-11-10 RX ADMIN — BUDESONIDE AND FORMOTEROL FUMARATE DIHYDRATE 2 PUFF: 80; 4.5 AEROSOL RESPIRATORY (INHALATION) at 17:12

## 2018-11-10 RX ADMIN — SODIUM CHLORIDE: 9 INJECTION, SOLUTION INTRAVENOUS at 17:12

## 2018-11-10 RX ADMIN — METRONIDAZOLE 500 MG: 500 INJECTION, SOLUTION INTRAVENOUS at 17:13

## 2018-11-10 ASSESSMENT — ENCOUNTER SYMPTOMS
HEARTBURN: 0
VOMITING: 0
SHORTNESS OF BREATH: 0
COUGH: 0
FEVER: 0
NAUSEA: 0
ABDOMINAL PAIN: 0
WHEEZING: 0
COUGH: 1
BLOOD IN STOOL: 0
HEMOPTYSIS: 0
CHILLS: 0
SPUTUM PRODUCTION: 0
DIARRHEA: 1
MYALGIAS: 0

## 2018-11-10 ASSESSMENT — PAIN SCALES - GENERAL
PAINLEVEL_OUTOF10: 0

## 2018-11-10 NOTE — CODE DOCUMENTATION
Son at bedside, updated on call for RRT. Per son, pt gets fatigued with activity and uses cane to ambulate.

## 2018-11-10 NOTE — NON-PROVIDER
.         Internal Medicine Medical Student Note  Note Author: Ziggy Perea, Student    Name Barry Torres     1940   Age/Sex 78 y.o. male   MRN [id number]   Code Status FULL       Reason for interval visit  (Principal Problem)   Left lower quadrant pain    Interval Problem Daily Status Update  (problem status, last 24 hours, new history, new data )     Patient is a 78 year old male inpatient following 1x of diarrhea and weakness. Work up for C diff negative. Patient on metronidazole and ceftriaxone. Overnight patient had dizziness, hypotension, and oxygen desaturation. Now needs 10L mid 90's on oxygen max. Patient was moved to [facility] and became lethargic. EKG, Troponin, ABG were all negative. CXR indicates a left lung opacity that has worsened since admission. Fluid were stopped due to bilateral crackles heard by night team and patient diuresed with IV lasix 20mg x1 dose. GI will do colonoscopy today and patient NPO.     This morning patient continues to complain about diarrhea, however denies any abdominal pain. He described coughing when choking on his pills in the hospital and at home. Concern for possible aspiration pneumonia. He does not seem to remember the acute event last night as described by notes from nighttime and nursing.      Review of Systems   Respiratory: Positive for cough. Negative for hemoptysis, sputum production and shortness of breath.    Cardiovascular: Negative for chest pain.   Gastrointestinal: Positive for diarrhea. Negative for abdominal pain.       Physical Exam       Vitals:    11/10/18 0051 11/10/18 0552 11/10/18 0900 11/10/18 1112   BP: 120/62 126/70 137/75 124/56   Pulse: 94 84 88 96   Resp:  16   Temp: 36.9 °C (98.4 °F) 36.6 °C (97.8 °F) 36.8 °C (98.2 °F) 37.3 °C (99.2 °F)   SpO2: 96% 98% 94% 98%   Weight:       Height:         Body mass index is 24.43 kg/m². Weight: 84 kg (185 lb 3 oz)  Oxygen Therapy:  Pulse Oximetry: 98 %, O2 (LPM): 5, O2 Delivery: Nasal  Cannula    Physical Exam   Constitutional: He is oriented to person, place, and time. No distress.   Sitting up in bed comfortably   Cardiovascular: Normal rate and regular rhythm.  Exam reveals no gallop and no friction rub.    No murmur heard.  Pulmonary/Chest: Effort normal and breath sounds normal.   Remains on 10L O2  Crackles in the lower bases bilaterally   Abdominal: Bowel sounds are normal. He exhibits distension. There is no tenderness. There is no rebound and no guarding.   Firm  Mildly Distended  No TTP throughout   Musculoskeletal: He exhibits no deformity.   No edema in lower extremities    Neurological: He is alert and oriented to person, place, and time.   Skin: Skin is warm and dry.     Results for FIDELIA MERA (MRN [id number]) as of 11/10/2018 07:55   Ref. Range 11/10/2018 06:34   WBC Latest Ref Range: 4.8 - 10.8 K/uL 15.1 (H)   RBC Latest Ref Range: 4.70 - 6.10 M/uL 3.04 (L)   Hemoglobin Latest Ref Range: 14.0 - 18.0 g/dL 9.5 (L)   Hematocrit Latest Ref Range: 42.0 - 52.0 % 29.8 (L)   MCV Latest Ref Range: 81.4 - 97.8 fL 98.0 (H)   MCH Latest Ref Range: 27.0 - 33.0 pg 31.3   MCHC Latest Ref Range: 33.7 - 35.3 g/dL 31.9 (L)   RDW Latest Ref Range: 35.9 - 50.0 fL 54.1 (H)   Platelet Count Latest Ref Range: 164 - 446 K/uL 276   MPV Latest Ref Range: 9.0 - 12.9 fL 9.9   Results for FIDELIA MERA (MRN [id number]) as of 11/10/2018 07:55   Ref. Range 11/9/2018 21:37   Troponin I Latest Ref Range: 0.00 - 0.04 ng/mL 0.02     Results for FIDELIA MERA (MRN [id number]) as of 11/10/2018 07:55   Ref. Range 11/9/2018 21:37   Ph Latest Ref Range: 7.40 - 7.50  7.38 (L)   Pco2 Latest Ref Range: 26.0 - 37.0 mmHg 29.7   Po2 Latest Ref Range: 64.0 - 87.0 mmHg 87.0   Hco3 Latest Ref Range: 17 - 25 mmol/L 17   Base Excess Latest Ref Range: -4 - 3 mmol/L -7 (L)   O2 Therapy Latest Ref Range: 2.0 - 10.0 L/min 10.0   O2 Saturation Latest Ref Range: 93.0 - 99.0 % 95.4   Ph -TC Latest Ref Range:  7.40 - 7.50  7.40   Pco2 -TC Latest Ref Range: 26.0 - 37.0 mmHg 28.1   Po2 -TC Latest Ref Range: 64.0 - 87.0 mmHg 80.1   Body Temp Latest Units: Centigrade 35.7     CXR:  Worsening airspace opacity in the left lung base could relate to worsening atelectasis or infection.    Assessment/Plan     Patient is a 78 year old male admitted for weakness and SOB following 1 month of diarrhea likely due to an infectious or inflammatory etiology.     #Diarrhea  Patient has had 1 month of diarrhea that began in the hospital. Likely etiology includes C. Diff, Microscopic Colitis, Giardia, Malabsorption Syndrome (such as Celiacs), or Toxic Megacolon. Patient was treated with Keflax for sepsis due to cellulitis in the past month when diarrhea began. C. Diff negative. Abdominal X-Ray indicated: Above-average stool volume is compatible with constipation. No radiographic evidence of bowel obstruction. Stool fat negative. Celiac panel is negative. FOBT negative. SED elevated to 24.78. HIV negative. Ferratin 587.2. CT indicates inflammatory or infectious etiology. Microscopic colitis is possible etiology. Per GI colonoscopy scheduled for today.      Plan  - IV Fluids 200mL/hr - stopped due to fluid overload  - Monitor Mag  - Continue metronidazole and ceftriaxone   - Awaiting stool WBCs, stool culture, C. Difficile, Giardia/Crypto assay, fecal fat   - Per GI   Bowel prep   Order stool test for salmonella, Campylobacter, Shigella, E coli, parasite and ova    NPO at midnight  - Repeat abdominal x-ray       #Leukocytosis  Possible infectious etiology for chronic diarrhea or pneumonia.  Patient's leukocytosis continues to worsen and currently at 15.1. CXR indicates worsening airspace opacity in the left lung base could relate to worsening atelectasis or infection. Patient says he chokes on his pills and food sometimes causing concern for aspiration pneumonia. Patient is currently on 10L of oxygen and saturating well. Procalc elevated. H.  CAP coverage added.      Plan  - Patient on metronidazole  - Add cefepime  - Add vancomycin   - Monitor CBC  - Giardia/Crypto panel pending, Stool WBC pending\  - Procalcitonin pending  - Repeat CXR 2 view         #KERRY superimposed on CKD Stage III  Patient was elevated Cr from his baseline of 1.33. Currently, 1.25. GFR is improving. Now 56. Likely etiology pre-renal due to dehydration with chronic diarrhea x1 month. Patient as solitary kidney post nephrectomy. Avoid nephrotoxic drugs.     Plan  - Monitor Cr and BUN        #Non-traumatic Rhabdomyolysis  Patient's CPK was intially 5150, but now has decreased to 2089. Patient denies myalgia or dark urine. Rosuvastatin was discontinued. Fluids were stopped due to concern for fluid overload by night team. Recheck CPK today to reassess.      Plan  - Monitor GFR and Cr due to concern for contribution to KERRY  - Monitor CPK, BMP     #Normocytic Normochromic Anemia  Patient's Hb has been declining over the past year. FOBT negative today. Ferritin 587.2. Etiology nutritional, GI bleed, or CKD. No signs of current GI bleeding. Currently stabilized at 9.5     Plan  - continue to monitor H & H  - Transfuse if <7 or symtomatic     #Weakness  Patient's weakness likely due to chronic diarrhea and other comorbid conditions. Patient had an episode of desaturation and dizziness when moving to the commode.      #COPD  Patient has a history of COPD. Uses 2L of oxygen at home. He is currently on 4L with good oxygen saturation.      Plan  - Continue symbicort 80-4.5mcg  - Continue Spiriva 18mcg  - Respiratory care protocol  - Continue oxygen wean as tolerated  - Continuous pulse ox        #Rheumatoid Arthritis  - Hold home medications        #Hyperlipidemia  - Discontinued rousuvastatin due to rhabdomyolysis.

## 2018-11-10 NOTE — CODE DOCUMENTATION
RN got pt up to [facility], pt became lethargic. BP 79/59, o2 sat 82% on 3.5L. Pt placed on 10L mask, o2 sat increased to 98%. While sitting /51, improving. Pt a&ox4.

## 2018-11-10 NOTE — PROGRESS NOTES
"Pharmacy Kinetics 78 y.o. male on vancomycin day # 1   11/10/2018    Currently on Vancomycin 2100 mg iv x1: 11/10/18@1416    Indication for Treatment: r/o PNA    Pertinent history per medical record: Admitted on 2018 for weakness.78 y.o. male who presents to [facility] ED with a chief complaint of weakness, diarrhea, fever and dizziness.  Patient denies any pain, and shortness of breath.  States he feels weak, has had diarrhea for the last several days.  Recently treated for a hand infection, finished Keflex about 2 weeks ago.  He underwent 2 courses of it.  Reports a fever up to 100.2 at home.  No nausea, vomiting.    Other antibiotics: Pzxrypcl2o iv q12hr , Flagyl 500 iv q8hr     Allergies: Penicillins; Ciprofloxacin hcl; and Levofloxacin     List concerns for renal function: Age , BMI, elevated SCr    Pertinent cultures to date:   18:PBCx2:NGTD  18:C.Diff Toxin:Negative    Imagin18:DX-Chest:Worsening airspace opacity in the left lung base could relate to worsening atelectasis or infection.    Recent Labs      18   0135  18   0300  18   2136  11/10/18   0634   WBC  12.1*  14.4*  16.2*  15.1*   NEUTSPOLYS  73.20*   --   81.00*   --      Recent Labs      18   0135  18   0300  18   1636  11/10/18   0634   BUN  16  15  16  18   CREATININE  1.45*  1.25  1.36  1.60*   ALBUMIN  2.9*  2.7*   --   2.7*     No results for input(s): VANCOTROUGH, VANCOPEAK, VANCORANDOM in the last 72 hours.  Intake/Output Summary (Last 24 hours) at 11/10/18 1049  Last data filed at 11/10/18 0552   Gross per 24 hour   Intake              360 ml   Output             1125 ml   Net             -765 ml      Blood pressure 137/75, pulse 88, temperature 36.8 °C (98.2 °F), resp. rate 18, height 1.854 m (6' 1\"), weight 84 kg (185 lb 3 oz), SpO2 94 %. Temp (24hrs), Av.1 °C (98.8 °F), Min:36.6 °C (97.8 °F), Max:37.7 °C (99.9 °F)      A/P   1. Vancomycin dose change: Pulse Dosing for " now  2. Next vancomycin level: 20 hr random level:11/11/18@1000  3. Goal trough: 16-20mcg/mL  4. Comments:  CXR: worsening atelectasis or infection, O2 increased 10 LPM, febrile 11/09, leukocytosis. Vancomycin initiated per protocol , pulse dosing and level ordered. If SCr does not reverse trend , should consider alternate abx. Consider abx de-escalation when clinically warranted.     Sanju MorganD BCPS

## 2018-11-10 NOTE — PROGRESS NOTES
Progress Note:    [facility] team paged to bedside for oxygen desaturation to 84%. Patient evaluated at bedside. Currently saturating mid 90's on 10L oxy mask. Nurse reported patient with lethargy and hypotension when moved to use commode. Patient back in bed, he states he feels okay now. Denies dizziness, confusion, shortness of breath, chest pain. Lung auscultation reveals bilateral crackles up to mid lung bases. CXR shows worsening left lung opacity. Will stop IV fluids for now, diurese with IV lasix 20 mg x1 dose. Respiratory therapy called for breathing treatment. Patient is NPO for possible colonoscopy tomorrow. Will hold go lytely prep and restart later tonight if vital signs remain stable.

## 2018-11-10 NOTE — NON-PROVIDER
.         Internal Medicine Medical Student Note  Note Author: Ziggy Perea, Student    Name Barry Torres     1940   Age/Sex 78 y.o. male   MRN [id number]   Code Status FULL       Reason for interval visit  (Principal Problem)   Left lower quadrant pain    Interval Problem Daily Status Update  (problem status, last 24 hours, new history, new data )     Patient is a 78 year old male inpatient following 1x of diarrhea and weakness. Work up for C diff negative. Patient on metronidazole and ceftriaxone. Overnight patient had dizziness, hypotension, and oxygen desaturation. Now needs 10L mid 90's on oxygen max. Patient was moved to [facility] and became lethargic. EKG, Troponin, ABG were all negative. CXR indicates a left lung opacity that has worsened since admission. Fluid were stopped due to bilateral crackles heard by night team and patient diuresed with IV lasix 20mg x1 dose. GI will do colonoscopy today and patient NPO.     This morning patient continues to complain about diarrhea, however denies any abdominal pain. He described coughing when choking on his pills in the hospital and at home. Concern for possible aspiration pneumonia. He does not seem to remember the acute event last night as described by notes from nighttime and nursing.      Review of Systems   Respiratory: Positive for cough. Negative for hemoptysis, sputum production and shortness of breath.    Cardiovascular: Negative for chest pain.   Gastrointestinal: Positive for diarrhea. Negative for abdominal pain.       Physical Exam       Vitals:    18 2151 18 2200 11/10/18 0051 11/10/18 0552   BP:   120/62 126/70   Pulse: 95  94 84   Resp:    Temp:   36.9 °C (98.4 °F) 36.6 °C (97.8 °F)   SpO2: 95%  96% 98%   Weight:  84 kg (185 lb 3 oz)     Height:         Body mass index is 24.43 kg/m². Weight: 84 kg (185 lb 3 oz)  Oxygen Therapy:  Pulse Oximetry: 98 %, O2 (LPM): 10, O2 Delivery: Mask    Physical Exam   Constitutional:  He is oriented to person, place, and time. No distress.   Sitting up in bed comfortably   Cardiovascular: Normal rate and regular rhythm.  Exam reveals no gallop and no friction rub.    No murmur heard.  Pulmonary/Chest: Effort normal and breath sounds normal.   Remains on 10L O2  Crackles in the lower bases bilaterally   Abdominal: Bowel sounds are normal. He exhibits distension. There is no tenderness. There is no rebound and no guarding.   Firm  Mildly Distended  No TTP throughout   Musculoskeletal: He exhibits no deformity.   No edema in lower extremities    Neurological: He is alert and oriented to person, place, and time.   Skin: Skin is warm and dry.     Results for FIDELIA MERA (MRN [id number]) as of 11/10/2018 07:55   Ref. Range 11/10/2018 06:34   WBC Latest Ref Range: 4.8 - 10.8 K/uL 15.1 (H)   RBC Latest Ref Range: 4.70 - 6.10 M/uL 3.04 (L)   Hemoglobin Latest Ref Range: 14.0 - 18.0 g/dL 9.5 (L)   Hematocrit Latest Ref Range: 42.0 - 52.0 % 29.8 (L)   MCV Latest Ref Range: 81.4 - 97.8 fL 98.0 (H)   MCH Latest Ref Range: 27.0 - 33.0 pg 31.3   MCHC Latest Ref Range: 33.7 - 35.3 g/dL 31.9 (L)   RDW Latest Ref Range: 35.9 - 50.0 fL 54.1 (H)   Platelet Count Latest Ref Range: 164 - 446 K/uL 276   MPV Latest Ref Range: 9.0 - 12.9 fL 9.9   Results for FIDELIA MERA (MRN [id number]) as of 11/10/2018 07:55   Ref. Range 11/9/2018 21:37   Troponin I Latest Ref Range: 0.00 - 0.04 ng/mL 0.02     Results for FIDELIA MERA (MRN [id number]) as of 11/10/2018 07:55   Ref. Range 11/9/2018 21:37   Ph Latest Ref Range: 7.40 - 7.50  7.38 (L)   Pco2 Latest Ref Range: 26.0 - 37.0 mmHg 29.7   Po2 Latest Ref Range: 64.0 - 87.0 mmHg 87.0   Hco3 Latest Ref Range: 17 - 25 mmol/L 17   Base Excess Latest Ref Range: -4 - 3 mmol/L -7 (L)   O2 Therapy Latest Ref Range: 2.0 - 10.0 L/min 10.0   O2 Saturation Latest Ref Range: 93.0 - 99.0 % 95.4   Ph -TC Latest Ref Range: 7.40 - 7.50  7.40   Pco2 -TC Latest Ref Range:  26.0 - 37.0 mmHg 28.1   Po2 -TC Latest Ref Range: 64.0 - 87.0 mmHg 80.1   Body Temp Latest Units: Centigrade 35.7     CXR:  Worsening airspace opacity in the left lung base could relate to worsening atelectasis or infection.    Assessment/Plan     Patient is a 78 year old male admitted for weakness and SOB following 1 month of diarrhea likely due to an infectious or inflammatory etiology.     #Diarrhea  Patient has had 1 month of diarrhea that began in the hospital. Likely etiology includes C. Diff, Microscopic Colitis, Giardia, Malabsorption Syndrome (such as Celiacs), or Toxic Megacolon. Patient was treated with Keflax for sepsis due to cellulitis in the past month when diarrhea began. C. Diff negative. Abdominal X-Ray indicated: Above-average stool volume is compatible with constipation. No radiographic evidence of bowel obstruction. Stool fat negative. Celiac panel is negative. FOBT negative. SED elevated to 24.78. HIV negative. Ferratin 587.2. CT indicates inflammatory or infectious etiology. Microscopic colitis is possible etiology. Per GI colonoscopy scheduled for today.       Plan  - IV Fluids 200mL/hr - stopped due to fluid overload  - Monitor Mag  - Continue metronidazole and ceftriaxone   - Awaiting stool WBCs, stool culture, C. Difficile, Giardia/Crypto assay, fecal fat   - Per GI   Bowel prep   Order stool test for salmonella, Campylobacter, Shigella, E coli, parasite and ova    NPO at midnight     #Leukocytosis  Possible infectious etiology for chronic diarrhea or pneumonia.  Patient's leukocytosis continues to worsen and currently at 15.1. CXR indicates worsening airspace opacity in the left lung base could relate to worsening atelectasis or infection. Patient says he chokes on his pills and food sometimes causing concern for aspiration pneumonia. Patient is currently on 10L of oxygen and saturating well.      Plan  - Patient on metronidazole and ceftriaxone  - Monitor CBC  - Giardia/Crypto panel  pending, Stool WBC pending\  - Procalcitonin pending         #KERRY superimposed on CKD Stage III  Patient was elevated Cr from his baseline of 1.33. Currently, 1.25. GFR is improving. Now 56. Likely etiology pre-renal due to dehydration with chronic diarrhea x1 month. Patient as solitary kidney post nephrectomy. Avoid nephrotoxic drugs.     Plan  - Monitor Cr and BUN        #Non-traumatic Rhabdomyolysis  Patient's CPK was intially 5150, but now has decreased to 2089. Patient denies myalgia or dark urine. Rosuvastatin was discontinued. Fluids were stopped due to concern for fluid overload by night team. Recheck CPK today to reassess.      Plan  - Monitor GFR and Cr due to concern for contribution to KERRY  - Monitor CPK, BMP     #Normocytic Normochromic Anemia  Patient's Hb has been declining over the past year. FOBT negative today. Ferritin 587.2. Etiology nutritional, GI bleed, or CKD. No signs of current GI bleeding. Currently stabilized at 9.5     Plan  - continue to monitor H & H  - Transfuse if <7 or symtomatic     #Weakness  Patient's weakness likely due to chronic diarrhea and other comorbid conditions. Patient had an episode of desaturation and dizziness when moving to the commode.      #COPD  Patient has a history of COPD. Uses 2L of oxygen at home. He is currently on 4L with good oxygen saturation.      Plan  - Continue symbicort 80-4.5mcg  - Continue Spiriva 18mcg  - Respiratory care protocol  - Continue oxygen wean as tolerated  - Continuous pulse ox        #Rheumatoid Arthritis  - Hold home medications        #Hyperlipidemia  - Discontinued rousuvastatin due to rhabdomyolysis.

## 2018-11-10 NOTE — OP REPORT
DATE OF SERVICE:  11/10/2018    PROCEDURE PERFORMED:  Colonoscopy with biopsy.    INDICATION:  Diarrhea.    POSTPROCEDURE DIAGNOSIS:  Severe pseudomembranous colitis.    PHYSICIAN:  Ganesh Peacock MD    ANESTHESIOLOGIST:  Keyla Jeffers MD.    MEDICATIONS:  Deep sedation.    CONSENT:  Procedure risks and benefits reviewed thoroughly with the patient,   risks including but not limited to bleeding, perforation, side effects of   medication were informed.  Patient voiced understanding and agreed to proceed.    DESCRIPTION OF PROCEDURE:  Patient was placed in a left lateral decubitus   position.  Rectal examination revealed no palpable abnormalities and the   colonoscope was inserted in rectum and advanced to the cecum.  The entire   colon was riddled with yellow white round plaques consistent with   pseudomembranous lesions that easily removed with the biopsy forceps.    Biopsies were obtained throughout the colon randomly to rule out Clostridium   difficile colitis as well as other etiologies for pseudomembranous colitis.    Once the cecum was reached, appendiceal orifice and ileocecal valve were well   visualized and imaged.  Upon careful retraction, no obvious lesions could be   appreciated, polyps could easily been missed due to the significance of the   pseudomembranes occupying the colonic mucosa.  Retroflexed views of the rectum   were not performed due to significant amount of pseudomembranes and risk for   perforation.  Rectum was desufflated, and scope was removed.    COMPLICATIONS:  None.    BLOOD LOSS:  None.    SPECIMENS:  Obtained.    RECOMMENDATIONS:  Although the patient did have a C. diff PCR that was   negative with the pseudomembranes identified, we will initiate vancomycin for   preemptive treatment of possible C. diff colitis and will await results of   biopsies and adjust treatment that being discontinued vancomycin if this is   not C. diff and adjusted to a different medication pending  review and results   of biopsies.    DIET:  Cardiac diet will be initiated.    We will follow clinically.       ____________________________________     MD BLUE Contreras / NIKKIE    DD:  11/10/2018 12:04:52  DT:  11/10/2018 12:28:36    D#:  [id number]  Job#:  [id number]

## 2018-11-10 NOTE — CODE DOCUMENTATION
returned call after second page. MD updated on RRT and pt condition. MD ordered Troponins and EKG. RRT team ordered CBC, ABG and CXR per protocol for hotn and SOB/increased O2 need.

## 2018-11-10 NOTE — PROGRESS NOTES
Internal Medicine Interval Note  Note Author: Keisha Caban M.D.     Name Barry Torres     1940   Age/Sex 78 y.o. male   MRN [id number]   Code Status FULL     After 5PM or if no immediate response to page, please call for cross-coverage  Attending/Team: Dr. Dia Carney/[place] See Patient List for primary contact information  Call [phone] to page    1st Call - Day Intern (R1):   Dr. Keisha Caban  2nd Call - Day Sr. Resident (R2/R3):   Dr. Karlie Fink      Reason for interval visit  (Principal Problem)   C. difficile colitis    Interval Problem Daily Status Update  (24 hours, problem oriented, brief subjective history, new lab/imaging data pertinent to that problem)   Overnight, the patient was going to the commode with assistance when all of a sudden he became lethargic and vitals taken showed hypotension, and patient desaturated down into the low 80s, and required 10 LPM to maintain his O2 saturation > 90%.  The night team noted bilateral crackles up to the mid lung fields, and chest x-ray showed worsening left lower opacity.  They discontinued his fluids, and started diuresis with 20 mg IV Lasix.  Workup including EKG, troponin were negative.  Episode resolved once patient was placed back in the recumbent position, and oxygen was continued. Patient has not since had a similar episode.    He is still on 10 LPM via oxy mask, and he does admit to choking on his pills this morning, which has happened to him previous to this hospital admission.  He denies nausea/vomiting, difficulty of breathing, chills, chest pain, palpitations.  Because of the worsening opacity in the left lower lung field, elevated pro-calcitonin, increasing white count, recent hospital admissions, will cover for aspiration and HCAP with cefepime and vancomycin.  He does not recall the incident last night, and his complaints this morning include mild abdominal pain.  He continues to have small volume nonbloody  diarrhea.  However, although he is experiencing diarrhea, his abdomen looks increasingly distended today, still with normoactive bowel sounds and firm, but no tenderness or guarding or rigidity.  Colonoscopy was done which revealed pseudomembranes all over the colon, and although his C. difficile test was negative, GI is considering this as presumptive C. difficile colitis.  He was started on oral vancomycin, and once biopsy results are available will adjust according to susceptibility.    Review of Systems   Constitutional: Negative for chills and fever.   Respiratory: Negative for cough, shortness of breath and wheezing.    Cardiovascular: Negative for chest pain and leg swelling.   Gastrointestinal: Positive for diarrhea. Negative for abdominal pain, blood in stool, heartburn, melena, nausea and vomiting.   Genitourinary: Negative for dysuria and hematuria.   Musculoskeletal: Negative for joint pain and myalgias.   Skin: Negative for itching and rash.     Disposition/Barriers to discharge:   Guarded    Consultants/Specialty  Dr. Ganesh Peacock/gastroenterology  PCP: Lizzie Soto M.D.    Quality Measures  Quality-Core Measures   Reviewed items::  Labs reviewed, Medications reviewed and Radiology images reviewed  DVT prophylaxis pharmacological::  Contraindicated - High bleeding risk  DVT prophylaxis - mechanical:  SCDs  Ulcer Prophylaxis::  Not indicated    Physical Exam     Vitals:    11/10/18 1315 11/10/18 1345 11/10/18 1415 11/10/18 1445   BP: 147/74 139/67 113/64 128/67   Pulse: 92 (!) 102 96 96   Resp: 18 20 20 18   Temp:    37.6 °C (99.6 °F)   SpO2: 95% 96% 96% 95%   Weight:       Height:         Body mass index is 24.43 kg/m². Weight: 84 kg (185 lb 3 oz)  Oxygen Therapy:  Pulse Oximetry: 95 %, O2 (LPM): 6, O2 Delivery: Nasal Cannula    Physical Exam   Constitutional: He is oriented to person, place, and time and well-developed, well-nourished, and in no distress.   HENT:   Head: Normocephalic and atraumatic.  "  Mouth/Throat: Oropharynx is clear and moist.   Neck: Normal range of motion.   Cardiovascular: Normal rate, regular rhythm, normal heart sounds and intact distal pulses.    Pulmonary/Chest: Effort normal. No respiratory distress. He has no wheezes.   On oxymask at 10 LPM  Bibasilar crackles more prominent at left lower lung field   Abdominal:   Increasing abdominal distention, normoactive bowel sounds, firm, no tenderness to superficial or deep palpation even at the LLQ   Musculoskeletal: Normal range of motion. He exhibits no edema.   Lymphadenopathy:     He has no cervical adenopathy.   Neurological: He is alert and oriented to person, place, and time.   Skin: Skin is warm and dry.   Psychiatric: Affect and judgment normal.   Nursing note and vitals reviewed.    Assessment/Plan     * C. difficile colitis- (present on admission)   Assessment & Plan    5-week history of watery diarrhea with 1 isolated episode of bloody diarrhea (3 weeks ago)  He was recently admitted and given antibiotics including: Ceftriaxone and vancomycin which was switched to cefazolin upon (-) C. difficile test  Patient also complains of recent onset of fecal incontinence, rectal tenesmus, anal leakage between BM  Rectal exam on admission showed a perianal pedunculated polyp, nonbleeding, no signs of infection, no fissures; good rectal tone, (-)FOBT, no masses palpated  Unclear etiology of diarrhea: May be related to patient's daily intake of magnesium, infectious, inflammatory or ischemic causes.   Yesterday with symptoms more suggestive of constipation -he has not had a bowel movement since admission, and abdominal x-ray shows \"above average fecal content\" suggestive of constipation  (-) C. diff  CT showed: Wall thickening of the descending and sigmoid colon with inflammatory changes most prominent adjacent to the distal descending and sigmoid colon. Findings may represent an infectious or inflammatory colitis. Given the length of " involvement, this is less likely to represent diverticulitis.    Currently patient has been having numerous small volume nonbloody episodes of watery stool.  Colonoscopy showed pseudomembranes - will treat for presumptive disease    -Hold magnesium for now  -Diet as tolerated   -Abdominal XR  Shows no dilation of bowel loops   -Continue IVF  -Start oral vancomycin   -Appreciate GI recommendations     Non-traumatic rhabdomyolysis   Assessment & Plan    CPK: 5150 --> 2089 --> 889  Patient denies myalgias, dark urine.   -Discontinue rosuvastatin   -Increase IVF to 200 cc/h  -Concern for possible KERRY due to rhabdomyolysis as CPK > 500  -Monitor CPK, BMP   -Decrease fluids to 125 cc/hr once CPK<1000     Acute kidney injury superimposed on chronic kidney disease (HCC)- (present on admission)   Assessment & Plan    Creatinine baseline 1.3, On admission: 1.65  Patient has a solitary kidney   Creatinine improving, CPK down-trending   -Concern for possible KERRY due to rhabdomyolysis as CPK > 500  -Cont IVF at 75cc/hr; watch for signs of fluid overload   -Monitor BMP  -Avoid nephrotoxic drugs       Weakness- (present on admission)   Assessment & Plan    Patient states he has not week currently, however has not gotten out of bed  Strength 5/5 on all extremities  -Fall precautions in place   -PT ordered     Leukocytosis- (present on admission)   Assessment & Plan    Possibly due to inflammatory or infectious causes especially given patient's history of diarrhea  Patient does not appear toxic, however with infiltrates and chest findings - history of possible aspiration   -Monitor CBC  -Start cefepime, vancomycin IV for suspected aspiration pneumonia   -Start oral vancomycon (not absorbed) for C. difficile colitis        Normocytic normochromic anemia- (present on admission)   Assessment & Plan    Ferritin: 587  Likely multifactorial: Nutritional, inflammatory and CKD 3   No current signs of GI bleed, no ASA or NSAIDs, no warfarin  or DOAC use  -CTM  -Transfuse if hemoglobin <7 or symptomatic  -No need for iron therapy at this time because of inflammatory component

## 2018-11-10 NOTE — PROGRESS NOTES
Pt transferred from hospital bed to bedside commode with 2 assists using front wheel walker, tolerated poorly. Pt suddenly became lethargic and vitals obtained were 79/59 mmHg, 99.4 degrees Farenheit (temporal temperature), and oxygen saturation decreased to 82% saturation on 3.5 liters via nasal cannula. Pt is A&O x 4 consistently and denies pain. NIH stroke scale completed, no deficits noted. Pt switched to oxymask and increased oxygenation to 10 liters O2, which increased oxygen saturation to 94%. Rapid response called, notified charge RN (Roma), rapid response team and respiratory therapist (Og) at bedside. STAT chest x-ray, ABG's, and CBC ordered. Per [facility] hospitalist (Dr. Montoya), also order STAT 12-Lead ECG and troponin. [facility] hospitalists (Dr. Novoa and Dr. Montoya) also at bedside assessing pt.

## 2018-11-11 PROBLEM — E87.6 HYPOKALEMIA: Status: ACTIVE | Noted: 2018-11-11

## 2018-11-11 PROBLEM — J69.0 ASPIRATION PNEUMONIA (HCC): Status: ACTIVE | Noted: 2018-11-11

## 2018-11-11 LAB
ALBUMIN SERPL BCP-MCNC: 2.4 G/DL (ref 3.2–4.9)
ALBUMIN/GLOB SERPL: 1 G/DL
ALP SERPL-CCNC: 64 U/L (ref 30–99)
ALT SERPL-CCNC: 23 U/L (ref 2–50)
ANION GAP SERPL CALC-SCNC: 9 MMOL/L (ref 0–11.9)
AST SERPL-CCNC: 46 U/L (ref 12–45)
BILIRUB SERPL-MCNC: 0.5 MG/DL (ref 0.1–1.5)
BUN SERPL-MCNC: 18 MG/DL (ref 8–22)
CALCIUM SERPL-MCNC: 7.2 MG/DL (ref 8.5–10.5)
CHLORIDE SERPL-SCNC: 109 MMOL/L (ref 96–112)
CK SERPL-CCNC: 610 U/L (ref 0–154)
CO2 SERPL-SCNC: 21 MMOL/L (ref 20–33)
CREAT SERPL-MCNC: 1.62 MG/DL (ref 0.5–1.4)
ERYTHROCYTE [DISTWIDTH] IN BLOOD BY AUTOMATED COUNT: 52.4 FL (ref 35.9–50)
GLOBULIN SER CALC-MCNC: 2.4 G/DL (ref 1.9–3.5)
GLUCOSE SERPL-MCNC: 102 MG/DL (ref 65–99)
HCT VFR BLD AUTO: 26.4 % (ref 42–52)
HGB BLD-MCNC: 8.6 G/DL (ref 14–18)
MCH RBC QN AUTO: 30.9 PG (ref 27–33)
MCHC RBC AUTO-ENTMCNC: 32.6 G/DL (ref 33.7–35.3)
MCV RBC AUTO: 95 FL (ref 81.4–97.8)
MRSA DNA SPEC QL NAA+PROBE: NORMAL
PHOSPHATE SERPL-MCNC: 2.2 MG/DL (ref 2.5–4.5)
PLATELET # BLD AUTO: 274 K/UL (ref 164–446)
PMV BLD AUTO: 9.8 FL (ref 9–12.9)
POTASSIUM SERPL-SCNC: 3.2 MMOL/L (ref 3.6–5.5)
PROT SERPL-MCNC: 4.8 G/DL (ref 6–8.2)
RBC # BLD AUTO: 2.78 M/UL (ref 4.7–6.1)
SIGNIFICANT IND 70042: NORMAL
SITE SITE: NORMAL
SODIUM SERPL-SCNC: 139 MMOL/L (ref 135–145)
SOURCE SOURCE: NORMAL
VANCOMYCIN SERPL-MCNC: 15.4 UG/ML
WBC # BLD AUTO: 12.5 K/UL (ref 4.8–10.8)

## 2018-11-11 PROCEDURE — 770020 HCHG ROOM/CARE - TELE (206)

## 2018-11-11 PROCEDURE — 87641 MR-STAPH DNA AMP PROBE: CPT

## 2018-11-11 PROCEDURE — 99233 SBSQ HOSP IP/OBS HIGH 50: CPT | Performed by: INTERNAL MEDICINE

## 2018-11-11 PROCEDURE — 700111 HCHG RX REV CODE 636 W/ 250 OVERRIDE (IP): Performed by: STUDENT IN AN ORGANIZED HEALTH CARE EDUCATION/TRAINING PROGRAM

## 2018-11-11 PROCEDURE — 700102 HCHG RX REV CODE 250 W/ 637 OVERRIDE(OP): Performed by: STUDENT IN AN ORGANIZED HEALTH CARE EDUCATION/TRAINING PROGRAM

## 2018-11-11 PROCEDURE — 85027 COMPLETE CBC AUTOMATED: CPT

## 2018-11-11 PROCEDURE — 84100 ASSAY OF PHOSPHORUS: CPT

## 2018-11-11 PROCEDURE — A9270 NON-COVERED ITEM OR SERVICE: HCPCS | Performed by: STUDENT IN AN ORGANIZED HEALTH CARE EDUCATION/TRAINING PROGRAM

## 2018-11-11 PROCEDURE — 700105 HCHG RX REV CODE 258: Performed by: STUDENT IN AN ORGANIZED HEALTH CARE EDUCATION/TRAINING PROGRAM

## 2018-11-11 PROCEDURE — 36415 COLL VENOUS BLD VENIPUNCTURE: CPT

## 2018-11-11 PROCEDURE — 82550 ASSAY OF CK (CPK): CPT

## 2018-11-11 PROCEDURE — 700101 HCHG RX REV CODE 250: Performed by: STUDENT IN AN ORGANIZED HEALTH CARE EDUCATION/TRAINING PROGRAM

## 2018-11-11 PROCEDURE — 80202 ASSAY OF VANCOMYCIN: CPT

## 2018-11-11 PROCEDURE — 80053 COMPREHEN METABOLIC PANEL: CPT

## 2018-11-11 RX ORDER — AZITHROMYCIN 250 MG/1
250 TABLET, FILM COATED ORAL DAILY
Status: DISCONTINUED | OUTPATIENT
Start: 2018-11-12 | End: 2018-11-13 | Stop reason: HOSPADM

## 2018-11-11 RX ORDER — POTASSIUM CHLORIDE 20 MEQ/1
40 TABLET, EXTENDED RELEASE ORAL DAILY
Status: DISCONTINUED | OUTPATIENT
Start: 2018-11-11 | End: 2018-11-11

## 2018-11-11 RX ORDER — AZITHROMYCIN 250 MG/1
500 TABLET, FILM COATED ORAL ONCE
Status: COMPLETED | OUTPATIENT
Start: 2018-11-11 | End: 2018-11-11

## 2018-11-11 RX ORDER — POTASSIUM CHLORIDE 20 MEQ/1
40 TABLET, EXTENDED RELEASE ORAL ONCE
Status: COMPLETED | OUTPATIENT
Start: 2018-11-11 | End: 2018-11-11

## 2018-11-11 RX ADMIN — GABAPENTIN 300 MG: 300 CAPSULE ORAL at 06:22

## 2018-11-11 RX ADMIN — METRONIDAZOLE 500 MG: 500 INJECTION, SOLUTION INTRAVENOUS at 06:22

## 2018-11-11 RX ADMIN — VANCOMYCIN HYDROCHLORIDE 125 MG: 10 INJECTION, POWDER, LYOPHILIZED, FOR SOLUTION INTRAVENOUS at 13:16

## 2018-11-11 RX ADMIN — VANCOMYCIN HYDROCHLORIDE 125 MG: 10 INJECTION, POWDER, LYOPHILIZED, FOR SOLUTION INTRAVENOUS at 06:22

## 2018-11-11 RX ADMIN — AZITHROMYCIN MONOHYDRATE 500 MG: 250 TABLET ORAL at 17:31

## 2018-11-11 RX ADMIN — VANCOMYCIN HYDROCHLORIDE 125 MG: 10 INJECTION, POWDER, LYOPHILIZED, FOR SOLUTION INTRAVENOUS at 00:00

## 2018-11-11 RX ADMIN — TIOTROPIUM BROMIDE 1 CAPSULE: 18 CAPSULE ORAL; RESPIRATORY (INHALATION) at 06:22

## 2018-11-11 RX ADMIN — CEFTRIAXONE SODIUM 2 G: 2 INJECTION, POWDER, FOR SOLUTION INTRAMUSCULAR; INTRAVENOUS at 17:32

## 2018-11-11 RX ADMIN — POTASSIUM CHLORIDE 40 MEQ: 1500 TABLET, EXTENDED RELEASE ORAL at 08:15

## 2018-11-11 RX ADMIN — METRONIDAZOLE 500 MG: 500 INJECTION, SOLUTION INTRAVENOUS at 13:16

## 2018-11-11 RX ADMIN — GABAPENTIN 600 MG: 300 CAPSULE ORAL at 17:30

## 2018-11-11 RX ADMIN — CEFEPIME 2 G: 2 INJECTION, POWDER, FOR SOLUTION INTRAVENOUS at 06:21

## 2018-11-11 RX ADMIN — VANCOMYCIN HYDROCHLORIDE 125 MG: 10 INJECTION, POWDER, LYOPHILIZED, FOR SOLUTION INTRAVENOUS at 17:32

## 2018-11-11 RX ADMIN — BUDESONIDE AND FORMOTEROL FUMARATE DIHYDRATE 2 PUFF: 80; 4.5 AEROSOL RESPIRATORY (INHALATION) at 06:00

## 2018-11-11 RX ADMIN — METRONIDAZOLE 500 MG: 500 INJECTION, SOLUTION INTRAVENOUS at 21:35

## 2018-11-11 ASSESSMENT — ENCOUNTER SYMPTOMS
DIARRHEA: 1
DIZZINESS: 0
COUGH: 0
MUSCULOSKELETAL NEGATIVE: 1
RESPIRATORY NEGATIVE: 1
WEAKNESS: 0
CARDIOVASCULAR NEGATIVE: 1
PALPITATIONS: 0
FEVER: 0
COUGH: 1
SPUTUM PRODUCTION: 0
BLOOD IN STOOL: 0
NAUSEA: 0
CHILLS: 0
HEADACHES: 0
MYALGIAS: 0
VOMITING: 0
PSYCHIATRIC NEGATIVE: 1
SHORTNESS OF BREATH: 0
ABDOMINAL PAIN: 0
HEARTBURN: 0

## 2018-11-11 ASSESSMENT — PAIN SCALES - GENERAL
PAINLEVEL_OUTOF10: 0

## 2018-11-11 NOTE — PROGRESS NOTES
Gastroenterology Progress Note     Author: Ganesh STEPHENSON Madonna   Date & Time Created: 11/11/2018 1:42 PM    Chief Complaint:  Diarrhea and abdominal pain    Interval History:  Colonoscopy yesterday with pseudomembranes.   Empiric treatment with Vancomycin initiated.   Marked clinical improvement.     Review of Systems:  Review of Systems   Respiratory: Negative.    Cardiovascular: Negative.    Gastrointestinal: Positive for diarrhea.   Musculoskeletal: Negative.    Psychiatric/Behavioral: Negative.        Physical Exam:  Physical Exam   Constitutional: He appears well-developed.   HENT:   Head: Normocephalic.   Cardiovascular: Normal rate.    Pulmonary/Chest: Effort normal.   Abdominal: Soft.   Neurological: He is alert.   Skin: Skin is warm.   Psychiatric: He has a normal mood and affect. His behavior is normal. Judgment and thought content normal.       Labs:  Recent Labs      11/09/18 2137   [id number]  7.38*   [id number]  29.7   [id number]  87.0   KNIQ1GES  95.4   ARTHCO3  17   [id number]  10.0   ARTBE  -7*     Recent Labs      11/09/18   0300  11/09/18   2137  11/10/18   0634  11/11/18   0256   CPKTOTAL  2089*   --   883*  610*   TROPONINI   --   0.02   --    --      Recent Labs      11/09/18   1636  11/10/18   0634  11/11/18   0256  11/11/18   0952   SODIUM  137  138  139   --    POTASSIUM  3.9  3.8  3.2*   --    CHLORIDE  109  108  109   --    CO2  22  22  21   --    BUN  16  18  18   --    CREATININE  1.36  1.60*  1.62*   --    PHOSPHORUS   --    --    --   2.2*   CALCIUM  7.3*  7.5*  7.2*   --      Recent Labs      11/09/18   0300  11/09/18   1636  11/10/18   0634  11/11/18   0256   ALTSGPT  28   --   24  23   ASTSGOT  85*   --   53*  46*   ALKPHOSPHAT  68   --   76  64   TBILIRUBIN  0.5   --   0.5  0.5   GLUCOSE  106*  120*  111*  102*     Recent Labs      11/09/18   2136  11/10/18   0634  11/11/18   0256   RBC  3.03*  3.04*  2.78*   HEMOGLOBIN  9.2*  9.5*  8.6*   HEMATOCRIT  29.1*  29.8*  26.4*   PLATELETCT   283  276  274     Recent Labs      18   0300  18   2136  11/10/18   0634  18   0256   WBC  14.4*  16.2*  15.1*  12.5*   NEUTSPOLYS   --   81.00*   --    --    LYMPHOCYTES   --   6.80*   --    --    MONOCYTES   --   9.80   --    --    EOSINOPHILS   --   1.10   --    --    BASOPHILS   --   0.70   --    --    ASTSGOT  85*   --   53*  46*   ALTSGPT  28   --   24  23   ALKPHOSPHAT  68   --   76  64   TBILIRUBIN  0.5   --   0.5  0.5     Hemodynamics:  Temp (24hrs), Av.2 °C (99 °F), Min:36.6 °C (97.8 °F), Max:37.6 °C (99.7 °F)  Temperature: 36.6 °C (97.8 °F)  Pulse  Av.5  Min: 68  Max: 127   Blood Pressure : 137/66     Respiratory:    Respiration: 16, Pulse Oximetry: 95 %     Work Of Breathing / Effort: Mild  RUL Breath Sounds: Clear, RML Breath Sounds: Diminished, RLL Breath Sounds: Diminished, SAWYER Breath Sounds: Clear, LLL Breath Sounds: Diminished  Fluids:    Intake/Output Summary (Last 24 hours) at 18 1342  Last data filed at 18 0928   Gross per 24 hour   Intake              240 ml   Output                0 ml   Net              240 ml     Weight: 93.3 kg (205 lb 11 oz)  GI/Nutrition:  Orders Placed This Encounter   Procedures   • Diet Order Cardiac     Standing Status:   Standing     Number of Occurrences:   1     Order Specific Question:   Diet:     Answer:   Cardiac [6]     Medical Decision Making, by Problem:  Active Hospital Problems    Diagnosis   • *Pseudomembranous colitis [A04.72]   • Non-traumatic rhabdomyolysis [M62.82]   • Acute kidney injury superimposed on chronic kidney disease (HCC) [N17.9, N18.9]   • Weakness [R53.1]   • Leukocytosis [D72.829]   • Normocytic normochromic anemia [D64.9]   • Hypokalemia [E87.6]   • Aspiration pneumonia (HCC) [J69.0]       Plan:  Pseudomembranous colitis: Empiric treatment with Vancomycin initiated. If positive for C Diff continue Vanco 125 q6 for 14 days. Risk for recurrence considering comorbidities is 30-50%, risk of death over 1  year is 20-30% if positive for C Diff.  Discussion regarding FMT also reviewed. Encouraged healthier eating habits. If possible we ask the primary team to discontinue immunosuppressive medications (symbicort). Will follow. Discussed with patient, wife, son, and RN.     Quality-Core Measures

## 2018-11-11 NOTE — PROGRESS NOTES
Internal Medicine Interval Note  Note Author: Keisha Caban M.D.     Name Barry Torres     1940   Age/Sex 78 y.o. male   MRN [id number]   Code Status  full     After 5PM or if no immediate response to page, please call for cross-coverage  Attending/Team: Dr. Dia Carney/[place] See Patient List for primary contact information  Call [phone] to page    1st Call - Day Intern (R1):   Dr. Keisha Caban  2nd Call - Day Sr. Resident (R2/R3):   Dr. Karlie Fink      Reason for interval visit  (Principal Problem)   Pseudomembranous colitis    Interval Problem Daily Status Update  (24 hours, problem oriented, brief subjective history, new lab/imaging data pertinent to that problem)   No acute overnight events.  Yesterday patient had colonoscopy, revealing severe pseudomembranous disease, and is being treated as presumptive C. difficile colitis although C. difficile testing was negative.  He was started on oral vancomycin, which he has been tolerating well.  Seems to be clinically improving, with decreased abdominal pain, no tenderness, no fevers, still with some small volume nonbloody watery stool, and decreasing white count.  Will await further recommendations from GI.  Patient was advised to inform staff of any worsening abdominal symptoms.    Patient's oxygen requirements are still elevated from his baseline of 2 L nightly, however decreased from yesterday.  Chest findings still with bibasilar crackles, L>R.  We will still continue treatment for HCAP vs aspiration pneumonia with cefepime (renally adjusted), IV vancomycin and IV metronidazole.  Patient will also have swallow evaluation.    Creatinine stable from yesterday however still increased from baseline, rhabdomyolysis resolving.    On blood work patient had hypokalemia, likely due to decreased intake as well as increased GI losses.  Repleted, will continue to monitor.     Review of Systems   Constitutional: Negative for chills and  fever.   Respiratory: Negative for cough, sputum production and shortness of breath.    Cardiovascular: Negative for chest pain, palpitations and leg swelling.   Gastrointestinal: Positive for diarrhea. Negative for blood in stool, heartburn, nausea and vomiting.   Genitourinary: Negative for dysuria and hematuria.   Musculoskeletal: Negative for joint pain and myalgias.   Skin: Negative for itching and rash.   Neurological: Negative for dizziness and headaches.     Disposition/Barriers to discharge:   Guarded    Consultants/Specialty  Dr. Ganesh Peacock/gastroenterology  PCP: Lizzie Soto M.D.    Quality Measures  Quality-Core Measures   Reviewed items::  Labs reviewed, Medications reviewed and Radiology images reviewed  Lozano catheter::  No Lozano  DVT prophylaxis pharmacological::  Contraindicated - High bleeding risk  DVT prophylaxis - mechanical:  SCDs  Ulcer Prophylaxis::  Not indicated    Physical Exam     Vitals:    11/11/18 0020 11/11/18 0404 11/11/18 0800 11/11/18 1220   BP: 126/65 127/74 136/74 137/66   Pulse: 92 84 83 99   Resp: 16 17 18 16   Temp: 37.6 °C (99.7 °F) 37.4 °C (99.4 °F) 36.9 °C (98.5 °F) 36.6 °C (97.8 °F)   SpO2: 96% 98% 92% 95%   Weight:       Height:         Body mass index is 27.14 kg/m². Weight: 93.3 kg (205 lb 11 oz)  Oxygen Therapy:  Pulse Oximetry: 95 %, O2 (LPM): 2, O2 Delivery: Nasal Cannula    Physical Exam   Constitutional: He is oriented to person, place, and time and well-developed, well-nourished, and in no distress.   HENT:   Head: Normocephalic and atraumatic.   Eyes: EOM are normal.   Neck: Normal range of motion.   Cardiovascular: Normal rate, regular rhythm, normal heart sounds and intact distal pulses.    Pulmonary/Chest: Effort normal.   On nasal cannula at 4 LPM, no respiratory distress, bibasilar crackles L>R, no wheezes   Abdominal:   Mildly distended abdomen, normoactive bowel sounds, firm, no tenderness in all quadrants to superficial and deep palpation, no guarding,  rigidity   Musculoskeletal: Normal range of motion. He exhibits no edema.   Neurological: He is alert and oriented to person, place, and time.   Skin: Skin is warm and dry.   Psychiatric: Affect and judgment normal.   Nursing note and vitals reviewed.    Assessment/Plan     * Pseudomembranous colitis- (present on admission)   Assessment & Plan    Colonoscopy showed pseudomembranes although negative C. difficile- will treat for presumptive disease    -GI following  -Await biopsy results  -Diet as tolerated   -Continue IVF  -Watch for worsening abdominal pain consider: abdominal x-ray  -Continue oral vancomycin        Non-traumatic rhabdomyolysis   Assessment & Plan    Improving  -Concern for possible KERRY due to rhabdomyolysis as CPK > 500  -Monitor CPK, BMP      Acute kidney injury superimposed on chronic kidney disease (HCC)- (present on admission)   Assessment & Plan    Creatinine baseline ~1.4-1.6; solitary kidney   -Concern for possible KERRY due to rhabdomyolysis as CPK > 500  -Cont IVF until CPK <500  -Monitor BMP  -Avoid nephrotoxic drugs, all meds renally adjusted       Weakness- (present on admission)   Assessment & Plan    Strength 5/5 on all extremities  -Fall precautions in place   -PT ordered     Leukocytosis- (present on admission)   Assessment & Plan    Improving  -Monitor CBC  -Continue cefepime, vancomycin IV for suspected aspiration pneumonia vs HCAP -reassess with CXR tomorrow (two-view)  -Continue oral vancomycon (not absorbed) for C. difficile colitis x 10 days in total       Aspiration pneumonia (HCC)   Assessment & Plan    Aspiration pneumonia (history of choking of pills, LL lung field) vs HCAP (previous admission with IV antibiotics)   We are treating to cover both at this time, however consider de-escalating antibiotics  -Follow-up CXR   -Continue IV metronidazole, cefepime, vancomycin     Hypokalemia   Assessment & Plan    Due to GI losses  Replete as necessary, monitor     Normocytic  normochromic anemia- (present on admission)   Assessment & Plan    Ferritin: 587  Likely multifactorial: Nutritional, inflammatory and CKD 3   No current signs of GI bleed, no ASA or NSAIDs, no warfarin or DOAC use  -CTM  -Transfuse if hemoglobin <7 or symptomatic  -No need for iron therapy at this time because of inflammatory component

## 2018-11-11 NOTE — PROGRESS NOTES
Two rn skin assessment completed.    Pt skin fragile, red. Incontinence dermatitis to perianal area, barrier paste applied frequently. Stools slowing down, condom cath in place.    Pads to O2 tubing placed, red ears, slow to carlos. Pt wears O2 at home.     Pt is repositioning self frequently and is sitting up in chair now.

## 2018-11-11 NOTE — PROGRESS NOTES
Assumed care of patient, pt awake, alert, oriented. Contact isolation for C diff. Denies pain. Incontinent of stool, BM soft, brown. Condom cath in place. Skin sensitive and excoriated related to incontinence. Barrier paste placed. IV fluids increased per order.

## 2018-11-11 NOTE — ASSESSMENT & PLAN NOTE
Resolving. Patient is back at baseline of 2L of oxygen.  Aspiration pneumonia (history of choking of pills, LL lung field) vs HCAP (previous admission with IV antibiotics)   We are treating to cover both at this time, however consider de-escalating antibiotics  Follow-up CXR   Patient to be discharged to complete 5 day course of oral azithromycin. Prescription sent to patient's pharmacy.

## 2018-11-11 NOTE — PROGRESS NOTES
Assumed care from LISA Allen at [id number]. Report received. Pt has no complaints of pain at this time. Pt breathing equally bilaterally with no strain noted. Pt on 5L nasal cannula at this time. No acute signs of distress noted. Will continue to monitor.

## 2018-11-11 NOTE — PROGRESS NOTES
ST eval ordered. Pt tolerating regular diet with thin liquids.     Spoke with speech therapist, RN to reorder ST if needed in the future. Cancelled for now.

## 2018-11-11 NOTE — NON-PROVIDER
.         Internal Medicine Medical Student Note  Note Author: Ziggy Perea, Student    Name Barry Torres     1940   Age/Sex 78 y.o. male   MRN [id number]   Code Status FULL       Reason for interval visit  (Principal Problem)   C. difficile colitis    Interval Problem Daily Status Update  (problem status, last 24 hours, new history, new data )   Patient is a 78 year old male inpatient following 1x  Month of diarrhea and weakness likely due to C. Diff. Colonoscopy showed pseudomembranes throughout the colon. Biopsy pending. Patient's C. Diff PCR was negative previously. Vancomycin started. Changes will be made to antibiotics if biospy does not show C. Diff.     Per Patient, he still has non-bloody diarrhea, however it has been improving. He denies any abdominal pain or fatigue. Overall, he states that he is feeling better.    .Review of Systems   Constitutional: Negative for malaise/fatigue.   Respiratory: Positive for cough. Negative for sputum production.    Gastrointestinal: Positive for diarrhea. Negative for abdominal pain and blood in stool.   Neurological: Negative for weakness.         Physical Exam       Vitals:    11/10/18 1923 11/10/18 2034 18 0020 18 0404   BP: 120/76 130/69 126/65 127/74   Pulse: 85 76 92 84   Resp:  16 16 17   Temp: 37.2 °C (98.9 °F) 37.2 °C (98.9 °F) 37.6 °C (99.7 °F) 37.4 °C (99.4 °F)   SpO2: 94% 93% 96% 98%   Weight: 93.3 kg (205 lb 11 oz)      Height:         Body mass index is 27.14 kg/m². Weight: 93.3 kg (205 lb 11 oz)  Oxygen Therapy:  Pulse Oximetry: 98 %, O2 (LPM): 6, O2 Delivery: Nasal Cannula    Physical Exam   Constitutional: He is oriented to person, place, and time. No distress.   Conversational, sitting up in bed   Cardiovascular: Normal rate and regular rhythm.  Exam reveals no gallop and no friction rub.    No murmur heard.  Pulmonary/Chest: Effort normal and breath sounds normal. No respiratory distress. He has no wheezes.   Crackles in lower  bases bilaterally   Abdominal: Bowel sounds are normal. He exhibits distension. There is no tenderness. There is no rebound and no guarding.   Firm throughout  Not TTP throughout  Mildly distended   Musculoskeletal: He exhibits edema.   Trace Edema   Neurological: He is alert and oriented to person, place, and time.   Skin: Skin is warm and dry.         Results for FIDELIA MERA (MRN [id number]) as of 11/11/2018 06:22   Ref. Range 11/11/2018 02:56   WBC Latest Ref Range: 4.8 - 10.8 K/uL 12.5 (H)   RBC Latest Ref Range: 4.70 - 6.10 M/uL 2.78 (L)   Hemoglobin Latest Ref Range: 14.0 - 18.0 g/dL 8.6 (L)   Hematocrit Latest Ref Range: 42.0 - 52.0 % 26.4 (L)   MCV Latest Ref Range: 81.4 - 97.8 fL 95.0   MCH Latest Ref Range: 27.0 - 33.0 pg 30.9   MCHC Latest Ref Range: 33.7 - 35.3 g/dL 32.6 (L)   RDW Latest Ref Range: 35.9 - 50.0 fL 52.4 (H)   Platelet Count Latest Ref Range: 164 - 446 K/uL 274   MPV Latest Ref Range: 9.0 - 12.9 fL 9.8   Sodium Latest Ref Range: 135 - 145 mmol/L 139   Potassium Latest Ref Range: 3.6 - 5.5 mmol/L 3.2 (L)   Chloride Latest Ref Range: 96 - 112 mmol/L 109   Co2 Latest Ref Range: 20 - 33 mmol/L 21   Anion Gap Latest Ref Range: 0.0 - 11.9  9.0   Glucose Latest Ref Range: 65 - 99 mg/dL 102 (H)   Bun Latest Ref Range: 8 - 22 mg/dL 18   Creatinine Latest Ref Range: 0.50 - 1.40 mg/dL 1.62 (H)   GFR If  Latest Ref Range: >60 mL/min/1.73 m 2 50 (A)   GFR If Non  Latest Ref Range: >60 mL/min/1.73 m 2 41 (A)   Calcium Latest Ref Range: 8.5 - 10.5 mg/dL 7.2 (L)   AST(SGOT) Latest Ref Range: 12 - 45 U/L 46 (H)   ALT(SGPT) Latest Ref Range: 2 - 50 U/L 23   Alkaline Phosphatase Latest Ref Range: 30 - 99 U/L 64   Total Bilirubin Latest Ref Range: 0.1 - 1.5 mg/dL 0.5   Albumin Latest Ref Range: 3.2 - 4.9 g/dL 2.4 (L)   Total Protein Latest Ref Range: 6.0 - 8.2 g/dL 4.8 (L)   Globulin Latest Ref Range: 1.9 - 3.5 g/dL 2.4   A-G Ratio Latest Units: g/dL 1.0     Results  for FIDELIA MERA (MRN [id number]) as of 11/11/2018 06:22   Ref. Range 11/10/2018 06:34   Procalcitonin Latest Ref Range: <0.25 ng/mL 0.62 (H)     Abdominal X-Ray:   No dilated bowel identified    Chest X-Ray:   1.  Bibasilar atelectasis/consolidation, stable.    2.  Stable cardiomegaly.    Assessment/Plan     Patient is a 78 year old male admitted for weakness and SOB following 1 month of diarrhea likely due to C. Diff after colonoscopy found pseudomembranes.      #Diarrhea  Patient has had 1 month of diarrhea that began in the hospital. Likely etiology includes C. Diff, Microscopic Colitis, Giardia, Malabsorption Syndrome (such as Celiacs), or Toxic Megacolon. Patient was treated with Keflax for sepsis due to cellulitis in the past month when diarrhea began. C. Diff negative. Abdominal X-Ray indicated: Above-average stool volume is compatible with constipation. No radiographic evidence of bowel obstruction. Repeat abdominal x-ray done due to distended abdomen did not show dilation of bowel loops. Stool fat negative. Celiac panel is negative. FOBT negative. SED elevated to 24.78. HIV negative. Ferratin 587.2. CT indicates inflammatory or infectious etiology. Post colonoscopy: Although C. Diff negative colonoscopy showed pseudomembranes throughout colon. Treatment will be done on presumptive C. Diff colitis and adjusted based on biopsy results. Patient started on Vancomycin.         Plan  - Continue IVF  - Monitor Mag  - Start oral Vancomycin  - GI recommendations        #Leukocytosis  Possible infectious etiology for chronic diarrhea or pneumonia.  Patient's leukocytosis has improved from 15.1 to 12.5 this morning. CXR indicates worsening airspace opacity in the left lung base could relate to worsening atelectasis or infection. However, repeat chest x-ray shows bibasilar atelectasis/consolidation, stable. Patient says he chokes on his pills and food sometimes causing concern for aspiration pneumonia.  Patient is currently on 5L of oxygen and saturating well. Procalc elevated. H. CAP coverage added. Patient started on oral Vanco for possible C. Diff colitis. WBC is trending down. Repeat CXR.     Plan  - Patient on metronidazole  - Cefepime and Vancomycin IV added for aspiration pneumonia (suspected)  - Oral Vancomycin - Possible C. Diff Colitis based on colonoscopy  - Monitor CBC  - Repeat CXR        #KERRY superimposed on CKD Stage III  Patient was elevated Cr from his baseline of 1.33. Currently, 1.62. GFR 41. Likely etiology pre-renal due to dehydration with chronic diarrhea x1 month. Patient as solitary kidney post nephrectomy. Avoid nephrotoxic drugs.     Plan  - Monitor Cr and BUN  - Continue IVF        #Non-traumatic Rhabdomyolysis  Patient's CPK was intially 5150, but now has decreased to 610. Patient denies myalgia or dark urine. Rosuvastatin was discontinued. Fluids were stopped due to concern for fluid overload by night team. Recheck CPK today to reassess.      Plan  - Monitor GFR and Cr due to concern for contribution to KERRY  - Monitor CPK, BMP  - Continue IVF       #Normocytic Normochromic Anemia  Patient's Hb has been declining over the past year. FOBT negative today. Ferritin 587.2. Etiology nutritional, GI bleed, or CKD. No signs of current GI bleeding. Patient's hemoglobin 8.6. Continues to decline, however likely due to inflammatory illness.      Plan  - continue to monitor H & H  - Transfuse if <7 or symtomatic     #Weakness  Patient's weakness likely due to chronic diarrhea and other comorbid conditions. Patient had an episode of desaturation and dizziness when moving to the commode. He does not report weakness today.       #COPD  Patient has a history of COPD. Uses 2L of oxygen at home. He is currently on 5L with good oxygen saturation.      Plan  - Continue symbicort 80-4.5mcg  - Continue Spiriva 18mcg  - Respiratory care protocol  - Continue oxygen wean as tolerated  - Continuous pulse  ox        #Rheumatoid Arthritis  - Hold home medications        #Hyperlipidemia  - Discontinued rousuvastatin due to rhabdomyolysis.

## 2018-11-11 NOTE — PROGRESS NOTES
2 RN Skin Check    2 RN skin check performed with LISA Howard.  Patient had bilateral redness and blanching on ears.  Left ear had one small spot that was not blanching.  Gray foam pads placed on soft, silicone tubing.  Bilateral elbows were pink and blanching.  Bilateral heels were dry, pink, and blanching.  Bilateral toes were dry but skin was intact and showed no signs of injury.  Sacrum was pink and blanching.  Patient is incontinent, so no mepilex placed.  Bottom was pink and excoriated from incontinence associated dermatitis.  Scrotum was excoriated from incontinence associated dermatitis.  Barrier wipes and paste used to protect skin.  Condom catheter used to protect skin.

## 2018-11-11 NOTE — CARE PLAN
Problem: Venous Thromboembolism (VTW)/Deep Vein Thrombosis (DVT) Prevention:  Goal: Patient will participate in Venous Thrombosis (VTE)/Deep Vein Thrombosis (DVT)Prevention Measures  Outcome: PROGRESSING AS EXPECTED  Patient will not develop DVT during shift.    Problem: Skin Integrity  Goal: Risk for impaired skin integrity will decrease  Outcome: PROGRESSING AS EXPECTED  Patient will not experience further skin breakdown during shift.    Problem: Respiratory:  Goal: Respiratory status will improve  Outcome: PROGRESSING AS EXPECTED  Patient will wean down to 3L during shift.

## 2018-11-11 NOTE — THERAPY
Speech therapy contact note:     Per RN patient tolerating regular diet with no overt s/s concerning for penetration/aspiration. RN to clarify with MD and cancel if no longer indicated. Thank you.

## 2018-11-12 LAB
ALBUMIN SERPL BCP-MCNC: 2.2 G/DL (ref 3.2–4.9)
ALBUMIN/GLOB SERPL: 0.9 G/DL
ALP SERPL-CCNC: 58 U/L (ref 30–99)
ALT SERPL-CCNC: 21 U/L (ref 2–50)
ANION GAP SERPL CALC-SCNC: 7 MMOL/L (ref 0–11.9)
AST SERPL-CCNC: 34 U/L (ref 12–45)
BACTERIA BLD CULT: NORMAL
BACTERIA BLD CULT: NORMAL
BACTERIA STL CULT: NORMAL
BILIRUB SERPL-MCNC: 0.5 MG/DL (ref 0.1–1.5)
BUN SERPL-MCNC: 15 MG/DL (ref 8–22)
C DIFF DNA SPEC QL NAA+PROBE: NEGATIVE
C DIFF TOX A+B STL QL IA: NEGATIVE
C DIFF TOX GENS STL QL NAA+PROBE: NORMAL
CALCIUM SERPL-MCNC: 7.1 MG/DL (ref 8.5–10.5)
CHLORIDE SERPL-SCNC: 113 MMOL/L (ref 96–112)
CO2 SERPL-SCNC: 20 MMOL/L (ref 20–33)
CREAT SERPL-MCNC: 1.3 MG/DL (ref 0.5–1.4)
E COLI SXT1+2 STL IA: NORMAL
ERYTHROCYTE [DISTWIDTH] IN BLOOD BY AUTOMATED COUNT: 53.8 FL (ref 35.9–50)
GLOBULIN SER CALC-MCNC: 2.4 G/DL (ref 1.9–3.5)
GLUCOSE SERPL-MCNC: 95 MG/DL (ref 65–99)
HCT VFR BLD AUTO: 24.8 % (ref 42–52)
HGB BLD-MCNC: 7.9 G/DL (ref 14–18)
MCH RBC QN AUTO: 30.6 PG (ref 27–33)
MCHC RBC AUTO-ENTMCNC: 31.9 G/DL (ref 33.7–35.3)
MCV RBC AUTO: 96.1 FL (ref 81.4–97.8)
PATHOLOGY CONSULT NOTE: NORMAL
PLATELET # BLD AUTO: 274 K/UL (ref 164–446)
PMV BLD AUTO: 10.1 FL (ref 9–12.9)
POTASSIUM SERPL-SCNC: 3.5 MMOL/L (ref 3.6–5.5)
PROT SERPL-MCNC: 4.6 G/DL (ref 6–8.2)
RBC # BLD AUTO: 2.58 M/UL (ref 4.7–6.1)
SIGNIFICANT IND 70042: NORMAL
SITE SITE: NORMAL
SODIUM SERPL-SCNC: 140 MMOL/L (ref 135–145)
SOURCE SOURCE: NORMAL
WBC # BLD AUTO: 10.5 K/UL (ref 4.8–10.8)

## 2018-11-12 PROCEDURE — 87324 CLOSTRIDIUM AG IA: CPT

## 2018-11-12 PROCEDURE — A9270 NON-COVERED ITEM OR SERVICE: HCPCS | Performed by: STUDENT IN AN ORGANIZED HEALTH CARE EDUCATION/TRAINING PROGRAM

## 2018-11-12 PROCEDURE — 700105 HCHG RX REV CODE 258: Performed by: STUDENT IN AN ORGANIZED HEALTH CARE EDUCATION/TRAINING PROGRAM

## 2018-11-12 PROCEDURE — 36415 COLL VENOUS BLD VENIPUNCTURE: CPT

## 2018-11-12 PROCEDURE — 770020 HCHG ROOM/CARE - TELE (206)

## 2018-11-12 PROCEDURE — 97162 PT EVAL MOD COMPLEX 30 MIN: CPT

## 2018-11-12 PROCEDURE — 700101 HCHG RX REV CODE 250: Performed by: STUDENT IN AN ORGANIZED HEALTH CARE EDUCATION/TRAINING PROGRAM

## 2018-11-12 PROCEDURE — 87493 C DIFF AMPLIFIED PROBE: CPT

## 2018-11-12 PROCEDURE — 99232 SBSQ HOSP IP/OBS MODERATE 35: CPT | Mod: GC | Performed by: HOSPITALIST

## 2018-11-12 PROCEDURE — G8978 MOBILITY CURRENT STATUS: HCPCS | Mod: CK

## 2018-11-12 PROCEDURE — 700102 HCHG RX REV CODE 250 W/ 637 OVERRIDE(OP): Performed by: STUDENT IN AN ORGANIZED HEALTH CARE EDUCATION/TRAINING PROGRAM

## 2018-11-12 PROCEDURE — 85027 COMPLETE CBC AUTOMATED: CPT

## 2018-11-12 PROCEDURE — 80053 COMPREHEN METABOLIC PANEL: CPT

## 2018-11-12 PROCEDURE — G8979 MOBILITY GOAL STATUS: HCPCS | Mod: CI

## 2018-11-12 RX ORDER — POTASSIUM CHLORIDE 20 MEQ/1
40 TABLET, EXTENDED RELEASE ORAL ONCE
Status: COMPLETED | OUTPATIENT
Start: 2018-11-12 | End: 2018-11-12

## 2018-11-12 RX ADMIN — SODIUM CHLORIDE: 9 INJECTION, SOLUTION INTRAVENOUS at 14:51

## 2018-11-12 RX ADMIN — GABAPENTIN 300 MG: 300 CAPSULE ORAL at 06:46

## 2018-11-12 RX ADMIN — POTASSIUM CHLORIDE 40 MEQ: 1500 TABLET, EXTENDED RELEASE ORAL at 06:46

## 2018-11-12 RX ADMIN — SODIUM CHLORIDE: 9 INJECTION, SOLUTION INTRAVENOUS at 01:42

## 2018-11-12 RX ADMIN — VANCOMYCIN HYDROCHLORIDE 125 MG: 10 INJECTION, POWDER, LYOPHILIZED, FOR SOLUTION INTRAVENOUS at 06:46

## 2018-11-12 RX ADMIN — VANCOMYCIN HYDROCHLORIDE 125 MG: 10 INJECTION, POWDER, LYOPHILIZED, FOR SOLUTION INTRAVENOUS at 12:35

## 2018-11-12 RX ADMIN — GABAPENTIN 600 MG: 300 CAPSULE ORAL at 17:33

## 2018-11-12 RX ADMIN — SODIUM CHLORIDE: 9 INJECTION, SOLUTION INTRAVENOUS at 06:47

## 2018-11-12 RX ADMIN — VANCOMYCIN HYDROCHLORIDE 125 MG: 10 INJECTION, POWDER, LYOPHILIZED, FOR SOLUTION INTRAVENOUS at 00:36

## 2018-11-12 RX ADMIN — VANCOMYCIN HYDROCHLORIDE 125 MG: 10 INJECTION, POWDER, LYOPHILIZED, FOR SOLUTION INTRAVENOUS at 17:33

## 2018-11-12 RX ADMIN — METRONIDAZOLE 500 MG: 500 INJECTION, SOLUTION INTRAVENOUS at 06:47

## 2018-11-12 RX ADMIN — AZITHROMYCIN MONOHYDRATE 250 MG: 250 TABLET ORAL at 06:46

## 2018-11-12 RX ADMIN — TIOTROPIUM BROMIDE 1 CAPSULE: 18 CAPSULE ORAL; RESPIRATORY (INHALATION) at 06:46

## 2018-11-12 ASSESSMENT — ENCOUNTER SYMPTOMS
CHILLS: 0
DIZZINESS: 0
TINGLING: 0
DEPRESSION: 0
BLOOD IN STOOL: 0
FEVER: 0
PALPITATIONS: 0
ABDOMINAL PAIN: 0
NAUSEA: 0
WEAKNESS: 0
VOMITING: 0
HEARTBURN: 0
COUGH: 1
COUGH: 0
BLURRED VISION: 0
SPUTUM PRODUCTION: 0
MYALGIAS: 0
DIARRHEA: 1
HEADACHES: 0
SHORTNESS OF BREATH: 0

## 2018-11-12 ASSESSMENT — COGNITIVE AND FUNCTIONAL STATUS - GENERAL
MOVING TO AND FROM BED TO CHAIR: UNABLE
SUGGESTED CMS G CODE MODIFIER MOBILITY: CL
MOBILITY SCORE: 11
MOVING FROM LYING ON BACK TO SITTING ON SIDE OF FLAT BED: UNABLE
TURNING FROM BACK TO SIDE WHILE IN FLAT BAD: A LOT
CLIMB 3 TO 5 STEPS WITH RAILING: A LOT
STANDING UP FROM CHAIR USING ARMS: A LITTLE
WALKING IN HOSPITAL ROOM: A LOT

## 2018-11-12 ASSESSMENT — GAIT ASSESSMENTS
DISTANCE (FEET): 5
DEVIATION: SHUFFLED GAIT;BRADYKINETIC
ASSISTIVE DEVICE: FRONT WHEEL WALKER
GAIT LEVEL OF ASSIST: MINIMAL ASSIST

## 2018-11-12 ASSESSMENT — PAIN SCALES - GENERAL
PAINLEVEL_OUTOF10: 0
PAINLEVEL_OUTOF10: 0

## 2018-11-12 NOTE — PROGRESS NOTES
Bedside report received. Per night RN pt had multiple loose BM's. Patient A&O x 4. Currently requiring 2 L via NC. Diminished at bases. No complaints of pain at this time. HGB down to 7.9 from 8.6, no signs of bleeding. CPK trending down, NS running at 200 ml/hr.  POC discussed with patient. Pt verbalizes understanding. Call light and belongings with in reach. Bed locked and in lowest position, alarm and fall precautions in place.

## 2018-11-12 NOTE — NON-PROVIDER
.         Internal Medicine Medical Student Note  Note Author: Ziggy Perea, Student    Name Barry Torres     1940   Age/Sex 78 y.o. male   MRN [id number]   Code Status FULL             Reason for interval visit  (Principal Problem)   Pseudomembranous colitis    Interval Problem Daily Status Update  (problem status, last 24 hours, new history, new data )   Patient is a 78 year old male inpatient following 1x  Month of diarrhea and weakness likely due to C. Diff. Colonoscopy showed pseudomembranes throughout the colon. Biopsy pending. Patient's C. Diff PCR was negative previously. Vancomycin started. Changes will be made to antibiotics if biospy does not show C. Diff.     #C. Diff  Patient is on oral vancomycin for C. Diff unless biopsy (pending) confirms otherwise. Per GN if biopsy positive for C. Diff continue Vancomycin 125 q6 for 14 days. Possible consideration of discontinuing immunosuppressive mediations (symbicort.) Thus symbicort was discontinued. Patient continues to have non-bloody diarrhea. Last night he had 6BM, however not large amounts. He denies any abdominal pain. His abdomen remains mildly distended.       #Pneumonia, possible HCAP vs aspiration pneumonia  Patient is currently on 2L oxygen throughout the day. At home his baseline is 2L at night. Patient currently on azithromycin (Zithromax), Ceftriaxone discontinued, and Metronidazole. Obtain 2 view CXR if patient can tolerate standing. Patient denies SOB at this time. He has a chronic dry cough, but denies sputum production.       #KERRY  Patient's kidney function has fluctuated while in patient. Less concerning for KERRY due to baseline being higher according to PCP note. Today Cr is 1.30 which has improved.       #Rhabodmyolysis  Last CK was 610. Resolving.       #Hypokalemia  Patient at 3.5K today and was given supplementation.       Review of Systems   Respiratory: Positive for cough. Negative for sputum production and shortness of  breath.    Cardiovascular: Negative for chest pain.   Gastrointestinal: Positive for diarrhea. Negative for abdominal pain and vomiting.   Neurological: Negative for weakness.             Physical Exam       Vitals:    11/11/18 1600 11/11/18 2018 11/11/18 2347 11/12/18 0414   BP: 146/79 136/77 132/70 144/76   Pulse: 87 88 62 67   Resp: 20 20 17 16   Temp: 36.7 °C (98.1 °F) 37.7 °C (99.8 °F) 36.7 °C (98.1 °F) 36.7 °C (98.1 °F)   SpO2: 94% 94% 92% 93%   Weight:       Height:         Body mass index is 27.14 kg/m².    Oxygen Therapy:  Pulse Oximetry: 93 %, O2 (LPM): 2, O2 Delivery: Nasal Cannula    Physical Exam   Constitutional: He is oriented to person, place, and time. No distress.   Pleasant gentleman, sitting up in bed watching TV, comfortable   Cardiovascular: Normal rate and regular rhythm.  Exam reveals no gallop and no friction rub.    No murmur heard.  Pulmonary/Chest: Effort normal and breath sounds normal. No respiratory distress. He has no wheezes.   Crackles in lower bases   Abdominal: Bowel sounds are normal. He exhibits distension. He exhibits no mass. There is no tenderness. There is no rebound and no guarding.   Firm abdomen  Mildly distended  No TTP throughout   Musculoskeletal: He exhibits edema. He exhibits no deformity.   Trace edema   Neurological: He is alert and oriented to person, place, and time. No cranial nerve deficit.   Skin: Skin is warm and dry.         Results for SAMARIA FIDELIA DECKER (MRN [id number]) as of 11/12/2018 06:29   Ref. Range 11/12/2018 02:23   WBC Latest Ref Range: 4.8 - 10.8 K/uL 10.5   RBC Latest Ref Range: 4.70 - 6.10 M/uL 2.58 (L)   Hemoglobin Latest Ref Range: 14.0 - 18.0 g/dL 7.9 (L)   Hematocrit Latest Ref Range: 42.0 - 52.0 % 24.8 (L)   MCV Latest Ref Range: 81.4 - 97.8 fL 96.1   MCH Latest Ref Range: 27.0 - 33.0 pg 30.6   MCHC Latest Ref Range: 33.7 - 35.3 g/dL 31.9 (L)   RDW Latest Ref Range: 35.9 - 50.0 fL 53.8 (H)   Platelet Count Latest Ref Range: 164 - 446  K/uL 274   MPV Latest Ref Range: 9.0 - 12.9 fL 10.1   Results for FIDELIA MERA (MRN [id number]) as of 11/12/2018 06:29   Ref. Range 11/12/2018 02:23   Sodium Latest Ref Range: 135 - 145 mmol/L 140   Potassium Latest Ref Range: 3.6 - 5.5 mmol/L 3.5 (L)   Chloride Latest Ref Range: 96 - 112 mmol/L 113 (H)   Co2 Latest Ref Range: 20 - 33 mmol/L 20   Anion Gap Latest Ref Range: 0.0 - 11.9  7.0   Glucose Latest Ref Range: 65 - 99 mg/dL 95   Bun Latest Ref Range: 8 - 22 mg/dL 15   Creatinine Latest Ref Range: 0.50 - 1.40 mg/dL 1.30   GFR If  Latest Ref Range: >60 mL/min/1.73 m 2 >60   GFR If Non  Latest Ref Range: >60 mL/min/1.73 m 2 53 (A)   Calcium Latest Ref Range: 8.5 - 10.5 mg/dL 7.1 (L)   AST(SGOT) Latest Ref Range: 12 - 45 U/L 34   ALT(SGPT) Latest Ref Range: 2 - 50 U/L 21   Alkaline Phosphatase Latest Ref Range: 30 - 99 U/L 58   Total Bilirubin Latest Ref Range: 0.1 - 1.5 mg/dL 0.5   Albumin Latest Ref Range: 3.2 - 4.9 g/dL 2.2 (L)   Total Protein Latest Ref Range: 6.0 - 8.2 g/dL 4.6 (L)   Globulin Latest Ref Range: 1.9 - 3.5 g/dL 2.4   A-G Ratio Latest Units: g/dL 0.9     Results for FIDELIA MERA (MRN [id number]) as of 11/12/2018 06:29   Ref. Range 11/11/2018 09:52   Phosphorus Latest Ref Range: 2.5 - 4.5 mg/dL 2.2 (L)   Results for FIDELIA MERA (MRN [id number]) as of 11/12/2018 06:29   Ref. Range 11/11/2018 09:52   Phosphorus Latest Ref Range: 2.5 - 4.5 mg/dL 2.2 (L)   Results for FIDELIA MERA (MRN [id number]) as of 11/12/2018 06:29   Ref. Range 11/11/2018 08:29   Significant Indicator Unknown NEG   Site Unknown NARES   Source Unknown RESP     Assessment/Plan     Patient is a 78 year old male admitted for weakness and SOB following 1 month of diarrhea likely due to C. Diff after colonoscopy found pseudomembranes.      #Diarrhea  Patient has had 1 month of diarrhea that began in the hospital. Likely etiology includes C. Diff, Microscopic Colitis,  Giardia, Malabsorption Syndrome (such as Celiacs), or Toxic Megacolon. Patient was treated with Keflax for sepsis due to cellulitis in the past month when diarrhea began. C. Diff negative. Abdominal X-Ray indicated: Above-average stool volume is compatible with constipation. No radiographic evidence of bowel obstruction. Repeat abdominal x-ray done due to distended abdomen did not show dilation of bowel loops. Stool fat negative. Celiac panel is negative. FOBT negative. SED elevated to 24.78. HIV negative. Ferratin 587.2. CT indicates inflammatory or infectious etiology. Post colonoscopy: Although C. Diff negative colonoscopy showed pseudomembranes throughout colon. Treatment will be done on presumptive C. Diff colitis and adjusted based on biopsy results. Patient started on oral Vancomycin. Per GI: Patient is on oral vancomycin for C. Diff unless biopsy (pending) confirms otherwise. Per GN if biopsy positive for C. Diff continue Vancomycin 125 q6 for 14 days. Possible consideration of discontinuing immunosuppressive mediations (symbicort.) Thus symbicort was discontinued.        Plan  - Continue IVF  - Monitor Mag  - Continue oral Vancomycin        #Leukocytosis  Possible infectious etiology for chronic diarrhea or pneumonia.  Patient's leukocytosis has improved from 15.1 to 10.5 this morning. CXR indicates worsening airspace opacity in the left lung base could relate to worsening atelectasis or infection. However, repeat chest x-ray shows bibasilar atelectasis/consolidation, stable. Patient says he chokes on his pills and food sometimes causing concern for aspiration pneumonia. Patient is currently on 5L of oxygen and saturating well. Procalc elevated. H. CAP coverage added. Patient started on oral Vanco for possible C. Diff colitis. WBC is trending down. Repeat CXR. Ceftriaxone discontinued.      Plan  - Patient on metronidazole and azithromycin   - Vancomycin IV added for aspiration pneumonia (suspected)  - Oral  Vancomycin - Possible C. Diff Colitis based on colonoscopy  - Monitor CBC  - Repeat CXR 2 View        #KERRY superimposed on CKD Stage III  Patient Cr fluctuates. Per PCP note baseline elevated. Currently, 1.30. GFR 53. Likely etiology pre-renal due to dehydration with chronic diarrhea x1 month. Patient as solitary kidney post nephrectomy. Avoid nephrotoxic drugs.     Plan  - Monitor Cr and BUN  - Continue IVF        #Non-traumatic Rhabdomyolysis  Patient's CPK was intially 5150, but now has decreased to 610. Rosuvastatin was discontinued.       Plan  - Monitor GFR and Cr due to concern for contribution to KERRY  - Monitor CPK, BMP  - Continue IVF        #Normocytic Normochromic Anemia  Patient's Hb has been declining over the past year. FOBT negative today. Ferritin 587.2. Etiology nutritional, GI bleed, or CKD. No signs of current GI bleeding. Patient's hemoglobin 7.9. Continues to decline, however likely due to inflammatory illness or blood loss with C. Diff.     Plan  - continue to monitor H & H  - Transfuse if <7 or symtomatic     #Weakness  Patient's weakness likely due to chronic diarrhea and other comorbid conditions. Patient had an episode of desaturation and dizziness when moving to the commode. He does not report weakness today.         #COPD  Patient has a history of COPD. Uses 2L of oxygen at home at night. He is currently on 2L with good oxygen saturation. Symbicort discontinued per GI recommendation due to immunosuppressive effect.     Plan  - Continue Spiriva 18mcg  - Respiratory care protocol  - Continue oxygen wean as tolerated  - Continuous pulse ox        #Rheumatoid Arthritis  - Hold home medications        #Hyperlipidemia  - Discontinued rousuvastatin due to rhabdomyolysis.

## 2018-11-12 NOTE — PROGRESS NOTES
Gastroenterology Progress Note   Note Author: Jenni Neely M.D.     Name Barry Torres     1940   Age/Sex 78 y.o. male   MRN [id number]   Code Status Full code        Reason for consult  Diarrhea and abdominal pain     Interval Problem Daily Status Update    Patient had colonoscopy on 11/10 and showed pseudomembrane.   Biopsy and C diff test still pending.   Patient has improved clinically     Review of Systems   Constitutional: Negative for chills and fever.   Respiratory: Negative for shortness of breath.    Cardiovascular: Negative for chest pain.   Gastrointestinal: Positive for diarrhea. Negative for abdominal pain, blood in stool, nausea and vomiting.   Skin: Negative for rash.   Neurological: Negative for dizziness and headaches.   Psychiatric/Behavioral: Negative for depression.       Quality Measures  Quality-Core Measures   Reviewed items::  Labs reviewed, Medications reviewed and Radiology images reviewed      Physical Exam       Vitals:    18 2347 18 0414 18 0840   BP: 136/77 132/70 144/76 139/74   Pulse: 88 62 67 80   Resp: 20 17 16 18   Temp: 37.7 °C (99.8 °F) 36.7 °C (98.1 °F) 36.7 °C (98.1 °F) 36.5 °C (97.7 °F)   SpO2: 94% 92% 93% 99%   Weight:       Height:         Body mass index is 27.14 kg/m².    Oxygen Therapy:  Pulse Oximetry: 99 %, O2 (LPM): 2, O2 Delivery: Silicone Nasal Cannula    Physical Exam   Constitutional: He is oriented to person, place, and time and well-developed, well-nourished, and in no distress.   HENT:   Head: Normocephalic and atraumatic.   Eyes: EOM are normal. No scleral icterus.   Neck: Neck supple.   Cardiovascular: Normal rate and regular rhythm.    Pulmonary/Chest: Effort normal and breath sounds normal. No respiratory distress.   Abdominal: Soft. Bowel sounds are normal. He exhibits no distension. There is no tenderness. There is no rebound and no guarding.   Musculoskeletal: He exhibits no edema.    Neurological: He is alert and oriented to person, place, and time.   Skin: Skin is warm.   Psychiatric: Affect normal.   Nursing note and vitals reviewed.    Lab Results   Component Value Date/Time    WBC 10.5 11/12/2018 02:23 AM    RBC 2.58 (L) 11/12/2018 02:23 AM    HEMOGLOBIN 7.9 (L) 11/12/2018 02:23 AM    HEMATOCRIT 24.8 (L) 11/12/2018 02:23 AM    MCV 96.1 11/12/2018 02:23 AM    MCH 30.6 11/12/2018 02:23 AM    MCHC 31.9 (L) 11/12/2018 02:23 AM    MPV 10.1 11/12/2018 02:23 AM    NEUTSPOLYS 81.00 (H) 11/09/2018 09:36 PM    LYMPHOCYTES 6.80 (L) 11/09/2018 09:36 PM    MONOCYTES 9.80 11/09/2018 09:36 PM    EOSINOPHILS 1.10 11/09/2018 09:36 PM    BASOPHILS 0.70 11/09/2018 09:36 PM      Lab Results   Component Value Date/Time    SODIUM 140 11/12/2018 02:23 AM    POTASSIUM 3.5 (L) 11/12/2018 02:23 AM    CHLORIDE 113 (H) 11/12/2018 02:23 AM    CO2 20 11/12/2018 02:23 AM    GLUCOSE 95 11/12/2018 02:23 AM    BUN 15 11/12/2018 02:23 AM    CREATININE 1.30 11/12/2018 02:23 AM    CREATININE 1.5 (H) 04/11/2005 10:13 AM        Assessment/Plan     Pseudomembranous colitis:   - continue empiric treatment with Vancomycin, pending C diff test   - Follow biopsy results   - Encourage healthier eating   - Discussed with patient and wife that agree with plan   - Will continue to follow

## 2018-11-12 NOTE — PROGRESS NOTES
Internal Medicine Interval Note  Note Author: Juvencio Clay D.O.     Name Barry Torres     1940   Age/Sex 78 y.o. male   MRN [id number]   Code Status  full     After 5PM or if no immediate response to page, please call for cross-coverage  Attending/Team: Dr. ALICE Galicia/[place] See Patient List for primary contact information  Call [phone] to page    1st Call - Day Intern (R1):   Dr. Juvencio Clay  2nd Call - Day Sr. Resident (R2/R3):   Dr. Karlie Fink      Reason for interval visit  (Principal Problem)   Pseudomembranous colitis  Pneumonia    Interval Problem Daily Status Update  (24 hours, problem oriented, brief subjective history, new lab/imaging data pertinent to that problem)   The patient is a 78 year old male who presented with weakness, was on abx prior, and had a colonoscopy which showed diffuse pseudomembranous collitis. The patient is being treated empirically for C-diff as C-diff toxin has been negative x 2.    No acute overnight events.  He denies any abdominal pain.  However, he reports watery diarrhea and had approximately 4 episodes overnight.  We have reordered the C. difficile tests.  Biopsies done by GI are pending. Question IBD as cause of pseudomembranous colitis. Vancomycin trough is 15.4.    Patient's oxygen requirements are back at his baseline of 2 L nightly,  Minimal bibasilar crackles. We will still continue treatment for HCAP vs aspiration pneumonia with azithromycin, ceftriaxone, and IV flagyl.      Patient's creatinine has improved to 1.30 from 1.62 yesterday.    The patient was hypokalemic with a potassium of 3.5.  Likely secondary to GI losses.  We will continue to replete as necessary.    We will discontinue IV flagyl at this time per pharmacy recommendations as per guidelines the patient does not have c-diff with ileus. We will continue oral vancomycin for empiric treatment of c-diff for now.      Review of Systems   Constitutional:  Negative for chills, fever and malaise/fatigue.   HENT: Negative for hearing loss.    Eyes: Negative for blurred vision.   Respiratory: Negative for cough, sputum production and shortness of breath.    Cardiovascular: Negative for palpitations and leg swelling.   Gastrointestinal: Positive for diarrhea. Negative for blood in stool, heartburn, nausea and vomiting.   Genitourinary: Negative for dysuria and hematuria.   Musculoskeletal: Negative for joint pain and myalgias.   Skin: Negative for itching and rash.   Neurological: Negative for dizziness, tingling and headaches.     Disposition/Barriers to discharge:   [place]    Consultants/Specialty  Dr. Ganesh Peacock/gastroenterology  PCP: Lizzie Soto M.D.    Quality Measures  Quality-Core Measures   Reviewed items::  Labs reviewed, Medications reviewed and Radiology images reviewed  Lozano catheter::  No Lozano  DVT prophylaxis pharmacological::  Contraindicated - High bleeding risk  DVT prophylaxis - mechanical:  SCDs  Ulcer Prophylaxis::  Not indicated    Physical Exam     Vitals:    11/11/18 1600 11/11/18 2018 11/11/18 2347 11/12/18 0414   BP: 146/79 136/77 132/70 144/76   Pulse: 87 88 62 67   Resp: 20 20 17 16   Temp: 36.7 °C (98.1 °F) 37.7 °C (99.8 °F) 36.7 °C (98.1 °F) 36.7 °C (98.1 °F)   SpO2: 94% 94% 92% 93%   Weight:       Height:         Body mass index is 27.14 kg/m².    Oxygen Therapy:  Pulse Oximetry: 93 %, O2 (LPM): 2, O2 Delivery: Nasal Cannula    Physical Exam   Constitutional: He is oriented to person, place, and time and well-developed, well-nourished, and in no distress.   HENT:   Head: Normocephalic and atraumatic.   Eyes: Pupils are equal, round, and reactive to light. Conjunctivae and EOM are normal.   Neck: Normal range of motion. Neck supple.   Cardiovascular: Normal rate, regular rhythm, normal heart sounds and intact distal pulses.    Pulmonary/Chest: Effort normal.   Minimal bibasilar crackles   Abdominal: Soft. Bowel sounds are normal. He  exhibits no distension. There is no tenderness. There is no rebound and no guarding.   Abdominal distension is improving.  No rebound tenderness or guarding.   Musculoskeletal: Normal range of motion. He exhibits no edema.   Neurological: He is alert and oriented to person, place, and time. No cranial nerve deficit.   Skin: Skin is warm and dry.   Psychiatric: Mood, memory, affect and judgment normal.   Nursing note and vitals reviewed.    Assessment/Plan     * Pseudomembranous colitis- (present on admission)   Assessment & Plan    Colonoscopy showed pseudomembranes although negative C. difficile- will treat for presumptive disease  However, other more rare causes of pseudomembranous colitis include Irritable Bowel Disease    GI is following; Biopsy results pending  Diet as tolerated   Continue IVF  Watch for worsening abdominal pain consider: abdominal x-ray  Continue oral vancomycin; discontinue oral metronidazole.    We will recheck c-diff toxin per GI recommendations.       Non-traumatic rhabdomyolysis   Assessment & Plan    Resolving.  There was concern for possible KERRY due to rhabdomyolysis as CPK > 500  We will decrease fluid rate to 100 and recheck CPK a final time in AM per pharmacy recommendations.       Acute kidney injury superimposed on chronic kidney disease (HCC)- (present on admission)   Assessment & Plan    Creatinine baseline ~1.4-1.6; solitary kidney   Creatinine is down to 1.3  Concern for possible KERRY due to rhabdomyolysis as   Cont IVF until CPK <500  Monitor BMP  Avoid nephrotoxic drugs, all meds renally adjusted       Weakness- (present on admission)   Assessment & Plan    Strength 5/5 on all extremities  Fall precautions in place   PT ordered     Leukocytosis- (present on admission)   Assessment & Plan    Improving  Monitor CBC  Continue azithromycin, ceftriaxone for suspected aspiration pneumonia vs HCAP -reassess with CXR tomorrow (two-view)  Continue oral vancomycon (not absorbed)  for C. difficile colitis x 10 days in total       Aspiration pneumonia (HCC)   Assessment & Plan    Aspiration pneumonia (history of choking of pills, LL lung field) vs HCAP (previous admission with IV antibiotics)   We are treating to cover both at this time, however consider de-escalating antibiotics  Follow-up CXR   Continue IV azithromycin     Hypokalemia   Assessment & Plan    Due to GI losses  Replete as necessary, monitor     Normocytic normochromic anemia- (present on admission)   Assessment & Plan    Ferritin: 587  Likely multifactorial: Nutritional, inflammatory and CKD 3   No current signs of GI bleed, no ASA or NSAIDs, no warfarin or DOAC use  Transfuse if hemoglobin <7 or symptomatic

## 2018-11-12 NOTE — CARE PLAN
Problem: Infection  Goal: Will remain free from infection  Outcome: PROGRESSING AS EXPECTED  No fevers or signs of infection. IV/PO antibiotics.     Problem: Discharge Barriers/Planning  Goal: Patient's continuum of care needs will be met  Outcome: PROGRESSING AS EXPECTED  Awaiting culture results for proper d/c antibiotics.     Problem: Respiratory:  Goal: Respiratory status will improve  Outcome: PROGRESSING AS EXPECTED  Back to baseline, 2L NC HS.

## 2018-11-13 ENCOUNTER — HOME HEALTH ADMISSION (OUTPATIENT)
Dept: HOME HEALTH SERVICES | Facility: HOME HEALTHCARE | Age: 78
End: 2018-11-13
Payer: MEDICARE

## 2018-11-13 ENCOUNTER — PATIENT OUTREACH (OUTPATIENT)
Dept: HEALTH INFORMATION MANAGEMENT | Facility: OTHER | Age: 78
End: 2018-11-13

## 2018-11-13 VITALS
RESPIRATION RATE: 18 BRPM | HEART RATE: 82 BPM | OXYGEN SATURATION: 95 % | BODY MASS INDEX: 27.99 KG/M2 | DIASTOLIC BLOOD PRESSURE: 71 MMHG | TEMPERATURE: 97.7 F | WEIGHT: 211.2 LBS | HEIGHT: 73 IN | SYSTOLIC BLOOD PRESSURE: 133 MMHG

## 2018-11-13 LAB
ALBUMIN SERPL BCP-MCNC: 2.5 G/DL (ref 3.2–4.9)
ALBUMIN/GLOB SERPL: 1.3 G/DL
ALP SERPL-CCNC: 57 U/L (ref 30–99)
ALT SERPL-CCNC: 20 U/L (ref 2–50)
ANION GAP SERPL CALC-SCNC: 5 MMOL/L (ref 0–11.9)
AST SERPL-CCNC: 29 U/L (ref 12–45)
BILIRUB SERPL-MCNC: 0.5 MG/DL (ref 0.1–1.5)
BUN SERPL-MCNC: 12 MG/DL (ref 8–22)
CALCIUM SERPL-MCNC: 7.5 MG/DL (ref 8.5–10.5)
CHLORIDE SERPL-SCNC: 113 MMOL/L (ref 96–112)
CK SERPL-CCNC: 150 U/L (ref 0–154)
CO2 SERPL-SCNC: 22 MMOL/L (ref 20–33)
CREAT SERPL-MCNC: 1.13 MG/DL (ref 0.5–1.4)
ERYTHROCYTE [DISTWIDTH] IN BLOOD BY AUTOMATED COUNT: 56 FL (ref 35.9–50)
GLOBULIN SER CALC-MCNC: 2 G/DL (ref 1.9–3.5)
GLUCOSE SERPL-MCNC: 89 MG/DL (ref 65–99)
HCT VFR BLD AUTO: 28.6 % (ref 42–52)
HGB BLD-MCNC: 8.7 G/DL (ref 14–18)
MCH RBC QN AUTO: 30.1 PG (ref 27–33)
MCHC RBC AUTO-ENTMCNC: 30.4 G/DL (ref 33.7–35.3)
MCV RBC AUTO: 99 FL (ref 81.4–97.8)
PLATELET # BLD AUTO: 305 K/UL (ref 164–446)
PMV BLD AUTO: 9.9 FL (ref 9–12.9)
POTASSIUM SERPL-SCNC: 3.9 MMOL/L (ref 3.6–5.5)
PROT SERPL-MCNC: 4.5 G/DL (ref 6–8.2)
RBC # BLD AUTO: 2.89 M/UL (ref 4.7–6.1)
SODIUM SERPL-SCNC: 140 MMOL/L (ref 135–145)
WBC # BLD AUTO: 6.4 K/UL (ref 4.8–10.8)

## 2018-11-13 PROCEDURE — 36415 COLL VENOUS BLD VENIPUNCTURE: CPT

## 2018-11-13 PROCEDURE — 85027 COMPLETE CBC AUTOMATED: CPT

## 2018-11-13 PROCEDURE — 99239 HOSP IP/OBS DSCHRG MGMT >30: CPT | Mod: GC | Performed by: HOSPITALIST

## 2018-11-13 PROCEDURE — G8979 MOBILITY GOAL STATUS: HCPCS | Mod: CI

## 2018-11-13 PROCEDURE — 700105 HCHG RX REV CODE 258: Performed by: STUDENT IN AN ORGANIZED HEALTH CARE EDUCATION/TRAINING PROGRAM

## 2018-11-13 PROCEDURE — 700102 HCHG RX REV CODE 250 W/ 637 OVERRIDE(OP): Performed by: STUDENT IN AN ORGANIZED HEALTH CARE EDUCATION/TRAINING PROGRAM

## 2018-11-13 PROCEDURE — 97530 THERAPEUTIC ACTIVITIES: CPT

## 2018-11-13 PROCEDURE — G8980 MOBILITY D/C STATUS: HCPCS | Mod: CJ

## 2018-11-13 PROCEDURE — 82550 ASSAY OF CK (CPK): CPT

## 2018-11-13 PROCEDURE — A9270 NON-COVERED ITEM OR SERVICE: HCPCS | Performed by: STUDENT IN AN ORGANIZED HEALTH CARE EDUCATION/TRAINING PROGRAM

## 2018-11-13 PROCEDURE — 80053 COMPREHEN METABOLIC PANEL: CPT

## 2018-11-13 PROCEDURE — 97116 GAIT TRAINING THERAPY: CPT

## 2018-11-13 RX ORDER — AZITHROMYCIN 250 MG/1
250 TABLET, FILM COATED ORAL DAILY
Qty: 4 TAB | Refills: 0 | Status: SHIPPED | OUTPATIENT
Start: 2018-11-13 | End: 2018-11-17

## 2018-11-13 RX ADMIN — VANCOMYCIN HYDROCHLORIDE 125 MG: 10 INJECTION, POWDER, LYOPHILIZED, FOR SOLUTION INTRAVENOUS at 13:46

## 2018-11-13 RX ADMIN — SODIUM CHLORIDE: 9 INJECTION, SOLUTION INTRAVENOUS at 01:06

## 2018-11-13 RX ADMIN — VANCOMYCIN HYDROCHLORIDE 125 MG: 10 INJECTION, POWDER, LYOPHILIZED, FOR SOLUTION INTRAVENOUS at 05:26

## 2018-11-13 RX ADMIN — AZITHROMYCIN MONOHYDRATE 250 MG: 250 TABLET ORAL at 05:26

## 2018-11-13 RX ADMIN — SODIUM CHLORIDE: 9 INJECTION, SOLUTION INTRAVENOUS at 11:11

## 2018-11-13 RX ADMIN — GABAPENTIN 300 MG: 300 CAPSULE ORAL at 06:00

## 2018-11-13 RX ADMIN — VANCOMYCIN HYDROCHLORIDE 125 MG: 10 INJECTION, POWDER, LYOPHILIZED, FOR SOLUTION INTRAVENOUS at 00:19

## 2018-11-13 RX ADMIN — TIOTROPIUM BROMIDE 1 CAPSULE: 18 CAPSULE ORAL; RESPIRATORY (INHALATION) at 06:41

## 2018-11-13 ASSESSMENT — ENCOUNTER SYMPTOMS
DIARRHEA: 1
BLOOD IN STOOL: 0
DIZZINESS: 0
FEVER: 0
SHORTNESS OF BREATH: 0
VOMITING: 0
NAUSEA: 0
SPUTUM PRODUCTION: 0
CHILLS: 0
ABDOMINAL PAIN: 0
DEPRESSION: 0
FALLS: 0
COUGH: 1

## 2018-11-13 ASSESSMENT — COGNITIVE AND FUNCTIONAL STATUS - GENERAL
MOVING TO AND FROM BED TO CHAIR: UNABLE
CLIMB 3 TO 5 STEPS WITH RAILING: A LITTLE
WALKING IN HOSPITAL ROOM: A LITTLE
MOVING FROM LYING ON BACK TO SITTING ON SIDE OF FLAT BED: UNABLE
TURNING FROM BACK TO SIDE WHILE IN FLAT BAD: A LITTLE
STANDING UP FROM CHAIR USING ARMS: A LITTLE
SUGGESTED CMS G CODE MODIFIER MOBILITY: CL
MOBILITY SCORE: 14

## 2018-11-13 ASSESSMENT — PAIN SCALES - GENERAL
PAINLEVEL_OUTOF10: 0

## 2018-11-13 ASSESSMENT — GAIT ASSESSMENTS
DEVIATION: BRADYKINETIC;SHUFFLED GAIT
DISTANCE (FEET): 50
GAIT LEVEL OF ASSIST: CONTACT GUARD ASSIST
ASSISTIVE DEVICE: FRONT WHEEL WALKER

## 2018-11-13 NOTE — NON-PROVIDER
.         Internal Medicine Medical Student Note  Note Author: Ziggy Perea, Student    Name Barry Torres     1940   Age/Sex 78 y.o. male   MRN [id number]   Code Status FULL       Reason for interval visit  (Principal Problem)   Pseudomembranous colitis    Interval Problem Daily Status Update  (problem status, last 24 hours, new history, new data )   Patient is a 78 year old male inpatient following 1x  month of diarrhea and weakness likely due to C. Diff. Colonoscopy showed pseudomembranes throughout the colon. Biopsy pending. Patient's C. Diff PCR was negative intially with repeat negative as well. Oral Vancomycin started with metronidazole discontinued per pharmacy recommendation due no ileus suspected. Patient on azithromycin x5 days for possible pneumonia.    Patient worked with PT and was able to sit in the chair.     Per medicine perspective patient is able to go home today with home care PT if stable enough. Oral Vanco will be continued x14 days and course of azithromycin will be finished.      This morning patient does not have any complaints. Overnight he had 1 BM and overall reports improvement in diarrhea. He has a chronic dry cough with no sputum production. Currently, on 2L O2 baseline without SOB.     Review of Systems   Respiratory: Positive for cough. Negative for sputum production and shortness of breath.    Gastrointestinal: Positive for diarrhea. Negative for abdominal pain.         Physical Exam       Vitals:    18 1634 18 2037 18 2347 18 0405   BP: 157/74 121/69 133/68 142/83   Pulse: 78 81 75 82   Resp: 18 16 17 18   Temp: 36.2 °C (97.2 °F) 36.8 °C (98.3 °F) 36.4 °C (97.5 °F) 36.7 °C (98 °F)   SpO2: 95% 96% 98% 93%   Weight:  95.8 kg (211 lb 3.2 oz)     Height:         Body mass index is 27.86 kg/m². Weight: 95.8 kg (211 lb 3.2 oz)  Oxygen Therapy:  Pulse Oximetry: 93 %, O2 (LPM): 2, O2 Delivery: Nasal Cannula    Physical Exam   Constitutional: He is  oriented to person, place, and time. No distress.   Pleasant, sitting up in bed   Eyes: Pupils are equal, round, and reactive to light. EOM are normal.   Cardiovascular: Normal rate and regular rhythm.  Exam reveals no gallop and no friction rub.    No murmur heard.  Pulmonary/Chest: Effort normal and breath sounds normal. No respiratory distress. He has no wheezes.   Crackles in lower bases bilaterally   Abdominal: Bowel sounds are normal. He exhibits distension. He exhibits no mass. There is no tenderness. There is no rebound and no guarding.   Mildly Firm  Moderate Distention throughout  No TTP   Musculoskeletal: He exhibits edema. He exhibits no deformity.   1+ edema bilaterally   Neurological: He is alert and oriented to person, place, and time. No cranial nerve deficit. Coordination normal.   Skin: Skin is warm.   Psychiatric: Affect normal.         Results for FIDELIA MERA (MRN [id number]) as of 11/13/2018 06:21   Ref. Range 11/13/2018 01:17   WBC Latest Ref Range: 4.8 - 10.8 K/uL 6.4   RBC Latest Ref Range: 4.70 - 6.10 M/uL 2.89 (L)   Hemoglobin Latest Ref Range: 14.0 - 18.0 g/dL 8.7 (L)   Hematocrit Latest Ref Range: 42.0 - 52.0 % 28.6 (L)   MCV Latest Ref Range: 81.4 - 97.8 fL 99.0 (H)   MCH Latest Ref Range: 27.0 - 33.0 pg 30.1   MCHC Latest Ref Range: 33.7 - 35.3 g/dL 30.4 (L)   RDW Latest Ref Range: 35.9 - 50.0 fL 56.0 (H)   Platelet Count Latest Ref Range: 164 - 446 K/uL 305   MPV Latest Ref Range: 9.0 - 12.9 fL 9.9   Results for FIDELIA MERA (MRN [id number]) as of 11/13/2018 06:21   Ref. Range 11/12/2018 17:07   [id number] Presumptive Latest Ref Range: Negative  Negative   C Diff by PCR Latest Ref Range: Negative  See Toxin   C.Diff Toxin A&B Unknown Negative   Results for FIDELIA MERA (MRN [id number]) as of 11/13/2018 06:21   Ref. Range 11/13/2018 01:17   Sodium Latest Ref Range: 135 - 145 mmol/L 140   Potassium Latest Ref Range: 3.6 - 5.5 mmol/L 3.9   Chloride Latest Ref  Range: 96 - 112 mmol/L 113 (H)   Co2 Latest Ref Range: 20 - 33 mmol/L 22   Anion Gap Latest Ref Range: 0.0 - 11.9  5.0   Glucose Latest Ref Range: 65 - 99 mg/dL 89   Bun Latest Ref Range: 8 - 22 mg/dL 12   Creatinine Latest Ref Range: 0.50 - 1.40 mg/dL 1.13   GFR If  Latest Ref Range: >60 mL/min/1.73 m 2 >60   GFR If Non  Latest Ref Range: >60 mL/min/1.73 m 2 >60   Calcium Latest Ref Range: 8.5 - 10.5 mg/dL 7.5 (L)   AST(SGOT) Latest Ref Range: 12 - 45 U/L 29   ALT(SGPT) Latest Ref Range: 2 - 50 U/L 20   Alkaline Phosphatase Latest Ref Range: 30 - 99 U/L 57   Total Bilirubin Latest Ref Range: 0.1 - 1.5 mg/dL 0.5   Albumin Latest Ref Range: 3.2 - 4.9 g/dL 2.5 (L)   Total Protein Latest Ref Range: 6.0 - 8.2 g/dL 4.5 (L)   Globulin Latest Ref Range: 1.9 - 3.5 g/dL 2.0   A-G Ratio Latest Units: g/dL 1.3   CPK Total Latest Ref Range: 0 - 154 U/L 150       Assessment/Plan     Patient is a 78 year old male admitted for weakness and SOB following 1 month of diarrhea likely due to C. Diff after colonoscopy found pseudomembranes.      #Diarrhea  Patient has had 1 month of diarrhea that began in the hospital. Likely etiology includes C. Diff, Microscopic Colitis, Giardia, Malabsorption Syndrome (such as Celiacs), or Toxic Megacolon. Patient was treated with Keflax for sepsis due to cellulitis in the past month when diarrhea began. C. Diff negative. Abdominal X-Ray indicated: Above-average stool volume is compatible with constipation. No radiographic evidence of bowel obstruction. Repeat abdominal x-ray done due to distended abdomen did not show dilation of bowel loops. Stool fat negative. Celiac panel is negative. FOBT negative. SED elevated to 24.78. HIV negative. Ferratin 587.2. CT indicates inflammatory or infectious etiology. Post colonoscopy: Although C. Diff negative colonoscopy showed pseudomembranes throughout colon. Treatment will be done on presumptive C. Diff colitis and adjusted  based on biopsy results. Patient started on oral Vancomycin. Per GI: Patient is on oral vancomycin for C. Diff unless biopsy (pending) confirms otherwise. Per GI if biopsy positive for C. Diff continue Vancomycin 125 q6 for 14 days. Possible consideration of discontinuing immunosuppressive mediations (symbicort.) Thus symbicort was discontinued. Patient's repeat C. Diff negative. He is ready for discharge with oral vancomycin to finish course.      Plan  - Continue oral Vancomycin x14 days outpatient        #Leukocytosis  Possible infectious etiology for chronic diarrhea or pneumonia.  Patient's leukocytosis has improved from 15.1 to 6.4 this morning. CXR indicates worsening airspace opacity in the left lung base could relate to worsening atelectasis or infection. However, repeat chest x-ray shows bibasilar atelectasis/consolidation, stable. Patient says he chokes on his pills and food sometimes causing concern for aspiration pneumonia. Patient is currently on 2L of oxygen and saturating well. Procalc elevated. H. CAP coverage added. Patient started on oral Vanco for possible C. Diff colitis. WBC is trending down. Repeat CXR. Ceftriaxone and Metronidazole discontinued.      Plan  - Continue azithromycin course (x5 days) outpatient   - Oral Vancomycin - Possible C. Diff Colitis based on colonoscopy    #Weakness  Patient's weakness likely due to chronic diarrhea and other comorbid conditions. Patient had an episode of desaturation and dizziness when moving to the commode. He does not report weakness today. Patient was able to ambulate to chair with PT. If patient can ambulate with walker patient can be discharge with home health PT.     Plan  - Continue to work with PT        #KERRY superimposed on CKD Stage III  Patient Cr fluctuates. Per PCP note baseline elevated. Currently, 1.30. GFR 53. Likely etiology pre-renal due to dehydration with chronic diarrhea x1 month. Patient as solitary kidney post nephrectomy. Avoid  nephrotoxic drugs.     Plan  - Monitor Cr and BUN       #Non-traumatic Rhabdomyolysis, resolved  Patient's CPK was intially 5150, but now has decreased to 150. Rosuvastatin was discontinued.        #Normocytic Normochromic Anemia  Patient's Hb has been declining over the past year. FOBT negative today. Ferritin 587.2. Etiology nutritional, GI bleed, or CKD. No signs of current GI bleeding. Likely due to inflammatory illness or blood loss with C. Diff.     Plan  - continue to monitor H & H  - Transfuse if <7 or symtomatic        #COPD  Patient has a history of COPD. Uses 2L of oxygen at home at night. He is currently on 2L with good oxygen saturation. Symbicort discontinued per GI recommendation due to immunosuppressive effect.     Plan  - Continue Spiriva 18mcg  - Respiratory care protocol  - Continue oxygen wean as tolerated  - Continuous pulse ox        #Rheumatoid Arthritis  - Hold home medications        #Hyperlipidemia  - Discontinued rousuvastatin due to rhabdomyolysis.

## 2018-11-13 NOTE — DISCHARGE INSTRUCTIONS
Discharge Instructions    Discharged to home by car with relative. Discharged via wheelchair, hospital escort: Yes.  Special equipment needed: Wheelchair    Be sure to schedule a follow-up appointment with your primary care doctor or any specialists as instructed.     Discharge Plan:   Diet Plan: Discussed  Activity Level: Discussed  Confirmed Follow up Appointment: Appointment Scheduled  Confirmed Symptoms Management: Discussed  Medication Reconciliation Updated: Yes  Influenza Vaccine Indication: Not indicated: Previously immunized this influenza season and > 8 years of age    I understand that a diet low in cholesterol, fat, and sodium is recommended for good health. Unless I have been given specific instructions below for another diet, I accept this instruction as my diet prescription.   Other diet: cardaic    Special Instructions: None    · Is patient discharged on Warfarin / Coumadin?   No     Depression / Suicide Risk    As you are discharged from this [facility] facility, it is important to learn how to keep safe from harming yourself.    Recognize the warning signs:  · Abrupt changes in personality, positive or negative- including increase in energy   · Giving away possessions  · Change in eating patterns- significant weight changes-  positive or negative  · Change in sleeping patterns- unable to sleep or sleeping all the time   · Unwillingness or inability to communicate  · Depression  · Unusual sadness, discouragement and loneliness  · Talk of wanting to die  · Neglect of personal appearance   · Rebelliousness- reckless behavior  · Withdrawal from people/activities they love  · Confusion- inability to concentrate     If you or a loved one observes any of these behaviors or has concerns about self-harm, here's what you can do:  · Talk about it- your feelings and reasons for harming yourself  · Remove any means that you might use to hurt yourself (examples: pills, rope, extension cords, firearm)  · Get  professional help from the community (Mental Health, Substance Abuse, psychological counseling)  · Do not be alone:Call your Safe Contact- someone whom you trust who will be there for you.  · Call your local CRISIS HOTLINE 729-3305 or 548-037-8690  · Call your local Children's Mobile Crisis Response Team [place] (321) 712-6087 or www.[employer]  · Call the toll free National Suicide Prevention Hotlines   · National Suicide Prevention Lifeline 690-916-BVLT (5258)  · [employer] Line Network 800-SUICIDE (346-0300)      Acute Kidney Injury, Adult  Acute kidney injury (KERRY) occurs when there is sudden (acute) damage to the kidneys. A small amount of kidney damage may not cause problems, but a large amount of damage may make it difficult or impossible for the kidneys to work the way they should. KERRY may develop into long-lasting (chronic) kidney disease. Early detection and treatment of KERRY may prevent kidney damage from becoming permanent or getting worse.  What are the causes?  Common causes of this condition include:  · A problem with blood flow to the kidneys. This may be caused by:  ¨ Blood loss.  ¨ Heart and blood vessel (cardiovascular) disease.  ¨ Severe burns.  ¨ Liver disease.  · Direct damage to the kidneys. This may be caused by:  ¨ Some medicines.  ¨ A kidney infection.  ¨ Poisoning.  ¨ Being around or in contact with poisonous (toxic) substances.  ¨ A surgical wound.  ¨ A hard, direct force to the kidney area.  · A sudden blockage of urine flow. This may be caused by:  ¨ Cancer.  ¨ Kidney stones.  ¨ Enlarged prostate in males.  What are the signs or symptoms?  Symptoms develop slowly and may not be obvious until the kidney damage becomes severe. It is possible to have KERRY for years without showing any symptoms. Symptoms of this condition can include:  · Swelling (edema) of the face, legs, ankles, or feet.  · Numbness, tingling, or loss of feeling (sensation) in the hands or feet.  · Tiredness  (lethargy).  · Nausea or vomiting.  · Confusion or trouble concentrating.  · Problems with urination, such as:  ¨ Painful or burning feeling during urination.  ¨ Decreased urine production.  ¨ Frequent urination, especially at night.  ¨ Bloody urine.  · Muscle twitches and cramps, especially in the legs.  · Shortness of breath.  · Weakness.  · Constant itchiness.  · Loss of appetite.  · Metallic taste in the mouth.  · Trouble sleeping.  · Pale lining of the eyelids and surface of the eye (conjunctiva).  How is this diagnosed?  This condition may be diagnosed with various tests. Tests may include:  · Blood tests.  · Urine tests.  · Imaging tests.  · A test in which a sample of tissue is removed from the kidneys to be looked at under a microscope (kidney biopsy).  How is this treated?  Treatment of KERRY varies depending on the cause and severity of the kidney damage. In mild cases, treatment may not be needed. The kidneys may heal on their own.  If KERRY is more severe, your health care provider will treat the cause of the kidney damage, help the kidneys heal, and prevent problems from occurring. Severe cases may require a procedure to remove toxic wastes from the body (dialysis) or surgery to repair kidney damage. Surgery may involve:  · Repair of a torn kidney.  · Removal of a urine flow obstruction.  Follow these instructions at home:  · Follow your prescribed diet.  · Take over-the-counter and prescription medicines only as told by your health care provider.  ¨ Do not take any new medicines unless approved by your health care provider. Many medicines can worsen your kidney damage.  ¨ Do not take any vitamin and mineral supplements unless approved by your health care provider. Many nutritional supplements can worsen your kidney damage.  ¨ The dose of some medicines that you take may need to be adjusted.  · Do not use any tobacco products, such as cigarettes, chewing tobacco, and e-cigarettes. If you need help  quitting, ask your health care provider.  · Keep all follow-up visits as told by your health care provider. This is important.  · Keep track of your blood pressure. Report changes in your blood pressure as told by your health care provider.  · Achieve and maintain a healthy weight. If you need help with this, ask your health care provider.  · Start or continue an exercise plan. Try to exercise at least 30 minutes a day, 5 days a week.  · Stay current with immunizations as told by your health care provider.  Where to find more information:  · American Association of Kidney Patients: www.aakp.org  · National Kidney Foundation: www.kidney.org  · American Kidney Fund: www.akfinc.org  · [employer] [facility]: www.lifeoptions.org and www.kidneyschool.org  Contact a health care provider if:  · Your symptoms get worse.  · You develop new symptoms.  Get help right away if:  · You develop symptoms of end-stage kidney disease, which include:  ¨ Headaches.  ¨ Abnormally dark or light skin.  ¨ Numbness in the hands or feet.  ¨ Easy bruising.  ¨ Frequent hiccups.  ¨ Chest pain.  ¨ Shortness of breath.  ¨ End of menstruation in women.  · You have a fever.  · You have decreased urine production.  · You have pain or bleeding when you urinate.  This information is not intended to replace advice given to you by your health care provider. Make sure you discuss any questions you have with your health care provider.  Document Released: 07/02/2012 Document Revised: 07/27/2017 Document Reviewed: 08/16/2013  [employer] Patient Education © 2017 [employer]    Clostridium Difficile Infection  Introduction   Clostridium difficile (C. difficile or C. diff) infection causes inflammation of the large intestine (colon). This condition can result in damage to the lining of your colon and may lead to another condition called colitis. This infection can be passed from person to person (is contagious).  Follow these  instructions at home:  Eating and drinking  · Drink enough fluid to keep your pee (urine) clear or pale yellow.  · Avoid drinking:  ¨ Milk.  ¨ Caffeine.  ¨ Alcohol.  · Follow exact instructions from your doctor about how to get enough fluid in your body (rehydrate).  · Eat small meals often instead of large meals.  Medicines  · Take your antibiotic medicine as told by your doctor. Do not stop taking the antibiotic even if you start to feel better unless your doctor told you to do that.  · Take over-the-counter and prescription medicines only as told by your doctor.  · Do not use medicines to help with watery poop (diarrhea).  General instructions  · Wash your hands fully before you prepare food and after you use the bathroom. Make sure people who live with you also wash their  · hands often.  · Clean the surfaces that you touch. Use a product that contains chlorine bleach.  · Keep all follow-up visits as told by your doctor. This is important.  Contact a doctor if:  · Your symptoms do not get better with treatment.  · Your symptoms get worse with treatment.  · Your symptoms go away and then come back.  · You have a fever.  · You have new symptoms.  Get help right away if:  · You have more pain or tenderness in your belly (abdomen).  · Your poop (stool) is mostly bloody.  · Your poop looks dark black and tarry.  · You cannot eat or drink without throwing up (vomiting).  · You have signs of dehydration, such as:  ¨ Dark pee, very little pee, or no pee.  ¨ Cracked lips.  ¨ Not making tears when you cry.  ¨ Dry mouth.  ¨ Sunken eyes.  ¨ Feeling sleepy.  ¨ Feeling weak.  ¨ Feeling dizzy.  This information is not intended to replace advice given to you by your health care provider. Make sure you discuss any questions you have with your health care provider.  Document Released: 10/15/2010 Document Revised: 05/25/2017 Document Reviewed: 06/20/2016  © 2017 [employer]

## 2018-11-13 NOTE — DISCHARGE SUMMARY
Internal Medicine Discharge Summary  Note Author: Juvencio Clay D.O.       Name Barry Torres     1940   Age/Sex 78 y.o. male   MRN [id number]         Admit Date:  2018       Discharge Date:   18    Service:   [facility] Internal Medicine [facility]  Attending Physician(s):   Dr. Dia Carney; Dr. ALICE Galicia      Senior Resident(s):   Dr. Karlie Fink  Ramirez Resident(s):   Dr. Juvencio Clay  PCP: Lizzie Soto M.D.      Primary Diagnosis:   Pseudomembranous colitis    Secondary Diagnoses:                Principal Problem:    Pseudomembranous colitis POA: Yes  Active Problems:    Weakness POA: Yes    Acute kidney injury superimposed on chronic kidney disease (HCC) POA: Yes    Non-traumatic rhabdomyolysis POA: Unknown    Leukocytosis POA: Yes    Normocytic normochromic anemia POA: Yes    Hypokalemia POA: Unknown    Aspiration pneumonia (HCC) POA: Unknown  Resolved Problems:    * No resolved hospital problems. *      Hospital Summary (Brief Narrative):              Patient is a 78-year-old male with a history of one functioning kidney, emphysema on 2 L of oxygen at home, and hypertension, who was admitted for weakness.  On a previous admission on 2018, for cellulitis following carpal tunnel syndrome, he tested negative for C. Difficile, Fecal Lactoferrin was positive, and throughout this previous admission was administered vancomycin and ceftriaxone throughout the admission as well as oral cephalexin upon discharge. For the past 5 weeks, the patient had watery bowel movements. Also reported approximately 10 lbs of recent weight loss.  In the emergency department on this admission, the patient had hemoglobin of 9.3, mild leukocytosis of 12.8, and increased creatinine 1.65 from a baseline of 1.3. Tissue transglutaminase was negative. Stool WBCs were positive. Occult was positive.       Of note, the patient had an episode of pneumonia while inpatient and was started on  ceftriaxone while inpatient as well as IV metronidazole and IV vancomycin. At one point, the patient was requiring 10L of oxygen. On the day of discharge, the patient's oxygen requirements were back at baseline of 2 Liters. The patient was discharged home to complete a five day course of azithromycin 250mg BID. The prescription was sent to the patient's pharmacy.         The patient also had a brief episode of non-traumatic rhabdomyolysis with CPK less than 1,000. The patient was administered IV fluids. Creatinine resolved back to baseline. This problem resolved.         Gastroenterology was consulted and the patient had a colonoscopy performed.  The colonoscopy showed diffuse pseudomembranes consistent with pseudomembranous colitis.  C. difficile PCR was positive.  However, C. difficile toxin was negative 3 times. Biopsies from colonoscopy were negative for ulcerative colitis or irritable bowel disease but positive for acute colitis.  The patient had approximately 6 loose stools per night 3 nights prior to discharge.  The patient was started on oral vancomycin.  his symptoms improved dramatically throughout the hospitalization.  The patient reported minimal abdominal tenderness and one episode of loose stool that evening prior to discharge.  The patient's functional mobility also improved.  The patient was evaluated by physical therapy who recommended home health PT.          The patient was discharged home with instructions to complete a 14 day course of oral vancomycin per Gastroenterology's recommendations. The patient was instructed to return to the emergency department if he began having increased abdominal pain or recurrence of increased watery diarrhea. The patient was instructed by GI to follow up with Dr. Peacock. The patient was scheduled to go to post-discharge clinic in one week and to follow up with his PCP Dr. Palmer.             Patient /Hospital Summary (Details -- Problem Oriented) :           Non-traumatic rhabdomyolysis   Assessment & Plan    Resolved  There was concern for possible KERRY due to rhabdomyolysis as CPK > 500  IV fluids were administered and creatinine back to baseline.       Acute kidney injury superimposed on chronic kidney disease (HCC)   Assessment & Plan    Creatinine baseline ~1.4-1.6; solitary kidney   Creatinine is down to 1.3  Concern for possible KERRY due to rhabdomyolysis as   Cont IVF until CPK <500  Monitor BMP  Avoid nephrotoxic drugs, all meds renally adjusted       Weakness   Assessment & Plan    Strength 5/5 on all extremities  Fall precautions in place   Patient to be discharged with home health Physical Therapy.     * Pseudomembranous colitis   Assessment & Plan    Colonoscopy performed on this admission showed pseudomembranes although negative C. difficile- will treat for presumptive disease  However, other more rare causes of pseudomembranous colitis include Irritable Bowel Disease    Colonoscopy Biopsy results revealed acute colitis. Per GI, this is likely secondary to resolving C-diff infection.  GI recommends outpatient oral vancomycin to complete 14 day course.   We will discharge the patient with prescription for oral vancomycin to complete fourteen day course.       Leukocytosis   Assessment & Plan    Improving  Continue oral vancomycon for C. difficile colitis x 14 days in total       Aspiration pneumonia (HCC)   Assessment & Plan    Resolving. Patient is back at baseline of 2L of oxygen.  Aspiration pneumonia (history of choking of pills, LL lung field) vs HCAP (previous admission with IV antibiotics)   We are treating to cover both at this time, however consider de-escalating antibiotics  Follow-up CXR   Patient to be discharged to complete 5 day course of oral azithromycin. Prescription sent to patient's pharmacy.     Hypokalemia   Assessment & Plan    Due to GI losses  Replete as necessary, monitor     Normocytic normochromic anemia   Assessment &  Plan    Ferritin: 587  Likely multifactorial: Nutritional, inflammatory and CKD 3   No current signs of GI bleed, no ASA or NSAIDs, no warfarin or DOAC use  Transfuse if hemoglobin <7 or symptomatic           Consultants:     Gastroenterology; Digestive Health: Dr. Peacock and Dr. Reza  Physical Therapy    Procedures:        Colonoscopy revealing pseudomembranous colitis.    Imaging/ Testing:      [id number] VIEW   Final Result      No dilated bowel identified      DX-CHEST-PORTABLE (1 VIEW)   Final Result      1.  Bibasilar atelectasis/consolidation, stable.      2.  Stable cardiomegaly.      DX-CHEST-PORTABLE (1 VIEW)   Final Result         Worsening airspace opacity in the left lung base could relate to worsening atelectasis or infection.      CT-ABDOMEN-PELVIS W/O   Final Result      Wall thickening of the descending and sigmoid colon with inflammatory changes most prominent adjacent to the distal descending and sigmoid colon. Findings may represent an infectious or inflammatory colitis. Given the length of involvement, this is less    likely to represent diverticulitis.      Air-fluid levels in the colon can be seen in the setting of diarrhea. Mild colonic distention is seen.      Nonvisualization of the appendix, limiting evaluation.      There appears to be mildly enlarged lymph node at the aortic bifurcation which is nonspecific and may be reactive.      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      Fat-containing left flank hernia.      Small right bladder diverticulum.      Small amount of free fluid in the abdomen and pelvis.      Tiny nonobstructing right renal calculus.      Density layering within the gallbladder likely represents gallstones.      Small bilateral pleural effusions with overlying atelectasis/consolidation.         [id number] VIEW   Final Result      Above-average stool volume is compatible with constipation      No radiographic evidence of bowel  obstruction      DX-CHEST-PORTABLE (1 VIEW)   Final Result      Bibasilar atelectasis is mildly increased on the left and improved on the right.      Atherosclerotic plaque.               Discharge Medications:         Medication Reconciliation: Completed       Medication List      START taking these medications      Instructions   azithromycin 250 MG Tabs  Commonly known as:  ZITHROMAX   Take 1 Tab by mouth every day for 4 days. For pneumonia  Dose:  250 mg     vancomycin 50 mg/mL 50 mg/mL Soln   Doctor's comments:  12 day course for treatment of clostridium difficile colitis  Take 2.5 mL by mouth every 6 hours for 12 days.  Dose:  125 mg        CONTINUE taking these medications      Instructions   fexofenadine 180 MG tablet  Commonly known as:  ALLEGRA   Take 180 mg by mouth every day.  Dose:  180 mg     fluticasone 50 MCG/ACT nasal spray  Commonly known as:  FLONASE   Spray 1 Spray in nose every day.  Dose:  1 Spray     gabapentin 300 MG Caps  Commonly known as:  NEURONTIN   Take 300-600 mg by mouth 2 Times a Day. 300 mg in the morning 600 mg in the evening  Dose:  300-600 mg     MULTIVITAMIN PO   Take 1 Tab by mouth every day.  Dose:  1 Tab     PROBIOTIC PO   Take 1 Cap by mouth every day.  Dose:  1 Cap     SYMBICORT 80-4.5 MCG/ACT Aero  Generic drug:  budesonide-formoterol   INHALE 2 PUFFS BY MOUTH TWO TIMES A DAY        STOP taking these medications    CIMZIA PREFILLED 2 X 200 MG/ML Kit  Generic drug:  certolizumab pegol     leflunomide 20 MG Tabs  Commonly known as:  ARAVA     loperamide 2 MG tablet  Commonly known as:  IMODIUM A-D     Magnesium 250 MG Tabs     SPIRIVA HANDIHALER 18 MCG Caps  Generic drug:  tiotropium              Disposition:   Patient to be discharged home with home health.     Diet:   Renal diet    Activity:   Per home health Physical Therapy    Instructions:        The patient was instructed to return to the ER in the event of worsening symptoms. I have counseled the patient on the  importance of compliance and the patient has agreed to proceed with all medical recommendations and follow up plan indicated above.   The patient understands that all medications come with benefits and risks. Risks may include permanent injury or death and these risks can be minimized with close reassessment and monitoring.        Primary Care Provider:    Dr. Morgan  Discharge summary faxed to primary care provider:  Deferred  Copy of discharge summary given to the patient: Deferred      Follow up appointment details :      The patient will follow up with post-discharge clinic in one week.  The patient will follow up with Gastroenterology Dr. Peacock.  The patient has a follow up appointment scheduled with Dr. Soto.    Pending Studies:        None    Time spent on discharge day patient visit, preparing discharge paperwork and arranging for patient follow up.    Summary of follow up issues:   The patient will need to complete a 14 day course of oral vancomycin.  The patient will follow up with Gastroenterology for pseudomembranous colitis.    Discharge Time (Minutes) :    90  Hospital Course Type:  Inpatient Stay >2 midnights      Condition on Discharge    Medically Stable for Discharge  ______________________________________________________________________    Interval history/exam for day of discharge:    The patient is a 78 year old male who presented with weakness, was on abx prior, and had a colonoscopy which showed diffuse pseudomembranous collitis. The patient is being treated empirically for C-diff as C-diff toxin has been negative x 3.    No acute overnight events. The patient reports that he is doing much better this morning. Reports only one loose bowel movement overnight.  Reports that he has not been coughing. States that he no longer has shortness of breath.    The patient's third C-diff toxin came back negative. The patient was evaluated by gastroenterology who recommended that the patient continue 14  days of oral vancomycin. The patient was also instructed to use imodium AFTER completing fourteen day course of oral vancomycin for symptomatic relief.    Vitals:  Temperature: 36.5 degrees C; Pulse 82; RR: 18; BP: 133/71; O2=95% ORA    Physical Exam:  General: The patient is resting comfortably in bed.   HEENT: PERRL. EOM intact.   Neck: No JVD.  Cardiac: RRR. Neg. S3. Neg. S4. No murmurs, rubs, or gallops.  Lungs: CTA throughout. No rales. No wheeze.  Chest: No chest wall tenderness.  Abdomen: Soft, no masses. Normal bowel sounds. Nontender to palpation.  Neuro: A&O x4. No focal deficits.  Skin: No rashes or suspicious lesions.      Most Recent Labs:    Lab Results   Component Value Date/Time    WBC 6.4 11/13/2018 01:17 AM    RBC 2.89 (L) 11/13/2018 01:17 AM    HEMOGLOBIN 8.7 (L) 11/13/2018 01:17 AM    HEMATOCRIT 28.6 (L) 11/13/2018 01:17 AM    MCV 99.0 (H) 11/13/2018 01:17 AM    MCH 30.1 11/13/2018 01:17 AM    MCHC 30.4 (L) 11/13/2018 01:17 AM    MPV 9.9 11/13/2018 01:17 AM    NEUTSPOLYS 81.00 (H) 11/09/2018 09:36 PM    LYMPHOCYTES 6.80 (L) 11/09/2018 09:36 PM    MONOCYTES 9.80 11/09/2018 09:36 PM    EOSINOPHILS 1.10 11/09/2018 09:36 PM    BASOPHILS 0.70 11/09/2018 09:36 PM      Lab Results   Component Value Date/Time    SODIUM 140 11/13/2018 01:17 AM    POTASSIUM 3.9 11/13/2018 01:17 AM    CHLORIDE 113 (H) 11/13/2018 01:17 AM    CO2 22 11/13/2018 01:17 AM    GLUCOSE 89 11/13/2018 01:17 AM    BUN 12 11/13/2018 01:17 AM    CREATININE 1.13 11/13/2018 01:17 AM    CREATININE 1.5 (H) 04/11/2005 10:13 AM      Lab Results   Component Value Date/Time    ALTSGPT 20 11/13/2018 01:17 AM    ASTSGOT 29 11/13/2018 01:17 AM    ALKPHOSPHAT 57 11/13/2018 01:17 AM    TBILIRUBIN 0.5 11/13/2018 01:17 AM    DBILIRUBIN 0.1 10/29/2018 02:41 PM    LIPASE 10 (L) 11/07/2018 01:54 PM    ALBUMIN 2.5 (L) 11/13/2018 01:17 AM    ALBUMIN 4.24 09/25/2013 08:11 AM    GLOBULIN 2.0 11/13/2018 01:17 AM    INR 1.24 (H) 11/07/2018 01:54 PM     Lab  Results   Component Value Date/Time    PROTHROMBTM 15.7 (H) 11/07/2018 01:54 PM    INR 1.24 (H) 11/07/2018 01:54 PM

## 2018-11-13 NOTE — CARE PLAN
Problem: Bowel/Gastric:  Goal: Normal bowel function is maintained or improved  Outcome: PROGRESSING AS EXPECTED  Decreased number of BM along with size. Pt stating abdominal upset much improved. Continue antibiotics as ordered.

## 2018-11-13 NOTE — DISCHARGE PLANNING
ATTN: Case Management  RE: Referral for Home Health                We would like to take this opportunity to thank you for submitting a referral for your patient to continue care with [facility]. Our skilled team is dedicated to helping all patients recover and gain independence in the home setting.            As of 11/13/2018, we have accepted the above patient into our service. A [facility] clinician will be out to see the patient within 48 hours to conduct our initial visit. If you have any questions or concerns regarding the patient’s transition to Home Health, please do not hesitate to contact us. We are open for referrals 7 days a week from 8AM to 5PM at [phone].      We look forward to collaborating with you,  [facility] Team

## 2018-11-13 NOTE — CARE PLAN
Problem: Communication  Goal: The ability to communicate needs accurately and effectively will improve  Outcome: PROGRESSING AS EXPECTED  Pt educated on POC, updated on pending results for culture from colonoscopy.       Problem: Safety  Goal: Will remain free from injury  Outcome: PROGRESSING AS EXPECTED  Pt educated to call for assistance. Call light and belongings within reach. Non skid yellow socks are on, bed is locked in lowest position.     Problem: Knowledge Deficit  Goal: Knowledge of disease process/condition, treatment plan, diagnostic tests, and medications will improve  Outcome: PROGRESSING AS EXPECTED  Pt educated on POC and waiting results of culture to come back.     Problem: Skin Integrity  Goal: Risk for impaired skin integrity will decrease  Outcome: PROGRESSING AS EXPECTED  Pt sacrum red and balancing. Applied barrier cream to bottom.

## 2018-11-13 NOTE — DISCHARGE PLANNING
Received Choice form at 1440  Agency/Facility Name: [facility]  Referral sent per Choice form at 1444

## 2018-11-13 NOTE — PROGRESS NOTES
Received report from Leonard , at pt bedside. Pt on IV vanco until biopsy and culture comes back from colonoscopy, per GI MD pt should be on C diff precautions until results come back negative, POC discussed. Call light and belongings within reach. Bed locked and in low position. Alarm on and fall precautions in place.

## 2018-11-13 NOTE — THERAPY
"Physical Therapy Evaluation completed.   Bed Mobility:  Supine to Sit: Moderate Assist  Transfers: Sit to Stand: Minimal Assist  Gait: Level Of Assist: Minimal Assist with Front-Wheel Walker       Plan of Care: Will benefit from Physical Therapy 4 times per week  Discharge Recommendations: Equipment: Will Continue to Assess for Equipment Needs. Post-acute therapy Discharge to a transitional care facility for continued skilled therapy services prior to DC home.    See \"Rehab Therapy-Acute\" Patient Summary Report for complete documentation.     "

## 2018-11-13 NOTE — FACE TO FACE
Face to Face Supporting Documentation - Home Health    The encounter with this patient was in whole or in part the primary reason for home health admission.    Date of encounter:   Patient:                    MRN:                       YOB: 2019  Barry Torres  [id number]  1940     Home health to see patient for:  Physical Therapy evaluation and treatment    Skilled need for:  Exacerbation of Chronic Disease State colitis    Skilled nursing interventions to include:  Comment: Physical Therapy    Homebound status evidenced by:  Needs the assistance of another person in order to leave the home. Leaving home requires a considerable and taxing effort. There is a normal inability to leave the home.    Community Physician to provide follow up care: Lizzie Soto M.D.     Optional Interventions? No      I certify the face to face encounter for this home health care referral meets the CMS requirements and the encounter/clinical assessment with the patient was, in whole, or in part, for the medical condition(s) listed above, which is the primary reason for home health care. Based on my clinical findings: the service(s) are medically necessary, support the need for home health care, and the homebound criteria are met.  I certify that this patient has had a face to face encounter by myself.  Juvencio Clay D.O. - NPI: [id number]

## 2018-11-13 NOTE — DISCHARGE PLANNING
Anticipated Discharge Disposition: Home with HHC    Action: LSW spoke with patient and wife at bedside regarding HHC. Patient's wife signed choice for [facility].   LSW faxed choice to [facility]    Barriers to Discharge: HHC acceptance    Plan: LSW to f/u with acceptance

## 2018-11-13 NOTE — PROGRESS NOTES
Gastroenterology Progress Note   Note Author: Jenni Neely M.D.     Name Barry Torres     1940   Age/Sex 78 y.o. male   MRN [id number]   Code Status Full code        Reason for consult   Diarrhea and abdominal pain     Interval Problem Daily Status Update    Patient still having diarrhea, but denies any abdominal pain. Diarrhea has improved since admission only 2 episodes in the past 24 hours.   C diff test was negative   Biopsy from colonoscopy showed acute colitis with mucopurulent exudate consistent with pseudomembranous colitis.      Review of Systems   Constitutional: Negative for chills and fever.   HENT: Negative for congestion.    Respiratory: Negative for shortness of breath.    Cardiovascular: Negative for chest pain and leg swelling.   Gastrointestinal: Positive for diarrhea. Negative for abdominal pain, blood in stool, nausea and vomiting.   Musculoskeletal: Negative for falls.   Neurological: Negative for dizziness.   Psychiatric/Behavioral: Negative for depression.         Quality Measures  Quality-Core Measures   Reviewed items::  Labs reviewed, Medications reviewed and Radiology images reviewed      Physical Exam       Vitals:    18 2037 18 2347 18 0405 18 0746   BP: 121/69 133/68 142/83 147/88   Pulse: 81 75 82 86   Resp: 16  18   Temp: 36.8 °C (98.3 °F) 36.4 °C (97.5 °F) 36.7 °C (98 °F) 36.4 °C (97.6 °F)   SpO2: 96% 98% 93% 94%   Weight: 95.8 kg (211 lb 3.2 oz)      Height:         Body mass index is 27.86 kg/m². Weight: 95.8 kg (211 lb 3.2 oz)  Oxygen Therapy:  Pulse Oximetry: 94 %, O2 (LPM): 2, O2 Delivery: Nasal Cannula    Physical Exam   Constitutional: He is oriented to person, place, and time and well-developed, well-nourished, and in no distress.   HENT:   Head: Normocephalic and atraumatic.   Eyes: EOM are normal. No scleral icterus.   Neck: Neck supple.   Cardiovascular: Normal rate and regular rhythm.    No murmur  heard.  Pulmonary/Chest: Effort normal. No respiratory distress.   Abdominal: Soft. Bowel sounds are normal. He exhibits no distension. There is no tenderness. There is no rebound and no guarding.   Musculoskeletal: He exhibits no edema.   Neurological: He is alert and oriented to person, place, and time.   Skin: Skin is warm.   Psychiatric: Affect normal.   Nursing note and vitals reviewed.    Lab Results   Component Value Date/Time    WBC 6.4 11/13/2018 01:17 AM    RBC 2.89 (L) 11/13/2018 01:17 AM    HEMOGLOBIN 8.7 (L) 11/13/2018 01:17 AM    HEMATOCRIT 28.6 (L) 11/13/2018 01:17 AM    MCV 99.0 (H) 11/13/2018 01:17 AM    MCH 30.1 11/13/2018 01:17 AM    MCHC 30.4 (L) 11/13/2018 01:17 AM    MPV 9.9 11/13/2018 01:17 AM    NEUTSPOLYS 81.00 (H) 11/09/2018 09:36 PM    LYMPHOCYTES 6.80 (L) 11/09/2018 09:36 PM    MONOCYTES 9.80 11/09/2018 09:36 PM    EOSINOPHILS 1.10 11/09/2018 09:36 PM    BASOPHILS 0.70 11/09/2018 09:36 PM      Lab Results   Component Value Date/Time    SODIUM 140 11/13/2018 01:17 AM    POTASSIUM 3.9 11/13/2018 01:17 AM    CHLORIDE 113 (H) 11/13/2018 01:17 AM    CO2 22 11/13/2018 01:17 AM    GLUCOSE 89 11/13/2018 01:17 AM    BUN 12 11/13/2018 01:17 AM    CREATININE 1.13 11/13/2018 01:17 AM    CREATININE 1.5 (H) 04/11/2005 10:13 AM        Assessment/Plan     Pseudomembranous colitis:   - C diff test: Negative   - Biopsy from colonoscopy showed acute colitis with mucopurulent exudate consistent with pseudomembranous colitis. It is acute and there is no signs of chronic colitis (UC or Crohn's)   - Even when C diff test was negative, the biopsy results increase the likelihood of C diff. Acute colitis can be due to other causes not only C diff, but due to his resent use of antibiotics and symptoms improved after treatment, recommend complete 14 days of oral vancomycin.   - Encourage healthier eating   - Discussed with patient and wife that agree with plan   - Follow up as outpatient

## 2018-11-14 ENCOUNTER — PATIENT OUTREACH (OUTPATIENT)
Dept: HEALTH INFORMATION MANAGEMENT | Facility: OTHER | Age: 78
End: 2018-11-14

## 2018-11-14 ENCOUNTER — TELEPHONE (OUTPATIENT)
Dept: INTERNAL MEDICINE | Facility: MEDICAL CENTER | Age: 78
End: 2018-11-14

## 2018-11-14 NOTE — PROGRESS NOTES
Pt being discharged. Pt educated on c-diff and acute renal injury and received instructions. New prescriptions given and pt verbalized understanding of all medications. Follow up appt made with PCP and pt will call GI for appiontment. PIV removed. Monitor checked in, monitor room notified. All personal belongings with pt. Pt going home with wife. RN to call transport for escort out. Will monitor pt until off unit.

## 2018-11-14 NOTE — THERAPY
"Physical Therapy Treatment completed.   Bed Mobility:  Supine to Sit: Contact Guard Assist  Transfers: Sit to Stand: Contact Guard Assist  Gait: Level Of Assist: Contact Guard Assist with Front-Wheel Walker       Plan of Care: Patient with no further skilled PT needs in the acute care setting at this time  Discharge Recommendations: Equipment: No Equipment Needed. Post-acute therapy Discharge to home with outpatient or home health for additional skilled therapy services.     See \"Rehab Therapy-Acute\" Patient Summary Report for complete documentation.       "

## 2018-11-15 ENCOUNTER — HOME CARE VISIT (OUTPATIENT)
Dept: HOME HEALTH SERVICES | Facility: HOME HEALTHCARE | Age: 78
End: 2018-11-15
Payer: MEDICARE

## 2018-11-15 ENCOUNTER — PATIENT OUTREACH (OUTPATIENT)
Dept: HEALTH INFORMATION MANAGEMENT | Facility: OTHER | Age: 78
End: 2018-11-15

## 2018-11-15 VITALS
DIASTOLIC BLOOD PRESSURE: 64 MMHG | HEART RATE: 84 BPM | HEIGHT: 73 IN | RESPIRATION RATE: 18 BRPM | SYSTOLIC BLOOD PRESSURE: 138 MMHG | OXYGEN SATURATION: 93 % | BODY MASS INDEX: 26.53 KG/M2 | TEMPERATURE: 97.3 F | WEIGHT: 200.2 LBS

## 2018-11-15 PROCEDURE — G0493 RN CARE EA 15 MIN HH/HOSPICE: HCPCS

## 2018-11-15 PROCEDURE — 665001 SOC-HOME HEALTH

## 2018-11-15 PROCEDURE — 665999 HH PPS REVENUE DEBIT

## 2018-11-15 PROCEDURE — 665998 HH PPS REVENUE CREDIT

## 2018-11-15 SDOH — ECONOMIC STABILITY: HOUSING INSECURITY: UNSAFE APPLIANCES: 0

## 2018-11-15 SDOH — ECONOMIC STABILITY: HOUSING INSECURITY: UNSAFE COOKING RANGE AREA: 0

## 2018-11-15 ASSESSMENT — PATIENT HEALTH QUESTIONNAIRE - PHQ9
CLINICAL INTERPRETATION OF PHQ2 SCORE: 1
5. POOR APPETITE OR OVEREATING: 1 - SEVERAL DAYS
SUM OF ALL RESPONSES TO PHQ QUESTIONS 1-9: 3
2. FEELING DOWN, DEPRESSED, IRRITABLE, OR HOPELESS: 01
1. LITTLE INTEREST OR PLEASURE IN DOING THINGS: 00

## 2018-11-15 ASSESSMENT — ACTIVITIES OF DAILY LIVING (ADL)
HOME_HEALTH_OASIS: 01
OASIS_M1830: 03

## 2018-11-15 ASSESSMENT — ENCOUNTER SYMPTOMS
NAUSEA: DENIES C/O NAUSEA
VOMITING: DENIES ANY VOMITING

## 2018-11-16 PROCEDURE — 665999 HH PPS REVENUE DEBIT

## 2018-11-16 PROCEDURE — 665998 HH PPS REVENUE CREDIT

## 2018-11-17 PROCEDURE — 665999 HH PPS REVENUE DEBIT

## 2018-11-17 PROCEDURE — 665998 HH PPS REVENUE CREDIT

## 2018-11-17 NOTE — PROGRESS NOTES
On November 14th, 2018, I called in a prescription for oral vancomycin capsules 125mg every 6 hours for 12 day course to [facility] at [address] [place] [address] as the patient was unable to fill his previous prescription for oral vancomycin. Spoke with Infectious Disease Pharmacist Prisca Arana and this is equivalent dose for treatment of C-Diff colitis. Also called in prescription for completion of 5 day course of Azithromycin for resolving pneumonia.    Dr. Juvencio Clay

## 2018-11-18 PROCEDURE — 665999 HH PPS REVENUE DEBIT

## 2018-11-18 PROCEDURE — 665998 HH PPS REVENUE CREDIT

## 2018-11-19 PROCEDURE — 665999 HH PPS REVENUE DEBIT

## 2018-11-19 PROCEDURE — 665998 HH PPS REVENUE CREDIT

## 2018-11-20 ENCOUNTER — HOME CARE VISIT (OUTPATIENT)
Dept: HOME HEALTH SERVICES | Facility: HOME HEALTHCARE | Age: 78
End: 2018-11-20
Payer: MEDICARE

## 2018-11-20 PROCEDURE — 665999 HH PPS REVENUE DEBIT

## 2018-11-20 PROCEDURE — G0151 HHCP-SERV OF PT,EA 15 MIN: HCPCS

## 2018-11-20 PROCEDURE — 665998 HH PPS REVENUE CREDIT

## 2018-11-20 PROCEDURE — G0493 RN CARE EA 15 MIN HH/HOSPICE: HCPCS

## 2018-11-21 VITALS
RESPIRATION RATE: 20 BRPM | DIASTOLIC BLOOD PRESSURE: 66 MMHG | OXYGEN SATURATION: 94 % | TEMPERATURE: 98.8 F | SYSTOLIC BLOOD PRESSURE: 140 MMHG | HEART RATE: 84 BPM

## 2018-11-21 VITALS — DIASTOLIC BLOOD PRESSURE: 66 MMHG | RESPIRATION RATE: 20 BRPM | SYSTOLIC BLOOD PRESSURE: 140 MMHG

## 2018-11-21 PROCEDURE — 665999 HH PPS REVENUE DEBIT

## 2018-11-21 PROCEDURE — 665998 HH PPS REVENUE CREDIT

## 2018-11-21 SDOH — ECONOMIC STABILITY: HOUSING INSECURITY: UNSAFE APPLIANCES: 0

## 2018-11-21 SDOH — ECONOMIC STABILITY: HOUSING INSECURITY: UNSAFE COOKING RANGE AREA: 0

## 2018-11-21 ASSESSMENT — ACTIVITIES OF DAILY LIVING (ADL)
GROOMING_ASSISTANCE: 0
EATING_ASSISTANCE: 0
DRESSING_LB_ASSISTANCE: 0
HOUSEKEEPING_ASSISTANCE: 6
BATHING_ASSISTANCE: 4
TELEPHONE_ASSISTANCE: 0
TRANSPORTATION_ASSISTANCE: 0
LAUNDRY_ASSISTANCE: 6
TOILETING_ASSISTANCE: 0
MEAL_PREP_ASSISTANCE: 5
ORAL_CARE_ASSISTANCE: 0
SHOPPING_ASSISTANCE: 5
DRESSING_UB_ASSISTANCE: 0

## 2018-11-21 ASSESSMENT — ENCOUNTER SYMPTOMS
NAUSEA: DENIES
VOMITING: DENIES

## 2018-11-22 PROCEDURE — 665999 HH PPS REVENUE DEBIT

## 2018-11-22 PROCEDURE — 665998 HH PPS REVENUE CREDIT

## 2018-11-23 PROCEDURE — 665998 HH PPS REVENUE CREDIT

## 2018-11-23 PROCEDURE — 665999 HH PPS REVENUE DEBIT

## 2018-11-24 PROCEDURE — 665999 HH PPS REVENUE DEBIT

## 2018-11-24 PROCEDURE — 665998 HH PPS REVENUE CREDIT

## 2018-11-25 PROCEDURE — 665998 HH PPS REVENUE CREDIT

## 2018-11-25 PROCEDURE — 665999 HH PPS REVENUE DEBIT

## 2018-11-26 ENCOUNTER — HOME CARE VISIT (OUTPATIENT)
Dept: HOME HEALTH SERVICES | Facility: HOME HEALTHCARE | Age: 78
End: 2018-11-26
Payer: MEDICARE

## 2018-11-26 PROCEDURE — 665999 HH PPS REVENUE DEBIT

## 2018-11-26 PROCEDURE — 665998 HH PPS REVENUE CREDIT

## 2018-11-26 PROCEDURE — G0151 HHCP-SERV OF PT,EA 15 MIN: HCPCS

## 2018-11-26 PROCEDURE — 665997 HH PPS REVENUE ADJ

## 2018-11-27 VITALS
OXYGEN SATURATION: 90 % | SYSTOLIC BLOOD PRESSURE: 142 MMHG | RESPIRATION RATE: 16 BRPM | HEART RATE: 68 BPM | TEMPERATURE: 98.3 F | DIASTOLIC BLOOD PRESSURE: 68 MMHG

## 2018-11-27 PROCEDURE — 665998 HH PPS REVENUE CREDIT

## 2018-11-27 PROCEDURE — 665999 HH PPS REVENUE DEBIT

## 2018-11-28 PROCEDURE — 665998 HH PPS REVENUE CREDIT

## 2018-11-28 PROCEDURE — 665999 HH PPS REVENUE DEBIT

## 2018-11-28 PROCEDURE — G0180 MD CERTIFICATION HHA PATIENT: HCPCS | Performed by: INTERNAL MEDICINE

## 2018-11-28 SDOH — ECONOMIC STABILITY: HOUSING INSECURITY: UNSAFE APPLIANCES: 0

## 2018-11-28 SDOH — ECONOMIC STABILITY: HOUSING INSECURITY: UNSAFE COOKING RANGE AREA: 0

## 2018-11-28 ASSESSMENT — ENCOUNTER SYMPTOMS: SHORTNESS OF BREATH: T

## 2018-11-28 ASSESSMENT — ACTIVITIES OF DAILY LIVING (ADL)
HOME_HEALTH_OASIS: 01
OASIS_M1830: 00
HOME_HEALTH_OASIS: 00

## 2018-11-28 ASSESSMENT — PATIENT HEALTH QUESTIONNAIRE - PHQ9: CLINICAL INTERPRETATION OF PHQ2 SCORE: 0

## 2018-11-29 PROCEDURE — 665998 HH PPS REVENUE CREDIT

## 2018-11-29 PROCEDURE — 665999 HH PPS REVENUE DEBIT

## 2018-11-30 PROCEDURE — 665998 HH PPS REVENUE CREDIT

## 2018-11-30 PROCEDURE — 665999 HH PPS REVENUE DEBIT

## 2018-11-30 RX ORDER — ROSUVASTATIN CALCIUM 20 MG/1
TABLET, COATED ORAL
Refills: 0 | OUTPATIENT
Start: 2018-11-30

## 2018-12-01 PROCEDURE — 665999 HH PPS REVENUE DEBIT

## 2018-12-01 PROCEDURE — 665998 HH PPS REVENUE CREDIT

## 2018-12-02 PROCEDURE — 665999 HH PPS REVENUE DEBIT

## 2018-12-02 PROCEDURE — 665998 HH PPS REVENUE CREDIT

## 2018-12-03 ENCOUNTER — TELEPHONE (OUTPATIENT)
Dept: INTERNAL MEDICINE | Facility: MEDICAL CENTER | Age: 78
End: 2018-12-03

## 2018-12-03 PROCEDURE — 665999 HH PPS REVENUE DEBIT

## 2018-12-03 PROCEDURE — 665998 HH PPS REVENUE CREDIT

## 2018-12-04 ENCOUNTER — OFFICE VISIT (OUTPATIENT)
Dept: INTERNAL MEDICINE | Facility: MEDICAL CENTER | Age: 78
End: 2018-12-04
Payer: MEDICARE

## 2018-12-04 VITALS
BODY MASS INDEX: 25.5 KG/M2 | SYSTOLIC BLOOD PRESSURE: 142 MMHG | HEIGHT: 73 IN | HEART RATE: 65 BPM | TEMPERATURE: 97.6 F | DIASTOLIC BLOOD PRESSURE: 80 MMHG | OXYGEN SATURATION: 97 % | WEIGHT: 192.4 LBS

## 2018-12-04 DIAGNOSIS — M06.9 RHEUMATOID ARTHRITIS INVOLVING MULTIPLE SITES, UNSPECIFIED RHEUMATOID FACTOR PRESENCE: ICD-10-CM

## 2018-12-04 DIAGNOSIS — Z86.19: ICD-10-CM

## 2018-12-04 DIAGNOSIS — J44.9 CHRONIC OBSTRUCTIVE PULMONARY DISEASE, UNSPECIFIED COPD TYPE (HCC): ICD-10-CM

## 2018-12-04 DIAGNOSIS — Z09 HOSPITAL DISCHARGE FOLLOW-UP: ICD-10-CM

## 2018-12-04 DIAGNOSIS — E78.5 HYPERLIPIDEMIA, UNSPECIFIED HYPERLIPIDEMIA TYPE: ICD-10-CM

## 2018-12-04 PROBLEM — A41.9 SEPSIS DUE TO CELLULITIS (HCC): Status: RESOLVED | Noted: 2018-09-29 | Resolved: 2018-12-04

## 2018-12-04 PROBLEM — L03.90 SEPSIS DUE TO CELLULITIS (HCC): Status: RESOLVED | Noted: 2018-09-29 | Resolved: 2018-12-04

## 2018-12-04 PROBLEM — M62.82 NON-TRAUMATIC RHABDOMYOLYSIS: Status: RESOLVED | Noted: 2018-11-08 | Resolved: 2018-12-04

## 2018-12-04 PROCEDURE — 665998 HH PPS REVENUE CREDIT

## 2018-12-04 PROCEDURE — 665999 HH PPS REVENUE DEBIT

## 2018-12-04 PROCEDURE — 99214 OFFICE O/P EST MOD 30 MIN: CPT | Performed by: INTERNAL MEDICINE

## 2018-12-04 RX ORDER — TIOTROPIUM BROMIDE 18 UG/1
18 CAPSULE ORAL; RESPIRATORY (INHALATION) DAILY
Qty: 30 CAP | Refills: 3
Start: 2018-12-04 | End: 2021-05-07

## 2018-12-04 RX ORDER — LEFLUNOMIDE 20 MG/1
20 TABLET ORAL DAILY
Qty: 30 TAB | Refills: 0
Start: 2018-12-04

## 2018-12-04 NOTE — PROGRESS NOTES
Geriatric Clinic Coverage Note  Patient: Barry Torres  Age: 78 y.o.   Gender: male  Author: Raghavendra Portillo M.D.  PCP: Lizzie Soto M.D.    Today's date: 12/04/18  Chief Complaint   Patient presents with   • Hospital Follow-up     Resume Statin?        HPI:  History obtained from patient, medical chart and family (wife).    Coming for post hospital follow-up.  Patient was admitted for a week starting on November 7, 2018.  He presented with diarrhea and was found to have pseudomembranous colitis and his treatment improved on vancomycin.  He was discharged home with a walker and home health.    Since discharge the wife has made him stay active and has been doing well.  Home health only visited once.  He is now walking with a cane.  He reports no falls.  He says his fecal incontinence is improved since stopping finishing his vancomycin last Tuesday.    They wanted to discuss the use of Crestor.  He denies any history of heart attack stroke or heart failure.  He says that he was previously on another statin and that Dr. oumou hays put him on Crestor.  He says the issue was happen when he tried to get it refilled and it was noted that it was not going to be refilled which is why he is coming in.    He does have a history of rheumatoid arthritis that was diagnosed 4 years ago.  He follows with rheumatology.  He is on leflunomide and cetrolizumab    He is idiopathic lower extremity neuropathy which is well controlled with gabapentin.    Assessment: 79 y/o M here for post hospital follow up    Plan:  1. Hospital discharge follow-up  2. History of pseudomembranous enterocolitis  He is doing well and improving his mobility.  His incontinence is improved and he has not fallen.  I encouraged him to continue with the mobility.  I cautioned him that C. difficile can recur and to watch out for worsening diarrhea    3. Hyperlipidemia, unspecified hyperlipidemia type  Given his LDL of 15 he has no history of  cardiovascular disease I recommended continuing to hold the statin.  We will repeat the lipid panel and see what his cholesterol looks like off of the statin in 2 months time, 3 months since he stopped it.  We underwent shared decision making and the patient and his wife are happy with this decision.  - Lipid Profile; Future    4. Chronic obstructive pulmonary disease, unspecified COPD type (HCC)  I added that sprivia along with his Symbicort to his medication list.  He is also on oxygen at night for his COPD.  He is not currently smoking    5. Rheumatoid arthritis involving multiple sites, unspecified rheumatoid factor presence (HCC)  He follows with rheumatology, this does increase his risk for cardiovascular disease however it is unclear how much.  This will need to be kept in context at his primary care physician decides on whether or not to restart his statin after rechecking his cholesterol in a few months.  - leflunomide (ARAVA) 20 MG Tab; Take 1 Tab by mouth every day.  Dispense: 30 Tab; Refill: 0  - certolizumab pegol (CIMZIA PREFILLED) 2 X 200 MG/ML Kit; Inject 200 mg as instructed every 14 days.  Dispense: 1 Kit; Refill: 0        Health Maintenance:   Immunizations:   Immunization History   Administered Date(s) Administered   • Dtap Vaccine 10/13/2014   • Influenza (IM) Preservative Free 08/29/2011   • Influenza Vaccine Adult HD 08/27/2012, 09/08/2015, 09/26/2016, 08/24/2017, 09/30/2018   • Influenza Vaccine H1N1 12/28/2009   • Influenza Vaccine Pediatric Split 10/09/2004, 08/13/2010   • Influenza, Unspecified 10/09/2004, 08/13/2010   • Pneumococcal Conjugate Vaccine (Prevnar/PCV-13) 10/13/2014   • Pneumococcal polysaccharide vaccine (PPSV-23) 05/08/2015   • Recombinant Zoster Vaccine (RZV) (Shingrix) 04/04/2018, 06/05/2018   • Tuberculin Skin Test 02/23/2015   • Zoster Vaccine Live (ZVL) (Zostavax) 03/08/2010, 04/04/2018     Return if symptoms worsen or fail to improve.  Has follow-up with PCP in early  January    ROS:    A 14 point ROS is negative, other than as stated above.     Past Medical History:   Diagnosis Date   • Abnormal thyroid stimulating hormone (TSH) level    • Allergic rhinitis    • Asbestosis ([facility])    • Asthma    • Bronchitis    • Chronic diarrhea     declines eval; takes immodium once a day usually and sometimes less; see previous notes; aware of risk    • Chronic low back pain    • Chronic lung disease     asbestos related   • CKD (chronic kidney disease), stage III ([facility])     sees neph   • COPD (chronic obstructive pulmonary disease) ([facility])    • Coronary artery calcification seen on CAT scan 8/2/2016    sees cards   • Epididymitis 2009    h/o listed in chart   • GERD (gastroesophageal reflux disease)    • History of hemorrhoids    • History of skin cancer     non melanoma   • [place] (hard of hearing)     declines hearing aids   • Hypercholesteremia    • Hypertension    • Hypertriglyceridemia    • Indigestion    • Light headedness     2/2 orthostasis? now better off hctz   • Lightheadedness 6/23/2016   • Low back pain    • Nasal drainage    • Olecranon bursitis    • Orthostasis 6/23/2016    better off HCTZ   • Peripheral neuropathy    • Pleural plaque 2005     CT [place]   • Post-nasal drip    • Pulmonary emphysema ([facility])    • RA (rheumatoid arthritis) ([facility])     on meds, sees rheum   • Restless leg syndrome    • Rheumatoid arthritis ([facility])    • Sleep apnea     obstructive and central - not adherent to autopap   • Solitary kidney     history of kidney cyst 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-functioning kidney s/p nephrectomy      Social History     Social History   • Marital status:      Spouse name: N/A   • Number of children: N/A   • Years of education: N/A     Occupational History   • Not on file.     Social History Main Topics   • Smoking status: Former Smoker     Packs/day: 1.00     Years: 40.00     Types: Cigarettes     Quit date: 1/1/1980   • Smokeless tobacco: Never Used      Comment: 1 pk a day for 40 yrs   •  "Alcohol use No      Comment: 2 a week   • Drug use: No   • Sexual activity: No      Comment: retired      Other Topics Concern   • Not on file     Social History Narrative    See H&P    Lives with wife     Family History   Problem Relation Age of Onset   • Genetic Father         ALS   • No Known Problems Sister    • No Known Problems Son    • No Known Problems Daughter    • Heart Attack Neg Hx    • Heart Disease Neg Hx    • Heart Failure Neg Hx      Allergies: Penicillins; Ciprofloxacin hcl; and Levofloxacin  Current Outpatient Prescriptions on File Prior to Visit   Medication Sig Dispense Refill   • Home Care Oxygen Inhale 2 L/min by mouth every bedtime.     • gabapentin (NEURONTIN) 300 MG Cap Take 300-600 mg by mouth 2 Times a Day. 300 mg in the morning  600 mg in the evening      • SYMBICORT 80-4.5 MCG/ACT Aerosol INHALE 2 PUFFS BY MOUTH TWO TIMES A DAY 3 Inhaler 1   • Probiotic Product (PROBIOTIC PO) Take 1 Cap by mouth every day.     • Multiple Vitamin (MULTIVITAMIN PO) Take 1 Tab by mouth every day.     • vancomycin (VANCOCIN) 125 MG capsule Take 125 mg by mouth 4 times a day.     • fluticasone (FLONASE) 50 MCG/ACT nasal spray Spray 1 Spray in nose every day.     • fexofenadine (ALLEGRA) 180 MG tablet Take 180 mg by mouth every day.       No current facility-administered medications on file prior to visit.        Physical Exam:  /80 (BP Location: Left arm, Patient Position: Sitting, BP Cuff Size: Adult)   Pulse 65   Temp 36.4 °C (97.6 °F)   Ht 1.854 m (6' 1\")   Wt 87.3 kg (192 lb 6.4 oz)   SpO2 97%   BMI 25.38 kg/m² Body mass index is 25.38 kg/m².    Gen: Neck supple alert, pleasant, will order  HEENT:  CV: Regular rate and rhythm, normal  Lungs: Normal effort, clear to auscultation anterior  Abd: Soft nontender  Ext: Arthritic changes noted to hands and MCPs DIPs and PIPs bilaterally  Neuro: Nonfocal  Psych: Does not appear to be responding to internal stimuli      Labs: CBC chemistry panel " reviewed from his hospital stay    Raghavendra Portillo M.D.  Geriatric Physician    This note was partially dictated with voice recognition software, for any confusion please do not hesitate to contact me.

## 2018-12-10 RX ORDER — GABAPENTIN 300 MG/1
CAPSULE ORAL
Qty: 270 CAP | Refills: 1 | Status: SHIPPED | OUTPATIENT
Start: 2018-12-10 | End: 2019-01-14

## 2018-12-10 NOTE — TELEPHONE ENCOUNTER
PT SEEN: 12/4/18 WITH Dr. Portillo NEXT APPT 1/11/19 WITH Dr. Soto  Was the patient seen in the last year in this department? Yes     Does patient have an active prescription for medications requested? No     Received Request Via: Pharmacy

## 2018-12-10 NOTE — TELEPHONE ENCOUNTER
Please confirm his dose.   In the chart is says 300-600mg bid not 300 tid  See what he is doing and I can see what I can do

## 2018-12-13 ENCOUNTER — PATIENT OUTREACH (OUTPATIENT)
Dept: HEALTH INFORMATION MANAGEMENT | Facility: OTHER | Age: 78
End: 2018-12-13

## 2018-12-14 NOTE — PROGRESS NOTES
Barry Torres was admitted to [facility] on 11/7/18 for history of pseudomembranous enterocolitis.  The patient was discharged on 11/13/18 and instructed to follow-up with his PCP and with Gastroenterology.  The patient successfully filled his discharge medications and [facility] began service on 11/15/18.  The patient was scheduled to [facility], however, he cancelled this appointment and followed up with his PCP on 12/4/18.  The patient is scheduled for 2 future appointments with his PCP on 1/11/19 and with Gastroenterology on 1/25/19. PPS screening was conducted at 70%.

## 2018-12-18 ENCOUNTER — APPOINTMENT (RX ONLY)
Dept: URBAN - METROPOLITAN AREA CLINIC 20 | Facility: CLINIC | Age: 78
Setting detail: DERMATOLOGY
End: 2018-12-18

## 2018-12-18 DIAGNOSIS — L57.0 ACTINIC KERATOSIS: ICD-10-CM

## 2018-12-18 DIAGNOSIS — Z85.828 PERSONAL HISTORY OF OTHER MALIGNANT NEOPLASM OF SKIN: ICD-10-CM

## 2018-12-18 DIAGNOSIS — D18.0 HEMANGIOMA: ICD-10-CM

## 2018-12-18 DIAGNOSIS — L82.1 OTHER SEBORRHEIC KERATOSIS: ICD-10-CM

## 2018-12-18 DIAGNOSIS — Z71.89 OTHER SPECIFIED COUNSELING: ICD-10-CM

## 2018-12-18 DIAGNOSIS — D22 MELANOCYTIC NEVI: ICD-10-CM

## 2018-12-18 DIAGNOSIS — L81.4 OTHER MELANIN HYPERPIGMENTATION: ICD-10-CM

## 2018-12-18 PROBLEM — D48.5 NEOPLASM OF UNCERTAIN BEHAVIOR OF SKIN: Status: ACTIVE | Noted: 2018-12-18

## 2018-12-18 PROBLEM — D18.01 HEMANGIOMA OF SKIN AND SUBCUTANEOUS TISSUE: Status: ACTIVE | Noted: 2018-12-18

## 2018-12-18 PROBLEM — D22.62 MELANOCYTIC NEVI OF LEFT UPPER LIMB, INCLUDING SHOULDER: Status: ACTIVE | Noted: 2018-12-18

## 2018-12-18 PROBLEM — D22.5 MELANOCYTIC NEVI OF TRUNK: Status: ACTIVE | Noted: 2018-12-18

## 2018-12-18 PROBLEM — D22.61 MELANOCYTIC NEVI OF RIGHT UPPER LIMB, INCLUDING SHOULDER: Status: ACTIVE | Noted: 2018-12-18

## 2018-12-18 PROCEDURE — ? BIOPSY BY SHAVE METHOD

## 2018-12-18 PROCEDURE — ? MEDICATION COUNSELING

## 2018-12-18 PROCEDURE — 99214 OFFICE O/P EST MOD 30 MIN: CPT | Mod: 25

## 2018-12-18 PROCEDURE — ? COUNSELING

## 2018-12-18 PROCEDURE — ? SUNSCREEN RECOMMENDATIONS

## 2018-12-18 PROCEDURE — ? LIQUID NITROGEN

## 2018-12-18 PROCEDURE — ? PRESCRIPTION

## 2018-12-18 PROCEDURE — 11100: CPT | Mod: 59

## 2018-12-18 PROCEDURE — ? OBSERVATION

## 2018-12-18 PROCEDURE — 17003 DESTRUCT PREMALG LES 2-14: CPT

## 2018-12-18 PROCEDURE — 17000 DESTRUCT PREMALG LESION: CPT

## 2018-12-18 RX ORDER — FLUOROURACIL 50 MG/G
CREAM TOPICAL
Qty: 1 | Refills: 0 | Status: ERX

## 2018-12-18 ASSESSMENT — LOCATION ZONE DERM
LOCATION ZONE: EAR
LOCATION ZONE: SCALP
LOCATION ZONE: HAND
LOCATION ZONE: TRUNK
LOCATION ZONE: ARM
LOCATION ZONE: FACE

## 2018-12-18 ASSESSMENT — LOCATION SIMPLE DESCRIPTION DERM
LOCATION SIMPLE: RIGHT EAR
LOCATION SIMPLE: RIGHT FOREHEAD
LOCATION SIMPLE: LEFT EAR
LOCATION SIMPLE: LEFT HAND
LOCATION SIMPLE: SCALP
LOCATION SIMPLE: LEFT SCALP
LOCATION SIMPLE: RIGHT HAND
LOCATION SIMPLE: RIGHT FOREARM
LOCATION SIMPLE: FRONTAL SCALP
LOCATION SIMPLE: UPPER BACK
LOCATION SIMPLE: LEFT FOREARM
LOCATION SIMPLE: RIGHT UPPER BACK
LOCATION SIMPLE: LEFT FOREHEAD

## 2018-12-18 ASSESSMENT — LOCATION DETAILED DESCRIPTION DERM
LOCATION DETAILED: RIGHT PROXIMAL DORSAL FOREARM
LOCATION DETAILED: INFERIOR THORACIC SPINE
LOCATION DETAILED: LEFT RADIAL DORSAL HAND
LOCATION DETAILED: RIGHT SUPERIOR FOREHEAD
LOCATION DETAILED: LEFT SUPERIOR CRUS OF ANTIHELIX
LOCATION DETAILED: LEFT INFERIOR FOREHEAD
LOCATION DETAILED: RIGHT INFERIOR MEDIAL FOREHEAD
LOCATION DETAILED: RIGHT SUPERIOR HELIX
LOCATION DETAILED: RIGHT MEDIAL UPPER BACK
LOCATION DETAILED: LEFT CENTRAL PARIETAL SCALP
LOCATION DETAILED: SUPERIOR THORACIC SPINE
LOCATION DETAILED: RIGHT VENTRAL DISTAL FOREARM
LOCATION DETAILED: MEDIAL FRONTAL SCALP
LOCATION DETAILED: LEFT VENTRAL PROXIMAL FOREARM
LOCATION DETAILED: RIGHT RADIAL DORSAL HAND
LOCATION DETAILED: RIGHT INFERIOR UPPER BACK
LOCATION DETAILED: RIGHT LATERAL FOREHEAD
LOCATION DETAILED: LEFT MEDIAL FRONTAL SCALP
LOCATION DETAILED: LEFT PROXIMAL DORSAL FOREARM

## 2018-12-18 NOTE — PROCEDURE: BIOPSY BY SHAVE METHOD
Lab: [id number]
Detail Level: Detailed
Biopsy Method: Personna blade
Bill For Surgical Tray: no
Notification Instructions: Patient will be notified of biopsy results. However, patient instructed to call the office if not contacted within 2 weeks.
Hemostasis: Drysol and Electrocautery
Wound Care: Bacitracin
Render Post-Care Instructions In Note?: yes
Lab Facility: 
Size Of Lesion In Cm: 0.5
Anesthesia Type: 1% lidocaine with 1:100,000 epinephrine and a 1:12 solution of 8.4% sodium bicarbonate
Additional Anesthesia Volume In Cc (Will Not Render If 0): 0
Consent: Written consent was obtained and risks were reviewed including but not limited to scarring, infection, bleeding, scabbing, incomplete removal, nerve damage and allergy to anesthesia.
Billing Type: Third-Party Bill
Biopsy Type: H and E
Depth Of Biopsy: dermis
Post-Care Instructions: I reviewed with the patient in detail post-care instructions. Patient is to keep the biopsy site clean once daily and then apply petroleum and bandaid  until healed.
Dressing: Band-Aid
Anesthesia Volume In Cc: 1
Type Of Destruction Used: Curettage

## 2018-12-18 NOTE — PROCEDURE: MEDICATION COUNSELING
Simponi Counseling:  I discussed with the patient the risks of golimumab including but not limited to myelosuppression, immunosuppression, autoimmune hepatitis, demyelinating diseases, lymphoma, and serious infections.  The patient understands that monitoring is required including a PPD at baseline and must alert us or the primary physician if symptoms of infection or other concerning signs are noted.
Protopic Pregnancy And Lactation Text: This medication is Pregnancy Category C. It is unknown if this medication is excreted in breast milk when applied topically.
Minocycline Counseling: Patient advised regarding possible photosensitivity and discoloration of the teeth, skin, lips, tongue and gums.  Patient instructed to avoid sunlight, if possible.  When exposed to sunlight, patients should wear protective clothing, sunglasses, and sunscreen.  The patient was instructed to call the office immediately if the following severe adverse effects occur:  hearing changes, easy bruising/bleeding, severe headache, or vision changes.  The patient verbalized understanding of the proper use and possible adverse effects of minocycline.  All of the patient's questions and concerns were addressed.
Clofazimine Counseling:  I discussed with the patient the risks of clofazimine including but not limited to skin and eye pigmentation, liver damage, nausea/vomiting, gastrointestinal bleeding and allergy.
Cyclosporine Counseling:  I discussed with the patient the risks of cyclosporine including but not limited to hypertension, gingival hyperplasia,myelosuppression, immunosuppression, liver damage, kidney damage, neurotoxicity, lymphoma, and serious infections. The patient understands that monitoring is required including baseline blood pressure, CBC, CMP, lipid panel and uric acid, and then 1-2 times monthly CMP and blood pressure.
Gabapentin Pregnancy And Lactation Text: This medication is Pregnancy Category C and isn't considered safe during pregnancy. It is excreted in breast milk.
Cephalexin Pregnancy And Lactation Text: This medication is Pregnancy Category B and considered safe during pregnancy.  It is also excreted in breast milk but can be used safely for shorter doses.
Carac Counseling:  I discussed with the patient the risks of Carac including but not limited to erythema, scaling, itching, weeping, crusting, and pain.
Birth Control Pills Pregnancy And Lactation Text: This medication should be avoided if pregnant and for the first 30 days post-partum.
Fluconazole Pregnancy And Lactation Text: This medication is Pregnancy Category C and it isn't know if it is safe during pregnancy. It is also excreted in breast milk.
Hydroxyzine Counseling: Patient advised that the medication is sedating and not to drive a car after taking this medication.  Patient informed of potential adverse effects including but not limited to dry mouth, urinary retention, and blurry vision.  The patient verbalized understanding of the proper use and possible adverse effects of hydroxyzine.  All of the patient's questions and concerns were addressed.
Use Enhanced Medication Counseling?: No
Bexarotene Pregnancy And Lactation Text: This medication is Pregnancy Category X and should not be given to women who are pregnant or may become pregnant. This medication should not be used if you are breast feeding.
Ketoconazole Counseling:   Patient counseled regarding improving absorption with orange juice.  Adverse effects include but are not limited to breast enlargement, headache, diarrhea, nausea, upset stomach, liver function test abnormalities, taste disturbance, and stomach pain.  There is a rare possibility of liver failure that can occur when taking ketoconazole. The patient understands that monitoring of LFTs may be required, especially at baseline. The patient verbalized understanding of the proper use and possible adverse effects of ketoconazole.  All of the patient's questions and concerns were addressed.
Humira Pregnancy And Lactation Text: This medication is Pregnancy Category B and is considered safe during pregnancy. It is unknown if this medication is excreted in breast milk.
Hydroquinone Counseling:  Patient advised that medication may result in skin irritation, lightening (hypopigmentation), dryness, and burning.  In the event of skin irritation, the patient was advised to reduce the amount of the drug applied or use it less frequently.  Rarely, spots that are treated with hydroquinone can become darker (pseudoochronosis).  Should this occur, patient instructed to stop medication and call the office. The patient verbalized understanding of the proper use and possible adverse effects of hydroquinone.  All of the patient's questions and concerns were addressed.
Cimzia Counseling:  I discussed with the patient the risks of Cimzia including but not limited to immunosuppression, allergic reactions and infections.  The patient understands that monitoring is required including a PPD at baseline and must alert us or the primary physician if symptoms of infection or other concerning signs are noted.
Glycopyrrolate Counseling:  I discussed with the patient the risks of glycopyrrolate including but not limited to skin rash, drowsiness, dry mouth, difficulty urinating, and blurred vision.
Xolair Counseling:  Patient informed of potential adverse effects including but not limited to fever, muscle aches, rash and allergic reactions.  The patient verbalized understanding of the proper use and possible adverse effects of Xolair.  All of the patient's questions and concerns were addressed.
Topical Sulfur Applications Counseling: Topical Sulfur Counseling: Patient counseled that this medication may cause skin irritation or allergic reactions.  In the event of skin irritation, the patient was advised to reduce the amount of the drug applied or use it less frequently.   The patient verbalized understanding of the proper use and possible adverse effects of topical sulfur application.  All of the patient's questions and concerns were addressed.
Solaraze Counseling:  I discussed with the patient the risks of Solaraze including but not limited to erythema, scaling, itching, weeping, crusting, and pain.
Minocycline Pregnancy And Lactation Text: This medication is Pregnancy Category D and not consider safe during pregnancy. It is also excreted in breast milk.
Simponi Pregnancy And Lactation Text: The risk during pregnancy and breastfeeding is uncertain with this medication.
Cyclosporine Pregnancy And Lactation Text: This medication is Pregnancy Category C and it isn't know if it is safe during pregnancy. This medication is excreted in breast milk.
Clindamycin Counseling: I counseled the patient regarding use of clindamycin as an antibiotic for prophylactic and/or therapeutic purposes. Clindamycin is active against numerous classes of bacteria, including skin bacteria. Side effects may include nausea, diarrhea, gastrointestinal upset, rash, hives, yeast infections, and in rare cases, colitis.
Birth Control Pills Counseling: Birth Control Pill Counseling: I discussed with the patient the potential side effects of OCPs including but not limited to increased risk of stroke, heart attack, thrombophlebitis, deep venous thrombosis, hepatic adenomas, breast changes, GI upset, headaches, and depression.  The patient verbalized understanding of the proper use and possible adverse effects of OCPs. All of the patient's questions and concerns were addressed.
Griseofulvin Counseling:  I discussed with the patient the risks of griseofulvin including but not limited to photosensitivity, cytopenia, liver damage, nausea/vomiting and severe allergy.  The patient understands that this medication is best absorbed when taken with a fatty meal (e.g., ice cream or french fries).
Carac Pregnancy And Lactation Text: This medication is Pregnancy Category X and contraindicated in pregnancy and in women who may become pregnant. It is unknown if this medication is excreted in breast milk.
Isotretinoin Counseling: Patient should get monthly blood tests, not donate blood, not drive at night if vision affected, not share medication, and not undergo elective surgery for 6 months after tx completed. Side effects reviewed, pt to contact office should one occur.
Hydroxyzine Pregnancy And Lactation Text: This medication is not safe during pregnancy and should not be taken. It is also excreted in breast milk and breast feeding isn't recommended.
Valtrex Pregnancy And Lactation Text: this medication is Pregnancy Category B and is considered safe during pregnancy. This medication is not directly found in breast milk but it's metabolite acyclovir is present.
Ilumya Counseling: I discussed with the patient the risks of tildrakizumab including but not limited to immunosuppression, malignancy, posterior leukoencephalopathy syndrome, and serious infections.  The patient understands that monitoring is required including a PPD at baseline and must alert us or the primary physician if symptoms of infection or other concerning signs are noted.
Hydroquinone Pregnancy And Lactation Text: This medication has not been assigned a Pregnancy Risk Category but animal studies failed to show danger with the topical medication. It is unknown if the medication is excreted in breast milk.
Ketoconazole Pregnancy And Lactation Text: This medication is Pregnancy Category C and it isn't know if it is safe during pregnancy. It is also excreted in breast milk and breast feeding isn't recommended.
Xolair Pregnancy And Lactation Text: This medication is Pregnancy Category B and is considered safe during pregnancy. This medication is excreted in breast milk.
Glycopyrrolate Pregnancy And Lactation Text: This medication is Pregnancy Category B and is considered safe during pregnancy. It is unknown if it is excreted breast milk.
Cimzia Pregnancy And Lactation Text: This medication crosses the placenta but can be considered safe in certain situations. Cimzia may be excreted in breast milk.
Topical Sulfur Applications Pregnancy And Lactation Text: This medication is Pregnancy Category C and has an unknown safety profile during pregnancy. It is unknown if this topical medication is excreted in breast milk.
Quinolones Counseling:  I discussed with the patient the risks of fluoroquinolones including but not limited to GI upset, allergic reaction, drug rash, diarrhea, dizziness, photosensitivity, yeast infections, liver function test abnormalities, tendonitis/tendon rupture.
Stelara Counseling:  I discussed with the patient the risks of ustekinumab including but not limited to immunosuppression, malignancy, posterior leukoencephalopathy syndrome, and serious infections.  The patient understands that monitoring is required including a PPD at baseline and must alert us or the primary physician if symptoms of infection or other concerning signs are noted.
Clindamycin Pregnancy And Lactation Text: This medication can be used in pregnancy if certain situations. Clindamycin is also present in breast milk.
Methotrexate Counseling:  Patient counseled regarding adverse effects of methotrexate including but not limited to nausea, vomiting, abnormalities in liver function tests. Patients may develop mouth sores, rash, diarrhea, and abnormalities in blood counts. The patient understands that monitoring is required including LFT's and blood counts.  There is a rare possibility of scarring of the liver and lung problems that can occur when taking methotrexate. Persistent nausea, loss of appetite, pale stools, dark urine, cough, and shortness of breath should be reported immediately. Patient advised to discontinue methotrexate treatment at least three months before attempting to become pregnant.  I discussed the need for folate supplements while taking methotrexate.  These supplements can decrease side effects during methotrexate treatment. The patient verbalized understanding of the proper use and possible adverse effects of methotrexate.  All of the patient's questions and concerns were addressed.
Colchicine Counseling:  Patient counseled regarding adverse effects including but not limited to stomach upset (nausea, vomiting, stomach pain, or diarrhea).  Patient instructed to limit alcohol consumption while taking this medication.  Colchicine may reduce blood counts especially with prolonged use.  The patient understands that monitoring of kidney function and blood counts may be required, especially at baseline. The patient verbalized understanding of the proper use and possible adverse effects of colchicine.  All of the patient's questions and concerns were addressed.
Griseofulvin Pregnancy And Lactation Text: This medication is Pregnancy Category X and is known to cause serious birth defects. It is unknown if this medication is excreted in breast milk but breast feeding should be avoided.
Oxybutynin Pregnancy And Lactation Text: This medication is Pregnancy Category B and is considered safe during pregnancy. It is unknown if it is excreted in breast milk.
5-Fu Counseling: 5-Fluorouracil Counseling:  I discussed with the patient the risks of 5-fluorouracil including but not limited to erythema, scaling, itching, weeping, crusting, and pain.
Valtrex Counseling: I discussed with the patient the risks of valacyclovir including but not limited to kidney damage, nausea, vomiting and severe allergy.  The patient understands that if the infection seems to be worsening or is not improving, they are to call.
Isotretinoin Pregnancy And Lactation Text: This medication is Pregnancy Category X and is considered extremely dangerous during pregnancy. It is unknown if it is excreted in breast milk.
Solaraze Pregnancy And Lactation Text: This medication is Pregnancy Category B and is considered safe. There is some data to suggest avoiding during the third trimester. It is unknown if this medication is excreted in breast milk.
Wartpeel Counseling:  I discussed with the patient the risks of Wartpeel including but not limited to erythema, scaling, itching, weeping, crusting, and pain.
Imiquimod Counseling:  I discussed with the patient the risks of imiquimod including but not limited to erythema, scaling, itching, weeping, crusting, and pain.  Patient understands that the inflammatory response to imiquimod is variable from person to person and was educated regarded proper titration schedule.  If flu-like symptoms develop, patient knows to discontinue the medication and contact us.
Terbinafine Counseling: Patient counseling regarding adverse effects of terbinafine including but not limited to headache, diarrhea, rash, upset stomach, liver function test abnormalities, itching, taste/smell disturbance, nausea, abdominal pain, and flatulence.  There is a rare possibility of liver failure that can occur when taking terbinafine.  The patient understands that a baseline LFT and kidney function test may be required. The patient verbalized understanding of the proper use and possible adverse effects of terbinafine.  All of the patient's questions and concerns were addressed.
Cosentyx Counseling:  I discussed with the patient the risks of Cosentyx including but not limited to worsening of Crohn's disease, immunosuppression, allergic reactions and infections.  The patient understands that monitoring is required including a PPD at baseline and must alert us or the primary physician if symptoms of infection or other concerning signs are noted.
Methotrexate Pregnancy And Lactation Text: This medication is Pregnancy Category X and is known to cause fetal harm. This medication is excreted in breast milk.
Hydroxychloroquine Counseling:  I discussed with the patient that a baseline ophthalmologic exam is needed at the start of therapy and every year thereafter while on therapy. A CBC may also be warranted for monitoring.  The side effects of this medication were discussed with the patient, including but not limited to agranulocytosis, aplastic anemia, seizures, rashes, retinopathy, and liver toxicity. Patient instructed to call the office should any adverse effect occur.  The patient verbalized understanding of the proper use and possible adverse effects of Plaquenil.  All the patient's questions and concerns were addressed.
Doxycycline Counseling:  Patient counseled regarding possible photosensitivity and increased risk for sunburn.  Patient instructed to avoid sunlight, if possible.  When exposed to sunlight, patients should wear protective clothing, sunglasses, and sunscreen.  The patient was instructed to call the office immediately if the following severe adverse effects occur:  hearing changes, easy bruising/bleeding, severe headache, or vision changes.  The patient verbalized understanding of the proper use and possible adverse effects of doxycycline.  All of the patient's questions and concerns were addressed.
Oxybutynin Counseling:  I discussed with the patient the risks of oxybutynin including but not limited to skin rash, drowsiness, dry mouth, difficulty urinating, and blurred vision.
High Dose Vitamin A Counseling: Side effects reviewed, pt to contact office should one occur.
Itraconazole Counseling:  I discussed with the patient the risks of itraconazole including but not limited to liver damage, nausea/vomiting, neuropathy, and severe allergy.  The patient understands that this medication is best absorbed when taken with acidic beverages such as non-diet cola or ginger ale.  The patient understands that monitoring is required including baseline LFTs and repeat LFTs at intervals.  The patient understands that they are to contact us or the primary physician if concerning signs are noted.
Topical Retinoid counseling:  Patient advised to apply a pea-sized amount only at bedtime and wait 30 minutes after washing their face before applying.  If too drying, patient may add a non-comedogenic moisturizer. The patient verbalized understanding of the proper use and possible adverse effects of retinoids.  All of the patient's questions and concerns were addressed.
Thalidomide Pregnancy And Lactation Text: This medication is Pregnancy Category X and is absolutely contraindicated during pregnancy. It is unknown if it is excreted in breast milk.
Imiquimod Pregnancy And Lactation Text: This medication is Pregnancy Category C. It is unknown if this medication is excreted in breast milk.
Albendazole Counseling:  I discussed with the patient the risks of albendazole including but not limited to cytopenia, kidney damage, nausea/vomiting and severe allergy.  The patient understands that this medication is being used in an off-label manner.
Terbinafine Pregnancy And Lactation Text: This medication is Pregnancy Category B and is considered safe during pregnancy. It is also excreted in breast milk and breast feeding isn't recommended.
Infliximab Counseling:  I discussed with the patient the risks of infliximab including but not limited to myelosuppression, immunosuppression, autoimmune hepatitis, demyelinating diseases, lymphoma, and serious infections.  The patient understands that monitoring is required including a PPD at baseline and must alert us or the primary physician if symptoms of infection or other concerning signs are noted.
Azathioprine Counseling:  I discussed with the patient the risks of azathioprine including but not limited to myelosuppression, immunosuppression, hepatotoxicity, lymphoma, and infections.  The patient understands that monitoring is required including baseline LFTs, Creatinine, possible TPMP genotyping and weekly CBCs for the first month and then every 2 weeks thereafter.  The patient verbalized understanding of the proper use and possible adverse effects of azathioprine.  All of the patient's questions and concerns were addressed.
Rifampin Counseling: I discussed with the patient the risks of rifampin including but not limited to liver damage, kidney damage, red-orange body fluids, nausea/vomiting and severe allergy.
Taltz Counseling: I discussed with the patient the risks of ixekizumab including but not limited to immunosuppression, serious infections, worsening of inflammatory bowel disease and drug reactions.  The patient understands that monitoring is required including a PPD at baseline and must alert us or the primary physician if symptoms of infection or other concerning signs are noted.
Hydroxychloroquine Pregnancy And Lactation Text: This medication has been shown to cause fetal harm but it isn't assigned a Pregnancy Risk Category. There are small amounts excreted in breast milk.
Doxycycline Pregnancy And Lactation Text: This medication is Pregnancy Category D and not consider safe during pregnancy. It is also excreted in breast milk but is considered safe for shorter treatment courses.
Prednisone Counseling:  I discussed with the patient the risks of prolonged use of prednisone including but not limited to weight gain, insomnia, osteoporosis, mood changes, diabetes, susceptibility to infection, glaucoma and high blood pressure.  In cases where prednisone use is prolonged, patients should be monitored with blood pressure checks, serum glucose levels and an eye exam.  Additionally, the patient may need to be placed on GI prophylaxis, PCP prophylaxis, and calcium and vitamin D supplementation and/or a bisphosphonate.  The patient verbalized understanding of the proper use and the possible adverse effects of prednisone.  All of the patient's questions and concerns were addressed.
Otezla Pregnancy And Lactation Text: This medication is Pregnancy Category C and it isn't known if it is safe during pregnancy. It is unknown if it is excreted in breast milk.
Dapsone Counseling: I discussed with the patient the risks of dapsone including but not limited to hemolytic anemia, agranulocytosis, rashes, methemoglobinemia, kidney failure, peripheral neuropathy, headaches, GI upset, and liver toxicity.  Patients who start dapsone require monitoring including baseline LFTs and weekly CBCs for the first month, then every month thereafter.  The patient verbalized understanding of the proper use and possible adverse effects of dapsone.  All of the patient's questions and concerns were addressed.
Drysol Counseling:  I discussed with the patient the risks of drysol/aluminum chloride including but not limited to skin rash, itching, irritation, burning.
High Dose Vitamin A Pregnancy And Lactation Text: High dose vitamin A therapy is contraindicated during pregnancy and breast feeding.
Albendazole Pregnancy And Lactation Text: This medication is Pregnancy Category C and it isn't known if it is safe during pregnancy. It is also excreted in breast milk.
Thalidomide Counseling: I discussed with the patient the risks of thalidomide including but not limited to birth defects, anxiety, weakness, chest pain, dizziness, cough and severe allergy.
Minoxidil Counseling: Minoxidil is a topical medication which can increase blood flow where it is applied. It is uncertain how this medication increases hair growth. Side effects are uncommon and include stinging and allergic reactions.
Azathioprine Pregnancy And Lactation Text: This medication is Pregnancy Category D and isn't considered safe during pregnancy. It is unknown if this medication is excreted in breast milk.
Dupixent Counseling: I discussed with the patient the risks of dupilumab including but not limited to eye infection and irritation, cold sores, injection site reactions, worsening of asthma, allergic reactions and increased risk of parasitic infection.  Live vaccines should be avoided while taking dupilumab. Dupilumab will also interact with certain medications such as warfarin and cyclosporine. The patient understands that monitoring is required and they must alert us or the primary physician if symptoms of infection or other concerning signs are noted.
Nsaids Counseling: NSAID Counseling: I discussed with the patient that NSAIDs should be taken with food. Prolonged use of NSAIDs can result in the development of stomach ulcers.  Patient advised to stop taking NSAIDs if abdominal pain occurs.  The patient verbalized understanding of the proper use and possible adverse effects of NSAIDs.  All of the patient's questions and concerns were addressed.
Azithromycin Counseling:  I discussed with the patient the risks of azithromycin including but not limited to GI upset, allergic reaction, drug rash, diarrhea, and yeast infections.
Zyclara Counseling:  I discussed with the patient the risks of imiquimod including but not limited to erythema, scaling, itching, weeping, crusting, and pain.  Patient understands that the inflammatory response to imiquimod is variable from person to person and was educated regarded proper titration schedule.  If flu-like symptoms develop, patient knows to discontinue the medication and contact us.
Rifampin Pregnancy And Lactation Text: This medication is Pregnancy Category C and it isn't know if it is safe during pregnancy. It is also excreted in breast milk and should not be used if you are breast feeding.
Drysol Pregnancy And Lactation Text: This medication is considered safe during pregnancy and breast feeding.
Erythromycin Counseling:  I discussed with the patient the risks of erythromycin including but not limited to GI upset, allergic reaction, drug rash, diarrhea, increase in liver enzymes, and yeast infections.
Otezla Counseling: The side effects of Otezla were discussed with the patient, including but not limited to worsening or new depression, weight loss, diarrhea, nausea, upper respiratory tract infection, and headache. Patient instructed to call the office should any adverse effect occur.  The patient verbalized understanding of the proper use and possible adverse effects of Otezla.  All the patient's questions and concerns were addressed.
Dapsone Pregnancy And Lactation Text: This medication is Pregnancy Category C and is not considered safe during pregnancy or breast feeding.
Ivermectin Counseling:  Patient instructed to take medication on an empty stomach with a full glass of water.  Patient informed of potential adverse effects including but not limited to nausea, diarrhea, dizziness, itching, and swelling of the extremities or lymph nodes.  The patient verbalized understanding of the proper use and possible adverse effects of ivermectin.  All of the patient's questions and concerns were addressed.
Rituxan Counseling:  I discussed with the patient the risks of Rituxan infusions. Side effects can include infusion reactions, severe drug rashes including mucocutaneous reactions, reactivation of latent hepatitis and other infections and rarely progressive multifocal leukoencephalopathy.  All of the patient's questions and concerns were addressed.
Cimetidine Counseling:  I discussed with the patient the risks of Cimetidine including but not limited to gynecomastia, headache, diarrhea, nausea, drowsiness, arrhythmias, pancreatitis, skin rashes, psychosis, bone marrow suppression and kidney toxicity.
Dupixent Pregnancy And Lactation Text: This medication likely crosses the placenta but the risk for the fetus is uncertain. This medication is excreted in breast milk.
Azithromycin Pregnancy And Lactation Text: This medication is considered safe during pregnancy and is also secreted in breast milk.
Cellcept Counseling:  I discussed with the patient the risks of mycophenolate mofetil including but not limited to infection/immunosuppression, GI upset, hypokalemia, hypercholesterolemia, bone marrow suppression, lymphoproliferative disorders, malignancy, GI ulceration/bleed/perforation, colitis, interstitial lung disease, kidney failure, progressive multifocal leukoencephalopathy, and birth defects.  The patient understands that monitoring is required including a baseline creatinine and regular CBC testing. In addition, patient must alert us immediately if symptoms of infection or other concerning signs are noted.
Sski Pregnancy And Lactation Text: This medication is Pregnancy Category D and isn't considered safe during pregnancy. It is excreted in breast milk.
Tremfya Counseling: I discussed with the patient the risks of guselkumab including but not limited to immunosuppression, serious infections, worsening of inflammatory bowel disease and drug reactions.  The patient understands that monitoring is required including a PPD at baseline and must alert us or the primary physician if symptoms of infection or other concerning signs are noted.
Tetracycline Counseling: Patient counseled regarding possible photosensitivity and increased risk for sunburn.  Patient instructed to avoid sunlight, if possible.  When exposed to sunlight, patients should wear protective clothing, sunglasses, and sunscreen.  The patient was instructed to call the office immediately if the following severe adverse effects occur:  hearing changes, easy bruising/bleeding, severe headache, or vision changes.  The patient verbalized understanding of the proper use and possible adverse effects of tetracycline.  All of the patient's questions and concerns were addressed. Patient understands to avoid pregnancy while on therapy due to potential birth defects.
Erythromycin Pregnancy And Lactation Text: This medication is Pregnancy Category B and is considered safe during pregnancy. It is also excreted in breast milk.
Elidel Counseling: Patient may experience a mild burning sensation during topical application. Elidel is not approved in children less than 2 years of age. There have been case reports of hematologic and skin malignancies in patients using topical calcineurin inhibitors although causality is questionable.
Tazorac Counseling:  Patient advised that medication is irritating and drying.  Patient may need to apply sparingly and wash off after an hour before eventually leaving it on overnight.  The patient verbalized understanding of the proper use and possible adverse effects of tazorac.  All of the patient's questions and concerns were addressed.
Picato Counseling:  I discussed with the patient the risks of Picato including but not limited to erythema, scaling, itching, weeping, crusting, and pain.
Rituxan Pregnancy And Lactation Text: This medication is Pregnancy Category C and it isn't know if it is safe during pregnancy. It is unknown if this medication is excreted in breast milk but similar antibodies are known to be excreted.
Erivedge Counseling- I discussed with the patient the risks of Erivedge including but not limited to nausea, vomiting, diarrhea, constipation, weight loss, changes in the sense of taste, decreased appetite, muscle spasms, and hair loss.  The patient verbalized understanding of the proper use and possible adverse effects of Erivedge.  All of the patient's questions and concerns were addressed.
Bactrim Counseling:  I discussed with the patient the risks of sulfa antibiotics including but not limited to GI upset, allergic reaction, drug rash, diarrhea, dizziness, photosensitivity, and yeast infections.  Rarely, more serious reactions can occur including but not limited to aplastic anemia, agranulocytosis, methemoglobinemia, blood dyscrasias, liver or kidney failure, lung infiltrates or desquamative/blistering drug rashes.
Enbrel Counseling:  I discussed with the patient the risks of etanercept including but not limited to myelosuppression, immunosuppression, autoimmune hepatitis, demyelinating diseases, lymphoma, and infections.  The patient understands that monitoring is required including a PPD at baseline and must alert us or the primary physician if symptoms of infection or other concerning signs are noted.
SSKI Counseling:  I discussed with the patient the risks of SSKI including but not limited to thyroid abnormalities, metallic taste, GI upset, fever, headache, acne, arthralgias, paraesthesias, lymphadenopathy, easy bleeding, arrhythmias, and allergic reaction.
Metronidazole Counseling:  I discussed with the patient the risks of metronidazole including but not limited to seizures, nausea/vomiting, a metallic taste in the mouth, nausea/vomiting and severe allergy.
Acitretin Counseling:  I discussed with the patient the risks of acitretin including but not limited to hair loss, dry lips/skin/eyes, liver damage, hyperlipidemia, depression/suicidal ideation, photosensitivity.  Serious rare side effects can include but are not limited to pancreatitis, pseudotumor cerebri, bony changes, clot formation/stroke/heart attack.  Patient understands that alcohol is contraindicated since it can result in liver toxicity and significantly prolong the elimination of the drug by many years.
Odomzo Counseling- I discussed with the patient the risks of Odomzo including but not limited to nausea, vomiting, diarrhea, constipation, weight loss, changes in the sense of taste, decreased appetite, muscle spasms, and hair loss.  The patient verbalized understanding of the proper use and possible adverse effects of Odomzo.  All of the patient's questions and concerns were addressed.
Tazorac Pregnancy And Lactation Text: This medication is not safe during pregnancy. It is unknown if this medication is excreted in breast milk.
Siliq Counseling:  I discussed with the patient the risks of Siliq including but not limited to new or worsening depression, suicidal thoughts and behavior, immunosuppression, malignancy, posterior leukoencephalopathy syndrome, and serious infections.  The patient understands that monitoring is required including a PPD at baseline and must alert us or the primary physician if symptoms of infection or other concerning signs are noted. There is also a special program designed to monitor depression which is required with Siliq.
Cyclophosphamide Counseling:  I discussed with the patient the risks of cyclophosphamide including but not limited to hair loss, hormonal abnormalities, decreased fertility, abdominal pain, diarrhea, nausea and vomiting, bone marrow suppression and infection. The patient understands that monitoring is required while taking this medication.
Bactrim Pregnancy And Lactation Text: This medication is Pregnancy Category D and is known to cause fetal risk.  It is also excreted in breast milk.
Benzoyl Peroxide Counseling: Patient counseled that medicine may cause skin irritation and bleach clothing.  In the event of skin irritation, the patient was advised to reduce the amount of the drug applied or use it less frequently.   The patient verbalized understanding of the proper use and possible adverse effects of benzoyl peroxide.  All of the patient's questions and concerns were addressed.
Arava Counseling:  Patient counseled regarding adverse effects of Arava including but not limited to nausea, vomiting, abnormalities in liver function tests. Patients may develop mouth sores, rash, diarrhea, and abnormalities in blood counts. The patient understands that monitoring is required including LFTs and blood counts.  There is a rare possibility of scarring of the liver and lung problems that can occur when taking methotrexate. Persistent nausea, loss of appetite, pale stools, dark urine, cough, and shortness of breath should be reported immediately. Patient advised to discontinue Arava treatment and consult with a physician prior to attempting conception. The patient will have to undergo a treatment to eliminate Arava from the body prior to conception.
Spironolactone Pregnancy And Lactation Text: This medication can cause feminization of the male fetus and should be avoided during pregnancy. The active metabolite is also found in breast milk.
Acitretin Pregnancy And Lactation Text: This medication is Pregnancy Category X and should not be given to women who are pregnant or may become pregnant in the future. This medication is excreted in breast milk.
Doxepin Counseling:  Patient advised that the medication is sedating and not to drive a car after taking this medication. Patient informed of potential adverse effects including but not limited to dry mouth, urinary retention, and blurry vision.  The patient verbalized understanding of the proper use and possible adverse effects of doxepin.  All of the patient's questions and concerns were addressed.
Eucrisa Counseling: Patient may experience a mild burning sensation during topical application. Eucrisa is not approved in children less than 2 years of age.
Metronidazole Pregnancy And Lactation Text: This medication is Pregnancy Category B and considered safe during pregnancy.  It is also excreted in breast milk.
Nsaids Pregnancy And Lactation Text: These medications are considered safe up to 30 weeks gestation. It is excreted in breast milk.
Topical Clindamycin Counseling: Patient counseled that this medication may cause skin irritation or allergic reactions.  In the event of skin irritation, the patient was advised to reduce the amount of the drug applied or use it less frequently.   The patient verbalized understanding of the proper use and possible adverse effects of clindamycin.  All of the patient's questions and concerns were addressed.
Xeljanz Counseling: I discussed with the patient the risks of Xeljanz therapy including increased risk of infection, liver issues, headache, diarrhea, or cold symptoms. Live vaccines should be avoided. They were instructed to call if they have any problems.
Gabapentin Counseling: I discussed with the patient the risks of gabapentin including but not limited to dizziness, somnolence, fatigue and ataxia.
Detail Level: Zone
Protopic Counseling: Patient may experience a mild burning sensation during topical application. Protopic is not approved in children less than 2 years of age. There have been case reports of hematologic and skin malignancies in patients using topical calcineurin inhibitors although causality is questionable.
Cyclophosphamide Pregnancy And Lactation Text: This medication is Pregnancy Category D and it isn't considered safe during pregnancy. This medication is excreted in breast milk.
Cephalexin Counseling: I counseled the patient regarding use of cephalexin as an antibiotic for prophylactic and/or therapeutic purposes. Cephalexin (commonly prescribed under brand name Keflex) is a cephalosporin antibiotic which is active against numerous classes of bacteria, including most skin bacteria. Side effects may include nausea, diarrhea, gastrointestinal upset, rash, hives, yeast infections, and in rare cases, hepatitis, kidney disease, seizures, fever, confusion, neurologic symptoms, and others. Patients with severe allergies to penicillin medications are cautioned that there is about a 10% incidence of cross-reactivity with cephalosporins. When possible, patients with penicillin allergies should use alternatives to cephalosporins for antibiotic therapy.
Spironolactone Counseling: Patient advised regarding risks of diarrhea, abdominal pain, hyperkalemia, birth defects (for female patients), liver toxicity and renal toxicity. The patient may need blood work to monitor liver and kidney function and potassium levels while on therapy. The patient verbalized understanding of the proper use and possible adverse effects of spironolactone.  All of the patient's questions and concerns were addressed.
Fluconazole Counseling:  Patient counseled regarding adverse effects of fluconazole including but not limited to headache, diarrhea, nausea, upset stomach, liver function test abnormalities, taste disturbance, and stomach pain.  There is a rare possibility of liver failure that can occur when taking fluconazole.  The patient understands that monitoring of LFTs and kidney function test may be required, especially at baseline. The patient verbalized understanding of the proper use and possible adverse effects of fluconazole.  All of the patient's questions and concerns were addressed.
Benzoyl Peroxide Pregnancy And Lactation Text: This medication is Pregnancy Category C. It is unknown if benzoyl peroxide is excreted in breast milk.
Bexarotene Counseling:  I discussed with the patient the risks of bexarotene including but not limited to hair loss, dry lips/skin/eyes, liver abnormalities, hyperlipidemia, pancreatitis, depression/suicidal ideation, photosensitivity, drug rash/allergic reactions, hypothyroidism, anemia, leukopenia, infection, cataracts, and teratogenicity.  Patient understands that they will need regular blood tests to check lipid profile, liver function tests, white blood cell count, thyroid function tests and pregnancy test if applicable.
Doxepin Pregnancy And Lactation Text: This medication is Pregnancy Category C and it isn't known if it is safe during pregnancy. It is also excreted in breast milk and breast feeding isn't recommended.
Humira Counseling:  I discussed with the patient the risks of adalimumab including but not limited to myelosuppression, immunosuppression, autoimmune hepatitis, demyelinating diseases, lymphoma, and serious infections.  The patient understands that monitoring is required including a PPD at baseline and must alert us or the primary physician if symptoms of infection or other concerning signs are noted.
Xeljulianz Pregnancy And Lactation Text: This medication is Pregnancy Category D and is not considered safe during pregnancy.  The risk during breast feeding is also uncertain.

## 2019-01-04 ENCOUNTER — APPOINTMENT (RX ONLY)
Dept: URBAN - METROPOLITAN AREA CLINIC 36 | Facility: CLINIC | Age: 79
Setting detail: DERMATOLOGY
End: 2019-01-04

## 2019-01-04 VITALS — SYSTOLIC BLOOD PRESSURE: 144 MMHG | DIASTOLIC BLOOD PRESSURE: 80 MMHG

## 2019-01-04 DIAGNOSIS — L72.8 OTHER FOLLICULAR CYSTS OF THE SKIN AND SUBCUTANEOUS TISSUE: ICD-10-CM

## 2019-01-04 PROBLEM — D04.4 CARCINOMA IN SITU OF SKIN OF SCALP AND NECK: Status: ACTIVE | Noted: 2019-01-04

## 2019-01-04 PROCEDURE — 17311 MOHS 1 STAGE H/N/HF/G: CPT

## 2019-01-04 PROCEDURE — ? DIAGNOSIS COMMENT

## 2019-01-04 PROCEDURE — ? MOHS SURGERY

## 2019-01-04 PROCEDURE — ? COUNSELING

## 2019-01-04 PROCEDURE — 17312 MOHS ADDL STAGE: CPT

## 2019-01-04 PROCEDURE — 13121 CMPLX RPR S/A/L 2.6-7.5 CM: CPT

## 2019-01-04 ASSESSMENT — LOCATION DETAILED DESCRIPTION DERM: LOCATION DETAILED: POSTERIOR SCROTUM

## 2019-01-04 ASSESSMENT — LOCATION SIMPLE DESCRIPTION DERM: LOCATION SIMPLE: SCROTUM

## 2019-01-04 ASSESSMENT — LOCATION ZONE DERM: LOCATION ZONE: GENITALIA

## 2019-01-04 NOTE — PROCEDURE: MOHS SURGERY
Repair Type: Complex Repair
V-Y Flap Text: The defect edges were debeveled with a #15 scalpel blade.  Given the location of the defect, shape of the defect and the proximity to free margins a V-Y flap was deemed most appropriate.  Using a sterile surgical marker, an appropriate advancement flap was drawn incorporating the defect and placing the expected incisions within the relaxed skin tension lines where possible.    The area thus outlined was incised deep to adipose tissue with a #15 scalpel blade.  The skin margins were undermined to an appropriate distance in all directions utilizing iris scissors.
Stage 3: Number Of Blocks?: 0
Consent 2/Introductory Paragraph: Mohs surgery was explained to the patient and consent was obtained. The risks, benefits and alternatives to therapy were discussed in detail. Specifically, the risks of infection, scarring, bleeding, prolonged wound healing, incomplete removal, allergy to anesthesia, nerve injury and recurrence were addressed. Prior to the procedure, the treatment site was clearly identified and confirmed by the patient. All components of Universal Protocol/PAUSE Rule completed.
Plastic Surgeon Procedure Text (A): After obtaining clear surgical margins the patient was sent to plastics for surgical repair.  The patient understands they will receive post-surgical care and follow-up from the referring physician's office.
Graft Donor Site Will Heal By Secondary Intention: No
Melolabial Interpolation Flap Text: A decision was made to reconstruct the defect utilizing an interpolation axial flap and a staged reconstruction.  A telfa template was made of the defect.  This telfa template was then used to outline the melolabial interpolation flap.  The donor area for the pedicle flap was then injected with anesthesia.  The flap was excised through the skin and subcutaneous tissue down to the layer of the underlying musculature.  The pedicle flap was carefully excised within this deep plane to maintain its blood supply.  The edges of the donor site were undermined.   The donor site was closed in a primary fashion.  The pedicle was then rotated into position and sutured.  Once the tube was sutured into place, adequate blood supply was confirmed with blanching and refill.  The pedicle was then wrapped with xeroform gauze and dressed appropriately with a telfa and gauze bandage to ensure continued blood supply and protect the attached pedicle.
No Residual Tumor Seen Histology Text: There were no malignant cells seen in the sections examined.
Localized Dermabrasion With Wire Brush Text: The patient was draped in routine manner.  Localized dermabrasion using 3 x 17 mm wire brush was performed in routine manner to papillary dermis. This spot dermabrasion is being performed to complete skin cancer reconstruction. It also will eliminate the other sun damaged precancerous cells that are known to be part of the regional effect of a lifetime's worth of sun exposure. This localized dermabrasion is therapeutic and should not be considered cosmetic in any regard.
Wound Care (No Sutures): Petrolatum
Show Surgical Defect Variables In The Stage Tabs: Yes
Dorsal Nasal Flap Text: The defect edges were debeveled with a #15 scalpel blade.  Given the location of the defect and the proximity to free margins a dorsal nasal flap was deemed most appropriate.  Using a sterile surgical marker, an appropriate dorsal nasal flap was drawn around the defect.    The area thus outlined was incised deep to adipose tissue with a #15 scalpel blade.  The skin margins were undermined to an appropriate distance in all directions utilizing iris scissors.
Non-Graft Cartilage Fenestration Text: The cartilage was fenestrated with a 2mm punch biopsy to help facilitate healing.
Asc Procedure Text (A): After obtaining clear surgical margins the patient was sent to an ASC for surgical repair.  The patient understands they will receive post-surgical care and follow-up from the ASC physician.
Epidermal Closure Graft Donor Site (Optional): simple interrupted
Consent (Spinal Accessory)/Introductory Paragraph: The rationale for Mohs was explained to the patient and consent was obtained. The risks, benefits and alternatives to therapy were discussed in detail. Specifically, the risks of damage to the spinal accessory nerve, infection, scarring, bleeding, prolonged wound healing, incomplete removal, allergy to anesthesia, and recurrence were addressed. Prior to the procedure, the treatment site was clearly identified and confirmed by the patient. All components of Universal Protocol/PAUSE Rule completed.
Estimated Blood Loss (Cc): minimal
Cheiloplasty (Less Than 50%) Text: A decision was made to reconstruct the defect with a  cheiloplasty.  The defect was undermined extensively.  Additional obicularis oris muscle was excised with a 15 blade scalpel.  The defect was converted into a full thickness wedge, of less than 50% of the vertical height of the lip, to facilite a better cosmetic result.  Small vessels were then tied off with 5-0 monocyrl. The obicularis oris, superficial fascia, adipose and dermis were then reapproximated.  After the deeper layers were approximated the epidermis was reapproximated with particular care given to realign the vermilion border.
Mucosal Advancement Flap Text: Given the location of the defect, shape of the defect and the proximity to free margins a mucosal advancement flap was deemed most appropriate. Incisions were made with a 15 blade scalpel in the appropriate fashion along the cutaneous vermilion border and the mucosal lip. The remaining actinically damaged mucosal tissue was excised.  The mucosal advancement flap was then elevated to the gingival sulcus with care taken to preserve the neurovascular structures and advanced into the primary defect. Care was taken to ensure that precise realignment of the vermilion border was achieved.
Otolaryngologist Procedure Text (B): After obtaining clear surgical margins the patient was sent to otolaryngology for surgical repair.  The patient understands they will receive post-surgical care and follow-up from the referring physician's office.
Stage 2: Additional Anesthesia Volume In Cc: 2
Referring Physician (Optional): LYNETTE Leiva
Consent (Lip)/Introductory Paragraph: The rationale for Mohs was explained to the patient and consent was obtained. The risks, benefits and alternatives to therapy were discussed in detail. Specifically, the risks of lip deformity, changes in the oral aperture, infection, scarring, bleeding, prolonged wound healing, incomplete removal, allergy to anesthesia, nerve injury and recurrence were addressed. Prior to the procedure, the treatment site was clearly identified and confirmed by the patient. All components of Universal Protocol/PAUSE Rule completed.
Closure 2 Information: This tab is for additional flaps and grafts, including complex repair and grafts and complex repair and flaps. You can also specify a different location for the additional defect, if the location is the same you do not need to select a new one. We will insert the automated text for the repair you select below just as we do for solitary flaps and grafts. Please note that at this time if you select a location with a different insurance zone you will need to override the ICD10 and CPT if appropriate.
Suture Removal: 14 days
Referred To Oculoplastics For Closure Text (Leave Blank If You Do Not Want): After obtaining clear surgical margins the patient was sent to oculoplastics for surgical repair.  The patient understands they will receive post-surgical care and follow-up from the referring physician's office.
Double Island Pedicle Flap Text: The defect edges were debeveled with a #15 scalpel blade.  Given the location of the defect, shape of the defect and the proximity to free margins a double island pedicle advancement flap was deemed most appropriate.  Using a sterile surgical marker, an appropriate advancement flap was drawn incorporating the defect, outlining the appropriate donor tissue and placing the expected incisions within the relaxed skin tension lines where possible.    The area thus outlined was incised deep to adipose tissue with a #15 scalpel blade.  The skin margins were undermined to an appropriate distance in all directions around the primary defect and laterally outward around the island pedicle utilizing iris scissors.  There was minimal undermining beneath the pedicle flap.
Bcc Infiltrative Histology Text: There were numerous aggregates of basaloid cells demonstrating an infiltrative pattern.
Ftsg Text: The defect edges were debeveled with a #15 scalpel blade.  Given the location of the defect, shape of the defect and the proximity to free margins a full thickness skin graft was deemed most appropriate.  Using a sterile surgical marker, the primary defect shape was transferred to the donor site. The area thus outlined was incised deep to adipose tissue with a #15 scalpel blade.  The harvested graft was then trimmed of adipose tissue until only dermis and epidermis was left.  The skin margins of the secondary defect were undermined to an appropriate distance in all directions utilizing iris scissors.  The secondary defect was closed with interrupted buried subcutaneous sutures.  The skin edges were then re-apposed with running  sutures.  The skin graft was then placed in the primary defect and oriented appropriately.
Subsequent Stages Histo Method Verbiage: Using a similar technique to that described above, a thin layer of tissue was removed from all areas where tumor was visible on the previous stage.  The tissue was again oriented, mapped, dyed, and processed as above.
Split-Thickness Skin Graft Text: The defect edges were debeveled with a #15 scalpel blade.  Given the location of the defect, shape of the defect and the proximity to free margins a split thickness skin graft was deemed most appropriate.  Using a sterile surgical marker, the primary defect shape was transferred to the donor site. The split thickness graft was then harvested.  The skin graft was then placed in the primary defect and oriented appropriately.
Transposition Flap Text: The defect edges were debeveled with a #15 scalpel blade.  Given the location of the defect and the proximity to free margins a transposition flap was deemed most appropriate.  Using a sterile surgical marker, an appropriate transposition flap was drawn incorporating the defect.    The area thus outlined was incised deep to adipose tissue with a #15 scalpel blade.  The skin margins were undermined to an appropriate distance in all directions utilizing iris scissors.
Provider Procedure Text (C): After obtaining clear surgical margins the defect was repaired by another provider.
S Plasty Text: Given the location and shape of the defect, and the orientation of relaxed skin tension lines, an S-plasty was deemed most appropriate for repair.  Using a sterile surgical marker, the appropriate outline of the S-plasty was drawn, incorporating the defect and placing the expected incisions within the relaxed skin tension lines where possible.  The area thus outlined was incised deep to adipose tissue with a #15 scalpel blade.  The skin margins were undermined to an appropriate distance in all directions utilizing iris scissors. The skin flaps were advanced over the defect.  The opposing margins were then approximated with interrupted buried subcutaneous sutures.
Body Location Override (Optional - Billing Will Still Be Based On Selected Body Map Location If Applicable): left frontal scalp
Anesthesia Type: 1% lidocaine with epinephrine and a 1:10 solution of 8.4% sodium bicarbonate
Area L Indication Text: Tumors in this location are included in Area L (trunk and extremities).  Mohs surgery is indicated for larger tumors, or tumors with aggressive histologic features, in these anatomic locations.
X Size Of Lesion In Cm (Optional): 0.7
Bi-Rhombic Flap Text: The defect edges were debeveled with a #15 scalpel blade.  Given the location of the defect and the proximity to free margins a bi-rhombic flap was deemed most appropriate.  Using a sterile surgical marker, an appropriate rhombic flap was drawn incorporating the defect. The area thus outlined was incised deep to adipose tissue with a #15 scalpel blade.  The skin margins were undermined to an appropriate distance in all directions utilizing iris scissors.
Dermal Autograft Text: The defect edges were debeveled with a #15 scalpel blade.  Given the location of the defect, shape of the defect and the proximity to free margins a dermal autograft was deemed most appropriate.  Using a sterile surgical marker, the primary defect shape was transferred to the donor site. The area thus outlined was incised deep to adipose tissue with a #15 scalpel blade.  The harvested graft was then trimmed of adipose and epidermal tissue until only dermis was left.  The skin graft was then placed in the primary defect and oriented appropriately.
Crescentic Complex Repair Preamble Text (Leave Blank If You Do Not Want): Extensive wide undermining was performed.
Z Plasty Text: The lesion was extirpated to the level of the fat with a #15 scalpel blade.  Given the location of the defect, shape of the defect and the proximity to free margins a Z-plasty was deemed most appropriate for repair.  Using a sterile surgical marker, the appropriate transposition arms of the Z-plasty were drawn incorporating the defect and placing the expected incisions within the relaxed skin tension lines where possible.    The area thus outlined was incised deep to adipose tissue with a #15 scalpel blade.  The skin margins were undermined to an appropriate distance in all directions utilizing iris scissors.  The opposing transposition arms were then transposed into place in opposite direction and anchored with interrupted buried subcutaneous sutures.
Crescentic Advancement Flap Text: The defect edges were debeveled with a #15 scalpel blade.  Given the location of the defect and the proximity to free margins a crescentic advancement flap was deemed most appropriate.  Using a sterile surgical marker, the appropriate advancement flap was drawn incorporating the defect and placing the expected incisions within the relaxed skin tension lines where possible.    The area thus outlined was incised deep to adipose tissue with a #15 scalpel blade.  The skin margins were undermined to an appropriate distance in all directions utilizing iris scissors.
Postop Diagnosis: same
Mid-Level Procedure Text (D): After obtaining clear surgical margins the patient was sent to a mid-level provider for surgical repair.  The patient understands they will receive post-surgical care and follow-up from the mid-level provider.
Crescentic Intermediate Repair Preamble Text (Leave Blank If You Do Not Want): Undermining was performed with blunt dissection.
Dressing: pressure dressing with telfa
Banner Transposition Flap Text: The defect edges were debeveled with a #15 scalpel blade.  Given the location of the defect and the proximity to free margins a Banner transposition flap was deemed most appropriate.  Using a sterile surgical marker, an appropriate flap drawn around the defect. The area thus outlined was incised deep to adipose tissue with a #15 scalpel blade.  The skin margins were undermined to an appropriate distance in all directions utilizing iris scissors.
Purse String (Simple) Text: Given the location of the defect and the characteristics of the surrounding skin a purse string closure was deemed most appropriate.  Undermining was performed circumfirentially around the surgical defect.  A purse string suture was then placed and tightened.
Consent 3/Introductory Paragraph: I gave the patient a chance to ask questions they had about the procedure.  Following this I explained the Mohs procedure and consent was obtained. The risks, benefits and alternatives to therapy were discussed in detail. Specifically, the risks of infection, scarring, bleeding, prolonged wound healing, incomplete removal, allergy to anesthesia, nerve injury and recurrence were addressed. Prior to the procedure, the treatment site was clearly identified and confirmed by the patient. All components of Universal Protocol/PAUSE Rule completed.
Additional Anesthesia Volume In Cc: 6
Repair Anesthesia Method: local infiltration
Purse String (Intermediate) Text: Given the location of the defect and the characteristics of the surrounding skin a purse string intermediate closure was deemed most appropriate.  Undermining was performed circumfirentially around the surgical defect.  A purse string suture was then placed and tightened.
Bilobed Flap Text: The defect edges were debeveled with a #15 scalpel blade.  Given the location of the defect and the proximity to free margins a bilobe flap was deemed most appropriate.  Using a sterile surgical marker, an appropriate bilobe flap drawn around the defect.    The area thus outlined was incised deep to adipose tissue with a #15 scalpel blade.  The skin margins were undermined to an appropriate distance in all directions utilizing iris scissors.
Inflammation Suggestive Of Cancer Camouflage Histology Text: There was a dense lymphocytic infiltrate which prevented adequate histologic evaluation of adjacent structures.
Advancement-Rotation Flap Text: The defect edges were debeveled with a #15 scalpel blade.  Given the location of the defect, shape of the defect and the proximity to free margins an advancement-rotation flap was deemed most appropriate.  Using a sterile surgical marker, an appropriate flap was drawn incorporating the defect and placing the expected incisions within the relaxed skin tension lines where possible. The area thus outlined was incised deep to adipose tissue with a #15 scalpel blade.  The skin margins were undermined to an appropriate distance in all directions utilizing iris scissors.
Surgeon: Merlin Eddy M.D.
Location Indication Override (Is Already Calculated Based On Selected Body Location): Area M
Previous Accession (Optional): [id number]
Mastoid Interpolation Flap Text: A decision was made to reconstruct the defect utilizing an interpolation axial flap and a staged reconstruction.  A telfa template was made of the defect.  This telfa template was then used to outline the mastoid interpolation flap.  The donor area for the pedicle flap was then injected with anesthesia.  The flap was excised through the skin and subcutaneous tissue down to the layer of the underlying musculature.  The pedicle flap was carefully excised within this deep plane to maintain its blood supply.  The edges of the donor site were undermined.   The donor site was closed in a primary fashion.  The pedicle was then rotated into position and sutured.  Once the tube was sutured into place, adequate blood supply was confirmed with blanching and refill.  The pedicle was then wrapped with xeroform gauze and dressed appropriately with a telfa and gauze bandage to ensure continued blood supply and protect the attached pedicle.
Graft Cartilage Fenestration Text: The cartilage was fenestrated with a 2mm punch biopsy to help facilitate graft survival and healing.
Island Pedicle Flap Text: The defect edges were debeveled with a #15 scalpel blade.  Given the location of the defect, shape of the defect and the proximity to free margins an island pedicle advancement flap was deemed most appropriate.  Using a sterile surgical marker, an appropriate advancement flap was drawn incorporating the defect, outlining the appropriate donor tissue and placing the expected incisions within the relaxed skin tension lines where possible.    The area thus outlined was incised deep to adipose tissue with a #15 scalpel blade.  The skin margins were undermined to an appropriate distance in all directions around the primary defect and laterally outward around the island pedicle utilizing iris scissors.  There was minimal undermining beneath the pedicle flap.
Tarsorrhaphy Text: A tarsorrhaphy was performed using Frost sutures.
Consent (Near Eyelid Margin)/Introductory Paragraph: The rationale for Mohs was explained to the patient and consent was obtained. The risks, benefits and alternatives to therapy were discussed in detail. Specifically, the risks of ectropion or eyelid deformity, infection, scarring, bleeding, prolonged wound healing, incomplete removal, allergy to anesthesia, nerve injury and recurrence were addressed. Prior to the procedure, the treatment site was clearly identified and confirmed by the patient. All components of Universal Protocol/PAUSE Rule completed.
Stage 11: Additional Anesthesia Type: 1% lidocaine with epinephrine
Hatchet Flap Text: The defect edges were debeveled with a #15 scalpel blade.  Given the location of the defect, shape of the defect and the proximity to free margins a hatchet flap was deemed most appropriate.  Using a sterile surgical marker, an appropriate hatchet flap was drawn incorporating the defect and placing the expected incisions within the relaxed skin tension lines where possible.    The area thus outlined was incised deep to adipose tissue with a #15 scalpel blade.  The skin margins were undermined to an appropriate distance in all directions utilizing iris scissors.
Cheiloplasty (Complex) Text: A decision was made to reconstruct the defect with a  cheiloplasty.  The defect was undermined extensively.  Additional obicularis oris muscle was excised with a 15 blade scalpel.  The defect was converted into a full thickness wedge to facilite a better cosmetic result.  Small vessels were then tied off with 5-0 monocyrl. The obicularis oris, superficial fascia, adipose and dermis were then reapproximated.  After the deeper layers were approximated the epidermis was reapproximated with particular care given to realign the vermilion border.
Island Pedicle Flap-Requiring Vessel Identification Text: The defect edges were debeveled with a #15 scalpel blade.  Given the location of the defect, shape of the defect and the proximity to free margins an island pedicle advancement flap was deemed most appropriate.  Using a sterile surgical marker, an appropriate advancement flap was drawn, based on the axial vessel mentioned above, incorporating the defect, outlining the appropriate donor tissue and placing the expected incisions within the relaxed skin tension lines where possible.    The area thus outlined was incised deep to adipose tissue with a #15 scalpel blade.  The skin margins were undermined to an appropriate distance in all directions around the primary defect and laterally outward around the island pedicle utilizing iris scissors.  There was minimal undermining beneath the pedicle flap.
Consent Type: Consent 1 (Standard)
Consent (Scalp)/Introductory Paragraph: The rationale for Mohs was explained to the patient and consent was obtained. The risks, benefits and alternatives to therapy were discussed in detail. Specifically, the risks of changes in hair growth pattern secondary to repair, infection, scarring, bleeding, prolonged wound healing, incomplete removal, allergy to anesthesia, nerve injury and recurrence were addressed. Prior to the procedure, the treatment site was clearly identified and confirmed by the patient. All components of Universal Protocol/PAUSE Rule completed.
Stage 1: Number Of Blocks?: 1
Closure 4 Information: This tab is for additional flaps and grafts above and beyond our usual structured repairs.  Please note if you enter information here it will not currently bill and you will need to add the billing information manually.
Simple / Intermediate / Complex Repair - Final Wound Length In Cm: 5.2
Detail Level: Detailed
Donor Site Anesthesia Type: same as repair anesthesia
Cartilage Graft Text: The defect edges were debeveled with a #15 scalpel blade.  Given the location of the defect, shape of the defect, the fact the defect involved a full thickness cartilage defect a cartilage graft was deemed most appropriate.  An appropriate donor site was identified, cleansed, and anesthetized. The cartilage graft was then harvested and transferred to the recipient site, oriented appropriately and then sutured into place.  The secondary defect was then repaired using a primary closure.
Muscle Hinge Flap Text: The defect edges were debeveled with a #15 scalpel blade.  Given the size, depth and location of the defect and the proximity to free margins a muscle hinge flap was deemed most appropriate.  Using a sterile surgical marker, an appropriate hinge flap was drawn incorporating the defect. The area thus outlined was incised with a #15 scalpel blade.  The skin margins were undermined to an appropriate distance in all directions utilizing iris scissors.
Mohs Rapid Report Verbiage: The area of clinically evident tumor was marked with skin marking ink and appropriately hatched.  The initial incision was made following the Mohs approach through the skin.  The specimen was taken to the lab, divided into the necessary number of pieces, chromacoded and processed according to the Mohs protocol.  This was repeated in successive stages until a tumor free defect was achieved.
Deep Sutures: 3-0 Polysorb
Unna Boot Text: An Unna boot was placed to help immobilize the limb and facilitate more rapid healing.
Advancement Flap (Single) Text: The defect edges were debeveled with a #15 scalpel blade.  Given the location of the defect and the proximity to free margins a single advancement flap was deemed most appropriate.  Using a sterile surgical marker, an appropriate advancement flap was drawn incorporating the defect and placing the expected incisions within the relaxed skin tension lines where possible.    The area thus outlined was incised deep to adipose tissue with a #15 scalpel blade.  The skin margins were undermined to an appropriate distance in all directions utilizing iris scissors.
V-Y Plasty Text: The defect edges were debeveled with a #15 scalpel blade.  Given the location of the defect, shape of the defect and the proximity to free margins an V-Y advancement flap was deemed most appropriate.  Using a sterile surgical marker, an appropriate advancement flap was drawn incorporating the defect and placing the expected incisions within the relaxed skin tension lines where possible.    The area thus outlined was incised deep to adipose tissue with a #15 scalpel blade.  The skin margins were undermined to an appropriate distance in all directions utilizing iris scissors.
Helical Rim Advancement Flap Text: The defect edges were debeveled with a #15 blade scalpel.  Given the location of the defect and the proximity to free margins (helical rim) a double helical rim advancement flap was deemed most appropriate.  Using a sterile surgical marker, the appropriate advancement flaps were drawn incorporating the defect and placing the expected incisions between the helical rim and antihelix where possible.  The area thus outlined was incised through and through with a #15 scalpel blade.  With a skin hook and iris scissors, the flaps were gently and sharply undermined and freed up.
Skin Substitute Text: The defect edges were debeveled with a #15 scalpel blade.  Given the location of the defect, shape of the defect and the proximity to free margins a skin substitute graft was deemed most appropriate.  The graft material was trimmed to fit the size of the defect. The graft was then placed in the primary defect and oriented appropriately.
Medical Necessity Statement: Based on my medical judgement, Mohs surgery is the most appropriate treatment for this cancer compared to other treatments.
Tumor Debulked?: curette
A-T Advancement Flap Text: The defect edges were debeveled with a #15 scalpel blade.  Given the location of the defect, shape of the defect and the proximity to free margins an A-T advancement flap was deemed most appropriate.  Using a sterile surgical marker, an appropriate advancement flap was drawn incorporating the defect and placing the expected incisions within the relaxed skin tension lines where possible.    The area thus outlined was incised deep to adipose tissue with a #15 scalpel blade.  The skin margins were undermined to an appropriate distance in all directions utilizing iris scissors.
Post-Care Instructions: I reviewed with the patient in detail post-care instructions. Patient is not to engage in any heavy lifting, exercise, or swimming for the next 14 days. Should the patient develop any fevers, chills, bleeding, severe pain patient will contact the office immediately.
Cheek Interpolation Flap Text: A decision was made to reconstruct the defect utilizing an interpolation axial flap and a staged reconstruction.  A telfa template was made of the defect.  This telfa template was then used to outline the Cheek Interpolation flap.  The donor area for the pedicle flap was then injected with anesthesia.  The flap was excised through the skin and subcutaneous tissue down to the layer of the underlying musculature.  The interpolation flap was carefully excised within this deep plane to maintain its blood supply.  The edges of the donor site were undermined.   The donor site was closed in a primary fashion.  The pedicle was then rotated into position and sutured.  Once the tube was sutured into place, adequate blood supply was confirmed with blanching and refill.  The pedicle was then wrapped with xeroform gauze and dressed appropriately with a telfa and gauze bandage to ensure continued blood supply and protect the attached pedicle.
Partial Purse String (Simple) Text: Given the location of the defect and the characteristics of the surrounding skin a simple purse string closure was deemed most appropriate.  Undermining was performed circumfirentially around the surgical defect.  A purse string suture was then placed and tightened. Wound tension only allowed a partial closure of the circular defect.
Consent (Temporal Branch)/Introductory Paragraph: The rationale for Mohs was explained to the patient and consent was obtained. The risks, benefits and alternatives to therapy were discussed in detail. Specifically, the risks of damage to the temporal branch of the facial nerve, infection, scarring, bleeding, prolonged wound healing, incomplete removal, allergy to anesthesia, and recurrence were addressed. Prior to the procedure, the treatment site was clearly identified and confirmed by the patient. All components of Universal Protocol/PAUSE Rule completed.
Hemostasis: Electricator
Bilobed Transposition Flap Text: The defect edges were debeveled with a #15 scalpel blade.  Given the location of the defect and the proximity to free margins a bilobed transposition flap was deemed most appropriate.  Using a sterile surgical marker, an appropriate bilobe flap drawn around the defect.    The area thus outlined was incised deep to adipose tissue with a #15 scalpel blade.  The skin margins were undermined to an appropriate distance in all directions utilizing iris scissors.
Mauc Instructions: By selecting yes to the question below the MAUC number will be added into the note.  This will be calculated automatically based on the diagnosis chosen, the size entered, the body zone selected (H,M,L) and the specific indications you chose. You will also have the option to override the Mohs AUC if you disagree with the automatically calculated number and this option is found in the Case Summary tab.
Mercedes Flap Text: The defect edges were debeveled with a #15 scalpel blade.  Given the location of the defect, shape of the defect and the proximity to free margins a Mercedes flap was deemed most appropriate.  Using a sterile surgical marker, an appropriate advancement flap was drawn incorporating the defect and placing the expected incisions within the relaxed skin tension lines where possible. The area thus outlined was incised deep to adipose tissue with a #15 scalpel blade.  The skin margins were undermined to an appropriate distance in all directions utilizing iris scissors.
Posterior Auricular Interpolation Flap Text: A decision was made to reconstruct the defect utilizing an interpolation axial flap and a staged reconstruction.  A telfa template was made of the defect.  This telfa template was then used to outline the posterior auricular interpolation flap.  The donor area for the pedicle flap was then injected with anesthesia.  The flap was excised through the skin and subcutaneous tissue down to the layer of the underlying musculature.  The pedicle flap was carefully excised within this deep plane to maintain its blood supply.  The edges of the donor site were undermined.   The donor site was closed in a primary fashion.  The pedicle was then rotated into position and sutured.  Once the tube was sutured into place, adequate blood supply was confirmed with blanching and refill.  The pedicle was then wrapped with xeroform gauze and dressed appropriately with a telfa and gauze bandage to ensure continued blood supply and protect the attached pedicle.
Secondary Intention Text (Leave Blank If You Do Not Want): The defect will heal with secondary intention.
Dressing (No Sutures): dry sterile dressing
Graft Donor Site Bandage (Optional-Leave Blank If You Don't Want In Note): Steri-strips and a pressure bandage were applied to the donor site.
Island Pedicle Flap With Canthal Suspension Text: The defect edges were debeveled with a #15 scalpel blade.  Given the location of the defect, shape of the defect and the proximity to free margins an island pedicle advancement flap was deemed most appropriate.  Using a sterile surgical marker, an appropriate advancement flap was drawn incorporating the defect, outlining the appropriate donor tissue and placing the expected incisions within the relaxed skin tension lines where possible. The area thus outlined was incised deep to adipose tissue with a #15 scalpel blade.  The skin margins were undermined to an appropriate distance in all directions around the primary defect and laterally outward around the island pedicle utilizing iris scissors.  There was minimal undermining beneath the pedicle flap. A suspension suture was placed in the canthal tendon to prevent tension and prevent ectropion.
Complex Repair And Flap Additional Text (Will Appearing After The Standard Complex Repair Text): The complex repair was not sufficient to completely close the primary defect. The remaining additional defect was repaired with the flap mentioned below.
Consent (Ear)/Introductory Paragraph: The rationale for Mohs was explained to the patient and consent was obtained. The risks, benefits and alternatives to therapy were discussed in detail. Specifically, the risks of ear deformity, infection, scarring, bleeding, prolonged wound healing, incomplete removal, allergy to anesthesia, nerve injury and recurrence were addressed. Prior to the procedure, the treatment site was clearly identified and confirmed by the patient. All components of Universal Protocol/PAUSE Rule completed.
Mohs Histo Method Verbiage: Each section was then chromacoded and processed in the Mohs lab using the Mohs protocol and submitted for frozen section.
Ear Wedge Repair Text: A wedge excision was completed by carrying down an excision through the full thickness of the ear and cartilage with an inward facing Burow's triangle. The wound was then closed in a layered fashion.
Rotation Flap Text: The defect edges were debeveled with a #15 scalpel blade.  Given the location of the defect, shape of the defect and the proximity to free margins a rotation flap was deemed most appropriate.  Using a sterile surgical marker, an appropriate rotation flap was drawn incorporating the defect and placing the expected incisions within the relaxed skin tension lines where possible.    The area thus outlined was incised deep to adipose tissue with a #15 scalpel blade.  The skin margins were undermined to an appropriate distance in all directions utilizing iris scissors.
Keystone Flap Text: The defect edges were debeveled with a #15 scalpel blade.  Given the location of the defect, shape of the defect a keystone flap was deemed most appropriate.  Using a sterile surgical marker, an appropriate keystone flap was drawn incorporating the defect, outlining the appropriate donor tissue and placing the expected incisions within the relaxed skin tension lines where possible. The area thus outlined was incised deep to adipose tissue with a #15 scalpel blade.  The skin margins were undermined to an appropriate distance in all directions around the primary defect and laterally outward around the flap utilizing iris scissors.
Eye Protection Verbiage: Before proceeding with the stage, a plastic scleral shield was inserted. The globe was anesthetized with a few drops of 1% lidocaine with 1:100,000 epinephrine. Then, an appropriate sized scleral shield was chosen and coated with lacrilube ointment. The shield was gently inserted and left in place for the duration of each stage. After the stage was completed, the shield was gently removed.
O-T Plasty Text: The defect edges were debeveled with a #15 scalpel blade.  Given the location of the defect, shape of the defect and the proximity to free margins an O-T plasty was deemed most appropriate.  Using a sterile surgical marker, an appropriate O-T plasty was drawn incorporating the defect and placing the expected incisions within the relaxed skin tension lines where possible.    The area thus outlined was incised deep to adipose tissue with a #15 scalpel blade.  The skin margins were undermined to an appropriate distance in all directions utilizing iris scissors.
Mohs Method Verbiage: An incision at a 45 degree angle following the standard Mohs approach was done and the specimen was harvested as a microscopic controlled layer.
Surgical Defect Length In Cm (Optional): 1.8
Melolabial Transposition Flap Text: The defect edges were debeveled with a #15 scalpel blade.  Given the location of the defect and the proximity to free margins a melolabial flap was deemed most appropriate.  Using a sterile surgical marker, an appropriate melolabial transposition flap was drawn incorporating the defect.    The area thus outlined was incised deep to adipose tissue with a #15 scalpel blade.  The skin margins were undermined to an appropriate distance in all directions utilizing iris scissors.
Composite Graft Text: The defect edges were debeveled with a #15 scalpel blade.  Given the location of the defect, shape of the defect, the proximity to free margins and the fact the defect was full thickness a composite graft was deemed most appropriate.  The defect was outline and then transferred to the donor site.  A full thickness graft was then excised from the donor site. The graft was then placed in the primary defect, oriented appropriately and then sutured into place.  The secondary defect was then repaired using a primary closure.
H Plasty Text: Given the location of the defect, shape of the defect and the proximity to free margins a H-plasty was deemed most appropriate for repair.  Using a sterile surgical marker, the appropriate advancement arms of the H-plasty were drawn incorporating the defect and placing the expected incisions within the relaxed skin tension lines where possible. The area thus outlined was incised deep to adipose tissue with a #15 scalpel blade. The skin margins were undermined to an appropriate distance in all directions utilizing iris scissors.  The opposing advancement arms were then advanced into place in opposite direction and anchored with interrupted buried subcutaneous sutures.
Area H Indication Text: Tumors in this location are included in Area H (eyelids, eyebrows, nose, lips, chin, ear, pre-auricular, post-auricular, temple, genitalia, hands, feet, ankles and areola).  Tissue conservation is critical in these anatomic locations.
Advancement Flap (Double) Text: The defect edges were debeveled with a #15 scalpel blade.  Given the location of the defect and the proximity to free margins a double advancement flap was deemed most appropriate.  Using a sterile surgical marker, the appropriate advancement flaps were drawn incorporating the defect and placing the expected incisions within the relaxed skin tension lines where possible.    The area thus outlined was incised deep to adipose tissue with a #15 scalpel blade.  The skin margins were undermined to an appropriate distance in all directions utilizing iris scissors.
Home Suture Removal Text: Patient was provided instructions on removing sutures and will remove their sutures at home.  If they have any questions or difficulties they will call the office.
Tissue Cultured Epidermal Autograft Text: The defect edges were debeveled with a #15 scalpel blade.  Given the location of the defect, shape of the defect and the proximity to free margins a tissue cultured epidermal autograft was deemed most appropriate.  The graft was then trimmed to fit the size of the defect.  The graft was then placed in the primary defect and oriented appropriately.
Bilateral Helical Rim Advancement Flap Text: The defect edges were debeveled with a #15 blade scalpel.  Given the location of the defect and the proximity to free margins (helical rim) a bilateral helical rim advancement flap was deemed most appropriate.  Using a sterile surgical marker, the appropriate advancement flaps were drawn incorporating the defect and placing the expected incisions between the helical rim and antihelix where possible.  The area thus outlined was incised through and through with a #15 scalpel blade.  With a skin hook and iris scissors, the flaps were gently and sharply undermined and freed up.
Alternatives Discussed Intro (Do Not Add Period): I discussed alternative treatments to Mohs surgery and specifically discussed the risks and benefits of
Primary Defect Length In Cm (Final Defect Size - Required For Flaps/Grafts): 2.3
Anesthesia Volume In Cc: 11
Cheek-To-Nose Interpolation Flap Text: A decision was made to reconstruct the defect utilizing an interpolation axial flap and a staged reconstruction.  A telfa template was made of the defect.  This telfa template was then used to outline the Cheek-To-Nose Interpolation flap.  The donor area for the pedicle flap was then injected with anesthesia.  The flap was excised through the skin and subcutaneous tissue down to the layer of the underlying musculature.  The interpolation flap was carefully excised within this deep plane to maintain its blood supply.  The edges of the donor site were undermined.   The donor site was closed in a primary fashion.  The pedicle was then rotated into position and sutured.  Once the tube was sutured into place, adequate blood supply was confirmed with blanching and refill.  The pedicle was then wrapped with xeroform gauze and dressed appropriately with a telfa and gauze bandage to ensure continued blood supply and protect the attached pedicle.
O-T Advancement Flap Text: The defect edges were debeveled with a #15 scalpel blade.  Given the location of the defect, shape of the defect and the proximity to free margins an O-T advancement flap was deemed most appropriate.  Using a sterile surgical marker, an appropriate advancement flap was drawn incorporating the defect and placing the expected incisions within the relaxed skin tension lines where possible.    The area thus outlined was incised deep to adipose tissue with a #15 scalpel blade.  The skin margins were undermined to an appropriate distance in all directions utilizing iris scissors.
Epidermal Sutures: 3-0 Surgipro
O-L Flap Text: The defect edges were debeveled with a #15 scalpel blade.  Given the location of the defect, shape of the defect and the proximity to free margins an O-L flap was deemed most appropriate.  Using a sterile surgical marker, an appropriate advancement flap was drawn incorporating the defect and placing the expected incisions within the relaxed skin tension lines where possible.    The area thus outlined was incised deep to adipose tissue with a #15 scalpel blade.  The skin margins were undermined to an appropriate distance in all directions utilizing iris scissors.
Interpolation Flap Text: A decision was made to reconstruct the defect utilizing an interpolation axial flap and a staged reconstruction.  A telfa template was made of the defect.  This telfa template was then used to outline the interpolation flap.  The donor area for the pedicle flap was then injected with anesthesia.  The flap was excised through the skin and subcutaneous tissue down to the layer of the underlying musculature.  The interpolation flap was carefully excised within this deep plane to maintain its blood supply.  The edges of the donor site were undermined.   The donor site was closed in a primary fashion.  The pedicle was then rotated into position and sutured.  Once the tube was sutured into place, adequate blood supply was confirmed with blanching and refill.  The pedicle was then wrapped with xeroform gauze and dressed appropriately with a telfa and gauze bandage to ensure continued blood supply and protect the attached pedicle.
Same Histology In Subsequent Stages Text: The pattern and morphology of the tumor is as described in the first stage.
Trilobed Flap Text: The defect edges were debeveled with a #15 scalpel blade.  Given the location of the defect and the proximity to free margins a trilobed flap was deemed most appropriate.  Using a sterile surgical marker, an appropriate trilobed flap drawn around the defect.    The area thus outlined was incised deep to adipose tissue with a #15 scalpel blade.  The skin margins were undermined to an appropriate distance in all directions utilizing iris scissors.
Partial Purse String (Intermediate) Text: Given the location of the defect and the characteristics of the surrounding skin an intermediate purse string closure was deemed most appropriate.  Undermining was performed circumfirentially around the surgical defect.  A purse string suture was then placed and tightened. Wound tension only allowed a partial closure of the circular defect.
Mohs Case Number: [id number]
Consent (Marginal Mandibular)/Introductory Paragraph: The rationale for Mohs was explained to the patient and consent was obtained. The risks, benefits and alternatives to therapy were discussed in detail. Specifically, the risks of damage to the marginal mandibular branch of the facial nerve, infection, scarring, bleeding, prolonged wound healing, incomplete removal, allergy to anesthesia, and recurrence were addressed. Prior to the procedure, the treatment site was clearly identified and confirmed by the patient. All components of Universal Protocol/PAUSE Rule completed.
Paramedian Forehead Flap Text: A decision was made to reconstruct the defect utilizing an interpolation axial flap and a staged reconstruction.  A telfa template was made of the defect.  This telfa template was then used to outline the paramedian forehead pedicle flap.  The donor area for the pedicle flap was then injected with anesthesia.  The flap was excised through the skin and subcutaneous tissue down to the layer of the underlying musculature.  The pedicle flap was carefully excised within this deep plane to maintain its blood supply.  The edges of the donor site were undermined.   The donor site was closed in a primary fashion.  The pedicle was then rotated into position and sutured.  Once the tube was sutured into place, adequate blood supply was confirmed with blanching and refill.  The pedicle was then wrapped with xeroform gauze and dressed appropriately with a telfa and gauze bandage to ensure continued blood supply and protect the attached pedicle.
Modified Advancement Flap Text: The defect edges were debeveled with a #15 scalpel blade.  Given the location of the defect, shape of the defect and the proximity to free margins a modified advancement flap was deemed most appropriate.  Using a sterile surgical marker, an appropriate advancement flap was drawn incorporating the defect and placing the expected incisions within the relaxed skin tension lines where possible.    The area thus outlined was incised deep to adipose tissue with a #15 scalpel blade.  The skin margins were undermined to an appropriate distance in all directions utilizing iris scissors.
Consent (Nose)/Introductory Paragraph: The rationale for Mohs was explained to the patient and consent was obtained. The risks, benefits and alternatives to therapy were discussed in detail. Specifically, the risks of nasal deformity, changes in the flow of air through the nose, infection, scarring, bleeding, prolonged wound healing, incomplete removal, allergy to anesthesia, nerve injury and recurrence were addressed. Prior to the procedure, the treatment site was clearly identified and confirmed by the patient. All components of Universal Protocol/PAUSE Rule completed.
Complex Repair And Graft Additional Text (Will Appearing After The Standard Complex Repair Text): The complex repair was not sufficient to completely close the primary defect. The remaining additional defect was repaired with the graft mentioned below.
Alar Island Pedicle Flap Text: The defect edges were debeveled with a #15 scalpel blade.  Given the location of the defect, shape of the defect and the proximity to the alar rim an island pedicle advancement flap was deemed most appropriate.  Using a sterile surgical marker, an appropriate advancement flap was drawn incorporating the defect, outlining the appropriate donor tissue and placing the expected incisions within the nasal ala running parallel to the alar rim. The area thus outlined was incised with a #15 scalpel blade.  The skin margins were undermined minimally to an appropriate distance in all directions around the primary defect and laterally outward around the island pedicle utilizing iris scissors.  There was minimal undermining beneath the pedicle flap.
No Repair - Repaired With Adjacent Surgical Defect Text (Leave Blank If You Do Not Want): After obtaining clear surgical margins the defect was repaired concurrently with another surgical defect which was in close approximation.
Bcc Histology Text: There were numerous aggregates of basaloid cells.
Additional Epidermal Closure (Optional): pulley stitch
Full Thickness Lip Wedge Repair (Flap) Text: Given the location of the defect and the proximity to free margins a full thickness wedge repair was deemed most appropriate.  Using a sterile surgical marker, the appropriate repair was drawn incorporating the defect and placing the expected incisions perpendicular to the vermilion border.  The vermilion border was also meticulously outlined to ensure appropriate reapproximation during the repair.  The area thus outlined was incised through and through with a #15 scalpel blade.  The muscularis and dermis were reaproximated with deep sutures following hemostasis. Care was taken to realign the vermilion border before proceeding with the superficial closure.  Once the vermilion was realigned the superfical and mucosal closure was finished.
Spiral Flap Text: The defect edges were debeveled with a #15 scalpel blade.  Given the location of the defect, shape of the defect and the proximity to free margins a spiral flap was deemed most appropriate.  Using a sterile surgical marker, an appropriate rotation flap was drawn incorporating the defect and placing the expected incisions within the relaxed skin tension lines where possible. The area thus outlined was incised deep to adipose tissue with a #15 scalpel blade.  The skin margins were undermined to an appropriate distance in all directions utilizing iris scissors.
Surgical Defect Width In Cm (Optional): 2.1
Star Wedge Flap Text: The defect edges were debeveled with a #15 scalpel blade.  Given the location of the defect, shape of the defect and the proximity to free margins a star wedge flap was deemed most appropriate.  Using a sterile surgical marker, an appropriate rotation flap was drawn incorporating the defect and placing the expected incisions within the relaxed skin tension lines where possible. The area thus outlined was incised deep to adipose tissue with a #15 scalpel blade.  The skin margins were undermined to an appropriate distance in all directions utilizing iris scissors.
Surgeon/Pathologist Verbiage (Will Incorporate Name Of Surgeon From Intro If Not Blank): operated in two distinct and integrated capacities as the surgeon and pathologist.
O-Z Plasty Text: The defect edges were debeveled with a #15 scalpel blade.  Given the location of the defect, shape of the defect and the proximity to free margins an O-Z plasty (double transposition flap) was deemed most appropriate.  Using a sterile surgical marker, the appropriate transposition flaps were drawn incorporating the defect and placing the expected incisions within the relaxed skin tension lines where possible.    The area thus outlined was incised deep to adipose tissue with a #15 scalpel blade.  The skin margins were undermined to an appropriate distance in all directions utilizing iris scissors.  Hemostasis was achieved with electrocautery.  The flaps were then transposed into place, one clockwise and the other counterclockwise, and anchored with interrupted buried subcutaneous sutures.
Surgical Defect Width In Cm (Optional): 1.6
Initial Size Of Lesion: 0.6
Area M Indication Text: Tumors in this location are included in Area M (cheek, forehead, scalp, neck, jawline and pretibial skin).  Mohs surgery is indicated for tumors in these anatomic locations.
Epidermal Autograft Text: The defect edges were debeveled with a #15 scalpel blade.  Given the location of the defect, shape of the defect and the proximity to free margins an epidermal autograft was deemed most appropriate.  Using a sterile surgical marker, the primary defect shape was transferred to the donor site. The epidermal graft was then harvested.  The skin graft was then placed in the primary defect and oriented appropriately.
Rhombic Flap Text: The defect edges were debeveled with a #15 scalpel blade.  Given the location of the defect and the proximity to free margins a rhombic flap was deemed most appropriate.  Using a sterile surgical marker, an appropriate rhombic flap was drawn incorporating the defect.    The area thus outlined was incised deep to adipose tissue with a #15 scalpel blade.  The skin margins were undermined to an appropriate distance in all directions utilizing iris scissors.
W Plasty Text: The lesion was extirpated to the level of the fat with a #15 scalpel blade.  Given the location of the defect, shape of the defect and the proximity to free margins a W-plasty was deemed most appropriate for repair.  Using a sterile surgical marker, the appropriate transposition arms of the W-plasty were drawn incorporating the defect and placing the expected incisions within the relaxed skin tension lines where possible.    The area thus outlined was incised deep to adipose tissue with a #15 scalpel blade.  The skin margins were undermined to an appropriate distance in all directions utilizing iris scissors.  The opposing transposition arms were then transposed into place in opposite direction and anchored with interrupted buried subcutaneous sutures.
Burow's Advancement Flap Text: The defect edges were debeveled with a #15 scalpel blade.  Given the location of the defect and the proximity to free margins a Burow's advancement flap was deemed most appropriate.  Using a sterile surgical marker, the appropriate advancement flap was drawn incorporating the defect and placing the expected incisions within the relaxed skin tension lines where possible.    The area thus outlined was incised deep to adipose tissue with a #15 scalpel blade.  The skin margins were undermined to an appropriate distance in all directions utilizing iris scissors.
Manual Repair Warning Statement: We plan on removing the manually selected variable below in favor of our much easier automatic structured text blocks found in the previous tab. We decided to do this to help make the flow better and give you the full power of structured data. Manual selection is never going to be ideal in our platform and I would encourage you to avoid using manual selection from this point on, especially since I will be sunsetting this feature. It is important that you do one of two things with the customized text below. First, you can save all of the text in a word file so you can have it for future reference. Second, transfer the text to the appropriate area in the Library tab. Lastly, if there is a flap or graft type which we do not have you need to let us know right away so I can add it in before the variable is hidden. No need to panic, we plan to give you roughly 6 months to make the change.
Ear Star Wedge Flap Text: The defect edges were debeveled with a #15 blade scalpel.  Given the location of the defect and the proximity to free margins (helical rim) an ear star wedge flap was deemed most appropriate.  Using a sterile surgical marker, the appropriate flap was drawn incorporating the defect and placing the expected incisions between the helical rim and antihelix where possible.  The area thus outlined was incised through and through with a #15 scalpel blade.
Xenograft Text: The defect edges were debeveled with a #15 scalpel blade.  Given the location of the defect, shape of the defect and the proximity to free margins a xenograft was deemed most appropriate.  The graft was then trimmed to fit the size of the defect.  The graft was then placed in the primary defect and oriented appropriately.
Information: Selecting Yes will display possible errors in your note based on the variables you have selected. This validation is only offered as a suggestion for you. PLEASE NOTE THAT THE VALIDATION TEXT WILL BE REMOVED WHEN YOU FINALIZE YOUR NOTE. IF YOU WANT TO FAX A PRELIMINARY NOTE YOU WILL NEED TO TOGGLE THIS TO 'NO' IF YOU DO NOT WANT IT IN YOUR FAXED NOTE.
Consent 1/Introductory Paragraph: The rationale for Mohs was explained to the patient and consent was obtained. The risks, benefits and alternatives to therapy were discussed in detail. Specifically, the risks of infection, scarring, bleeding, prolonged wound healing, incomplete removal, allergy to anesthesia, nerve injury and recurrence were addressed. Prior to the procedure, the treatment site was clearly identified and confirmed by the patient. All components of Universal Protocol/PAUSE Rule completed.
Double O-Z Plasty Text: The defect edges were debeveled with a #15 scalpel blade.  Given the location of the defect, shape of the defect and the proximity to free margins a Double O-Z plasty (double transposition flap) was deemed most appropriate.  Using a sterile surgical marker, the appropriate transposition flaps were drawn incorporating the defect and placing the expected incisions within the relaxed skin tension lines where possible. The area thus outlined was incised deep to adipose tissue with a #15 scalpel blade.  The skin margins were undermined to an appropriate distance in all directions utilizing iris scissors.  Hemostasis was achieved with electrocautery.  The flaps were then transposed into place, one clockwise and the other counterclockwise, and anchored with interrupted buried subcutaneous sutures.
Rhomboid Transposition Flap Text: The defect edges were debeveled with a #15 scalpel blade.  Given the location of the defect and the proximity to free margins a rhomboid transposition flap was deemed most appropriate.  Using a sterile surgical marker, an appropriate rhomboid flap was drawn incorporating the defect.    The area thus outlined was incised deep to adipose tissue with a #15 scalpel blade.  The skin margins were undermined to an appropriate distance in all directions utilizing iris scissors.

## 2019-01-07 RX ORDER — DILTIAZEM HYDROCHLORIDE 60 MG/1
TABLET, FILM COATED ORAL
Qty: 3 INHALER | Refills: 3 | Status: SHIPPED | OUTPATIENT
Start: 2019-01-07 | End: 2020-08-24

## 2019-01-11 ENCOUNTER — OFFICE VISIT (OUTPATIENT)
Dept: INTERNAL MEDICINE | Facility: MEDICAL CENTER | Age: 79
End: 2019-01-11
Payer: MEDICARE

## 2019-01-11 VITALS
WEIGHT: 195.5 LBS | OXYGEN SATURATION: 92 % | HEIGHT: 73 IN | TEMPERATURE: 97.7 F | BODY MASS INDEX: 25.91 KG/M2 | HEART RATE: 70 BPM | DIASTOLIC BLOOD PRESSURE: 70 MMHG | SYSTOLIC BLOOD PRESSURE: 120 MMHG

## 2019-01-11 DIAGNOSIS — E78.5 DYSLIPIDEMIA: ICD-10-CM

## 2019-01-11 DIAGNOSIS — D64.9 ANEMIA, UNSPECIFIED TYPE: ICD-10-CM

## 2019-01-11 DIAGNOSIS — N32.81 OAB (OVERACTIVE BLADDER): ICD-10-CM

## 2019-01-11 DIAGNOSIS — K52.9 CHRONIC DIARRHEA: ICD-10-CM

## 2019-01-11 DIAGNOSIS — E88.09 HYPOALBUMINEMIA: ICD-10-CM

## 2019-01-11 PROCEDURE — 99214 OFFICE O/P EST MOD 30 MIN: CPT | Performed by: INTERNAL MEDICINE

## 2019-01-11 RX ORDER — FLUOROURACIL 50 MG/G
CREAM TOPICAL
Refills: 0 | Status: ON HOLD | COMMUNITY
Start: 2018-12-18 | End: 2019-04-03

## 2019-01-11 ASSESSMENT — PATIENT HEALTH QUESTIONNAIRE - PHQ9: CLINICAL INTERPRETATION OF PHQ2 SCORE: 0

## 2019-01-11 NOTE — PROGRESS NOTES
Follow-up    Chief Complaint   Patient presents with   • Skin Cancer     Removed from head by dermtologist   • Hypertension       HPI   History was obtained from the patient, family (wife), and a medical record review.   On Liovi.   Gut better.   To see GI in a few weeks.   Still takes Imodium.     Breathing ok.     Off statin.     THREE CHRONIC CONDITIONS  HTN, stable  Lung disease, stable  DL stable    Past Medical History:   Diagnosis Date   • Abnormal thyroid stimulating hormone (TSH) level    • Allergic rhinitis    • Asbestosis (HCC)    • Asthma    • Bronchitis    • Chronic diarrhea     declines eval; takes immodium once a day usually and sometimes less; see previous notes; aware of risk    • Chronic low back pain    • Chronic lung disease     asbestos related   • CKD (chronic kidney disease), stage III (HCC)     sees neph   • COPD (chronic obstructive pulmonary disease) ([facility])    • Coronary artery calcification seen on CAT scan 8/2/2016    sees cards   • Epididymitis 2009    h/o listed in chart   • GERD (gastroesophageal reflux disease)    • History of hemorrhoids    • History of skin cancer     non melanoma   • [place] (hard of hearing)     declines hearing aids   • Hypercholesteremia    • Hypertension    • Hypertriglyceridemia    • Indigestion    • Light headedness     2/2 orthostasis? now better off hctz   • Lightheadedness 6/23/2016   • Low back pain    • Nasal drainage    • Olecranon bursitis    • Orthostasis 6/23/2016    better off HCTZ   • Peripheral neuropathy    • Pleural plaque 2005     CT [place]   • Post-nasal drip    • Pulmonary emphysema (HCC)    • RA (rheumatoid arthritis) ([facility])     on meds, sees rheum   • Restless leg syndrome    • Rheumatoid arthritis (HCC)    • Sleep apnea     obstructive and central - not adherent to autopap   • Solitary kidney     history of kidney cyst 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-functioning kidney s/p nephrectomy        Past Surgical History:   Procedure Laterality Date   • COLONOSCOPY   11/10/2018    Procedure: COLONOSCOPY;  Surgeon: Ganesh Peacock M.D.;  Location: SURGERY [facility];  Service: Gastroenterology   • NASAL POLYPECTOMY Bilateral 10/6/2015    Procedure: NASAL POLYPECTOMY WITH SCOTT ENDOSCOPIC NASAL DEBRIDEMENT;  Surgeon: Param Maurer M.D.;  Location: SURGERY SAME DAY [facility];  Service:    • CYSTOSCOPY  2/1/2010    Performed by ANGIE VERDUZCO at SURGERY [facility]   • RETROGRADES  2/1/2010    Performed by ANGIE VERDUZCO at SURGERY [facility]   • PYELOGRAM  2/1/2010    Performed by ANGIE VERDUZCO at SURGERY [facility]   • URETEROSCOPY  2/1/2010    Performed by ANGIE VERDUZCO at [facility]   • NEPHRECTOMY LAPAROSCOPIC  2009    left kidney   • TESTICLE EXPLORATION  2004   • PB REMV 2ND CATARACT,CORN-SCLER SECTN     • TONSILLECTOMY         Current Outpatient Prescriptions   Medication Sig Dispense Refill   • fluorouracil (EFUDEX) 5 % cream APPLY A THIN FILM TO ROUGH PATCHES ON FACE/SCALP/ARMS ONCE D FOR 28 DAYS.  0   • SYMBICORT 80-4.5 MCG/ACT Aerosol INHALE 2 PUFFS BY MOUTH TWO TIMES A DAY 3 Inhaler 3   • gabapentin (NEURONTIN) 300 MG Cap 1 tab PO qAM and 2 tabs PO qPM 270 Cap 1   • tiotropium (SPIRIVA) 18 MCG Cap Inhale 1 Cap by mouth every day. 30 Cap 3   • leflunomide (ARAVA) 20 MG Tab Take 1 Tab by mouth every day. 30 Tab 0   • certolizumab pegol (CIMZIA PREFILLED) 2 X 200 MG/ML Kit Inject 200 mg as instructed every 14 days. 1 Kit 0   • Home Care Oxygen Inhale 2 L/min by mouth every bedtime.     • Probiotic Product (PROBIOTIC PO) Take 1 Cap by mouth every day.     • fluticasone (FLONASE) 50 MCG/ACT nasal spray Spray 1 Spray in nose every day.     • Multiple Vitamin (MULTIVITAMIN PO) Take 1 Tab by mouth every day.     • fexofenadine (ALLEGRA) 180 MG tablet Take 180 mg by mouth every day.       No current facility-administered medications for this visit.        Allergies as of 01/11/2019 - Reviewed 01/11/2019   Allergen  "Reaction Noted   • Penicillins Hives 09/15/2009   • Ciprofloxacin hcl Unspecified 11/07/2018   • Levofloxacin Unspecified 11/07/2018        Social History     Social History   • Marital status:      Spouse name: N/A   • Number of children: N/A   • Years of education: N/A     Occupational History   • Not on file.     Social History Main Topics   • Smoking status: Former Smoker     Packs/day: 1.00     Years: 40.00     Types: Cigarettes     Quit date: 1/1/1980   • Smokeless tobacco: Never Used      Comment: 1 pk a day for 40 yrs   • Alcohol use No      Comment: 2 a week   • Drug use: No   • Sexual activity: No      Comment: retired      Other Topics Concern   • Not on file     Social History Narrative    See H&P    Lives with wife       Family History   Problem Relation Age of Onset   • Genetic Father         ALS   • No Known Problems Sister    • No Known Problems Son    • No Known Problems Daughter    • Heart Attack Neg Hx    • Heart Disease Neg Hx    • Heart Failure Neg Hx        ROS:   No cough or SOB  No CP or heart palp   No dizziness or LH     /70 (BP Location: Right arm, Patient Position: Sitting, BP Cuff Size: Adult)   Pulse 70   Temp 36.5 °C (97.7 °F) (Temporal)   Ht 1.854 m (6' 1\")   Wt 88.7 kg (195 lb 8 oz)   SpO2 92%   BMI 25.79 kg/m²     Physical Exam  General:  Alert and oriented.  No apparent distress.    Eyes: No scleral icterus. No conjunctival injection.      ENMT:  MMM OP clear    Neck: Neck supple.  Trachea midline.      Resp: Clear to auscultation bilaterally. No rales, rhonchi, or wheezes.    Cardiovascular: Regular rate and normal rhythm. No murmurs, rubs or gallops. 2+ radial pulses.     Abdomen:     Musculoskeletal: No clubbing, cyanosis. Trace edema present    Skin: Sun exposure changes on head    Lymph:     Neuro:    Psych: Mood euthymic. Affect congruent.    Skin:     Other:     Labs/Studies  Labs in hosp:  hgb 8s  Alb 2.5    cxr  1.  Bibasilar atelectasis/consolidation, " stable.    2.  Stable cardiomegaly.    CT a/p   FINDINGS:  There are small bilateral pleural effusions with overlying atelectasis/consolidation. No free air is seen in the abdomen or pelvis. The unenhanced liver appears unremarkable. There is density layering within the spleen which may represent gallstones.   Spleen is not enlarged. No adrenal mass is identified. Postsurgical changes are seen status post left nephrectomy. There is a fat-containing hernia in the left flank. There is a punctate nonobstructing right renal calculus. There is no evidence of   hydronephrosis. An ovoid fat density structure with peripheral calcification in the left retroperitoneum is likely related to fat necrosis. Pancreas appears unremarkable. Atherosclerotic plaque is seen in the aorta. There are small inguinal and   mesenteric lymph nodes. There are air-fluid levels in the colon. There is mild wall thickening of the descending and sigmoid colon. Stranding is seen particularly surrounding the distal descending and sigmoid colon. There are colonic diverticula. There   is a moderate amount of colonic stool. There is no evidence of small bowel obstruction. Bladder is mildly distended. There is a right bladder diverticulum. Calcific densities in the pelvis likely represent phleboliths. Small amount of free fluid is seen   within the pelvis.    There is a mildly prominent lymph node at the aortic bifurcation measuring 1 cm in short axis dimension.    Degenerative changes are seen in the spine.   Impression       Wall thickening of the descending and sigmoid colon with inflammatory changes most prominent adjacent to the distal descending and sigmoid colon. Findings may represent an infectious or inflammatory colitis. Given the length of involvement, this is less   likely to represent diverticulitis.    Air-fluid levels in the colon can be seen in the setting of diarrhea. Mild colonic distention is seen.    Nonvisualization of the appendix,  limiting evaluation.    There appears to be mildly enlarged lymph node at the aortic bifurcation which is nonspecific and may be reactive.    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    Fat-containing left flank hernia.    Small right bladder diverticulum.    Small amount of free fluid in the abdomen and pelvis.    Tiny nonobstructing right renal calculus.    Density layering within the gallbladder likely represents gallstones.    Small bilateral pleural effusions with overlying atelectasis/consolidation.       Assessment and Plan  1. Chronic diarrhea  c diff treated  Back on Immodium although not rec'd  On [place] probiotic  To see GI    2. Dyslipidemia  Off statin   LDL 15  Repeat levels  - Lipid Profile; Future    3. Hypoalbuminemia  Check for resolution   - COMP METABOLIC PANEL; Future    4. Anemia, unspecified type  Check for resolution  - CBC WITH DIFFERENTIAL; Future    Chronic lung disease  Hypoxia  Chronic obstructive pulmonary disease, unspecified COPD type (CMS-[facility])  Asbestos induced b/l diffuse pleural thickening   NYASIA  Declines CPAP  On O2 at night and PRN   Sees pulm  On inhalers  ok'd - albuterol 108 (90 Base) MCG/ACT Aero Soln inhalation aerosol; Inhale 2 Puffs by mouth every 6 hours as needed for Shortness of Breath.  Dispense: 8.5 g; Refill: 3    Elevated TSH  resolved    CKD (chronic kidney disease) stage 3, GFR 30-59 ml/min  Stable   Monitor   Avoid nephrotoxic agents  Renally dose medications   Sees renal     Rheumatoid arthritis, involving unspecified site, unspecified rheumatoid factor presence (CMS-[facility])  Controlled on current meds  Sees rheum    Vitamin D deficiency  rec supplement    Benign essential HTN  Off meds     Neuropathy (CMS-[facility])  B12, folate, tsh are fine  Getting b12 injections by pods at times  Declines repeat eval at this time  On presley     Bilateral foot pain  Better with orthotics    Impaired gait  Gait imbalance after change in position   Not  orthostatic now but not to say it doesn't happen other times  Off all bp meds  OA/RA and neuropathy could be playing a role    Anemia, unspecified type  Macrocytosis B12 and folate fine  Re: anemia - [id number] with normal iron studies although on iron that he has been taking for years per his preference  No e/o gross bleeding  He declines further eval of the anemia aware of risk of missing malignancy  Monitor for now    Debility  Using [employer] handicap placard form - unable to walk more than 200 feet without resting at times 2/2 RA    LUTS  Sees urology    Preventative health care  Sees derm and eye doctor regularly  Declines hearing aid  Flu annually  PNA x 2 2014  TDAP 2014  Shingles 2010  Y Shingrix   [place] 2009 - told to repeat in 10 years  PSA 2014; in past, reviewed risks and benefits and will hold off  DEXA done 2014 was normal per pt     Patient Instructions   Labs.     Follow up with lung doctor.     Follow-up  Return in about 3 months (around 4/11/2019).    Signed by: Lizzie Soto M.D.     Addendum:  Wants rx for Mybetriq for OAB  Rx'd  30 days to hold him over until he sees uro  Got b/l CTS repair.   Doing ok   Fell on L and opened up scar. Now pinky sticks up.  To see Luciana later this month.

## 2019-01-14 RX ORDER — GABAPENTIN 300 MG/1
CAPSULE ORAL
Qty: 180 CAP | Refills: 1 | Status: SHIPPED | OUTPATIENT
Start: 2019-01-14 | End: 2019-05-31 | Stop reason: SDUPTHER

## 2019-01-17 ENCOUNTER — APPOINTMENT (RX ONLY)
Dept: URBAN - METROPOLITAN AREA CLINIC 22 | Facility: CLINIC | Age: 79
Setting detail: DERMATOLOGY
End: 2019-01-17

## 2019-01-17 DIAGNOSIS — Z48.817 ENCOUNTER FOR SURGICAL AFTERCARE FOLLOWING SURGERY ON THE SKIN AND SUBCUTANEOUS TISSUE: ICD-10-CM

## 2019-01-17 PROCEDURE — ? POST-OP WOUND CHECK

## 2019-01-17 ASSESSMENT — LOCATION ZONE DERM: LOCATION ZONE: SCALP

## 2019-01-17 ASSESSMENT — LOCATION DETAILED DESCRIPTION DERM: LOCATION DETAILED: LEFT CENTRAL FRONTAL SCALP

## 2019-01-17 ASSESSMENT — LOCATION SIMPLE DESCRIPTION DERM: LOCATION SIMPLE: LEFT SCALP

## 2019-01-17 NOTE — PROCEDURE: POST-OP WOUND CHECK
Detail Level: Detailed
Add 02719 Cpt? (Important Note: In 2017 The Use Of 92873 Is Being Tracked By Cms To Determine Future Global Period Reimbursement For Global Periods): no

## 2019-01-19 ENCOUNTER — HOSPITAL ENCOUNTER (OUTPATIENT)
Dept: LAB | Facility: MEDICAL CENTER | Age: 79
End: 2019-01-19
Attending: INTERNAL MEDICINE
Payer: MEDICARE

## 2019-01-19 DIAGNOSIS — E78.5 DYSLIPIDEMIA: ICD-10-CM

## 2019-01-19 DIAGNOSIS — E88.09 HYPOALBUMINEMIA: ICD-10-CM

## 2019-01-19 DIAGNOSIS — D64.9 ANEMIA, UNSPECIFIED TYPE: ICD-10-CM

## 2019-01-19 LAB
ALBUMIN SERPL BCP-MCNC: 4 G/DL (ref 3.2–4.9)
ALBUMIN/GLOB SERPL: 1.3 G/DL
ALP SERPL-CCNC: 63 U/L (ref 30–99)
ALT SERPL-CCNC: 18 U/L (ref 2–50)
ANION GAP SERPL CALC-SCNC: 10 MMOL/L (ref 0–11.9)
AST SERPL-CCNC: 22 U/L (ref 12–45)
BASOPHILS # BLD AUTO: 2 % (ref 0–1.8)
BASOPHILS # BLD: 0.11 K/UL (ref 0–0.12)
BILIRUB SERPL-MCNC: 0.5 MG/DL (ref 0.1–1.5)
BUN SERPL-MCNC: 22 MG/DL (ref 8–22)
CALCIUM SERPL-MCNC: 9.7 MG/DL (ref 8.5–10.5)
CHLORIDE SERPL-SCNC: 105 MMOL/L (ref 96–112)
CHOLEST SERPL-MCNC: 220 MG/DL (ref 100–199)
CO2 SERPL-SCNC: 26 MMOL/L (ref 20–33)
CREAT SERPL-MCNC: 1.46 MG/DL (ref 0.5–1.4)
EOSINOPHIL # BLD AUTO: 0.76 K/UL (ref 0–0.51)
EOSINOPHIL NFR BLD: 14 % (ref 0–6.9)
ERYTHROCYTE [DISTWIDTH] IN BLOOD BY AUTOMATED COUNT: 60.4 FL (ref 35.9–50)
GLOBULIN SER CALC-MCNC: 3.2 G/DL (ref 1.9–3.5)
GLUCOSE SERPL-MCNC: 89 MG/DL (ref 65–99)
HCT VFR BLD AUTO: 40.1 % (ref 42–52)
HDLC SERPL-MCNC: 39 MG/DL
HGB BLD-MCNC: 12.4 G/DL (ref 14–18)
IMM GRANULOCYTES # BLD AUTO: 0.01 K/UL (ref 0–0.11)
IMM GRANULOCYTES NFR BLD AUTO: 0.2 % (ref 0–0.9)
LDLC SERPL CALC-MCNC: 138 MG/DL
LYMPHOCYTES # BLD AUTO: 1.52 K/UL (ref 1–4.8)
LYMPHOCYTES NFR BLD: 28 % (ref 22–41)
MCH RBC QN AUTO: 31.4 PG (ref 27–33)
MCHC RBC AUTO-ENTMCNC: 30.9 G/DL (ref 33.7–35.3)
MCV RBC AUTO: 101.5 FL (ref 81.4–97.8)
MONOCYTES # BLD AUTO: 0.74 K/UL (ref 0–0.85)
MONOCYTES NFR BLD AUTO: 13.6 % (ref 0–13.4)
NEUTROPHILS # BLD AUTO: 2.29 K/UL (ref 1.82–7.42)
NEUTROPHILS NFR BLD: 42.2 % (ref 44–72)
NRBC # BLD AUTO: 0 K/UL
NRBC BLD-RTO: 0 /100 WBC
PLATELET # BLD AUTO: 281 K/UL (ref 164–446)
PMV BLD AUTO: 9.5 FL (ref 9–12.9)
POTASSIUM SERPL-SCNC: 4.3 MMOL/L (ref 3.6–5.5)
PROT SERPL-MCNC: 7.2 G/DL (ref 6–8.2)
RBC # BLD AUTO: 3.95 M/UL (ref 4.7–6.1)
SODIUM SERPL-SCNC: 141 MMOL/L (ref 135–145)
TRIGL SERPL-MCNC: 216 MG/DL (ref 0–149)
WBC # BLD AUTO: 5.4 K/UL (ref 4.8–10.8)

## 2019-01-19 PROCEDURE — 80053 COMPREHEN METABOLIC PANEL: CPT

## 2019-01-19 PROCEDURE — 85025 COMPLETE CBC W/AUTO DIFF WBC: CPT

## 2019-01-19 PROCEDURE — 36415 COLL VENOUS BLD VENIPUNCTURE: CPT

## 2019-01-19 PROCEDURE — 80061 LIPID PANEL: CPT

## 2019-01-21 DIAGNOSIS — D64.9 ANEMIA, UNSPECIFIED TYPE: ICD-10-CM

## 2019-01-21 DIAGNOSIS — E78.5 DYSLIPIDEMIA: ICD-10-CM

## 2019-01-21 RX ORDER — ROSUVASTATIN CALCIUM 5 MG/1
5 TABLET, COATED ORAL EVERY EVENING
Qty: 30 TAB | Refills: 2 | Status: SHIPPED | OUTPATIENT
Start: 2019-01-21 | End: 2019-01-21 | Stop reason: SDUPTHER

## 2019-01-21 NOTE — PROGRESS NOTES
Call out to discuss test results    Lipids high  ASCVD high   Pt has RA and known CAD on imaging  Will resume low dose Crestor.    Also still anemic but better    Will repeat LFTs, lipids and anemia labs in 6 weeks.     All questions were answered to the patient's and/or family's satisfaction and patient and/or family was/were appreciative of the phone call.

## 2019-01-26 ENCOUNTER — HOSPITAL ENCOUNTER (OUTPATIENT)
Dept: LAB | Facility: MEDICAL CENTER | Age: 79
End: 2019-01-26
Attending: INTERNAL MEDICINE
Payer: MEDICARE

## 2019-01-26 LAB
ALBUMIN SERPL BCP-MCNC: 4.2 G/DL (ref 3.2–4.9)
ALBUMIN SERPL BCP-MCNC: 4.2 G/DL (ref 3.2–4.9)
ALBUMIN/GLOB SERPL: 1.6 G/DL
ALP SERPL-CCNC: 61 U/L (ref 30–99)
ALP SERPL-CCNC: 62 U/L (ref 30–99)
ALT SERPL-CCNC: 16 U/L (ref 2–50)
ALT SERPL-CCNC: 17 U/L (ref 2–50)
ANION GAP SERPL CALC-SCNC: 8 MMOL/L (ref 0–11.9)
APPEARANCE UR: CLEAR
AST SERPL-CCNC: 21 U/L (ref 12–45)
AST SERPL-CCNC: 21 U/L (ref 12–45)
BASOPHILS # BLD AUTO: 1.8 % (ref 0–1.8)
BASOPHILS # BLD AUTO: 1.9 % (ref 0–1.8)
BASOPHILS # BLD: 0.1 K/UL (ref 0–0.12)
BASOPHILS # BLD: 0.11 K/UL (ref 0–0.12)
BILIRUB CONJ SERPL-MCNC: <0.1 MG/DL (ref 0.1–0.5)
BILIRUB INDIRECT SERPL-MCNC: NORMAL MG/DL (ref 0–1)
BILIRUB SERPL-MCNC: 0.5 MG/DL (ref 0.1–1.5)
BILIRUB SERPL-MCNC: 0.5 MG/DL (ref 0.1–1.5)
BILIRUB UR QL STRIP.AUTO: NEGATIVE
BUN SERPL-MCNC: 25 MG/DL (ref 8–22)
CALCIUM SERPL-MCNC: 9.5 MG/DL (ref 8.5–10.5)
CHLORIDE SERPL-SCNC: 106 MMOL/L (ref 96–112)
CO2 SERPL-SCNC: 26 MMOL/L (ref 20–33)
COLOR UR: YELLOW
CREAT SERPL-MCNC: 1.4 MG/DL (ref 0.5–1.4)
CREAT SERPL-MCNC: 1.45 MG/DL (ref 0.5–1.4)
CREAT UR-MCNC: 103.5 MG/DL
CRP SERPL HS-MCNC: 0.12 MG/DL (ref 0–0.75)
EOSINOPHIL # BLD AUTO: 0.67 K/UL (ref 0–0.51)
EOSINOPHIL # BLD AUTO: 0.67 K/UL (ref 0–0.51)
EOSINOPHIL NFR BLD: 11.4 % (ref 0–6.9)
EOSINOPHIL NFR BLD: 11.9 % (ref 0–6.9)
ERYTHROCYTE [DISTWIDTH] IN BLOOD BY AUTOMATED COUNT: 58.4 FL (ref 35.9–50)
ERYTHROCYTE [DISTWIDTH] IN BLOOD BY AUTOMATED COUNT: 58.5 FL (ref 35.9–50)
ERYTHROCYTE [SEDIMENTATION RATE] IN BLOOD BY WESTERGREN METHOD: 20 MM/HOUR (ref 0–20)
GLOBULIN SER CALC-MCNC: 2.7 G/DL (ref 1.9–3.5)
GLUCOSE SERPL-MCNC: 87 MG/DL (ref 65–99)
GLUCOSE UR STRIP.AUTO-MCNC: NEGATIVE MG/DL
HCT VFR BLD AUTO: 39.6 % (ref 42–52)
HCT VFR BLD AUTO: 39.8 % (ref 42–52)
HGB BLD-MCNC: 12.5 G/DL (ref 14–18)
HGB BLD-MCNC: 12.6 G/DL (ref 14–18)
IMM GRANULOCYTES # BLD AUTO: 0.01 K/UL (ref 0–0.11)
IMM GRANULOCYTES # BLD AUTO: 0.01 K/UL (ref 0–0.11)
IMM GRANULOCYTES NFR BLD AUTO: 0.2 % (ref 0–0.9)
IMM GRANULOCYTES NFR BLD AUTO: 0.2 % (ref 0–0.9)
KETONES UR STRIP.AUTO-MCNC: NEGATIVE MG/DL
LEUKOCYTE ESTERASE UR QL STRIP.AUTO: NEGATIVE
LYMPHOCYTES # BLD AUTO: 1.26 K/UL (ref 1–4.8)
LYMPHOCYTES # BLD AUTO: 1.42 K/UL (ref 1–4.8)
LYMPHOCYTES NFR BLD: 22.3 % (ref 22–41)
LYMPHOCYTES NFR BLD: 24.1 % (ref 22–41)
MCH RBC QN AUTO: 31.3 PG (ref 27–33)
MCH RBC QN AUTO: 31.7 PG (ref 27–33)
MCHC RBC AUTO-ENTMCNC: 31.6 G/DL (ref 33.7–35.3)
MCHC RBC AUTO-ENTMCNC: 31.7 G/DL (ref 33.7–35.3)
MCV RBC AUTO: 100.3 FL (ref 81.4–97.8)
MCV RBC AUTO: 99 FL (ref 81.4–97.8)
MICRO URNS: NORMAL
MONOCYTES # BLD AUTO: 0.67 K/UL (ref 0–0.85)
MONOCYTES # BLD AUTO: 0.68 K/UL (ref 0–0.85)
MONOCYTES NFR BLD AUTO: 11.4 % (ref 0–13.4)
MONOCYTES NFR BLD AUTO: 12.1 % (ref 0–13.4)
NEUTROPHILS # BLD AUTO: 2.92 K/UL (ref 1.82–7.42)
NEUTROPHILS # BLD AUTO: 3 K/UL (ref 1.82–7.42)
NEUTROPHILS NFR BLD: 51 % (ref 44–72)
NEUTROPHILS NFR BLD: 51.7 % (ref 44–72)
NITRITE UR QL STRIP.AUTO: NEGATIVE
NRBC # BLD AUTO: 0 K/UL
NRBC # BLD AUTO: 0 K/UL
NRBC BLD-RTO: 0 /100 WBC
NRBC BLD-RTO: 0 /100 WBC
PH UR STRIP.AUTO: 5 [PH]
PHOSPHATE SERPL-MCNC: 3.4 MG/DL (ref 2.5–4.5)
PLATELET # BLD AUTO: 261 K/UL (ref 164–446)
PLATELET # BLD AUTO: 261 K/UL (ref 164–446)
PMV BLD AUTO: 9.2 FL (ref 9–12.9)
PMV BLD AUTO: 9.3 FL (ref 9–12.9)
POTASSIUM SERPL-SCNC: 4.3 MMOL/L (ref 3.6–5.5)
PROT SERPL-MCNC: 6.9 G/DL (ref 6–8.2)
PROT SERPL-MCNC: 6.9 G/DL (ref 6–8.2)
PROT UR QL STRIP: NEGATIVE MG/DL
PROT UR-MCNC: 24.6 MG/DL (ref 0–15)
PROT/CREAT UR: 238 MG/G (ref 15–68)
RBC # BLD AUTO: 3.97 M/UL (ref 4.7–6.1)
RBC # BLD AUTO: 4 M/UL (ref 4.7–6.1)
RBC UR QL AUTO: NEGATIVE
SODIUM SERPL-SCNC: 140 MMOL/L (ref 135–145)
SP GR UR STRIP.AUTO: 1.02
UROBILINOGEN UR STRIP.AUTO-MCNC: 0.2 MG/DL
WBC # BLD AUTO: 5.6 K/UL (ref 4.8–10.8)
WBC # BLD AUTO: 5.9 K/UL (ref 4.8–10.8)

## 2019-01-26 PROCEDURE — 81003 URINALYSIS AUTO W/O SCOPE: CPT

## 2019-01-26 PROCEDURE — 82565 ASSAY OF CREATININE: CPT

## 2019-01-26 PROCEDURE — 85652 RBC SED RATE AUTOMATED: CPT

## 2019-01-26 PROCEDURE — 80076 HEPATIC FUNCTION PANEL: CPT

## 2019-01-26 PROCEDURE — 36415 COLL VENOUS BLD VENIPUNCTURE: CPT

## 2019-01-26 PROCEDURE — 84100 ASSAY OF PHOSPHORUS: CPT

## 2019-01-26 PROCEDURE — 80053 COMPREHEN METABOLIC PANEL: CPT

## 2019-01-26 PROCEDURE — 82570 ASSAY OF URINE CREATININE: CPT

## 2019-01-26 PROCEDURE — 85025 COMPLETE CBC W/AUTO DIFF WBC: CPT | Mod: 91

## 2019-01-26 PROCEDURE — 86140 C-REACTIVE PROTEIN: CPT

## 2019-01-26 PROCEDURE — 84156 ASSAY OF PROTEIN URINE: CPT

## 2019-01-26 PROCEDURE — 85025 COMPLETE CBC W/AUTO DIFF WBC: CPT

## 2019-02-13 ENCOUNTER — APPOINTMENT (RX ONLY)
Dept: URBAN - METROPOLITAN AREA CLINIC 22 | Facility: CLINIC | Age: 79
Setting detail: DERMATOLOGY
End: 2019-02-13

## 2019-02-13 DIAGNOSIS — Z48.817 ENCOUNTER FOR SURGICAL AFTERCARE FOLLOWING SURGERY ON THE SKIN AND SUBCUTANEOUS TISSUE: ICD-10-CM

## 2019-02-13 PROCEDURE — 99024 POSTOP FOLLOW-UP VISIT: CPT

## 2019-02-13 PROCEDURE — ? POST-OP WOUND EVALUATION

## 2019-02-13 ASSESSMENT — LOCATION DETAILED DESCRIPTION DERM: LOCATION DETAILED: POSTERIOR MID-PARIETAL SCALP

## 2019-02-13 ASSESSMENT — LOCATION ZONE DERM: LOCATION ZONE: SCALP

## 2019-02-13 ASSESSMENT — LOCATION SIMPLE DESCRIPTION DERM: LOCATION SIMPLE: POSTERIOR SCALP

## 2019-02-13 NOTE — PROCEDURE: POST-OP WOUND EVALUATION
Detail Level: Detailed
Wound Diameter In Cm(Optional): 0
Add 71210 Cpt? (Important Note: In 2017 The Use Of 28628 Is Being Tracked By Cms To Determine Future Global Period Reimbursement For Global Periods): yes
Wound Granulation?: early
Wound Evaluated By (Optional): Merlin Eddy MD
Wound Drainage?: serous drainage
Wound Dehiscence?: dehiscence
Wound Color?: pink

## 2019-03-12 ENCOUNTER — APPOINTMENT (RX ONLY)
Dept: URBAN - METROPOLITAN AREA CLINIC 22 | Facility: CLINIC | Age: 79
Setting detail: DERMATOLOGY
End: 2019-03-12

## 2019-03-12 ENCOUNTER — RX ONLY (OUTPATIENT)
Age: 79
Setting detail: RX ONLY
End: 2019-03-12

## 2019-03-12 DIAGNOSIS — L57.0 ACTINIC KERATOSIS: ICD-10-CM

## 2019-03-12 DIAGNOSIS — Z85.828 PERSONAL HISTORY OF OTHER MALIGNANT NEOPLASM OF SKIN: ICD-10-CM | Status: RESOLVING

## 2019-03-12 PROCEDURE — 17000 DESTRUCT PREMALG LESION: CPT

## 2019-03-12 PROCEDURE — 17003 DESTRUCT PREMALG LES 2-14: CPT

## 2019-03-12 PROCEDURE — ? COUNSELING

## 2019-03-12 PROCEDURE — ? LIQUID NITROGEN

## 2019-03-12 PROCEDURE — ? ADDITIONAL NOTES

## 2019-03-12 PROCEDURE — 99213 OFFICE O/P EST LOW 20 MIN: CPT | Mod: 25

## 2019-03-12 RX ORDER — KETOCONAZOLE 20.5 MG/ML
SHAMPOO, SUSPENSION TOPICAL
Qty: 1 | Refills: 1 | Status: ERX | COMMUNITY
Start: 2019-03-12

## 2019-03-12 ASSESSMENT — LOCATION DETAILED DESCRIPTION DERM
LOCATION DETAILED: LEFT CENTRAL FRONTAL SCALP
LOCATION DETAILED: RIGHT MID TEMPLE
LOCATION DETAILED: RIGHT CENTRAL MALAR CHEEK
LOCATION DETAILED: RIGHT DISTAL RADIAL DORSAL FOREARM
LOCATION DETAILED: LEFT FOREHEAD
LOCATION DETAILED: RIGHT SUPERIOR FOREHEAD
LOCATION DETAILED: LEFT SUPERIOR CENTRAL MALAR CHEEK
LOCATION DETAILED: LEFT MID TEMPLE
LOCATION DETAILED: RIGHT DORSAL RING METACARPOPHALANGEAL JOINT
LOCATION DETAILED: LEFT LATERAL EYEBROW
LOCATION DETAILED: LEFT ULNAR DORSAL HAND
LOCATION DETAILED: RIGHT SUPERIOR CRUS OF ANTIHELIX

## 2019-03-12 ASSESSMENT — LOCATION SIMPLE DESCRIPTION DERM
LOCATION SIMPLE: RIGHT CHEEK
LOCATION SIMPLE: RIGHT FOREHEAD
LOCATION SIMPLE: LEFT CHEEK
LOCATION SIMPLE: LEFT EYEBROW
LOCATION SIMPLE: RIGHT HAND
LOCATION SIMPLE: LEFT SCALP
LOCATION SIMPLE: RIGHT FOREARM
LOCATION SIMPLE: LEFT FOREHEAD
LOCATION SIMPLE: LEFT TEMPLE
LOCATION SIMPLE: LEFT HAND
LOCATION SIMPLE: RIGHT TEMPLE
LOCATION SIMPLE: RIGHT EAR

## 2019-03-12 ASSESSMENT — LOCATION ZONE DERM
LOCATION ZONE: FACE
LOCATION ZONE: SCALP
LOCATION ZONE: ARM
LOCATION ZONE: HAND
LOCATION ZONE: EAR

## 2019-03-12 NOTE — HPI: SKIN LESIONS
Is This A New Presentation, Or A Follow-Up?: Growths
How Severe Is Your Skin Lesion?: moderate
Have Your Skin Lesions Been Treated?: been treated
Additional History: Has been using efudex on the hands but still with some rough spots. Has not treated face because was waiting for his head to heal before starting.

## 2019-03-12 NOTE — PROCEDURE: ADDITIONAL NOTES
Additional Notes: Will recheck scalp and face at next visit, if surgical site well healed will plan to treat with efudex
Detail Level: Detailed
Additional Notes: Will hold off additional treatment for now and recheck in 6 weeks

## 2019-03-22 NOTE — TELEPHONE ENCOUNTER
Was the patient seen in the last year in this department? Yes    Does patient have an active prescription for medications requested? Yes-POt was given a 90 day supply on 12/10/18 with one additional refill    Received Request Via: Pharmacy      
no

## 2019-04-03 ENCOUNTER — ANESTHESIA EVENT (OUTPATIENT)
Dept: SURGERY | Facility: MEDICAL CENTER | Age: 79
End: 2019-04-03
Payer: MEDICARE

## 2019-04-03 ENCOUNTER — ANESTHESIA (OUTPATIENT)
Dept: SURGERY | Facility: MEDICAL CENTER | Age: 79
End: 2019-04-03
Payer: MEDICARE

## 2019-04-03 ENCOUNTER — HOSPITAL ENCOUNTER (OUTPATIENT)
Facility: MEDICAL CENTER | Age: 79
End: 2019-04-03
Attending: SURGERY | Admitting: SURGERY
Payer: MEDICARE

## 2019-04-03 VITALS
WEIGHT: 203.26 LBS | HEART RATE: 70 BPM | SYSTOLIC BLOOD PRESSURE: 144 MMHG | DIASTOLIC BLOOD PRESSURE: 74 MMHG | RESPIRATION RATE: 16 BRPM | TEMPERATURE: 97.2 F | HEIGHT: 73 IN | BODY MASS INDEX: 26.94 KG/M2 | OXYGEN SATURATION: 91 %

## 2019-04-03 DIAGNOSIS — S66.912A TENDON RUPTURE OF WRIST, LEFT, INITIAL ENCOUNTER: ICD-10-CM

## 2019-04-03 LAB
GRAM STN SPEC: NORMAL
RHODAMINE-AURAMINE STN SPEC: NORMAL
SIGNIFICANT IND 70042: NORMAL
SIGNIFICANT IND 70042: NORMAL
SITE SITE: NORMAL
SITE SITE: NORMAL
SOURCE SOURCE: NORMAL
SOURCE SOURCE: NORMAL

## 2019-04-03 PROCEDURE — 700101 HCHG RX REV CODE 250: Performed by: ANESTHESIOLOGY

## 2019-04-03 PROCEDURE — 700111 HCHG RX REV CODE 636 W/ 250 OVERRIDE (IP)

## 2019-04-03 PROCEDURE — 160025 RECOVERY II MINUTES (STATS): Performed by: SURGERY

## 2019-04-03 PROCEDURE — 160028 HCHG SURGERY MINUTES - 1ST 30 MINS LEVEL 3: Performed by: SURGERY

## 2019-04-03 PROCEDURE — 700111 HCHG RX REV CODE 636 W/ 250 OVERRIDE (IP): Performed by: ANESTHESIOLOGY

## 2019-04-03 PROCEDURE — 160035 HCHG PACU - 1ST 60 MINS PHASE I: Performed by: SURGERY

## 2019-04-03 PROCEDURE — 87205 SMEAR GRAM STAIN: CPT

## 2019-04-03 PROCEDURE — 500881 HCHG PACK, EXTREMITY: Performed by: SURGERY

## 2019-04-03 PROCEDURE — 160002 HCHG RECOVERY MINUTES (STAT): Performed by: SURGERY

## 2019-04-03 PROCEDURE — 501838 HCHG SUTURE GENERAL: Performed by: SURGERY

## 2019-04-03 PROCEDURE — 160046 HCHG PACU - 1ST 60 MINS PHASE II: Performed by: SURGERY

## 2019-04-03 PROCEDURE — 87075 CULTR BACTERIA EXCEPT BLOOD: CPT

## 2019-04-03 PROCEDURE — 160039 HCHG SURGERY MINUTES - EA ADDL 1 MIN LEVEL 3: Performed by: SURGERY

## 2019-04-03 PROCEDURE — 160048 HCHG OR STATISTICAL LEVEL 1-5: Performed by: SURGERY

## 2019-04-03 PROCEDURE — 87070 CULTURE OTHR SPECIMN AEROBIC: CPT

## 2019-04-03 PROCEDURE — 87116 MYCOBACTERIA CULTURE: CPT

## 2019-04-03 PROCEDURE — 87015 SPECIMEN INFECT AGNT CONCNTJ: CPT

## 2019-04-03 PROCEDURE — 160009 HCHG ANES TIME/MIN: Performed by: SURGERY

## 2019-04-03 PROCEDURE — 700101 HCHG RX REV CODE 250

## 2019-04-03 PROCEDURE — 87206 SMEAR FLUORESCENT/ACID STAI: CPT

## 2019-04-03 PROCEDURE — 87102 FUNGUS ISOLATION CULTURE: CPT

## 2019-04-03 RX ORDER — CEFAZOLIN SODIUM 1 G/3ML
INJECTION, POWDER, FOR SOLUTION INTRAMUSCULAR; INTRAVENOUS PRN
Status: DISCONTINUED | OUTPATIENT
Start: 2019-04-03 | End: 2019-04-03 | Stop reason: SURG

## 2019-04-03 RX ORDER — SODIUM CHLORIDE, SODIUM LACTATE, POTASSIUM CHLORIDE, CALCIUM CHLORIDE 600; 310; 30; 20 MG/100ML; MG/100ML; MG/100ML; MG/100ML
INJECTION, SOLUTION INTRAVENOUS CONTINUOUS
Status: DISCONTINUED | OUTPATIENT
Start: 2019-04-03 | End: 2019-04-03 | Stop reason: HOSPADM

## 2019-04-03 RX ORDER — DEXAMETHASONE SODIUM PHOSPHATE 4 MG/ML
INJECTION, SOLUTION INTRA-ARTICULAR; INTRALESIONAL; INTRAMUSCULAR; INTRAVENOUS; SOFT TISSUE PRN
Status: DISCONTINUED | OUTPATIENT
Start: 2019-04-03 | End: 2019-04-03 | Stop reason: SURG

## 2019-04-03 RX ORDER — ONDANSETRON 2 MG/ML
INJECTION INTRAMUSCULAR; INTRAVENOUS PRN
Status: DISCONTINUED | OUTPATIENT
Start: 2019-04-03 | End: 2019-04-03 | Stop reason: SURG

## 2019-04-03 RX ORDER — OXYCODONE HCL 5 MG/5 ML
5 SOLUTION, ORAL ORAL
Status: DISCONTINUED | OUTPATIENT
Start: 2019-04-03 | End: 2019-04-03 | Stop reason: HOSPADM

## 2019-04-03 RX ORDER — HYDROCODONE BITARTRATE AND ACETAMINOPHEN 5; 325 MG/1; MG/1
1-2 TABLET ORAL EVERY 4 HOURS PRN
Qty: 20 TAB | Refills: 0 | Status: SHIPPED | OUTPATIENT
Start: 2019-04-03 | End: 2019-04-08

## 2019-04-03 RX ORDER — HYDROMORPHONE HYDROCHLORIDE 1 MG/ML
0.2 INJECTION, SOLUTION INTRAMUSCULAR; INTRAVENOUS; SUBCUTANEOUS
Status: DISCONTINUED | OUTPATIENT
Start: 2019-04-03 | End: 2019-04-03 | Stop reason: HOSPADM

## 2019-04-03 RX ORDER — OXYCODONE HCL 5 MG/5 ML
10 SOLUTION, ORAL ORAL
Status: DISCONTINUED | OUTPATIENT
Start: 2019-04-03 | End: 2019-04-03 | Stop reason: HOSPADM

## 2019-04-03 RX ORDER — MEPERIDINE HYDROCHLORIDE 25 MG/ML
25 INJECTION INTRAMUSCULAR; INTRAVENOUS; SUBCUTANEOUS
Status: DISCONTINUED | OUTPATIENT
Start: 2019-04-03 | End: 2019-04-03 | Stop reason: HOSPADM

## 2019-04-03 RX ORDER — MIDAZOLAM HYDROCHLORIDE 1 MG/ML
0.5 INJECTION INTRAMUSCULAR; INTRAVENOUS
Status: DISCONTINUED | OUTPATIENT
Start: 2019-04-03 | End: 2019-04-03 | Stop reason: HOSPADM

## 2019-04-03 RX ORDER — OXYBUTYNIN CHLORIDE 5 MG/1
5 TABLET, EXTENDED RELEASE ORAL DAILY
COMMUNITY
End: 2020-08-24

## 2019-04-03 RX ORDER — ROSUVASTATIN CALCIUM 5 MG/1
5 TABLET, COATED ORAL DAILY
COMMUNITY
End: 2019-04-22

## 2019-04-03 RX ORDER — HYDROMORPHONE HYDROCHLORIDE 1 MG/ML
0.4 INJECTION, SOLUTION INTRAMUSCULAR; INTRAVENOUS; SUBCUTANEOUS
Status: DISCONTINUED | OUTPATIENT
Start: 2019-04-03 | End: 2019-04-03 | Stop reason: HOSPADM

## 2019-04-03 RX ORDER — BUPIVACAINE HYDROCHLORIDE AND EPINEPHRINE 5; 5 MG/ML; UG/ML
INJECTION, SOLUTION EPIDURAL; INTRACAUDAL; PERINEURAL
Status: DISCONTINUED | OUTPATIENT
Start: 2019-04-03 | End: 2019-04-03 | Stop reason: HOSPADM

## 2019-04-03 RX ORDER — ACETAMINOPHEN 500 MG
1000 TABLET ORAL 2 TIMES DAILY
Status: ON HOLD | COMMUNITY
End: 2020-12-23

## 2019-04-03 RX ORDER — ONDANSETRON 2 MG/ML
4 INJECTION INTRAMUSCULAR; INTRAVENOUS
Status: DISCONTINUED | OUTPATIENT
Start: 2019-04-03 | End: 2019-04-03 | Stop reason: HOSPADM

## 2019-04-03 RX ORDER — HALOPERIDOL 5 MG/ML
1 INJECTION INTRAMUSCULAR
Status: DISCONTINUED | OUTPATIENT
Start: 2019-04-03 | End: 2019-04-03 | Stop reason: HOSPADM

## 2019-04-03 RX ORDER — HYDROMORPHONE HYDROCHLORIDE 1 MG/ML
0.1 INJECTION, SOLUTION INTRAMUSCULAR; INTRAVENOUS; SUBCUTANEOUS
Status: DISCONTINUED | OUTPATIENT
Start: 2019-04-03 | End: 2019-04-03 | Stop reason: HOSPADM

## 2019-04-03 RX ADMIN — EPHEDRINE SULFATE 25 MG: 50 INJECTION INTRAMUSCULAR; INTRAVENOUS; SUBCUTANEOUS at 10:05

## 2019-04-03 RX ADMIN — ONDANSETRON 4 MG: 2 INJECTION INTRAMUSCULAR; INTRAVENOUS at 09:15

## 2019-04-03 RX ADMIN — FENTANYL CITRATE 50 MCG: 50 INJECTION, SOLUTION INTRAMUSCULAR; INTRAVENOUS at 09:13

## 2019-04-03 RX ADMIN — CEFAZOLIN 2 G: 330 INJECTION, POWDER, FOR SOLUTION INTRAMUSCULAR; INTRAVENOUS at 09:20

## 2019-04-03 RX ADMIN — DEXAMETHASONE SODIUM PHOSPHATE 4 MG: 4 INJECTION, SOLUTION INTRAMUSCULAR; INTRAVENOUS at 09:15

## 2019-04-03 RX ADMIN — LIDOCAINE HYDROCHLORIDE 50 MG: 20 INJECTION, SOLUTION INFILTRATION; PERINEURAL at 09:15

## 2019-04-03 RX ADMIN — FENTANYL CITRATE 50 MCG: 50 INJECTION, SOLUTION INTRAMUSCULAR; INTRAVENOUS at 10:00

## 2019-04-03 RX ADMIN — SODIUM CHLORIDE, SODIUM LACTATE, POTASSIUM CHLORIDE, CALCIUM CHLORIDE: 600; 310; 30; 20 INJECTION, SOLUTION INTRAVENOUS at 09:15

## 2019-04-03 RX ADMIN — PROPOFOL 150 MG: 10 INJECTION, EMULSION INTRAVENOUS at 09:15

## 2019-04-03 ASSESSMENT — PAIN SCALES - GENERAL: PAIN_LEVEL: 0

## 2019-04-03 NOTE — OR NURSING
Patient verbalizing request to go home. Patient's VSS , no pain, and no nausea/vomiting.    Dressing clean/dry/intact. IV removed. Ice pack given to patient.    Discharge instructions reviewed with patient. All questions answered. All belongings returned to patient and prescription given.

## 2019-04-03 NOTE — OP REPORT
DATE OF SERVICE:  04/03/2019    SURGEON:  Marc Chowdhury MD    ASSISTANT:  None.    PREOPERATIVE DIAGNOSES:  1.  Left small finger flexor digitorum profundus rupture.  2.  Left small finger flexor digitorum superficialis rupture.    POSTOPERATIVE DIAGNOSES:  1.  Left small finger flexor digitorum profundus rupture.  2.  Left small finger flexor digitorum superficialis rupture.    PROCEDURES PERFORMED:  1.  Left hand exploration with left small finger flexor digitorum profundus   transfer to ring fundus.  2.  Left small finger flexor digitorum profundus transfer to ring   superficialis tendon.    ANESTHESIOLOGIST:  Valdo Cole MD    ANESTHESIA:  General.    COMPLICATIONS:  None noted.    DRAINS:  None.    SPECIMENS:  Tenosynovium from the carpal tunnel.    ESTIMATED BLOOD LOSS:  Minimal.    INDICATIONS:  The patient is a 78-year-old gentleman well known to me through   my outpatient clinic for the above mentioned diagnosis.  He believes and   agrees he has failed conservative treatment and is a candidate for the   above-mentioned procedure.  Prior to the procedure, he understood the risks,   benefits and alternatives to the surgery.  He understood the risks to include,   but not be limited to risk of infection requiring repeat surgery, bleeding   requiring blood transfusion, nerve, blood vessel, tendon injury requiring   repair, chronic pain, re-rupture of the tendon or tendon adhesions or full   range of motion requiring salvage procedure, dissatisfaction with his outcome,   DVT, pulmonary embolism, heart attack, stroke, and even death.  The patient   states despite these risks, he wished to proceed with surgery.    DESCRIPTION OF PROCEDURE:  The patient was met in the preop holding area.  His   left hand was initialed as correct operative site.  The patient had an   opportunity to ask questions, all questions were answered and informed consent   was obtained.    The patient was brought to the  operating room and laid supine on the operating   table.  All bony and dependent prominences were well padded.  The left upper   extremity was prepped and draped in the usual sterile fashion.  Formal   time-out was performed in which all parties agreed upon the correct patient,   procedure, and operative site.  I began the procedure by using Esmarch to   exsanguinate the extremity and inflated the tourniquet to 250 mmHg.  I then   incised the patient's prior carpal tunnel scar, extended approximately   inverted fashion toward the ulnar side of the wrist and distally in Lázaro   fashion.  I dissected down to subcutaneous tissues to protect neurovascular   bundles and cleaned the superficial arch.  I identified that he had a   significant amount tenosynovium.  This was sent for culture.  It was extensive   consistent with possible mycobacterial infection or some unusual inflammatory   disorder.  I identified the ruptured distal ends of small FDS and FDP at the   level of the A1 pulley and I could not identify the proximal ends; therefore,   it was not amenable to primary repair and I therefore elected to perform a   transfer from the ring FDS and FDP into the small FDS and FDP.  I started with   the deep tendon.  I split the ring FDS leaving half intact in the ulnar side,   transferred with a 2-way Pulvertaft weave with 3-0 looped Supramid into the   small FDP.  I did the same for the FDS.  I split the FDS to the ring in half   and transferred the ulnar half to the small FDS with a 2-way Pulvertaft weave   with 3-0 looped Supramid suture, restored the cascade of the digit.  I   released the A1 pulley to allow for good sliding of the tendon transfers.  I   then deflated the tourniquet, used Bovie cautery to achieve hemostasis,   copiously irrigated the wounds with normal saline, closed the incisions with   3-0 nylon suture, all covered with Xeroform, 4x4s, and a well-padded splint.    The patient awoke from anesthesia  without any complication, was transferred in   stable condition to the PACU where there were no immediate post-term   complaints.    POSTOPERATIVE PLAN:  The patient will be discharged home per same day   criteria, sedentary use of the upper extremity and follow up in clinic in   10-14 days for wound check.       ____________________________________     MD FATUMA Jenkins / NIKKIE    DD:  04/03/2019 15:31:41  DT:  04/03/2019 15:54:34    D#:  [id number]  Job#:  [id number]

## 2019-04-03 NOTE — PROGRESS NOTES
Pre op assessment complete, pt's VSS, pt and family updated on POC. Call light within reach, pt denies any other needs at this time.     Unable to obtain PIV-will alert Dr. Cole

## 2019-04-03 NOTE — OR NURSING
Pt on room air.  No c/o nausea, tolerating sips of water.  Pt denies pain/discomfort at this time.  Left hand dressing CDI, fingers warm, able to move fingers, elevated on pillow.  VSS, afebrile, BROWN, A/O x4.    Glasses in place.  Pt's wife provided prescription by surgeon.

## 2019-04-03 NOTE — DISCHARGE INSTRUCTIONS
ACTIVITY: Rest and take it easy for the first 24 hours.  A responsible adult is recommended to remain with you during that time.  It is normal to feel sleepy.  We encourage you to not do anything that requires balance, judgment or coordination.    MILD FLU-LIKE SYMPTOMS ARE NORMAL. YOU MAY EXPERIENCE GENERALIZED MUSCLE ACHES, THROAT IRRITATION, HEADACHE AND/OR SOME NAUSEA.    FOR 24 HOURS DO NOT:  Drive, operate machinery or run household appliances.  Drink beer or alcoholic beverages.   Make important decisions or sign legal documents.    SPECIAL INSTRUCTIONS:   Keep splint on, clean, and dry until clinic follow-up. Elevate above heart and ice frequently for at least 2 weeks.    No heavy lifting or grasping for at least 6 weeks.     No driving while on narcotic pain medications. Contact auto-insurance carrier for clearance to begin driving again.     Call MD or come to ER for any major concerns.      DIET: To avoid nausea, slowly advance diet as tolerated, avoiding spicy or greasy foods for the first day.  Add more substantial food to your diet according to your physician's instructions.  Babies can be fed formula or breast milk as soon as they are hungry.  INCREASE FLUIDS AND FIBER TO AVOID CONSTIPATION.    SURGICAL DRESSING/BATHING: Keep splint clean, dry and intact until follow-up appointment. No submerging in water until follow-up.     FOLLOW-UP APPOINTMENT:  A follow-up appointment should be arranged with your doctor in 1-2 weeks; call to schedule.    You should CALL YOUR PHYSICIAN if you develop:  Fever greater than 101 degrees F.  Pain not relieved by medication, or persistent nausea or vomiting.  Excessive bleeding (blood soaking through dressing) or unexpected drainage from the wound.  Extreme redness or swelling around the incision site, drainage of pus or foul smelling drainage.  Inability to urinate or empty your bladder within 8 hours.  Problems with breathing or chest pain.    You should call 911 if  you develop problems with breathing or chest pain.  If you are unable to contact your doctor or surgical center, you should go to the nearest emergency room or urgent care center.  Physician's telephone #: Dr. Chowdhury [phone]    If any questions arise, call your doctor.  If your doctor is not available, please feel free to call the Surgical Center at [phone].  The Center is open Monday through Friday from 7AM to 7PM.  You can also call the HEALTH HOTLINE open 24 hours/day, 7 days/week and speak to a nurse at [phone], or toll free at [phone].    A registered nurse may call you a few days after your surgery to see how you are doing after your procedure.    MEDICATIONS: Resume taking daily medication.  Take prescribed pain medication with food.  If no medication is prescribed, you may take non-aspirin pain medication if needed.  PAIN MEDICATION CAN BE VERY CONSTIPATING.  Take a stool softener or laxative such as senokot, pericolace, or milk of magnesia if needed.    Prescription given for Norco (pain). May take pain medication at anytime.    If your physician has prescribed pain medication that includes Acetaminophen (Tylenol), do not take additional Acetaminophen (Tylenol) while taking the prescribed medication.    Depression / Suicide Risk    As you are discharged from this [facility] facility, it is important to learn how to keep safe from harming yourself.    Recognize the warning signs:  · Abrupt changes in personality, positive or negative- including increase in energy   · Giving away possessions  · Change in eating patterns- significant weight changes-  positive or negative  · Change in sleeping patterns- unable to sleep or sleeping all the time   · Unwillingness or inability to communicate  · Depression  · Unusual sadness, discouragement and loneliness  · Talk of wanting to die  · Neglect of personal appearance   · Rebelliousness- reckless behavior  · Withdrawal from  people/activities they love  · Confusion- inability to concentrate     If you or a loved one observes any of these behaviors or has concerns about self-harm, here's what you can do:  · Talk about it- your feelings and reasons for harming yourself  · Remove any means that you might use to hurt yourself (examples: pills, rope, extension cords, firearm)  · Get professional help from the community (Mental Health, Substance Abuse, psychological counseling)  · Do not be alone:Call your Safe Contact- someone whom you trust who will be there for you.  · Call your local CRISIS HOTLINE 018-2565 or 447-553-5382  · Call your local Children's Mobile Crisis Response Team [place] (027) 501-7271 or www.[employer]  · Call the toll free National Suicide Prevention Hotlines   · National Suicide Prevention Lifeline 296-627-LEKJ (4299)  · National Hope Line Network 800-SUICIDE (695-3210)

## 2019-04-03 NOTE — ANESTHESIA PREPROCEDURE EVALUATION
Relevant Problems   (+) Acute kidney injury superimposed on chronic kidney disease (HCC)   (+) Aspiration pneumonia (HCC)   (+) COPD (chronic obstructive pulmonary disease) ([facility])   (+) Chronic obstructive pulmonary disease (HCC)   (+) NYASIA (obstructive sleep apnea)   (+) RA (rheumatoid arthritis) ([facility]-H[facility])       Physical Exam    Airway   Mallampati: II  TM distance: >3 FB  Neck ROM: full       Cardiovascular - normal exam  Rhythm: regular  Rate: normal  (-) murmur     Dental - normal exam         Pulmonary - normal exam  Breath sounds clear to auscultation     Abdominal    Neurological - normal exam                 Anesthesia Plan    ASA 3       Plan - general       Airway plan will be LMA        Induction: intravenous    Postoperative Plan: Postoperative administration of opioids is intended.    Pertinent diagnostic labs and testing reviewed    Informed Consent:    Anesthetic plan and risks discussed with patient.    Use of blood products discussed with: patient whom consented to blood products.

## 2019-04-03 NOTE — ANESTHESIA TIME REPORT
Anesthesia Start and Stop Event Times     Date Time Event    4/3/2019 0911 Anesthesia Start        Responsible Staff  04/03/19    Name Role Begin End    Valdo Cole M.D. Anesth 0911         Preop Diagnosis (Free Text):  Pre-op Diagnosis     HAND WEAKNESS LEFT, HAND PAIN BILATERAL        Preop Diagnosis (Codes):  Diagnosis Information     Diagnosis Code(s):         Post op Diagnosis  Flexor tendon rupture of hand, left, initial encounter      Premium Reason  Non-Premium    Comments:

## 2019-04-03 NOTE — ANESTHESIA PROCEDURE NOTES
Airway  Date/Time: 4/3/2019 9:16 AM  Performed by: KENNETH FAULKNER  Authorized by: KENNETH FAULKNER     Location:  OR  Urgency:  Elective  Indications for Airway Management:  Anesthesia  Spontaneous Ventilation: absent    Sedation Level:  Deep  Preoxygenated: Yes    Mask Difficulty Assessment:  0 - not attempted  Final Airway Type:  Supraglottic airway  Final Supraglottic Airway:  Standard LMA  SGA Size:  5  Number of Attempts at Approach:  1  Number of Other Approaches Attempted:  0

## 2019-04-03 NOTE — ANESTHESIA QCDR
2019 [facility] Clinical Data Registry (for Quality Improvement)     Postoperative nausea/vomiting risk protocol (Adult = 18 yrs and Pediatric 3-17 yrs)- (430 and 463)  General inhalation anesthetic (NOT TIVA) with PONV risk factors: No  Provision of anti-emetic therapy with at least 2 different classes of agents: N/A  Patient DID NOT receive anti-emetic therapy and reason is documented in Medical Record: N/A    Multimodal Pain Management- (AQI59)  Patient undergoing Elective Surgery (i.e. Outpatient, or ASC, or Prescheduled Surgery prior to Hospital Admission): Yes  Use of Multimodal Pain Management, two or more drugs and/or interventions, NOT including systemic opioids: Yes   Exception: Documented allergy to multiple classes of analgesics:  N/A    PACU assessment of acute postoperative pain prior to Anesthesia Care End- Applies to Patients Age = 18- (ABG7)  Initial PACU pain score is which of the following: < 7/10  Patient unable to report pain score: N/A    Post-anesthetic transfer of care checklist/protocol to PACU/ICU- (426 and 427)  Upon conclusion of case, patient transferred to which of the following locations: PACU/Non-ICU  Use of transfer checklist/protocol: Yes  Exclusion: Service Performed in Patient Hospital Room (and thus did not require transfer): N/A    PACU Reintubation- (AQI31)  General anesthesia requiring endotracheal intubation (ETT) along with subsequent extubation in OR or PACU: No  Required reintubation in the PACU: N/A  Extubation was a planned trial documented in the medical record prior to removal of the original airway device: N/A    Unplanned admission to ICU related to anesthesia service up through end of PACU care- (MD51)  Unplanned admission to ICU (not initially anticipated at anesthesia start time): No

## 2019-04-06 LAB
BACTERIA TISS AEROBE CULT: NORMAL
GRAM STN SPEC: NORMAL
SIGNIFICANT IND 70042: NORMAL
SITE SITE: NORMAL
SOURCE SOURCE: NORMAL

## 2019-04-08 LAB
BACTERIA SPEC ANAEROBE CULT: NORMAL
SIGNIFICANT IND 70042: NORMAL
SITE SITE: NORMAL
SOURCE SOURCE: NORMAL

## 2019-04-09 ENCOUNTER — OFFICE VISIT (OUTPATIENT)
Dept: URGENT CARE | Facility: PHYSICIAN GROUP | Age: 79
End: 2019-04-09
Payer: MEDICARE

## 2019-04-09 VITALS
BODY MASS INDEX: 25.98 KG/M2 | SYSTOLIC BLOOD PRESSURE: 122 MMHG | WEIGHT: 196 LBS | DIASTOLIC BLOOD PRESSURE: 68 MMHG | OXYGEN SATURATION: 94 % | HEIGHT: 73 IN | TEMPERATURE: 97.4 F | RESPIRATION RATE: 10 BRPM | HEART RATE: 107 BPM

## 2019-04-09 DIAGNOSIS — Z86.19 HISTORY OF CLOSTRIDIUM DIFFICILE COLITIS: ICD-10-CM

## 2019-04-09 DIAGNOSIS — R19.7 DIARRHEA OF PRESUMED INFECTIOUS ORIGIN: ICD-10-CM

## 2019-04-09 PROCEDURE — 99214 OFFICE O/P EST MOD 30 MIN: CPT | Performed by: FAMILY MEDICINE

## 2019-04-09 RX ORDER — VANCOMYCIN HYDROCHLORIDE 125 MG/1
125 CAPSULE ORAL 4 TIMES DAILY
Qty: 40 CAP | Refills: 0 | Status: SHIPPED | OUTPATIENT
Start: 2019-04-09 | End: 2019-05-23

## 2019-04-09 ASSESSMENT — ENCOUNTER SYMPTOMS
MYALGIAS: 0
WEIGHT LOSS: 0
EYE REDNESS: 0
HEADACHES: 0
VOMITING: 0
BACK PAIN: 0
SORE THROAT: 0
NAUSEA: 0
EYE DISCHARGE: 0

## 2019-04-09 NOTE — PROGRESS NOTES
"Subjective:      Barry Torres is a 78 y.o. male who presents with Diarrhea (upset stomach x 5 days)            6 days watery diarrhea without blood in stool. No fever. Intermittent abdominal pain \"like bad gut ache\". No recent travel/camping/drinking from streams or well. PMH cdiff 11/2018. Recently got intraoperative abx. No other aggravating or alleviating factors. Pushing fluids with normal urine output. Minimal relief with imodium. No other aggravating or alleviating factors.          Review of Systems   Constitutional: Negative for malaise/fatigue and weight loss.   HENT: Negative for sore throat.    Eyes: Negative for discharge and redness.   Cardiovascular: Negative for chest pain.   Gastrointestinal: Negative for melena, nausea and vomiting.   Musculoskeletal: Negative for back pain and myalgias.   Skin: Negative for itching and rash.   Neurological: Negative for headaches.     .  Medications, Allergies, and current problem list reviewed today in Epic       Objective:   /68   Pulse (!) 107   Temp 36.3 °C (97.4 °F)   Resp (!) 10   Ht 1.854 m (6' 1\")   Wt 88.9 kg (196 lb)   SpO2 94%   BMI 25.86 kg/m²       Physical Exam   Constitutional: He is oriented to person, place, and time. He appears well-developed and well-nourished. No distress.   HENT:   Head: Normocephalic and atraumatic.   Mouth/Throat: Oropharynx is clear and moist.   Eyes: Conjunctivae are normal.   Cardiovascular: Normal rate, regular rhythm and normal heart sounds.    Pulmonary/Chest: Effort normal and breath sounds normal.   Abdominal: Soft. Bowel sounds are normal. There is tenderness (  Mild diffuse).   Neurological: He is alert and oriented to person, place, and time.   Skin: Skin is warm and dry.               Assessment/Plan:     1. Diarrhea of presumed infectious origin  vancomycin (VANCOCIN) 125 MG capsule    C Diff by PCR rflx Toxin    CULTURE STOOL    CANCELED: CULTURE STOOL    CANCELED: C Diff by PCR rflx " Toxin   2. History of Clostridium difficile colitis  vancomycin (VANCOCIN) 125 MG capsule    C Diff by PCR rflx Toxin    CULTURE STOOL    CANCELED: CULTURE STOOL    CANCELED: C Diff by PCR rflx Toxin     Differential diagnosis, natural history, supportive care, and indications for immediate follow-up discussed at length.     We will follow-up lab studies.    We will initiate vancomycin empirically.

## 2019-04-10 ENCOUNTER — HOSPITAL ENCOUNTER (OUTPATIENT)
Dept: LAB | Facility: MEDICAL CENTER | Age: 79
End: 2019-04-10
Attending: FAMILY MEDICINE
Payer: MEDICARE

## 2019-04-10 DIAGNOSIS — R19.7 DIARRHEA OF PRESUMED INFECTIOUS ORIGIN: ICD-10-CM

## 2019-04-10 DIAGNOSIS — Z86.19 HISTORY OF CLOSTRIDIUM DIFFICILE COLITIS: ICD-10-CM

## 2019-04-10 LAB
C DIFF DNA SPEC QL NAA+PROBE: NEGATIVE
C DIFF TOX A+B STL QL IA: NEGATIVE
C DIFF TOX GENS STL QL NAA+PROBE: NORMAL

## 2019-04-10 PROCEDURE — 87899 AGENT NOS ASSAY W/OPTIC: CPT

## 2019-04-10 PROCEDURE — 87493 C DIFF AMPLIFIED PROBE: CPT

## 2019-04-10 PROCEDURE — 87324 CLOSTRIDIUM AG IA: CPT

## 2019-04-10 PROCEDURE — 87046 STOOL CULTR AEROBIC BACT EA: CPT

## 2019-04-10 PROCEDURE — 87045 FECES CULTURE AEROBIC BACT: CPT

## 2019-04-11 LAB
E COLI SXT1+2 STL IA: NORMAL
SIGNIFICANT IND 70042: NORMAL
SITE SITE: NORMAL
SOURCE SOURCE: NORMAL

## 2019-04-22 RX ORDER — ROSUVASTATIN CALCIUM 5 MG/1
TABLET, COATED ORAL
Qty: 90 TAB | Refills: 1 | Status: SHIPPED | OUTPATIENT
Start: 2019-04-22 | End: 2021-10-12

## 2019-04-22 NOTE — TELEPHONE ENCOUNTER
Was the patient seen in the last year in this department? Yes   Last seen: 01/11/19 by Dr. Soto  Next appt: 05/23/19 with Dr. Soto      Does patient have an active prescription for medications requested? No     Received Request Via: Pharmacy

## 2019-04-23 ENCOUNTER — HOSPITAL ENCOUNTER (OUTPATIENT)
Dept: LAB | Facility: MEDICAL CENTER | Age: 79
End: 2019-04-23
Attending: INTERNAL MEDICINE
Payer: MEDICARE

## 2019-04-23 LAB
ALBUMIN SERPL BCP-MCNC: 3.8 G/DL (ref 3.2–4.9)
ALP SERPL-CCNC: 71 U/L (ref 30–99)
ALT SERPL-CCNC: 29 U/L (ref 2–50)
AST SERPL-CCNC: 24 U/L (ref 12–45)
BASOPHILS # BLD AUTO: 1.4 % (ref 0–1.8)
BASOPHILS # BLD: 0.07 K/UL (ref 0–0.12)
BILIRUB CONJ SERPL-MCNC: 0.1 MG/DL (ref 0.1–0.5)
BILIRUB INDIRECT SERPL-MCNC: 0.5 MG/DL (ref 0–1)
BILIRUB SERPL-MCNC: 0.6 MG/DL (ref 0.1–1.5)
CREAT SERPL-MCNC: 1.57 MG/DL (ref 0.5–1.4)
CRP SERPL HS-MCNC: 0.29 MG/DL (ref 0–0.75)
EOSINOPHIL # BLD AUTO: 0.12 K/UL (ref 0–0.51)
EOSINOPHIL NFR BLD: 2.4 % (ref 0–6.9)
ERYTHROCYTE [DISTWIDTH] IN BLOOD BY AUTOMATED COUNT: 57.8 FL (ref 35.9–50)
ERYTHROCYTE [SEDIMENTATION RATE] IN BLOOD BY WESTERGREN METHOD: 14 MM/HOUR (ref 0–20)
HCT VFR BLD AUTO: 40.8 % (ref 42–52)
HGB BLD-MCNC: 12.6 G/DL (ref 14–18)
IMM GRANULOCYTES # BLD AUTO: 0.06 K/UL (ref 0–0.11)
IMM GRANULOCYTES NFR BLD AUTO: 1.2 % (ref 0–0.9)
LYMPHOCYTES # BLD AUTO: 1.23 K/UL (ref 1–4.8)
LYMPHOCYTES NFR BLD: 24.3 % (ref 22–41)
MCH RBC QN AUTO: 30.8 PG (ref 27–33)
MCHC RBC AUTO-ENTMCNC: 30.9 G/DL (ref 33.7–35.3)
MCV RBC AUTO: 99.8 FL (ref 81.4–97.8)
MONOCYTES # BLD AUTO: 0.5 K/UL (ref 0–0.85)
MONOCYTES NFR BLD AUTO: 9.9 % (ref 0–13.4)
NEUTROPHILS # BLD AUTO: 3.08 K/UL (ref 1.82–7.42)
NEUTROPHILS NFR BLD: 60.8 % (ref 44–72)
NRBC # BLD AUTO: 0 K/UL
NRBC BLD-RTO: 0 /100 WBC
PLATELET # BLD AUTO: 260 K/UL (ref 164–446)
PMV BLD AUTO: 8.9 FL (ref 9–12.9)
PROT SERPL-MCNC: 6.5 G/DL (ref 6–8.2)
RBC # BLD AUTO: 4.09 M/UL (ref 4.7–6.1)
WBC # BLD AUTO: 5.1 K/UL (ref 4.8–10.8)

## 2019-04-23 PROCEDURE — 80076 HEPATIC FUNCTION PANEL: CPT

## 2019-04-23 PROCEDURE — 82565 ASSAY OF CREATININE: CPT

## 2019-04-23 PROCEDURE — 85652 RBC SED RATE AUTOMATED: CPT

## 2019-04-23 PROCEDURE — 36415 COLL VENOUS BLD VENIPUNCTURE: CPT

## 2019-04-23 PROCEDURE — 86140 C-REACTIVE PROTEIN: CPT

## 2019-04-23 PROCEDURE — 85025 COMPLETE CBC W/AUTO DIFF WBC: CPT

## 2019-04-30 ENCOUNTER — APPOINTMENT (RX ONLY)
Dept: URBAN - METROPOLITAN AREA CLINIC 22 | Facility: CLINIC | Age: 79
Setting detail: DERMATOLOGY
End: 2019-04-30

## 2019-04-30 DIAGNOSIS — L57.0 ACTINIC KERATOSIS: ICD-10-CM

## 2019-04-30 DIAGNOSIS — Z86.007 PERSONAL HISTORY OF IN-SITU NEOPLASM OF SKIN: ICD-10-CM

## 2019-04-30 PROBLEM — Z85.828 PERSONAL HISTORY OF OTHER MALIGNANT NEOPLASM OF SKIN: Status: ACTIVE | Noted: 2019-04-30

## 2019-04-30 PROCEDURE — ? LIQUID NITROGEN

## 2019-04-30 PROCEDURE — 17003 DESTRUCT PREMALG LES 2-14: CPT

## 2019-04-30 PROCEDURE — 99213 OFFICE O/P EST LOW 20 MIN: CPT | Mod: 25

## 2019-04-30 PROCEDURE — 17000 DESTRUCT PREMALG LESION: CPT

## 2019-04-30 PROCEDURE — ? COUNSELING

## 2019-04-30 PROCEDURE — ? PRESCRIPTION

## 2019-04-30 RX ORDER — FLUOROURACIL 2 G/40G
CREAM TOPICAL BID
Qty: 1 | Refills: 0 | Status: ERX | COMMUNITY
Start: 2019-04-30

## 2019-04-30 RX ADMIN — FLUOROURACIL 1: 2 CREAM TOPICAL at 00:00

## 2019-04-30 ASSESSMENT — LOCATION DETAILED DESCRIPTION DERM
LOCATION DETAILED: RIGHT SUPERIOR FOREHEAD
LOCATION DETAILED: RIGHT MEDIAL TEMPLE
LOCATION DETAILED: LEFT CENTRAL TEMPLE
LOCATION DETAILED: SUPERIOR MID FOREHEAD
LOCATION DETAILED: LEFT INFERIOR LATERAL FOREHEAD
LOCATION DETAILED: RIGHT INFERIOR MEDIAL FOREHEAD
LOCATION DETAILED: RIGHT INFERIOR HELIX
LOCATION DETAILED: MID-FRONTAL SCALP
LOCATION DETAILED: RIGHT CENTRAL TEMPLE
LOCATION DETAILED: LEFT CENTRAL FRONTAL SCALP

## 2019-04-30 ASSESSMENT — LOCATION SIMPLE DESCRIPTION DERM
LOCATION SIMPLE: LEFT FOREHEAD
LOCATION SIMPLE: RIGHT TEMPLE
LOCATION SIMPLE: LEFT TEMPLE
LOCATION SIMPLE: ANTERIOR SCALP
LOCATION SIMPLE: SUPERIOR FOREHEAD
LOCATION SIMPLE: RIGHT EAR
LOCATION SIMPLE: RIGHT FOREHEAD
LOCATION SIMPLE: SCALP
LOCATION SIMPLE: LEFT SCALP

## 2019-04-30 ASSESSMENT — LOCATION ZONE DERM
LOCATION ZONE: FACE
LOCATION ZONE: SCALP
LOCATION ZONE: EAR

## 2019-05-01 LAB
FUNGUS SPEC CULT: NORMAL
SIGNIFICANT IND 70042: NORMAL
SITE SITE: NORMAL
SOURCE SOURCE: NORMAL

## 2019-05-23 ENCOUNTER — OFFICE VISIT (OUTPATIENT)
Dept: INTERNAL MEDICINE | Facility: MEDICAL CENTER | Age: 79
End: 2019-05-23
Payer: MEDICARE

## 2019-05-23 VITALS
HEART RATE: 59 BPM | OXYGEN SATURATION: 100 % | RESPIRATION RATE: 20 BRPM | DIASTOLIC BLOOD PRESSURE: 78 MMHG | SYSTOLIC BLOOD PRESSURE: 122 MMHG | BODY MASS INDEX: 27.43 KG/M2 | WEIGHT: 207 LBS | TEMPERATURE: 96.5 F | HEIGHT: 73 IN

## 2019-05-23 DIAGNOSIS — J98.4 CHRONIC LUNG DISEASE: ICD-10-CM

## 2019-05-23 DIAGNOSIS — E78.5 DYSLIPIDEMIA: ICD-10-CM

## 2019-05-23 DIAGNOSIS — M06.9 RHEUMATOID ARTHRITIS, INVOLVING UNSPECIFIED SITE, UNSPECIFIED RHEUMATOID FACTOR PRESENCE: ICD-10-CM

## 2019-05-23 DIAGNOSIS — R09.02 HYPOXIA: ICD-10-CM

## 2019-05-23 DIAGNOSIS — D64.9 ANEMIA, UNSPECIFIED TYPE: ICD-10-CM

## 2019-05-23 PROCEDURE — 99214 OFFICE O/P EST MOD 30 MIN: CPT | Performed by: INTERNAL MEDICINE

## 2019-05-23 NOTE — PROGRESS NOTES
Follow-up    Chief Complaint   Patient presents with   • Diarrhea     follow up       HPI   History was obtained from the patient, family (wife), and a medical record review.     Doing ok.     Stool better.   Brown, firm.   BMs 2 times a day and not 1 as in past.     Hand better.   Finishing up PT.     Breathing ok.   Initially o2 here was 84%.   Denied CP, heart palp, cough, SOB, dizziness, LH, F/c.   Reported feeling well.   Repeat o2 100%.    THREE CHRONIC CONDITIONS  HTN, stable  Lung disease, stable  DL stable    Past Medical History:   Diagnosis Date   • Abnormal thyroid stimulating hormone (TSH) level    • Allergic rhinitis    • Asbestosis ([facility])    • Asthma    • Bronchitis    • Chronic diarrhea     declines eval; takes immodium once a day usually and sometimes less; see previous notes; aware of risk    • Chronic low back pain    • Chronic lung disease     asbestos related   • CKD (chronic kidney disease), stage III ([facility])     sees neph, one kidney   • COPD (chronic obstructive pulmonary disease) ([facility])    • Coronary artery calcification seen on CAT scan 8/2/2016    sees cards   • Epididymitis 2009    h/o listed in chart   • GERD (gastroesophageal reflux disease)    • History of hemorrhoids    • History of skin cancer     non melanoma   • [place] (hard of hearing)     declines hearing aids   • Hypercholesteremia    • Hypertension    • Hypertriglyceridemia    • Indigestion    • Light headedness     2/2 orthostasis? now better off hctz   • Lightheadedness 6/23/2016   • Low back pain    • Nasal drainage    • Olecranon bursitis    • Orthostasis 6/23/2016    better off HCTZ   • Peripheral neuropathy    • Pleural plaque 2005     CT [facility]   • Post-nasal drip    • Pulmonary emphysema ([facility])    • RA (rheumatoid arthritis) ([facility])     on meds, sees rheum   • Restless leg syndrome    • Rheumatoid arthritis ([facility])    • Sleep apnea     obstructive and central - not adherent to autopap   • Solitary kidney     history of kidney cyst  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-functioning kidney s/p nephrectomy        Past Surgical History:   Procedure Laterality Date   • FLEXOR TENDON REPAIR Left 4/3/2019    Procedure: FLEXOR TENDON REPAIR- SMALL FINGER VS;  Surgeon: Marc Chowdhury M.D.;  Location: SURGERY [facility];  Service: Orthopedics   • TENDON TRANSFER Left 4/3/2019    Procedure: TENDON TRANSFER;  Surgeon: Marc Chowdhury M.D.;  Location: SURGERY [facility];  Service: Orthopedics   • COLONOSCOPY  11/10/2018    Procedure: COLONOSCOPY;  Surgeon: Ganesh Peacock M.D.;  Location: SURGERY [facility];  Service: Gastroenterology   • NASAL POLYPECTOMY Bilateral 10/6/2015    Procedure: NASAL POLYPECTOMY WITH SCOTT ENDOSCOPIC NASAL DEBRIDEMENT;  Surgeon: Param Maurer M.D.;  Location: SURGERY SAME DAY [facility];  Service:    • CYSTOSCOPY  2/1/2010    Performed by ANGIE VERDUZCO at [facility]   • RETROGRADES  2/1/2010    Performed by ANGIE VERDUZCO at [facility]   • PYELOGRAM  2/1/2010    Performed by ANGIE VERDUZCO at [facility]   • URETEROSCOPY  2/1/2010    Performed by ANGIE VERDUZCO at [facility]   • NEPHRECTOMY LAPAROSCOPIC  2009    left kidney   • TESTICLE EXPLORATION  2004   • PB REMV 2ND CATARACT,CORN-SCLER SECTN     • TONSILLECTOMY         Current Outpatient Prescriptions   Medication Sig Dispense Refill   • rosuvastatin (CRESTOR) 5 MG Tab TAKE 1 TABLET BY MOUTH EVERY EVENING 90 Tab 1   • acetaminophen (TYLENOL) 500 MG Tab Take 1,000 mg by mouth every 6 hours as needed for Moderate Pain.     • oxybutynin SR (DITROPAN-XL) 5 MG TABLET SR 24 HR Take 5 mg by mouth every day.     • gabapentin (NEURONTIN) 300 MG Cap TAKE 1 CAPSULE BY MOUTH THREE TIMES A  Cap 1   • SYMBICORT 80-4.5 MCG/ACT Aerosol INHALE 2 PUFFS BY MOUTH TWO TIMES A DAY 3 Inhaler 3   • tiotropium (SPIRIVA) 18 MCG Cap Inhale 1 Cap by mouth every day. 30 Cap 3   • leflunomide (ARAVA) 20 MG Tab  "Take 1 Tab by mouth every day. 30 Tab 0   • certolizumab pegol (CIMZIA PREFILLED) 2 X 200 MG/ML Kit Inject 200 mg as instructed every 14 days. 1 Kit 0   • Probiotic Product (PROBIOTIC PO) Take 1 Cap by mouth 2 Times a Day.     • fluticasone (FLONASE) 50 MCG/ACT nasal spray Spray 1 Spray in nose every day.     • Multiple Vitamin (MULTIVITAMIN PO) Take 1 Tab by mouth every day.     • fexofenadine (ALLEGRA) 180 MG tablet Take 180 mg by mouth as needed.       No current facility-administered medications for this visit.        Allergies as of 05/23/2019 - Reviewed 05/23/2019   Allergen Reaction Noted   • Penicillins Hives 09/15/2009   • Ciprofloxacin hcl Unspecified 11/07/2018   • Levofloxacin Unspecified 11/07/2018        Social History     Social History   • Marital status:      Spouse name: N/A   • Number of children: N/A   • Years of education: N/A     Occupational History   • Not on file.     Social History Main Topics   • Smoking status: Former Smoker     Packs/day: 1.00     Years: 40.00     Types: Cigarettes     Quit date: 1/1/1980   • Smokeless tobacco: Never Used      Comment: 1 pk a day for 40 yrs   • Alcohol use No   • Drug use: No   • Sexual activity: No      Comment: retired      Other Topics Concern   • Not on file     Social History Narrative    See H&P    Lives with wife       Family History   Problem Relation Age of Onset   • Genetic Father         ALS   • No Known Problems Sister    • No Known Problems Son    • No Known Problems Daughter    • Heart Attack Neg Hx    • Heart Disease Neg Hx    • Heart Failure Neg Hx        ROS:   No cough or SOB  No CP or heart palp   No dizziness or LH     /78 (BP Location: Left arm, Patient Position: Sitting, BP Cuff Size: Adult)   Pulse (!) 59   Temp 35.8 °C (96.5 °F) (Temporal)   Resp 20   Ht 1.854 m (6' 1\")   Wt 93.9 kg (207 lb)   SpO2 100%   BMI 27.31 kg/m²     Physical Exam  General:  Alert and oriented.  No apparent distress.    Eyes: No scleral " icterus. No conjunctival injection.      ENMT:  MMM OP clear    Neck: Neck supple.  Trachea midline.      Resp: Clear to auscultation bilaterally. No rales, rhonchi, or wheezes.    Cardiovascular: Regular rate and normal rhythm. No murmurs, rubs or gallops. 2+ radial pulses.     Abdomen:     Musculoskeletal: No clubbing, cyanosis. Trace edema present    Skin: Sun exposure changes on head    Lymph:     Neuro:    Psych: Mood euthymic. Affect congruent.    Skin:     Other:   L hand with large scar on palm    Labs/Studies  No visits with results within 1 Month(s) from this visit.   Latest known visit with results is:   Hospital Outpatient Visit on 04/23/2019   Component Date Value Ref Range Status   • WBC 04/23/2019 5.1  4.8 - 10.8 K/uL Final   • RBC 04/23/2019 4.09* 4.70 - 6.10 M/uL Final   • Hemoglobin 04/23/2019 12.6* 14.0 - 18.0 g/dL Final   • Hematocrit 04/23/2019 40.8* 42.0 - 52.0 % Final   • MCV 04/23/2019 99.8* 81.4 - 97.8 fL Final   • MCH 04/23/2019 30.8  27.0 - 33.0 pg Final   • MCHC 04/23/2019 30.9* 33.7 - 35.3 g/dL Final   • RDW 04/23/2019 57.8* 35.9 - 50.0 fL Final   • Platelet Count 04/23/2019 260  164 - 446 K/uL Final   • MPV 04/23/2019 8.9* 9.0 - 12.9 fL Final   • Neutrophils-Polys 04/23/2019 60.80  44.00 - 72.00 % Final   • Lymphocytes 04/23/2019 24.30  22.00 - 41.00 % Final   • Monocytes 04/23/2019 9.90  0.00 - 13.40 % Final   • Eosinophils 04/23/2019 2.40  0.00 - 6.90 % Final   • Basophils 04/23/2019 1.40  0.00 - 1.80 % Final   • Immature Granulocytes 04/23/2019 1.20* 0.00 - 0.90 % Final   • Nucleated RBC 04/23/2019 0.00  /100 WBC Final   • Neutrophils (Absolute) 04/23/2019 3.08  1.82 - 7.42 K/uL Final    Includes immature neutrophils, if present.   • Lymphs (Absolute) 04/23/2019 1.23  1.00 - 4.80 K/uL Final   • Monos (Absolute) 04/23/2019 0.50  0.00 - 0.85 K/uL Final   • Eos (Absolute) 04/23/2019 0.12  0.00 - 0.51 K/uL Final   • Baso (Absolute) 04/23/2019 0.07  0.00 - 0.12 K/uL Final   • Immature  Granulocytes (abs) 04/23/2019 0.06  0.00 - 0.11 K/uL Final   • NRBC (Absolute) 04/23/2019 0.00  K/uL Final   • Sed Rate [place] 04/23/2019 14  0 - 20 mm/hour Final   • Stat C-Reactive Protein 04/23/2019 0.29  0.00 - 0.75 mg/dL Final   • Alkaline Phosphatase 04/23/2019 71  30 - 99 U/L Final   • AST(SGOT) 04/23/2019 24  12 - 45 U/L Final   • ALT(SGPT) 04/23/2019 29  2 - 50 U/L Final   • Total Bilirubin 04/23/2019 0.6  0.1 - 1.5 mg/dL Final   • Direct Bilirubin 04/23/2019 0.1  0.1 - 0.5 mg/dL Final   • Indirect Bilirubin 04/23/2019 0.5  0.0 - 1.0 mg/dL Final   • Albumin 04/23/2019 3.8  3.2 - 4.9 g/dL Final   • Total Protein 04/23/2019 6.5  6.0 - 8.2 g/dL Final   • Creatinine 04/23/2019 1.57* 0.50 - 1.40 mg/dL Final   • GFR If  04/23/2019 52* >60 mL/min/1.73 m 2 Final   • GFR If Non  04/23/2019 43* >60 mL/min/1.73 m 2 Final         Assessment and Plan  1. Hypoxia  On repeat fine  No sxs   Watch for now  - Nursing Communication    2. Chronic lung disease    Chronic obstructive pulmonary disease, unspecified COPD type (CMS-HCC)  Asbestos induced b/l diffuse pleural thickening   NYASIA  Declines CPAP  On O2 at night and PRN     Sees lung doc   To see in about 1 month  Cont current inhalers  To get CT ordered by rheum per pt    3. Rheumatoid arthritis, involving unspecified site, unspecified rheumatoid factor presence (HCC)  Sees rheum  Ok on meds    4. Dyslipidemia  Ok on statin     5. Anemia, unspecified type  Last comp eval was months ago  Will check labs  - CBC WITH DIFFERENTIAL; Future  - VITAMIN B12; Future  - FOLATE; Future  - IRON/TOTAL IRON BIND; Future  - FERRITIN; Future  - RETICULOCYTES COUNT; Future  - SERUM PROTEIN ELECTROPHORESIS; Future  - PROTEIN ELECTROPHORESIS, UR RANDOM; Future    Chronic diarrhea  C diff treated  No longer on Imodium  Doing ok     CKD (chronic kidney disease) stage 3, GFR 30-59 ml/min  Stable   Monitor   Avoid nephrotoxic agents  Renally dose medications    Sees renal     Vitamin D deficiency  rec supplement    Benign essential HTN  Off meds     Neuropathy (CMS-HCC)  B12, folate, tsh are fine  Getting b12 injections by pods at times  Declines repeat eval at this time  On presley     Bilateral foot pain  Better with orthotics    Impaired gait  Gait imbalance after change in position   Not orthostatic now but not to say it doesn't happen other times  Off all bp meds  OA/RA and neuropathy could be playing a role    Debility  Using [employer] handicap placard form - unable to walk more than 200 feet without resting at times 2/2 RA    LUTS  Sees urology    Preventative health care  Sees derm and eye doctor regularly  Declines hearing aid  Flu annually  PNA x 2 2014  TDAP 2014  Shingles 2010  Y Shingrix   [place] 2009 - told to repeat in 10 years  PSA 2014; in past, reviewed risks and benefits and will hold off  DEXA done 2014 was normal per pt     Patient Instructions   Get anemia labs.         Follow-up  Return in about 4 months (around 9/23/2019).    Signed by: Lizzie Soto M.D.

## 2019-05-24 ENCOUNTER — HOSPITAL ENCOUNTER (OUTPATIENT)
Dept: LAB | Facility: MEDICAL CENTER | Age: 79
End: 2019-05-24
Attending: INTERNAL MEDICINE
Payer: MEDICARE

## 2019-05-24 DIAGNOSIS — E78.5 DYSLIPIDEMIA: ICD-10-CM

## 2019-05-24 DIAGNOSIS — D64.9 ANEMIA, UNSPECIFIED TYPE: ICD-10-CM

## 2019-05-24 LAB
ALBUMIN SERPL BCP-MCNC: 4.1 G/DL (ref 3.2–4.9)
ALP SERPL-CCNC: 64 U/L (ref 30–99)
ALT SERPL-CCNC: 23 U/L (ref 2–50)
AST SERPL-CCNC: 22 U/L (ref 12–45)
BASOPHILS # BLD AUTO: 1.5 % (ref 0–1.8)
BASOPHILS # BLD: 0.08 K/UL (ref 0–0.12)
BILIRUB CONJ SERPL-MCNC: 0.2 MG/DL (ref 0.1–0.5)
BILIRUB INDIRECT SERPL-MCNC: 0.5 MG/DL (ref 0–1)
BILIRUB SERPL-MCNC: 0.7 MG/DL (ref 0.1–1.5)
CHOLEST SERPL-MCNC: 115 MG/DL (ref 100–199)
EOSINOPHIL # BLD AUTO: 0.33 K/UL (ref 0–0.51)
EOSINOPHIL NFR BLD: 6.3 % (ref 0–6.9)
ERYTHROCYTE [DISTWIDTH] IN BLOOD BY AUTOMATED COUNT: 64.2 FL (ref 35.9–50)
FASTING STATUS PATIENT QL REPORTED: NORMAL
FERRITIN SERPL-MCNC: 83.1 NG/ML (ref 22–322)
FOLATE SERPL-MCNC: >23.8 NG/ML
HCT VFR BLD AUTO: 42.9 % (ref 42–52)
HDLC SERPL-MCNC: 37 MG/DL
HGB BLD-MCNC: 13.3 G/DL (ref 14–18)
HGB RETIC QN AUTO: 33.8 PG/CELL (ref 29–35)
IMM GRANULOCYTES # BLD AUTO: 0.01 K/UL (ref 0–0.11)
IMM GRANULOCYTES NFR BLD AUTO: 0.2 % (ref 0–0.9)
IMM RETICS NFR: 13.1 % (ref 9.3–17.4)
IRON SATN MFR SERPL: 34 % (ref 15–55)
IRON SERPL-MCNC: 105 UG/DL (ref 50–180)
LDLC SERPL CALC-MCNC: 39 MG/DL
LYMPHOCYTES # BLD AUTO: 1.59 K/UL (ref 1–4.8)
LYMPHOCYTES NFR BLD: 30.3 % (ref 22–41)
MCH RBC QN AUTO: 31.7 PG (ref 27–33)
MCHC RBC AUTO-ENTMCNC: 31 G/DL (ref 33.7–35.3)
MCV RBC AUTO: 102.4 FL (ref 81.4–97.8)
MONOCYTES # BLD AUTO: 0.62 K/UL (ref 0–0.85)
MONOCYTES NFR BLD AUTO: 11.8 % (ref 0–13.4)
NEUTROPHILS # BLD AUTO: 2.62 K/UL (ref 1.82–7.42)
NEUTROPHILS NFR BLD: 49.9 % (ref 44–72)
NRBC # BLD AUTO: 0 K/UL
NRBC BLD-RTO: 0 /100 WBC
PLATELET # BLD AUTO: 224 K/UL (ref 164–446)
PMV BLD AUTO: 9.5 FL (ref 9–12.9)
PROT SERPL-MCNC: 6.9 G/DL (ref 6–8.2)
RBC # BLD AUTO: 4.19 M/UL (ref 4.7–6.1)
RETICS # AUTO: 0.07 M/UL (ref 0.04–0.06)
RETICS/RBC NFR: 1.7 % (ref 0.8–2.1)
TIBC SERPL-MCNC: 308 UG/DL (ref 250–450)
TRIGL SERPL-MCNC: 196 MG/DL (ref 0–149)
VIT B12 SERPL-MCNC: 847 PG/ML (ref 211–911)
WBC # BLD AUTO: 5.3 K/UL (ref 4.8–10.8)

## 2019-05-24 PROCEDURE — 82728 ASSAY OF FERRITIN: CPT

## 2019-05-24 PROCEDURE — 82746 ASSAY OF FOLIC ACID SERUM: CPT

## 2019-05-24 PROCEDURE — 85046 RETICYTE/HGB CONCENTRATE: CPT

## 2019-05-24 PROCEDURE — 80061 LIPID PANEL: CPT

## 2019-05-24 PROCEDURE — 80076 HEPATIC FUNCTION PANEL: CPT

## 2019-05-24 PROCEDURE — 83883 ASSAY NEPHELOMETRY NOT SPEC: CPT

## 2019-05-24 PROCEDURE — 85025 COMPLETE CBC W/AUTO DIFF WBC: CPT

## 2019-05-24 PROCEDURE — 84156 ASSAY OF PROTEIN URINE: CPT

## 2019-05-24 PROCEDURE — 83540 ASSAY OF IRON: CPT

## 2019-05-24 PROCEDURE — 82607 VITAMIN B-12: CPT

## 2019-05-24 PROCEDURE — 84165 PROTEIN E-PHORESIS SERUM: CPT

## 2019-05-24 PROCEDURE — 36415 COLL VENOUS BLD VENIPUNCTURE: CPT

## 2019-05-24 PROCEDURE — 83550 IRON BINDING TEST: CPT

## 2019-05-24 PROCEDURE — 84160 ASSAY OF PROTEIN ANY SOURCE: CPT

## 2019-05-26 LAB
ALBUMIN SERPL-MCNC: 4.22 G/DL (ref 3.75–5.01)
ALPHA1 GLOB SERPL ELPH-MCNC: 0.28 G/DL (ref 0.19–0.46)
ALPHA2 GLOB SERPL ELPH-MCNC: 0.84 G/DL (ref 0.48–1.05)
B-GLOBULIN SERPL ELPH-MCNC: 0.83 G/DL (ref 0.48–1.1)
EER PROT ELECT SER Q1092: NORMAL
GAMMA GLOB SERPL ELPH-MCNC: 0.92 G/DL (ref 0.62–1.51)
INTERPRETATION SERPL IFE-IMP: NORMAL
PROT SERPL-MCNC: 7.1 G/DL (ref 6–8.3)

## 2019-05-27 LAB
ALBUMIN 24H UR QL ELPH: DETECTED
ALPHA1 GLOB 24H UR QL ELPH: DETECTED
ALPHA2 GLOB 24H UR QL ELPH: DETECTED
B-GLOBULIN UR QL ELPH: DETECTED
COLLECT DURATION TIME SPEC: ABNORMAL H
GAMMA GLOB UR QL ELPH: DETECTED
INTERPRETATION UR IFE-IMP: ABNORMAL
KAPPA LC FREE UR-MCNC: 13.5 MG/DL (ref 0.14–2.42)
KAPPA LC FREE/LAMBDA FREE UR: 15.7 RATIO (ref 2.04–10.37)
LAMBDA LC FREE UR-MCNC: 0.86 MG/DL (ref 0.02–0.67)
PROT 24H UR-MRATE: ABNORMAL MG/D (ref 10–140)
TOTAL VOLUME 1105: ABNORMAL ML

## 2019-05-28 DIAGNOSIS — D64.9 ANEMIA, UNSPECIFIED TYPE: ICD-10-CM

## 2019-05-29 LAB
MYCOBACTERIUM SPEC CULT: NORMAL
RHODAMINE-AURAMINE STN SPEC: NORMAL
SIGNIFICANT IND 70042: NORMAL
SITE SITE: NORMAL
SOURCE SOURCE: NORMAL

## 2019-05-30 ENCOUNTER — HOSPITAL ENCOUNTER (OUTPATIENT)
Dept: PULMONOLOGY | Facility: MEDICAL CENTER | Age: 79
End: 2019-05-30
Attending: INTERNAL MEDICINE
Payer: MEDICARE

## 2019-05-30 PROCEDURE — 94729 DIFFUSING CAPACITY: CPT

## 2019-05-30 PROCEDURE — 94060 EVALUATION OF WHEEZING: CPT

## 2019-05-30 PROCEDURE — 94726 PLETHYSMOGRAPHY LUNG VOLUMES: CPT

## 2019-05-30 ASSESSMENT — PULMONARY FUNCTION TESTS
FEV1: 1.5
FEV1_PREDICTED: 3.16
FEV1_LLN: 32.64
FEV1/FVC_PERCENT_PREDICTED: 84
FEV1_PERCENT_PREDICTED: 47
FEV1_PERCENT_CHANGE: 14
FEV1/FVC: 62
FEV1/FVC_PERCENT_PREDICTED: 84
FEV1/FVC: 60
FEV1/FVC: 62.45
FEV1_PERCENT_PREDICTED: 54
FVC_PERCENT_PREDICTED: 58
FEV1/FVC: 60
FVC: 2.77
FEV1/FVC_PERCENT_PREDICTED: 73
FVC_PREDICTED: 4.3
FEV1/FVC_PERCENT_LLN: 62
FEV1/FVC_PERCENT_CHANGE: 140
FEV1/FVC_PREDICTED: 74
FEV1_PERCENT_CHANGE: 10
FVC_LLN: 3.59
FEV1/FVC_PERCENT_PREDICTED: 81
FVC: 2.52
FEV1/FVC_PERCENT_CHANGE: 4
FEV1/FVC_PERCENT_LLN: 62
FEV1/FVC_PERCENT_PREDICTED: 80
FVC_LLN: 3.59
FEV1: 1.73
FVC_PERCENT_PREDICTED: 64
FEV1_LLN: 2.64

## 2019-06-02 NOTE — PROCEDURES
PULMONARY FUNCTION TEST INTERPRETATION    REQUESTING PROVIDER:  Kaylin Martinez MD    REASON FOR REQUEST:  Shortness of breath.    FINDINGS:  1.  Acceptable and reproducible.  2.  FEV1 1.5 liters (47%), FVC 2.52 liters (58%), ratio is 60%.  3.  Flow volume loops consistent with peripheral air obstruction and   unrestriction.  4.  Total lung capacity 8.77 liters (114%).  5.  DLCO corrected for hemoglobin 13.72 mL/min/mmHg (52%).    IMPRESSION:  Moderately severe obstruction with response to bronchodilator.    FEV1 improved from 1.5 liters to 1.73 liters with a 14% change, evidence of   airtrapping, normal total lung capacity, but moderate reduction in gas   transfer all consistent with a diagnosis of chronic obstructive pulmonary   disease.  Clinically correlate.       ____________________________________     MD SOFIE Napier / NIKKIE    DD:  06/02/2019 13:00:17  DT:  06/02/2019 13:10:19    D#:  [id number]  Job#:  [id number]    cc: Kaylin Martinez MD

## 2019-06-03 ENCOUNTER — HOSPITAL ENCOUNTER (OUTPATIENT)
Facility: MEDICAL CENTER | Age: 79
End: 2019-06-03
Attending: INTERNAL MEDICINE
Payer: MEDICARE

## 2019-06-03 ENCOUNTER — APPOINTMENT (OUTPATIENT)
Dept: LAB | Facility: MEDICAL CENTER | Age: 79
End: 2019-06-03
Attending: INTERNAL MEDICINE
Payer: MEDICARE

## 2019-06-03 DIAGNOSIS — D64.9 ANEMIA, UNSPECIFIED TYPE: ICD-10-CM

## 2019-06-03 PROCEDURE — 84156 ASSAY OF PROTEIN URINE: CPT

## 2019-06-03 PROCEDURE — 94729 DIFFUSING CAPACITY: CPT | Mod: 26 | Performed by: INTERNAL MEDICINE

## 2019-06-03 PROCEDURE — 94726 PLETHYSMOGRAPHY LUNG VOLUMES: CPT | Mod: 26 | Performed by: INTERNAL MEDICINE

## 2019-06-03 PROCEDURE — 83883 ASSAY NEPHELOMETRY NOT SPEC: CPT

## 2019-06-03 PROCEDURE — 94060 EVALUATION OF WHEEZING: CPT | Mod: 26 | Performed by: INTERNAL MEDICINE

## 2019-06-05 LAB
ALBUMIN 24H UR QL ELPH: DETECTED
ALPHA1 GLOB 24H UR QL ELPH: DETECTED
ALPHA2 GLOB 24H UR QL ELPH: DETECTED
B-GLOBULIN UR QL ELPH: DETECTED
COLLECT DURATION TIME SPEC: ABNORMAL H
GAMMA GLOB UR QL ELPH: DETECTED
INTERPRETATION UR IFE-IMP: ABNORMAL
KAPPA LC FREE UR-MCNC: 11.9 MG/DL (ref 0.14–2.42)
KAPPA LC FREE/LAMBDA FREE UR: 15.66 RATIO (ref 2.04–10.37)
LAMBDA LC FREE UR-MCNC: 0.76 MG/DL (ref 0.02–0.67)
PROT 24H UR-MRATE: ABNORMAL MG/D (ref 10–140)
TOTAL VOLUME 1105: ABNORMAL ML

## 2019-06-17 DIAGNOSIS — D64.9 ANEMIA, UNSPECIFIED TYPE: ICD-10-CM

## 2019-06-20 ENCOUNTER — HOSPITAL ENCOUNTER (OUTPATIENT)
Dept: LAB | Facility: MEDICAL CENTER | Age: 79
End: 2019-06-20
Attending: INTERNAL MEDICINE
Payer: MEDICARE

## 2019-06-20 DIAGNOSIS — D64.9 ANEMIA, UNSPECIFIED TYPE: ICD-10-CM

## 2019-06-20 PROCEDURE — 84165 PROTEIN E-PHORESIS SERUM: CPT

## 2019-06-20 PROCEDURE — 86334 IMMUNOFIX E-PHORESIS SERUM: CPT

## 2019-06-20 PROCEDURE — 84160 ASSAY OF PROTEIN ANY SOURCE: CPT

## 2019-06-20 PROCEDURE — 36415 COLL VENOUS BLD VENIPUNCTURE: CPT

## 2019-06-20 PROCEDURE — 82784 ASSAY IGA/IGD/IGG/IGM EACH: CPT

## 2019-06-23 LAB
ALBUMIN SERPL-MCNC: 4.39 G/DL (ref 3.75–5.01)
ALPHA1 GLOB SERPL ELPH-MCNC: 0.34 G/DL (ref 0.19–0.46)
ALPHA2 GLOB SERPL ELPH-MCNC: 0.95 G/DL (ref 0.48–1.05)
B-GLOBULIN SERPL ELPH-MCNC: 0.89 G/DL (ref 0.48–1.1)
GAMMA GLOB SERPL ELPH-MCNC: 1.03 G/DL (ref 0.62–1.51)
IGA SERPL-MCNC: 254 MG/DL (ref 68–408)
IGG SERPL-MCNC: 890 MG/DL (ref 768–1632)
IGM SERPL-MCNC: 80 MG/DL (ref 35–263)
INTERPRETATION SERPL IFE-IMP: NORMAL
MONOCLON BAND OBS SERPL: NORMAL
PATHOLOGY STUDY: NORMAL
PROT SERPL-MCNC: 7.6 G/DL (ref 6–8.3)

## 2019-07-20 ENCOUNTER — OFFICE VISIT (OUTPATIENT)
Dept: URGENT CARE | Facility: PHYSICIAN GROUP | Age: 79
End: 2019-07-20
Payer: MEDICARE

## 2019-07-20 VITALS
HEART RATE: 81 BPM | BODY MASS INDEX: 26.51 KG/M2 | HEIGHT: 73 IN | WEIGHT: 200 LBS | TEMPERATURE: 98.2 F | OXYGEN SATURATION: 92 % | SYSTOLIC BLOOD PRESSURE: 160 MMHG | DIASTOLIC BLOOD PRESSURE: 100 MMHG

## 2019-07-20 DIAGNOSIS — H57.11 EYE PAIN, RIGHT: ICD-10-CM

## 2019-07-20 PROCEDURE — 99213 OFFICE O/P EST LOW 20 MIN: CPT | Performed by: NURSE PRACTITIONER

## 2019-07-20 RX ORDER — MULTIVITAMIN WITH IRON
TABLET ORAL
COMMUNITY
Start: 2015-11-03 | End: 2020-12-16

## 2019-07-20 RX ORDER — MULTIVITAMIN
CAPSULE ORAL
COMMUNITY
Start: 2015-11-03 | End: 2020-08-24

## 2019-07-20 RX ORDER — FLUTICASONE PROPIONATE 50 MCG
2 SPRAY, SUSPENSION (ML) NASAL DAILY
COMMUNITY
Start: 2015-11-03

## 2019-07-20 RX ORDER — ACETAMINOPHEN 160 MG
TABLET,DISINTEGRATING ORAL
COMMUNITY
Start: 2015-11-03 | End: 2020-08-24

## 2019-07-20 RX ORDER — AMOXICILLIN 500 MG
CAPSULE ORAL
COMMUNITY
Start: 2015-11-03 | End: 2020-08-24

## 2019-07-20 RX ORDER — LEFLUNOMIDE 20 MG/1
TABLET ORAL
COMMUNITY
Start: 2015-11-03 | End: 2020-08-24

## 2019-07-20 RX ORDER — HYDROCHLOROTHIAZIDE 25 MG/1
TABLET ORAL
COMMUNITY
Start: 2015-11-03 | End: 2019-08-01

## 2019-07-20 RX ORDER — ATENOLOL 25 MG/1
TABLET ORAL
COMMUNITY
Start: 2015-11-03 | End: 2020-08-24

## 2019-07-20 RX ORDER — ACETAMINOPHEN 325 MG/1
TABLET ORAL
COMMUNITY
Start: 2015-11-03 | End: 2020-08-24

## 2019-07-20 RX ORDER — NEOMYCIN SULFATE, POLYMYXIN B SULFATE AND DEXAMETHASONE 3.5; 10000; 1 MG/ML; [USP'U]/ML; MG/ML
2 SUSPENSION/ DROPS OPHTHALMIC EVERY 4 HOURS
Qty: 1 BOTTLE | Refills: 0 | Status: SHIPPED | OUTPATIENT
Start: 2019-07-20 | End: 2019-07-27

## 2019-07-20 RX ORDER — TIOTROPIUM BROMIDE 18 UG/1
CAPSULE ORAL; RESPIRATORY (INHALATION)
COMMUNITY
Start: 2015-11-03 | End: 2020-08-24

## 2019-07-20 RX ORDER — BUDESONIDE AND FORMOTEROL FUMARATE DIHYDRATE 80; 4.5 UG/1; UG/1
2 AEROSOL RESPIRATORY (INHALATION) 2 TIMES DAILY
COMMUNITY
Start: 2015-12-02

## 2019-07-20 RX ORDER — ROSUVASTATIN CALCIUM 10 MG/1
5 TABLET, COATED ORAL DAILY
COMMUNITY
Start: 2016-01-05 | End: 2020-08-24

## 2019-07-20 RX ORDER — CLOTRIMAZOLE AND BETAMETHASONE DIPROPIONATE 10; .64 MG/G; MG/G
CREAM TOPICAL
COMMUNITY
Start: 2015-12-02 | End: 2020-12-16

## 2019-07-20 RX ORDER — FEXOFENADINE HCL 180 MG/1
180 TABLET ORAL DAILY
COMMUNITY
Start: 2015-11-03

## 2019-07-20 RX ORDER — GUAIFENESIN 600 MG/1
1200 TABLET, EXTENDED RELEASE ORAL DAILY
COMMUNITY
Start: 2015-11-03

## 2019-07-20 RX ORDER — GABAPENTIN 300 MG/1
CAPSULE ORAL
COMMUNITY
Start: 2015-12-02 | End: 2020-08-24

## 2019-07-20 ASSESSMENT — ENCOUNTER SYMPTOMS
EYE ITCHING: 0
FOREIGN BODY SENSATION: 1
EYE REDNESS: 0
CONSTITUTIONAL NEGATIVE: 1
BLURRED VISION: 0
NEUROLOGICAL NEGATIVE: 1
EYE DISCHARGE: 0

## 2019-07-20 ASSESSMENT — VISUAL ACUITY
OS_CC: 20/30
OD_CC: 20/40

## 2019-07-21 NOTE — PROGRESS NOTES
Subjective:     Barry Torres is a 79 y.o. male who presents for Foreign Body in Eye (x1 day. Pt complains of foreign body in Rt eye.)       Eye Problem    The right eye is affected. This is a new problem. The problem has been gradually worsening. Associated symptoms include a foreign body sensation. Pertinent negatives include no blurred vision, eye discharge, eye redness or itching.     Patient present to urgent care with reports of foreign body sensation in the right eye.  Denies trauma mechanism.  He states that this morning he got stuck in his eye.  Afterwards he tried to irrigate his right eye and use lubricating eyedrops with no relief in the symptoms.  Denies vision changes or contact lens use.    PMH:  has a past medical history of Abnormal thyroid stimulating hormone (TSH) level; Allergic rhinitis; Asbestosis ([facility]); Asthma; Bronchitis; Chronic diarrhea; Chronic low back pain; Chronic lung disease; CKD (chronic kidney disease), stage III ([facility]); COPD (chronic obstructive pulmonary disease) ([facility]); Coronary artery calcification seen on CAT scan (8/2/2016); Epididymitis (2009); GERD (gastroesophageal reflux disease); History of hemorrhoids; History of skin cancer; [place] (hard of hearing); Hypercholesteremia; Hypertension; Hypertriglyceridemia; Indigestion; Light headedness; Lightheadedness (6/23/2016); Low back pain; Nasal drainage; Olecranon bursitis; Orthostasis (6/23/2016); Peripheral neuropathy; Pleural plaque (2005 ); Post-nasal drip; Pulmonary emphysema (HCC); RA (rheumatoid arthritis) ([facility]); Restless leg syndrome; Rheumatoid arthritis ([facility]); Sleep apnea; and Solitary kidney.    MEDS:   Current Outpatient Prescriptions:   •  acetaminophen (TYLENOL) 325 MG Tab, TYLENOL TABS, Disp: , Rfl:   •  atenolol (TENORMIN) 25 MG Tab, ATENOLOL 25 MG TABS, Disp: , Rfl:   •  CALCIUM PO, CALCIUM CAPS, Disp: , Rfl:   •  Calcium Carbonate Antacid 1000 MG Chew Tab, TUMS CHEW, Disp: , Rfl:   •  Cholecalciferol  (VITAMIN D3) 2000 UNIT Cap, VITAMIN D3 2000 UNIT CAPS, Disp: , Rfl:   •  clotrimazole-betamethasone (LOTRISONE) 1-0.05 % Cream, CLOTRIMAZOLE-BETAMETHASONE 1-0.05 % CREA, Disp: , Rfl:   •  guaiFENesin LA (MUCINEX) 600 MG TABLET SR 12 HR, MUCINEX 600 MG XR12H-TAB, Disp: , Rfl:   •  hydroCHLOROthiazide (HYDRODIURIL) 25 MG Tab, HYDROCHLOROTHIAZIDE 25 MG TABS, Disp: , Rfl:   •  Ketotifen Fumarate (ZADITOR OP), ZADITOR SOLN, Disp: , Rfl:   •  Loperamide HCl (IMODIUM A-D PO), IMODIUM A-D TABS, Disp: , Rfl:   •  Magnesium 250 MG Tab, MAGNESIUM 250 MG TABS, Disp: , Rfl:   •  Omega-3 Fatty Acids (FISH OIL) 1200 MG Cap, FISH OIL 1200 MG CAPS, Disp: , Rfl:   •  fexofenadine (ALLEGRA ALLERGY) 180 MG tablet, ALLEGRA ALLERGY 180 MG TABS, Disp: , Rfl:   •  rosuvastatin (CRESTOR) 10 MG Tab, CRESTOR 10 MG TABS, Disp: , Rfl:   •  neomycin-polymyxin-dexamethasone (MAXITROL) 3.5-26242-9.1 Suspension ophthalmic suspension, Place 2 Drops in right eye every 4 hours for 7 days., Disp: 1 Bottle, Rfl: 0  •  gabapentin (NEURONTIN) 300 MG Cap, TAKE 1 CAPSULE BY MOUTH THREE TIMES A DAY, Disp: 180 Cap, Rfl: 0  •  oxybutynin SR (DITROPAN-XL) 5 MG TABLET SR 24 HR, Take 5 mg by mouth every day., Disp: , Rfl:   •  SYMBICORT 80-4.5 MCG/ACT Aerosol, INHALE 2 PUFFS BY MOUTH TWO TIMES A DAY, Disp: 3 Inhaler, Rfl: 3  •  tiotropium (SPIRIVA) 18 MCG Cap, Inhale 1 Cap by mouth every day., Disp: 30 Cap, Rfl: 3  •  leflunomide (ARAVA) 20 MG Tab, Take 1 Tab by mouth every day., Disp: 30 Tab, Rfl: 0  •  certolizumab pegol (CIMZIA PREFILLED) 2 X 200 MG/ML Kit, Inject 200 mg as instructed every 14 days., Disp: 1 Kit, Rfl: 0  •  Probiotic Product (PROBIOTIC PO), Take 1 Cap by mouth 2 Times a Day., Disp: , Rfl:   •  fluticasone (FLONASE) 50 MCG/ACT nasal spray, Spray 1 Spray in nose every day., Disp: , Rfl:   •  Multiple Vitamin (MULTIVITAMIN PO), Take 1 Tab by mouth every day., Disp: , Rfl:   •  fluticasone (FLONASE) 50 MCG/ACT nasal spray, FLONASE ALLERGY RELIEF  SUSP, Disp: , Rfl:   •  Multiple Vitamin (MULTIVITAMIN) capsule, MULTIVITAMINS CAPS, Disp: , Rfl:   •  tiotropium (SPIRIVA) 18 MCG Cap, SPIRIVA HANDIHALER CAPS, Disp: , Rfl:   •  Budesonide-Formoterol Fumarate (SYMBICORT INH), SYMBICORT AERO, Disp: , Rfl:   •  certolizumab pegol (CIMZIA PREFILLED) 2 X 200 MG/ML Kit, CIMZIA PREFILLED 2 X 200 MG/ML KIT, Disp: , Rfl:   •  gabapentin (NEURONTIN) 300 MG Cap, GABAPENTIN 300 MG CAPS, Disp: , Rfl:   •  leflunomide (ARAVA) 20 MG Tab, ARAVA 20 MG TABS, Disp: , Rfl:   •  rosuvastatin (CRESTOR) 5 MG Tab, TAKE 1 TABLET BY MOUTH EVERY EVENING (Patient not taking: Reported on 7/20/2019), Disp: 90 Tab, Rfl: 1  •  acetaminophen (TYLENOL) 500 MG Tab, Take 1,000 mg by mouth every 6 hours as needed for Moderate Pain., Disp: , Rfl:   •  fexofenadine (ALLEGRA) 180 MG tablet, Take 180 mg by mouth as needed., Disp: , Rfl:     ALLERGIES:   Allergies   Allergen Reactions   • Levaquin      Other reaction(s): Muscle aches  Muscle aches   • Penicillins Hives     Rash and asthma attack when a child - wife states he has had Keflex in the past and tolerated   • Ciprofloxacin Hcl Unspecified     MUSCLE ACHES   • Levofloxacin Unspecified     MUSCLE ACHES     SURGHX:   Past Surgical History:   Procedure Laterality Date   • FLEXOR TENDON REPAIR Left 4/3/2019    Procedure: FLEXOR TENDON REPAIR- SMALL FINGER VS;  Surgeon: Marc Chowdhury M.D.;  Location: [facility];  Service: Orthopedics   • TENDON TRANSFER Left 4/3/2019    Procedure: TENDON TRANSFER;  Surgeon: Marc Chowdhury M.D.;  Location: SURGERY [facility];  Service: Orthopedics   • COLONOSCOPY  11/10/2018    Procedure: COLONOSCOPY;  Surgeon: Ganesh Peacock M.D.;  Location: SURGERY [facility];  Service: Gastroenterology   • NASAL POLYPECTOMY Bilateral 10/6/2015    Procedure: NASAL POLYPECTOMY WITH SCOTT ENDOSCOPIC NASAL DEBRIDEMENT;  Surgeon: Param Maurer M.D.;  Location: SURGERY SAME DAY [facility];  Service:   "  • CYSTOSCOPY  2/1/2010    Performed by ANGIE VERDUZCO at SURGERY [facility]S   • RETROGRADES  2/1/2010    Performed by ANGIE VERDUZCO at SURGERY [facility]S   • PYELOGRAM  2/1/2010    Performed by ANGIE VERDUZCO at SURGERY [facility]   • URETEROSCOPY  2/1/2010    Performed by ANGIE VERDUZCO at SURGERY [facility]   • NEPHRECTOMY LAPAROSCOPIC  2009    left kidney   • TESTICLE EXPLORATION  2004   • PB REMV 2ND CATARACT,CORN-SCLER SECTN     • TONSILLECTOMY       SOCHX:  reports that he quit smoking about 39 years ago. His smoking use included Cigarettes. He has a 40.00 pack-year smoking history. He has never used smokeless tobacco. He reports that he does not drink alcohol or use drugs.     FH: Reviewed with patient, not pertinent to this visit.    Review of Systems   Constitutional: Negative.    Eyes: Negative for blurred vision, discharge, redness and itching.        FB sensation R eye   Neurological: Negative.    All other systems reviewed and are negative.    Objective:     /100   Pulse 81   Temp 36.8 °C (98.2 °F)   Ht 1.854 m (6' 1\")   Wt 90.7 kg (200 lb)   SpO2 92%   BMI 26.39 kg/m²     Physical Exam   Constitutional: He is oriented to person, place, and time. He appears well-developed and well-nourished.  Non-toxic appearance. No distress.   HENT:   Right Ear: External ear normal.   Left Ear: External ear normal.   Nose: Nose normal.   Eyes: Pupils are equal, round, and reactive to light. Conjunctivae and EOM are normal. Lids are everted and swept, no foreign bodies found. Right eye exhibits no discharge and no hordeolum. No foreign body present in the right eye. Right conjunctiva is not injected.   Fluorescein exam of R eye performed: no FB or abrasion noted   Neck: Normal range of motion.   Cardiovascular: Normal rate and normal pulses.    Pulmonary/Chest: Effort normal. No respiratory distress.   Musculoskeletal: Normal range of motion. He " exhibits no deformity.   Neurological: He is alert and oriented to person, place, and time. Coordination normal.   Skin: Skin is warm and dry. Capillary refill takes less than 2 seconds.   Psychiatric: He has a normal mood and affect. His behavior is normal.        Assessment/Plan:     1. Eye pain, right    Differentials reviewed including eye irritation, dryness, hordeolum, and infectious etiology.    No FB or corneal abrasion identified on fluorescein exam. Irrigated copiously with NS. Recommend OTC commercial eyewashes and lubricating eyedrops.    VS stable, no vision changes, NAD. Advised close monitoring for resolution of symptoms. Contingent antibiotic eyedrops sent to pharmacy.    Patient advised to: Return for 1) Symptoms don't improve or worsen, or go to ER, 2) Follow up with primary care in 7-10 days.    Differential diagnosis, natural history, supportive care, and indications for immediate follow-up discussed. All questions answered. Patient agrees with the plan of care.

## 2019-08-01 ENCOUNTER — OFFICE VISIT (OUTPATIENT)
Dept: CARDIOLOGY | Facility: MEDICAL CENTER | Age: 79
End: 2019-08-01
Payer: MEDICARE

## 2019-08-01 VITALS
BODY MASS INDEX: 27.96 KG/M2 | DIASTOLIC BLOOD PRESSURE: 88 MMHG | WEIGHT: 211 LBS | HEART RATE: 94 BPM | SYSTOLIC BLOOD PRESSURE: 146 MMHG | HEIGHT: 73 IN | OXYGEN SATURATION: 93 %

## 2019-08-01 DIAGNOSIS — I25.10 CORONARY ARTERY CALCIFICATION SEEN ON CAT SCAN: ICD-10-CM

## 2019-08-01 DIAGNOSIS — J96.11 CHRONIC RESPIRATORY FAILURE WITH HYPOXIA (HCC): ICD-10-CM

## 2019-08-01 DIAGNOSIS — E78.2 MIXED HYPERLIPIDEMIA: ICD-10-CM

## 2019-08-01 DIAGNOSIS — I49.3 PVC (PREMATURE VENTRICULAR CONTRACTION): ICD-10-CM

## 2019-08-01 DIAGNOSIS — E78.2 MIXED DYSLIPIDEMIA: ICD-10-CM

## 2019-08-01 DIAGNOSIS — I10 ESSENTIAL HYPERTENSION, BENIGN: ICD-10-CM

## 2019-08-01 PROCEDURE — 99213 OFFICE O/P EST LOW 20 MIN: CPT | Performed by: INTERNAL MEDICINE

## 2019-08-01 ASSESSMENT — ENCOUNTER SYMPTOMS
FALLS: 0
NEUROLOGICAL NEGATIVE: 1
HEMOPTYSIS: 0
CONSTITUTIONAL NEGATIVE: 1
GASTROINTESTINAL NEGATIVE: 1
BRUISES/BLEEDS EASILY: 1
RESPIRATORY NEGATIVE: 1
PALPITATIONS: 0

## 2019-08-01 NOTE — PROGRESS NOTES
Chief Complaint   Patient presents with   • Premature Ventricular Contractions (PVCs)     PP       Subjective:   Barry Torres is a 79 y.o. male who presents today for follow-up of hyperlipidemia, PVCs, and arterial calcification noted on CT scanning.  He is mildly physically active and rides an exercise bike several times a week.  He has no known coronary disease.  He has no chest pain or exertional dyspnea.  He denies palpitations.  Recent lab reviewed with him.  He had dramatic response to rosuvastatin and his dose was reduced by half.  Most recent LDL was only 39.  Past Medical History:   Diagnosis Date   • Abnormal thyroid stimulating hormone (TSH) level    • Allergic rhinitis    • Asbestosis ([facility])    • Asthma    • Bronchitis    • Chronic diarrhea     declines eval; takes immodium once a day usually and sometimes less; see previous notes; aware of risk    • Chronic low back pain    • Chronic lung disease     asbestos related   • CKD (chronic kidney disease), stage III ([facility])     sees neph, one kidney   • COPD (chronic obstructive pulmonary disease) ([facility])    • Coronary artery calcification seen on CAT scan 8/2/2016    sees cards   • Epididymitis 2009    h/o listed in chart   • GERD (gastroesophageal reflux disease)    • History of hemorrhoids    • History of skin cancer     non melanoma   • [place] (hard of hearing)     declines hearing aids   • Hypercholesteremia    • Hypertension    • Hypertriglyceridemia    • Indigestion    • Light headedness     2/2 orthostasis? now better off hctz   • Lightheadedness 6/23/2016   • Low back pain    • Nasal drainage    • Olecranon bursitis    • Orthostasis 6/23/2016    better off HCTZ   • Peripheral neuropathy    • Pleural plaque 2005     CT [place]   • Post-nasal drip    • Pulmonary emphysema ([facility])    • RA (rheumatoid arthritis) ([facility])     on meds, sees rheum   • Restless leg syndrome    • Rheumatoid arthritis ([facility])    • Sleep apnea     obstructive and central - not  adherent to autopap   • Solitary kidney     history of kidney cyst 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-functioning kidney s/p nephrectomy      Past Surgical History:   Procedure Laterality Date   • FLEXOR TENDON REPAIR Left 4/3/2019    Procedure: FLEXOR TENDON REPAIR- SMALL FINGER VS;  Surgeon: Marc Chowdhury M.D.;  Location: SURGERY [facility];  Service: Orthopedics   • TENDON TRANSFER Left 4/3/2019    Procedure: TENDON TRANSFER;  Surgeon: Marc Chowdhury M.D.;  Location: SURGERY [facility];  Service: Orthopedics   • COLONOSCOPY  11/10/2018    Procedure: COLONOSCOPY;  Surgeon: Ganesh Peacock M.D.;  Location: SURGERY [facility];  Service: Gastroenterology   • NASAL POLYPECTOMY Bilateral 10/6/2015    Procedure: NASAL POLYPECTOMY WITH SCOTT ENDOSCOPIC NASAL DEBRIDEMENT;  Surgeon: Param Maurer M.D.;  Location: SURGERY SAME DAY [facility];  Service:    • CYSTOSCOPY  2/1/2010    Performed by ANGIE VERDUZCO at [facility]   • RETROGRADES  2/1/2010    Performed by ANGIE VERDUZCO at [facility]   • PYELOGRAM  2/1/2010    Performed by ANGIE VERDUZCO at [facility]   • URETEROSCOPY  2/1/2010    Performed by ANGIE VERDUZCO at [facility]   • NEPHRECTOMY LAPAROSCOPIC  2009    left kidney   • TESTICLE EXPLORATION  2004   • PB REMV 2ND CATARACT,CORN-SCLER SECTN     • TONSILLECTOMY       Family History   Problem Relation Age of Onset   • Genetic Disorder Father         ALS   • No Known Problems Sister    • No Known Problems Son    • No Known Problems Daughter    • Heart Attack Neg Hx    • Heart Disease Neg Hx    • Heart Failure Neg Hx      Social History     Socioeconomic History   • Marital status:      Spouse name: Not on file   • Number of children: Not on file   • Years of education: Not on file   • Highest education level: Not on file   Occupational History   • Not on file   Social Needs   • Financial resource strain: Not on file    • Food insecurity:     Worry: Not on file     Inability: Not on file   • Transportation needs:     Medical: Not on file     Non-medical: Not on file   Tobacco Use   • Smoking status: Former Smoker     Packs/day: 1.00     Years: 40.00     Pack years: 40.00     Types: Cigarettes     Last attempt to quit: 1980     Years since quittin.6   • Smokeless tobacco: Never Used   • Tobacco comment: 1 pk a day for 40 yrs   Substance and Sexual Activity   • Alcohol use: No     Alcohol/week: 0.0 oz   • Drug use: No   • Sexual activity: Never     Partners: Female     Comment: retired    Lifestyle   • Physical activity:     Days per week: Not on file     Minutes per session: Not on file   • Stress: Not on file   Relationships   • Social connections:     Talks on phone: Not on file     Gets together: Not on file     Attends [employer] service: Not on file     Active member of club or organization: Not on file     Attends meetings of clubs or organizations: Not on file     Relationship status: Not on file   • Intimate partner violence:     Fear of current or ex partner: Not on file     Emotionally abused: Not on file     Physically abused: Not on file     Forced sexual activity: Not on file   Other Topics Concern   • Not on file   Social History Narrative    See H&P    Lives with wife     Allergies   Allergen Reactions   • Levaquin      Other reaction(s): Muscle aches  Muscle aches   • Penicillins Hives     Rash and asthma attack when a child - wife states he has had Keflex in the past and tolerated   • Ciprofloxacin Hcl Unspecified     MUSCLE ACHES   • Levofloxacin Unspecified     MUSCLE ACHES     Outpatient Encounter Medications as of 2019   Medication Sig Dispense Refill   • Calcium Carbonate Antacid 1000 MG Chew Tab TUMS CHEW     • guaiFENesin LA (MUCINEX) 600 MG TABLET SR 12 HR MUCINEX 600 MG XR12H-TAB     • Ketotifen Fumarate (ZADITOR OP) ZADITOR SOLN     • Loperamide HCl (IMODIUM A-D PO) IMODIUM A-D TABS     •  Magnesium 250 MG Tab MAGNESIUM 250 MG TABS     • Budesonide-Formoterol Fumarate (SYMBICORT INH) SYMBICORT AERO     • fexofenadine (ALLEGRA ALLERGY) 180 MG tablet ALLEGRA ALLERGY 180 MG TABS     • rosuvastatin (CRESTOR) 10 MG Tab Take 5 mg by mouth every day.     • gabapentin (NEURONTIN) 300 MG Cap TAKE 1 CAPSULE BY MOUTH THREE TIMES A  Cap 0   • acetaminophen (TYLENOL) 500 MG Tab Take 1,000 mg by mouth every 6 hours as needed for Moderate Pain.     • SYMBICORT 80-4.5 MCG/ACT Aerosol INHALE 2 PUFFS BY MOUTH TWO TIMES A DAY 3 Inhaler 3   • tiotropium (SPIRIVA) 18 MCG Cap Inhale 1 Cap by mouth every day. 30 Cap 3   • leflunomide (ARAVA) 20 MG Tab Take 1 Tab by mouth every day. 30 Tab 0   • certolizumab pegol (CIMZIA PREFILLED) 2 X 200 MG/ML Kit Inject 200 mg as instructed every 14 days. 1 Kit 0   • Probiotic Product (PROBIOTIC PO) Take 1 Cap by mouth 2 Times a Day.     • fluticasone (FLONASE) 50 MCG/ACT nasal spray Spray 1 Spray in nose every day.     • Multiple Vitamin (MULTIVITAMIN PO) Take 1 Tab by mouth every day.     • acetaminophen (TYLENOL) 325 MG Tab TYLENOL TABS     • atenolol (TENORMIN) 25 MG Tab ATENOLOL 25 MG TABS     • CALCIUM PO CALCIUM CAPS     • Cholecalciferol (VITAMIN D3) 2000 UNIT Cap VITAMIN D3 2000 UNIT CAPS     • clotrimazole-betamethasone (LOTRISONE) 1-0.05 % Cream CLOTRIMAZOLE-BETAMETHASONE 1-0.05 % CREA     • fluticasone (FLONASE) 50 MCG/ACT nasal spray FLONASE ALLERGY RELIEF SUSP     • Multiple Vitamin (MULTIVITAMIN) capsule MULTIVITAMINS CAPS     • Omega-3 Fatty Acids (FISH OIL) 1200 MG Cap FISH OIL 1200 MG CAPS     • tiotropium (SPIRIVA) 18 MCG Cap SPIRIVA HANDIHALER CAPS     • certolizumab pegol (CIMZIA PREFILLED) 2 X 200 MG/ML Kit CIMZIA PREFILLED 2 X 200 MG/ML KIT     • gabapentin (NEURONTIN) 300 MG Cap GABAPENTIN 300 MG CAPS     • leflunomide (ARAVA) 20 MG Tab ARAVA 20 MG TABS     • [DISCONTINUED] hydroCHLOROthiazide (HYDRODIURIL) 25 MG Tab HYDROCHLOROTHIAZIDE 25 MG TABS     •  "rosuvastatin (CRESTOR) 5 MG Tab TAKE 1 TABLET BY MOUTH EVERY EVENING 90 Tab 1   • oxybutynin SR (DITROPAN-XL) 5 MG TABLET SR 24 HR Take 5 mg by mouth every day.     • fexofenadine (ALLEGRA) 180 MG tablet Take 180 mg by mouth as needed.       No facility-administered encounter medications on file as of 8/1/2019.      Review of Systems   Constitutional: Negative.    HENT: Positive for hearing loss.    Respiratory: Negative.  Negative for hemoptysis.    Cardiovascular: Positive for leg swelling. Negative for chest pain and palpitations.   Gastrointestinal: Negative.    Musculoskeletal: Positive for joint pain. Negative for falls.   Skin: Negative.    Neurological: Negative.    Endo/Heme/Allergies: Bruises/bleeds easily.        Objective:   /88 (BP Location: Left arm, Patient Position: Sitting, BP Cuff Size: Adult)   Pulse 94   Ht 1.854 m (6' 1\")   Wt 95.7 kg (211 lb)   SpO2 93%   BMI 27.84 kg/m²     Physical Exam   Constitutional: He is oriented to person, place, and time. He appears well-nourished. No distress.   HENT:   Head: Normocephalic and atraumatic.   Eyes: Pupils are equal, round, and reactive to light. No scleral icterus.   Neck: No JVD present. No tracheal deviation present.   Cardiovascular: Normal rate and regular rhythm.  Occasional extrasystoles are present.   No murmur heard.  Pulmonary/Chest: Breath sounds normal. No respiratory distress. He has no wheezes.   Abdominal: Soft. Bowel sounds are normal. He exhibits no distension. There is no tenderness.   Musculoskeletal:   Trace edema  Torn right biceps muscle   Neurological: He is alert and oriented to person, place, and time.   Skin: Skin is warm and dry.   Psychiatric: He has a normal mood and affect.       Assessment:     1. Chronic respiratory failure with hypoxia (HCC)     2. PVC (premature ventricular contraction)     3. Mixed hyperlipidemia     4. Calcified LAD and LCx on CT chest 2015     5. Mixed dyslipidemia     6. Essential " hypertension, benign         Medical Decision Making:  Today's Assessment / Status / Plan:   Hyperlipidemia: Patient had dramatic response to rosuvastatin.  Agree with reducing his dose to 5 mg a day.    PVCs: Asymptomatic.  Recent Holter reviewed    Arterial calcification by CT scanning: Probably appropriate for age.  Agree with Dr. Krishnamurthy's note would not recommend stress testing as he is asymptomatic and EKG is normal..  Recommend to get more physically active.    History of hypertension: Blood pressure by my reading 128/76.  He is currently not on any medications.  If he would require medications a future would consider something like amlodipine or carvedilol that would not interfere with unit nephric status.  Return as needed

## 2019-08-13 ENCOUNTER — APPOINTMENT (RX ONLY)
Dept: URBAN - METROPOLITAN AREA CLINIC 22 | Facility: CLINIC | Age: 79
Setting detail: DERMATOLOGY
End: 2019-08-13

## 2019-08-13 DIAGNOSIS — Z09 ENCOUNTER FOR FOLLOW-UP EXAMINATION AFTER COMPLETED TREATMENT FOR CONDITIONS OTHER THAN MALIGNANT NEOPLASM: ICD-10-CM

## 2019-08-13 DIAGNOSIS — L57.0 ACTINIC KERATOSIS: ICD-10-CM

## 2019-08-13 PROBLEM — D48.5 NEOPLASM OF UNCERTAIN BEHAVIOR OF SKIN: Status: ACTIVE | Noted: 2019-08-13

## 2019-08-13 PROCEDURE — ? BIOPSY BY SHAVE METHOD

## 2019-08-13 PROCEDURE — 11103 TANGNTL BX SKIN EA SEP/ADDL: CPT

## 2019-08-13 PROCEDURE — 17003 DESTRUCT PREMALG LES 2-14: CPT

## 2019-08-13 PROCEDURE — 11102 TANGNTL BX SKIN SINGLE LES: CPT

## 2019-08-13 PROCEDURE — ? COUNSELING

## 2019-08-13 PROCEDURE — 17000 DESTRUCT PREMALG LESION: CPT | Mod: 59

## 2019-08-13 PROCEDURE — ? LIQUID NITROGEN

## 2019-08-13 PROCEDURE — 99213 OFFICE O/P EST LOW 20 MIN: CPT | Mod: 25

## 2019-08-13 PROCEDURE — ? MEDICATION COUNSELING

## 2019-08-13 ASSESSMENT — LOCATION DETAILED DESCRIPTION DERM
LOCATION DETAILED: RIGHT PROXIMAL RADIAL DORSAL FOREARM
LOCATION DETAILED: LEFT PROXIMAL DORSAL FOREARM
LOCATION DETAILED: LEFT SUPERIOR FOREHEAD
LOCATION DETAILED: LEFT VENTRAL LATERAL DISTAL FOREARM
LOCATION DETAILED: LEFT CENTRAL POSTAURICULAR SKIN
LOCATION DETAILED: LEFT CENTRAL MALAR CHEEK
LOCATION DETAILED: LEFT SUPERIOR PARIETAL SCALP
LOCATION DETAILED: LEFT CENTRAL TEMPLE
LOCATION DETAILED: LEFT SUPERIOR POSTAURICULAR SKIN
LOCATION DETAILED: LEFT SUPERIOR HELIX
LOCATION DETAILED: RIGHT PROXIMAL DORSAL FOREARM
LOCATION DETAILED: RIGHT MEDIAL FOREHEAD
LOCATION DETAILED: RIGHT RADIAL DORSAL HAND
LOCATION DETAILED: LEFT MEDIAL ZYGOMA
LOCATION DETAILED: RIGHT CENTRAL MALAR CHEEK

## 2019-08-13 ASSESSMENT — LOCATION SIMPLE DESCRIPTION DERM
LOCATION SIMPLE: RIGHT FOREARM
LOCATION SIMPLE: LEFT CHEEK
LOCATION SIMPLE: LEFT FOREHEAD
LOCATION SIMPLE: RIGHT HAND
LOCATION SIMPLE: SCALP
LOCATION SIMPLE: RIGHT CHEEK
LOCATION SIMPLE: LEFT TEMPLE
LOCATION SIMPLE: LEFT FOREARM
LOCATION SIMPLE: LEFT ZYGOMA
LOCATION SIMPLE: LEFT EAR
LOCATION SIMPLE: RIGHT FOREHEAD

## 2019-08-13 ASSESSMENT — LOCATION ZONE DERM
LOCATION ZONE: SCALP
LOCATION ZONE: FACE
LOCATION ZONE: EAR
LOCATION ZONE: HAND
LOCATION ZONE: ARM

## 2019-08-13 NOTE — PROCEDURE: MEDICATION COUNSELING
Rifampin Pregnancy And Lactation Text: This medication is Pregnancy Category C and it isn't know if it is safe during pregnancy. It is also excreted in breast milk and should not be used if you are breast feeding.
Taltz Pregnancy And Lactation Text: The risk during pregnancy and breastfeeding is uncertain with this medication.
Use Enhanced Medication Counseling?: No
Solaraze Pregnancy And Lactation Text: This medication is Pregnancy Category B and is considered safe. There is some data to suggest avoiding during the third trimester. It is unknown if this medication is excreted in breast milk.
Hydroxyzine Counseling: Patient advised that the medication is sedating and not to drive a car after taking this medication.  Patient informed of potential adverse effects including but not limited to dry mouth, urinary retention, and blurry vision.  The patient verbalized understanding of the proper use and possible adverse effects of hydroxyzine.  All of the patient's questions and concerns were addressed.
Prednisone Pregnancy And Lactation Text: This medication is Pregnancy Category C and it isn't know if it is safe during pregnancy. This medication is excreted in breast milk.
Clofazimine Counseling:  I discussed with the patient the risks of clofazimine including but not limited to skin and eye pigmentation, liver damage, nausea/vomiting, gastrointestinal bleeding and allergy.
Elidel Counseling: Patient may experience a mild burning sensation during topical application. Elidel is not approved in children less than 2 years of age. There have been case reports of hematologic and skin malignancies in patients using topical calcineurin inhibitors although causality is questionable.
Azithromycin Counseling:  I discussed with the patient the risks of azithromycin including but not limited to GI upset, allergic reaction, drug rash, diarrhea, and yeast infections.
Nsaids Pregnancy And Lactation Text: These medications are considered safe up to 30 weeks gestation. It is excreted in breast milk.
Humira Pregnancy And Lactation Text: This medication is Pregnancy Category B and is considered safe during pregnancy. It is unknown if this medication is excreted in breast milk.
Hydroxyzine Pregnancy And Lactation Text: This medication is not safe during pregnancy and should not be taken. It is also excreted in breast milk and breast feeding isn't recommended.
Clofazimine Pregnancy And Lactation Text: This medication is Pregnancy Category C and isn't considered safe during pregnancy. It is excreted in breast milk.
Ilumya Counseling: I discussed with the patient the risks of tildrakizumab including but not limited to immunosuppression, malignancy, posterior leukoencephalopathy syndrome, and serious infections.  The patient understands that monitoring is required including a PPD at baseline and must alert us or the primary physician if symptoms of infection or other concerning signs are noted.
Elidel Pregnancy And Lactation Text: This medication is Pregnancy Category C. It is unknown if this medication is excreted in breast milk.
Tremfya Counseling: I discussed with the patient the risks of guselkumab including but not limited to immunosuppression, serious infections, worsening of inflammatory bowel disease and drug reactions.  The patient understands that monitoring is required including a PPD at baseline and must alert us or the primary physician if symptoms of infection or other concerning signs are noted.
Topical Retinoid counseling:  Patient advised to apply a pea-sized amount only at bedtime and wait 30 minutes after washing their face before applying.  If too drying, patient may add a non-comedogenic moisturizer. The patient verbalized understanding of the proper use and possible adverse effects of retinoids.  All of the patient's questions and concerns were addressed.
Tetracycline Counseling: Patient counseled regarding possible photosensitivity and increased risk for sunburn.  Patient instructed to avoid sunlight, if possible.  When exposed to sunlight, patients should wear protective clothing, sunglasses, and sunscreen.  The patient was instructed to call the office immediately if the following severe adverse effects occur:  hearing changes, easy bruising/bleeding, severe headache, or vision changes.  The patient verbalized understanding of the proper use and possible adverse effects of tetracycline.  All of the patient's questions and concerns were addressed. Patient understands to avoid pregnancy while on therapy due to potential birth defects.
Odomzo Counseling- I discussed with the patient the risks of Odomzo including but not limited to nausea, vomiting, diarrhea, constipation, weight loss, changes in the sense of taste, decreased appetite, muscle spasms, and hair loss.  The patient verbalized understanding of the proper use and possible adverse effects of Odomzo.  All of the patient's questions and concerns were addressed.
Azithromycin Pregnancy And Lactation Text: This medication is considered safe during pregnancy and is also secreted in breast milk.
Eucrisa Pregnancy And Lactation Text: This medication has not been assigned a Pregnancy Risk Category but animal studies failed to show danger with the topical medication. It is unknown if the medication is excreted in breast milk.
Infliximab Counseling:  I discussed with the patient the risks of infliximab including but not limited to myelosuppression, immunosuppression, autoimmune hepatitis, demyelinating diseases, lymphoma, and serious infections.  The patient understands that monitoring is required including a PPD at baseline and must alert us or the primary physician if symptoms of infection or other concerning signs are noted.
Otezla Counseling: The side effects of Otezla were discussed with the patient, including but not limited to worsening or new depression, weight loss, diarrhea, nausea, upper respiratory tract infection, and headache. Patient instructed to call the office should any adverse effect occur.  The patient verbalized understanding of the proper use and possible adverse effects of Otezla.  All the patient's questions and concerns were addressed.
Bactrim Pregnancy And Lactation Text: This medication is Pregnancy Category D and is known to cause fetal risk.  It is also excreted in breast milk.
Tetracycline Pregnancy And Lactation Text: This medication is Pregnancy Category D and not consider safe during pregnancy. It is also excreted in breast milk.
Colchicine Counseling:  Patient counseled regarding adverse effects including but not limited to stomach upset (nausea, vomiting, stomach pain, or diarrhea).  Patient instructed to limit alcohol consumption while taking this medication.  Colchicine may reduce blood counts especially with prolonged use.  The patient understands that monitoring of kidney function and blood counts may be required, especially at baseline. The patient verbalized understanding of the proper use and possible adverse effects of colchicine.  All of the patient's questions and concerns were addressed.
Acitretin Counseling:  I discussed with the patient the risks of acitretin including but not limited to hair loss, dry lips/skin/eyes, liver damage, hyperlipidemia, depression/suicidal ideation, photosensitivity.  Serious rare side effects can include but are not limited to pancreatitis, pseudotumor cerebri, bony changes, clot formation/stroke/heart attack.  Patient understands that alcohol is contraindicated since it can result in liver toxicity and significantly prolong the elimination of the drug by many years.
Bactrim Counseling:  I discussed with the patient the risks of sulfa antibiotics including but not limited to GI upset, allergic reaction, drug rash, diarrhea, dizziness, photosensitivity, and yeast infections.  Rarely, more serious reactions can occur including but not limited to aplastic anemia, agranulocytosis, methemoglobinemia, blood dyscrasias, liver or kidney failure, lung infiltrates or desquamative/blistering drug rashes.
Odomzo Pregnancy And Lactation Text: This medication is Pregnancy Category X and is absolutely contraindicated during pregnancy. It is unknown if it is excreted in breast milk.
Eucrisa Counseling: Patient may experience a mild burning sensation during topical application. Eucrisa is not approved in children less than 2 years of age.
Tazorac Pregnancy And Lactation Text: This medication is not safe during pregnancy. It is unknown if this medication is excreted in breast milk.
Xeljulianz Pregnancy And Lactation Text: This medication is Pregnancy Category D and is not considered safe during pregnancy.  The risk during breast feeding is also uncertain.
Dapsone Counseling: I discussed with the patient the risks of dapsone including but not limited to hemolytic anemia, agranulocytosis, rashes, methemoglobinemia, kidney failure, peripheral neuropathy, headaches, GI upset, and liver toxicity.  Patients who start dapsone require monitoring including baseline LFTs and weekly CBCs for the first month, then every month thereafter.  The patient verbalized understanding of the proper use and possible adverse effects of dapsone.  All of the patient's questions and concerns were addressed.
Fluconazole Counseling:  Patient counseled regarding adverse effects of fluconazole including but not limited to headache, diarrhea, nausea, upset stomach, liver function test abnormalities, taste disturbance, and stomach pain.  There is a rare possibility of liver failure that can occur when taking fluconazole.  The patient understands that monitoring of LFTs and kidney function test may be required, especially at baseline. The patient verbalized understanding of the proper use and possible adverse effects of fluconazole.  All of the patient's questions and concerns were addressed.
Otezla Pregnancy And Lactation Text: This medication is Pregnancy Category C and it isn't known if it is safe during pregnancy. It is unknown if it is excreted in breast milk.
Hydroquinone Counseling:  Patient advised that medication may result in skin irritation, lightening (hypopigmentation), dryness, and burning.  In the event of skin irritation, the patient was advised to reduce the amount of the drug applied or use it less frequently.  Rarely, spots that are treated with hydroquinone can become darker (pseudoochronosis).  Should this occur, patient instructed to stop medication and call the office. The patient verbalized understanding of the proper use and possible adverse effects of hydroquinone.  All of the patient's questions and concerns were addressed.
Cephalexin Counseling: I counseled the patient regarding use of cephalexin as an antibiotic for prophylactic and/or therapeutic purposes. Cephalexin (commonly prescribed under brand name Keflex) is a cephalosporin antibiotic which is active against numerous classes of bacteria, including most skin bacteria. Side effects may include nausea, diarrhea, gastrointestinal upset, rash, hives, yeast infections, and in rare cases, hepatitis, kidney disease, seizures, fever, confusion, neurologic symptoms, and others. Patients with severe allergies to penicillin medications are cautioned that there is about a 10% incidence of cross-reactivity with cephalosporins. When possible, patients with penicillin allergies should use alternatives to cephalosporins for antibiotic therapy.
Xeljanz Counseling: I discussed with the patient the risks of Xeljanz therapy including increased risk of infection, liver issues, headache, diarrhea, or cold symptoms. Live vaccines should be avoided. They were instructed to call if they have any problems.
Tazorac Counseling:  Patient advised that medication is irritating and drying.  Patient may need to apply sparingly and wash off after an hour before eventually leaving it on overnight.  The patient verbalized understanding of the proper use and possible adverse effects of tazorac.  All of the patient's questions and concerns were addressed.
Acitretin Pregnancy And Lactation Text: This medication is Pregnancy Category X and should not be given to women who are pregnant or may become pregnant in the future. This medication is excreted in breast milk.
Albendazole Counseling:  I discussed with the patient the risks of albendazole including but not limited to cytopenia, kidney damage, nausea/vomiting and severe allergy.  The patient understands that this medication is being used in an off-label manner.
Fluconazole Pregnancy And Lactation Text: This medication is Pregnancy Category C and it isn't know if it is safe during pregnancy. It is also excreted in breast milk.
Dapsone Pregnancy And Lactation Text: This medication is Pregnancy Category C and is not considered safe during pregnancy or breast feeding.
Ivermectin Counseling:  Patient instructed to take medication on an empty stomach with a full glass of water.  Patient informed of potential adverse effects including but not limited to nausea, diarrhea, dizziness, itching, and swelling of the extremities or lymph nodes.  The patient verbalized understanding of the proper use and possible adverse effects of ivermectin.  All of the patient's questions and concerns were addressed.
Bexarotene Pregnancy And Lactation Text: This medication is Pregnancy Category X and should not be given to women who are pregnant or may become pregnant. This medication should not be used if you are breast feeding.
Cephalexin Pregnancy And Lactation Text: This medication is Pregnancy Category B and considered safe during pregnancy.  It is also excreted in breast milk but can be used safely for shorter doses.
Oxybutynin Counseling:  I discussed with the patient the risks of oxybutynin including but not limited to skin rash, drowsiness, dry mouth, difficulty urinating, and blurred vision.
Topical Clindamycin Counseling: Patient counseled that this medication may cause skin irritation or allergic reactions.  In the event of skin irritation, the patient was advised to reduce the amount of the drug applied or use it less frequently.   The patient verbalized understanding of the proper use and possible adverse effects of clindamycin.  All of the patient's questions and concerns were addressed.
Xolair Counseling:  Patient informed of potential adverse effects including but not limited to fever, muscle aches, rash and allergic reactions.  The patient verbalized understanding of the proper use and possible adverse effects of Xolair.  All of the patient's questions and concerns were addressed.
Albendazole Pregnancy And Lactation Text: This medication is Pregnancy Category C and it isn't known if it is safe during pregnancy. It is also excreted in breast milk.
Bexarotene Counseling:  I discussed with the patient the risks of bexarotene including but not limited to hair loss, dry lips/skin/eyes, liver abnormalities, hyperlipidemia, pancreatitis, depression/suicidal ideation, photosensitivity, drug rash/allergic reactions, hypothyroidism, anemia, leukopenia, infection, cataracts, and teratogenicity.  Patient understands that they will need regular blood tests to check lipid profile, liver function tests, white blood cell count, thyroid function tests and pregnancy test if applicable.
Griseofulvin Counseling:  I discussed with the patient the risks of griseofulvin including but not limited to photosensitivity, cytopenia, liver damage, nausea/vomiting and severe allergy.  The patient understands that this medication is best absorbed when taken with a fatty meal (e.g., ice cream or french fries).
Xolair Pregnancy And Lactation Text: This medication is Pregnancy Category B and is considered safe during pregnancy. This medication is excreted in breast milk.
Topical Clindamycin Pregnancy And Lactation Text: This medication is Pregnancy Category B and is considered safe during pregnancy. It is unknown if it is excreted in breast milk.
Isotretinoin Counseling: Patient should get monthly blood tests, not donate blood, not drive at night if vision affected, not share medication, and not undergo elective surgery for 6 months after tx completed. Side effects reviewed, pt to contact office should one occur.
Erivedge Counseling- I discussed with the patient the risks of Erivedge including but not limited to nausea, vomiting, diarrhea, constipation, weight loss, changes in the sense of taste, decreased appetite, muscle spasms, and hair loss.  The patient verbalized understanding of the proper use and possible adverse effects of Erivedge.  All of the patient's questions and concerns were addressed.
Imiquimod Counseling:  I discussed with the patient the risks of imiquimod including but not limited to erythema, scaling, itching, weeping, crusting, and pain.  Patient understands that the inflammatory response to imiquimod is variable from person to person and was educated regarded proper titration schedule.  If flu-like symptoms develop, patient knows to discontinue the medication and contact us.
Clindamycin Counseling: I counseled the patient regarding use of clindamycin as an antibiotic for prophylactic and/or therapeutic purposes. Clindamycin is active against numerous classes of bacteria, including skin bacteria. Side effects may include nausea, diarrhea, gastrointestinal upset, rash, hives, yeast infections, and in rare cases, colitis.
Topical Sulfur Applications Counseling: Topical Sulfur Counseling: Patient counseled that this medication may cause skin irritation or allergic reactions.  In the event of skin irritation, the patient was advised to reduce the amount of the drug applied or use it less frequently.   The patient verbalized understanding of the proper use and possible adverse effects of topical sulfur application.  All of the patient's questions and concerns were addressed.
Isotretinoin Pregnancy And Lactation Text: This medication is Pregnancy Category X and is considered extremely dangerous during pregnancy. It is unknown if it is excreted in breast milk.
Griseofulvin Pregnancy And Lactation Text: This medication is Pregnancy Category X and is known to cause serious birth defects. It is unknown if this medication is excreted in breast milk but breast feeding should be avoided.
Propranolol Counseling:  I discussed with the patient the risks of propranolol including but not limited to low heart rate, low blood pressure, low blood sugar, restlessness and increased cold sensitivity. They should call the office if they experience any of these side effects.
Clindamycin Pregnancy And Lactation Text: This medication can be used in pregnancy if certain situations. Clindamycin is also present in breast milk.
Doxycycline Pregnancy And Lactation Text: This medication is Pregnancy Category D and not consider safe during pregnancy. It is also excreted in breast milk but is considered safe for shorter treatment courses.
Itraconazole Counseling:  I discussed with the patient the risks of itraconazole including but not limited to liver damage, nausea/vomiting, neuropathy, and severe allergy.  The patient understands that this medication is best absorbed when taken with acidic beverages such as non-diet cola or ginger ale.  The patient understands that monitoring is required including baseline LFTs and repeat LFTs at intervals.  The patient understands that they are to contact us or the primary physician if concerning signs are noted.
Finasteride Male Counseling: Finasteride Counseling:  I discussed with the patient the risks of use of finasteride including but not limited to decreased libido, decreased ejaculate volume, gynecomastia, and depression. Women should not handle medication.  All of the patient's questions and concerns were addressed.
High Dose Vitamin A Counseling: Side effects reviewed, pt to contact office should one occur.
Doxycycline Counseling:  Patient counseled regarding possible photosensitivity and increased risk for sunburn.  Patient instructed to avoid sunlight, if possible.  When exposed to sunlight, patients should wear protective clothing, sunglasses, and sunscreen.  The patient was instructed to call the office immediately if the following severe adverse effects occur:  hearing changes, easy bruising/bleeding, severe headache, or vision changes.  The patient verbalized understanding of the proper use and possible adverse effects of doxycycline.  All of the patient's questions and concerns were addressed.
Propranolol Pregnancy And Lactation Text: This medication is Pregnancy Category C and it isn't known if it is safe during pregnancy. It is excreted in breast milk.
Minoxidil Counseling: Minoxidil is a topical medication which can increase blood flow where it is applied. It is uncertain how this medication increases hair growth. Side effects are uncommon and include stinging and allergic reactions.
Azathioprine Counseling:  I discussed with the patient the risks of azathioprine including but not limited to myelosuppression, immunosuppression, hepatotoxicity, lymphoma, and infections.  The patient understands that monitoring is required including baseline LFTs, Creatinine, possible TPMP genotyping and weekly CBCs for the first month and then every 2 weeks thereafter.  The patient verbalized understanding of the proper use and possible adverse effects of azathioprine.  All of the patient's questions and concerns were addressed.
Topical Sulfur Applications Pregnancy And Lactation Text: This medication is Pregnancy Category C and has an unknown safety profile during pregnancy. It is unknown if this topical medication is excreted in breast milk.
Wartpeel Pregnancy And Lactation Text: This medication is Pregnancy Category X and contraindicated in pregnancy and in women who may become pregnant. It is unknown if this medication is excreted in breast milk.
Erythromycin Counseling:  I discussed with the patient the risks of erythromycin including but not limited to GI upset, allergic reaction, drug rash, diarrhea, increase in liver enzymes, and yeast infections.
Cellcept Counseling:  I discussed with the patient the risks of mycophenolate mofetil including but not limited to infection/immunosuppression, GI upset, hypokalemia, hypercholesterolemia, bone marrow suppression, lymphoproliferative disorders, malignancy, GI ulceration/bleed/perforation, colitis, interstitial lung disease, kidney failure, progressive multifocal leukoencephalopathy, and birth defects.  The patient understands that monitoring is required including a baseline creatinine and regular CBC testing. In addition, patient must alert us immediately if symptoms of infection or other concerning signs are noted.
Gabapentin Counseling: I discussed with the patient the risks of gabapentin including but not limited to dizziness, somnolence, fatigue and ataxia.
Cimzia Counseling:  I discussed with the patient the risks of Cimzia including but not limited to immunosuppression, allergic reactions and infections.  The patient understands that monitoring is required including a PPD at baseline and must alert us or the primary physician if symptoms of infection or other concerning signs are noted.
Ketoconazole Counseling:   Patient counseled regarding improving absorption with orange juice.  Adverse effects include but are not limited to breast enlargement, headache, diarrhea, nausea, upset stomach, liver function test abnormalities, taste disturbance, and stomach pain.  There is a rare possibility of liver failure that can occur when taking ketoconazole. The patient understands that monitoring of LFTs may be required, especially at baseline. The patient verbalized understanding of the proper use and possible adverse effects of ketoconazole.  All of the patient's questions and concerns were addressed.
Mirvaso Counseling: Mirvaso is a topical medication which can decrease superficial blood flow where applied. Side effects are uncommon and include stinging, redness and allergic reactions.
Birth Control Pills Counseling: Birth Control Pill Counseling: I discussed with the patient the potential side effects of OCPs including but not limited to increased risk of stroke, heart attack, thrombophlebitis, deep venous thrombosis, hepatic adenomas, breast changes, GI upset, headaches, and depression.  The patient verbalized understanding of the proper use and possible adverse effects of OCPs. All of the patient's questions and concerns were addressed.
Azathioprine Pregnancy And Lactation Text: This medication is Pregnancy Category D and isn't considered safe during pregnancy. It is unknown if this medication is excreted in breast milk.
Wartpeel Counseling:  I discussed with the patient the risks of Wartpeel including but not limited to erythema, scaling, itching, weeping, crusting, and pain.
High Dose Vitamin A Pregnancy And Lactation Text: High dose vitamin A therapy is contraindicated during pregnancy and breast feeding.
Finasteride Pregnancy And Lactation Text: This medication is absolutely contraindicated during pregnancy. It is unknown if it is excreted in breast milk.
Benzoyl Peroxide Counseling: Patient counseled that medicine may cause skin irritation and bleach clothing.  In the event of skin irritation, the patient was advised to reduce the amount of the drug applied or use it less frequently.   The patient verbalized understanding of the proper use and possible adverse effects of benzoyl peroxide.  All of the patient's questions and concerns were addressed.
Ketoconazole Pregnancy And Lactation Text: This medication is Pregnancy Category C and it isn't know if it is safe during pregnancy. It is also excreted in breast milk and breast feeding isn't recommended.
Cimzia Pregnancy And Lactation Text: This medication crosses the placenta but can be considered safe in certain situations. Cimzia may be excreted in breast milk.
Erythromycin Pregnancy And Lactation Text: This medication is Pregnancy Category B and is considered safe during pregnancy. It is also excreted in breast milk.
Rituxan Pregnancy And Lactation Text: This medication is Pregnancy Category C and it isn't know if it is safe during pregnancy. It is unknown if this medication is excreted in breast milk but similar antibodies are known to be excreted.
Spironolactone Counseling: Patient advised regarding risks of diarrhea, abdominal pain, hyperkalemia, birth defects (for female patients), liver toxicity and renal toxicity. The patient may need blood work to monitor liver and kidney function and potassium levels while on therapy. The patient verbalized understanding of the proper use and possible adverse effects of spironolactone.  All of the patient's questions and concerns were addressed.
Mirvaso Pregnancy And Lactation Text: This medication has not been assigned a Pregnancy Risk Category. It is unknown if the medication is excreted in breast milk.
Zyclara Counseling:  I discussed with the patient the risks of imiquimod including but not limited to erythema, scaling, itching, weeping, crusting, and pain.  Patient understands that the inflammatory response to imiquimod is variable from person to person and was educated regarded proper titration schedule.  If flu-like symptoms develop, patient knows to discontinue the medication and contact us.
Birth Control Pills Pregnancy And Lactation Text: This medication should be avoided if pregnant and for the first 30 days post-partum.
Rituxan Counseling:  I discussed with the patient the risks of Rituxan infusions. Side effects can include infusion reactions, severe drug rashes including mucocutaneous reactions, reactivation of latent hepatitis and other infections and rarely progressive multifocal leukoencephalopathy.  All of the patient's questions and concerns were addressed.
Siliq Counseling:  I discussed with the patient the risks of Siliq including but not limited to new or worsening depression, suicidal thoughts and behavior, immunosuppression, malignancy, posterior leukoencephalopathy syndrome, and serious infections.  The patient understands that monitoring is required including a PPD at baseline and must alert us or the primary physician if symptoms of infection or other concerning signs are noted. There is also a special program designed to monitor depression which is required with Siliq.
Picato Counseling:  I discussed with the patient the risks of Picato including but not limited to erythema, scaling, itching, weeping, crusting, and pain.
Terbinafine Counseling: Patient counseling regarding adverse effects of terbinafine including but not limited to headache, diarrhea, rash, upset stomach, liver function test abnormalities, itching, taste/smell disturbance, nausea, abdominal pain, and flatulence.  There is a rare possibility of liver failure that can occur when taking terbinafine.  The patient understands that a baseline LFT and kidney function test may be required. The patient verbalized understanding of the proper use and possible adverse effects of terbinafine.  All of the patient's questions and concerns were addressed.
Glycopyrrolate Counseling:  I discussed with the patient the risks of glycopyrrolate including but not limited to skin rash, drowsiness, dry mouth, difficulty urinating, and blurred vision.
Cyclophosphamide Counseling:  I discussed with the patient the risks of cyclophosphamide including but not limited to hair loss, hormonal abnormalities, decreased fertility, abdominal pain, diarrhea, nausea and vomiting, bone marrow suppression and infection. The patient understands that monitoring is required while taking this medication.
Spironolactone Pregnancy And Lactation Text: This medication can cause feminization of the male fetus and should be avoided during pregnancy. The active metabolite is also found in breast milk.
Cosentyx Counseling:  I discussed with the patient the risks of Cosentyx including but not limited to worsening of Crohn's disease, immunosuppression, allergic reactions and infections.  The patient understands that monitoring is required including a PPD at baseline and must alert us or the primary physician if symptoms of infection or other concerning signs are noted.
Benzoyl Peroxide Pregnancy And Lactation Text: This medication is Pregnancy Category C. It is unknown if benzoyl peroxide is excreted in breast milk.
Metronidazole Counseling:  I discussed with the patient the risks of metronidazole including but not limited to seizures, nausea/vomiting, a metallic taste in the mouth, nausea/vomiting and severe allergy.
SSKI Counseling:  I discussed with the patient the risks of SSKI including but not limited to thyroid abnormalities, metallic taste, GI upset, fever, headache, acne, arthralgias, paraesthesias, lymphadenopathy, easy bleeding, arrhythmias, and allergic reaction.
Metronidazole Pregnancy And Lactation Text: This medication is Pregnancy Category B and considered safe during pregnancy.  It is also excreted in breast milk.
Cyclophosphamide Pregnancy And Lactation Text: This medication is Pregnancy Category D and it isn't considered safe during pregnancy. This medication is excreted in breast milk.
Detail Level: Simple
Glycopyrrolate Pregnancy And Lactation Text: This medication is Pregnancy Category B and is considered safe during pregnancy. It is unknown if it is excreted breast milk.
Carac Counseling:  I discussed with the patient the risks of Carac including but not limited to erythema, scaling, itching, weeping, crusting, and pain.
Terbinafine Pregnancy And Lactation Text: This medication is Pregnancy Category B and is considered safe during pregnancy. It is also excreted in breast milk and breast feeding isn't recommended.
Opioid Pregnancy And Lactation Text: These medications can lead to premature delivery and should be avoided during pregnancy. These medications are also present in breast milk in small amounts.
Dupixent Counseling: I discussed with the patient the risks of dupilumab including but not limited to eye infection and irritation, cold sores, injection site reactions, worsening of asthma, allergic reactions and increased risk of parasitic infection.  Live vaccines should be avoided while taking dupilumab. Dupilumab will also interact with certain medications such as warfarin and cyclosporine. The patient understands that monitoring is required and they must alert us or the primary physician if symptoms of infection or other concerning signs are noted.
Hydroxychloroquine Counseling:  I discussed with the patient that a baseline ophthalmologic exam is needed at the start of therapy and every year thereafter while on therapy. A CBC may also be warranted for monitoring.  The side effects of this medication were discussed with the patient, including but not limited to agranulocytosis, aplastic anemia, seizures, rashes, retinopathy, and liver toxicity. Patient instructed to call the office should any adverse effect occur.  The patient verbalized understanding of the proper use and possible adverse effects of Plaquenil.  All the patient's questions and concerns were addressed.
Simponi Counseling:  I discussed with the patient the risks of golimumab including but not limited to myelosuppression, immunosuppression, autoimmune hepatitis, demyelinating diseases, lymphoma, and serious infections.  The patient understands that monitoring is required including a PPD at baseline and must alert us or the primary physician if symptoms of infection or other concerning signs are noted.
Minocycline Counseling: Patient advised regarding possible photosensitivity and discoloration of the teeth, skin, lips, tongue and gums.  Patient instructed to avoid sunlight, if possible.  When exposed to sunlight, patients should wear protective clothing, sunglasses, and sunscreen.  The patient was instructed to call the office immediately if the following severe adverse effects occur:  hearing changes, easy bruising/bleeding, severe headache, or vision changes.  The patient verbalized understanding of the proper use and possible adverse effects of minocycline.  All of the patient's questions and concerns were addressed.
Protopic Counseling: Patient may experience a mild burning sensation during topical application. Protopic is not approved in children less than 2 years of age. There have been case reports of hematologic and skin malignancies in patients using topical calcineurin inhibitors although causality is questionable.
Sski Pregnancy And Lactation Text: This medication is Pregnancy Category D and isn't considered safe during pregnancy. It is excreted in breast milk.
Cyclosporine Counseling:  I discussed with the patient the risks of cyclosporine including but not limited to hypertension, gingival hyperplasia,myelosuppression, immunosuppression, liver damage, kidney damage, neurotoxicity, lymphoma, and serious infections. The patient understands that monitoring is required including baseline blood pressure, CBC, CMP, lipid panel and uric acid, and then 1-2 times monthly CMP and blood pressure.
Opioid Counseling: I discussed with the patient the potential side effects of opioids including but not limited to addiction, altered mental status, and depression. I stressed avoiding alcohol, benzodiazepines, muscle relaxants and sleep aids unless specifically okayed by a physician. The patient verbalized understanding of the proper use and possible adverse effects of opioids. All of the patient's questions and concerns were addressed. They were instructed to flush the remaining pills down the toilet if they did not need them for pain.
Quinolones Counseling:  I discussed with the patient the risks of fluoroquinolones including but not limited to GI upset, allergic reaction, drug rash, diarrhea, dizziness, photosensitivity, yeast infections, liver function test abnormalities, tendonitis/tendon rupture.
Methotrexate Counseling:  Patient counseled regarding adverse effects of methotrexate including but not limited to nausea, vomiting, abnormalities in liver function tests. Patients may develop mouth sores, rash, diarrhea, and abnormalities in blood counts. The patient understands that monitoring is required including LFT's and blood counts.  There is a rare possibility of scarring of the liver and lung problems that can occur when taking methotrexate. Persistent nausea, loss of appetite, pale stools, dark urine, cough, and shortness of breath should be reported immediately. Patient advised to discontinue methotrexate treatment at least three months before attempting to become pregnant.  I discussed the need for folate supplements while taking methotrexate.  These supplements can decrease side effects during methotrexate treatment. The patient verbalized understanding of the proper use and possible adverse effects of methotrexate.  All of the patient's questions and concerns were addressed.
Enbrel Counseling:  I discussed with the patient the risks of etanercept including but not limited to myelosuppression, immunosuppression, autoimmune hepatitis, demyelinating diseases, lymphoma, and infections.  The patient understands that monitoring is required including a PPD at baseline and must alert us or the primary physician if symptoms of infection or other concerning signs are noted.
Niacinamide Counseling: I recommended taking niacin or niacinamide, also know as vitamin B3, twice daily. Recent evidence suggests that taking vitamin B3 (500 mg twice daily) can reduce the risk of actinic keratoses and non-melanoma skin cancers. Side effects of vitamin B3 include flushing and headache.
Cimetidine Counseling:  I discussed with the patient the risks of Cimetidine including but not limited to gynecomastia, headache, diarrhea, nausea, drowsiness, arrhythmias, pancreatitis, skin rashes, psychosis, bone marrow suppression and kidney toxicity.
Hydroxychloroquine Pregnancy And Lactation Text: This medication has been shown to cause fetal harm but it isn't assigned a Pregnancy Risk Category. There are small amounts excreted in breast milk.
5-Fu Counseling: 5-Fluorouracil Counseling:  I discussed with the patient the risks of 5-fluorouracil including but not limited to erythema, scaling, itching, weeping, crusting, and pain.
Dupixent Pregnancy And Lactation Text: This medication likely crosses the placenta but the risk for the fetus is uncertain. This medication is excreted in breast milk.
Thalidomide Counseling: I discussed with the patient the risks of thalidomide including but not limited to birth defects, anxiety, weakness, chest pain, dizziness, cough and severe allergy.
Protopic Pregnancy And Lactation Text: This medication is Pregnancy Category C. It is unknown if this medication is excreted in breast milk when applied topically.
Drysol Counseling:  I discussed with the patient the risks of drysol/aluminum chloride including but not limited to skin rash, itching, irritation, burning.
Niacinamide Pregnancy And Lactation Text: These medications are considered safe during pregnancy.
Valtrex Counseling: I discussed with the patient the risks of valacyclovir including but not limited to kidney damage, nausea, vomiting and severe allergy.  The patient understands that if the infection seems to be worsening or is not improving, they are to call.
Methotrexate Pregnancy And Lactation Text: This medication is Pregnancy Category X and is known to cause fetal harm. This medication is excreted in breast milk.
Rhofade Counseling: Rhofade is a topical medication which can decrease superficial blood flow where applied. Side effects are uncommon and include stinging, redness and allergic reactions.
Stelara Counseling:  I discussed with the patient the risks of ustekinumab including but not limited to immunosuppression, malignancy, posterior leukoencephalopathy syndrome, and serious infections.  The patient understands that monitoring is required including a PPD at baseline and must alert us or the primary physician if symptoms of infection or other concerning signs are noted.
Doxepin Pregnancy And Lactation Text: This medication is Pregnancy Category C and it isn't known if it is safe during pregnancy. It is also excreted in breast milk and breast feeding isn't recommended.
Nsaids Counseling: NSAID Counseling: I discussed with the patient that NSAIDs should be taken with food. Prolonged use of NSAIDs can result in the development of stomach ulcers.  Patient advised to stop taking NSAIDs if abdominal pain occurs.  The patient verbalized understanding of the proper use and possible adverse effects of NSAIDs.  All of the patient's questions and concerns were addressed.
Drysol Pregnancy And Lactation Text: This medication is considered safe during pregnancy and breast feeding.
Humira Counseling:  I discussed with the patient the risks of adalimumab including but not limited to myelosuppression, immunosuppression, autoimmune hepatitis, demyelinating diseases, lymphoma, and serious infections.  The patient understands that monitoring is required including a PPD at baseline and must alert us or the primary physician if symptoms of infection or other concerning signs are noted.
Solaraze Counseling:  I discussed with the patient the risks of Solaraze including but not limited to erythema, scaling, itching, weeping, crusting, and pain.
Valtrex Pregnancy And Lactation Text: this medication is Pregnancy Category B and is considered safe during pregnancy. This medication is not directly found in breast milk but it's metabolite acyclovir is present.
Taltz Counseling: I discussed with the patient the risks of ixekizumab including but not limited to immunosuppression, serious infections, worsening of inflammatory bowel disease and drug reactions.  The patient understands that monitoring is required including a PPD at baseline and must alert us or the primary physician if symptoms of infection or other concerning signs are noted.
Rifampin Counseling: I discussed with the patient the risks of rifampin including but not limited to liver damage, kidney damage, red-orange body fluids, nausea/vomiting and severe allergy.
Prednisone Counseling:  I discussed with the patient the risks of prolonged use of prednisone including but not limited to weight gain, insomnia, osteoporosis, mood changes, diabetes, susceptibility to infection, glaucoma and high blood pressure.  In cases where prednisone use is prolonged, patients should be monitored with blood pressure checks, serum glucose levels and an eye exam.  Additionally, the patient may need to be placed on GI prophylaxis, PCP prophylaxis, and calcium and vitamin D supplementation and/or a bisphosphonate.  The patient verbalized understanding of the proper use and the possible adverse effects of prednisone.  All of the patient's questions and concerns were addressed.
Doxepin Counseling:  Patient advised that the medication is sedating and not to drive a car after taking this medication. Patient informed of potential adverse effects including but not limited to dry mouth, urinary retention, and blurry vision.  The patient verbalized understanding of the proper use and possible adverse effects of doxepin.  All of the patient's questions and concerns were addressed.
Arava Counseling:  Patient counseled regarding adverse effects of Arava including but not limited to nausea, vomiting, abnormalities in liver function tests. Patients may develop mouth sores, rash, diarrhea, and abnormalities in blood counts. The patient understands that monitoring is required including LFTs and blood counts.  There is a rare possibility of scarring of the liver and lung problems that can occur when taking methotrexate. Persistent nausea, loss of appetite, pale stools, dark urine, cough, and shortness of breath should be reported immediately. Patient advised to discontinue Arava treatment and consult with a physician prior to attempting conception. The patient will have to undergo a treatment to eliminate Arava from the body prior to conception.

## 2019-08-13 NOTE — PROCEDURE: BIOPSY BY SHAVE METHOD
Render Post-Care Instructions In Note?: yes
Electrodesiccation And Curettage Text: The wound bed was treated with electrodesiccation and curettage after the biopsy was performed.
Anesthesia Volume In Cc: 2
Type Of Destruction Used: Curettage
Biopsy Type: H and E
X Size Of Lesion In Cm: 0
Consent: Written consent was obtained and risks were reviewed including but not limited to scarring, infection, bleeding, scabbing, incomplete removal, nerve damage and allergy to anesthesia.
Bill For Surgical Tray: no
Depth Of Biopsy: dermis
Electrodesiccation Text: The wound bed was treated with electrodesiccation after the biopsy was performed.
Wound Care: Petrolatum
Notification Instructions: Patient will be notified of biopsy results. However, patient instructed to call the office if not contacted within 2 weeks.
Size Of Lesion In Cm: 0.7
Lab Facility: 
Cryotherapy Text: The wound bed was treated with cryotherapy after the biopsy was performed.
Anesthesia Type: 1% lidocaine with 1:100,000 epinephrine and a 1:10 solution of 8.4% sodium bicarbonate
Post-Care Instructions: I reviewed with the patient in detail post-care instructions. Patient is to keep the biopsy site dry overnight. Gentle cleansing daily.  Apply petroleum ointment daily until healed. Patient may apply hydrogen peroxide soaks to remove any crusting.
Lab: [id number]
Billing Type: Third-Party Bill
Detail Level: Detailed
Curettage Text: The wound bed was treated with curettage after the biopsy was performed.
Hemostasis: Drysol
Biopsy Method: Personna blade
Silver Nitrate Text: The wound bed was treated with silver nitrate after the biopsy was performed.
Dressing: pressure dressing with telfa

## 2019-09-11 ENCOUNTER — APPOINTMENT (RX ONLY)
Dept: URBAN - METROPOLITAN AREA CLINIC 22 | Facility: CLINIC | Age: 79
Setting detail: DERMATOLOGY
End: 2019-09-11

## 2019-09-11 PROBLEM — C44.329 SQUAMOUS CELL CARCINOMA OF SKIN OF OTHER PARTS OF FACE: Status: ACTIVE | Noted: 2019-09-11

## 2019-09-11 PROCEDURE — ? DIAGNOSIS COMMENT

## 2019-09-11 PROCEDURE — 17311 MOHS 1 STAGE H/N/HF/G: CPT

## 2019-09-11 PROCEDURE — 13132 CMPLX RPR F/C/C/M/N/AX/G/H/F: CPT

## 2019-09-11 PROCEDURE — ? MOHS SURGERY

## 2019-09-11 PROCEDURE — 17312 MOHS ADDL STAGE: CPT

## 2019-09-11 NOTE — PROCEDURE: MOHS SURGERY
Did The Patient Refuse Pain Medications?: No
Star Wedge Flap Text: The defect edges were debeveled with a #15 scalpel blade.  Given the location of the defect, shape of the defect and the proximity to free margins a star wedge flap was deemed most appropriate.  Using a sterile surgical marker, an appropriate rotation flap was drawn incorporating the defect and placing the expected incisions within the relaxed skin tension lines where possible. The area thus outlined was incised deep to adipose tissue with a #15 scalpel blade.  The skin margins were undermined to an appropriate distance in all directions utilizing iris scissors.
Consent (Spinal Accessory)/Introductory Paragraph: The rationale for Mohs was explained to the patient and consent was obtained. The risks, benefits and alternatives to therapy were discussed in detail. Specifically, the risks of damage to the spinal accessory nerve, infection, scarring, bleeding, prolonged wound healing, incomplete removal, allergy to anesthesia, and recurrence were addressed. Prior to the procedure, the treatment site was clearly identified and confirmed by the patient. All components of Universal Protocol/PAUSE Rule completed.
Oculoplastic Surgeon Procedure Text (D): After obtaining clear surgical margins the patient was sent to oculoplastics for surgical repair.  The patient understands they will receive post-surgical care and follow-up from the referring physician's office.
Repair Anesthesia Method: local infiltration
Dressing (No Sutures): dry sterile dressing
Alar Island Pedicle Flap Text: The defect edges were debeveled with a #15 scalpel blade.  Given the location of the defect, shape of the defect and the proximity to the alar rim an island pedicle advancement flap was deemed most appropriate.  Using a sterile surgical marker, an appropriate advancement flap was drawn incorporating the defect, outlining the appropriate donor tissue and placing the expected incisions within the nasal ala running parallel to the alar rim. The area thus outlined was incised with a #15 scalpel blade.  The skin margins were undermined minimally to an appropriate distance in all directions around the primary defect and laterally outward around the island pedicle utilizing iris scissors.  There was minimal undermining beneath the pedicle flap.
Referred To Asc For Closure Text (Leave Blank If You Do Not Want): After obtaining clear surgical margins the patient was sent to an ASC for surgical repair.  The patient understands they will receive post-surgical care and follow-up from the ASC physician.
Stage 6: Additional Anesthesia Volume In Cc: 0
Stage 11: Additional Anesthesia Type: 1% lidocaine with epinephrine
Show Surgical Defect Variables In The Stage Tabs: Yes
Cartilage Graft Text: The defect edges were debeveled with a #15 scalpel blade.  Given the location of the defect, shape of the defect, the fact the defect involved a full thickness cartilage defect a cartilage graft was deemed most appropriate.  An appropriate donor site was identified, cleansed, and anesthetized. The cartilage graft was then harvested and transferred to the recipient site, oriented appropriately and then sutured into place.  The secondary defect was then repaired using a primary closure.
Epidermal Sutures: 6-0 Surgipro
Mucosal Advancement Flap Text: Given the location of the defect, shape of the defect and the proximity to free margins a mucosal advancement flap was deemed most appropriate. Incisions were made with a 15 blade scalpel in the appropriate fashion along the cutaneous vermilion border and the mucosal lip. The remaining actinically damaged mucosal tissue was excised.  The mucosal advancement flap was then elevated to the gingival sulcus with care taken to preserve the neurovascular structures and advanced into the primary defect. Care was taken to ensure that precise realignment of the vermilion border was achieved.
Consent (Lip)/Introductory Paragraph: The rationale for Mohs was explained to the patient and consent was obtained. The risks, benefits and alternatives to therapy were discussed in detail. Specifically, the risks of lip deformity, changes in the oral aperture, infection, scarring, bleeding, prolonged wound healing, incomplete removal, allergy to anesthesia, nerve injury and recurrence were addressed. Prior to the procedure, the treatment site was clearly identified and confirmed by the patient. All components of Universal Protocol/PAUSE Rule completed.
Intermediate Repair Preamble Text (Leave Blank If You Do Not Want): Undermining was performed with blunt dissection.
Mid-Level Procedure Text (D): After obtaining clear surgical margins the patient was sent to a mid-level provider for surgical repair.  The patient understands they will receive post-surgical care and follow-up from the mid-level provider.
Location Indication Override (Is Already Calculated Based On Selected Body Location): Area M
Closure 2 Information: This tab is for additional flaps and grafts, including complex repair and grafts and complex repair and flaps. You can also specify a different location for the additional defect, if the location is the same you do not need to select a new one. We will insert the automated text for the repair you select below just as we do for solitary flaps and grafts. Please note that at this time if you select a location with a different insurance zone you will need to override the ICD10 and CPT if appropriate.
Wound Check: 7 days
Area L Indication Text: Tumors in this location are included in Area L (trunk and extremities).  Mohs surgery is indicated for larger tumors, or tumors with aggressive histologic features, in these anatomic locations.
Trilobed Flap Text: The defect edges were debeveled with a #15 scalpel blade.  Given the location of the defect and the proximity to free margins a trilobed flap was deemed most appropriate.  Using a sterile surgical marker, an appropriate trilobed flap drawn around the defect.    The area thus outlined was incised deep to adipose tissue with a #15 scalpel blade.  The skin margins were undermined to an appropriate distance in all directions utilizing iris scissors.
Anesthesia Type: 1% lidocaine with epinephrine and a 1:10 solution of 8.4% sodium bicarbonate
No Repair - Repaired With Adjacent Surgical Defect Text (Leave Blank If You Do Not Want): After obtaining clear surgical margins the defect was repaired concurrently with another surgical defect which was in close approximation.
Plastic Surgeon Procedure Text (A): After obtaining clear surgical margins the patient was sent to plastics for surgical repair.  The patient understands they will receive post-surgical care and follow-up from the referring physician's office.
Skin Substitute Text: The defect edges were debeveled with a #15 scalpel blade.  Given the location of the defect, shape of the defect and the proximity to free margins a skin substitute graft was deemed most appropriate.  The graft material was trimmed to fit the size of the defect. The graft was then placed in the primary defect and oriented appropriately.
Suturegard Body: The suture ends were repeatedly re-tightened and re-clamped to achieve the desired tissue expansion.
Mohs Case Number: [id number]
Double M-Plasty Complex Repair Preamble Text (Leave Blank If You Do Not Want): Extensive wide undermining was performed.
V-Y Flap Text: The defect edges were debeveled with a #15 scalpel blade.  Given the location of the defect, shape of the defect and the proximity to free margins a V-Y flap was deemed most appropriate.  Using a sterile surgical marker, an appropriate advancement flap was drawn incorporating the defect and placing the expected incisions within the relaxed skin tension lines where possible.    The area thus outlined was incised deep to adipose tissue with a #15 scalpel blade.  The skin margins were undermined to an appropriate distance in all directions utilizing iris scissors.
Cheek Interpolation Flap Text: A decision was made to reconstruct the defect utilizing an interpolation axial flap and a staged reconstruction.  A telfa template was made of the defect.  This telfa template was then used to outline the Cheek Interpolation flap.  The donor area for the pedicle flap was then injected with anesthesia.  The flap was excised through the skin and subcutaneous tissue down to the layer of the underlying musculature.  The interpolation flap was carefully excised within this deep plane to maintain its blood supply.  The edges of the donor site were undermined.   The donor site was closed in a primary fashion.  The pedicle was then rotated into position and sutured.  Once the tube was sutured into place, adequate blood supply was confirmed with blanching and refill.  The pedicle was then wrapped with xeroform gauze and dressed appropriately with a telfa and gauze bandage to ensure continued blood supply and protect the attached pedicle.
Double O-Z Flap Text: The defect edges were debeveled with a #15 scalpel blade.  Given the location of the defect, shape of the defect and the proximity to free margins a Double O-Z flap was deemed most appropriate.  Using a sterile surgical marker, an appropriate transposition flap was drawn incorporating the defect and placing the expected incisions within the relaxed skin tension lines where possible. The area thus outlined was incised deep to adipose tissue with a #15 scalpel blade.  The skin margins were undermined to an appropriate distance in all directions utilizing iris scissors.
Surgical Defect Width In Cm (Optional): 1.4
Cheek-To-Nose Interpolation Flap Text: A decision was made to reconstruct the defect utilizing an interpolation axial flap and a staged reconstruction.  A telfa template was made of the defect.  This telfa template was then used to outline the Cheek-To-Nose Interpolation flap.  The donor area for the pedicle flap was then injected with anesthesia.  The flap was excised through the skin and subcutaneous tissue down to the layer of the underlying musculature.  The interpolation flap was carefully excised within this deep plane to maintain its blood supply.  The edges of the donor site were undermined.   The donor site was closed in a primary fashion.  The pedicle was then rotated into position and sutured.  Once the tube was sutured into place, adequate blood supply was confirmed with blanching and refill.  The pedicle was then wrapped with xeroform gauze and dressed appropriately with a telfa and gauze bandage to ensure continued blood supply and protect the attached pedicle.
Home Suture Removal Text: Patient was provided instructions on removing sutures and will remove their sutures at home.  If they have any questions or difficulties they will call the office.
Bilateral Helical Rim Advancement Flap Text: The defect edges were debeveled with a #15 blade scalpel.  Given the location of the defect and the proximity to free margins (helical rim) a bilateral helical rim advancement flap was deemed most appropriate.  Using a sterile surgical marker, the appropriate advancement flaps were drawn incorporating the defect and placing the expected incisions between the helical rim and antihelix where possible.  The area thus outlined was incised through and through with a #15 scalpel blade.  With a skin hook and iris scissors, the flaps were gently and sharply undermined and freed up.
Surgical Defect Width In Cm (Optional): 1.5
Additional Anesthesia Volume In Cc: 6
Partial Purse String (Simple) Text: Given the location of the defect and the characteristics of the surrounding skin a simple purse string closure was deemed most appropriate.  Undermining was performed circumfirentially around the surgical defect.  A purse string suture was then placed and tightened. Wound tension only allowed a partial closure of the circular defect.
Referring Physician (Optional): Afshan Rascon M.D.
Wound Care: Petrolatum
V-Y Plasty Text: The defect edges were debeveled with a #15 scalpel blade.  Given the location of the defect, shape of the defect and the proximity to free margins an V-Y advancement flap was deemed most appropriate.  Using a sterile surgical marker, an appropriate advancement flap was drawn incorporating the defect and placing the expected incisions within the relaxed skin tension lines where possible.    The area thus outlined was incised deep to adipose tissue with a #15 scalpel blade.  The skin margins were undermined to an appropriate distance in all directions utilizing iris scissors.
A-T Advancement Flap Text: The defect edges were debeveled with a #15 scalpel blade.  Given the location of the defect, shape of the defect and the proximity to free margins an A-T advancement flap was deemed most appropriate.  Using a sterile surgical marker, an appropriate advancement flap was drawn incorporating the defect and placing the expected incisions within the relaxed skin tension lines where possible.    The area thus outlined was incised deep to adipose tissue with a #15 scalpel blade.  The skin margins were undermined to an appropriate distance in all directions utilizing iris scissors.
Postop Diagnosis: same
Posterior Auricular Interpolation Flap Text: A decision was made to reconstruct the defect utilizing an interpolation axial flap and a staged reconstruction.  A telfa template was made of the defect.  This telfa template was then used to outline the posterior auricular interpolation flap.  The donor area for the pedicle flap was then injected with anesthesia.  The flap was excised through the skin and subcutaneous tissue down to the layer of the underlying musculature.  The pedicle flap was carefully excised within this deep plane to maintain its blood supply.  The edges of the donor site were undermined.   The donor site was closed in a primary fashion.  The pedicle was then rotated into position and sutured.  Once the tube was sutured into place, adequate blood supply was confirmed with blanching and refill.  The pedicle was then wrapped with xeroform gauze and dressed appropriately with a telfa and gauze bandage to ensure continued blood supply and protect the attached pedicle.
Otolaryngologist Procedure Text (C): After obtaining clear surgical margins the patient was sent to otolaryngology for surgical repair.  The patient understands they will receive post-surgical care and follow-up from the referring physician's office.
Rhombic Flap Text: The defect edges were debeveled with a #15 scalpel blade.  Given the location of the defect and the proximity to free margins a rhombic flap was deemed most appropriate.  Using a sterile surgical marker, an appropriate rhombic flap was drawn incorporating the defect.    The area thus outlined was incised deep to adipose tissue with a #15 scalpel blade.  The skin margins were undermined to an appropriate distance in all directions utilizing iris scissors.
Mohs Method Verbiage: An incision at a 45 degree angle following the standard Mohs approach was done and the specimen was harvested as a microscopic controlled layer.
Stage 2: Additional Anesthesia Volume In Cc: 3
Consent 2/Introductory Paragraph: Mohs surgery was explained to the patient and consent was obtained. The risks, benefits and alternatives to therapy were discussed in detail. Specifically, the risks of infection, scarring, bleeding, prolonged wound healing, incomplete removal, allergy to anesthesia, nerve injury and recurrence were addressed. Prior to the procedure, the treatment site was clearly identified and confirmed by the patient. All components of Universal Protocol/PAUSE Rule completed.
Complex Repair And Flap Additional Text (Will Appearing After The Standard Complex Repair Text): The complex repair was not sufficient to completely close the primary defect. The remaining additional defect was repaired with the flap mentioned below.
O-T Plasty Text: The defect edges were debeveled with a #15 scalpel blade.  Given the location of the defect, shape of the defect and the proximity to free margins an O-T plasty was deemed most appropriate.  Using a sterile surgical marker, an appropriate O-T plasty was drawn incorporating the defect and placing the expected incisions within the relaxed skin tension lines where possible.    The area thus outlined was incised deep to adipose tissue with a #15 scalpel blade.  The skin margins were undermined to an appropriate distance in all directions utilizing iris scissors.
Advancement Flap (Double) Text: The defect edges were debeveled with a #15 scalpel blade.  Given the location of the defect and the proximity to free margins a double advancement flap was deemed most appropriate.  Using a sterile surgical marker, the appropriate advancement flaps were drawn incorporating the defect and placing the expected incisions within the relaxed skin tension lines where possible.    The area thus outlined was incised deep to adipose tissue with a #15 scalpel blade.  The skin margins were undermined to an appropriate distance in all directions utilizing iris scissors.
Provider Procedure Text (C): After obtaining clear surgical margins the defect was repaired by another provider.
Ear Wedge Repair Text: A wedge excision was completed by carrying down an excision through the full thickness of the ear and cartilage with an inward facing Burow's triangle. The wound was then closed in a layered fashion.
Number Of Stages: 2
Surgeon: Merlin Eddy M.D.
Full Thickness Lip Wedge Repair (Flap) Text: Given the location of the defect and the proximity to free margins a full thickness wedge repair was deemed most appropriate.  Using a sterile surgical marker, the appropriate repair was drawn incorporating the defect and placing the expected incisions perpendicular to the vermilion border.  The vermilion border was also meticulously outlined to ensure appropriate reapproximation during the repair.  The area thus outlined was incised through and through with a #15 scalpel blade.  The muscularis and dermis were reaproximated with deep sutures following hemostasis. Care was taken to realign the vermilion border before proceeding with the superficial closure.  Once the vermilion was realigned the superfical and mucosal closure was finished.
Primary Defect Length In Cm (Final Defect Size - Required For Flaps/Grafts): 2.2
Bcc Histology Text: There were numerous aggregates of basaloid cells.
Double Island Pedicle Flap Text: The defect edges were debeveled with a #15 scalpel blade.  Given the location of the defect, shape of the defect and the proximity to free margins a double island pedicle advancement flap was deemed most appropriate.  Using a sterile surgical marker, an appropriate advancement flap was drawn incorporating the defect, outlining the appropriate donor tissue and placing the expected incisions within the relaxed skin tension lines where possible.    The area thus outlined was incised deep to adipose tissue with a #15 scalpel blade.  The skin margins were undermined to an appropriate distance in all directions around the primary defect and laterally outward around the island pedicle utilizing iris scissors.  There was minimal undermining beneath the pedicle flap.
Consent (Near Eyelid Margin)/Introductory Paragraph: The rationale for Mohs was explained to the patient and consent was obtained. The risks, benefits and alternatives to therapy were discussed in detail. Specifically, the risks of ectropion or eyelid deformity, infection, scarring, bleeding, prolonged wound healing, incomplete removal, allergy to anesthesia, nerve injury and recurrence were addressed. Prior to the procedure, the treatment site was clearly identified and confirmed by the patient. All components of Universal Protocol/PAUSE Rule completed.
Retention Suture Bite Size: 3 mm
Subsequent Stages Histo Method Verbiage: Using a similar technique to that described above, a thin layer of tissue was removed from all areas where tumor was visible on the previous stage.  The tissue was again oriented, mapped, dyed, and processed as above.
Graft Donor Site Bandage (Optional-Leave Blank If You Don't Want In Note): Steri-strips and a pressure bandage were applied to the donor site.
Hatchet Flap Text: The defect edges were debeveled with a #15 scalpel blade.  Given the location of the defect, shape of the defect and the proximity to free margins a hatchet flap was deemed most appropriate.  Using a sterile surgical marker, an appropriate hatchet flap was drawn incorporating the defect and placing the expected incisions within the relaxed skin tension lines where possible.    The area thus outlined was incised deep to adipose tissue with a #15 scalpel blade.  The skin margins were undermined to an appropriate distance in all directions utilizing iris scissors.
Composite Graft Text: The defect edges were debeveled with a #15 scalpel blade.  Given the location of the defect, shape of the defect, the proximity to free margins and the fact the defect was full thickness a composite graft was deemed most appropriate.  The defect was outline and then transferred to the donor site.  A full thickness graft was then excised from the donor site. The graft was then placed in the primary defect, oriented appropriately and then sutured into place.  The secondary defect was then repaired using a primary closure.
Tumor Debulked?: curette
Consent (Scalp)/Introductory Paragraph: The rationale for Mohs was explained to the patient and consent was obtained. The risks, benefits and alternatives to therapy were discussed in detail. Specifically, the risks of changes in hair growth pattern secondary to repair, infection, scarring, bleeding, prolonged wound healing, incomplete removal, allergy to anesthesia, nerve injury and recurrence were addressed. Prior to the procedure, the treatment site was clearly identified and confirmed by the patient. All components of Universal Protocol/PAUSE Rule completed.
Dorsal Nasal Flap Text: The defect edges were debeveled with a #15 scalpel blade.  Given the location of the defect and the proximity to free margins a dorsal nasal flap was deemed most appropriate.  Using a sterile surgical marker, an appropriate dorsal nasal flap was drawn around the defect.    The area thus outlined was incised deep to adipose tissue with a #15 scalpel blade.  The skin margins were undermined to an appropriate distance in all directions utilizing iris scissors.
Advancement-Rotation Flap Text: The defect edges were debeveled with a #15 scalpel blade.  Given the location of the defect, shape of the defect and the proximity to free margins an advancement-rotation flap was deemed most appropriate.  Using a sterile surgical marker, an appropriate flap was drawn incorporating the defect and placing the expected incisions within the relaxed skin tension lines where possible. The area thus outlined was incised deep to adipose tissue with a #15 scalpel blade.  The skin margins were undermined to an appropriate distance in all directions utilizing iris scissors.
Tissue Cultured Epidermal Autograft Text: The defect edges were debeveled with a #15 scalpel blade.  Given the location of the defect, shape of the defect and the proximity to free margins a tissue cultured epidermal autograft was deemed most appropriate.  The graft was then trimmed to fit the size of the defect.  The graft was then placed in the primary defect and oriented appropriately.
Donor Site Anesthesia Type: same as repair anesthesia
Xenograft Text: The defect edges were debeveled with a #15 scalpel blade.  Given the location of the defect, shape of the defect and the proximity to free margins a xenograft was deemed most appropriate.  The graft was then trimmed to fit the size of the defect.  The graft was then placed in the primary defect and oriented appropriately.
Previous Accession (Optional): [id number].
Ear Star Wedge Flap Text: The defect edges were debeveled with a #15 blade scalpel.  Given the location of the defect and the proximity to free margins (helical rim) an ear star wedge flap was deemed most appropriate.  Using a sterile surgical marker, the appropriate flap was drawn incorporating the defect and placing the expected incisions between the helical rim and antihelix where possible.  The area thus outlined was incised through and through with a #15 scalpel blade.
Interpolation Flap Text: A decision was made to reconstruct the defect utilizing an interpolation axial flap and a staged reconstruction.  A telfa template was made of the defect.  This telfa template was then used to outline the interpolation flap.  The donor area for the pedicle flap was then injected with anesthesia.  The flap was excised through the skin and subcutaneous tissue down to the layer of the underlying musculature.  The interpolation flap was carefully excised within this deep plane to maintain its blood supply.  The edges of the donor site were undermined.   The donor site was closed in a primary fashion.  The pedicle was then rotated into position and sutured.  Once the tube was sutured into place, adequate blood supply was confirmed with blanching and refill.  The pedicle was then wrapped with xeroform gauze and dressed appropriately with a telfa and gauze bandage to ensure continued blood supply and protect the attached pedicle.
Same Histology In Subsequent Stages Text: The pattern and morphology of the tumor is as described in the first stage.
H Plasty Text: Given the location of the defect, shape of the defect and the proximity to free margins a H-plasty was deemed most appropriate for repair.  Using a sterile surgical marker, the appropriate advancement arms of the H-plasty were drawn incorporating the defect and placing the expected incisions within the relaxed skin tension lines where possible. The area thus outlined was incised deep to adipose tissue with a #15 scalpel blade. The skin margins were undermined to an appropriate distance in all directions utilizing iris scissors.  The opposing advancement arms were then advanced into place in opposite direction and anchored with interrupted buried subcutaneous sutures.
Hemostasis: Electricator
Stage 1: Number Of Blocks?: 1
O-T Advancement Flap Text: The defect edges were debeveled with a #15 scalpel blade.  Given the location of the defect, shape of the defect and the proximity to free margins an O-T advancement flap was deemed most appropriate.  Using a sterile surgical marker, an appropriate advancement flap was drawn incorporating the defect and placing the expected incisions within the relaxed skin tension lines where possible.    The area thus outlined was incised deep to adipose tissue with a #15 scalpel blade.  The skin margins were undermined to an appropriate distance in all directions utilizing iris scissors.
Medical Necessity Statement: Based on my medical judgement, Mohs surgery is the most appropriate treatment for this cancer compared to other treatments.
Partial Purse String (Intermediate) Text: Given the location of the defect and the characteristics of the surrounding skin an intermediate purse string closure was deemed most appropriate.  Undermining was performed circumfirentially around the surgical defect.  A purse string suture was then placed and tightened. Wound tension only allowed a partial closure of the circular defect.
Consent Type: Consent 1 (Standard)
Rhomboid Transposition Flap Text: The defect edges were debeveled with a #15 scalpel blade.  Given the location of the defect and the proximity to free margins a rhomboid transposition flap was deemed most appropriate.  Using a sterile surgical marker, an appropriate rhomboid flap was drawn incorporating the defect.    The area thus outlined was incised deep to adipose tissue with a #15 scalpel blade.  The skin margins were undermined to an appropriate distance in all directions utilizing iris scissors.
Paramedian Forehead Flap Text: A decision was made to reconstruct the defect utilizing an interpolation axial flap and a staged reconstruction.  A telfa template was made of the defect.  This telfa template was then used to outline the paramedian forehead pedicle flap.  The donor area for the pedicle flap was then injected with anesthesia.  The flap was excised through the skin and subcutaneous tissue down to the layer of the underlying musculature.  The pedicle flap was carefully excised within this deep plane to maintain its blood supply.  The edges of the donor site were undermined.   The donor site was closed in a primary fashion.  The pedicle was then rotated into position and sutured.  Once the tube was sutured into place, adequate blood supply was confirmed with blanching and refill.  The pedicle was then wrapped with xeroform gauze and dressed appropriately with a telfa and gauze bandage to ensure continued blood supply and protect the attached pedicle.
Information: Selecting Yes will display possible errors in your note based on the variables you have selected. This validation is only offered as a suggestion for you. PLEASE NOTE THAT THE VALIDATION TEXT WILL BE REMOVED WHEN YOU FINALIZE YOUR NOTE. IF YOU WANT TO FAX A PRELIMINARY NOTE YOU WILL NEED TO TOGGLE THIS TO 'NO' IF YOU DO NOT WANT IT IN YOUR FAXED NOTE.
O-Z Plasty Text: The defect edges were debeveled with a #15 scalpel blade.  Given the location of the defect, shape of the defect and the proximity to free margins an O-Z plasty (double transposition flap) was deemed most appropriate.  Using a sterile surgical marker, the appropriate transposition flaps were drawn incorporating the defect and placing the expected incisions within the relaxed skin tension lines where possible.    The area thus outlined was incised deep to adipose tissue with a #15 scalpel blade.  The skin margins were undermined to an appropriate distance in all directions utilizing iris scissors.  Hemostasis was achieved with electrocautery.  The flaps were then transposed into place, one clockwise and the other counterclockwise, and anchored with interrupted buried subcutaneous sutures.
Perineural Invasion (For Histology - Be Specific If Possible): absent
Burow's Advancement Flap Text: The defect edges were debeveled with a #15 scalpel blade.  Given the location of the defect and the proximity to free margins a Burow's advancement flap was deemed most appropriate.  Using a sterile surgical marker, the appropriate advancement flap was drawn incorporating the defect and placing the expected incisions within the relaxed skin tension lines where possible.    The area thus outlined was incised deep to adipose tissue with a #15 scalpel blade.  The skin margins were undermined to an appropriate distance in all directions utilizing iris scissors.
Consent 3/Introductory Paragraph: I gave the patient a chance to ask questions they had about the procedure.  Following this I explained the Mohs procedure and consent was obtained. The risks, benefits and alternatives to therapy were discussed in detail. Specifically, the risks of infection, scarring, bleeding, prolonged wound healing, incomplete removal, allergy to anesthesia, nerve injury and recurrence were addressed. Prior to the procedure, the treatment site was clearly identified and confirmed by the patient. All components of Universal Protocol/PAUSE Rule completed.
Surgeon/Pathologist Verbiage (Will Incorporate Name Of Surgeon From Intro If Not Blank): operated in two distinct and integrated capacities as the surgeon and pathologist.
Complex Repair And Graft Additional Text (Will Appearing After The Standard Complex Repair Text): The complex repair was not sufficient to completely close the primary defect. The remaining additional defect was repaired with the graft mentioned below.
Transposition Flap Text: The defect edges were debeveled with a #15 scalpel blade.  Given the location of the defect and the proximity to free margins a transposition flap was deemed most appropriate.  Using a sterile surgical marker, an appropriate transposition flap was drawn incorporating the defect.    The area thus outlined was incised deep to adipose tissue with a #15 scalpel blade.  The skin margins were undermined to an appropriate distance in all directions utilizing iris scissors.
Closure 4 Information: This tab is for additional flaps and grafts above and beyond our usual structured repairs.  Please note if you enter information here it will not currently bill and you will need to add the billing information manually.
Island Pedicle Flap-Requiring Vessel Identification Text: The defect edges were debeveled with a #15 scalpel blade.  Given the location of the defect, shape of the defect and the proximity to free margins an island pedicle advancement flap was deemed most appropriate.  Using a sterile surgical marker, an appropriate advancement flap was drawn, based on the axial vessel mentioned above, incorporating the defect, outlining the appropriate donor tissue and placing the expected incisions within the relaxed skin tension lines where possible.    The area thus outlined was incised deep to adipose tissue with a #15 scalpel blade.  The skin margins were undermined to an appropriate distance in all directions around the primary defect and laterally outward around the island pedicle utilizing iris scissors.  There was minimal undermining beneath the pedicle flap.
Ftsg Text: The defect edges were debeveled with a #15 scalpel blade.  Given the location of the defect, shape of the defect and the proximity to free margins a full thickness skin graft was deemed most appropriate.  Using a sterile surgical marker, the primary defect shape was transferred to the donor site. The area thus outlined was incised deep to adipose tissue with a #15 scalpel blade.  The harvested graft was then trimmed of adipose tissue until only dermis and epidermis was left.  The skin margins of the secondary defect were undermined to an appropriate distance in all directions utilizing iris scissors.  The secondary defect was closed with interrupted buried subcutaneous sutures.  The skin edges were then re-apposed with running  sutures.  The skin graft was then placed in the primary defect and oriented appropriately.
Epidermal Closure: running
Bcc Infiltrative Histology Text: There were numerous aggregates of basaloid cells demonstrating an infiltrative pattern.
Pain Refusal Text: I offered to prescribe pain medication but the patient refused to take this medication.
Rotation Flap Text: The defect edges were debeveled with a #15 scalpel blade.  Given the location of the defect, shape of the defect and the proximity to free margins a rotation flap was deemed most appropriate.  Using a sterile surgical marker, an appropriate rotation flap was drawn incorporating the defect and placing the expected incisions within the relaxed skin tension lines where possible.    The area thus outlined was incised deep to adipose tissue with a #15 scalpel blade.  The skin margins were undermined to an appropriate distance in all directions utilizing iris scissors.
Mohs Rapid Report Verbiage: The area of clinically evident tumor was marked with skin marking ink and appropriately hatched.  The initial incision was made following the Mohs approach through the skin.  The specimen was taken to the lab, divided into the necessary number of pieces, chromacoded and processed according to the Mohs protocol.  This was repeated in successive stages until a tumor free defect was achieved.
Consent (Ear)/Introductory Paragraph: The rationale for Mohs was explained to the patient and consent was obtained. The risks, benefits and alternatives to therapy were discussed in detail. Specifically, the risks of ear deformity, infection, scarring, bleeding, prolonged wound healing, incomplete removal, allergy to anesthesia, nerve injury and recurrence were addressed. Prior to the procedure, the treatment site was clearly identified and confirmed by the patient. All components of Universal Protocol/PAUSE Rule completed.
Island Pedicle Flap Text: The defect edges were debeveled with a #15 scalpel blade.  Given the location of the defect, shape of the defect and the proximity to free margins an island pedicle advancement flap was deemed most appropriate.  Using a sterile surgical marker, an appropriate advancement flap was drawn incorporating the defect, outlining the appropriate donor tissue and placing the expected incisions within the relaxed skin tension lines where possible.    The area thus outlined was incised deep to adipose tissue with a #15 scalpel blade.  The skin margins were undermined to an appropriate distance in all directions around the primary defect and laterally outward around the island pedicle utilizing iris scissors.  There was minimal undermining beneath the pedicle flap.
Epidermal Autograft Text: The defect edges were debeveled with a #15 scalpel blade.  Given the location of the defect, shape of the defect and the proximity to free margins an epidermal autograft was deemed most appropriate.  Using a sterile surgical marker, the primary defect shape was transferred to the donor site. The epidermal graft was then harvested.  The skin graft was then placed in the primary defect and oriented appropriately.
Detail Level: Detailed
Mercedes Flap Text: The defect edges were debeveled with a #15 scalpel blade.  Given the location of the defect, shape of the defect and the proximity to free margins a Mercedes flap was deemed most appropriate.  Using a sterile surgical marker, an appropriate advancement flap was drawn incorporating the defect and placing the expected incisions within the relaxed skin tension lines where possible. The area thus outlined was incised deep to adipose tissue with a #15 scalpel blade.  The skin margins were undermined to an appropriate distance in all directions utilizing iris scissors.
W Plasty Text: The lesion was extirpated to the level of the fat with a #15 scalpel blade.  Given the location of the defect, shape of the defect and the proximity to free margins a W-plasty was deemed most appropriate for repair.  Using a sterile surgical marker, the appropriate transposition arms of the W-plasty were drawn incorporating the defect and placing the expected incisions within the relaxed skin tension lines where possible.    The area thus outlined was incised deep to adipose tissue with a #15 scalpel blade.  The skin margins were undermined to an appropriate distance in all directions utilizing iris scissors.  The opposing transposition arms were then transposed into place in opposite direction and anchored with interrupted buried subcutaneous sutures.
Manual Repair Warning Statement: We plan on removing the manually selected variable below in favor of our much easier automatic structured text blocks found in the previous tab. We decided to do this to help make the flow better and give you the full power of structured data. Manual selection is never going to be ideal in our platform and I would encourage you to avoid using manual selection from this point on, especially since I will be sunsetting this feature. It is important that you do one of two things with the customized text below. First, you can save all of the text in a word file so you can have it for future reference. Second, transfer the text to the appropriate area in the Library tab. Lastly, if there is a flap or graft type which we do not have you need to let us know right away so I can add it in before the variable is hidden. No need to panic, we plan to give you roughly 6 months to make the change.
Area H Indication Text: Tumors in this location are included in Area H (eyelids, eyebrows, nose, lips, chin, ear, pre-auricular, post-auricular, temple, genitalia, hands, feet, ankles and areola).  Tissue conservation is critical in these anatomic locations.
Bilobed Flap Text: The defect edges were debeveled with a #15 scalpel blade.  Given the location of the defect and the proximity to free margins a bilobe flap was deemed most appropriate.  Using a sterile surgical marker, an appropriate bilobe flap drawn around the defect.    The area thus outlined was incised deep to adipose tissue with a #15 scalpel blade.  The skin margins were undermined to an appropriate distance in all directions utilizing iris scissors.
Anesthesia Volume In Cc: 12
Graft Cartilage Fenestration Text: The cartilage was fenestrated with a 2mm punch biopsy to help facilitate graft survival and healing.
Banner Transposition Flap Text: The defect edges were debeveled with a #15 scalpel blade.  Given the location of the defect and the proximity to free margins a Banner transposition flap was deemed most appropriate.  Using a sterile surgical marker, an appropriate flap drawn around the defect. The area thus outlined was incised deep to adipose tissue with a #15 scalpel blade.  The skin margins were undermined to an appropriate distance in all directions utilizing iris scissors.
Non-Graft Cartilage Fenestration Text: The cartilage was fenestrated with a 2mm punch biopsy to help facilitate healing.
Purse String (Simple) Text: Given the location of the defect and the characteristics of the surrounding skin a purse string closure was deemed most appropriate.  Undermining was performed circumfirentially around the surgical defect.  A purse string suture was then placed and tightened.
O-L Flap Text: The defect edges were debeveled with a #15 scalpel blade.  Given the location of the defect, shape of the defect and the proximity to free margins an O-L flap was deemed most appropriate.  Using a sterile surgical marker, an appropriate advancement flap was drawn incorporating the defect and placing the expected incisions within the relaxed skin tension lines where possible.    The area thus outlined was incised deep to adipose tissue with a #15 scalpel blade.  The skin margins were undermined to an appropriate distance in all directions utilizing iris scissors.
Melolabial Interpolation Flap Text: A decision was made to reconstruct the defect utilizing an interpolation axial flap and a staged reconstruction.  A telfa template was made of the defect.  This telfa template was then used to outline the melolabial interpolation flap.  The donor area for the pedicle flap was then injected with anesthesia.  The flap was excised through the skin and subcutaneous tissue down to the layer of the underlying musculature.  The pedicle flap was carefully excised within this deep plane to maintain its blood supply.  The edges of the donor site were undermined.   The donor site was closed in a primary fashion.  The pedicle was then rotated into position and sutured.  Once the tube was sutured into place, adequate blood supply was confirmed with blanching and refill.  The pedicle was then wrapped with xeroform gauze and dressed appropriately with a telfa and gauze bandage to ensure continued blood supply and protect the attached pedicle.
No Residual Tumor Seen Histology Text: There were no malignant cells seen in the sections examined.
Bi-Rhombic Flap Text: The defect edges were debeveled with a #15 scalpel blade.  Given the location of the defect and the proximity to free margins a bi-rhombic flap was deemed most appropriate.  Using a sterile surgical marker, an appropriate rhombic flap was drawn incorporating the defect. The area thus outlined was incised deep to adipose tissue with a #15 scalpel blade.  The skin margins were undermined to an appropriate distance in all directions utilizing iris scissors.
Alternatives Discussed Intro (Do Not Add Period): I discussed alternative treatments to Mohs surgery and specifically discussed the risks and benefits of
Localized Dermabrasion With Wire Brush Text: The patient was draped in routine manner.  Localized dermabrasion using 3 x 17 mm wire brush was performed in routine manner to papillary dermis. This spot dermabrasion is being performed to complete skin cancer reconstruction. It also will eliminate the other sun damaged precancerous cells that are known to be part of the regional effect of a lifetime's worth of sun exposure. This localized dermabrasion is therapeutic and should not be considered cosmetic in any regard.
Dressing: pressure dressing with telfa
Double O-Z Plasty Text: The defect edges were debeveled with a #15 scalpel blade.  Given the location of the defect, shape of the defect and the proximity to free margins a Double O-Z plasty (double transposition flap) was deemed most appropriate.  Using a sterile surgical marker, the appropriate transposition flaps were drawn incorporating the defect and placing the expected incisions within the relaxed skin tension lines where possible. The area thus outlined was incised deep to adipose tissue with a #15 scalpel blade.  The skin margins were undermined to an appropriate distance in all directions utilizing iris scissors.  Hemostasis was achieved with electrocautery.  The flaps were then transposed into place, one clockwise and the other counterclockwise, and anchored with interrupted buried subcutaneous sutures.
Chonodrocutaneous Helical Advancement Flap Text: The defect edges were debeveled with a #15 scalpel blade.  Given the location of the defect and the proximity to free margins a chondrocutaneous helical advancement flap was deemed most appropriate.  Using a sterile surgical marker, the appropriate advancement flap was drawn incorporating the defect and placing the expected incisions within the relaxed skin tension lines where possible.    The area thus outlined was incised deep to adipose tissue with a #15 scalpel blade.  The skin margins were undermined to an appropriate distance in all directions utilizing iris scissors.
Cheiloplasty (Less Than 50%) Text: A decision was made to reconstruct the defect with a  cheiloplasty.  The defect was undermined extensively.  Additional obicularis oris muscle was excised with a 15 blade scalpel.  The defect was converted into a full thickness wedge, of less than 50% of the vertical height of the lip, to facilite a better cosmetic result.  Small vessels were then tied off with 5-0 monocyrl. The obicularis oris, superficial fascia, adipose and dermis were then reapproximated.  After the deeper layers were approximated the epidermis was reapproximated with particular care given to realign the vermilion border.
Muscle Hinge Flap Text: The defect edges were debeveled with a #15 scalpel blade.  Given the size, depth and location of the defect and the proximity to free margins a muscle hinge flap was deemed most appropriate.  Using a sterile surgical marker, an appropriate hinge flap was drawn incorporating the defect. The area thus outlined was incised with a #15 scalpel blade.  The skin margins were undermined to an appropriate distance in all directions utilizing iris scissors.
Consent (Temporal Branch)/Introductory Paragraph: The rationale for Mohs was explained to the patient and consent was obtained. The risks, benefits and alternatives to therapy were discussed in detail. Specifically, the risks of damage to the temporal branch of the facial nerve, infection, scarring, bleeding, prolonged wound healing, incomplete removal, allergy to anesthesia, and recurrence were addressed. Prior to the procedure, the treatment site was clearly identified and confirmed by the patient. All components of Universal Protocol/PAUSE Rule completed.
Mohs Histo Method Verbiage: Each section was then chromacoded and processed in the Mohs lab using the Mohs protocol and submitted for frozen section.
Suturegard Retention Suture: 2-0 Nylon
Epidermal Closure Graft Donor Site (Optional): simple interrupted
Spiral Flap Text: The defect edges were debeveled with a #15 scalpel blade.  Given the location of the defect, shape of the defect and the proximity to free margins a spiral flap was deemed most appropriate.  Using a sterile surgical marker, an appropriate rotation flap was drawn incorporating the defect and placing the expected incisions within the relaxed skin tension lines where possible. The area thus outlined was incised deep to adipose tissue with a #15 scalpel blade.  The skin margins were undermined to an appropriate distance in all directions utilizing iris scissors.
Repair Type: Complex Repair
Split-Thickness Skin Graft Text: The defect edges were debeveled with a #15 scalpel blade.  Given the location of the defect, shape of the defect and the proximity to free margins a split thickness skin graft was deemed most appropriate.  Using a sterile surgical marker, the primary defect shape was transferred to the donor site. The split thickness graft was then harvested.  The skin graft was then placed in the primary defect and oriented appropriately.
Mauc Instructions: By selecting yes to the question below the MAUC number will be added into the note.  This will be calculated automatically based on the diagnosis chosen, the size entered, the body zone selected (H,M,L) and the specific indications you chose. You will also have the option to override the Mohs AUC if you disagree with the automatically calculated number and this option is found in the Case Summary tab.
Island Pedicle Flap With Canthal Suspension Text: The defect edges were debeveled with a #15 scalpel blade.  Given the location of the defect, shape of the defect and the proximity to free margins an island pedicle advancement flap was deemed most appropriate.  Using a sterile surgical marker, an appropriate advancement flap was drawn incorporating the defect, outlining the appropriate donor tissue and placing the expected incisions within the relaxed skin tension lines where possible. The area thus outlined was incised deep to adipose tissue with a #15 scalpel blade.  The skin margins were undermined to an appropriate distance in all directions around the primary defect and laterally outward around the island pedicle utilizing iris scissors.  There was minimal undermining beneath the pedicle flap. A suspension suture was placed in the canthal tendon to prevent tension and prevent ectropion.
Consent (Nose)/Introductory Paragraph: The rationale for Mohs was explained to the patient and consent was obtained. The risks, benefits and alternatives to therapy were discussed in detail. Specifically, the risks of nasal deformity, changes in the flow of air through the nose, infection, scarring, bleeding, prolonged wound healing, incomplete removal, allergy to anesthesia, nerve injury and recurrence were addressed. Prior to the procedure, the treatment site was clearly identified and confirmed by the patient. All components of Universal Protocol/PAUSE Rule completed.
Length To Time In Minutes Device Was In Place: 10
Modified Advancement Flap Text: The defect edges were debeveled with a #15 scalpel blade.  Given the location of the defect, shape of the defect and the proximity to free margins a modified advancement flap was deemed most appropriate.  Using a sterile surgical marker, an appropriate advancement flap was drawn incorporating the defect and placing the expected incisions within the relaxed skin tension lines where possible.    The area thus outlined was incised deep to adipose tissue with a #15 scalpel blade.  The skin margins were undermined to an appropriate distance in all directions utilizing iris scissors.
Dermal Autograft Text: The defect edges were debeveled with a #15 scalpel blade.  Given the location of the defect, shape of the defect and the proximity to free margins a dermal autograft was deemed most appropriate.  Using a sterile surgical marker, the primary defect shape was transferred to the donor site. The area thus outlined was incised deep to adipose tissue with a #15 scalpel blade.  The harvested graft was then trimmed of adipose and epidermal tissue until only dermis was left.  The skin graft was then placed in the primary defect and oriented appropriately.
Suturegard Intro: Intraoperative tissue expansion was performed, utilizing the SUTUREGARD device, in order to reduce wound tension.
Area M Indication Text: Tumors in this location are included in Area M (cheek, forehead, scalp, neck, jawline and pretibial skin).  Mohs surgery is indicated for tumors in these anatomic locations.
Bilobed Transposition Flap Text: The defect edges were debeveled with a #15 scalpel blade.  Given the location of the defect and the proximity to free margins a bilobed transposition flap was deemed most appropriate.  Using a sterile surgical marker, an appropriate bilobe flap drawn around the defect.    The area thus outlined was incised deep to adipose tissue with a #15 scalpel blade.  The skin margins were undermined to an appropriate distance in all directions utilizing iris scissors.
Secondary Intention Text (Leave Blank If You Do Not Want): The defect will heal with secondary intention.
Z Plasty Text: The lesion was extirpated to the level of the fat with a #15 scalpel blade.  Given the location of the defect, shape of the defect and the proximity to free margins a Z-plasty was deemed most appropriate for repair.  Using a sterile surgical marker, the appropriate transposition arms of the Z-plasty were drawn incorporating the defect and placing the expected incisions within the relaxed skin tension lines where possible.    The area thus outlined was incised deep to adipose tissue with a #15 scalpel blade.  The skin margins were undermined to an appropriate distance in all directions utilizing iris scissors.  The opposing transposition arms were then transposed into place in opposite direction and anchored with interrupted buried subcutaneous sutures.
Deep Sutures: 4-0 Maxon
Unna Boot Text: An Unna boot was placed to help immobilize the limb and facilitate more rapid healing.
O-Z Flap Text: The defect edges were debeveled with a #15 scalpel blade.  Given the location of the defect, shape of the defect and the proximity to free margins an O-Z flap was deemed most appropriate.  Using a sterile surgical marker, an appropriate transposition flap was drawn incorporating the defect and placing the expected incisions within the relaxed skin tension lines where possible. The area thus outlined was incised deep to adipose tissue with a #15 scalpel blade.  The skin margins were undermined to an appropriate distance in all directions utilizing iris scissors.
Purse String (Intermediate) Text: Given the location of the defect and the characteristics of the surrounding skin a purse string intermediate closure was deemed most appropriate.  Undermining was performed circumfirentially around the surgical defect.  A purse string suture was then placed and tightened.
Helical Rim Advancement Flap Text: The defect edges were debeveled with a #15 blade scalpel.  Given the location of the defect and the proximity to free margins (helical rim) a double helical rim advancement flap was deemed most appropriate.  Using a sterile surgical marker, the appropriate advancement flaps were drawn incorporating the defect and placing the expected incisions between the helical rim and antihelix where possible.  The area thus outlined was incised through and through with a #15 scalpel blade.  With a skin hook and iris scissors, the flaps were gently and sharply undermined and freed up.
Mastoid Interpolation Flap Text: A decision was made to reconstruct the defect utilizing an interpolation axial flap and a staged reconstruction.  A telfa template was made of the defect.  This telfa template was then used to outline the mastoid interpolation flap.  The donor area for the pedicle flap was then injected with anesthesia.  The flap was excised through the skin and subcutaneous tissue down to the layer of the underlying musculature.  The pedicle flap was carefully excised within this deep plane to maintain its blood supply.  The edges of the donor site were undermined.   The donor site was closed in a primary fashion.  The pedicle was then rotated into position and sutured.  Once the tube was sutured into place, adequate blood supply was confirmed with blanching and refill.  The pedicle was then wrapped with xeroform gauze and dressed appropriately with a telfa and gauze bandage to ensure continued blood supply and protect the attached pedicle.
Inflammation Suggestive Of Cancer Camouflage Histology Text: There was a dense lymphocytic infiltrate which prevented adequate histologic evaluation of adjacent structures.
Undermining Location (Optional): in the deep fat
Eye Protection Verbiage: Before proceeding with the stage, a plastic scleral shield was inserted. The globe was anesthetized with a few drops of 1% lidocaine with 1:100,000 epinephrine. Then, an appropriate sized scleral shield was chosen and coated with lacrilube ointment. The shield was gently inserted and left in place for the duration of each stage. After the stage was completed, the shield was gently removed.
Post-Care Instructions: I reviewed with the patient in detail post-care instructions. Patient is not to engage in any heavy lifting, exercise, or swimming for the next 14 days. Should the patient develop any fevers, chills, bleeding, severe pain patient will contact the office immediately.
S Plasty Text: Given the location and shape of the defect, and the orientation of relaxed skin tension lines, an S-plasty was deemed most appropriate for repair.  Using a sterile surgical marker, the appropriate outline of the S-plasty was drawn, incorporating the defect and placing the expected incisions within the relaxed skin tension lines where possible.  The area thus outlined was incised deep to adipose tissue with a #15 scalpel blade.  The skin margins were undermined to an appropriate distance in all directions utilizing iris scissors. The skin flaps were advanced over the defect.  The opposing margins were then approximated with interrupted buried subcutaneous sutures.
Estimated Blood Loss (Cc): minimal
Crescentic Advancement Flap Text: The defect edges were debeveled with a #15 scalpel blade.  Given the location of the defect and the proximity to free margins a crescentic advancement flap was deemed most appropriate.  Using a sterile surgical marker, the appropriate advancement flap was drawn incorporating the defect and placing the expected incisions within the relaxed skin tension lines where possible.    The area thus outlined was incised deep to adipose tissue with a #15 scalpel blade.  The skin margins were undermined to an appropriate distance in all directions utilizing iris scissors.
Consent 1/Introductory Paragraph: The rationale for Mohs was explained to the patient and consent was obtained. The risks, benefits and alternatives to therapy were discussed in detail. Specifically, the risks of infection, scarring, bleeding, prolonged wound healing, incomplete removal, allergy to anesthesia, nerve injury and recurrence were addressed. Prior to the procedure, the treatment site was clearly identified and confirmed by the patient. All components of Universal Protocol/PAUSE Rule completed.
Tarsorrhaphy Text: A tarsorrhaphy was performed using Frost sutures.
Melolabial Transposition Flap Text: The defect edges were debeveled with a #15 scalpel blade.  Given the location of the defect and the proximity to free margins a melolabial flap was deemed most appropriate.  Using a sterile surgical marker, an appropriate melolabial transposition flap was drawn incorporating the defect.    The area thus outlined was incised deep to adipose tissue with a #15 scalpel blade.  The skin margins were undermined to an appropriate distance in all directions utilizing iris scissors.
Cheiloplasty (Complex) Text: A decision was made to reconstruct the defect with a  cheiloplasty.  The defect was undermined extensively.  Additional obicularis oris muscle was excised with a 15 blade scalpel.  The defect was converted into a full thickness wedge to facilite a better cosmetic result.  Small vessels were then tied off with 5-0 monocyrl. The obicularis oris, superficial fascia, adipose and dermis were then reapproximated.  After the deeper layers were approximated the epidermis was reapproximated with particular care given to realign the vermilion border.
Keystone Flap Text: The defect edges were debeveled with a #15 scalpel blade.  Given the location of the defect, shape of the defect a keystone flap was deemed most appropriate.  Using a sterile surgical marker, an appropriate keystone flap was drawn incorporating the defect, outlining the appropriate donor tissue and placing the expected incisions within the relaxed skin tension lines where possible. The area thus outlined was incised deep to adipose tissue with a #15 scalpel blade.  The skin margins were undermined to an appropriate distance in all directions around the primary defect and laterally outward around the flap utilizing iris scissors.
Advancement Flap (Single) Text: The defect edges were debeveled with a #15 scalpel blade.  Given the location of the defect and the proximity to free margins a single advancement flap was deemed most appropriate.  Using a sterile surgical marker, an appropriate advancement flap was drawn incorporating the defect and placing the expected incisions within the relaxed skin tension lines where possible.    The area thus outlined was incised deep to adipose tissue with a #15 scalpel blade.  The skin margins were undermined to an appropriate distance in all directions utilizing iris scissors.
Depth Of Tumor Invasion (For Histology): epidermis
Consent (Marginal Mandibular)/Introductory Paragraph: The rationale for Mohs was explained to the patient and consent was obtained. The risks, benefits and alternatives to therapy were discussed in detail. Specifically, the risks of damage to the marginal mandibular branch of the facial nerve, infection, scarring, bleeding, prolonged wound healing, incomplete removal, allergy to anesthesia, and recurrence were addressed. Prior to the procedure, the treatment site was clearly identified and confirmed by the patient. All components of Universal Protocol/PAUSE Rule completed.

## 2019-09-18 ENCOUNTER — APPOINTMENT (RX ONLY)
Dept: URBAN - METROPOLITAN AREA CLINIC 22 | Facility: CLINIC | Age: 79
Setting detail: DERMATOLOGY
End: 2019-09-18

## 2019-09-18 DIAGNOSIS — Z48.02 ENCOUNTER FOR REMOVAL OF SUTURES: ICD-10-CM

## 2019-09-18 PROCEDURE — ? SUTURE REMOVAL (GLOBAL PERIOD)

## 2019-09-18 PROCEDURE — 99024 POSTOP FOLLOW-UP VISIT: CPT

## 2019-09-18 ASSESSMENT — LOCATION ZONE DERM: LOCATION ZONE: FACE

## 2019-09-18 ASSESSMENT — LOCATION SIMPLE DESCRIPTION DERM: LOCATION SIMPLE: LEFT CHEEK

## 2019-09-18 ASSESSMENT — LOCATION DETAILED DESCRIPTION DERM: LOCATION DETAILED: LEFT LATERAL BUCCAL CHEEK

## 2019-09-18 NOTE — PROCEDURE: SUTURE REMOVAL (GLOBAL PERIOD)
Detail Level: Detailed
Add 79938 Cpt? (Important Note: In 2017 The Use Of 79860 Is Being Tracked By Cms To Determine Future Global Period Reimbursement For Global Periods): yes

## 2019-09-23 ENCOUNTER — HOSPITAL ENCOUNTER (OUTPATIENT)
Dept: LAB | Facility: MEDICAL CENTER | Age: 79
End: 2019-09-23
Attending: INTERNAL MEDICINE
Payer: MEDICARE

## 2019-09-23 LAB
ALBUMIN SERPL BCP-MCNC: 4.1 G/DL (ref 3.2–4.9)
ALT SERPL-CCNC: 31 U/L (ref 2–50)
AST SERPL-CCNC: 29 U/L (ref 12–45)
BASOPHILS # BLD AUTO: 1.5 % (ref 0–1.8)
BASOPHILS # BLD: 0.09 K/UL (ref 0–0.12)
CREAT SERPL-MCNC: 1.84 MG/DL (ref 0.5–1.4)
CRP SERPL HS-MCNC: 0.24 MG/DL (ref 0–0.75)
EOSINOPHIL # BLD AUTO: 0.36 K/UL (ref 0–0.51)
EOSINOPHIL NFR BLD: 5.8 % (ref 0–6.9)
ERYTHROCYTE [DISTWIDTH] IN BLOOD BY AUTOMATED COUNT: 57.8 FL (ref 35.9–50)
ERYTHROCYTE [SEDIMENTATION RATE] IN BLOOD BY WESTERGREN METHOD: 11 MM/HOUR (ref 0–20)
HCT VFR BLD AUTO: 42.6 % (ref 42–52)
HGB BLD-MCNC: 13 G/DL (ref 14–18)
IMM GRANULOCYTES # BLD AUTO: 0.01 K/UL (ref 0–0.11)
IMM GRANULOCYTES NFR BLD AUTO: 0.2 % (ref 0–0.9)
LYMPHOCYTES # BLD AUTO: 1.68 K/UL (ref 1–4.8)
LYMPHOCYTES NFR BLD: 27.3 % (ref 22–41)
MCH RBC QN AUTO: 31.2 PG (ref 27–33)
MCHC RBC AUTO-ENTMCNC: 30.5 G/DL (ref 33.7–35.3)
MCV RBC AUTO: 102.2 FL (ref 81.4–97.8)
MONOCYTES # BLD AUTO: 1.05 K/UL (ref 0–0.85)
MONOCYTES NFR BLD AUTO: 17 % (ref 0–13.4)
NEUTROPHILS # BLD AUTO: 2.97 K/UL (ref 1.82–7.42)
NEUTROPHILS NFR BLD: 48.2 % (ref 44–72)
NRBC # BLD AUTO: 0 K/UL
NRBC BLD-RTO: 0 /100 WBC
PLATELET # BLD AUTO: 196 K/UL (ref 164–446)
PMV BLD AUTO: 9.6 FL (ref 9–12.9)
RBC # BLD AUTO: 4.17 M/UL (ref 4.7–6.1)
WBC # BLD AUTO: 6.2 K/UL (ref 4.8–10.8)

## 2019-09-23 PROCEDURE — 85652 RBC SED RATE AUTOMATED: CPT

## 2019-09-23 PROCEDURE — 84450 TRANSFERASE (AST) (SGOT): CPT

## 2019-09-23 PROCEDURE — 86140 C-REACTIVE PROTEIN: CPT

## 2019-09-23 PROCEDURE — 85025 COMPLETE CBC W/AUTO DIFF WBC: CPT

## 2019-09-23 PROCEDURE — 82565 ASSAY OF CREATININE: CPT

## 2019-09-23 PROCEDURE — 82040 ASSAY OF SERUM ALBUMIN: CPT

## 2019-09-23 PROCEDURE — 84460 ALANINE AMINO (ALT) (SGPT): CPT

## 2019-09-23 PROCEDURE — 36415 COLL VENOUS BLD VENIPUNCTURE: CPT

## 2019-10-21 NOTE — TELEPHONE ENCOUNTER
Last seen: 12/04/18 by Dr. Portillo  Next appt: 01/11/19 with Dr. Soto    Was the patient seen in the last year in this department? Yes   Does patient have an active prescription for medications requested? No   Received Request Via: Pharmacy   Please call, labs showed sl increased white blood cell count.  The other labs that Mindy had done were normal.    Mindy will review as well when she is back for more info.

## 2019-10-31 ENCOUNTER — APPOINTMENT (RX ONLY)
Dept: URBAN - METROPOLITAN AREA CLINIC 22 | Facility: CLINIC | Age: 79
Setting detail: DERMATOLOGY
End: 2019-10-31

## 2019-10-31 DIAGNOSIS — L57.0 ACTINIC KERATOSIS: ICD-10-CM

## 2019-10-31 DIAGNOSIS — L81.4 OTHER MELANIN HYPERPIGMENTATION: ICD-10-CM

## 2019-10-31 DIAGNOSIS — D18.0 HEMANGIOMA: ICD-10-CM

## 2019-10-31 DIAGNOSIS — D22 MELANOCYTIC NEVI: ICD-10-CM

## 2019-10-31 DIAGNOSIS — L82.1 OTHER SEBORRHEIC KERATOSIS: ICD-10-CM

## 2019-10-31 DIAGNOSIS — Z85.828 PERSONAL HISTORY OF OTHER MALIGNANT NEOPLASM OF SKIN: ICD-10-CM

## 2019-10-31 PROBLEM — D18.01 HEMANGIOMA OF SKIN AND SUBCUTANEOUS TISSUE: Status: ACTIVE | Noted: 2019-10-31

## 2019-10-31 PROBLEM — D22.5 MELANOCYTIC NEVI OF TRUNK: Status: ACTIVE | Noted: 2019-10-31

## 2019-10-31 PROCEDURE — ? LIQUID NITROGEN

## 2019-10-31 PROCEDURE — ? COUNSELING

## 2019-10-31 PROCEDURE — 17000 DESTRUCT PREMALG LESION: CPT

## 2019-10-31 PROCEDURE — 17003 DESTRUCT PREMALG LES 2-14: CPT

## 2019-10-31 PROCEDURE — 99214 OFFICE O/P EST MOD 30 MIN: CPT | Mod: 25

## 2019-10-31 ASSESSMENT — LOCATION DETAILED DESCRIPTION DERM
LOCATION DETAILED: MID-OCCIPITAL SCALP
LOCATION DETAILED: RIGHT RADIAL DORSAL HAND
LOCATION DETAILED: RIGHT MEDIAL UPPER BACK
LOCATION DETAILED: SUPERIOR THORACIC SPINE
LOCATION DETAILED: LOWER STERNUM
LOCATION DETAILED: SUPERIOR THORACIC SPINE
LOCATION DETAILED: LEFT INFERIOR MEDIAL FOREHEAD
LOCATION DETAILED: LEFT DISTAL DORSAL FOREARM
LOCATION DETAILED: RIGHT VENTRAL PROXIMAL FOREARM
LOCATION DETAILED: LEFT RADIAL DORSAL HAND
LOCATION DETAILED: POSTERIOR MID-PARIETAL SCALP
LOCATION DETAILED: INFERIOR THORACIC SPINE
LOCATION DETAILED: LEFT VENTRAL PROXIMAL FOREARM
LOCATION DETAILED: STERNAL NOTCH
LOCATION DETAILED: LEFT LATERAL MALAR CHEEK
LOCATION DETAILED: RIGHT DORSAL RING METACARPOPHALANGEAL JOINT
LOCATION DETAILED: LEFT CENTRAL FRONTAL SCALP
LOCATION DETAILED: RIGHT VENTRAL PROXIMAL FOREARM

## 2019-10-31 ASSESSMENT — LOCATION SIMPLE DESCRIPTION DERM
LOCATION SIMPLE: LEFT CHEEK
LOCATION SIMPLE: RIGHT HAND
LOCATION SIMPLE: LEFT SCALP
LOCATION SIMPLE: UPPER BACK
LOCATION SIMPLE: POSTERIOR SCALP
LOCATION SIMPLE: RIGHT FOREARM
LOCATION SIMPLE: LEFT FOREARM
LOCATION SIMPLE: UPPER BACK
LOCATION SIMPLE: RIGHT FOREARM
LOCATION SIMPLE: LEFT HAND
LOCATION SIMPLE: LEFT FOREHEAD
LOCATION SIMPLE: RIGHT UPPER BACK
LOCATION SIMPLE: CHEST

## 2019-10-31 ASSESSMENT — LOCATION ZONE DERM
LOCATION ZONE: TRUNK
LOCATION ZONE: HAND
LOCATION ZONE: FACE
LOCATION ZONE: TRUNK
LOCATION ZONE: SCALP
LOCATION ZONE: ARM
LOCATION ZONE: ARM

## 2019-11-19 ENCOUNTER — HOSPITAL ENCOUNTER (OUTPATIENT)
Dept: LAB | Facility: MEDICAL CENTER | Age: 79
End: 2019-11-19
Attending: INTERNAL MEDICINE
Payer: MEDICARE

## 2019-11-19 LAB
25(OH)D3 SERPL-MCNC: 52 NG/ML (ref 30–100)
ALBUMIN SERPL BCP-MCNC: 4.2 G/DL (ref 3.2–4.9)
APPEARANCE UR: CLEAR
BACTERIA #/AREA URNS HPF: NEGATIVE /HPF
BASOPHILS # BLD AUTO: 1.8 % (ref 0–1.8)
BASOPHILS # BLD: 0.08 K/UL (ref 0–0.12)
BILIRUB UR QL STRIP.AUTO: NEGATIVE
BUN SERPL-MCNC: 23 MG/DL (ref 8–22)
CALCIUM SERPL-MCNC: 9 MG/DL (ref 8.5–10.5)
CHLORIDE SERPL-SCNC: 104 MMOL/L (ref 96–112)
CO2 SERPL-SCNC: 24 MMOL/L (ref 20–33)
COLOR UR: YELLOW
CREAT SERPL-MCNC: 1.67 MG/DL (ref 0.5–1.4)
CREAT UR-MCNC: 108.3 MG/DL
EOSINOPHIL # BLD AUTO: 0.35 K/UL (ref 0–0.51)
EOSINOPHIL NFR BLD: 7.7 % (ref 0–6.9)
EPI CELLS #/AREA URNS HPF: NEGATIVE /HPF
ERYTHROCYTE [DISTWIDTH] IN BLOOD BY AUTOMATED COUNT: 57.2 FL (ref 35.9–50)
GLUCOSE SERPL-MCNC: 89 MG/DL (ref 65–99)
GLUCOSE UR STRIP.AUTO-MCNC: NEGATIVE MG/DL
HCT VFR BLD AUTO: 44 % (ref 42–52)
HGB BLD-MCNC: 13.6 G/DL (ref 14–18)
HYALINE CASTS #/AREA URNS LPF: ABNORMAL /LPF
IMM GRANULOCYTES # BLD AUTO: 0.01 K/UL (ref 0–0.11)
IMM GRANULOCYTES NFR BLD AUTO: 0.2 % (ref 0–0.9)
KETONES UR STRIP.AUTO-MCNC: NEGATIVE MG/DL
LEUKOCYTE ESTERASE UR QL STRIP.AUTO: NEGATIVE
LYMPHOCYTES # BLD AUTO: 1.54 K/UL (ref 1–4.8)
LYMPHOCYTES NFR BLD: 33.8 % (ref 22–41)
MAGNESIUM SERPL-MCNC: 2 MG/DL (ref 1.5–2.5)
MCH RBC QN AUTO: 31.7 PG (ref 27–33)
MCHC RBC AUTO-ENTMCNC: 30.9 G/DL (ref 33.7–35.3)
MCV RBC AUTO: 102.6 FL (ref 81.4–97.8)
MICRO URNS: ABNORMAL
MONOCYTES # BLD AUTO: 0.78 K/UL (ref 0–0.85)
MONOCYTES NFR BLD AUTO: 17.1 % (ref 0–13.4)
NEUTROPHILS # BLD AUTO: 1.8 K/UL (ref 1.82–7.42)
NEUTROPHILS NFR BLD: 39.4 % (ref 44–72)
NITRITE UR QL STRIP.AUTO: NEGATIVE
NRBC # BLD AUTO: 0 K/UL
NRBC BLD-RTO: 0 /100 WBC
PH UR STRIP.AUTO: 5 [PH] (ref 5–8)
PHOSPHATE SERPL-MCNC: 2.9 MG/DL (ref 2.5–4.5)
PLATELET # BLD AUTO: 202 K/UL (ref 164–446)
PMV BLD AUTO: 9.6 FL (ref 9–12.9)
POTASSIUM SERPL-SCNC: 4.1 MMOL/L (ref 3.6–5.5)
PROT UR QL STRIP: 30 MG/DL
PROT UR-MCNC: 24.9 MG/DL (ref 0–15)
PROT/CREAT UR: 230 MG/G (ref 15–68)
PTH-INTACT SERPL-MCNC: 100 PG/ML (ref 14–72)
RBC # BLD AUTO: 4.29 M/UL (ref 4.7–6.1)
RBC # URNS HPF: ABNORMAL /HPF
RBC UR QL AUTO: NEGATIVE
SODIUM SERPL-SCNC: 139 MMOL/L (ref 135–145)
SP GR UR STRIP.AUTO: 1.02
URATE SERPL-MCNC: 5.3 MG/DL (ref 2.5–8.3)
UROBILINOGEN UR STRIP.AUTO-MCNC: 0.2 MG/DL
WBC # BLD AUTO: 4.6 K/UL (ref 4.8–10.8)
WBC #/AREA URNS HPF: ABNORMAL /HPF

## 2019-11-19 PROCEDURE — 84156 ASSAY OF PROTEIN URINE: CPT

## 2019-11-19 PROCEDURE — 84550 ASSAY OF BLOOD/URIC ACID: CPT

## 2019-11-19 PROCEDURE — 80069 RENAL FUNCTION PANEL: CPT

## 2019-11-19 PROCEDURE — 83735 ASSAY OF MAGNESIUM: CPT

## 2019-11-19 PROCEDURE — 85025 COMPLETE CBC W/AUTO DIFF WBC: CPT

## 2019-11-19 PROCEDURE — 82306 VITAMIN D 25 HYDROXY: CPT

## 2019-11-19 PROCEDURE — 82570 ASSAY OF URINE CREATININE: CPT

## 2019-11-19 PROCEDURE — 83970 ASSAY OF PARATHORMONE: CPT

## 2019-11-19 PROCEDURE — 81001 URINALYSIS AUTO W/SCOPE: CPT

## 2019-11-19 PROCEDURE — 36415 COLL VENOUS BLD VENIPUNCTURE: CPT

## 2019-11-22 ENCOUNTER — RX ONLY (OUTPATIENT)
Age: 79
Setting detail: RX ONLY
End: 2019-11-22

## 2019-11-22 RX ORDER — KETOCONAZOLE 20.5 MG/ML
2% SHAMPOO, SUSPENSION TOPICAL DAILY
Qty: 1 | Refills: 1 | Status: ERX | COMMUNITY
Start: 2019-11-22

## 2020-01-11 ENCOUNTER — HOSPITAL ENCOUNTER (OUTPATIENT)
Dept: LAB | Facility: MEDICAL CENTER | Age: 80
End: 2020-01-11
Attending: INTERNAL MEDICINE
Payer: MEDICARE

## 2020-01-11 LAB
ALBUMIN SERPL BCP-MCNC: 4.1 G/DL (ref 3.2–4.9)
ALP SERPL-CCNC: 61 U/L (ref 30–99)
ALT SERPL-CCNC: 32 U/L (ref 2–50)
AST SERPL-CCNC: 29 U/L (ref 12–45)
BASOPHILS # BLD AUTO: 1.4 % (ref 0–1.8)
BASOPHILS # BLD: 0.06 K/UL (ref 0–0.12)
BILIRUB CONJ SERPL-MCNC: 0.1 MG/DL (ref 0.1–0.5)
BILIRUB INDIRECT SERPL-MCNC: 0.7 MG/DL (ref 0–1)
BILIRUB SERPL-MCNC: 0.8 MG/DL (ref 0.1–1.5)
CREAT SERPL-MCNC: 1.69 MG/DL (ref 0.5–1.4)
CRP SERPL HS-MCNC: 0.1 MG/DL (ref 0–0.75)
EOSINOPHIL # BLD AUTO: 0.22 K/UL (ref 0–0.51)
EOSINOPHIL NFR BLD: 5.3 % (ref 0–6.9)
ERYTHROCYTE [DISTWIDTH] IN BLOOD BY AUTOMATED COUNT: 57.2 FL (ref 35.9–50)
ERYTHROCYTE [SEDIMENTATION RATE] IN BLOOD BY WESTERGREN METHOD: 4 MM/HOUR (ref 0–20)
HCT VFR BLD AUTO: 42.2 % (ref 42–52)
HGB BLD-MCNC: 13.5 G/DL (ref 14–18)
IMM GRANULOCYTES # BLD AUTO: 0.01 K/UL (ref 0–0.11)
IMM GRANULOCYTES NFR BLD AUTO: 0.2 % (ref 0–0.9)
LYMPHOCYTES # BLD AUTO: 1.28 K/UL (ref 1–4.8)
LYMPHOCYTES NFR BLD: 30.5 % (ref 22–41)
MCH RBC QN AUTO: 33 PG (ref 27–33)
MCHC RBC AUTO-ENTMCNC: 32 G/DL (ref 33.7–35.3)
MCV RBC AUTO: 103.2 FL (ref 81.4–97.8)
MONOCYTES # BLD AUTO: 0.75 K/UL (ref 0–0.85)
MONOCYTES NFR BLD AUTO: 17.9 % (ref 0–13.4)
NEUTROPHILS # BLD AUTO: 1.87 K/UL (ref 1.82–7.42)
NEUTROPHILS NFR BLD: 44.7 % (ref 44–72)
NRBC # BLD AUTO: 0 K/UL
NRBC BLD-RTO: 0 /100 WBC
PLATELET # BLD AUTO: 179 K/UL (ref 164–446)
PMV BLD AUTO: 9.6 FL (ref 9–12.9)
PROT SERPL-MCNC: 6.6 G/DL (ref 6–8.2)
RBC # BLD AUTO: 4.09 M/UL (ref 4.7–6.1)
WBC # BLD AUTO: 4.2 K/UL (ref 4.8–10.8)

## 2020-01-11 PROCEDURE — 85652 RBC SED RATE AUTOMATED: CPT

## 2020-01-11 PROCEDURE — 86140 C-REACTIVE PROTEIN: CPT

## 2020-01-11 PROCEDURE — 85025 COMPLETE CBC W/AUTO DIFF WBC: CPT

## 2020-01-11 PROCEDURE — 36415 COLL VENOUS BLD VENIPUNCTURE: CPT

## 2020-01-11 PROCEDURE — 80076 HEPATIC FUNCTION PANEL: CPT

## 2020-01-11 PROCEDURE — 82565 ASSAY OF CREATININE: CPT

## 2020-04-28 ENCOUNTER — HOSPITAL ENCOUNTER (OUTPATIENT)
Dept: LAB | Facility: MEDICAL CENTER | Age: 80
End: 2020-04-28
Attending: INTERNAL MEDICINE
Payer: MEDICARE

## 2020-04-28 LAB
ALBUMIN SERPL BCP-MCNC: 3.8 G/DL (ref 3.2–4.9)
ALBUMIN/GLOB SERPL: 1.5 G/DL
ALP SERPL-CCNC: 78 U/L (ref 30–99)
ALT SERPL-CCNC: 36 U/L (ref 2–50)
ANION GAP SERPL CALC-SCNC: 14 MMOL/L (ref 7–16)
AST SERPL-CCNC: 39 U/L (ref 12–45)
BASOPHILS # BLD AUTO: 1.4 % (ref 0–1.8)
BASOPHILS # BLD: 0.07 K/UL (ref 0–0.12)
BILIRUB CONJ SERPL-MCNC: 0.3 MG/DL (ref 0.1–0.5)
BILIRUB INDIRECT SERPL-MCNC: 0.2 MG/DL (ref 0–1)
BILIRUB SERPL-MCNC: 0.5 MG/DL (ref 0.1–1.5)
BUN SERPL-MCNC: 17 MG/DL (ref 8–22)
CALCIUM SERPL-MCNC: 9 MG/DL (ref 8.5–10.5)
CHLORIDE SERPL-SCNC: 104 MMOL/L (ref 96–112)
CO2 SERPL-SCNC: 24 MMOL/L (ref 20–33)
CREAT SERPL-MCNC: 1.58 MG/DL (ref 0.5–1.4)
CRP SERPL HS-MCNC: 0.29 MG/DL (ref 0–0.75)
EOSINOPHIL # BLD AUTO: 0.26 K/UL (ref 0–0.51)
EOSINOPHIL NFR BLD: 5.1 % (ref 0–6.9)
ERYTHROCYTE [DISTWIDTH] IN BLOOD BY AUTOMATED COUNT: 57 FL (ref 35.9–50)
ERYTHROCYTE [SEDIMENTATION RATE] IN BLOOD BY WESTERGREN METHOD: 4 MM/HOUR (ref 0–20)
FASTING STATUS PATIENT QL REPORTED: NORMAL
GLOBULIN SER CALC-MCNC: 2.6 G/DL (ref 1.9–3.5)
GLUCOSE SERPL-MCNC: 131 MG/DL (ref 65–99)
HCT VFR BLD AUTO: 38.7 % (ref 42–52)
HGB BLD-MCNC: 12.4 G/DL (ref 14–18)
IMM GRANULOCYTES # BLD AUTO: 0.01 K/UL (ref 0–0.11)
IMM GRANULOCYTES NFR BLD AUTO: 0.2 % (ref 0–0.9)
LYMPHOCYTES # BLD AUTO: 2 K/UL (ref 1–4.8)
LYMPHOCYTES NFR BLD: 39 % (ref 22–41)
MCH RBC QN AUTO: 32.4 PG (ref 27–33)
MCHC RBC AUTO-ENTMCNC: 32 G/DL (ref 33.7–35.3)
MCV RBC AUTO: 101 FL (ref 81.4–97.8)
MONOCYTES # BLD AUTO: 0.84 K/UL (ref 0–0.85)
MONOCYTES NFR BLD AUTO: 16.4 % (ref 0–13.4)
NEUTROPHILS # BLD AUTO: 1.95 K/UL (ref 1.82–7.42)
NEUTROPHILS NFR BLD: 37.9 % (ref 44–72)
NRBC # BLD AUTO: 0 K/UL
NRBC BLD-RTO: 0 /100 WBC
PLATELET # BLD AUTO: 217 K/UL (ref 164–446)
PMV BLD AUTO: 9.6 FL (ref 9–12.9)
POTASSIUM SERPL-SCNC: 4.9 MMOL/L (ref 3.6–5.5)
PROT SERPL-MCNC: 6.4 G/DL (ref 6–8.2)
RBC # BLD AUTO: 3.83 M/UL (ref 4.7–6.1)
SODIUM SERPL-SCNC: 142 MMOL/L (ref 135–145)
WBC # BLD AUTO: 5.1 K/UL (ref 4.8–10.8)

## 2020-04-28 PROCEDURE — 36415 COLL VENOUS BLD VENIPUNCTURE: CPT

## 2020-04-28 PROCEDURE — 85025 COMPLETE CBC W/AUTO DIFF WBC: CPT

## 2020-04-28 PROCEDURE — 80053 COMPREHEN METABOLIC PANEL: CPT

## 2020-04-28 PROCEDURE — 85652 RBC SED RATE AUTOMATED: CPT

## 2020-04-28 PROCEDURE — 82248 BILIRUBIN DIRECT: CPT

## 2020-04-28 PROCEDURE — 86140 C-REACTIVE PROTEIN: CPT

## 2020-06-05 ENCOUNTER — OFFICE VISIT (OUTPATIENT)
Dept: URGENT CARE | Facility: PHYSICIAN GROUP | Age: 80
End: 2020-06-05
Payer: MEDICARE

## 2020-06-05 ENCOUNTER — HOSPITAL ENCOUNTER (OUTPATIENT)
Dept: RADIOLOGY | Facility: MEDICAL CENTER | Age: 80
End: 2020-06-05
Attending: PHYSICIAN ASSISTANT
Payer: MEDICARE

## 2020-06-05 VITALS
DIASTOLIC BLOOD PRESSURE: 70 MMHG | TEMPERATURE: 98 F | RESPIRATION RATE: 18 BRPM | HEART RATE: 80 BPM | SYSTOLIC BLOOD PRESSURE: 140 MMHG | OXYGEN SATURATION: 89 %

## 2020-06-05 DIAGNOSIS — R22.41 MASS OF RIGHT LOWER EXTREMITY: ICD-10-CM

## 2020-06-05 DIAGNOSIS — M79.89 LEG SWELLING: ICD-10-CM

## 2020-06-05 DIAGNOSIS — D36.7 CYST, DERMOID, LEG, RIGHT: ICD-10-CM

## 2020-06-05 PROCEDURE — 76882 US LMTD JT/FCL EVL NVASC XTR: CPT | Mod: RT

## 2020-06-05 PROCEDURE — 93971 EXTREMITY STUDY: CPT | Mod: RT

## 2020-06-05 PROCEDURE — 99214 OFFICE O/P EST MOD 30 MIN: CPT | Performed by: PHYSICIAN ASSISTANT

## 2020-06-05 ASSESSMENT — ENCOUNTER SYMPTOMS
VOMITING: 0
FEVER: 0
LOSS OF CONSCIOUSNESS: 0
DIZZINESS: 0
WEAKNESS: 0
BLURRED VISION: 0
FOCAL WEAKNESS: 0
DOUBLE VISION: 0
COUGH: 0
NAUSEA: 0
DIARRHEA: 0
SHORTNESS OF BREATH: 0
CHILLS: 0
SENSORY CHANGE: 0
ABDOMINAL PAIN: 0
HEADACHES: 0
ORTHOPNEA: 0
SPEECH CHANGE: 0
LEG SWELLING: 1
TINGLING: 0
TREMORS: 0
SEIZURES: 0
CLAUDICATION: 0
PALPITATIONS: 0

## 2020-06-05 NOTE — PROGRESS NOTES
Subjective:      Barry Torres is a 80 y.o. male who presents with Leg Problem (lump on R calf x1 wk)            Leg Swelling   This is a new problem. The current episode started in the past 7 days. The problem occurs constantly. Pertinent negatives include no abdominal pain, chest pain, chills, coughing, fever, headaches, nausea, rash, vomiting or weakness. Associated symptoms comments: Swelling of right calf. Nothing aggravates the symptoms. He has tried nothing for the symptoms.       Review of Systems   Constitutional: Negative for chills and fever.   Eyes: Negative for blurred vision and double vision.   Respiratory: Negative for cough and shortness of breath.    Cardiovascular: Negative for chest pain, palpitations, orthopnea, claudication and leg swelling.   Gastrointestinal: Negative for abdominal pain, diarrhea, nausea and vomiting.   Musculoskeletal:        Right calf swelling   Skin: Negative for rash.   Neurological: Negative for dizziness, tingling, tremors, sensory change, speech change, focal weakness, seizures, loss of consciousness, weakness and headaches.   All other systems reviewed and are negative.    PMH:  has a past medical history of Abnormal thyroid stimulating hormone (TSH) level, Allergic rhinitis, Asbestosis ([facility]), Asthma, Bronchitis, Chronic diarrhea, Chronic low back pain, Chronic lung disease, CKD (chronic kidney disease), stage III ([facility]), COPD (chronic obstructive pulmonary disease) ([facility]ly [facility]), Coronary artery calcification seen on CAT scan (8/2/2016), Epididymitis (2009), GERD (gastroesophageal reflux disease), History of hemorrhoids, History of skin cancer, [place] (hard of hearing), Hypercholesteremia, Hypertension, Hypertriglyceridemia, Indigestion, Light headedness, Lightheadedness (6/23/2016), Low back pain, Nasal drainage, Olecranon bursitis, Orthostasis (6/23/2016), Peripheral neuropathy, Pleural plaque (2005 ), Post-nasal drip, Pulmonary emphysema (HCC), RA (rheumatoid  arthritis) (HCC), Restless leg syndrome, Rheumatoid arthritis (HCC), Sleep apnea, and Solitary kidney.  MEDS:   Current Outpatient Medications:   •  acetaminophen (TYLENOL) 325 MG Tab, TYLENOL TABS, Disp: , Rfl:   •  atenolol (TENORMIN) 25 MG Tab, ATENOLOL 25 MG TABS, Disp: , Rfl:   •  CALCIUM PO, CALCIUM CAPS, Disp: , Rfl:   •  Calcium Carbonate Antacid 1000 MG Chew Tab, TUMS CHEW, Disp: , Rfl:   •  Cholecalciferol (VITAMIN D3) 2000 UNIT Cap, VITAMIN D3 2000 UNIT CAPS, Disp: , Rfl:   •  clotrimazole-betamethasone (LOTRISONE) 1-0.05 % Cream, CLOTRIMAZOLE-BETAMETHASONE 1-0.05 % CREA, Disp: , Rfl:   •  fluticasone (FLONASE) 50 MCG/ACT nasal spray, FLONASE ALLERGY RELIEF SUSP, Disp: , Rfl:   •  guaiFENesin LA (MUCINEX) 600 MG TABLET SR 12 HR, MUCINEX 600 MG XR12H-TAB, Disp: , Rfl:   •  Ketotifen Fumarate (ZADITOR OP), ZADITOR SOLN, Disp: , Rfl:   •  Loperamide HCl (IMODIUM A-D PO), IMODIUM A-D TABS, Disp: , Rfl:   •  Magnesium 250 MG Tab, MAGNESIUM 250 MG TABS, Disp: , Rfl:   •  Multiple Vitamin (MULTIVITAMIN) capsule, MULTIVITAMINS CAPS, Disp: , Rfl:   •  Omega-3 Fatty Acids (FISH OIL) 1200 MG Cap, FISH OIL 1200 MG CAPS, Disp: , Rfl:   •  tiotropium (SPIRIVA) 18 MCG Cap, SPIRIVA HANDIHALER CAPS, Disp: , Rfl:   •  Budesonide-Formoterol Fumarate (SYMBICORT INH), SYMBICORT AERO, Disp: , Rfl:   •  certolizumab pegol (CIMZIA PREFILLED) 2 X 200 MG/ML Kit, CIMZIA PREFILLED 2 X 200 MG/ML KIT, Disp: , Rfl:   •  fexofenadine (ALLEGRA ALLERGY) 180 MG tablet, ALLEGRA ALLERGY 180 MG TABS, Disp: , Rfl:   •  gabapentin (NEURONTIN) 300 MG Cap, GABAPENTIN 300 MG CAPS, Disp: , Rfl:   •  leflunomide (ARAVA) 20 MG Tab, ARAVA 20 MG TABS, Disp: , Rfl:   •  rosuvastatin (CRESTOR) 10 MG Tab, Take 5 mg by mouth every day., Disp: , Rfl:   •  gabapentin (NEURONTIN) 300 MG Cap, TAKE 1 CAPSULE BY MOUTH THREE TIMES A DAY, Disp: 180 Cap, Rfl: 0  •  rosuvastatin (CRESTOR) 5 MG Tab, TAKE 1 TABLET BY MOUTH EVERY EVENING, Disp: 90 Tab, Rfl: 1  •   acetaminophen (TYLENOL) 500 MG Tab, Take 1,000 mg by mouth every 6 hours as needed for Moderate Pain., Disp: , Rfl:   •  oxybutynin SR (DITROPAN-XL) 5 MG TABLET SR 24 HR, Take 5 mg by mouth every day., Disp: , Rfl:   •  SYMBICORT 80-4.5 MCG/ACT Aerosol, INHALE 2 PUFFS BY MOUTH TWO TIMES A DAY, Disp: 3 Inhaler, Rfl: 3  •  tiotropium (SPIRIVA) 18 MCG Cap, Inhale 1 Cap by mouth every day., Disp: 30 Cap, Rfl: 3  •  leflunomide (ARAVA) 20 MG Tab, Take 1 Tab by mouth every day., Disp: 30 Tab, Rfl: 0  •  certolizumab pegol (CIMZIA PREFILLED) 2 X 200 MG/ML Kit, Inject 200 mg as instructed every 14 days., Disp: 1 Kit, Rfl: 0  •  Probiotic Product (PROBIOTIC PO), Take 1 Cap by mouth 2 Times a Day., Disp: , Rfl:   •  fluticasone (FLONASE) 50 MCG/ACT nasal spray, Spray 1 Spray in nose every day., Disp: , Rfl:   •  Multiple Vitamin (MULTIVITAMIN PO), Take 1 Tab by mouth every day., Disp: , Rfl:   •  fexofenadine (ALLEGRA) 180 MG tablet, Take 180 mg by mouth as needed., Disp: , Rfl:   ALLERGIES:   Allergies   Allergen Reactions   • Levaquin      Other reaction(s): Muscle aches  Muscle aches   • Penicillins Hives     Rash and asthma attack when a child - wife states he has had Keflex in the past and tolerated   • Ciprofloxacin Hcl Unspecified     MUSCLE ACHES   • Levofloxacin Unspecified     MUSCLE ACHES     SURGHX:   Past Surgical History:   Procedure Laterality Date   • FLEXOR TENDON REPAIR Left 4/3/2019    Procedure: FLEXOR TENDON REPAIR- SMALL FINGER VS;  Surgeon: Marc Chowdhury M.D.;  Location: SURGERY [facility];  Service: Orthopedics   • TENDON TRANSFER Left 4/3/2019    Procedure: TENDON TRANSFER;  Surgeon: Marc Chowdhury M.D.;  Location: [facility];  Service: Orthopedics   • COLONOSCOPY  11/10/2018    Procedure: COLONOSCOPY;  Surgeon: Ganesh Peacock M.D.;  Location: SURGERY [facility];  Service: Gastroenterology   • NASAL POLYPECTOMY Bilateral 10/6/2015    Procedure: NASAL POLYPECTOMY  WITH SCOTT ENDOSCOPIC NASAL DEBRIDEMENT;  Surgeon: Param Maurer M.D.;  Location: SURGERY SAME DAY [facility];  Service:    • CYSTOSCOPY  2/1/2010    Performed by ANGIE VERDUZCO at SURGERY [facility]   • RETROGRADES  2/1/2010    Performed by ANGIE VERDUZCO at SURGERY [facility]   • PYELOGRAM  2/1/2010    Performed by ANGIE VERDUZCO at SURGERY [facility]   • URETEROSCOPY  2/1/2010    Performed by ANGIE VERDUZCO at SURGERY [facility]   • NEPHRECTOMY LAPAROSCOPIC  2009    left kidney   • TESTICLE EXPLORATION  2004   • PB REMV 2ND CATARACT,CORN-SCLER SECTN     • TONSILLECTOMY       SOCHX:  reports that he quit smoking about 40 years ago. His smoking use included cigarettes. He has a 40.00 pack-year smoking history. He has never used smokeless tobacco. He reports that he does not drink alcohol or use drugs.  FH: Family history was reviewed, no pertinent findings to report  Medications, Allergies, and current problem list reviewed today in Epic       Objective:     Blood Pressure 140/70 (BP Location: Left arm, Patient Position: Sitting, BP Cuff Size: Small adult)   Pulse 80   Temperature 36.7 °C (98 °F) (Temporal)   Respiration 18   Oxygen Saturation 89%      Physical Exam  Vitals signs reviewed.   Constitutional:       General: He is not in acute distress.     Appearance: He is well-developed. He is not ill-appearing, toxic-appearing or diaphoretic.   HENT:      Head: Normocephalic and atraumatic.      Right Ear: External ear normal.      Left Ear: External ear normal.   Eyes:      Conjunctiva/sclera: Conjunctivae normal.   Neck:      Musculoskeletal: Normal range of motion and neck supple.   Cardiovascular:      Rate and Rhythm: Normal rate and regular rhythm.      Pulses: Normal pulses.      Heart sounds: Normal heart sounds.   Pulmonary:      Effort: Pulmonary effort is normal.      Breath sounds: Normal breath sounds.   Musculoskeletal:         General: Swelling  and tenderness present. No deformity.      Right lower leg: Edema present.      Left lower leg: Edema present.      Comments: No joint pain above or below injury.  Neurovascularly intact distally from injury.  Right calf mass approx 6 cm circum.   Skin:     General: Skin is warm and dry.   Neurological:      Mental Status: He is alert and oriented to person, place, and time.      Motor: No abnormal muscle tone.      Coordination: Coordination normal.      Deep Tendon Reflexes: Reflexes are normal and symmetric. Reflexes normal.   Psychiatric:         Behavior: Behavior normal.         Thought Content: Thought content normal.         Judgment: Judgment normal.            6/5/2020 3:31 PM     HISTORY/REASON FOR EXAM:  Palpable lump/mass in the medial superior right lower leg calf region with pain. Fluctuating size throughout the day.        TECHNIQUE/EXAM DESCRIPTION AND NUMBER OF VIEWS:  Ultrasound of the the right superior medial calf in the area of palpable lump is performed.     COMPARISON: None     FINDINGS:  Ultrasound in the area of concern demonstrates a superficial fluid collection measuring approximately 9.7 x 0.9 x 4.4 cm with some linear internal septations. There is no abnormal internal vascularity. There are no solid components.     IMPRESSION:     1.  There is a complex superficial fluid collection the right superior medial lower leg which is probably a resolving hematoma versus other benign cystic abnormality such as a intermuscular cyst.     Assessment/Plan:       1. Leg swelling  US-EXTREMITY VENOUS LOWER UNILAT RIGHT   2. Cyst, dermoid, leg, right  US-EXTREMITY NON VASCULAR UNILATERAL RIGHT     - observation    Differential diagnosis, natural history, supportive care discussed. Follow-up with primary care provider within 7-10 days, emergency room precautions discussed.  Patient and/or family appears understanding of information.  Handout and review of patients diagnosis and treatment was discussed  extensively.

## 2020-07-14 ENCOUNTER — APPOINTMENT (RX ONLY)
Dept: URBAN - METROPOLITAN AREA CLINIC 22 | Facility: CLINIC | Age: 80
Setting detail: DERMATOLOGY
End: 2020-07-14

## 2020-07-14 DIAGNOSIS — Z85.828 PERSONAL HISTORY OF OTHER MALIGNANT NEOPLASM OF SKIN: ICD-10-CM

## 2020-07-14 DIAGNOSIS — D22 MELANOCYTIC NEVI: ICD-10-CM

## 2020-07-14 DIAGNOSIS — D18.0 HEMANGIOMA: ICD-10-CM

## 2020-07-14 DIAGNOSIS — L81.4 OTHER MELANIN HYPERPIGMENTATION: ICD-10-CM

## 2020-07-14 DIAGNOSIS — L82.1 OTHER SEBORRHEIC KERATOSIS: ICD-10-CM

## 2020-07-14 DIAGNOSIS — L57.0 ACTINIC KERATOSIS: ICD-10-CM

## 2020-07-14 PROBLEM — D18.01 HEMANGIOMA OF SKIN AND SUBCUTANEOUS TISSUE: Status: ACTIVE | Noted: 2020-07-14

## 2020-07-14 PROBLEM — D22.5 MELANOCYTIC NEVI OF TRUNK: Status: ACTIVE | Noted: 2020-07-14

## 2020-07-14 PROCEDURE — 99213 OFFICE O/P EST LOW 20 MIN: CPT | Mod: 25

## 2020-07-14 PROCEDURE — ? LIQUID NITROGEN

## 2020-07-14 PROCEDURE — 17000 DESTRUCT PREMALG LESION: CPT

## 2020-07-14 PROCEDURE — ? COUNSELING

## 2020-07-14 PROCEDURE — 17003 DESTRUCT PREMALG LES 2-14: CPT

## 2020-07-14 ASSESSMENT — LOCATION DETAILED DESCRIPTION DERM
LOCATION DETAILED: LEFT VENTRAL PROXIMAL FOREARM
LOCATION DETAILED: STERNAL NOTCH
LOCATION DETAILED: LEFT LATERAL MALAR CHEEK
LOCATION DETAILED: SUPERIOR THORACIC SPINE
LOCATION DETAILED: SUPERIOR THORACIC SPINE
LOCATION DETAILED: RIGHT CENTRAL LATERAL NECK
LOCATION DETAILED: RIGHT INFERIOR HELIX
LOCATION DETAILED: LEFT INFERIOR MEDIAL FOREHEAD
LOCATION DETAILED: RIGHT MEDIAL FRONTAL SCALP
LOCATION DETAILED: RIGHT DISTAL POSTERIOR UPPER ARM
LOCATION DETAILED: LEFT CENTRAL PARIETAL SCALP
LOCATION DETAILED: LEFT INFERIOR HELIX
LOCATION DETAILED: RIGHT MEDIAL UPPER BACK
LOCATION DETAILED: LEFT SUPERIOR PARIETAL SCALP
LOCATION DETAILED: LOWER STERNUM
LOCATION DETAILED: RIGHT VENTRAL PROXIMAL FOREARM
LOCATION DETAILED: INFERIOR THORACIC SPINE
LOCATION DETAILED: POSTERIOR MID-PARIETAL SCALP
LOCATION DETAILED: LEFT CENTRAL FRONTAL SCALP
LOCATION DETAILED: RIGHT VENTRAL PROXIMAL FOREARM
LOCATION DETAILED: LEFT SUPERIOR HELIX

## 2020-07-14 ASSESSMENT — LOCATION ZONE DERM
LOCATION ZONE: EAR
LOCATION ZONE: TRUNK
LOCATION ZONE: TRUNK
LOCATION ZONE: NECK
LOCATION ZONE: ARM
LOCATION ZONE: SCALP
LOCATION ZONE: FACE
LOCATION ZONE: ARM

## 2020-07-14 ASSESSMENT — LOCATION SIMPLE DESCRIPTION DERM
LOCATION SIMPLE: LEFT CHEEK
LOCATION SIMPLE: POSTERIOR SCALP
LOCATION SIMPLE: LEFT FOREHEAD
LOCATION SIMPLE: RIGHT FOREARM
LOCATION SIMPLE: NECK
LOCATION SIMPLE: LEFT FOREARM
LOCATION SIMPLE: RIGHT EAR
LOCATION SIMPLE: UPPER BACK
LOCATION SIMPLE: LEFT EAR
LOCATION SIMPLE: SCALP
LOCATION SIMPLE: RIGHT FOREARM
LOCATION SIMPLE: LEFT SCALP
LOCATION SIMPLE: UPPER BACK
LOCATION SIMPLE: RIGHT UPPER BACK
LOCATION SIMPLE: RIGHT UPPER ARM
LOCATION SIMPLE: RIGHT SCALP
LOCATION SIMPLE: CHEST

## 2020-07-14 NOTE — PROCEDURE: MIPS QUALITY
Detail Level: Detailed
Quality 130: Documentation Of Current Medications In The Medical Record: Current Medications Documented
Quality 111:Pneumonia Vaccination Status For Older Adults: Pneumococcal Vaccination Previously Received
Quality 402: Tobacco Use And Help With Quitting Among Adolescents: Patient screened for tobacco and is an ex-smoker
Quality 226: Preventive Care And Screening: Tobacco Use: Screening And Cessation Intervention: Patient screened for tobacco use and is an ex/non-smoker

## 2020-07-30 ENCOUNTER — HOSPITAL ENCOUNTER (OUTPATIENT)
Dept: LAB | Facility: MEDICAL CENTER | Age: 80
End: 2020-07-30
Attending: ORTHOPAEDIC SURGERY
Payer: MEDICARE

## 2020-07-30 LAB
BUN SERPL-MCNC: 23 MG/DL (ref 8–22)
CREAT SERPL-MCNC: 1.75 MG/DL (ref 0.5–1.4)

## 2020-07-30 PROCEDURE — 84520 ASSAY OF UREA NITROGEN: CPT

## 2020-07-30 PROCEDURE — 36415 COLL VENOUS BLD VENIPUNCTURE: CPT

## 2020-07-30 PROCEDURE — 82565 ASSAY OF CREATININE: CPT

## 2020-08-06 ENCOUNTER — HOSPITAL ENCOUNTER (OUTPATIENT)
Dept: RADIOLOGY | Facility: MEDICAL CENTER | Age: 80
End: 2020-08-06
Attending: ORTHOPAEDIC SURGERY
Payer: MEDICARE

## 2020-08-06 DIAGNOSIS — M25.561 RIGHT KNEE PAIN, UNSPECIFIED CHRONICITY: ICD-10-CM

## 2020-08-06 DIAGNOSIS — M25.861 MASS OF JOINT OF RIGHT KNEE: ICD-10-CM

## 2020-08-06 DIAGNOSIS — M79.661 PAIN OF RIGHT LOWER LEG: ICD-10-CM

## 2020-08-06 PROCEDURE — 700117 HCHG RX CONTRAST REV CODE 255: Performed by: ORTHOPAEDIC SURGERY

## 2020-08-06 PROCEDURE — 73720 MRI LWR EXTREMITY W/O&W/DYE: CPT | Mod: RT

## 2020-08-06 PROCEDURE — A9576 INJ PROHANCE MULTIPACK: HCPCS | Performed by: ORTHOPAEDIC SURGERY

## 2020-08-06 RX ADMIN — GADOTERIDOL 20 ML: 279.3 INJECTION, SOLUTION INTRAVENOUS at 17:38

## 2020-08-18 ENCOUNTER — HOSPITAL ENCOUNTER (OUTPATIENT)
Dept: LAB | Facility: MEDICAL CENTER | Age: 80
End: 2020-08-18
Attending: STUDENT IN AN ORGANIZED HEALTH CARE EDUCATION/TRAINING PROGRAM
Payer: MEDICARE

## 2020-08-18 LAB
CHOLEST SERPL-MCNC: 115 MG/DL (ref 100–199)
HDLC SERPL-MCNC: 33 MG/DL
LDLC SERPL CALC-MCNC: 20 MG/DL
TRIGL SERPL-MCNC: 312 MG/DL (ref 0–149)

## 2020-08-18 PROCEDURE — 80061 LIPID PANEL: CPT

## 2020-08-18 PROCEDURE — 36415 COLL VENOUS BLD VENIPUNCTURE: CPT

## 2020-08-23 NOTE — PROGRESS NOTES
Chief Complaint   Patient presents with   • Follow-Up     Chronic Respiratory Failure with Hypoxia       Subjective:   Barry Torres is a 80 y.o. male who presents today for annual follow up.    He is followed by Dr. Fernandes in our clinic, history of hyperlipidemia, pVCs, CKD with solitary kidney and arterial calcification noted on CT scanning.     Overall, patient is doing well. He has had no episodes of chest pain, palpitations, dizziness/lightheadedness, orthopnea, or peripheral edema. He does have sob due to COPD which is his baseline. He is able to climb a flight of stairs without any chest pain or SOB.     He has subcutaneous fluid with chronic subcutaneous hematoma on his left lower leg and planning to have surgery to evacuate.    Past Medical History:   Diagnosis Date   • Abnormal thyroid stimulating hormone (TSH) level    • Allergic rhinitis    • Asbestosis (HCC)    • Asthma    • Bronchitis    • Chronic diarrhea     declines eval; takes immodium once a day usually and sometimes less; see previous notes; aware of risk    • Chronic low back pain    • Chronic lung disease     asbestos related   • CKD (chronic kidney disease), stage III (HCC)     sees neph, one kidney   • COPD (chronic obstructive pulmonary disease) (HCC)    • Coronary artery calcification seen on CAT scan 8/2/2016    sees cards   • Epididymitis 2009    h/o listed in chart   • GERD (gastroesophageal reflux disease)    • History of hemorrhoids    • History of skin cancer     non melanoma   • [place] (hard of hearing)     declines hearing aids   • Hypercholesteremia    • Hypertension    • Hypertriglyceridemia    • Indigestion    • Light headedness     2/2 orthostasis? now better off hctz   • Lightheadedness 6/23/2016   • Low back pain    • Nasal drainage    • Olecranon bursitis    • Orthostasis 6/23/2016    better off HCTZ   • Peripheral neuropathy    • Pleural plaque 2005     CT [facility]   • Post-nasal drip    • Pulmonary emphysema (HCC)    • RA  (rheumatoid arthritis) (HCC)     on meds, sees rheum   • Restless leg syndrome    • Rheumatoid arthritis (HCC)    • Sleep apnea     obstructive and central - not adherent to autopap   • Solitary kidney     history of kidney cyst 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-functioning kidney s/p nephrectomy      Past Surgical History:   Procedure Laterality Date   • FLEXOR TENDON REPAIR Left 4/3/2019    Procedure: FLEXOR TENDON REPAIR- SMALL FINGER VS;  Surgeon: Marc Chowdhury M.D.;  Location: SURGERY [facility];  Service: Orthopedics   • TENDON TRANSFER Left 4/3/2019    Procedure: TENDON TRANSFER;  Surgeon: Marc Chowdhury M.D.;  Location: SURGERY [facility];  Service: Orthopedics   • COLONOSCOPY  11/10/2018    Procedure: COLONOSCOPY;  Surgeon: Ganesh Peacock M.D.;  Location: SURGERY [facility];  Service: Gastroenterology   • NASAL POLYPECTOMY Bilateral 10/6/2015    Procedure: NASAL POLYPECTOMY WITH SCOTT ENDOSCOPIC NASAL DEBRIDEMENT;  Surgeon: Param Maurer M.D.;  Location: SURGERY SAME DAY [facility];  Service:    • CYSTOSCOPY  2/1/2010    Performed by ANGIE VERDUZCO at [facility]   • RETROGRADES  2/1/2010    Performed by ANGIE VERDUZCO at [facility]   • PYELOGRAM  2/1/2010    Performed by ANGIE VERDUZCO at [facility]   • URETEROSCOPY  2/1/2010    Performed by ANGIE VERDUZCO at [facility]   • NEPHRECTOMY LAPAROSCOPIC  2009    left kidney   • TESTICLE EXPLORATION  2004   • PB REMV 2ND CATARACT,CORN-SCLER SECTN     • TONSILLECTOMY       Family History   Problem Relation Age of Onset   • Genetic Disorder Father         ALS   • No Known Problems Sister    • No Known Problems Son    • No Known Problems Daughter    • Heart Attack Neg Hx    • Heart Disease Neg Hx    • Heart Failure Neg Hx      Social History     Socioeconomic History   • Marital status:      Spouse name: Not on file   • Number of children: Not on file   • Years of  education: Not on file   • Highest education level: Not on file   Occupational History   • Not on file   Social Needs   • Financial resource strain: Not on file   • Food insecurity     Worry: Not on file     Inability: Not on file   • Transportation needs     Medical: Not on file     Non-medical: Not on file   Tobacco Use   • Smoking status: Former Smoker     Packs/day: 1.00     Years: 40.00     Pack years: 40.00     Types: Cigarettes     Quit date: 1980     Years since quittin.6   • Smokeless tobacco: Never Used   • Tobacco comment: 1 pk a day for 40 yrs   Substance and Sexual Activity   • Alcohol use: No     Alcohol/week: 0.0 oz   • Drug use: No   • Sexual activity: Never     Partners: Female     Comment: retired    Lifestyle   • Physical activity     Days per week: Not on file     Minutes per session: Not on file   • Stress: Not on file   Relationships   • Social connections     Talks on phone: Not on file     Gets together: Not on file     Attends Yarsanism service: Not on file     Active member of club or organization: Not on file     Attends meetings of clubs or organizations: Not on file     Relationship status: Not on file   • Intimate partner violence     Fear of current or ex partner: Not on file     Emotionally abused: Not on file     Physically abused: Not on file     Forced sexual activity: Not on file   Other Topics Concern   • Not on file   Social History Narrative    See H&P    Lives with wife     Allergies   Allergen Reactions   • Ciprofloxacin      Other reaction(s): muscle aches   • Levaquin      Other reaction(s): Muscle aches  Muscle aches  Other reaction(s): Muscle aches   • Penicillins Hives     Rash and asthma attack when a child - wife states he has had Keflex in the past and tolerated   • Ciprofloxacin Hcl Unspecified     MUSCLE ACHES   • Levofloxacin Unspecified     MUSCLE ACHES     Outpatient Encounter Medications as of 2020   Medication Sig Dispense Refill   • Calcium  Carbonate Antacid 1000 MG Chew Tab TUMS CHEW     • clotrimazole-betamethasone (LOTRISONE) 1-0.05 % Cream CLOTRIMAZOLE-BETAMETHASONE 1-0.05 % CREA     • fluticasone (FLONASE) 50 MCG/ACT nasal spray FLONASE ALLERGY RELIEF SUSP     • guaiFENesin LA (MUCINEX) 600 MG TABLET SR 12 HR MUCINEX 600 MG XR12H-TAB     • Loperamide HCl (IMODIUM A-D PO) IMODIUM A-D TABS     • Magnesium 250 MG Tab MAGNESIUM 250 MG TABS     • Budesonide-Formoterol Fumarate (SYMBICORT INH) SYMBICORT AERO     • certolizumab pegol (CIMZIA PREFILLED) 2 X 200 MG/ML Kit CIMZIA PREFILLED 2 X 200 MG/ML KIT     • fexofenadine (ALLEGRA ALLERGY) 180 MG tablet ALLEGRA ALLERGY 180 MG TABS     • gabapentin (NEURONTIN) 300 MG Cap TAKE 1 CAPSULE BY MOUTH THREE TIMES A  Cap 0   • rosuvastatin (CRESTOR) 5 MG Tab TAKE 1 TABLET BY MOUTH EVERY EVENING 90 Tab 1   • acetaminophen (TYLENOL) 500 MG Tab Take 1,000 mg by mouth every 6 hours as needed for Moderate Pain.     • tiotropium (SPIRIVA) 18 MCG Cap Inhale 1 Cap by mouth every day. 30 Cap 3   • leflunomide (ARAVA) 20 MG Tab Take 1 Tab by mouth every day. 30 Tab 0   • certolizumab pegol (CIMZIA PREFILLED) 2 X 200 MG/ML Kit Inject 200 mg as instructed every 14 days. 1 Kit 0   • Probiotic Product (PROBIOTIC PO) Take 1 Cap by mouth 2 Times a Day.     • Multiple Vitamin (MULTIVITAMIN PO) Take 1 Tab by mouth every day.     • [DISCONTINUED] Calcium 250 MG Cap CALCIUM CAPSULE     • [DISCONTINUED] fluticasone (FLONASE) 50 MCG/ACT nasal spray FLONASE ALLERGY RELIEF SUSPENSION     • [DISCONTINUED] acetaminophen (TYLENOL) 325 MG Tab TYLENOL TABS     • [DISCONTINUED] atenolol (TENORMIN) 25 MG Tab ATENOLOL 25 MG TABS     • [DISCONTINUED] CALCIUM PO CALCIUM CAPS     • [DISCONTINUED] Cholecalciferol (VITAMIN D3) 2000 UNIT Cap VITAMIN D3 2000 UNIT CAPS     • [DISCONTINUED] Ketotifen Fumarate (ZADITOR OP) ZADITOR SOLN     • [DISCONTINUED] Multiple Vitamin (MULTIVITAMIN) capsule MULTIVITAMINS CAPS     • [DISCONTINUED] Omega-3  "Fatty Acids (FISH OIL) 1200 MG Cap FISH OIL 1200 MG CAPS     • [DISCONTINUED] tiotropium (SPIRIVA) 18 MCG Cap SPIRIVA HANDIHALER CAPS     • [DISCONTINUED] gabapentin (NEURONTIN) 300 MG Cap GABAPENTIN 300 MG CAPS     • [DISCONTINUED] leflunomide (ARAVA) 20 MG Tab ARAVA 20 MG TABS     • [DISCONTINUED] rosuvastatin (CRESTOR) 10 MG Tab Take 5 mg by mouth every day.     • [DISCONTINUED] oxybutynin SR (DITROPAN-XL) 5 MG TABLET SR 24 HR Take 5 mg by mouth every day.     • [DISCONTINUED] SYMBICORT 80-4.5 MCG/ACT Aerosol INHALE 2 PUFFS BY MOUTH TWO TIMES A DAY 3 Inhaler 3   • [DISCONTINUED] fluticasone (FLONASE) 50 MCG/ACT nasal spray Spray 1 Spray in nose every day.     • [DISCONTINUED] fexofenadine (ALLEGRA) 180 MG tablet Take 180 mg by mouth as needed.       No facility-administered encounter medications on file as of 8/24/2020.      Review of Systems   Constitutional: Negative for malaise/fatigue.   Respiratory: Positive for shortness of breath (chronic). Negative for cough, hemoptysis, sputum production and wheezing.    Cardiovascular: Negative for chest pain, palpitations, orthopnea, claudication, leg swelling and PND.   Gastrointestinal: Negative for nausea and vomiting.   Musculoskeletal: Positive for joint pain.   Neurological: Negative for dizziness and weakness.   Psychiatric/Behavioral: The patient is not nervous/anxious.         Objective:   /62 (BP Location: Left arm, Patient Position: Sitting, BP Cuff Size: Adult)   Pulse (!) 108   Ht 1.854 m (6' 1\")   Wt 92 kg (202 lb 12.8 oz)   SpO2 90%   BMI 26.76 kg/m²     Physical Exam   Constitutional: He is oriented to person, place, and time. He appears well-developed and well-nourished. No distress.   HENT:   Head: Normocephalic and atraumatic.   Eyes: Pupils are equal, round, and reactive to light.   Neck: No JVD present.   Cardiovascular: Normal rate, regular rhythm and normal heart sounds.   No murmur heard.  Pulmonary/Chest: Effort normal and breath " sounds normal.   Abdominal: Soft. Bowel sounds are normal. He exhibits no distension.   Musculoskeletal:         General: No edema.   Neurological: He is alert and oriented to person, place, and time.   Skin: Skin is warm and dry. He is not diaphoretic.   Psychiatric: He has a normal mood and affect. His behavior is normal. Judgment and thought content normal.       Echocardiography Laboratory  7/1/2016  Normal left ventricular chamber size and systolic function. Left   ventricular ejection fraction is 60%.   Mild left ventricular hypertrophy.  Mild mitral regurgitation.    Mild tricuspid regurgitation.   Mild to moderate pulmonic insufficiency.  Right ventricular systolic pressure is mildly elevated .    Assessment:     1. PVC (premature ventricular contraction)  EKG - Clinic Performed    EC-ECHOCARDIOGRAM COMPLETE W/O CONT   2. Calcified LAD and LCx on CT chest 2015  EKG - Clinic Performed    EC-ECHOCARDIOGRAM COMPLETE W/O CONT   3. Preoperative cardiovascular examination  EC-ECHOCARDIOGRAM COMPLETE W/O CONT       Medical Decision Making:  Today's Assessment / Status / Plan:     1. Preoperative cardiovascular examination:  - EKG today shoed NSR with borderline T abnormalities. Denies any chest pain   - RCRI 1  - repeat ECHO, if stable patient is low risk for intended surgery to removal of hematoma with subcutaneous fluid     2. calcified LAD and LCx:  - continue rosuvastatin 5mg qd     3. PVCs:  - resolved     Follow up pending ECHO result, sooner as needed.     Collaborating Provider: Dr. Troy

## 2020-08-24 ENCOUNTER — OFFICE VISIT (OUTPATIENT)
Dept: CARDIOLOGY | Facility: MEDICAL CENTER | Age: 80
End: 2020-08-24
Payer: MEDICARE

## 2020-08-24 VITALS
OXYGEN SATURATION: 90 % | WEIGHT: 202.8 LBS | SYSTOLIC BLOOD PRESSURE: 126 MMHG | HEART RATE: 108 BPM | DIASTOLIC BLOOD PRESSURE: 62 MMHG | BODY MASS INDEX: 26.88 KG/M2 | HEIGHT: 73 IN

## 2020-08-24 DIAGNOSIS — I49.3 PVC (PREMATURE VENTRICULAR CONTRACTION): ICD-10-CM

## 2020-08-24 DIAGNOSIS — Z01.810 PREOPERATIVE CARDIOVASCULAR EXAMINATION: ICD-10-CM

## 2020-08-24 DIAGNOSIS — I25.10 CORONARY ARTERY CALCIFICATION SEEN ON CAT SCAN: ICD-10-CM

## 2020-08-24 LAB — EKG IMPRESSION: NORMAL

## 2020-08-24 PROCEDURE — 99214 OFFICE O/P EST MOD 30 MIN: CPT | Performed by: NURSE PRACTITIONER

## 2020-08-24 PROCEDURE — 93000 ELECTROCARDIOGRAM COMPLETE: CPT | Performed by: INTERNAL MEDICINE

## 2020-08-24 RX ORDER — FLUTICASONE PROPIONATE 50 MCG
SPRAY, SUSPENSION (ML) NASAL
COMMUNITY
Start: 2015-11-03 | End: 2020-08-24

## 2020-08-24 ASSESSMENT — ENCOUNTER SYMPTOMS
VOMITING: 0
WHEEZING: 0
PALPITATIONS: 0
SHORTNESS OF BREATH: 1
COUGH: 0
SPUTUM PRODUCTION: 0
HEMOPTYSIS: 0
PND: 0
WEAKNESS: 0
DIZZINESS: 0
NERVOUS/ANXIOUS: 0
ORTHOPNEA: 0
CLAUDICATION: 0
NAUSEA: 0

## 2020-08-24 ASSESSMENT — FIBROSIS 4 INDEX: FIB4 SCORE: 2.43

## 2020-09-08 ENCOUNTER — HOSPITAL ENCOUNTER (OUTPATIENT)
Dept: CARDIOLOGY | Facility: MEDICAL CENTER | Age: 80
End: 2020-09-08
Attending: NURSE PRACTITIONER
Payer: MEDICARE

## 2020-09-08 DIAGNOSIS — Z01.810 PREOPERATIVE CARDIOVASCULAR EXAMINATION: ICD-10-CM

## 2020-09-08 DIAGNOSIS — I25.10 CORONARY ARTERY CALCIFICATION SEEN ON CAT SCAN: ICD-10-CM

## 2020-09-08 DIAGNOSIS — I49.3 PVC (PREMATURE VENTRICULAR CONTRACTION): ICD-10-CM

## 2020-09-08 LAB
LV EJECT FRACT  99904: 60
LV EJECT FRACT MOD 2C 99903: 56.1
LV EJECT FRACT MOD 4C 99902: 64.62
LV EJECT FRACT MOD BP 99901: 61.03

## 2020-09-08 PROCEDURE — 93306 TTE W/DOPPLER COMPLETE: CPT | Mod: 26 | Performed by: INTERNAL MEDICINE

## 2020-09-08 PROCEDURE — 93306 TTE W/DOPPLER COMPLETE: CPT

## 2020-09-10 ENCOUNTER — TELEPHONE (OUTPATIENT)
Dept: CARDIOLOGY | Facility: MEDICAL CENTER | Age: 80
End: 2020-09-10

## 2020-09-10 NOTE — LETTER
PROCEDURE/SURGERY CLEARANCE FORM      Encounter Date: 9/10/2020    Patient: Barry Torres  YOB: 1940    CARDIOLOGIST:  Marina LOOMIS    REFERRING DOCTOR:  Elijah Faulkner M.D        Based on cardiac evaluation, the above patient is okay to proceed with intended surgery.                                                 Marina LOOMIS  Electronically signed

## 2020-09-10 NOTE — TELEPHONE ENCOUNTER
EC-ECHOCARDIOGRAM COMPLETE W/O CONT  Order: [id number]  Status:  Final result   Visible to patient:  Yes (MyChart) Dx:  PVC (premature ventricular contractio...  Received: Yesterday  Message Contents     ----- Message -----   From: DELILAH Mccauley   Sent: 9/9/2020   2:51 PM PDT   To: Dannielle Nicole L.P.N.     Ok to proceed with intended surgery.     Thank you,   Marina Dorsey      ----------------------------------------------------------------------    Contacted pt and s/w his wife Lola. Informed them of normal echo and surgery clearance. Pt confirmed that the surgery is with Elijah Faulkner M.D at [facility].    Letter composed and faxed to [facility] at [phone].

## 2020-09-16 NOTE — TELEPHONE ENCOUNTER
1. Caller Name: Barry                      Call Back Number: [phone] (home)       2. Message: Spoke with pt. He said the [place] pharmacy called him Saturday and said his Rosuvastain was denied. I checked for pt. I see it was discontinued but I don;t see the note on why. I told pt I would forward this to Dr Soto. Pt understood.    3. Patient approves office to leave a detailed voicemail/MyChart message: N\A    
Called and spoke with pt. Scheduled pt an appt today at 2:20pm with Dr Portillo  
I think it was stopped because a muscle enzyme test was high in the hospital.   I want to see him for post hosp f/u.   You could offer him an appt with fellow Corby tomorrow MA.   We can cover over his hospital stay, see if we should restart the Crestor, etc.   
Pt was seen on 12/04/18  
denies

## 2020-09-24 ENCOUNTER — HOSPITAL ENCOUNTER (OUTPATIENT)
Dept: HOSPITAL 8 - STAR | Age: 80
Discharge: HOME | End: 2020-09-24
Attending: ORTHOPAEDIC SURGERY
Payer: MEDICARE

## 2020-09-24 DIAGNOSIS — M79.651: ICD-10-CM

## 2020-09-24 DIAGNOSIS — R94.31: ICD-10-CM

## 2020-09-24 DIAGNOSIS — Z20.828: ICD-10-CM

## 2020-09-24 DIAGNOSIS — Z01.812: Primary | ICD-10-CM

## 2020-09-24 DIAGNOSIS — M25.561: ICD-10-CM

## 2020-09-24 PROCEDURE — 87635 SARS-COV-2 COVID-19 AMP PRB: CPT

## 2020-09-24 PROCEDURE — 36415 COLL VENOUS BLD VENIPUNCTURE: CPT

## 2020-09-24 PROCEDURE — 93005 ELECTROCARDIOGRAM TRACING: CPT

## 2020-09-29 ENCOUNTER — HOSPITAL ENCOUNTER (OUTPATIENT)
Dept: HOSPITAL 8 - OUT | Age: 80
Discharge: HOME | End: 2020-09-29
Attending: ORTHOPAEDIC SURGERY
Payer: MEDICARE

## 2020-09-29 VITALS — HEIGHT: 73 IN | BODY MASS INDEX: 28.55 KG/M2 | WEIGHT: 215.39 LBS

## 2020-09-29 DIAGNOSIS — Z87.891: ICD-10-CM

## 2020-09-29 DIAGNOSIS — I10: ICD-10-CM

## 2020-09-29 DIAGNOSIS — J44.9: ICD-10-CM

## 2020-09-29 DIAGNOSIS — Z79.899: ICD-10-CM

## 2020-09-29 DIAGNOSIS — M06.9: ICD-10-CM

## 2020-09-29 DIAGNOSIS — Z90.5: ICD-10-CM

## 2020-09-29 DIAGNOSIS — Z99.81: ICD-10-CM

## 2020-09-29 DIAGNOSIS — M70.41: Primary | ICD-10-CM

## 2020-09-29 PROCEDURE — 88304 TISSUE EXAM BY PATHOLOGIST: CPT

## 2020-09-29 PROCEDURE — 27632 EXC LEG/ANKLE LES SC 3 CM/>: CPT

## 2020-09-29 PROCEDURE — 27340 REMOVAL OF KNEECAP BURSA: CPT

## 2020-10-26 ENCOUNTER — HOSPITAL ENCOUNTER (OUTPATIENT)
Dept: LAB | Facility: MEDICAL CENTER | Age: 80
End: 2020-10-26
Attending: INTERNAL MEDICINE
Payer: MEDICARE

## 2020-10-26 ENCOUNTER — APPOINTMENT (OUTPATIENT)
Dept: ADMISSIONS | Facility: MEDICAL CENTER | Age: 80
End: 2020-10-26
Attending: INTERNAL MEDICINE
Payer: MEDICARE

## 2020-10-26 ENCOUNTER — ANESTHESIA EVENT (OUTPATIENT)
Dept: SURGERY | Facility: MEDICAL CENTER | Age: 80
End: 2020-10-26
Payer: MEDICARE

## 2020-10-26 LAB
ALBUMIN SERPL BCP-MCNC: 3.8 G/DL (ref 3.2–4.9)
ALBUMIN/GLOB SERPL: 1.4 G/DL
ALP SERPL-CCNC: 82 U/L (ref 30–99)
ALT SERPL-CCNC: 34 U/L (ref 2–50)
ANION GAP SERPL CALC-SCNC: 10 MMOL/L (ref 7–16)
AST SERPL-CCNC: 38 U/L (ref 12–45)
BASOPHILS # BLD AUTO: 1.5 % (ref 0–1.8)
BASOPHILS # BLD: 0.08 K/UL (ref 0–0.12)
BILIRUB SERPL-MCNC: 0.5 MG/DL (ref 0.1–1.5)
BUN SERPL-MCNC: 19 MG/DL (ref 8–22)
CALCIUM SERPL-MCNC: 9.5 MG/DL (ref 8.5–10.5)
CHLORIDE SERPL-SCNC: 107 MMOL/L (ref 96–112)
CO2 SERPL-SCNC: 25 MMOL/L (ref 20–33)
CREAT SERPL-MCNC: 1.71 MG/DL (ref 0.5–1.4)
CRP SERPL HS-MCNC: 0.32 MG/DL (ref 0–0.75)
EOSINOPHIL # BLD AUTO: 0.26 K/UL (ref 0–0.51)
EOSINOPHIL NFR BLD: 4.9 % (ref 0–6.9)
ERYTHROCYTE [DISTWIDTH] IN BLOOD BY AUTOMATED COUNT: 59.6 FL (ref 35.9–50)
ERYTHROCYTE [SEDIMENTATION RATE] IN BLOOD BY WESTERGREN METHOD: 12 MM/HOUR (ref 0–20)
GLOBULIN SER CALC-MCNC: 2.8 G/DL (ref 1.9–3.5)
GLUCOSE SERPL-MCNC: 104 MG/DL (ref 65–99)
HCT VFR BLD AUTO: 40.5 % (ref 42–52)
HGB BLD-MCNC: 12.4 G/DL (ref 14–18)
IMM GRANULOCYTES # BLD AUTO: 0.02 K/UL (ref 0–0.11)
IMM GRANULOCYTES NFR BLD AUTO: 0.4 % (ref 0–0.9)
LYMPHOCYTES # BLD AUTO: 1.61 K/UL (ref 1–4.8)
LYMPHOCYTES NFR BLD: 30.1 % (ref 22–41)
MCH RBC QN AUTO: 32 PG (ref 27–33)
MCHC RBC AUTO-ENTMCNC: 30.6 G/DL (ref 33.7–35.3)
MCV RBC AUTO: 104.7 FL (ref 81.4–97.8)
MONOCYTES # BLD AUTO: 1 K/UL (ref 0–0.85)
MONOCYTES NFR BLD AUTO: 18.7 % (ref 0–13.4)
NEUTROPHILS # BLD AUTO: 2.38 K/UL (ref 1.82–7.42)
NEUTROPHILS NFR BLD: 44.4 % (ref 44–72)
NRBC # BLD AUTO: 0.03 K/UL
NRBC BLD-RTO: 0.6 /100 WBC
PLATELET # BLD AUTO: 203 K/UL (ref 164–446)
PMV BLD AUTO: 9.9 FL (ref 9–12.9)
POTASSIUM SERPL-SCNC: 5.1 MMOL/L (ref 3.6–5.5)
PROT SERPL-MCNC: 6.6 G/DL (ref 6–8.2)
RBC # BLD AUTO: 3.87 M/UL (ref 4.7–6.1)
SODIUM SERPL-SCNC: 142 MMOL/L (ref 135–145)
WBC # BLD AUTO: 5.4 K/UL (ref 4.8–10.8)

## 2020-10-26 PROCEDURE — 85025 COMPLETE CBC W/AUTO DIFF WBC: CPT

## 2020-10-26 PROCEDURE — 80053 COMPREHEN METABOLIC PANEL: CPT

## 2020-10-26 PROCEDURE — 86140 C-REACTIVE PROTEIN: CPT

## 2020-10-26 PROCEDURE — 36415 COLL VENOUS BLD VENIPUNCTURE: CPT

## 2020-10-26 PROCEDURE — 85652 RBC SED RATE AUTOMATED: CPT

## 2020-10-26 RX ORDER — CEPHALEXIN 500 MG/1
CAPSULE ORAL
Status: ON HOLD | COMMUNITY
Start: 2020-09-29 | End: 2020-10-27

## 2020-10-26 RX ORDER — ONDANSETRON 4 MG/1
TABLET, FILM COATED ORAL
Status: ON HOLD | COMMUNITY
Start: 2020-09-28 | End: 2020-10-27

## 2020-10-27 ENCOUNTER — ANESTHESIA (OUTPATIENT)
Dept: SURGERY | Facility: MEDICAL CENTER | Age: 80
End: 2020-10-27
Payer: MEDICARE

## 2020-10-27 ENCOUNTER — HOSPITAL ENCOUNTER (OUTPATIENT)
Facility: MEDICAL CENTER | Age: 80
End: 2020-10-27
Attending: ORTHOPAEDIC SURGERY | Admitting: ORTHOPAEDIC SURGERY
Payer: MEDICARE

## 2020-10-27 VITALS
OXYGEN SATURATION: 90 % | RESPIRATION RATE: 16 BRPM | HEIGHT: 73 IN | TEMPERATURE: 97.2 F | DIASTOLIC BLOOD PRESSURE: 90 MMHG | SYSTOLIC BLOOD PRESSURE: 154 MMHG | HEART RATE: 72 BPM | WEIGHT: 212.08 LBS | BODY MASS INDEX: 28.11 KG/M2

## 2020-10-27 DIAGNOSIS — G89.18 POST-OPERATIVE PAIN: ICD-10-CM

## 2020-10-27 LAB
COVID ORDER STATUS COVID19: NORMAL
GRAM STN SPEC: NORMAL
SARS-COV+SARS-COV-2 AG RESP QL IA.RAPID: NOTDETECTED
SIGNIFICANT IND 70042: NORMAL
SITE SITE: NORMAL
SOURCE SOURCE: NORMAL
SPECIMEN SOURCE: NORMAL

## 2020-10-27 PROCEDURE — 501838 HCHG SUTURE GENERAL: Performed by: ORTHOPAEDIC SURGERY

## 2020-10-27 PROCEDURE — 160036 HCHG PACU - EA ADDL 30 MINS PHASE I: Performed by: ORTHOPAEDIC SURGERY

## 2020-10-27 PROCEDURE — 160002 HCHG RECOVERY MINUTES (STAT): Performed by: ORTHOPAEDIC SURGERY

## 2020-10-27 PROCEDURE — 500881 HCHG PACK, EXTREMITY: Performed by: ORTHOPAEDIC SURGERY

## 2020-10-27 PROCEDURE — 87075 CULTR BACTERIA EXCEPT BLOOD: CPT | Mod: 91

## 2020-10-27 PROCEDURE — 87102 FUNGUS ISOLATION CULTURE: CPT

## 2020-10-27 PROCEDURE — 160025 RECOVERY II MINUTES (STATS): Performed by: ORTHOPAEDIC SURGERY

## 2020-10-27 PROCEDURE — 160038 HCHG SURGERY MINUTES - EA ADDL 1 MIN LEVEL 2: Performed by: ORTHOPAEDIC SURGERY

## 2020-10-27 PROCEDURE — 160009 HCHG ANES TIME/MIN: Performed by: ORTHOPAEDIC SURGERY

## 2020-10-27 PROCEDURE — 87070 CULTURE OTHR SPECIMN AEROBIC: CPT | Mod: 91

## 2020-10-27 PROCEDURE — 87426 SARSCOV CORONAVIRUS AG IA: CPT

## 2020-10-27 PROCEDURE — 160046 HCHG PACU - 1ST 60 MINS PHASE II: Performed by: ORTHOPAEDIC SURGERY

## 2020-10-27 PROCEDURE — 87186 SC STD MICRODIL/AGAR DIL: CPT

## 2020-10-27 PROCEDURE — 700111 HCHG RX REV CODE 636 W/ 250 OVERRIDE (IP): Performed by: ANESTHESIOLOGY

## 2020-10-27 PROCEDURE — 160027 HCHG SURGERY MINUTES - 1ST 30 MINS LEVEL 2: Performed by: ORTHOPAEDIC SURGERY

## 2020-10-27 PROCEDURE — 700105 HCHG RX REV CODE 258: Performed by: ORTHOPAEDIC SURGERY

## 2020-10-27 PROCEDURE — 87147 CULTURE TYPE IMMUNOLOGIC: CPT

## 2020-10-27 PROCEDURE — 87077 CULTURE AEROBIC IDENTIFY: CPT

## 2020-10-27 PROCEDURE — 87205 SMEAR GRAM STAIN: CPT | Mod: 91

## 2020-10-27 PROCEDURE — 160035 HCHG PACU - 1ST 60 MINS PHASE I: Performed by: ORTHOPAEDIC SURGERY

## 2020-10-27 PROCEDURE — 160048 HCHG OR STATISTICAL LEVEL 1-5: Performed by: ORTHOPAEDIC SURGERY

## 2020-10-27 DEVICE — MATRISTEM MICROMATRIX 500MG (1/EA): Type: IMPLANTABLE DEVICE | Site: KNEE | Status: FUNCTIONAL

## 2020-10-27 DEVICE — MATRISTEM MICROMATRIX 200MG (1/EA): Type: IMPLANTABLE DEVICE | Site: KNEE | Status: FUNCTIONAL

## 2020-10-27 RX ORDER — CEPHALEXIN 500 MG/1
1000 CAPSULE ORAL 3 TIMES DAILY
Qty: 21 CAP | Refills: 0 | Status: SHIPPED | OUTPATIENT
Start: 2020-10-27 | End: 2020-10-27 | Stop reason: SDUPTHER

## 2020-10-27 RX ORDER — CEFAZOLIN SODIUM 1 G/3ML
INJECTION, POWDER, FOR SOLUTION INTRAMUSCULAR; INTRAVENOUS PRN
Status: DISCONTINUED | OUTPATIENT
Start: 2020-10-27 | End: 2020-10-27 | Stop reason: SURG

## 2020-10-27 RX ORDER — ONDANSETRON 2 MG/ML
INJECTION INTRAMUSCULAR; INTRAVENOUS PRN
Status: DISCONTINUED | OUTPATIENT
Start: 2020-10-27 | End: 2020-10-27 | Stop reason: SURG

## 2020-10-27 RX ORDER — DEXAMETHASONE SODIUM PHOSPHATE 4 MG/ML
INJECTION, SOLUTION INTRA-ARTICULAR; INTRALESIONAL; INTRAMUSCULAR; INTRAVENOUS; SOFT TISSUE PRN
Status: DISCONTINUED | OUTPATIENT
Start: 2020-10-27 | End: 2020-10-27 | Stop reason: SURG

## 2020-10-27 RX ORDER — OXYCODONE HCL 5 MG/5 ML
10 SOLUTION, ORAL ORAL
Status: DISCONTINUED | OUTPATIENT
Start: 2020-10-27 | End: 2020-10-27 | Stop reason: HOSPADM

## 2020-10-27 RX ORDER — OXYCODONE HCL 5 MG/5 ML
5 SOLUTION, ORAL ORAL
Status: DISCONTINUED | OUTPATIENT
Start: 2020-10-27 | End: 2020-10-27 | Stop reason: HOSPADM

## 2020-10-27 RX ORDER — CELECOXIB 200 MG/1
200 CAPSULE ORAL ONCE
Status: DISCONTINUED | OUTPATIENT
Start: 2020-10-27 | End: 2020-10-27 | Stop reason: HOSPADM

## 2020-10-27 RX ORDER — CEPHALEXIN 500 MG/1
1000 CAPSULE ORAL 3 TIMES DAILY
Qty: 42 CAP | Refills: 0 | Status: SHIPPED | OUTPATIENT
Start: 2020-10-27 | End: 2020-11-03

## 2020-10-27 RX ORDER — ONDANSETRON 2 MG/ML
4 INJECTION INTRAMUSCULAR; INTRAVENOUS
Status: DISCONTINUED | OUTPATIENT
Start: 2020-10-27 | End: 2020-10-27 | Stop reason: HOSPADM

## 2020-10-27 RX ORDER — ONDANSETRON 4 MG/1
4 TABLET, FILM COATED ORAL EVERY 8 HOURS PRN
Qty: 20 TAB | Refills: 0 | Status: SHIPPED | OUTPATIENT
Start: 2020-10-27 | End: 2020-11-01

## 2020-10-27 RX ORDER — ACETAMINOPHEN 500 MG
1000 TABLET ORAL ONCE
Status: DISCONTINUED | OUTPATIENT
Start: 2020-10-27 | End: 2020-10-27 | Stop reason: HOSPADM

## 2020-10-27 RX ORDER — OXYCODONE HYDROCHLORIDE 5 MG/1
5 TABLET ORAL EVERY 6 HOURS PRN
Qty: 20 TAB | Refills: 0 | Status: SHIPPED | OUTPATIENT
Start: 2020-10-27 | End: 2020-11-01

## 2020-10-27 RX ORDER — LABETALOL HYDROCHLORIDE 5 MG/ML
5 INJECTION, SOLUTION INTRAVENOUS
Status: DISCONTINUED | OUTPATIENT
Start: 2020-10-27 | End: 2020-10-27 | Stop reason: HOSPADM

## 2020-10-27 RX ORDER — SODIUM CHLORIDE, SODIUM LACTATE, POTASSIUM CHLORIDE, CALCIUM CHLORIDE 600; 310; 30; 20 MG/100ML; MG/100ML; MG/100ML; MG/100ML
INJECTION, SOLUTION INTRAVENOUS CONTINUOUS
Status: DISCONTINUED | OUTPATIENT
Start: 2020-10-27 | End: 2020-10-27 | Stop reason: HOSPADM

## 2020-10-27 RX ORDER — SULFAMETHOXAZOLE AND TRIMETHOPRIM 800; 160 MG/1; MG/1
1 TABLET ORAL 2 TIMES DAILY
Qty: 14 TAB | Refills: 0 | Status: ON HOLD | OUTPATIENT
Start: 2020-10-27 | End: 2020-12-23

## 2020-10-27 RX ORDER — HALOPERIDOL 5 MG/ML
1 INJECTION INTRAMUSCULAR
Status: DISCONTINUED | OUTPATIENT
Start: 2020-10-27 | End: 2020-10-27 | Stop reason: HOSPADM

## 2020-10-27 RX ORDER — DIPHENHYDRAMINE HYDROCHLORIDE 50 MG/ML
12.5 INJECTION INTRAMUSCULAR; INTRAVENOUS
Status: DISCONTINUED | OUTPATIENT
Start: 2020-10-27 | End: 2020-10-27 | Stop reason: HOSPADM

## 2020-10-27 RX ADMIN — MIDAZOLAM HYDROCHLORIDE 2 MG: 1 INJECTION, SOLUTION INTRAMUSCULAR; INTRAVENOUS at 08:57

## 2020-10-27 RX ADMIN — FENTANYL CITRATE 25 MCG: 50 INJECTION, SOLUTION INTRAMUSCULAR; INTRAVENOUS at 09:00

## 2020-10-27 RX ADMIN — SODIUM CHLORIDE, POTASSIUM CHLORIDE, SODIUM LACTATE AND CALCIUM CHLORIDE: 600; 310; 30; 20 INJECTION, SOLUTION INTRAVENOUS at 08:50

## 2020-10-27 RX ADMIN — PROPOFOL 100 MG: 10 INJECTION, EMULSION INTRAVENOUS at 09:03

## 2020-10-27 RX ADMIN — CEFAZOLIN 2 G: 1 INJECTION, POWDER, FOR SOLUTION INTRAVENOUS at 09:03

## 2020-10-27 RX ADMIN — ONDANSETRON 4 MG: 2 INJECTION INTRAMUSCULAR; INTRAVENOUS at 09:01

## 2020-10-27 RX ADMIN — DEXAMETHASONE SODIUM PHOSPHATE 8 MG: 4 INJECTION, SOLUTION INTRAMUSCULAR; INTRAVENOUS at 09:04

## 2020-10-27 RX ADMIN — FENTANYL CITRATE 75 MCG: 50 INJECTION, SOLUTION INTRAMUSCULAR; INTRAVENOUS at 09:08

## 2020-10-27 ASSESSMENT — PAIN SCALES - GENERAL: PAIN_LEVEL: 3

## 2020-10-27 ASSESSMENT — FIBROSIS 4 INDEX: FIB4 SCORE: 2.6

## 2020-10-27 NOTE — ANESTHESIA PREPROCEDURE EVALUATION
Relevant Problems   ANESTHESIA   (+) NYASIA (obstructive sleep apnea)      PULMONARY   (+) Aspiration pneumonia (HCC)   (+) COPD (chronic obstructive pulmonary disease) ([facility])   (+) Chronic obstructive pulmonary disease ([facility])      NEURO   (+) History of pseudomembranous enterocolitis      CARDIAC   (+) Calcified LAD and LCx on CT chest 2015   (+) Essential hypertension, benign   (+) PVC (premature ventricular contraction)         (+) Acute kidney injury superimposed on chronic kidney disease ([facility])   (+) CKD (chronic kidney disease), stage III      Other   (+) RA (rheumatoid arthritis) ([facility])       Physical Exam    Airway   Mallampati: III  TM distance: >3 FB  Neck ROM: full       Cardiovascular - normal exam  Rhythm: regular  Rate: normal  (-) murmur     Dental - normal exam           Pulmonary - normal exam  Breath sounds clear to auscultation     Abdominal    Neurological - normal exam                 Anesthesia Plan    ASA 3   ASA physical status 3 criteria: respiratory insufficiency or compromise    Plan - general       Airway plan will be LMA        Induction: intravenous    Postoperative Plan: Postoperative administration of opioids is intended.    Pertinent diagnostic labs and testing reviewed    Informed Consent:    Anesthetic plan and risks discussed with patient.    Use of blood products discussed with: patient whom consented to blood products.

## 2020-10-27 NOTE — DISCHARGE INSTRUCTIONS
"  ACTIVITY: Rest and take it easy for the first 24 hours.  A responsible adult is recommended to remain with you during that time.  It is normal to feel sleepy.  We encourage you to not do anything that requires balance, judgment or coordination.    MILD FLU-LIKE SYMPTOMS ARE NORMAL. YOU MAY EXPERIENCE GENERALIZED MUSCLE ACHES, THROAT IRRITATION, HEADACHE AND/OR SOME NAUSEA.    FOR 24 HOURS DO NOT:  Drive, operate machinery or run household appliances.  Drink beer or alcoholic beverages.   Make important decisions or sign legal documents.    SPECIAL INSTRUCTIONS: See \"Dr. Faulkner Discharge Instructions\"  Brace required until post op appointment. Toe touch weight bearing. Right leg with walker/crutches.    DIET: To avoid nausea, slowly advance diet as tolerated, avoiding spicy or greasy foods for the first day.  Add more substantial food to your diet according to your physician's instructions.  Babies can be fed formula or breast milk as soon as they are hungry.  INCREASE FLUIDS AND FIBER TO AVOID CONSTIPATION.    SURGICAL DRESSING/BATHING: Keep dressing clean, dry and intact until follow up appointment.       FOLLOW-UP APPOINTMENT:  A follow-up appointment should be arranged with your doctor in 1 week; call to schedule.    You should CALL YOUR PHYSICIAN if you develop:  Fever greater than 101 degrees F.  Pain not relieved by medication, or persistent nausea or vomiting.  Excessive bleeding (blood soaking through dressing) or unexpected drainage from the wound.  Extreme redness or swelling around the incision site, drainage of pus or foul smelling drainage.  Inability to urinate or empty your bladder within 8 hours.    You should call 911 if you develop problems with breathing or chest pain.    If you are unable to contact your doctor or surgical center, you should go to the nearest emergency room or urgent care center.      Physician's telephone #: Dr Faulkner - Kimberly Castelan [phone].   After 5pm-(947) " [phone]    If any questions arise, call your doctor.  If your doctor is not available, please feel free to call the Surgical Center at [phone]. The Contact Center is open Monday through Friday 7AM to 5PM and may speak to a nurse at [phone], or toll free at [phone].     A registered nurse may call you a few days after your surgery to see how you are doing after your procedure.    MEDICATIONS: Resume taking daily medication.  Take prescribed pain medication with food.  If no medication is prescribed, you may take non-aspirin pain medication if needed.  PAIN MEDICATION CAN BE VERY CONSTIPATING.  Take a stool softener or laxative such as senokot, pericolace, or milk of magnesia if needed.    Prescription given for Keflex, Oxycodone, Zofran, Bactrim.      Last pain medication given at n/a.    If your physician has prescribed pain medication that includes Acetaminophen (Tylenol), do not take additional Acetaminophen (Tylenol) while taking the prescribed medication.    Depression / Suicide Risk    As you are discharged from this [facility] facility, it is important to learn how to keep safe from harming yourself.    Recognize the warning signs:  · Abrupt changes in personality, positive or negative- including increase in energy   · Giving away possessions  · Change in eating patterns- significant weight changes-  positive or negative  · Change in sleeping patterns- unable to sleep or sleeping all the time   · Unwillingness or inability to communicate  · Depression  · Unusual sadness, discouragement and loneliness  · Talk of wanting to die  · Neglect of personal appearance   · Rebelliousness- reckless behavior  · Withdrawal from people/activities they love  · Confusion- inability to concentrate     If you or a loved one observes any of these behaviors or has concerns about self-harm, here's what you can do:  · Talk about it- your feelings and reasons for harming yourself  · Remove any means that you  might use to hurt yourself (examples: pills, rope, extension cords, firearm)  · Get professional help from the community (Mental Health, Substance Abuse, psychological counseling)  · Do not be alone:Call your Safe Contact- someone whom you trust who will be there for you.  · Call your local CRISIS HOTLINE 074-3938 or 750-263-0249  · Call your local Children's Mobile Crisis Response Team [place] (327) 664-1710 or www.[employer] - [place]  · Call the toll free National Suicide Prevention Hotlines   · National Suicide Prevention Lifeline 123-530-ZAND (4392)  · National Hope Line Network 800-SUICIDE (075-5162)

## 2020-10-27 NOTE — ANESTHESIA TIME REPORT
Anesthesia Start and Stop Event Times     Date Time Event    10/27/2020 0849 Ready for Procedure     0856 Anesthesia Start        Responsible Staff  10/27/20    Name Role Begin End    Rajeev Mendieta M.D. Anesth 0856         Preop Diagnosis (Free Text):  Pre-op Diagnosis     TIBIAL PAIN RIGHT, MASS OF RIGHT KNEE JOINT, PREPATELLAR BUYRSITIS OF RIGHT KNEE        Preop Diagnosis (Codes):    Post op Diagnosis  Wound dehiscence      Premium Reason  Non-Premium    Comments:

## 2020-10-27 NOTE — OP REPORT
DATE OF SERVICE:  10/27/2020    SURGEON:  Elijah Faulkner MD    ASSISTANT:  Bridgett Hillman PA-C    ANESTHESIOLOGIST:  Rajeev Mendieta MD    PREOPERATIVE DIAGNOSES:  1.  Right knee recurrent prepatellar bursitis.  2.  Right knee prepatellar bursa surgical incision wound dehiscence.  3.  Right lower leg cyst recurrence.  4.  Right lower leg incision dehiscence.    POSTOPERATIVE DIAGNOSES:  1.  Right knee recurrent prepatellar bursitis.  2.  Right knee prepatellar bursa surgical incision wound dehiscence.  3.  Right lower leg cyst recurrence.  4.  Right lower leg incision dehiscence.    PROCEDURE PERFORMED  1.  Right prepatellar bursa resection and washout.  2.  Right knee prepatellar bursa incision secondary closure for treatment of   wound dehiscence.  3.  Right lower leg cyst resection.  4.  Right lower leg secondary closure of surgical wound dehiscence.    ANESTHESIA:  General anesthesia.    IMPLANTS:  None.    HISTORY OF PRESENT ILLNESS:  The patient is a very pleasant 80-year-old male   who underwent a right lower extremity open prepatellar bursa resection and   lower leg cyst resection by myself about 4-1/2 weeks ago.  The patient did   very well after the operation.  The incisions are healed up nicely and there   is no evidence of recurrent cyst location on either the lower leg or the   prepatellar bursa.  Yesterday morning, he was then taking a shower and noted a   reaccumulation of fluid in the prepatellar bursa and then followed by   dehiscence of the wound and egress of some prepatellar bursal fluid.  As a   result, we elected to bring the patient back to the operating room for open   irrigation, washout and then secondary closure of the wounds.  The patient has   been n.p.o. since midnight.  He is medically cleared for surgery by the   anesthesia team.    INFORMED CONSENT:  The patient was informed of the risks, benefits, and   alternatives to planned operation.  The risks include but not  limited to   bleeding, infection, neurovascular damage, recurrent cyst formation, wound   dehiscence, pain, stiffness, DVT, PE, MI, stroke, and death.  Advance   directives were reviewed.  After answering all questions, the patient elected   to proceed with planned operation and informed consent form was signed.    DESCRIPTION OF PROCEDURE:  The patient was identified in the preoperative   holding area.  The correct procedural side and site were identified and   marked.  The patient was then brought to the operating room and transferred to   the operating table where all bony prominences were well padded.  He   underwent a general anesthesia.    The right lower extremity was then prepped and draped in normal standard   sterile fashion.  A procedural pause was then performed by the operating room   team.  The patient was given IV antibiotics prior to incision.    I first turned my attention to the prepatellar bursa wound.  The area of   dehiscence was noted, only measured about maybe 3-4 mm.  The rest of the   incision was then opened up and noted egress of quite a bit of bursal fluid.    Three sets of cultures were obtained.  I then turned my attention to the right   lower leg wound.  At this point in time, the wound appeared to be intact, but   there was an area of concern over the proximal part of the incision.    I very carefully bluntly palpated this area and noted some area of wound   dehiscence, followed by small egress of fluid and additional 2 sets of   cultures were obtained from here.  I then copiously irrigated both wounds with   6 liters of normal saline via pulse lavage.  I then debrided the wound beds   in hopes of giving the tissue a better chance to scar down and heal.  I then   placed 200 mg of ACell MicroMatrix into each wound once again in hopes of   improving biological healing.  The wounds were then closed in a layered   fashion with interrupted 2-0 Monocryl, followed by interrupted 2-0 nylon    sutures.  Xeroform and sterile compressive dressing was applied, followed by   Ace wrap and ALEAH hose stocking to maintain compression.  The patient was then   placed in the TROM brace locked in full extension.    Needle and sponge counts were correct at the end of the procedure as reported   by the circulating nurse.  The patient tolerated the procedure well without   complications.  The patient was transferred ____anesthesia team.    ESTIMATED BLOOD LOSS:  10 mL.    COMPLICATIONS:  None.    TOURNIQUET TIME:  None.    SPECIMENS:  Intraoperative cultures x5.    WOUND TYPE:  Type 2, clean, contaminated.    POSTOPERATIVE PLAN:  The patient will be transferred back to the postoperative   unit.  I expect he will be discharged from the hospital later this morning.    The patient will remain touchdown weightbearing on the right lower extremity   with the use of crutches and TROM brace in place and locked in full extension.    I would like to keep the surgical dressing and TROM brace in place for at   least the next week.  We will see him in clinic at that time and perform a   dressing change.  The patient will take aspirin for pharmacological DVT   prophylaxis.       ____________________________________     MD JV Zheng / NIKKIE    DD:  10/27/2020 09:57:34  DT:  10/27/2020 10:47:01    D#:  [id number]  Job#:  [id number]

## 2020-10-27 NOTE — ANESTHESIA PROCEDURE NOTES
Airway    Date/Time: 10/27/2020 9:00 AM  Performed by: Rajeev Mendieta M.D.  Authorized by: Rajeev Mendieta M.D.     Location:  OR  Urgency:  Elective  Indications for Airway Management:  Anesthesia      Spontaneous Ventilation: absent    Sedation Level:  Deep  Preoxygenated: Yes    Final Airway Type:  Supraglottic airway  Final Supraglottic Airway:  Standard LMA    SGA Size:  3  Number of Attempts at Approach:  1

## 2020-10-27 NOTE — ANESTHESIA POSTPROCEDURE EVALUATION
Patient: Barry Torres    Procedure Summary     Date: 10/27/20 Room / Location:  OR  / SURGERY [facility]    Anesthesia Start: 0856 Anesthesia Stop:     Procedure: IRRIGATION AND DEBRIDEMENT, WOUND - KNEE OPEN (Right Knee) Diagnosis: (TIBIAL PAIN RIGHT, MASS OF RIGHT KNEE JOINT, PREPATELLAR BUYRSITIS OF RIGHT KNEE)    Surgeon: Elijah Faulkner M.D. Responsible Provider: Rajeev Mendieta M.D.    Anesthesia Type: general ASA Status: 3          Final Anesthesia Type: general  Last vitals  BP   Blood Pressure : 140/91    Temp   36.2 °C (97.2 °F)    Pulse   Pulse: (!) 105   Resp   15    SpO2   92 %      Anesthesia Post Evaluation    Patient location during evaluation: PACU  Patient participation: complete - patient participated  Level of consciousness: awake and alert  Pain score: 3    Airway patency: patent  Anesthetic complications: no  Cardiovascular status: hemodynamically stable  Respiratory status: acceptable  Hydration status: euvolemic    PONV: none           Nurse Pain Score: 0 (NPRS)

## 2020-10-29 LAB
BACTERIA WND AEROBE CULT: ABNORMAL
GRAM STN SPEC: ABNORMAL
SIGNIFICANT IND 70042: ABNORMAL
SITE SITE: ABNORMAL
SOURCE SOURCE: ABNORMAL

## 2020-11-25 LAB
FUNGUS SPEC CULT: NORMAL
SIGNIFICANT IND 70042: NORMAL
SITE SITE: NORMAL
SOURCE SOURCE: NORMAL

## 2020-12-16 ENCOUNTER — HOSPITAL ENCOUNTER (INPATIENT)
Facility: MEDICAL CENTER | Age: 80
LOS: 7 days | DRG: 853 | End: 2020-12-23
Attending: EMERGENCY MEDICINE | Admitting: HOSPITALIST
Payer: MEDICARE

## 2020-12-16 ENCOUNTER — APPOINTMENT (OUTPATIENT)
Dept: RADIOLOGY | Facility: MEDICAL CENTER | Age: 80
DRG: 853 | End: 2020-12-16
Attending: EMERGENCY MEDICINE
Payer: MEDICARE

## 2020-12-16 DIAGNOSIS — M00.861 ARTHRITIS OF RIGHT KNEE DUE TO OTHER BACTERIA (HCC): ICD-10-CM

## 2020-12-16 PROBLEM — J96.00 ACUTE RESPIRATORY FAILURE (HCC): Status: ACTIVE | Noted: 2020-12-16

## 2020-12-16 PROBLEM — J98.4 PNEUMONITIS: Status: ACTIVE | Noted: 2020-12-16

## 2020-12-16 PROBLEM — R13.12 OROPHARYNGEAL DYSPHAGIA: Status: ACTIVE | Noted: 2020-12-16

## 2020-12-16 LAB
ALBUMIN SERPL BCP-MCNC: 4.3 G/DL (ref 3.2–4.9)
ALBUMIN/GLOB SERPL: 1.4 G/DL
ALP SERPL-CCNC: 93 U/L (ref 30–99)
ALT SERPL-CCNC: 34 U/L (ref 2–50)
ANION GAP SERPL CALC-SCNC: 9 MMOL/L (ref 7–16)
APPEARANCE FLD: NORMAL
APPEARANCE UR: CLEAR
AST SERPL-CCNC: 30 U/L (ref 12–45)
BACTERIA #/AREA URNS HPF: NEGATIVE /HPF
BASOPHILS # BLD AUTO: 0.6 % (ref 0–1.8)
BASOPHILS # BLD: 0.08 K/UL (ref 0–0.12)
BILIRUB SERPL-MCNC: 1 MG/DL (ref 0.1–1.5)
BILIRUB UR QL STRIP.AUTO: NEGATIVE
BODY FLD TYPE: NORMAL
BODY FLD TYPE: NORMAL
BUN SERPL-MCNC: 22 MG/DL (ref 8–22)
CALCIUM SERPL-MCNC: 9.6 MG/DL (ref 8.5–10.5)
CHLORIDE SERPL-SCNC: 102 MMOL/L (ref 96–112)
CO2 SERPL-SCNC: 26 MMOL/L (ref 20–33)
COLOR FLD: YELLOW
COLOR UR: ABNORMAL
COVID ORDER STATUS COVID19: NORMAL
CREAT SERPL-MCNC: 1.59 MG/DL (ref 0.5–1.4)
CRYSTALS FLD MICRO: NORMAL
CSF COMMENTS 1658: NORMAL
EKG IMPRESSION: NORMAL
EOSINOPHIL # BLD AUTO: 0.02 K/UL (ref 0–0.51)
EOSINOPHIL NFR BLD: 0.1 % (ref 0–6.9)
EPI CELLS #/AREA URNS HPF: NEGATIVE /HPF
ERYTHROCYTE [DISTWIDTH] IN BLOOD BY AUTOMATED COUNT: 57.6 FL (ref 35.9–50)
FLUAV RNA SPEC QL NAA+PROBE: NEGATIVE
FLUBV RNA SPEC QL NAA+PROBE: NEGATIVE
GLOBULIN SER CALC-MCNC: 3.1 G/DL (ref 1.9–3.5)
GLUCOSE FLD-MCNC: 7 MG/DL
GLUCOSE SERPL-MCNC: 112 MG/DL (ref 65–99)
GLUCOSE UR STRIP.AUTO-MCNC: NEGATIVE MG/DL
GRAM STN SPEC: NORMAL
HCT VFR BLD AUTO: 42.1 % (ref 42–52)
HGB BLD-MCNC: 13.1 G/DL (ref 14–18)
HYALINE CASTS #/AREA URNS LPF: ABNORMAL /LPF
IMM GRANULOCYTES # BLD AUTO: 0.05 K/UL (ref 0–0.11)
IMM GRANULOCYTES NFR BLD AUTO: 0.4 % (ref 0–0.9)
KETONES UR STRIP.AUTO-MCNC: NEGATIVE MG/DL
LACTATE BLD-SCNC: 1.4 MMOL/L (ref 0.5–2)
LACTATE BLD-SCNC: 1.5 MMOL/L (ref 0.5–2)
LEUKOCYTE ESTERASE UR QL STRIP.AUTO: NEGATIVE
LYMPHOCYTES # BLD AUTO: 0.62 K/UL (ref 1–4.8)
LYMPHOCYTES NFR BLD: 4.6 % (ref 22–41)
LYMPHOCYTES NFR FLD: 3 %
MCH RBC QN AUTO: 32.1 PG (ref 27–33)
MCHC RBC AUTO-ENTMCNC: 31.1 G/DL (ref 33.7–35.3)
MCV RBC AUTO: 103.2 FL (ref 81.4–97.8)
MICRO URNS: ABNORMAL
MONOCYTES # BLD AUTO: 1.12 K/UL (ref 0–0.85)
MONOCYTES NFR BLD AUTO: 8.4 % (ref 0–13.4)
MONONUC CELLS NFR FLD: 1 %
NEUTROPHILS # BLD AUTO: 11.48 K/UL (ref 1.82–7.42)
NEUTROPHILS NFR BLD: 85.9 % (ref 44–72)
NEUTROPHILS NFR FLD: 96 %
NITRITE UR QL STRIP.AUTO: NEGATIVE
NRBC # BLD AUTO: 0 K/UL
NRBC BLD-RTO: 0 /100 WBC
PH UR STRIP.AUTO: 5 [PH] (ref 5–8)
PLATELET # BLD AUTO: 169 K/UL (ref 164–446)
PMV BLD AUTO: 9.7 FL (ref 9–12.9)
POTASSIUM SERPL-SCNC: 4.7 MMOL/L (ref 3.6–5.5)
PROCALCITONIN SERPL-MCNC: 0.29 NG/ML
PROT SERPL-MCNC: 7.4 G/DL (ref 6–8.2)
PROT UR QL STRIP: 30 MG/DL
RBC # BLD AUTO: 4.08 M/UL (ref 4.7–6.1)
RBC # FLD: 3000 CELLS/UL
RBC # URNS HPF: ABNORMAL /HPF
RBC UR QL AUTO: NEGATIVE
RSV RNA SPEC QL NAA+PROBE: NEGATIVE
SARS-COV-2 RNA RESP QL NAA+PROBE: NOTDETECTED
SIGNIFICANT IND 70042: NORMAL
SITE SITE: NORMAL
SODIUM SERPL-SCNC: 137 MMOL/L (ref 135–145)
SOURCE SOURCE: NORMAL
SP GR UR STRIP.AUTO: 1.02
SPECIMEN SOURCE: NORMAL
UROBILINOGEN UR STRIP.AUTO-MCNC: 0.2 MG/DL
WBC # BLD AUTO: 13.4 K/UL (ref 4.8–10.8)
WBC # FLD: 1773 CELLS/UL
WBC #/AREA URNS HPF: ABNORMAL /HPF

## 2020-12-16 PROCEDURE — 700102 HCHG RX REV CODE 250 W/ 637 OVERRIDE(OP): Performed by: STUDENT IN AN ORGANIZED HEALTH CARE EDUCATION/TRAINING PROGRAM

## 2020-12-16 PROCEDURE — 700111 HCHG RX REV CODE 636 W/ 250 OVERRIDE (IP): Performed by: EMERGENCY MEDICINE

## 2020-12-16 PROCEDURE — 700105 HCHG RX REV CODE 258: Performed by: HOSPITALIST

## 2020-12-16 PROCEDURE — 0HQ3XZZ REPAIR LEFT EAR SKIN, EXTERNAL APPROACH: ICD-10-PCS | Performed by: EMERGENCY MEDICINE

## 2020-12-16 PROCEDURE — 303353 HCHG DERMABOND SKIN ADHESIVE

## 2020-12-16 PROCEDURE — 20610 DRAIN/INJ JOINT/BURSA W/O US: CPT

## 2020-12-16 PROCEDURE — 36415 COLL VENOUS BLD VENIPUNCTURE: CPT

## 2020-12-16 PROCEDURE — 304217 HCHG IRRIGATION SYSTEM

## 2020-12-16 PROCEDURE — 87070 CULTURE OTHR SPECIMN AEROBIC: CPT

## 2020-12-16 PROCEDURE — 0241U HCHG SARS-COV-2 COVID-19 NFCT DS RESP RNA 4 TRGT MIC: CPT

## 2020-12-16 PROCEDURE — 96365 THER/PROPH/DIAG IV INF INIT: CPT

## 2020-12-16 PROCEDURE — 0S9C3ZX DRAINAGE OF RIGHT KNEE JOINT, PERCUTANEOUS APPROACH, DIAGNOSTIC: ICD-10-PCS | Performed by: EMERGENCY MEDICINE

## 2020-12-16 PROCEDURE — 87205 SMEAR GRAM STAIN: CPT

## 2020-12-16 PROCEDURE — 83605 ASSAY OF LACTIC ACID: CPT

## 2020-12-16 PROCEDURE — 87040 BLOOD CULTURE FOR BACTERIA: CPT

## 2020-12-16 PROCEDURE — C9803 HOPD COVID-19 SPEC COLLECT: HCPCS | Performed by: EMERGENCY MEDICINE

## 2020-12-16 PROCEDURE — 700102 HCHG RX REV CODE 250 W/ 637 OVERRIDE(OP): Performed by: HOSPITALIST

## 2020-12-16 PROCEDURE — 80053 COMPREHEN METABOLIC PANEL: CPT

## 2020-12-16 PROCEDURE — A9270 NON-COVERED ITEM OR SERVICE: HCPCS | Performed by: HOSPITALIST

## 2020-12-16 PROCEDURE — 700101 HCHG RX REV CODE 250: Performed by: EMERGENCY MEDICINE

## 2020-12-16 PROCEDURE — 700105 HCHG RX REV CODE 258: Performed by: EMERGENCY MEDICINE

## 2020-12-16 PROCEDURE — 85025 COMPLETE CBC W/AUTO DIFF WBC: CPT

## 2020-12-16 PROCEDURE — 70450 CT HEAD/BRAIN W/O DYE: CPT

## 2020-12-16 PROCEDURE — 700102 HCHG RX REV CODE 250 W/ 637 OVERRIDE(OP): Performed by: EMERGENCY MEDICINE

## 2020-12-16 PROCEDURE — 87186 SC STD MICRODIL/AGAR DIL: CPT

## 2020-12-16 PROCEDURE — 82945 GLUCOSE OTHER FLUID: CPT

## 2020-12-16 PROCEDURE — 99285 EMERGENCY DEPT VISIT HI MDM: CPT

## 2020-12-16 PROCEDURE — 87077 CULTURE AEROBIC IDENTIFY: CPT

## 2020-12-16 PROCEDURE — 84145 PROCALCITONIN (PCT): CPT

## 2020-12-16 PROCEDURE — 71045 X-RAY EXAM CHEST 1 VIEW: CPT

## 2020-12-16 PROCEDURE — 99223 1ST HOSP IP/OBS HIGH 75: CPT | Mod: AI | Performed by: HOSPITALIST

## 2020-12-16 PROCEDURE — 89051 BODY FLUID CELL COUNT: CPT

## 2020-12-16 PROCEDURE — A9270 NON-COVERED ITEM OR SERVICE: HCPCS | Performed by: STUDENT IN AN ORGANIZED HEALTH CARE EDUCATION/TRAINING PROGRAM

## 2020-12-16 PROCEDURE — 93005 ELECTROCARDIOGRAM TRACING: CPT | Performed by: EMERGENCY MEDICINE

## 2020-12-16 PROCEDURE — 81001 URINALYSIS AUTO W/SCOPE: CPT

## 2020-12-16 PROCEDURE — 72125 CT NECK SPINE W/O DYE: CPT

## 2020-12-16 PROCEDURE — 304999 HCHG REPAIR-SIMPLE/INTERMED LEVEL 1

## 2020-12-16 PROCEDURE — 770020 HCHG ROOM/CARE - TELE (206)

## 2020-12-16 PROCEDURE — A9270 NON-COVERED ITEM OR SERVICE: HCPCS | Performed by: EMERGENCY MEDICINE

## 2020-12-16 PROCEDURE — 89060 EXAM SYNOVIAL FLUID CRYSTALS: CPT

## 2020-12-16 RX ORDER — AZITHROMYCIN 250 MG/1
500 TABLET, FILM COATED ORAL DAILY
Status: DISCONTINUED | OUTPATIENT
Start: 2020-12-16 | End: 2020-12-16

## 2020-12-16 RX ORDER — ROSUVASTATIN CALCIUM 5 MG/1
5 TABLET, COATED ORAL EVERY EVENING
Status: DISCONTINUED | OUTPATIENT
Start: 2020-12-16 | End: 2020-12-16

## 2020-12-16 RX ORDER — GUAIFENESIN 600 MG/1
600 TABLET, EXTENDED RELEASE ORAL EVERY 12 HOURS
Status: DISCONTINUED | OUTPATIENT
Start: 2020-12-16 | End: 2020-12-16

## 2020-12-16 RX ORDER — BUPIVACAINE HYDROCHLORIDE AND EPINEPHRINE 5; 5 MG/ML; UG/ML
10 INJECTION, SOLUTION EPIDURAL; INTRACAUDAL; PERINEURAL ONCE
Status: COMPLETED | OUTPATIENT
Start: 2020-12-16 | End: 2020-12-16

## 2020-12-16 RX ORDER — AZITHROMYCIN 500 MG/5ML
500 INJECTION, POWDER, LYOPHILIZED, FOR SOLUTION INTRAVENOUS EVERY 24 HOURS
Status: DISCONTINUED | OUTPATIENT
Start: 2020-12-16 | End: 2020-12-17

## 2020-12-16 RX ORDER — ACETAMINOPHEN 650 MG/1
650 SUPPOSITORY RECTAL ONCE
Status: COMPLETED | OUTPATIENT
Start: 2020-12-16 | End: 2020-12-16

## 2020-12-16 RX ORDER — ACETAMINOPHEN 650 MG/1
650 SUPPOSITORY RECTAL EVERY 6 HOURS PRN
Status: DISPENSED | OUTPATIENT
Start: 2020-12-16 | End: 2020-12-17

## 2020-12-16 RX ORDER — BUDESONIDE AND FORMOTEROL FUMARATE DIHYDRATE 80; 4.5 UG/1; UG/1
2 AEROSOL RESPIRATORY (INHALATION) 2 TIMES DAILY
Status: DISCONTINUED | OUTPATIENT
Start: 2020-12-16 | End: 2020-12-23 | Stop reason: HOSPADM

## 2020-12-16 RX ORDER — CEPHALEXIN 500 MG/1
500 CAPSULE ORAL 3 TIMES DAILY
Status: ON HOLD | COMMUNITY
Start: 2020-12-07 | End: 2020-12-23

## 2020-12-16 RX ORDER — GABAPENTIN 300 MG/1
300 CAPSULE ORAL 3 TIMES DAILY
Status: DISCONTINUED | OUTPATIENT
Start: 2020-12-16 | End: 2020-12-16

## 2020-12-16 RX ORDER — ACETAMINOPHEN 325 MG/1
650 TABLET ORAL ONCE
Status: ACTIVE | OUTPATIENT
Start: 2020-12-16 | End: 2020-12-17

## 2020-12-16 RX ORDER — SODIUM CHLORIDE, SODIUM LACTATE, POTASSIUM CHLORIDE, CALCIUM CHLORIDE 600; 310; 30; 20 MG/100ML; MG/100ML; MG/100ML; MG/100ML
INJECTION, SOLUTION INTRAVENOUS CONTINUOUS
Status: DISCONTINUED | OUTPATIENT
Start: 2020-12-16 | End: 2020-12-18

## 2020-12-16 RX ORDER — SODIUM CHLORIDE, SODIUM LACTATE, POTASSIUM CHLORIDE, AND CALCIUM CHLORIDE .6; .31; .03; .02 G/100ML; G/100ML; G/100ML; G/100ML
500 INJECTION, SOLUTION INTRAVENOUS
Status: DISCONTINUED | OUTPATIENT
Start: 2020-12-16 | End: 2020-12-23 | Stop reason: HOSPADM

## 2020-12-16 RX ADMIN — ACETAMINOPHEN 650 MG: 650 SUPPOSITORY RECTAL at 14:24

## 2020-12-16 RX ADMIN — BUDESONIDE AND FORMOTEROL FUMARATE DIHYDRATE 2 PUFF: 80; 4.5 AEROSOL RESPIRATORY (INHALATION) at 18:43

## 2020-12-16 RX ADMIN — BUPIVACAINE HYDROCHLORIDE AND EPINEPHRINE 10 ML: 5; 5 INJECTION, SOLUTION EPIDURAL; INTRACAUDAL; PERINEURAL at 14:15

## 2020-12-16 RX ADMIN — ACETAMINOPHEN 650 MG: 650 SUPPOSITORY RECTAL at 22:14

## 2020-12-16 RX ADMIN — GLYCOPYRROLATE 1 CAPSULE: 15.6 CAPSULE RESPIRATORY (INHALATION) at 18:43

## 2020-12-16 RX ADMIN — CEFTRIAXONE SODIUM 2 G: 2 INJECTION, POWDER, FOR SOLUTION INTRAMUSCULAR; INTRAVENOUS at 15:39

## 2020-12-16 RX ADMIN — SODIUM CHLORIDE, POTASSIUM CHLORIDE, SODIUM LACTATE AND CALCIUM CHLORIDE: 600; 310; 30; 20 INJECTION, SOLUTION INTRAVENOUS at 18:44

## 2020-12-16 ASSESSMENT — ENCOUNTER SYMPTOMS
CARDIOVASCULAR NEGATIVE: 1
PSYCHIATRIC NEGATIVE: 1
COUGH: 1
SPUTUM PRODUCTION: 0
WEAKNESS: 1

## 2020-12-16 ASSESSMENT — FIBROSIS 4 INDEX
FIB4 SCORE: 2.6
FIB4 SCORE: 2.47

## 2020-12-16 NOTE — ED PROVIDER NOTES
ED Provider Note    CHIEF COMPLAINT  Chief Complaint   Patient presents with   • GLF       HPI  Barry Torres is a 80 y.o. male who presents for evaluation of ground-level fall with head injury.  Patient also has been feeling somewhat poorly with cough malaise and low-grade fever over the past several days.  He slumped out of bed and struck the back of his head.  There is no loss of consciousness she is not on any blood thinners apparently.  No associated seizures no numbness weakness tingling.  No reported pain or injury to the chest abdomen pelvis upper or lower extremities.  Patient has a known history of COPD.  He was started on oxygen per paramedics due to hypoxia.  He is not on home oxygen.    REVIEW OF SYSTEMS  See HPI for further details.  Positive for headache cough fever malaise all other systems are negative.     PAST MEDICAL HISTORY  Past Medical History:   Diagnosis Date   • Coronary artery calcification seen on CAT scan 8/2/2016    sees cards   • Lightheadedness 6/23/2016   • Orthostasis 6/23/2016    better off HCTZ   • Epididymitis 2009    h/o listed in chart   • Pleural plaque 2005     CT [place]   • Abnormal thyroid stimulating hormone (TSH) level    • Allergic rhinitis    • Asbestosis (HCC)    • Asthma    • Bronchitis    • Chronic diarrhea     declines eval; takes immodium once a day usually and sometimes less; see previous notes; aware of risk    • Chronic low back pain    • Chronic lung disease     asbestos related   • CKD (chronic kidney disease), stage III     sees neph, one kidney   • COPD (chronic obstructive pulmonary disease) (HCC)    • GERD (gastroesophageal reflux disease)    • History of hemorrhoids    • History of skin cancer     non melanoma   • Cahto (hard of hearing)     declines hearing aids   • Hypercholesteremia    • Hypertension    • Hypertriglyceridemia    • Indigestion    • Light headedness     2/2 orthostasis? 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        FAMILY HISTORY  Noncontributory    SOCIAL HISTORY  Social History     Socioeconomic History   • Marital status:      Spouse name: Not on file   • Number of children: Not on file   • Years of education: Not on file   • Highest education level: Not on file   Occupational History   • Not on file   Social Needs   • Financial resource strain: Not on file   • Food insecurity     Worry: Not on file     Inability: Not on file   • Transportation needs     Medical: Not on file     Non-medical: Not on file   Tobacco Use   • Smoking status: Former Smoker     Packs/day: 1.00     Years: 40.00     Pack years: 40.00     Types: Cigarettes     Quit date: 1980     Years since quittin.9   • Smokeless tobacco: Never Used   • Tobacco comment: 1 pk a day for 40 yrs   Substance and Sexual Activity   • Alcohol use: No     Alcohol/week: 0.0 oz   • Drug use: No   • Sexual activity: Never     Partners: Female     Comment: retired    Lifestyle   • Physical activity     Days per week: Not on file     Minutes per session: Not on file   • Stress: Not on file   Relationships   • Social connections     Talks on phone: Not on file     Gets together: Not on file     Attends Cheondoism service: Not on file     Active member of club or organization: Not on file     Attends meetings of clubs or organizations: Not on file     Relationship status: Not on file   • Intimate partner violence     Fear of current or ex partner: Not on file     Emotionally abused: Not on file     Physically abused: Not on file     Forced sexual activity: Not on file   Other  Topics Concern   • Not on file   Social History Narrative    See H&P    Lives with wife       SURGICAL HISTORY  Past Surgical History:   Procedure Laterality Date   • IRRIGATION & DEBRIDEMENT ORTHO Right 10/27/2020    Procedure: IRRIGATION AND DEBRIDEMENT, WOUND - KNEE OPEN;  Surgeon: Elijah Faulkner M.D.;  Location: SURGERY [facility];  Service: Orthopedics   • FLEXOR TENDON REPAIR Left 4/3/2019    Procedure: FLEXOR TENDON REPAIR- SMALL FINGER VS;  Surgeon: Marc Chowdhury M.D.;  Location: SURGERY [facility];  Service: Orthopedics   • TENDON TRANSFER Left 4/3/2019    Procedure: TENDON TRANSFER;  Surgeon: Marc Chowdhury M.D.;  Location: SURGERY [facility];  Service: Orthopedics   • COLONOSCOPY  11/10/2018    Procedure: COLONOSCOPY;  Surgeon: Ganesh Peacock M.D.;  Location: [facility];  Service: Gastroenterology   • NASAL POLYPECTOMY Bilateral 10/6/2015    Procedure: NASAL POLYPECTOMY WITH SCOTT ENDOSCOPIC NASAL DEBRIDEMENT;  Surgeon: Param Maurer M.D.;  Location: SURGERY SAME DAY [facility];  Service:    • CYSTOSCOPY  2/1/2010    Performed by ANGIE VERDUZCO at [facility]   • RETROGRADES  2/1/2010    Performed by ANGIE VERDUZCO at [facility]   • PYELOGRAM  2/1/2010    Performed by ANGIE VERDUZCO at [facility]   • URETEROSCOPY  2/1/2010    Performed by ANGIE VERDUZCO at [facility]   • NEPHRECTOMY LAPAROSCOPIC  2009    left kidney   • TESTICLE EXPLORATION  2004   • PB REMV 2ND CATARACT,CORN-SCLER SECTN     • TONSILLECTOMY         CURRENT MEDICATIONS    Current Facility-Administered Medications:   •  bupivacaine-0.5%-epinephrine 1:[id number] PF (MARCAINE/SENSORCAINE) injection 10 mL, 10 mL, Injection, Once, Arcadio Felix M.D.  •  acetaminophen (Tylenol) tablet 650 mg, 650 mg, Oral, Once, Arcadio Felix M.D., Stopped at 12/16/20 1358  •  acetaminophen (TYLENOL) suppository 650 mg, 650 mg,  Rectal, Once, Arcadio Felix M.D.    Current Outpatient Medications:   •  sulfamethoxazole-trimethoprim (BACTRIM DS) 800-160 MG tablet, Take 1 Tab by mouth 2 times a day., Disp: 14 Tab, Rfl: 0  •  Calcium Carbonate Antacid 1000 MG Chew Tab, TUMS CHEW, Disp: , Rfl:   •  clotrimazole-betamethasone (LOTRISONE) 1-0.05 % Cream, CLOTRIMAZOLE-BETAMETHASONE 1-0.05 % CREA, Disp: , Rfl:   •  fluticasone (FLONASE) 50 MCG/ACT nasal spray, FLONASE ALLERGY RELIEF SUSP, Disp: , Rfl:   •  guaiFENesin LA (MUCINEX) 600 MG TABLET SR 12 HR, MUCINEX 600 MG XR12H-TAB, Disp: , Rfl:   •  Loperamide HCl (IMODIUM A-D PO), IMODIUM A-D TABS, Disp: , Rfl:   •  Magnesium 250 MG Tab, MAGNESIUM 250 MG TABS, Disp: , Rfl:   •  Budesonide-Formoterol Fumarate (SYMBICORT INH), SYMBICORT AERO, Disp: , Rfl:   •  certolizumab pegol (CIMZIA PREFILLED) 2 X 200 MG/ML Kit, CIMZIA PREFILLED 2 X 200 MG/ML KIT, Disp: , Rfl:   •  fexofenadine (ALLEGRA ALLERGY) 180 MG tablet, ALLEGRA ALLERGY 180 MG TABS, Disp: , Rfl:   •  gabapentin (NEURONTIN) 300 MG Cap, TAKE 1 CAPSULE BY MOUTH THREE TIMES A DAY, Disp: 180 Cap, Rfl: 0  •  rosuvastatin (CRESTOR) 5 MG Tab, TAKE 1 TABLET BY MOUTH EVERY EVENING, Disp: 90 Tab, Rfl: 1  •  acetaminophen (TYLENOL) 500 MG Tab, Take 1,000 mg by mouth every 6 hours as needed for Moderate Pain., Disp: , Rfl:   •  tiotropium (SPIRIVA) 18 MCG Cap, Inhale 1 Cap by mouth every day., Disp: 30 Cap, Rfl: 3  •  leflunomide (ARAVA) 20 MG Tab, Take 1 Tab by mouth every day., Disp: 30 Tab, Rfl: 0  •  certolizumab pegol (CIMZIA PREFILLED) 2 X 200 MG/ML Kit, Inject 200 mg as instructed every 14 days., Disp: 1 Kit, Rfl: 0  •  Probiotic Product (PROBIOTIC PO), Take 1 Cap by mouth 2 Times a Day., Disp: , Rfl:   •  Multiple Vitamin (MULTIVITAMIN PO), Take 1 Tab by mouth every day., Disp: , Rfl:       ALLERGIES  Allergies   Allergen Reactions   • Ciprofloxacin      Other reaction(s): muscle aches   • Levaquin      Other reaction(s): Muscle aches  Muscle  "aches  Other reaction(s): Muscle aches   • Penicillins Hives     Rash and asthma attack when a child - wife states he has had Keflex in the past and tolerated   • Ciprofloxacin Hcl Unspecified     MUSCLE ACHES   • Levofloxacin Unspecified     MUSCLE ACHES       PHYSICAL EXAM  VITAL SIGNS: BP (!) 177/89   Pulse (!) 116   Temp 38 °C (100.4 °F) (Temporal)   Resp (!) 21   Ht 1.854 m (6' 1\")   Wt 95.3 kg (210 lb)   SpO2 92%   BMI 27.71 kg/m²       Constitutional: Ill-appearing  HENT: 2 cm laceration on the posterior aspect of the left ear, Bilateral external ears normal, Oropharynx moist, No oral exudates, Nose normal.   Eyes: PERRLA, EOMI, Conjunctiva normal, No discharge.   Neck: Normal range of motion, No tenderness, Supple, No stridor.   Cardiovascular: Tachycardic normal rhythm, No murmurs, No rubs, No gallops.   Thorax & Lungs: Bibasilar rhonchi.   Abdomen: Bowel sounds normal, Soft, No tenderness, No masses, No pulsatile masses.   Skin: Warm, Dry, No erythema, No rash.   Back: No tenderness, No CVA tenderness.   Extremities: Intact distal pulses, No edema, No tenderness, No cyanosis, No clubbing.   Musculoskeletal: Good range of motion in all major joints. No tenderness to palpation or major deformities noted.   Neurologic: Alert & oriented x 3, Normal motor function, Normal sensory function, No focal deficits noted.   Psychiatric: Sluggish    DX-CHEST-PORTABLE (1 VIEW)   Final Result      Stable chest without acute/new abnormality.      CT-CSPINE WITHOUT PLUS RECONS   Final Result      No acute fracture or traumatic listhesis in the cervical spine.      CT-HEAD W/O   Final Result      1.  Generalized atrophy and chronic microvascular ischemic type changes.   2.  No acute intracranial abnormality.   3.  Sinus disease.   4.  Small left parietal scalp contusion.            Results for orders placed or performed during the hospital encounter of 12/16/20   CBC WITH DIFFERENTIAL   Result Value Ref Range    WBC " 13.4 (H) 4.8 - 10.8 K/uL    RBC 4.08 (L) 4.70 - 6.10 M/uL    Hemoglobin 13.1 (L) 14.0 - 18.0 g/dL    Hematocrit 42.1 42.0 - 52.0 %    .2 (H) 81.4 - 97.8 fL    MCH 32.1 27.0 - 33.0 pg    MCHC 31.1 (L) 33.7 - 35.3 g/dL    RDW 57.6 (H) 35.9 - 50.0 fL    Platelet Count 169 164 - 446 K/uL    MPV 9.7 9.0 - 12.9 fL    Neutrophils-Polys 85.90 (H) 44.00 - 72.00 %    Lymphocytes 4.60 (L) 22.00 - 41.00 %    Monocytes 8.40 0.00 - 13.40 %    Eosinophils 0.10 0.00 - 6.90 %    Basophils 0.60 0.00 - 1.80 %    Immature Granulocytes 0.40 0.00 - 0.90 %    Nucleated RBC 0.00 /100 WBC    Neutrophils (Absolute) 11.48 (H) 1.82 - 7.42 K/uL    Lymphs (Absolute) 0.62 (L) 1.00 - 4.80 K/uL    Monos (Absolute) 1.12 (H) 0.00 - 0.85 K/uL    Eos (Absolute) 0.02 0.00 - 0.51 K/uL    Baso (Absolute) 0.08 0.00 - 0.12 K/uL    Immature Granulocytes (abs) 0.05 0.00 - 0.11 K/uL    NRBC (Absolute) 0.00 K/uL   COVID/SARS CoV-2 PCR    Specimen: Nasopharyngeal; Respirate   Result Value Ref Range    COVID Order Status Received    CoV-2, Flu A/B, And RSV by PCR   Result Value Ref Range    SARS-CoV-2 Source NP Swab    EKG   Result Value Ref Range    Report       [facility]    Test Date:  2020  Pt Name:    FIDELIA MERA             Department: ER  MRN:        [id number]                      Room:        17  Gender:     Male                         Technician: [id number]  :        [id number]                   Requested By:ER TRIAGE PROTOCOL  Order #:    [id number]                    Reading MD:    Measurements  Intervals                                Axis  Rate:       110                          P:          0  MI:         136                          QRS:        -11  QRSD:       98                           T:          128  QT:         328  QTc:        444    Interpretive Statements  SINUS TACHYCARDIA  NONSPECIFIC T ABNORMALITIES, LATERAL LEADS  Compared to ECG 2020 08:41:46  Wandering atrial pacemaker no  longer present  T-wave abnormality still present        COURSE & MEDICAL DECISION MAKING  Pertinent Labs & Imaging studies reviewed. (See chart for details)  Physician procedure: Diagnostic arthrocentesis of right knee.  Indication rule out septic joint.  The right lateral knee was prepped with Betadine x3.  A total of 4 cc of 0.5% bupivacaine with epinephrine was infiltrated into the skin and deeper tissues for anesthesia.  A sterile 25-gauge needle was inserted on the lateral aspect of the knee.  10 cc of clear yellow nonturbid fluid was aspirated no complications    Patient procedure: 2 cm left posterior ear laceration.  The wound was anesthetized with a total of 2 cc of 0.5% bupivacaine with epinephrine.  Wound was gently irrigated.  Wound was well approximated and amenable to Dermabond closure.  2 coats of Dermabond were applied to the wound with good cosmetic closure    Patient originally presented here as a code TBI due to the fact that he slumped over and hit his head.  His CT scan of the head and cervical spine are unremarkable.  Perhaps more concerning the patient here is a fever and profound hypoxia.  His chest x-ray is clear but on exam he has some rhonchi.  Covid test is negative but he has some lymphopenia concerning that this could be a false negative.  He has leukocytosis as well with a slightly elevated procalcitonin.  Blood and urine cultures are pending.  The knee was aspirated and it does not suggest septic joint.  Patient does have cough and hypoxia therefore he will be treated with antibiotics to cover for community-acquired pneumonia.  FINAL IMPRESSION  1.  Closed head injury  2.  Community-acquired pneumonia with hypoxia  3.  Left ear laceration  4.  Right knee effusion nonseptic         Electronically signed by: Arcadio Felix M.D., 12/16/2020 1:43 PM

## 2020-12-16 NOTE — ED TRIAGE NOTES
Barry Torres  80 y.o. male  Chief Complaint   Patient presents with   • GLF     Patient is a TBI alert. Brought in by EMS for a GLF, slid out of bed and hit the left side of his head. Red bump noted to left temporal head and small laceration noted to behind left ear. Patient transported to CT and roomed to [facility].

## 2020-12-17 PROBLEM — J18.9 PNEUMONIA: Status: ACTIVE | Noted: 2020-12-17

## 2020-12-17 PROBLEM — M25.461 EFFUSION OF BURSA OF RIGHT KNEE: Status: ACTIVE | Noted: 2020-12-17

## 2020-12-17 PROBLEM — J98.4 PNEUMONITIS: Status: RESOLVED | Noted: 2020-12-16 | Resolved: 2020-12-17

## 2020-12-17 PROBLEM — Z98.890 HX OF RIGHT KNEE SURGERY: Status: ACTIVE | Noted: 2020-12-17

## 2020-12-17 LAB
ANION GAP SERPL CALC-SCNC: 10 MMOL/L (ref 7–16)
APPEARANCE FLD: NORMAL
APTT PPP: 47.3 SEC (ref 24.7–36)
BASOPHILS NFR FLD: 1 %
BODY FLD TYPE: NORMAL
BODY FLD TYPE: NORMAL
BUN SERPL-MCNC: 21 MG/DL (ref 8–22)
CALCIUM SERPL-MCNC: 8.7 MG/DL (ref 8.5–10.5)
CHLORIDE SERPL-SCNC: 103 MMOL/L (ref 96–112)
CO2 SERPL-SCNC: 23 MMOL/L (ref 20–33)
COLOR FLD: YELLOW
CREAT SERPL-MCNC: 1.78 MG/DL (ref 0.5–1.4)
ERYTHROCYTE [DISTWIDTH] IN BLOOD BY AUTOMATED COUNT: 54.9 FL (ref 35.9–50)
GLUCOSE FLD-MCNC: <2 MG/DL
GLUCOSE SERPL-MCNC: 121 MG/DL (ref 65–99)
GRAM STN SPEC: NORMAL
HCT VFR BLD AUTO: 35.1 % (ref 42–52)
HGB BLD-MCNC: 11.1 G/DL (ref 14–18)
INR PPP: 1.23 (ref 0.87–1.13)
LACTATE BLD-SCNC: 1.3 MMOL/L (ref 0.5–2)
LACTATE BLD-SCNC: 1.8 MMOL/L (ref 0.5–2)
LYMPHOCYTES NFR FLD: 1 %
MCH RBC QN AUTO: 31.7 PG (ref 27–33)
MCHC RBC AUTO-ENTMCNC: 31.6 G/DL (ref 33.7–35.3)
MCV RBC AUTO: 100.3 FL (ref 81.4–97.8)
NEUTROPHILS NFR FLD: 98 %
PLATELET # BLD AUTO: 168 K/UL (ref 164–446)
PMV BLD AUTO: 10 FL (ref 9–12.9)
POTASSIUM SERPL-SCNC: 4.3 MMOL/L (ref 3.6–5.5)
PROTHROMBIN TIME: 15.9 SEC (ref 12–14.6)
RBC # BLD AUTO: 3.5 M/UL (ref 4.7–6.1)
RBC # FLD: 2000 CELLS/UL
SIGNIFICANT IND 70042: NORMAL
SITE SITE: NORMAL
SODIUM SERPL-SCNC: 136 MMOL/L (ref 135–145)
SOURCE SOURCE: NORMAL
WBC # BLD AUTO: 12 K/UL (ref 4.8–10.8)
WBC # FLD: NORMAL CELLS/UL

## 2020-12-17 PROCEDURE — 89051 BODY FLUID CELL COUNT: CPT

## 2020-12-17 PROCEDURE — 85610 PROTHROMBIN TIME: CPT

## 2020-12-17 PROCEDURE — 85730 THROMBOPLASTIN TIME PARTIAL: CPT

## 2020-12-17 PROCEDURE — 85027 COMPLETE CBC AUTOMATED: CPT

## 2020-12-17 PROCEDURE — A9270 NON-COVERED ITEM OR SERVICE: HCPCS | Performed by: STUDENT IN AN ORGANIZED HEALTH CARE EDUCATION/TRAINING PROGRAM

## 2020-12-17 PROCEDURE — 0S9C3ZX DRAINAGE OF RIGHT KNEE JOINT, PERCUTANEOUS APPROACH, DIAGNOSTIC: ICD-10-PCS | Performed by: INTERNAL MEDICINE

## 2020-12-17 PROCEDURE — 700102 HCHG RX REV CODE 250 W/ 637 OVERRIDE(OP): Performed by: STUDENT IN AN ORGANIZED HEALTH CARE EDUCATION/TRAINING PROGRAM

## 2020-12-17 PROCEDURE — 99233 SBSQ HOSP IP/OBS HIGH 50: CPT | Mod: GC,25 | Performed by: INTERNAL MEDICINE

## 2020-12-17 PROCEDURE — 700111 HCHG RX REV CODE 636 W/ 250 OVERRIDE (IP): Performed by: STUDENT IN AN ORGANIZED HEALTH CARE EDUCATION/TRAINING PROGRAM

## 2020-12-17 PROCEDURE — 20610 DRAIN/INJ JOINT/BURSA W/O US: CPT | Mod: GC | Performed by: INTERNAL MEDICINE

## 2020-12-17 PROCEDURE — 36415 COLL VENOUS BLD VENIPUNCTURE: CPT

## 2020-12-17 PROCEDURE — 87077 CULTURE AEROBIC IDENTIFY: CPT

## 2020-12-17 PROCEDURE — 82945 GLUCOSE OTHER FLUID: CPT

## 2020-12-17 PROCEDURE — 700105 HCHG RX REV CODE 258: Performed by: HOSPITALIST

## 2020-12-17 PROCEDURE — 700105 HCHG RX REV CODE 258: Performed by: STUDENT IN AN ORGANIZED HEALTH CARE EDUCATION/TRAINING PROGRAM

## 2020-12-17 PROCEDURE — 700111 HCHG RX REV CODE 636 W/ 250 OVERRIDE (IP): Performed by: HOSPITALIST

## 2020-12-17 PROCEDURE — 83605 ASSAY OF LACTIC ACID: CPT

## 2020-12-17 PROCEDURE — 700102 HCHG RX REV CODE 250 W/ 637 OVERRIDE(OP): Performed by: HOSPITALIST

## 2020-12-17 PROCEDURE — 87070 CULTURE OTHR SPECIMN AEROBIC: CPT

## 2020-12-17 PROCEDURE — A9270 NON-COVERED ITEM OR SERVICE: HCPCS | Performed by: HOSPITALIST

## 2020-12-17 PROCEDURE — 87205 SMEAR GRAM STAIN: CPT

## 2020-12-17 PROCEDURE — 80048 BASIC METABOLIC PNL TOTAL CA: CPT

## 2020-12-17 PROCEDURE — 92610 EVALUATE SWALLOWING FUNCTION: CPT

## 2020-12-17 PROCEDURE — 770020 HCHG ROOM/CARE - TELE (206)

## 2020-12-17 RX ORDER — ACETAMINOPHEN 325 MG/1
650 TABLET ORAL EVERY 4 HOURS PRN
Status: DISCONTINUED | OUTPATIENT
Start: 2020-12-17 | End: 2020-12-23 | Stop reason: HOSPADM

## 2020-12-17 RX ADMIN — CEFEPIME 2 G: 2 INJECTION, POWDER, FOR SOLUTION INTRAVENOUS at 15:52

## 2020-12-17 RX ADMIN — BUDESONIDE AND FORMOTEROL FUMARATE DIHYDRATE 2 PUFF: 80; 4.5 AEROSOL RESPIRATORY (INHALATION) at 17:29

## 2020-12-17 RX ADMIN — ACETAMINOPHEN 650 MG: 325 TABLET, FILM COATED ORAL at 18:23

## 2020-12-17 RX ADMIN — GLYCOPYRROLATE 1 CAPSULE: 15.6 CAPSULE RESPIRATORY (INHALATION) at 19:31

## 2020-12-17 RX ADMIN — BUDESONIDE AND FORMOTEROL FUMARATE DIHYDRATE 2 PUFF: 80; 4.5 AEROSOL RESPIRATORY (INHALATION) at 06:12

## 2020-12-17 RX ADMIN — GLYCOPYRROLATE 1 CAPSULE: 15.6 CAPSULE RESPIRATORY (INHALATION) at 10:38

## 2020-12-17 RX ADMIN — AZITHROMYCIN FOR INJECTION INJECTION, POWDER, LYOPHILIZED, FOR SOLUTION 500 MG: 500 INJECTION INTRAVENOUS at 01:56

## 2020-12-17 RX ADMIN — CEFTRIAXONE SODIUM 2 G: 2 INJECTION, POWDER, FOR SOLUTION INTRAMUSCULAR; INTRAVENOUS at 06:15

## 2020-12-17 RX ADMIN — CEFEPIME 2 G: 2 INJECTION, POWDER, FOR SOLUTION INTRAVENOUS at 22:29

## 2020-12-17 ASSESSMENT — ENCOUNTER SYMPTOMS
PALPITATIONS: 0
MYALGIAS: 1
HEADACHES: 0
HEARTBURN: 0
NAUSEA: 0
ABDOMINAL PAIN: 0
WEAKNESS: 0
CHILLS: 0
DIARRHEA: 0
CONSTIPATION: 0
DOUBLE VISION: 0
LOSS OF CONSCIOUSNESS: 0
DIAPHORESIS: 0
SHORTNESS OF BREATH: 0
BLURRED VISION: 0
FEVER: 0
COUGH: 0
BRUISES/BLEEDS EASILY: 0
DEPRESSION: 0
VOMITING: 0
SPUTUM PRODUCTION: 0

## 2020-12-17 ASSESSMENT — PATIENT HEALTH QUESTIONNAIRE - PHQ9
SUM OF ALL RESPONSES TO PHQ9 QUESTIONS 1 AND 2: 0
2. FEELING DOWN, DEPRESSED, IRRITABLE, OR HOPELESS: NOT AT ALL
1. LITTLE INTEREST OR PLEASURE IN DOING THINGS: NOT AT ALL

## 2020-12-17 ASSESSMENT — PAIN DESCRIPTION - PAIN TYPE: TYPE: ACUTE PAIN

## 2020-12-17 ASSESSMENT — FIBROSIS 4 INDEX: FIB4 SCORE: 2.48

## 2020-12-17 NOTE — PROGRESS NOTES
Daily Progress Note:     Date of Service: 12/17/2020  Primary Team: [facility] Team   Attending: Bradley Malave M.D.   Senior Resident: Dr. Treadwell  Intern: Dr. Downing  Contact:  [phone]    Chief Complaint: GLF    Subjective:   NAEO  Patient is resting in bed comfortably. He denies having any SOB, fever, chills, chest pain, N/V, abdominal pain, lower extremity pain.  He reports that he uses oxygen of 2L at night, and occasional as needed use during the day.  Otherwise no acute complaints at this time  Discussed with patient repeating the arthrocentesis for fluid analyses to confirm the results from his previous joint aspiration.  Completed another bedside swallow this AM and was started on a modified diet. Will continue to monitor for additional evaluation and advancement.    Consultants/Specialty:  Orthopedic Surgery    Review of Systems:    Review of Systems   Constitutional: Negative for chills, diaphoresis and fever.   HENT: Negative for hearing loss.    Eyes: Negative for blurred vision.   Respiratory: Negative for cough, sputum production and shortness of breath.    Cardiovascular: Negative for chest pain, palpitations and leg swelling.   Gastrointestinal: Negative for abdominal pain, constipation, diarrhea, nausea and vomiting.   Genitourinary: Negative for dysuria and hematuria.   Musculoskeletal: Negative for joint pain.   Skin: Negative for rash.   Neurological: Negative for loss of consciousness, weakness and headaches.   Psychiatric/Behavioral: Negative for depression.       Objective Data:   Physical Exam:   Vitals:   Temp:  [35.8 °C (96.5 °F)-38.4 °C (101.2 °F)] 37 °C (98.6 °F)  Pulse:  [] 87  Resp:  [15-22] 16  BP: (122-185)/(69-93) 130/74  SpO2:  [93 %-98 %] 95 %    Physical Exam  Vitals signs and nursing note reviewed.   Constitutional:       General: He is not in acute distress.     Appearance: He is not diaphoretic.   HENT:      Head: Normocephalic and atraumatic.      Nose: Nose  normal.      Mouth/Throat:      Mouth: Mucous membranes are moist.      Pharynx: Oropharynx is clear.   Eyes:      General: No scleral icterus.     Extraocular Movements: Extraocular movements intact.      Conjunctiva/sclera: Conjunctivae normal.      Pupils: Pupils are equal, round, and reactive to light.   Neck:      Musculoskeletal: Neck supple.   Cardiovascular:      Rate and Rhythm: Normal rate and regular rhythm.      Pulses: Normal pulses.      Heart sounds: No murmur. No friction rub. No gallop.    Pulmonary:      Effort: Pulmonary effort is normal. No respiratory distress.      Breath sounds: No wheezing, rhonchi or rales.   Abdominal:      General: Abdomen is flat. There is no distension.      Palpations: Abdomen is soft.      Tenderness: There is no abdominal tenderness. There is no guarding.   Musculoskeletal:         General: Swelling present.      Left lower leg: No edema.      Comments: Right knee joint with swelling and fluid accumulation. Surround erythema and swelling of entire right lower leg compared to left, warm to touch. Both surgical sites are C/D/I.   Skin:     General: Skin is warm and dry.      Capillary Refill: Capillary refill takes less than 2 seconds.   Neurological:      General: No focal deficit present.      Mental Status: He is alert and oriented to person, place, and time.   Psychiatric:         Mood and Affect: Mood normal.         Labs:   Recent Labs     12/16/20  1334 12/17/20  0109   WBC 13.4* 12.0*   RBC 4.08* 3.50*   HEMOGLOBIN 13.1* 11.1*   HEMATOCRIT 42.1 35.1*   .2* 100.3*   MCH 32.1 31.7   RDW 57.6* 54.9*   PLATELETCT 169 168   MPV 9.7 10.0   NEUTSPOLYS 85.90*  --    LYMPHOCYTES 4.60*  --    MONOCYTES 8.40  --    EOSINOPHILS 0.10  --    BASOPHILS 0.60  --       Recent Labs     12/16/20  1418 12/17/20  0109   SODIUM 137 136   POTASSIUM 4.7 4.3   CHLORIDE 102 103   CO2 26 23   GLUCOSE 112* 121*   BUN 22 21      Recent Labs     12/16/20  1418 12/17/20  0109   ALBUMIN  4.3  --    TBILIRUBIN 1.0  --    ALKPHOSPHAT 93  --    TOTPROTEIN 7.4  --    ALTSGPT 34  --    ASTSGOT 30  --    CREATININE 1.59* 1.78*        Imaging:   DX-CHEST-PORTABLE (1 VIEW)   Final Result      Stable chest without acute/new abnormality.      CT-CSPINE WITHOUT PLUS RECONS   Final Result      No acute fracture or traumatic listhesis in the cervical spine.      CT-HEAD W/O   Final Result      1.  Generalized atrophy and chronic microvascular ischemic type changes.   2.  No acute intracranial abnormality.   3.  Sinus disease.   4.  Small left parietal scalp contusion.        Problem Representation    Patient is a 79 y/o M with PMH of COPD on noctural O2 2L, CKD, HLD, RA, peripheral neuropathy, s/p right knee prepatellar bursa and lower leg cysts with debridement latest in 10/2020 presenting after a ground level fall and head trauma with CT head negative for any bleed also found to be septic with acute hypoxic respiratory failure requiring 2L O2 at all times combined with a failed bedside swallow in ED with source of infection most likely community acquired vs aspiration pneumonia along with fluid analysis of right knee aspiration in ED growing Pseudomonas. Admitted to telemetry with treatment of IV antibiotics and continued oxygen supplementation.      Sepsis (HCC)- (present on admission)  Assessment & Plan  This is Sepsis Present on admission  SIRS criteria identified on my evaluation include: Fever, with temperature greater than 101 deg F  Source is possible aspiration pneumonia vs infected right knee joint  Sepsis protocol initiated  Fluid resuscitation ordered per protocol  IV antibiotics as appropriate for source of sepsis  While organ dysfunction may be noted elsewhere in this problem list or in the chart, degree of organ dysfunction does not meet CMS criteria for severe sepsis    Plan:  Failed bedside swallow evaluation in ED - risk for aspiration PNA in setting of acute hypoxic respiratory failure  Fluid  culture from right knee showing light growth of Pseudomonas  Switching IV abx regimen from C3/Azithro --> Cefepime for Pseudomonal coverage (Hx of PCN allergy therefore not Pip/Tazo, and pt tolerated C3)    Acute respiratory failure (HCC)  Assessment & Plan  Requiring Oxygen 2L at all times to maintain sats > 90  Home O2 is 2L only at night    Plan:  Treat underlying etiology as pneumonia with IV abx  Continue to wean off oxygen as tolerated    Pneumonia  Assessment & Plan  Patient on 2L O2 only at night - however requiring 2L at rest during day on admission  Febrile, tachycardic, and hypertensive on admission  Procal - mild elevation 0.29  Failed bedside swallow eval - risk for aspiration pneumonia    Plan:  Switching IV abx to Cefepime from C3/Azithro  Continue to monitor response    Effusion of bursa of right knee  Assessment & Plan  S/p multiple procedures on Right knee - last 10/27/20 by Dr. Faulkner  S/p arthrocentesis in ED on 12/16  Fluid cultures from 12/16 showing light growth of Pseudomonas, however other fluid analysis, gram stain as well as physical examination of joint doesn't appear consistent with a infected/septic joint    Plan:  Repeating arthrocentesis today  Will send for fluid cell count, gram stain and culture, and glucose  Continue Cefepime as IV antibiotic in setting of EMR documented PCN allergy  Ortho consulted and will see pt      Oropharyngeal dysphagia- (present on admission)  Assessment & Plan  Completed another bedside swallow evaluation    Plan:  Diet: Level 6: Soft and bite sized  Liquid: Level 2: Mildly thick  If continuing to have any issues will order for formal swallow eval with SLP    Hypertension  Assessment & Plan  BP have been elevated since admission, up to 185/87  Not on any home anti-hypertensives    Plan:  Creatinine trending upward from baseline, will continue to monitor  Will consider starting on ACE/ARB for BP control with resolution of uptrending creatinine    Hx of  right knee surgery- (present on admission)  Assessment & Plan  Last procedure in 10/2020 - for irrigation and debridement after having previous operations for prepatellar bursa and lower leg cyst  S/p arthrocentesis in ED on 12/16  Still retains a lot of fluid in the right knee joint space    Plan:  Plan for repeat arthrocentesis and send for fluid analysis and culture gram stain/cx to verify results from previous sample  Pain control  Ortho consulted and will see pt  Previously on Cephalexin and Bactrim outpatient PTA    CKD (chronic kidney disease) stage 3, GFR 30-59 ml/min- (present on admission)  Assessment & Plan  Cr baseline 1.59 --> today 1.74  CrCl about 36-38    Plan:  Renall dose all medications  Avoid nephrotoxins  Continue to monitor BMP's   Will consider addition of ACE/ARB for hypertensive management once rule out KERRY    Chronic obstructive pulmonary disease (HCC)- (present on admission)  Assessment & Plan  Not in acute exacerbation - no increase in cough or sputum production, no wheezing    Plan:  Cont home Symbicort  Continue glycopyrrolate

## 2020-12-17 NOTE — ED NOTES
Norma GONZALEZ transporting pt to T804-01. Pt being transported on hospital bed with infusion running per MAR on 2 L oxygen NC.

## 2020-12-17 NOTE — ED NOTES
Med rec complete per interview with pt at bedside. Pt was unsure of some of the medications he was taking, called home pharmacy for clarification. Allergies reviewed. Per pharmacy, pt was started on a 7 day course of keflex 500 mg 2 caps tid on 10/27, pt was then put on keflex 500 mg 1 cap qid on 11/5 and has had recurrent keflex scripts every 7 days until his most recent 7 day script on 12/7. Pt states that he has been taking keflex tid not qid and took his most recent dose this morning.

## 2020-12-17 NOTE — ASSESSMENT & PLAN NOTE
Resolved  Blood culture negative  R knee aspiration culture, Pseudomonas  Lactate, 1.8; procalcitonin elevated  Source likely Right knee septic arthritis  Continue cefepime, ID following

## 2020-12-17 NOTE — ASSESSMENT & PLAN NOTE
BP have been elevated since admission, up to 185/87  Not on any home anti-hypertensives  Will consider starting on ACE/ARB for BP control with resolution of uptrending creatinine

## 2020-12-17 NOTE — PROGRESS NOTES
Assumed care of pt. Bedside report received from nightshift RN. Pt was updated on plan of care. Call light, phone and personal belongings in reach. Bed alarm on and working properly, bed in lowest position, and locked.

## 2020-12-17 NOTE — ASSESSMENT & PLAN NOTE
-S/p multiple procedures on Right knee - last 10/27/20 by Dr. Faulkner  -S/p arthrocentesis in ED on 12/16, 12/17  -2x Fluid cultures of right knee 12/16, 12/17 both growing pseudomonas, although to fluid itself does not look overtly purulent    Plan:  -Continue Cefepime as IV antibiotic in setting of EMR documented PCN allergy  -Ortho consulted, appreciate recs  -ID consulted for abx guidance. Allergic to FQ's.  Query bactrim?

## 2020-12-17 NOTE — H&P
Hospital Medicine History & Physical Note    Date of Service  12/16/2020    Primary Care Physician  Carlitos Harvey M.D.    Consultants  none    Code Status  Prior    Chief Complaint  Chief Complaint   Patient presents with   • GLF       History of Presenting Illness  80 y.o. male who presented 12/16/2020 with COPD, chronic kidney disease stage III, hypertension comes emergency department via ambulance after having a ground-level fall with a head injury.. Apparently fell out of bed and hit the left side of his head.  Evaluation in the emergency department showed no evidence of stroke or bleed on CT of the head.  Patient states that last several days he has been not been feeling well, he complains of general malaise and a nonproductive cough, shortness of breath but no wheezing.  No fever or chills.  In the emergency department patient did have a fever and he was also tachycardic and hypoxic.. Routine labs showed evidence of leukocytosis.. Acute chest did not show a clear infiltrate but he did have an slightly elevated procalcitonin level.  The patient failed a bedside swallow which increases concern for possible aspiration pneumonitis    Patient is recently finished a 2 week  course of p.o. Keflex for an infection related to a right knee surgery.. The ER physician did tap the right knee and it showed no evidence of active infection based on fluid analysis of the aspirate.  His right  knee surgery was 6 weeks ago.    Review of Systems  Review of Systems   Constitutional: Positive for malaise/fatigue.   HENT: Negative.    Eyes: Negative for blurred vision and double vision.   Respiratory: Positive for cough. Negative for sputum production.    Cardiovascular: Negative.    Gastrointestinal: Negative for heartburn and nausea.   Genitourinary: Negative for dysuria, frequency and urgency.   Musculoskeletal: Positive for joint pain (right knee) and myalgias.   Skin: Negative.    Neurological: Positive for weakness.    Endo/Heme/Allergies: Negative for environmental allergies. Does not bruise/bleed easily.   Psychiatric/Behavioral: Negative.    All other systems reviewed and are negative.      Past Medical History   has a past medical history of Abnormal thyroid stimulating hormone (TSH) level, Allergic rhinitis, Asbestosis (HCC), Asthma, Bronchitis, Chronic diarrhea, Chronic low back pain, Chronic lung disease, CKD (chronic kidney disease), stage III, COPD (chronic obstructive pulmonary disease) (HCC), Coronary artery calcification seen on CAT scan (8/2/2016), Epididymitis (2009), GERD (gastroesophageal reflux disease), History of hemorrhoids, History of skin cancer, [place] (hard of hearing), Hypercholesteremia, Hypertension, Hypertriglyceridemia, Indigestion, Light headedness, Lightheadedness (6/23/2016), Low back pain, Nasal drainage, Olecranon bursitis, Orthostasis (6/23/2016), Peripheral neuropathy, Pleural plaque (2005 ), Post-nasal drip, Pulmonary emphysema (HCC), RA (rheumatoid arthritis) (HCC), Restless leg syndrome, Rheumatoid arthritis (HCC), Sleep apnea, and Solitary kidney.    Surgical History   has a past surgical history that includes testicle exploration (2004); cystoscopy (2/1/2010); retrogrades (2/1/2010); pyelogram (2/1/2010); ureteroscopy (2/1/2010); nephrectomy laparoscopic (2009); nasal polypectomy (Bilateral, 10/6/2015); tonsillectomy; pr remv 2nd cataract,corn-scler sectn; colonoscopy (11/10/2018); flexor tendon repair (Left, 4/3/2019); tendon transfer (Left, 4/3/2019); and irrigation & debridement ortho (Right, 10/27/2020).     Family History  family history includes Genetic Disorder in his father; No Known Problems in his daughter, sister, and son.     Social History   reports that he quit smoking about 40 years ago. His smoking use included cigarettes. He has a 40.00 pack-year smoking history. He has never used smokeless tobacco. He reports that he does not drink alcohol or use  drugs.    Allergies  Allergies   Allergen Reactions   • Ciprofloxacin      Other reaction(s): muscle aches   • Levaquin      Other reaction(s): Muscle aches  Muscle aches  Other reaction(s): Muscle aches   • Penicillins Hives     Rash and asthma attack when a child - wife states he has had Keflex in the past and tolerated   • Ciprofloxacin Hcl Unspecified     MUSCLE ACHES   • Levofloxacin Unspecified     MUSCLE ACHES       Medications  Prior to Admission Medications   Prescriptions Last Dose Informant Patient Reported? Taking?   Budesonide-Formoterol Fumarate (SYMBICORT INH) 12/16/2020 at AM Patient Yes No   Sig: Inhale 1 Puff 2 Times a Day.   Loperamide HCl (IMODIUM A-D PO) 12/16/2020 at AM Patient Yes No   Sig: Take 1 Tab by mouth every day.   Multiple Vitamin (MULTIVITAMIN PO) 12/16/2020 at AM Patient Yes No   Sig: Take 1 Tab by mouth every day.   acetaminophen (TYLENOL) 500 MG Tab 12/16/2020 at AM Patient Yes No   Sig: Take 1,000 mg by mouth 2 Times a Day.   cephALEXin (KEFLEX) 500 MG Cap 12/16/2020 at AM Patient's Home Pharmacy Yes Yes   Sig: Take 500 mg by mouth 3 times a day.   certolizumab pegol (CIMZIA PREFILLED) 2 X 200 MG/ML Kit 12/12/2020 at unknown Patient No No   Sig: Inject 200 mg as instructed every 14 days.   fexofenadine (ALLEGRA ALLERGY) 180 MG tablet 12/16/2020 at AM Patient Yes No   Sig: ALLEGRA ALLERGY 180 MG TABS   fluticasone (FLONASE) 50 MCG/ACT nasal spray 12/16/2020 at AM Patient Yes No   Sig: FLONASE ALLERGY RELIEF SUSP   gabapentin (NEURONTIN) 300 MG Cap 12/16/2020 at AM Patient No No   Sig: TAKE 1 CAPSULE BY MOUTH THREE TIMES A DAY   Patient taking differently: Take 300-600 mg by mouth 2 Times a Day. Pt takes 1 tab qam and 2 tabs qhs   guaiFENesin LA (MUCINEX) 600 MG TABLET SR 12 HR 12/16/2020 at AM Patient Yes No   Sig: Take 600 mg by mouth 2 Times a Day.   leflunomide (ARAVA) 20 MG Tab 12/16/2020 at AM Patient No No   Sig: Take 1 Tab by mouth every day.   rosuvastatin (CRESTOR) 5 MG  Tab 12/16/2020 at AM Patient No No   Sig: TAKE 1 TABLET BY MOUTH EVERY EVENING   sulfamethoxazole-trimethoprim (BACTRIM DS) 800-160 MG tablet Not Taking at Unknown time Patient No No   Sig: Take 1 Tab by mouth 2 times a day.   Patient not taking: Reported on 12/16/2020   tiotropium (SPIRIVA) 18 MCG Cap Not Taking at Unknown time Patient No No   Sig: Inhale 1 Cap by mouth every day.   Patient not taking: Reported on 12/16/2020      Facility-Administered Medications: None       Physical Exam  Temp:  [38 °C (100.4 °F)-38.4 °C (101.2 °F)] 38.4 °C (101.2 °F)  Pulse:  [103-141] 109  Resp:  [15-24] 18  BP: (126-185)/(69-93) 163/85  SpO2:  [92 %-98 %] 94 %    Physical Exam  Constitutional:       Appearance: Normal appearance. He is ill-appearing.   HENT:      Head:      Comments: Small bruising left ear  Eyes:      Extraocular Movements: Extraocular movements intact.      Pupils: Pupils are equal, round, and reactive to light.   Neck:      Musculoskeletal: Normal range of motion and neck supple.   Cardiovascular:      Rate and Rhythm: Normal rate and regular rhythm.      Pulses: Normal pulses.   Pulmonary:      Breath sounds: Rhonchi present. No wheezing.   Abdominal:      General: There is distension.      Palpations: There is no mass.      Tenderness: There is no abdominal tenderness.      Hernia: No hernia is present.   Musculoskeletal: Normal range of motion.         General: No swelling, tenderness or deformity.      Comments: Surgery incision scar intact right knee with no redness or discharge noted .   Skin:     General: Skin is warm.      Capillary Refill: Capillary refill takes 2 to 3 seconds.      Coloration: Skin is not jaundiced or pale.   Neurological:      General: No focal deficit present.      Mental Status: He is alert and oriented to person, place, and time.   Psychiatric:         Mood and Affect: Mood normal.         Laboratory:  Recent Labs     12/16/20  1334   WBC 13.4*   RBC 4.08*   HEMOGLOBIN 13.1*    HEMATOCRIT 42.1   .2*   MCH 32.1   MCHC 31.1*   RDW 57.6*   PLATELETCT 169   MPV 9.7     Recent Labs     12/16/20  1418   SODIUM 137   POTASSIUM 4.7   CHLORIDE 102   CO2 26   GLUCOSE 112*   BUN 22   CREATININE 1.59*   CALCIUM 9.6     Recent Labs     12/16/20  1418   ALTSGPT 34   ASTSGOT 30   ALKPHOSPHAT 93   TBILIRUBIN 1.0   GLUCOSE 112*         No results for input(s): NTPROBNP in the last 72 hours.      No results for input(s): TROPONINT in the last 72 hours.    Imaging:  DX-CHEST-PORTABLE (1 VIEW)   Final Result      Stable chest without acute/new abnormality.      CT-CSPINE WITHOUT PLUS RECONS   Final Result      No acute fracture or traumatic listhesis in the cervical spine.      CT-HEAD W/O   Final Result      1.  Generalized atrophy and chronic microvascular ischemic type changes.   2.  No acute intracranial abnormality.   3.  Sinus disease.   4.  Small left parietal scalp contusion.            Assessment/Plan:  I anticipate this patient will require at least two midnights for appropriate medical management, necessitating inpatient admission.    Sepsis (HCC)- (present on admission)  Assessment & Plan  This is Sepsis Present on admission  SIRS criteria identified on my evaluation include: Fever, with temperature greater than 101 deg F  Source is lung  Sepsis protocol initiated  Fluid resuscitation ordered per protocol  IV antibiotics as appropriate for source of sepsis  While organ dysfunction may be noted elsewhere in this problem list or in the chart, degree of organ dysfunction does not meet CMS criteria for severe sepsis          Hx of right knee surgery- (present on admission)  Assessment & Plan  Pt/ot eval    Pain control    Acute respiratory failure (HCC)  Assessment & Plan  With hypoxia    Oxygen to maintain sats > 90    Treat pneumonia    Oropharyngeal dysphagia- (present on admission)  Assessment & Plan  Npo    Hydrate    Swallow therapy eval    Pneumonitis- (present on  admission)  Assessment & Plan  Iv rocephin, iv zithromax    CKD (chronic kidney disease) stage 3, GFR 30-59 ml/min- (present on admission)  Assessment & Plan  Monitor bmp      renal dose all medications      Avoid nephrotoxins    Chronic obstructive pulmonary disease (HCC)- (present on admission)  Assessment & Plan  Not in acute exacerbation    Cont bronchodilators

## 2020-12-17 NOTE — ED NOTES
Pt placed on hospital bed for comfort.  Incontinent of urine, linens changed and new urinal provided.

## 2020-12-17 NOTE — THERAPY
Missed Therapy     Patient Name: Barry Torres  Age:  80 y.o., Sex:  male  Medical Record #: [id number]  Today's Date: 12/17/2020    Discussed missed therapy with RN.       12/17/20 0824   Interdisciplinary Plan of Care Collaboration   IDT Collaboration with  Nursing;Speech Therapist   Patient Position at End of Therapy In Bed   Collaboration Comments RN requests PT hold for today, pending R LE ultrasound and further workup for new symptoms.

## 2020-12-17 NOTE — ASSESSMENT & PLAN NOTE
Last procedure in 10/2020 - for irrigation and debridement after having previous operations for prepatellar bursa and lower leg cyst  S/p arthrocentesis in ED on 12/16  Still retains a lot of fluid in the right knee joint space    Plan:  Plan for repeat arthrocentesis and send for fluid analysis and culture gram stain/cx to verify results from previous sample  Pain control  Ortho consulted  Previously on Cephalexin and Bactrim outpatient PTA

## 2020-12-17 NOTE — CARE PLAN
Problem: Safety  Goal: Will remain free from falls  Outcome: PROGRESSING AS EXPECTED  Intervention: Implement fall precautions  Flowsheets  Taken 12/17/2020 1351  Bed Alarm: Yes - Alarm On  IV Pole on Same Side of Bed as Bathroom: Yes  Bedrails: Bedrails Closest to Bathroom Down  Chair/Bed Strip Alarm: Yes - Alarm On  Taken 12/17/2020 0915  Environmental Precautions:   Treaded Slipper Socks on Patient   Personal Belongings, Wastebasket, Call Bell etc. in Easy Reach   Transferred to Stronger Side   Report Given to Other Health Care Providers Regarding Fall Risk   Bed in Low Position   Communication Sign for Patients & Families   Mobility Assessed & Appropriate Sign Placed     Problem: Skin Integrity  Goal: Risk for impaired skin integrity will decrease  Outcome: PROGRESSING AS EXPECTED  Intervention: Implement precautions to protect skin integrity in collaboration with the interdisciplinary team  Flowsheets  Taken 12/17/2020 1351 by Doreen De Luna RPriscillaNPriscilla  Assistance / Tolerance for Turning/Repositioning: Assistance of Two or More  Taken 12/17/2020 0915 by Elysia Brady RPriscillaNPriscilla  Skin Preventative Measures: Pillows in Use for Support / Positioning  Bed Types: Pressure Redistribution Mattress (Atmosair)  Friction Interventions: Draw Sheet / Pad Used for Repositioning  Patient Turns / Repositioning: Patient Turns Self from Side to Side  PT / OT Involved in Care: Order has been Placed  Moisturizers/Barriers: Moisturizer   Patient is Receiving Nutrition: Oral Intake Adequate  Vitamin Therapy in Use: No  Activity: Bed  Nutrition Consult Ordered: No, Consult has Already been Placed

## 2020-12-17 NOTE — ED NOTES
Pt failed bedside swallow eval this afternoon.  Pt coughing and gurgling.  MD aware and switching medication to different route if possible

## 2020-12-17 NOTE — ASSESSMENT & PLAN NOTE
Failed bedside swallow eval in ED - risk for aspiration pneumonia, on 2L on admission, was previously on ceftriaxone and azithromycin, now on cefepime due to septic arthritis  - aspiration precaution  - IS  - mucinex  - Speech therapy: rec outpatient speech

## 2020-12-17 NOTE — ASSESSMENT & PLAN NOTE
Not in acute exacerbation - no increase in cough or sputum production, no wheezing  Cont home Symbicort, glycopyrrolate  RT protocol  IS   Reviewed in office visit.

## 2020-12-17 NOTE — THERAPY
Speech Language Pathology   Initial Assessment     Patient Name: Barry Torres  AGE:  80 y.o., SEX:  male  Medical Record #: [id number]  Today's Date: 12/17/2020     Precautions: Fall Risk, Swallow Precautions ( See Comments)  Comments: right side facial weakness, leans to right, right LE red and hot to touch    Assessment    Pt is an 79 y/o male who presented to the ER following GLF when he fell out of bed and hit his head.  No evidence of stroke or bleed on CT scan.  Per notes, patient reported general malaise and non productive cough.   Patient with PMH of COPD, CKD stage III, allergic rhinitis, GERD, skin cancer, RA, post nasal drip, pulmonary emphysema, RLS, sleep apnea, colitis 2018.     Pt seen for swallow evaluation this date.  He was awake, alert and oriented in all spheres.  He was noted to have right side lean and had difficulty maintaining center when repositioned.  He does have slight right side facial droop, but sensation testing was intact bilaterally. Of note, he also had edematous and red RLE that was hot to the touch.  MD and RN aware. Presentation of PO consisted of ice, mildly thick liquids, liquidised, purees, minced and moist, soft and bite sized and regular consistencies.  Pt with increased WOB during mastication and minimally delayed initiation of swallow.  Patient with inconsistent wet and gurgly vocal quality with throat clearing following swallow about 50% of the time on thin liquids.  He had intermittent throat clearing following larger bites of soft and bite sized and regular consistencies.  He swallowed all other consistencies without any S/sx of aspiration.  At this time, would recommend initiation of soft and bite sized diet with mildly thick liquids (SB6/MT2) and close monitoring for any S/Sx of aspiration. SLP will continue to follow.  Thank you for the consult.         Plan    Soft and bite sized diet with mildly thick liquids (SB6/MT2)     Close monitoring for any S/Sx of  aspiration.    NO STRAWS    Meds floated in purees or given with mildly thick liquids.     STOP PO with difficulty.    Recommend Speech Therapy 3 times per week until therapy goals are met for the following treatments:  Dysphagia Training and Patient / Family / Caregiver Education.    Discharge Recommendations: Recommend post-acute placement for additional speech therapy services prior to discharge home    Objective     12/17/20 0855   Vitals   O2 (LPM) 2   O2 Delivery Device Silicone Nasal Cannula   Pain 0 - 10 Group   Therapist Pain Assessment Nurse Notified;Post Activity Pain Same as Prior to Activity;0   Prior Living Situation   Prior Services Home-Independent   Lives with - Patient's Self Care Capacity Spouse   Prior Level Of Function   Communication Within Functional Limits   Swallow Within Functional Limits   Dentition Intact   Dentures None   Hearing Impaired Both Ears   Hearing Aid None   Vision Wears Corrective Lenses   Patient's Primary Language English   Education High School Graduate or GED   Education Years Completed 12   Occupation (Pre-Hospital Vocational) Retired Due To Age  (retired )   Oral Motor Eval    Is Patient Able to Complete Oral Motor Eval Yes but Impaired   Labial Function   Labial Structure At Rest Minimal  (very subtle right side asymmetry)   Labial Vowel Production / I /, / U / Minimal  (more pronounced right side weakness)   Labial Sequence / I /, / U / Minimal   Frown, Pucker Within Functional Limits   Lingual Function   Lingual Structure At Rest Within Functional Limits  (dry and xerostomic)   Lingual Protrude Within Functional Limits   Lingual Retract Within Functional Limits   Elevate In Mouth Within Functional Limits   Elevate Outside Mouth Minimal   Lateralization No Impairment Left;No Impairment Right   Lick Lips (Circular) Within Functional Limits   Lick Palate Minimal   / Pa / 5X's Within Functional Limits   / Ta / 5X's Within Functional Limits   / Ka / 5X's Within  Functional Limits   / [place] / 5X's Minimal   Jaw   Jaw Structure At Rest Within Functional Limits   Bite ([place]) Within Functional Limits   Chew (Rotary) Minimal  (increased WOB with mastication)   Jaw Open / Resist Within Functional Limits   Jaw Close / Resist Within Functional Limits   Velar Function   Velar Structure At Rest Within Functional Limits   / A / Prolonged Within Functional Limits   / A /  5X's Within Functional Limits   Gag / Palatal Reflex Not Tested   Laryngeal Function   Voice Quality Within Functional Limits   Volutional Cough Minimal   Excursion Upon Swallow Complete   Oral Food Presentation   Ice Chips Within Functional Limits   Single Swallow Mildly Thick (2) - (Nectar Thick)  Within Functional Limits   Serial Swallow Mildly Thick (2) - (Nectar Thick) Within Functional Limits   Single Swallow Thin (0) Minimal  (wet voice following swallow)   Serial Swallow Thin (0) Minimal  (audible wet swallow, wet voice following swallow)   Liquidised (3) Within Functional Limits   Pureed (4) Within Functional Limits   Minced & Moist (5) - (Dysphagia II) Within Functional Limits   Soft & Bite-Sized (6) - (Dysphagia III) Minimal  (subtle throat clearing on larger bites, did well with small )   Regular (7) Minimal  (right side residue, increased thraot clearing following swal)   Self Feeding Independent   Tracheostomy   Tracheostomy  No   Dysphagia Strategies / Recommendations   Strategies / Interventions Recommended (Yes / No) Yes   Compensatory Strategies Monitor During Meals;Single Sips / Bites;No Straws;Assistance Needed for Meal Tray Set-up;No Talking During Eating / Drinking  (TV off during meals)   Diet / Liquid Recommendation Soft & Bite-Sized (6) - (Dysphagia III);Mildly Thick (2) - (Nectar Thick)   Medication Administration  Float Whole with Puree   Therapy Interventions Dysphagia Therapy By Speech Language Pathologist;Pharyngeal / Laryngeal Exercises;Oral Motor Exercises   Follow Up SLP  "Evaluation SLP to follow for dysphagia therapy   Dysphagia Rating   Nutritional Liquid Intake Rating Scale Thickened beverages (mildly thick unless otherwise specified)   Nutritional Food Intake Rating Scale Total oral diet with multiple consistencies but requiring special preparation or compensations   Short Term Goals   Short Term Goal # 1 Pt will be able to consume SB6/MT2 diet with no overt S/Sx of aspiration noted.   Short Term Goal # 2 Pt will be able to complete 10/10 reps of oral motor and laryngeal exercises with min cues and \"good\" accuracy as judged by SLP.   Problem List   Problem List Dysphagia   Anticipated Discharge Needs   Discharge Recommendations Recommend post-acute placement for additional speech therapy services prior to discharge home   Therapy Recommendations Upon DC Dysphagia Training;Community Re-Integration;Patient / Family / Caregiver Education     "

## 2020-12-18 ENCOUNTER — PATIENT OUTREACH (OUTPATIENT)
Dept: HEALTH INFORMATION MANAGEMENT | Facility: OTHER | Age: 80
End: 2020-12-18

## 2020-12-18 PROBLEM — D69.6 THROMBOCYTOPENIA (HCC): Status: ACTIVE | Noted: 2020-12-18

## 2020-12-18 LAB
ALBUMIN SERPL BCP-MCNC: 3.2 G/DL (ref 3.2–4.9)
ALBUMIN/GLOB SERPL: 1 G/DL
ALP SERPL-CCNC: 72 U/L (ref 30–99)
ALT SERPL-CCNC: 22 U/L (ref 2–50)
ANION GAP SERPL CALC-SCNC: 9 MMOL/L (ref 7–16)
AST SERPL-CCNC: 28 U/L (ref 12–45)
BACTERIA FLD AEROBE CULT: ABNORMAL
BACTERIA FLD AEROBE CULT: ABNORMAL
BILIRUB SERPL-MCNC: 0.8 MG/DL (ref 0.1–1.5)
BUN SERPL-MCNC: 23 MG/DL (ref 8–22)
CALCIUM SERPL-MCNC: 8.7 MG/DL (ref 8.5–10.5)
CHLORIDE SERPL-SCNC: 101 MMOL/L (ref 96–112)
CO2 SERPL-SCNC: 23 MMOL/L (ref 20–33)
CREAT SERPL-MCNC: 1.63 MG/DL (ref 0.5–1.4)
D DIMER PPP IA.FEU-MCNC: 0.76 UG/ML (FEU) (ref 0–0.5)
ERYTHROCYTE [DISTWIDTH] IN BLOOD BY AUTOMATED COUNT: 56.1 FL (ref 35.9–50)
FIBRINOGEN PPP-MCNC: 695 MG/DL (ref 215–460)
GLOBULIN SER CALC-MCNC: 3.1 G/DL (ref 1.9–3.5)
GLUCOSE SERPL-MCNC: 104 MG/DL (ref 65–99)
GRAM STN SPEC: ABNORMAL
HCT VFR BLD AUTO: 33.1 % (ref 42–52)
HEMOCCULT STL QL: NEGATIVE
HGB BLD-MCNC: 10.5 G/DL (ref 14–18)
LDH SERPL L TO P-CCNC: 230 U/L (ref 107–266)
MCH RBC QN AUTO: 32.3 PG (ref 27–33)
MCHC RBC AUTO-ENTMCNC: 31.7 G/DL (ref 33.7–35.3)
MCV RBC AUTO: 101.8 FL (ref 81.4–97.8)
PLATELET # BLD AUTO: 46 K/UL (ref 164–446)
PMV BLD AUTO: 12.1 FL (ref 9–12.9)
POTASSIUM SERPL-SCNC: 4.1 MMOL/L (ref 3.6–5.5)
PROCALCITONIN SERPL-MCNC: 1.31 NG/ML
PROT SERPL-MCNC: 6.3 G/DL (ref 6–8.2)
RBC # BLD AUTO: 3.25 M/UL (ref 4.7–6.1)
SIGNIFICANT IND 70042: ABNORMAL
SITE SITE: ABNORMAL
SODIUM SERPL-SCNC: 133 MMOL/L (ref 135–145)
SOURCE SOURCE: ABNORMAL
WBC # BLD AUTO: 8.7 K/UL (ref 4.8–10.8)

## 2020-12-18 PROCEDURE — 84145 PROCALCITONIN (PCT): CPT

## 2020-12-18 PROCEDURE — 97535 SELF CARE MNGMENT TRAINING: CPT

## 2020-12-18 PROCEDURE — 700111 HCHG RX REV CODE 636 W/ 250 OVERRIDE (IP): Performed by: STUDENT IN AN ORGANIZED HEALTH CARE EDUCATION/TRAINING PROGRAM

## 2020-12-18 PROCEDURE — 700102 HCHG RX REV CODE 250 W/ 637 OVERRIDE(OP): Performed by: STUDENT IN AN ORGANIZED HEALTH CARE EDUCATION/TRAINING PROGRAM

## 2020-12-18 PROCEDURE — 36415 COLL VENOUS BLD VENIPUNCTURE: CPT

## 2020-12-18 PROCEDURE — 770006 HCHG ROOM/CARE - MED/SURG/GYN SEMI*

## 2020-12-18 PROCEDURE — 99232 SBSQ HOSP IP/OBS MODERATE 35: CPT | Mod: GC | Performed by: INTERNAL MEDICINE

## 2020-12-18 PROCEDURE — 85384 FIBRINOGEN ACTIVITY: CPT

## 2020-12-18 PROCEDURE — 87040 BLOOD CULTURE FOR BACTERIA: CPT

## 2020-12-18 PROCEDURE — 85379 FIBRIN DEGRADATION QUANT: CPT

## 2020-12-18 PROCEDURE — 85027 COMPLETE CBC AUTOMATED: CPT

## 2020-12-18 PROCEDURE — 82272 OCCULT BLD FECES 1-3 TESTS: CPT

## 2020-12-18 PROCEDURE — 97166 OT EVAL MOD COMPLEX 45 MIN: CPT

## 2020-12-18 PROCEDURE — 83615 LACTATE (LD) (LDH) ENZYME: CPT

## 2020-12-18 PROCEDURE — A9270 NON-COVERED ITEM OR SERVICE: HCPCS | Performed by: STUDENT IN AN ORGANIZED HEALTH CARE EDUCATION/TRAINING PROGRAM

## 2020-12-18 PROCEDURE — 700105 HCHG RX REV CODE 258: Performed by: STUDENT IN AN ORGANIZED HEALTH CARE EDUCATION/TRAINING PROGRAM

## 2020-12-18 PROCEDURE — 700105 HCHG RX REV CODE 258: Performed by: HOSPITALIST

## 2020-12-18 PROCEDURE — 80053 COMPREHEN METABOLIC PANEL: CPT

## 2020-12-18 PROCEDURE — 97161 PT EVAL LOW COMPLEX 20 MIN: CPT

## 2020-12-18 RX ORDER — LOPERAMIDE HYDROCHLORIDE 2 MG/1
2 CAPSULE ORAL DAILY
Status: DISCONTINUED | OUTPATIENT
Start: 2020-12-18 | End: 2020-12-20

## 2020-12-18 RX ORDER — FLUTICASONE PROPIONATE 50 MCG
1 SPRAY, SUSPENSION (ML) NASAL DAILY
Status: DISCONTINUED | OUTPATIENT
Start: 2020-12-18 | End: 2020-12-23 | Stop reason: HOSPADM

## 2020-12-18 RX ORDER — LEFLUNOMIDE 20 MG/1
20 TABLET ORAL DAILY
Status: DISCONTINUED | OUTPATIENT
Start: 2020-12-18 | End: 2020-12-23 | Stop reason: HOSPADM

## 2020-12-18 RX ADMIN — LEFLUNOMIDE 20 MG: 20 TABLET ORAL at 13:41

## 2020-12-18 RX ADMIN — BUDESONIDE AND FORMOTEROL FUMARATE DIHYDRATE 2 PUFF: 80; 4.5 AEROSOL RESPIRATORY (INHALATION) at 05:56

## 2020-12-18 RX ADMIN — GLYCOPYRROLATE 1 CAPSULE: 15.6 CAPSULE RESPIRATORY (INHALATION) at 18:00

## 2020-12-18 RX ADMIN — GLYCOPYRROLATE 1 CAPSULE: 15.6 CAPSULE RESPIRATORY (INHALATION) at 05:55

## 2020-12-18 RX ADMIN — SODIUM CHLORIDE, POTASSIUM CHLORIDE, SODIUM LACTATE AND CALCIUM CHLORIDE: 600; 310; 30; 20 INJECTION, SOLUTION INTRAVENOUS at 02:41

## 2020-12-18 RX ADMIN — FLUTICASONE PROPIONATE 50 MCG: 50 SPRAY, METERED NASAL at 13:41

## 2020-12-18 RX ADMIN — CEFEPIME 2 G: 2 INJECTION, POWDER, FOR SOLUTION INTRAVENOUS at 05:55

## 2020-12-18 RX ADMIN — BUDESONIDE AND FORMOTEROL FUMARATE DIHYDRATE 2 PUFF: 80; 4.5 AEROSOL RESPIRATORY (INHALATION) at 18:00

## 2020-12-18 RX ADMIN — CEFEPIME 2 G: 2 INJECTION, POWDER, FOR SOLUTION INTRAVENOUS at 14:04

## 2020-12-18 RX ADMIN — CEFEPIME 2 G: 2 INJECTION, POWDER, FOR SOLUTION INTRAVENOUS at 20:52

## 2020-12-18 RX ADMIN — LOPERAMIDE HYDROCHLORIDE 2 MG: 2 CAPSULE ORAL at 13:41

## 2020-12-18 SDOH — ECONOMIC STABILITY: FOOD INSECURITY: WITHIN THE PAST 12 MONTHS, THE FOOD YOU BOUGHT JUST DIDN'T LAST AND YOU DIDN'T HAVE MONEY TO GET MORE.: NEVER TRUE

## 2020-12-18 SDOH — ECONOMIC STABILITY: INCOME INSECURITY: HOW HARD IS IT FOR YOU TO PAY FOR THE VERY BASICS LIKE FOOD, HOUSING, MEDICAL CARE, AND HEATING?: NOT VERY HARD

## 2020-12-18 SDOH — ECONOMIC STABILITY: FOOD INSECURITY: WITHIN THE PAST 12 MONTHS, YOU WORRIED THAT YOUR FOOD WOULD RUN OUT BEFORE YOU GOT MONEY TO BUY MORE.: NEVER TRUE

## 2020-12-18 SDOH — ECONOMIC STABILITY: TRANSPORTATION INSECURITY
IN THE PAST 12 MONTHS, HAS LACK OF TRANSPORTATION KEPT YOU FROM MEETINGS, WORK, OR FROM GETTING THINGS NEEDED FOR DAILY LIVING?: NO

## 2020-12-18 SDOH — ECONOMIC STABILITY: TRANSPORTATION INSECURITY
IN THE PAST 12 MONTHS, HAS THE LACK OF TRANSPORTATION KEPT YOU FROM MEDICAL APPOINTMENTS OR FROM GETTING MEDICATIONS?: NO

## 2020-12-18 ASSESSMENT — COGNITIVE AND FUNCTIONAL STATUS - GENERAL
WALKING IN HOSPITAL ROOM: A LITTLE
HELP NEEDED FOR BATHING: A LOT
MOBILITY SCORE: 13
CLIMB 3 TO 5 STEPS WITH RAILING: A LOT
MOVING TO AND FROM BED TO CHAIR: A LITTLE
STANDING UP FROM CHAIR USING ARMS: A LOT
DRESSING REGULAR UPPER BODY CLOTHING: A LITTLE
DRESSING REGULAR UPPER BODY CLOTHING: A LITTLE
MOVING TO AND FROM BED TO CHAIR: A LOT
EATING MEALS: A LITTLE
SUGGESTED CMS G CODE MODIFIER MOBILITY: CK
SUGGESTED CMS G CODE MODIFIER DAILY ACTIVITY: CK
DRESSING REGULAR LOWER BODY CLOTHING: A LOT
TURNING FROM BACK TO SIDE WHILE IN FLAT BAD: A LITTLE
TOILETING: A LOT
SUGGESTED CMS G CODE MODIFIER DAILY ACTIVITY: CK
PERSONAL GROOMING: A LITTLE
MOVING FROM LYING ON BACK TO SITTING ON SIDE OF FLAT BED: A LITTLE
HELP NEEDED FOR BATHING: A LOT
WALKING IN HOSPITAL ROOM: A LOT
STANDING UP FROM CHAIR USING ARMS: A LITTLE
EATING MEALS: A LITTLE
TURNING FROM BACK TO SIDE WHILE IN FLAT BAD: A LITTLE
MOVING FROM LYING ON BACK TO SITTING ON SIDE OF FLAT BED: A LOT
PERSONAL GROOMING: A LITTLE
SUGGESTED CMS G CODE MODIFIER MOBILITY: CL
DAILY ACTIVITIY SCORE: 15
MOBILITY SCORE: 18
DAILY ACTIVITIY SCORE: 15
TOILETING: A LOT
DRESSING REGULAR LOWER BODY CLOTHING: A LOT
CLIMB 3 TO 5 STEPS WITH RAILING: A LITTLE

## 2020-12-18 ASSESSMENT — ENCOUNTER SYMPTOMS
COUGH: 0
HEADACHES: 0
ABDOMINAL PAIN: 0
VOMITING: 0
BLURRED VISION: 0
WEAKNESS: 0
CONSTIPATION: 0
DIARRHEA: 0
SHORTNESS OF BREATH: 0
CHILLS: 0
LOSS OF CONSCIOUSNESS: 0
MYALGIAS: 0
FEVER: 0
NAUSEA: 0
PALPITATIONS: 0

## 2020-12-18 ASSESSMENT — FIBROSIS 4 INDEX: FIB4 SCORE: 10.51

## 2020-12-18 ASSESSMENT — GAIT ASSESSMENTS
DISTANCE (FEET): 200
ASSISTIVE DEVICE: FRONT WHEEL WALKER
GAIT LEVEL OF ASSIST: SUPERVISED
DEVIATION: BRADYKINETIC;DECREASED BASE OF SUPPORT;DECREASED HEEL STRIKE;DECREASED TOE OFF

## 2020-12-18 ASSESSMENT — ACTIVITIES OF DAILY LIVING (ADL): TOILETING: INDEPENDENT

## 2020-12-18 NOTE — PROGRESS NOTES
Received report from night shift RNDanielle. Assumed care of pt @ 9230. Pt reports no pain at this time. Updated pt on plan of care. Pt resting comfortably in bedside chair w/ waffle overlay. Pt assisted to bedside commode by PT/OT, had one episode of incontinence of stool. Waffle mattress placed on bed w/ full linen change. Pt ambulated halls w/ PT/OT. Fall precautions in place. Q2 turns to be initiated once pt returns to bed. Educated on use of call light. Hourly rounding and continuous monitoring in place.    0815: MD at bedside. Pt noted to have elevated bp this AM of 168/93. No prn or scheduled bp meds. MD aware, new orders to come. IV fluids d/c'd.

## 2020-12-18 NOTE — PROGRESS NOTES
Monitor Summary  Rhythm: SR  Rate: 88-95  Ectopy: (O) PVC, (O) PAC, (O) Bigem, (R) Trigem, first degree heart block  .22 / .08 / .36

## 2020-12-18 NOTE — PROGRESS NOTES
Community Health Worker Intake  • Social determinates of health intake Complete.   • Identified barriers to transportation when unable to drive.  • Contact information provided to Barry Torres   • Has PCP appointment scheduled   • Declined Meds-To-Beds.   • Inpatient assessment completed.    JUANCHO Santizo met with pt bedside to introduce CCM program. Pt states he would like some transportation resources for when he is unable to drive. CHW introduced MTM. Pt identifies no other barriers to resources and expressed no other needs at this time.    Plan: CHW will mail MTM resource information. CHW will follow up with pt after d/c to confirm needs are met.

## 2020-12-18 NOTE — PROGRESS NOTES
Dr Downing notified of pt's elevated d-dimer at 0.76. No new orders, continue to monitor. Pt still ok to transfer to medical status.

## 2020-12-18 NOTE — THERAPY
Physical Therapy   Initial Evaluation     Patient Name: Barry Torres  Age:  80 y.o., Sex:  male  Medical Record #: [id number]  Today's Date: 12/18/2020     Precautions: Fall Risk, Swallow Precautions ( See Comments)(hx R knee injury)    Assessment  Patient is 80 y.o. male admitted on 12/16/20 after GLF when he fell out of bed and hit his head.  No evidence of stroke or bleed on CT scan.  PMH: COPD, CKD stage III, allergic rhinitis, GERD, skin cancer, RA, post nasal drip, pulmonary emphysema, RLS, sleep apnea, colitis 2018. Pt presents today generally weak and deconditioned with decreased activity tolerance due to bedrest and inactivity along with mild balance deficits. Pt admits to not using his FWW at home,along with hx of falls. Highly recommend pt use FWW for safe ambulation at all times. Anticipate pt will be able to go home when medically cleared.        Plan    Recommend Physical Therapy 3 times per week until therapy goals are met for the following treatments:  Bed Mobility, Equipment, Gait Training, Neuro Re-Education / Balance, Therapeutic Activities and Therapeutic Exercises    DC Equipment Recommendations: None  Discharge Recommendations: Recommend home health for continued physical therapy services       Subjective    Pt agrees to PT     Objective       12/18/20 0844   Prior Living Situation   Prior Services Home-Independent;None   Housing / Facility 1 Story House   Steps Into Home 0   Steps In Home 0   Equipment Owned Front-Wheel Walker;4-Wheel Walker;Oxygen   Lives with - Patient's Self Care Capacity Spouse   Prior Level of Functional Mobility   Bed Mobility Independent   Transfer Status Independent   Ambulation Independent   Distance Ambulation (Feet) 300   Assistive Devices Used None   Stairs Independent   Comments per pt report   History of Falls   History of Falls Yes   Date of Last Fall 12/16/20   Cognition    Cognition / Consciousness X   Speech/ Communication Hard of Hearing   Level of  Consciousness Alert   Comments Pt cooperative t/o tx.   Passive ROM Lower Body   Passive ROM Lower Body X   Comments R knee ext -20 degrees   Active ROM Lower Body    Active ROM Lower Body  X   Comments see above   Strength Lower Body   Lower Body Strength  X   Gross Strength Generalized Weakness, Equal Bilaterally   Sensation Lower Body   Lower Extremity Sensation   Not Tested   Lower Body Muscle Tone   Lower Body Muscle Tone  WDL   Coordination Lower Body    Coordination Lower Body  WDL   Balance Assessment   Sitting Balance (Static) Fair +   Sitting Balance (Dynamic) Fair   Standing Balance (Static) Fair +   Standing Balance (Dynamic) Fair   Weight Shift Sitting Fair   Weight Shift Standing Fair   Comments with FWW   Gait Analysis   Gait Level Of Assist Supervised   Assistive Device Front Wheel Walker   Distance (Feet) 200   # of Times Distance was Traveled 1   Deviation Bradykinetic;Decreased Base Of Support;Decreased Heel Strike;Decreased Toe Off   # of Stairs Climbed 0   Weight Bearing Status Full   Bed Mobility    Supine to Sit Supervised   Scooting Supervised   Functional Mobility   Sit to Stand Minimal Assist   Bed, Chair, Wheelchair Transfer Minimal Assist   Transfer Method Stand Step   Mobility gait in boucher   How much help from another person does the patient currently need...   6 clicks Mobility Score 18   Activity Tolerance   Sitting Edge of Bed 10   Standing 15   Edema / Skin Assessment   Comments R LE edema noted   Patient / Family Goals    Patient / Family Goal #1 Home   Short Term Goals    Short Term Goal # 1 Pt will be SPV for all transfers with FWW in 6 txs to improve functional mobility.   Short Term Goal # 2 Pt will be SPV for gait >350' with FWW in 6 txs to improve functional mobility.   Education Group   Role of Physical Therapist Patient Response Patient;Acceptance;Explanation;Action Demonstration   Use of Assistive Device Patient Response Patient;Acceptance;Explanation;Demonstration;Verbal  Demonstration;Action Demonstration   Problem List    Problems Impaired Bed Mobility;Impaired Transfers;Impaired Ambulation;Functional ROM Deficit;Functional Strength Deficit;Impaired Balance;Decreased Activity Tolerance   Anticipated Discharge Equipment and Recommendations   DC Equipment Recommendations None   Discharge Recommendations Recommend home health for continued physical therapy services

## 2020-12-18 NOTE — DISCHARGE PLANNING
LSW met with pt and completed CTT assessment.    Pt reports he has a cane and walker but does not use them. Pt uses 2L O2 at night and is independent with ADLs and IADLs.     LSW spoke to pt about dc recommendations for HH. Pt refusing HH. He states he had it in the past about two years ago and does not want it again.    Care Transition Team Assessment    Information Source  Orientation : Oriented x 4  Information Given By: Patient  Who is responsible for making decisions for patient? : Patient    Readmission Evaluation  Is this a readmission?: No    Elopement Risk  Legal Hold: No  Ambulatory or Self Mobile in Wheelchair: Yes  Disoriented: No  Psychiatric Symptoms: None  History of Wandering: No  Elopement this Admit: No  Vocalizing Wanting to Leave: No  Displays Behaviors, Body Language Wanting to Leave: No-Not at Risk for Elopement  Elopement Risk: Not at Risk for Elopement    Interdisciplinary Discharge Planning  Lives with - Patient's Self Care Capacity: Spouse  Housing / Facility: [address] [place]  Prior Services: Home-Independent, None    Discharge Preparedness  What is your plan after discharge?: Home with help  What are your discharge supports?: Spouse  Prior Functional Level: Ambulatory, Drives Self, Independent with Activities of Daily Living, Independent with Medication Management  Difficulity with ADLs: None  Difficulity with IADLs: None    Functional Assesment  Prior Functional Level: Ambulatory, Drives Self, Independent with Activities of Daily Living, Independent with Medication Management    Finances  Financial Barriers to Discharge: No  Prescription Coverage: Yes    Vision / Hearing Impairment  Right Eye Vision: Impaired, Wears Glasses  Left Eye Vision: Impaired, Wears Glasses         Advance Directive  Advance Directive?: None    Domestic Abuse  Have you ever been the victim of abuse or violence?: No    Psychological Assessment  History of Substance Abuse: None  History of Psychiatric Problems:  No  Non-compliant with Treatment: No  Newly Diagnosed Illness: No    Discharge Risks or Barriers  Discharge risks or barriers?: No  Patient risk factors: Vulnerable adult

## 2020-12-18 NOTE — ASSESSMENT & PLAN NOTE
At baseline borderline low, but transiently dropped to 46, likely lab abnormality  Continue to monitor with daily CBC  Holding pharmacological VTE prophylaxis  SCD's ordered

## 2020-12-18 NOTE — PROCEDURES
ARTHROCENTESIS NOTE    Indications: Right knee effusion    Consent obtained: by physician at bedside on 12/17/20    Operators:    1) Dr. Jake Downing    Site of procedure: Right knee    Pre-procedure diagnosis: Effusion of right knee     Team pause:  At 15:15 on 12/17/20, prior to the beginning of the procedure, the team paused to verify the patient’s identity, the procedure to be performed (in accordance with the consent obtained prior to procedure,) and the correct side/site. This information was verified by the patient's nurse and the patient himself/herself.     Procedure:  By physical exam an effusion was located, and using landmarks the knee was positioned accordingly. The site was prepped with betadine solution. Using sterile technique, the joint space was entered with an 20 gauge needle and synovial fluid was aspirated via medial approach. A clean dressing was applied.     Findings:  Volume of synovial fluid aspirated:  80 mL  Synovial fluid appearance: Yellow and cloudy  Diagnostic studies:  - Cell count and differential  - Glucose  - Gram stain and culture     I was present for the entire procedure. There were no immediate or anticipated complications.     I spent 30 minutes with the patient.

## 2020-12-18 NOTE — PROGRESS NOTES
Daily Progress Note:     Date of Service: 12/18/2020  Primary Team: [facility] Orange Team   Attending: Elijah Coates D.O.  Senior Resident: Dr. Treadwell  Intern: Dr. Downing  Contact:  [phone]    Chief Complaint: GLF    Subjective:   Experienced a fever of 100.6 yesterday evening given Tylenol, has been afebrile since. He also had some night sweats overnight.  This AM he is resting in bed comfortably with no acute complaints at this time.  Denies SOB, chest pain, fevers, chills, cough, N/V, abdominal pain. No pain at the site of his knee after 2 aspirations or having any other issues.    Consultants/Specialty:  Orthopedic surgery    Review of Systems:    Review of Systems   Constitutional: Negative for chills and fever.   HENT: Negative for hearing loss.    Eyes: Negative for blurred vision.   Respiratory: Negative for cough and shortness of breath.    Cardiovascular: Negative for chest pain, palpitations and leg swelling.   Gastrointestinal: Negative for abdominal pain, constipation, diarrhea, nausea and vomiting.   Genitourinary: Negative for dysuria and hematuria.   Musculoskeletal: Negative for myalgias.   Skin: Negative for rash.   Neurological: Negative for loss of consciousness, weakness and headaches.       Objective Data:   Physical Exam:   Vitals:   Temp:  [36.2 °C (97.2 °F)-38.1 °C (100.6 °F)] 36.5 °C (97.7 °F)  Pulse:  [75-95] 84  Resp:  [17-18] 18  BP: (115-168)/(77-97) 125/79  SpO2:  [92 %-99 %] 95 %    Physical Exam  Vitals signs and nursing note reviewed.   Constitutional:       General: He is not in acute distress.     Appearance: He is not diaphoretic.   HENT:      Head: Normocephalic and atraumatic.      Nose: Nose normal.      Mouth/Throat:      Mouth: Mucous membranes are moist.      Pharynx: Oropharynx is clear.   Eyes:      Extraocular Movements: Extraocular movements intact.      Conjunctiva/sclera: Conjunctivae normal.      Pupils: Pupils are equal, round, and reactive to light.    Neck:      Musculoskeletal: Neck supple.   Cardiovascular:      Rate and Rhythm: Normal rate and regular rhythm.      Pulses: Normal pulses.      Heart sounds: No murmur. No friction rub. No gallop.    Pulmonary:      Effort: Pulmonary effort is normal. No respiratory distress.      Breath sounds: No wheezing, rhonchi or rales.   Abdominal:      General: Bowel sounds are normal. There is no distension.      Palpations: Abdomen is soft.      Tenderness: There is no abdominal tenderness. There is no guarding.   Musculoskeletal:      Right lower leg: Edema present.      Left lower leg: No edema.      Comments: RLE both surgical incisions C/D/I, surrounding erythema and warm to touch along lower leg, unchanged from yesterday. Fluid palpable in right knee joint. No skin discoloration or changes at site of arthrocentesis. No tenderness to palpation.    Skin:     General: Skin is warm and dry.      Capillary Refill: Capillary refill takes less than 2 seconds.   Neurological:      General: No focal deficit present.      Mental Status: He is alert and oriented to person, place, and time.      Cranial Nerves: No cranial nerve deficit.   Psychiatric:         Mood and Affect: Mood normal.           Labs:   Recent Labs     12/16/20  1334 12/17/20 0109 12/18/20  0350   WBC 13.4* 12.0* 8.7   RBC 4.08* 3.50* 3.25*   HEMOGLOBIN 13.1* 11.1* 10.5*   HEMATOCRIT 42.1 35.1* 33.1*   .2* 100.3* 101.8*   MCH 32.1 31.7 32.3   RDW 57.6* 54.9* 56.1*   PLATELETCT 169 168 46*   MPV 9.7 10.0 12.1   NEUTSPOLYS 85.90*  --   --    LYMPHOCYTES 4.60*  --   --    MONOCYTES 8.40  --   --    EOSINOPHILS 0.10  --   --    BASOPHILS 0.60  --   --       Recent Labs     12/16/20  1418 12/17/20 0109 12/18/20  0350   SODIUM 137 136 133*   POTASSIUM 4.7 4.3 4.1   CHLORIDE 102 103 101   CO2 26 23 23   GLUCOSE 112* 121* 104*   BUN 22 21 23*      Recent Labs     12/16/20  1418 12/17/20  0109 12/18/20  0350   ALBUMIN 4.3  --  3.2   TBILIRUBIN 1.0  --  0.8    ALKPHOSPHAT 93  --  72   TOTPROTEIN 7.4  --  6.3   ALTSGPT 34  --  22   ASTSGOT 30  --  28   CREATININE 1.59* 1.78* 1.63*        Imaging:   DX-CHEST-PORTABLE (1 VIEW)   Final Result      Stable chest without acute/new abnormality.      CT-CSPINE WITHOUT PLUS RECONS   Final Result      No acute fracture or traumatic listhesis in the cervical spine.      CT-HEAD W/O   Final Result      1.  Generalized atrophy and chronic microvascular ischemic type changes.   2.  No acute intracranial abnormality.   3.  Sinus disease.   4.  Small left parietal scalp contusion.         Problem Representation:     Patient is a 79 y/o M with PMH of COPD on noctural O2 2L, CKD, HLD, RA, peripheral neuropathy, s/p right knee prepatellar bursa and lower leg cysts with debridement latest in 10/2020 presenting after a ground level fall and head trauma with CT head negative for any bleed also found to be septic with acute hypoxic respiratory failure requiring 2L O2 at all times combined with a failed bedside swallow in ED with source of infection most likely community acquired vs aspiration pneumonia along with fluid analysis of right knee aspiration in ED growing Pseudomonas. Admitted for treatment with IV antibiotics and continued oxygen supplementation.     Sepsis (HCC)- (present on admission)  Assessment & Plan  This is Sepsis Present on admission  SIRS criteria identified on my evaluation include: Fever, with temperature greater than 101 deg F  Source is possible aspiration pneumonia vs infected right knee joint  Sepsis protocol initiated  Fluid resuscitation ordered per protocol  IV antibiotics as appropriate for source of sepsis  While organ dysfunction may be noted elsewhere in this problem list or in the chart, degree of organ dysfunction does not meet CMS criteria for severe sepsis    Plan:  Failed bedside swallow evaluation in ED - risk for aspiration PNA in setting of acute hypoxic respiratory failure  Fluid culture from right  knee showing light growth of Pseudomonas  Pending repeat fluid aspiration culture to confirm Pseudomonal growth - as possibly original culture was a contaminant  Switching IV abx regimen from C3/Azithro --> Cefepime for Pseudomonal coverage (Hx of PCN allergy therefore not Pip/Tazo, and pt tolerated C3)    Effusion of bursa of right knee  Assessment & Plan  S/p multiple procedures on Right knee - last 10/27/20 by Dr. Faulkner  S/p arthrocentesis in ED on 12/16  Fluid cultures from 12/16 showing light growth of Pseudomonas, however other fluid analysis, gram stain as well as physical examination of joint doesn't appear consistent with a infected/septic joint  Repeat fluid analysis on 12/17 showing increase in WBC most likely reactive to initial arthrocentesis, still too low to indicate septic joint. Clinically joint continues to look uninfected and more consistent with possible contaminant of prior culture.    Plan:  Continue Cefepime as IV antibiotic in setting of EMR documented PCN allergy  Pending repeat fluid cultures on 12/17 knee aspiration  Ortho consulted    Acute respiratory failure (HCC)  Assessment & Plan  Requiring Oxygen 2L at all times to maintain sats around 88-92% in setting of COPD  Home O2 is 2L only at night, and rare occassions during the day.    Plan:  Treat underlying etiology as pneumonia with IV abx  Continue to wean off oxygen as tolerated    Pneumonia  Assessment & Plan  Patient on 2L O2 only at night - however requiring 2L at rest during day on admission  Febrile, tachycardic, and hypertensive on admission  Procal - mild elevation 0.29  Failed bedside swallow eval in ED - risk for aspiration pneumonia    Plan:  Switching IV abx to Cefepime from C3/Azithro  Continue to monitor response    Thrombocytopenia (HCC)  Assessment & Plan  Plt dropped from 168 --> 46 12/18  No Heparin exposure  Not clinically showing any petechiae, bruising, or bleeding  LDH and TBili normal - unlikely to be  hemolytic underlying process  D-dimer not elevated after age adjustment, also no clinical suspicion of DVT/PE  Fibrinogen with mild elevation to 695  Last CTX dosing of Certolizumab on 12/12/20 - but Wbc and Hgb stable, less likely for CTX induced cytopenia with normal other cell lines and given temporality  Unclear etiology of drop in platelets at this time    Plan:  Continue to monitor with daily CBC  Holding pharmacological VTE prophylaxis  SCD's ordered    Oropharyngeal dysphagia- (present on admission)  Assessment & Plan  Completed another bedside swallow evaluation    Plan:  Diet: Level 6: Soft and bite sized  Liquid: Level 2: Mildly thick  If continuing to have any issues will order for formal swallow eval with SLP    Hypertension  Assessment & Plan  BP have been elevated since admission, up to 185/87  Not on any home anti-hypertensives    Plan:  Creatinine trending upward from baseline, will continue to monitor  Will consider starting on ACE/ARB for BP control with resolution of uptrending creatinine    Hx of right knee surgery- (present on admission)  Assessment & Plan  Last procedure in 10/2020 - for irrigation and debridement after having previous operations for prepatellar bursa and lower leg cyst  S/p arthrocentesis in ED on 12/16  Still retains a lot of fluid in the right knee joint space    Plan:  Plan for repeat arthrocentesis and send for fluid analysis and culture gram stain/cx to verify results from previous sample  Pain control  Ortho consulted and will see pt  Previously on Cephalexin and Bactrim outpatient PTA    CKD (chronic kidney disease) stage 3, GFR 30-59 ml/min- (present on admission)  Assessment & Plan  Cr baseline 1.59 --> today 1.63, improving  CrCl about 36-38    Plan:  Renall dose all medications  Avoid nephrotoxins  Continue to monitor BMP's   Will consider addition of ACE/ARB for hypertensive management once rule out KERRY    Chronic obstructive pulmonary disease (HCC)- (present on  Alert-The patient is alert, awake and responds to voice. The patient is oriented to time, place, and person. The triage nurse is able to obtain subjective information. admission)  Assessment & Plan  Not in acute exacerbation - no increase in cough or sputum production, no wheezing    Plan:  Cont home Symbicort  Continue glycopyrrolate

## 2020-12-18 NOTE — PROGRESS NOTES
Assumed care at 1900, bedside report received from Doreen GONZALEZ. Pt is SR 86 on the monitor. Initial assessment completed, orders reviewed. Call light within reach, bed alarm is in use, and hourly rounding in place. Pt is AxO 4, no complaints of pain/discomfort. POC addressed with patient, no additional questions at this time.

## 2020-12-18 NOTE — THERAPY
Occupational Therapy   Initial Evaluation     Patient Name: Barry Torres  Age:  80 y.o., Sex:  male  Medical Record #: [id number]  Today's Date: 12/18/2020     Precautions  Precautions: Fall Risk, Swallow Precautions ( See Comments)(hx R knee injury)  Comments: right side facial weakness, leans to right, right LE red and hot to touch    Assessment  Patient is 80 y.o. male with a diagnosis of sepsis, pneumonia.  Additional factors influencing patient status / progress: fair family support at home.      Plan    Recommend Occupational Therapy 3 times per week until therapy goals are met for the following treatments:  Adaptive Equipment, Self Care/Activities of Daily Living, Therapeutic Activities and Therapeutic Exercises.    DC Equipment Recommendations: (P) Unable to determine at this time  Discharge Recommendations: (P) Recommend home health for continued occupational therapy services     Subjective    Pleasant and cooperative throughout     Objective       12/18/20 0745   Total Time Spent   Total Time Spent (Mins) 40   Charge Group   OT Evaluation OT Evaluation Mod   Initial Contact Note    Initial Contact Note Order Received and Verified, Occupational Therapy Evaluation in Progress with Full Report to Follow.   Prior Living Situation   Prior Services Home-Independent   Housing / Facility 1 Story House   Steps Into Home 0   Steps In Home 0   Bathroom Set up Walk In Shower;Grab Bars   Equipment Owned Front-Wheel Walker;4-Wheel Walker;Oxygen   Lives with - Patient's Self Care Capacity Spouse   Prior Level of ADL Function   Self Feeding Independent   Grooming / Hygiene Independent   Bathing Independent   Dressing Independent   Toileting Independent   Prior Level of IADL Function   Medication Management Independent   Laundry Requires Assist   Kitchen Mobility Independent   Finances Independent   Home Management Requires Assist   Shopping Requires Assist   Prior Level Of Mobility Independent With Device in Home    Precautions   Precautions Fall Risk;Swallow Precautions ( See Comments)   Vitals   O2 (LPM) 1   O2 Delivery Device Silicone Nasal Cannula   Pain 0 - 10 Group   Therapist Pain Assessment Post Activity Pain Same as Prior to Activity;Nurse Notified;0   Cognition    Speech/ Communication Hard of Hearing   Level of Consciousness Alert   Passive ROM Upper Body   Passive ROM Upper Body WDL   Active ROM Upper Body   Active ROM Upper Body  WDL   Dominant Hand Right   Strength Upper Body   Upper Body Strength  WDL   Sensation Upper Body   Upper Extremity Sensation  WDL   Upper Body Muscle Tone   Upper Body Muscle Tone  WDL   Coordination Upper Body   Coordination WDL   Balance Assessment   Sitting Balance (Static) Fair +   Sitting Balance (Dynamic) Fair   Standing Balance (Static) Fair +   Standing Balance (Dynamic) Fair   Weight Shift Sitting Fair   Weight Shift Standing Fair   Bed Mobility    Supine to Sit Supervised   Sit to Supine   (left seated in bedside chair)   Scooting Supervised   ADL Assessment   Eating Supervision   Grooming Supervision;Seated   Bathing   (NT)   Upper Body Dressing Supervision   Lower Body Dressing Moderate Assist   Toileting Moderate Assist  (mostly for post BM ray care)   How much help from another person does the patient currently need...   Putting on and taking off regular lower body clothing? 2   Bathing (including washing, rinsing, and drying)? 2   Toileting, which includes using a toilet, bedpan, or urinal? 2   Putting on and taking off regular upper body clothing? 3   Taking care of personal grooming such as brushing teeth? 3   Eating meals? 3   6 Clicks Daily Activity Score 15   Functional Mobility   Sit to Stand Minimal Assist   Bed, Chair, Wheelchair Transfer Minimal Assist   Toilet Transfers Minimal Assist  (bedside commode)   Transfer Method Stand Pivot   Visual Perception   Visual Perception  WDL   Patient / Family Goals   Patient / Family Goal #1 return home   Short Term Goals    Short Term Goal # 1 supervised level for necessaqry functional transfer, using FWW   Short Term Goal # 2 set up for seated UB ADLs   Short Term Goal # 3 supervised lvel for toileting tasks   Education Group   Role of Occupational Therapist Patient Response Patient;Acceptance;Explanation;Demonstration;Reinforcement Needed   Problem List   Problem List Decreased Active Daily Living Skills;Decreased Upper Extremity Strength Right;Decreased Upper Extremity Strength Left;Decreased Functional Mobility;Decreased Activity Tolerance   Anticipated Discharge Equipment and Recommendations   DC Equipment Recommendations Unable to determine at this time   Discharge Recommendations Recommend home health for continued occupational therapy services   Interdisciplinary Plan of Care Collaboration   IDT Collaboration with  Nursing;Physical Therapist;Certified Nursing Assistant   Patient Position at End of Therapy Seated;Call Light within Reach;Tray Table within Reach;Phone within Reach   Collaboration Comments report given   Session Information   Date / Session Number  12/18.1 ( 1/3, 12/24)   Priority 2

## 2020-12-19 ENCOUNTER — ANESTHESIA (OUTPATIENT)
Dept: SURGERY | Facility: MEDICAL CENTER | Age: 80
DRG: 853 | End: 2020-12-19
Payer: MEDICARE

## 2020-12-19 ENCOUNTER — ANESTHESIA EVENT (OUTPATIENT)
Dept: SURGERY | Facility: MEDICAL CENTER | Age: 80
DRG: 853 | End: 2020-12-19
Payer: MEDICARE

## 2020-12-19 LAB
ANION GAP SERPL CALC-SCNC: 9 MMOL/L (ref 7–16)
BACTERIA FLD AEROBE CULT: ABNORMAL
BACTERIA FLD AEROBE CULT: ABNORMAL
BASOPHILS # BLD AUTO: 0.5 % (ref 0–1.8)
BASOPHILS # BLD: 0.04 K/UL (ref 0–0.12)
BUN SERPL-MCNC: 19 MG/DL (ref 8–22)
C DIFF DNA SPEC QL NAA+PROBE: NEGATIVE
C DIFF TOX A+B STL QL IA: NEGATIVE
C DIFF TOX GENS STL QL NAA+PROBE: NORMAL
CALCIUM SERPL-MCNC: 8.6 MG/DL (ref 8.5–10.5)
CHLORIDE SERPL-SCNC: 104 MMOL/L (ref 96–112)
CO2 SERPL-SCNC: 23 MMOL/L (ref 20–33)
CREAT SERPL-MCNC: 1.52 MG/DL (ref 0.5–1.4)
EOSINOPHIL # BLD AUTO: 0.09 K/UL (ref 0–0.51)
EOSINOPHIL NFR BLD: 1.1 % (ref 0–6.9)
ERYTHROCYTE [DISTWIDTH] IN BLOOD BY AUTOMATED COUNT: 55.3 FL (ref 35.9–50)
GLUCOSE SERPL-MCNC: 105 MG/DL (ref 65–99)
GRAM STN SPEC: ABNORMAL
HCT VFR BLD AUTO: 35.3 % (ref 42–52)
HGB BLD-MCNC: 11 G/DL (ref 14–18)
IMM GRANULOCYTES # BLD AUTO: 0.03 K/UL (ref 0–0.11)
IMM GRANULOCYTES NFR BLD AUTO: 0.4 % (ref 0–0.9)
LYMPHOCYTES # BLD AUTO: 1.15 K/UL (ref 1–4.8)
LYMPHOCYTES NFR BLD: 13.7 % (ref 22–41)
MCH RBC QN AUTO: 31.6 PG (ref 27–33)
MCHC RBC AUTO-ENTMCNC: 31.2 G/DL (ref 33.7–35.3)
MCV RBC AUTO: 101.4 FL (ref 81.4–97.8)
MONOCYTES # BLD AUTO: 1.37 K/UL (ref 0–0.85)
MONOCYTES NFR BLD AUTO: 16.3 % (ref 0–13.4)
NEUTROPHILS # BLD AUTO: 5.73 K/UL (ref 1.82–7.42)
NEUTROPHILS NFR BLD: 68 % (ref 44–72)
NRBC # BLD AUTO: 0 K/UL
NRBC BLD-RTO: 0 /100 WBC
PLATELET # BLD AUTO: 146 K/UL (ref 164–446)
PMV BLD AUTO: 10.6 FL (ref 9–12.9)
POTASSIUM SERPL-SCNC: 3.7 MMOL/L (ref 3.6–5.5)
RBC # BLD AUTO: 3.48 M/UL (ref 4.7–6.1)
SIGNIFICANT IND 70042: ABNORMAL
SITE SITE: ABNORMAL
SODIUM SERPL-SCNC: 136 MMOL/L (ref 135–145)
SOURCE SOURCE: ABNORMAL
WBC # BLD AUTO: 8.4 K/UL (ref 4.8–10.8)

## 2020-12-19 PROCEDURE — 700111 HCHG RX REV CODE 636 W/ 250 OVERRIDE (IP): Performed by: STUDENT IN AN ORGANIZED HEALTH CARE EDUCATION/TRAINING PROGRAM

## 2020-12-19 PROCEDURE — 160035 HCHG PACU - 1ST 60 MINS PHASE I: Performed by: SURGERY

## 2020-12-19 PROCEDURE — 80048 BASIC METABOLIC PNL TOTAL CA: CPT

## 2020-12-19 PROCEDURE — 87102 FUNGUS ISOLATION CULTURE: CPT

## 2020-12-19 PROCEDURE — 64447 NJX AA&/STRD FEMORAL NRV IMG: CPT | Performed by: SURGERY

## 2020-12-19 PROCEDURE — A9270 NON-COVERED ITEM OR SERVICE: HCPCS | Performed by: STUDENT IN AN ORGANIZED HEALTH CARE EDUCATION/TRAINING PROGRAM

## 2020-12-19 PROCEDURE — 770006 HCHG ROOM/CARE - MED/SURG/GYN SEMI*

## 2020-12-19 PROCEDURE — 87075 CULTR BACTERIA EXCEPT BLOOD: CPT

## 2020-12-19 PROCEDURE — 160002 HCHG RECOVERY MINUTES (STAT): Performed by: SURGERY

## 2020-12-19 PROCEDURE — 87116 MYCOBACTERIA CULTURE: CPT

## 2020-12-19 PROCEDURE — 3E0T3BZ INTRODUCTION OF ANESTHETIC AGENT INTO PERIPHERAL NERVES AND PLEXI, PERCUTANEOUS APPROACH: ICD-10-PCS | Performed by: ANESTHESIOLOGY

## 2020-12-19 PROCEDURE — 700102 HCHG RX REV CODE 250 W/ 637 OVERRIDE(OP): Performed by: ANESTHESIOLOGY

## 2020-12-19 PROCEDURE — 0S9C0ZZ DRAINAGE OF RIGHT KNEE JOINT, OPEN APPROACH: ICD-10-PCS | Performed by: SURGERY

## 2020-12-19 PROCEDURE — 160038 HCHG SURGERY MINUTES - EA ADDL 1 MIN LEVEL 2: Performed by: SURGERY

## 2020-12-19 PROCEDURE — 501330 HCHG SET, CYSTO IRRIG TUBING: Performed by: SURGERY

## 2020-12-19 PROCEDURE — 501838 HCHG SUTURE GENERAL: Performed by: SURGERY

## 2020-12-19 PROCEDURE — 87493 C DIFF AMPLIFIED PROBE: CPT

## 2020-12-19 PROCEDURE — 700111 HCHG RX REV CODE 636 W/ 250 OVERRIDE (IP): Performed by: ANESTHESIOLOGY

## 2020-12-19 PROCEDURE — 87324 CLOSTRIDIUM AG IA: CPT

## 2020-12-19 PROCEDURE — 160048 HCHG OR STATISTICAL LEVEL 1-5: Performed by: SURGERY

## 2020-12-19 PROCEDURE — 160036 HCHG PACU - EA ADDL 30 MINS PHASE I: Performed by: SURGERY

## 2020-12-19 PROCEDURE — 87205 SMEAR GRAM STAIN: CPT

## 2020-12-19 PROCEDURE — 99233 SBSQ HOSP IP/OBS HIGH 50: CPT | Mod: GC | Performed by: INTERNAL MEDICINE

## 2020-12-19 PROCEDURE — 700101 HCHG RX REV CODE 250: Performed by: ANESTHESIOLOGY

## 2020-12-19 PROCEDURE — 0SBC0ZZ EXCISION OF RIGHT KNEE JOINT, OPEN APPROACH: ICD-10-PCS | Performed by: SURGERY

## 2020-12-19 PROCEDURE — 160009 HCHG ANES TIME/MIN: Performed by: SURGERY

## 2020-12-19 PROCEDURE — 87070 CULTURE OTHR SPECIMN AEROBIC: CPT

## 2020-12-19 PROCEDURE — 99223 1ST HOSP IP/OBS HIGH 75: CPT | Performed by: INTERNAL MEDICINE

## 2020-12-19 PROCEDURE — 700105 HCHG RX REV CODE 258: Performed by: STUDENT IN AN ORGANIZED HEALTH CARE EDUCATION/TRAINING PROGRAM

## 2020-12-19 PROCEDURE — 502240 HCHG MISC OR SUPPLY RC 0272: Performed by: SURGERY

## 2020-12-19 PROCEDURE — 36415 COLL VENOUS BLD VENIPUNCTURE: CPT

## 2020-12-19 PROCEDURE — 85025 COMPLETE CBC W/AUTO DIFF WBC: CPT

## 2020-12-19 PROCEDURE — L1830 KO IMMOB CANVAS LONG PRE OTS: HCPCS | Performed by: SURGERY

## 2020-12-19 PROCEDURE — A9270 NON-COVERED ITEM OR SERVICE: HCPCS | Performed by: ANESTHESIOLOGY

## 2020-12-19 PROCEDURE — 700102 HCHG RX REV CODE 250 W/ 637 OVERRIDE(OP): Performed by: STUDENT IN AN ORGANIZED HEALTH CARE EDUCATION/TRAINING PROGRAM

## 2020-12-19 PROCEDURE — 160027 HCHG SURGERY MINUTES - 1ST 30 MINS LEVEL 2: Performed by: SURGERY

## 2020-12-19 RX ORDER — OXYCODONE HCL 5 MG/5 ML
5 SOLUTION, ORAL ORAL
Status: COMPLETED | OUTPATIENT
Start: 2020-12-19 | End: 2020-12-19

## 2020-12-19 RX ORDER — OXYCODONE HCL 5 MG/5 ML
10 SOLUTION, ORAL ORAL
Status: COMPLETED | OUTPATIENT
Start: 2020-12-19 | End: 2020-12-19

## 2020-12-19 RX ORDER — HYDROMORPHONE HYDROCHLORIDE 1 MG/ML
0.2 INJECTION, SOLUTION INTRAMUSCULAR; INTRAVENOUS; SUBCUTANEOUS
Status: DISCONTINUED | OUTPATIENT
Start: 2020-12-19 | End: 2020-12-19 | Stop reason: HOSPADM

## 2020-12-19 RX ORDER — ONDANSETRON 2 MG/ML
4 INJECTION INTRAMUSCULAR; INTRAVENOUS
Status: DISCONTINUED | OUTPATIENT
Start: 2020-12-19 | End: 2020-12-19 | Stop reason: HOSPADM

## 2020-12-19 RX ORDER — ROPIVACAINE HYDROCHLORIDE 5 MG/ML
INJECTION, SOLUTION EPIDURAL; INFILTRATION; PERINEURAL
Status: COMPLETED | OUTPATIENT
Start: 2020-12-19 | End: 2020-12-19

## 2020-12-19 RX ORDER — DEXAMETHASONE SODIUM PHOSPHATE 4 MG/ML
INJECTION, SOLUTION INTRA-ARTICULAR; INTRALESIONAL; INTRAMUSCULAR; INTRAVENOUS; SOFT TISSUE PRN
Status: DISCONTINUED | OUTPATIENT
Start: 2020-12-19 | End: 2020-12-19 | Stop reason: SURG

## 2020-12-19 RX ORDER — IPRATROPIUM BROMIDE AND ALBUTEROL SULFATE 2.5; .5 MG/3ML; MG/3ML
3 SOLUTION RESPIRATORY (INHALATION)
Status: DISCONTINUED | OUTPATIENT
Start: 2020-12-19 | End: 2020-12-19 | Stop reason: HOSPADM

## 2020-12-19 RX ORDER — OXYCODONE HYDROCHLORIDE 5 MG/1
5 TABLET ORAL EVERY 6 HOURS PRN
Status: DISCONTINUED | OUTPATIENT
Start: 2020-12-19 | End: 2020-12-23 | Stop reason: HOSPADM

## 2020-12-19 RX ORDER — HYDROMORPHONE HYDROCHLORIDE 1 MG/ML
0.1 INJECTION, SOLUTION INTRAMUSCULAR; INTRAVENOUS; SUBCUTANEOUS
Status: DISCONTINUED | OUTPATIENT
Start: 2020-12-19 | End: 2020-12-19 | Stop reason: HOSPADM

## 2020-12-19 RX ORDER — HYDROMORPHONE HYDROCHLORIDE 1 MG/ML
0.4 INJECTION, SOLUTION INTRAMUSCULAR; INTRAVENOUS; SUBCUTANEOUS
Status: DISCONTINUED | OUTPATIENT
Start: 2020-12-19 | End: 2020-12-19 | Stop reason: HOSPADM

## 2020-12-19 RX ORDER — LIDOCAINE HYDROCHLORIDE 20 MG/ML
INJECTION, SOLUTION EPIDURAL; INFILTRATION; INTRACAUDAL; PERINEURAL PRN
Status: DISCONTINUED | OUTPATIENT
Start: 2020-12-19 | End: 2020-12-19 | Stop reason: SURG

## 2020-12-19 RX ORDER — LABETALOL HYDROCHLORIDE 5 MG/ML
5 INJECTION, SOLUTION INTRAVENOUS
Status: DISCONTINUED | OUTPATIENT
Start: 2020-12-19 | End: 2020-12-19 | Stop reason: HOSPADM

## 2020-12-19 RX ORDER — SODIUM CHLORIDE, SODIUM LACTATE, POTASSIUM CHLORIDE, CALCIUM CHLORIDE 600; 310; 30; 20 MG/100ML; MG/100ML; MG/100ML; MG/100ML
INJECTION, SOLUTION INTRAVENOUS CONTINUOUS
Status: DISCONTINUED | OUTPATIENT
Start: 2020-12-19 | End: 2020-12-19 | Stop reason: HOSPADM

## 2020-12-19 RX ORDER — MEPERIDINE HYDROCHLORIDE 25 MG/ML
12.5 INJECTION INTRAMUSCULAR; INTRAVENOUS; SUBCUTANEOUS
Status: DISCONTINUED | OUTPATIENT
Start: 2020-12-19 | End: 2020-12-19 | Stop reason: HOSPADM

## 2020-12-19 RX ORDER — HALOPERIDOL 5 MG/ML
1 INJECTION INTRAMUSCULAR
Status: DISCONTINUED | OUTPATIENT
Start: 2020-12-19 | End: 2020-12-19 | Stop reason: HOSPADM

## 2020-12-19 RX ADMIN — FENTANYL CITRATE 50 MCG: 50 INJECTION, SOLUTION INTRAMUSCULAR; INTRAVENOUS at 17:13

## 2020-12-19 RX ADMIN — OXYCODONE HYDROCHLORIDE 10 MG: 5 SOLUTION ORAL at 18:27

## 2020-12-19 RX ADMIN — LABETALOL HYDROCHLORIDE 5 MG: 5 INJECTION, SOLUTION INTRAVENOUS at 18:51

## 2020-12-19 RX ADMIN — ROPIVACAINE HYDROCHLORIDE 20 ML: 5 INJECTION, SOLUTION EPIDURAL; INFILTRATION; PERINEURAL at 17:26

## 2020-12-19 RX ADMIN — FENTANYL CITRATE 50 MCG: 50 INJECTION, SOLUTION INTRAMUSCULAR; INTRAVENOUS at 18:45

## 2020-12-19 RX ADMIN — GLYCOPYRROLATE 1 CAPSULE: 15.6 CAPSULE RESPIRATORY (INHALATION) at 04:16

## 2020-12-19 RX ADMIN — LEFLUNOMIDE 20 MG: 20 TABLET ORAL at 04:16

## 2020-12-19 RX ADMIN — DEXAMETHASONE SODIUM PHOSPHATE 4 MG: 4 INJECTION, SOLUTION INTRA-ARTICULAR; INTRALESIONAL; INTRAMUSCULAR; INTRAVENOUS; SOFT TISSUE at 17:22

## 2020-12-19 RX ADMIN — HYDROMORPHONE HYDROCHLORIDE 0.2 MG: 1 INJECTION, SOLUTION INTRAMUSCULAR; INTRAVENOUS; SUBCUTANEOUS at 19:21

## 2020-12-19 RX ADMIN — PROPOFOL 120 MG: 10 INJECTION, EMULSION INTRAVENOUS at 17:22

## 2020-12-19 RX ADMIN — DEXAMETHASONE SODIUM PHOSPHATE 4 MG: 4 INJECTION, SOLUTION INTRA-ARTICULAR; INTRALESIONAL; INTRAMUSCULAR; INTRAVENOUS; SOFT TISSUE at 17:13

## 2020-12-19 RX ADMIN — OXYCODONE 5 MG: 5 TABLET ORAL at 21:37

## 2020-12-19 RX ADMIN — FLUTICASONE PROPIONATE 50 MCG: 50 SPRAY, METERED NASAL at 04:16

## 2020-12-19 RX ADMIN — BUDESONIDE AND FORMOTEROL FUMARATE DIHYDRATE 2 PUFF: 80; 4.5 AEROSOL RESPIRATORY (INHALATION) at 04:16

## 2020-12-19 RX ADMIN — HYDROMORPHONE HYDROCHLORIDE 0.2 MG: 1 INJECTION, SOLUTION INTRAMUSCULAR; INTRAVENOUS; SUBCUTANEOUS at 19:30

## 2020-12-19 RX ADMIN — HYDROMORPHONE HYDROCHLORIDE 0.4 MG: 1 INJECTION, SOLUTION INTRAMUSCULAR; INTRAVENOUS; SUBCUTANEOUS at 18:48

## 2020-12-19 RX ADMIN — HYDROMORPHONE HYDROCHLORIDE 0.4 MG: 1 INJECTION, SOLUTION INTRAMUSCULAR; INTRAVENOUS; SUBCUTANEOUS at 19:36

## 2020-12-19 RX ADMIN — FENTANYL CITRATE 50 MCG: 50 INJECTION, SOLUTION INTRAMUSCULAR; INTRAVENOUS at 18:26

## 2020-12-19 RX ADMIN — CEFEPIME 2 G: 2 INJECTION, POWDER, FOR SOLUTION INTRAVENOUS at 05:58

## 2020-12-19 RX ADMIN — FENTANYL CITRATE 50 MCG: 50 INJECTION, SOLUTION INTRAMUSCULAR; INTRAVENOUS at 17:22

## 2020-12-19 RX ADMIN — HYDROMORPHONE HYDROCHLORIDE 0.2 MG: 1 INJECTION, SOLUTION INTRAMUSCULAR; INTRAVENOUS; SUBCUTANEOUS at 19:10

## 2020-12-19 RX ADMIN — HYDROMORPHONE HYDROCHLORIDE 0.4 MG: 1 INJECTION, SOLUTION INTRAMUSCULAR; INTRAVENOUS; SUBCUTANEOUS at 19:46

## 2020-12-19 RX ADMIN — LIDOCAINE HYDROCHLORIDE 100 MG: 20 INJECTION, SOLUTION EPIDURAL; INFILTRATION; INTRACAUDAL at 17:22

## 2020-12-19 ASSESSMENT — ENCOUNTER SYMPTOMS
NAUSEA: 0
BLURRED VISION: 0
FEVER: 0
CHILLS: 0
MYALGIAS: 0
DIARRHEA: 0
COUGH: 0
VOMITING: 0
SHORTNESS OF BREATH: 0
WEAKNESS: 0
LOSS OF CONSCIOUSNESS: 0
HEADACHES: 0
PALPITATIONS: 0
ABDOMINAL PAIN: 0
CONSTIPATION: 0

## 2020-12-19 ASSESSMENT — PAIN DESCRIPTION - PAIN TYPE
TYPE: SURGICAL PAIN
TYPE: ACUTE PAIN
TYPE: SURGICAL PAIN

## 2020-12-19 ASSESSMENT — PAIN SCALES - GENERAL: PAIN_LEVEL: 3

## 2020-12-19 NOTE — PROGRESS NOTES
No growth from second knee tap  Case discussed with Dr Viera  First tap growth likely contaminant  Continue to follow cultures

## 2020-12-19 NOTE — PROGRESS NOTES
Received report from LISA Guerrero. Reviewed POC, no questions at this time. Call light is within reach, bed is in lowest/locked position.

## 2020-12-19 NOTE — PROGRESS NOTES
Daily Progress Note:     Date of Service: 12/19/2020  Primary Team: [facility] Orange Team   Attending: Shady Retana MD  Senior Resident: Arcadio Hernandez      Chief Complaint: GLF    Subjective:     -feel well, mild knee pain with active and passive ROM  -second right knee aspirate also back positive for pseudomonas, support septic arthritis  -ortho to perform I and D this afternoon  -ID consulted for antibiotic guidance for pseudomonal arthritis    Consultants/Specialty:  Orthopedic surgery  Infectious Disease    Review of Systems:    Review of Systems   Constitutional: Negative for chills and fever.   HENT: Negative for hearing loss.    Eyes: Negative for blurred vision.   Respiratory: Negative for cough and shortness of breath.    Cardiovascular: Negative for chest pain, palpitations and leg swelling.   Gastrointestinal: Negative for abdominal pain, constipation, diarrhea, nausea and vomiting.   Genitourinary: Negative for dysuria and hematuria.   Musculoskeletal: Negative for myalgias.   Skin: Negative for rash.   Neurological: Negative for loss of consciousness, weakness and headaches.       Objective Data:   Physical Exam:   Vitals:   Temp:  [36.5 °C (97.7 °F)-37.7 °C (99.8 °F)] 37 °C (98.6 °F)  Pulse:  [74-93] 89  Resp:  [18] 18  BP: (106-163)/(72-93) 163/85  SpO2:  [95 %-97 %] 95 %    Physical Exam  Vitals signs and nursing note reviewed.   Constitutional:       General: He is not in acute distress.     Appearance: He is not diaphoretic.   HENT:      Head: Normocephalic and atraumatic.      Nose: Nose normal.      Mouth/Throat:      Mouth: Mucous membranes are moist.      Pharynx: Oropharynx is clear.   Eyes:      Extraocular Movements: Extraocular movements intact.      Conjunctiva/sclera: Conjunctivae normal.      Pupils: Pupils are equal, round, and reactive to light.   Neck:      Musculoskeletal: Neck supple.   Cardiovascular:      Rate and Rhythm: Normal rate and regular rhythm.      Pulses: Normal pulses.       Heart sounds: No murmur. No friction rub. No gallop.    Pulmonary:      Effort: Pulmonary effort is normal. No respiratory distress.      Breath sounds: No wheezing, rhonchi or rales.   Abdominal:      General: Bowel sounds are normal. There is no distension.      Palpations: Abdomen is soft.      Tenderness: There is no abdominal tenderness. There is no guarding.   Musculoskeletal:      Right lower leg: Edema present.      Left lower leg: No edema.      Comments: RLE both surgical incisions C/D/I, surrounding erythema and warm to touch along lower leg. Fluid palpable in right knee joint. No skin discoloration or changes at site of arthrocentesis. No tenderness to palpation.    Skin:     General: Skin is warm and dry.      Capillary Refill: Capillary refill takes less than 2 seconds.   Neurological:      General: No focal deficit present.      Mental Status: He is alert and oriented to person, place, and time.      Cranial Nerves: No cranial nerve deficit.   Psychiatric:         Mood and Affect: Mood normal.           Labs:   Recent Labs     12/17/20 0109 12/18/20  0350 12/19/20  0450   WBC 12.0* 8.7 8.4   RBC 3.50* 3.25* 3.48*   HEMOGLOBIN 11.1* 10.5* 11.0*   HEMATOCRIT 35.1* 33.1* 35.3*   .3* 101.8* 101.4*   MCH 31.7 32.3 31.6   RDW 54.9* 56.1* 55.3*   PLATELETCT 168 46* 146*   MPV 10.0 12.1 10.6   NEUTSPOLYS  --   --  68.00   LYMPHOCYTES  --   --  13.70*   MONOCYTES  --   --  16.30*   EOSINOPHILS  --   --  1.10   BASOPHILS  --   --  0.50      Recent Labs     12/17/20  0109 12/18/20  0350 12/19/20  0450   SODIUM 136 133* 136   POTASSIUM 4.3 4.1 3.7   CHLORIDE 103 101 104   CO2 23 23 23   GLUCOSE 121* 104* 105*   BUN 21 23* 19      Recent Labs     12/16/20  1418 12/17/20  0109 12/18/20  0350 12/19/20  0450   ALBUMIN 4.3  --  3.2  --    TBILIRUBIN 1.0  --  0.8  --    ALKPHOSPHAT 93  --  72  --    TOTPROTEIN 7.4  --  6.3  --    ALTSGPT 34  --  22  --    ASTSGOT 30  --  28  --    CREATININE 1.59* 1.78* 1.63*  1.52*        Imaging:   DX-CHEST-PORTABLE (1 VIEW)   Final Result      Stable chest without acute/new abnormality.      CT-CSPINE WITHOUT PLUS RECONS   Final Result      No acute fracture or traumatic listhesis in the cervical spine.      CT-HEAD W/O   Final Result      1.  Generalized atrophy and chronic microvascular ischemic type changes.   2.  No acute intracranial abnormality.   3.  Sinus disease.   4.  Small left parietal scalp contusion.         Problem Representation:     Patient is a 81 y/o M with PMH of COPD on noctural O2 2L, CKD, HLD, RA, peripheral neuropathy, s/p right knee prepatellar bursa and lower leg cysts with debridement latest in 10/2020 presenting after a ground level fall and head trauma with CT head negative for any bleed also found to be septic with acute hypoxic respiratory failure requiring 2L O2 at all times combined with a failed bedside swallow in ED with source of infection most likely community acquired vs aspiration pneumonia along with fluid analysis of right knee aspiration in ED growing Pseudomonas. Admitted for treatment with IV antibiotics and continued oxygen supplementation.      Effusion of bursa of right knee  Assessment & Plan  -S/p multiple procedures on Right knee - last 10/27/20 by Dr. Faulkner  -S/p arthrocentesis in ED on 12/16, 12/17  -2x Fluid cultures of right knee 12/16, 12/17 both growing pseudomonas, although to fluid itself does not look overtly purulent    Plan:  -Continue Cefepime as IV antibiotic in setting of EMR documented PCN allergy  -Ortho consulted, appreciate recs  -ID consulted for abx guidance. Allergic to FQ's.  Query bactrim?    Pneumonia  Assessment & Plan  Patient on 2L O2 only at night - however requiring 2L at rest during day on admission  Febrile, tachycardic, and hypertensive on admission  Procal - mild elevation 0.29  Failed bedside swallow eval in ED - risk for aspiration pneumonia    Plan:  -continue cefepime for pseudomonas in knee  which covers CAP organisms  -Continue to monitor response    Acute respiratory failure (HCC)  Assessment & Plan  Requiring Oxygen 2L at all times to maintain sats around 88-92% in setting of COPD  Home O2 is 2L only at night, and rare occassions during the day.  Plan:  -continue to cover possible pneumonia with IV abx  -Continue to wean off oxygen as tolerated    Sepsis (HCC)- (present on admission)  Assessment & Plan  Improving  Source likely Right knee septic arthritis  Does have component of aspiration risk, initial concern for pneumonia    Plan:  -Fluid culture from right knee showing light growth of Pseudomonas  -repeat confirms pseudomonas growth  -currently on cefepime given penicillin allergy  -Ortho onboard for srouce control  -ID on board for Abx guidance      Thrombocytopenia (HCC)  Assessment & Plan  At baseline borderline low, but transiently dropped to 46, likely lab abnormality  Continue to monitor with daily CBC  Holding pharmacological VTE prophylaxis  SCD's ordered    Oropharyngeal dysphagia- (present on admission)  Assessment & Plan  follwoed by speech  Diet: Level 6: Soft and bite sized  Liquid: Level 2: Mildly thick      Hypertension  Assessment & Plan  BP have been elevated since admission, up to 185/87  Not on any home anti-hypertensives  Will consider starting on ACE/ARB for BP control with resolution of uptrending creatinine    Hx of right knee surgery- (present on admission)  Assessment & Plan  Last procedure in 10/2020 - for irrigation and debridement after having previous operations for prepatellar bursa and lower leg cyst  S/p arthrocentesis in ED on 12/16  Still retains a lot of fluid in the right knee joint space    Plan:  Plan for repeat arthrocentesis and send for fluid analysis and culture gram stain/cx to verify results from previous sample  Pain control  Ortho consulted  Previously on Cephalexin and Bactrim outpatient PTA    CKD (chronic kidney disease) stage 3, GFR 30-59 ml/min-  (present on admission)  Assessment & Plan  Cr baseline 1.59, CrCl about 36-38    Renall dose all medications  Avoid nephrotoxins  Continue to monitor BMP's   Will consider addition of ACE/ARB for hypertensive management once rule out KERRY    Chronic obstructive pulmonary disease (HCC)- (present on admission)  Assessment & Plan  Not in acute exacerbation - no increase in cough or sputum production, no wheezing  Cont home Symbicort  Continue glycopyrrolate

## 2020-12-19 NOTE — CONSULTS
[facility] INFECTIOUS DISEASES INPATIENT CONSULT NOTE     Date of Service: 12/19/2020    Consult Requested By: Shady Retana M.D.    Reason for Consultation: Septic arthritis    Chief Complaint: Fall    History of Present Illness:     Barry Torres is a 80 y.o. male admitted 12/16/2020.  Patient with COPD, CKD stage III, hypertension, fell out of bed and hit the left side of his head on 12/16, thus presented to the ER.  Imaging with no evidence of stroke or bleed.  Patient states that about the past several days prior to admission, he had not been feeling well, with generalized malaise, nonproductive cough, shortness of breath, no fevers or chills.  Chest x-ray with no acute abnormalities.  In the ER, patient was noted to be febrile to 101.2, with leukocytosis of 13.4.  Procalcitonin was elevated to 0.20.  Patient was initially started on ceftriaxone and azithromycin.    Of note, on 10/27/2020, patient underwent right prepatellar bursa resection and washout, right lower leg cyst resection due to recurrent prepatellar bursitis, lower leg cyst recurrence and incision dehiscence.  Cultures at that time grew MSSA.  No discharge summary so treatment course uncertain.  Reportedly, he received 2 weeks of Keflex for an infection.  He noted pain in his right knee on admission, thus fluid is aspirated and it grew only 1700 white cells, neutrophil predominant.  However, the culture returned positive for Pseudomonas.  Aspiration was repeated on 12/17 and this time it showed 15,000 white cells, 90% neutrophils.  Culture again grew Pseudomonas.  Plan is for I&D today.  Blood cultures no growth till date.    Review of Systems:  All other systems reviewed and are negative expect as noted in HPI    Past Medical History:   Diagnosis Date   • Abnormal thyroid stimulating hormone (TSH) level    • Allergic rhinitis    • Asbestosis (HCC)    • Asthma    • Bronchitis    • Chronic diarrhea     declines eval; takes immodium once a day  usually and sometimes less; see previous notes; aware of risk    • Chronic low back pain    • Chronic lung disease     asbestos related   • CKD (chronic kidney disease), stage III     sees neph, one kidney   • COPD (chronic obstructive pulmonary disease) ([facility])    • Coronary artery calcification seen on CAT scan 8/2/2016    sees cards   • Epididymitis 2009    h/o listed in chart   • GERD (gastroesophageal reflux disease)    • History of hemorrhoids    • History of skin cancer     non melanoma   • [place] (hard of hearing)     declines hearing aids   • Hypercholesteremia    • Hypertension    • Hypertriglyceridemia    • Indigestion    • Light headedness     2/2 orthostasis? now better off hctz   • Lightheadedness 6/23/2016   • Low back pain    • Nasal drainage    • Olecranon bursitis    • Orthostasis 6/23/2016    better off HCTZ   • Peripheral neuropathy    • Pleural plaque 2005     CT [place]   • Post-nasal drip    • Pulmonary emphysema ([facility])    • RA (rheumatoid arthritis) ([facility])     on meds, sees rheum   • Restless leg syndrome    • Rheumatoid arthritis ([facility])    • Sleep apnea     obstructive and central - not adherent to autopap   • Solitary kidney     history of kidney cyst 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-functioning kidney s/p nephrectomy        Past Surgical History:   Procedure Laterality Date   • IRRIGATION & DEBRIDEMENT ORTHO Right 10/27/2020    Procedure: IRRIGATION AND DEBRIDEMENT, WOUND - KNEE OPEN;  Surgeon: Elijah Falukner M.D.;  Location: [facility];  Service: Orthopedics   • FLEXOR TENDON REPAIR Left 4/3/2019    Procedure: FLEXOR TENDON REPAIR- SMALL FINGER VS;  Surgeon: Marc Chowdhury M.D.;  Location: SURGERY [facility];  Service: Orthopedics   • TENDON TRANSFER Left 4/3/2019    Procedure: TENDON TRANSFER;  Surgeon: Marc Chowdhury M.D.;  Location: [facility];  Service: Orthopedics   • COLONOSCOPY  11/10/2018    Procedure: COLONOSCOPY;  Surgeon: Ganesh Peacock M.D.;  Location:  SURGERY [facility];  Service: Gastroenterology   • NASAL POLYPECTOMY Bilateral 10/6/2015    Procedure: NASAL POLYPECTOMY WITH SCOTT ENDOSCOPIC NASAL DEBRIDEMENT;  Surgeon: Param Maurer M.D.;  Location: SURGERY SAME DAY [facility];  Service:    • CYSTOSCOPY  2010    Performed by ANGIE VERDUZCO at SURGERY [facility] ORS   • RETROGRADES  2010    Performed by ANGIE VERDUZCO at SURGERY [facility] ORS   • PYELOGRAM  2010    Performed by ANGIE VERDUZCO at SURGERY [facility] ORS   • URETEROSCOPY  2010    Performed by ANGIE VERDUZCO at SURGERY [facility] ORS   • NEPHRECTOMY LAPAROSCOPIC      left kidney   • TESTICLE EXPLORATION     • PB REMV 2ND CATARACT,CORN-SCLER SECTN     • TONSILLECTOMY         Family History   Problem Relation Age of Onset   • Genetic Disorder Father         ALS   • No Known Problems Sister    • No Known Problems Son    • No Known Problems Daughter    • Heart Attack Neg Hx    • Heart Disease Neg Hx    • Heart Failure Neg Hx        Social History     Socioeconomic History   • Marital status:      Spouse name: Not on file   • Number of children: Not on file   • Years of education: Not on file   • Highest education level: Not on file   Occupational History   • Not on file   Social Needs   • Financial resource strain: Not very hard   • Food insecurity     Worry: Never true     Inability: Never true   • Transportation needs     Medical: No     Non-medical: No   Tobacco Use   • Smoking status: Former Smoker     Packs/day: 1.00     Years: 40.00     Pack years: 40.00     Types: Cigarettes     Quit date: 1980     Years since quittin.9   • Smokeless tobacco: Never Used   • Tobacco comment: 1 pk a day for 40 yrs   Substance and Sexual Activity   • Alcohol use: No     Alcohol/week: 0.0 oz   • Drug use: No   • Sexual activity: Never     Partners: Female     Comment: retired    Lifestyle   • Physical activity     Days per week: Not on  file     Minutes per session: Not on file   • Stress: Not on file   Relationships   • Social connections     Talks on phone: Not on file     Gets together: Not on file     Attends [employer] service: Not on file     Active member of club or organization: Not on file     Attends meetings of clubs or organizations: Not on file     Relationship status: Not on file   • Intimate partner violence     Fear of current or ex partner: Not on file     Emotionally abused: Not on file     Physically abused: Not on file     Forced sexual activity: Not on file   Other Topics Concern   • Not on file   Social History Narrative    See H&P    Lives with wife       Allergies   Allergen Reactions   • Ciprofloxacin      Other reaction(s): muscle aches   • Levaquin      Other reaction(s): Muscle aches  Muscle aches  Other reaction(s): Muscle aches   • Penicillins Hives     Rash and asthma attack when a child - wife states he has had Keflex in the past and tolerated   • Ciprofloxacin Hcl Unspecified     MUSCLE ACHES   • Levofloxacin Unspecified     MUSCLE ACHES       Medications:    Current Facility-Administered Medications:   •  cefepime (MAXIPIME) 2 g in  mL IVPB, 2 g, Intravenous, Q12HRS, Jake Downing M.D.  •  loperamide (IMODIUM) capsule 2 mg, 2 mg, Oral, DAILY, Jake Downing M.D., Stopped at 12/19/20 0600  •  leflunomide (ARAVA) tablet 20 mg, 20 mg, Oral, DAILY, Jake Downing M.D., 20 mg at 12/19/20 0416  •  fluticasone (FLONASE) nasal spray 50 mcg, 1 Spray, Nasal, DAILY, Jake Downing M.D., 50 mcg at 12/19/20 0416  •  acetaminophen (Tylenol) tablet 650 mg, 650 mg, Oral, Q4HRS PRN, Jake Downing M.D., 650 mg at 12/17/20 1823  •  glycopyrrolate (Seebri) 15.6 mcg inhalation capsule 1 Cap, 1 Cap, Inhalation, BID, Bradley Malave M.D., 1 Cap at 12/19/20 0416  •  lactated ringers infusion (BOLUS), 500 mL, Intravenous, Once PRN, Bradley Malave M.D.  •  budesonide-formoterol (SYMBICORT) 80-4.5 MCG/ACT  "inhaler 2 Puff, 2 Puff, Inhalation, BID, Bradley Malave M.D., 2 Puff at 20 0416    Physical Exam:   Vital Signs: BP (!) 163/85   Pulse 89   Temp 37 °C (98.6 °F) (Temporal)   Resp 18   Ht 1.854 m (6' 1\")   Wt 94.8 kg (208 lb 15.9 oz)   SpO2 95%   BMI 27.57 kg/m²   Temp  Av.1 °C (98.8 °F)  Min: 35.8 °C (96.5 °F)  Max: 38.4 °C (101.2 °F)  Vital signs reviewed  Constitutional: Patient is oriented to person, place, and time. Appears elderly, no acute distress  Head: Atraumatic, normocephalic  Eyes: Conjunctivae normal  Mouth/Throat: Lips without lesions  Neck: Neck supple. No masses/lymphadenopathy  Cardiovascular: Normal rate, regular rhythm, normal S1S2 and intact distal pulses. No murmur, gallop, or friction rub. No pedal edema.  Pulmonary/Chest: No respiratory distress. Unlabored respiratory effort, lungs clear to auscultation. No wheezes or rales.   Abdominal: Soft, non tender. BS + x 4. No masses or hepatosplenomegaly.   Musculoskeletal: Right knee swollen with erythema, fluctuance, pain with passive motion, right proximal posteromedial leg with well approximated surgical incision  Neurological: Alert and oriented to person, place, and time. No gross cranial nerve deficit. No focal neural deficit noted  Skin: Skin is warm and dry. No rashes or embolic phenomena noted on exposed skin  Psychiatric: Pleasant.  Normal mood and affect. Behavior is normal.     LABS:  Recent Labs     20  0350 20  0450   WBC 12.0* 8.7 8.4      Recent Labs     20  0350 20  045   HEMOGLOBIN 11.1* 10.5* 11.0*   HEMATOCRIT 35.1* 33.1* 35.3*   .3* 101.8* 101.4*   MCH 31.7 32.3 31.6   PLATELETCT 168 46* 146*       Recent Labs     20  0350 20  0450   SODIUM 136 133* 136   POTASSIUM 4.3 4.1 3.7   CHLORIDE 103 101 104   CO2 23 23 23   CREATININE 1.78* 1.63* 1.52*        Recent Labs     20  1418 20  0350   ALBUMIN 4.3 3.2    "     MICRO:  Blood Culture   Date Value Ref Range Status   11/10/2018 No growth after 5 days of incubation.  Final   11/10/2018 No growth after 5 days of incubation.  Final        Latest pertinent labs were reviewed    IMAGING STUDIES:  As above    Hospital Course/Assessment:   Barry Torres is a 80 y.o. male with a history of  COPD, CKD stage III, hypertension, recent (10/27/2020) right prepatellar bursa resection and washout, right lower leg cyst resection due to recurrent prepatellar bursitis, lower leg cyst recurrence and incision dehiscence. Cultures at that time grew MSSA, treatment course uncertain.  Patient now admitted due to falling out of bed and hitting the left side of his head on 12/16, also noted to be septic and with right knee pain and swelling, with synovial fluid growing Pseudomonas.    Pertinent Diagnoses:  Sepsis  Right knee septic arthritis, prepatellar abscess, Pseudomonas  Recent prepatellar bursitis and recurrent lower leg cyst status post I&D  CKD stage III  COPD  Fluoroquinolone allergy    Plan:   -Agree with IV cefepime 2 g every 12 hours  -Agree with I&D.  We will follow operative note  -No oral options given fluoroquinolone allergy.  Anticipate 4 weeks of IV cefepime    Plan was discussed with the primary team, Dr. Mary ARCINIEGA will follow. Please feel free to call with questions.    Duarte Bell M.D.    Please note that this dictation was created using voice recognition software. I have worked with technical experts from [facility] to optimize the interface.  I have made every reasonable attempt to correct obvious errors, but there may be errors of grammar and possibly content that I did not discover before finalizing the note.

## 2020-12-19 NOTE — PROGRESS NOTES
Bedside report received. Fall precautions in place. Patient in bed. Patient alert and oriented x4. Call light within reach. Bed in low and locked position. POC reviewed with patient. No further questions at this time.

## 2020-12-19 NOTE — PROGRESS NOTES
Report received, pt resting in bed, alert and pleasant. Pt reports abdomen pain rt upset stomach, pt states he has felt occasional nausea and had diarrhea throughout the night. Pt on isolation for rule out C diff.     Pt right leg hot, red, and swollen. Pt states his leg has been like this the past few days. Pulses present. Discussed POC and precautions, pt verbalizes understanding. Will continue to monitor

## 2020-12-20 PROBLEM — D69.6 THROMBOCYTOPENIA (HCC): Status: RESOLVED | Noted: 2020-12-18 | Resolved: 2020-12-20

## 2020-12-20 PROBLEM — Z98.890 HX OF RIGHT KNEE SURGERY: Status: RESOLVED | Noted: 2020-12-17 | Resolved: 2020-12-20

## 2020-12-20 PROBLEM — M25.461 EFFUSION OF BURSA OF RIGHT KNEE: Status: RESOLVED | Noted: 2020-12-17 | Resolved: 2020-12-20

## 2020-12-20 PROBLEM — M00.9 SEPTIC ARTHRITIS (HCC): Status: ACTIVE | Noted: 2020-12-20

## 2020-12-20 PROBLEM — M70.41 PREPATELLAR BURSITIS OF RIGHT KNEE: Status: ACTIVE | Noted: 2020-12-20

## 2020-12-20 LAB
ANION GAP SERPL CALC-SCNC: 10 MMOL/L (ref 7–16)
BASOPHILS # BLD AUTO: 0.1 % (ref 0–1.8)
BASOPHILS # BLD: 0.01 K/UL (ref 0–0.12)
BUN SERPL-MCNC: 19 MG/DL (ref 8–22)
CALCIUM SERPL-MCNC: 8.6 MG/DL (ref 8.5–10.5)
CHLORIDE SERPL-SCNC: 106 MMOL/L (ref 96–112)
CO2 SERPL-SCNC: 23 MMOL/L (ref 20–33)
CREAT SERPL-MCNC: 1.33 MG/DL (ref 0.5–1.4)
EOSINOPHIL # BLD AUTO: 0 K/UL (ref 0–0.51)
EOSINOPHIL NFR BLD: 0 % (ref 0–6.9)
ERYTHROCYTE [DISTWIDTH] IN BLOOD BY AUTOMATED COUNT: 54 FL (ref 35.9–50)
GLUCOSE SERPL-MCNC: 129 MG/DL (ref 65–99)
GRAM STN SPEC: NORMAL
GRAM STN SPEC: NORMAL
HCT VFR BLD AUTO: 34 % (ref 42–52)
HGB BLD-MCNC: 10.8 G/DL (ref 14–18)
IMM GRANULOCYTES # BLD AUTO: 0.05 K/UL (ref 0–0.11)
IMM GRANULOCYTES NFR BLD AUTO: 0.6 % (ref 0–0.9)
LYMPHOCYTES # BLD AUTO: 0.64 K/UL (ref 1–4.8)
LYMPHOCYTES NFR BLD: 8 % (ref 22–41)
MCH RBC QN AUTO: 31.6 PG (ref 27–33)
MCHC RBC AUTO-ENTMCNC: 31.8 G/DL (ref 33.7–35.3)
MCV RBC AUTO: 99.4 FL (ref 81.4–97.8)
MONOCYTES # BLD AUTO: 0.63 K/UL (ref 0–0.85)
MONOCYTES NFR BLD AUTO: 7.9 % (ref 0–13.4)
NEUTROPHILS # BLD AUTO: 6.68 K/UL (ref 1.82–7.42)
NEUTROPHILS NFR BLD: 83.4 % (ref 44–72)
NRBC # BLD AUTO: 0 K/UL
NRBC BLD-RTO: 0 /100 WBC
PLATELET # BLD AUTO: 167 K/UL (ref 164–446)
PMV BLD AUTO: 10.1 FL (ref 9–12.9)
POTASSIUM SERPL-SCNC: 4.2 MMOL/L (ref 3.6–5.5)
PROCALCITONIN SERPL-MCNC: 0.54 NG/ML
RBC # BLD AUTO: 3.42 M/UL (ref 4.7–6.1)
SIGNIFICANT IND 70042: NORMAL
SIGNIFICANT IND 70042: NORMAL
SITE SITE: NORMAL
SITE SITE: NORMAL
SODIUM SERPL-SCNC: 139 MMOL/L (ref 135–145)
SOURCE SOURCE: NORMAL
SOURCE SOURCE: NORMAL
WBC # BLD AUTO: 8 K/UL (ref 4.8–10.8)

## 2020-12-20 PROCEDURE — 700102 HCHG RX REV CODE 250 W/ 637 OVERRIDE(OP): Performed by: STUDENT IN AN ORGANIZED HEALTH CARE EDUCATION/TRAINING PROGRAM

## 2020-12-20 PROCEDURE — 700111 HCHG RX REV CODE 636 W/ 250 OVERRIDE (IP): Performed by: STUDENT IN AN ORGANIZED HEALTH CARE EDUCATION/TRAINING PROGRAM

## 2020-12-20 PROCEDURE — 36415 COLL VENOUS BLD VENIPUNCTURE: CPT

## 2020-12-20 PROCEDURE — 85025 COMPLETE CBC W/AUTO DIFF WBC: CPT

## 2020-12-20 PROCEDURE — 700105 HCHG RX REV CODE 258: Performed by: STUDENT IN AN ORGANIZED HEALTH CARE EDUCATION/TRAINING PROGRAM

## 2020-12-20 PROCEDURE — 84145 PROCALCITONIN (PCT): CPT

## 2020-12-20 PROCEDURE — 770006 HCHG ROOM/CARE - MED/SURG/GYN SEMI*

## 2020-12-20 PROCEDURE — A9270 NON-COVERED ITEM OR SERVICE: HCPCS | Performed by: STUDENT IN AN ORGANIZED HEALTH CARE EDUCATION/TRAINING PROGRAM

## 2020-12-20 PROCEDURE — 700102 HCHG RX REV CODE 250 W/ 637 OVERRIDE(OP): Performed by: HOSPITALIST

## 2020-12-20 PROCEDURE — 80048 BASIC METABOLIC PNL TOTAL CA: CPT

## 2020-12-20 PROCEDURE — 99232 SBSQ HOSP IP/OBS MODERATE 35: CPT | Performed by: STUDENT IN AN ORGANIZED HEALTH CARE EDUCATION/TRAINING PROGRAM

## 2020-12-20 PROCEDURE — A9270 NON-COVERED ITEM OR SERVICE: HCPCS | Performed by: HOSPITALIST

## 2020-12-20 RX ORDER — GUAIFENESIN 600 MG/1
600 TABLET, EXTENDED RELEASE ORAL EVERY 12 HOURS
Status: DISCONTINUED | OUTPATIENT
Start: 2020-12-20 | End: 2020-12-23 | Stop reason: HOSPADM

## 2020-12-20 RX ORDER — HEPARIN SODIUM 5000 [USP'U]/ML
5000 INJECTION, SOLUTION INTRAVENOUS; SUBCUTANEOUS EVERY 8 HOURS
Status: DISCONTINUED | OUTPATIENT
Start: 2020-12-20 | End: 2020-12-23 | Stop reason: HOSPADM

## 2020-12-20 RX ORDER — LOPERAMIDE HYDROCHLORIDE 2 MG/1
2 CAPSULE ORAL DAILY
Status: DISCONTINUED | OUTPATIENT
Start: 2020-12-20 | End: 2020-12-22

## 2020-12-20 RX ADMIN — GUAIFENESIN 600 MG: 600 TABLET, EXTENDED RELEASE ORAL at 10:30

## 2020-12-20 RX ADMIN — FLUTICASONE PROPIONATE 50 MCG: 50 SPRAY, METERED NASAL at 04:37

## 2020-12-20 RX ADMIN — CEFEPIME 2 G: 2 INJECTION, POWDER, FOR SOLUTION INTRAVENOUS at 17:05

## 2020-12-20 RX ADMIN — GUAIFENESIN 600 MG: 600 TABLET, EXTENDED RELEASE ORAL at 17:05

## 2020-12-20 RX ADMIN — CEFEPIME 2 G: 2 INJECTION, POWDER, FOR SOLUTION INTRAVENOUS at 04:35

## 2020-12-20 RX ADMIN — ACETAMINOPHEN 650 MG: 325 TABLET, FILM COATED ORAL at 08:58

## 2020-12-20 RX ADMIN — HEPARIN SODIUM 5000 UNITS: 5000 INJECTION, SOLUTION INTRAVENOUS; SUBCUTANEOUS at 15:21

## 2020-12-20 RX ADMIN — GLYCOPYRROLATE 1 CAPSULE: 15.6 CAPSULE RESPIRATORY (INHALATION) at 04:39

## 2020-12-20 RX ADMIN — OXYCODONE 5 MG: 5 TABLET ORAL at 19:39

## 2020-12-20 RX ADMIN — HEPARIN SODIUM 5000 UNITS: 5000 INJECTION, SOLUTION INTRAVENOUS; SUBCUTANEOUS at 23:55

## 2020-12-20 RX ADMIN — BUDESONIDE AND FORMOTEROL FUMARATE DIHYDRATE 2 PUFF: 80; 4.5 AEROSOL RESPIRATORY (INHALATION) at 17:05

## 2020-12-20 RX ADMIN — GLYCOPYRROLATE 1 CAPSULE: 15.6 CAPSULE RESPIRATORY (INHALATION) at 19:40

## 2020-12-20 RX ADMIN — LEFLUNOMIDE 20 MG: 20 TABLET ORAL at 04:36

## 2020-12-20 RX ADMIN — BUDESONIDE AND FORMOTEROL FUMARATE DIHYDRATE 2 PUFF: 80; 4.5 AEROSOL RESPIRATORY (INHALATION) at 04:37

## 2020-12-20 RX ADMIN — LOPERAMIDE HYDROCHLORIDE 2 MG: 2 CAPSULE ORAL at 04:36

## 2020-12-20 ASSESSMENT — ENCOUNTER SYMPTOMS
COUGH: 1
MYALGIAS: 0
FOCAL WEAKNESS: 0
BLURRED VISION: 0
NAUSEA: 0
DEPRESSION: 0
SHORTNESS OF BREATH: 0
CHILLS: 0
HEMOPTYSIS: 0
WHEEZING: 0
PALPITATIONS: 0
FEVER: 0
VOMITING: 0
DIZZINESS: 0
BRUISES/BLEEDS EASILY: 0

## 2020-12-20 ASSESSMENT — PAIN DESCRIPTION - PAIN TYPE
TYPE: SURGICAL PAIN

## 2020-12-20 NOTE — ANESTHESIA PROCEDURE NOTES
Airway    Date/Time: 12/19/2020 5:25 PM  Performed by: Rajeev Hyman M.D.  Authorized by: Rajeev Hyman M.D.     Location:  OR  Urgency:  Elective  Indications for Airway Management:  Anesthesia      Spontaneous Ventilation: absent    Sedation Level:  Deep  Preoxygenated: Yes    Mask Difficulty Assessment:  0 - not attempted  Final Airway Type:  Supraglottic airway  Final Supraglottic Airway:  Standard LMA    SGA Size:  5  Number of Attempts at Approach:  1

## 2020-12-20 NOTE — ANESTHESIA QCDR
2019 [facility] Clinical Data Registry (for Quality Improvement)     Postoperative nausea/vomiting risk protocol (Adult = 18 yrs and Pediatric 3-17 yrs)- (430 and 463)  General inhalation anesthetic (NOT TIVA) with PONV risk factors: Yes  Provision of anti-emetic therapy with at least 2 different classes of agents: Yes   Patient DID NOT receive anti-emetic therapy and reason is documented in Medical Record:  N/A    Multimodal Pain Management- (477)  Non-emergent surgery AND patient age >= 18: No  Use of Multimodal Pain Management, two or more drugs and/or interventions, NOT including systemic opioids:   Exception: Documented allergy to multiple classes of analgesics:     Smoking Abstinence (404)  Patient is current smoker (cigarette, pipe, e-cig, marijuanna): No  Elective Surgery:   Abstinence instructions provided prior to day of surgery:   Patient abstained from smoking on day of surgery:     Pre-Op Beta-Blocker in Isolated CABG (44)  Isolated CABG AND patient age >= 18: No  Beta-blocker admin within 24 hours of surgical incision:   Exception:of medical reason(s) for not administering beta blocker within 24 hours prior to surgical incision (e.g., not  indicated,other medical reason):     PACU assessment of acute postoperative pain prior to Anesthesia Care End- Applies to Patients Age = 18- (ABG7)  Initial PACU pain score is which of the following: < 7/10  Patient unable to report pain score: N/A    Post-anesthetic transfer of care checklist/protocol to PACU/ICU- (426 and 427)  Upon conclusion of case, patient transferred to which of the following locations: PACU/Non-ICU  Use of transfer checklist/protocol: Yes  Exclusion: Service Performed in Patient Hospital Room (and thus did not require transfer): N/A  Unplanned admission to ICU related to anesthesia service up through end of PACU care- (MD51)  Unplanned admission to ICU (not initially anticipated at anesthesia start time): No

## 2020-12-20 NOTE — PROGRESS NOTES
POD 1 s/p right knee/prepatellar bursa I and D    Patient doing well this morning, he reports minimal pain, feels well overall.    Exam:  Right lower extremity  Dressings clean and dry, hemovac and wound vacuum cannister in place, functioning  Swelling throughout right ankle/foot  Sensation grossly intact throughout foot  Able to move ankle/toes/foot without pain  Brisk capillary refill all toes      POD 1 s/p right knee/prepatallar bursa I and D  No cultures from OR as of yet  Abx per ID from prior culture results - cefepime currently, ID following  Monitor drain output and incisional vac output  Please see Dr Allen's note for further details regarding post operative plan once available  60cc output from hemovac, will monitor and leave in place      Ronn Madison MD

## 2020-12-20 NOTE — PROGRESS NOTES
[facility] Medicine Daily Progress Note    Date of Service  12/20/2020    Chief Complaint  80 y.o. male admitted 12/16/2020 with   Chief Complaint   Patient presents with   • GLF         Hospital Course    80 y.o. male with hx of COPD on 2L home O2 nightly, CKD3, HTN, who was admitted on 12/16/2020 after having a ground-level fall with a head injury. Patient fell out of bed and hit the left side of his head.  Evaluation in the emergency department showed no evidence of stroke or bleed on CT of the head. Patient states that last several days he has been not been feeling well, he complains of general malaise and a nonproductive cough, shortness of breath but no wheezing. Routine labs showed evidence of leukocytosis.. Acute chest did not show a clear infiltrate but he did have an slightly elevated procalcitonin level.  The patient failed a bedside swallow which increases concern for possible aspiration pneumonitis. He was initially started on ceftriaxone and azithromycin    Of note, on 10/27/2020, patient underwent right prepatellar bursa resection and washout, right lower leg cyst resection due to recurrent prepatellar bursitis, lower leg cyst recurrence and incision dehiscence.  Cultures at that time grew MSSA.  Reportedly, he received 2 weeks of Keflex for an infection.  He noted pain in his right knee on admission, thus fluid is aspirated and it grew only 1700 white cells, neutrophil predominant.  However, the culture returned positive for Pseudomonas.  Aspiration was repeated on 12/17 and this time it showed 15,000 white cells, 90% neutrophils.  Culture again grew Pseudomonas.  He underwent R knee I&D 12/19.  ID and ortho following. Anticipate 4 weeks of IV cefepime (given fluoroquinolone allergy).  Blood culture negative.    Interval Problem Update  Patient was seen and examined at the bedside. No overnight events reported.  S/p R knee I&D 12/19  Denies fever, chills, chest pain, n/v/abd  pain.    Consultants/Specialty  Ortho  ID    Code Status  Full Code    Disposition  TBD  PT/OT: [facility]    Review of Systems  Review of Systems   Constitutional: Negative for chills and fever.   HENT: Negative for ear discharge.    Eyes: Negative for blurred vision.   Respiratory: Positive for cough. Negative for hemoptysis, shortness of breath and wheezing.    Cardiovascular: Negative for chest pain and palpitations.   Gastrointestinal: Negative for nausea and vomiting.   Genitourinary: Negative for dysuria.   Musculoskeletal: Positive for joint pain (R knee pain). Negative for myalgias.   Skin: Negative for rash.   Neurological: Negative for dizziness and focal weakness.   Endo/Heme/Allergies: Does not bruise/bleed easily.   Psychiatric/Behavioral: Negative for depression.        Physical Exam  Temp:  [36.2 °C (97.2 °F)-37.1 °C (98.7 °F)] 36.7 °C (98 °F)  Pulse:  [] 78  Resp:  [10-19] 17  BP: (127-172)/() 135/88  SpO2:  [91 %-98 %] 95 %    Physical Exam  Vitals signs and nursing note reviewed.   Constitutional:       General: He is not in acute distress.     Appearance: Normal appearance.   HENT:      Head: Normocephalic and atraumatic.      Mouth/Throat:      Mouth: Mucous membranes are dry.      Pharynx: Oropharynx is clear.   Eyes:      Pupils: Pupils are equal, round, and reactive to light.   Neck:      Musculoskeletal: Neck supple.   Cardiovascular:      Rate and Rhythm: Normal rate and regular rhythm.   Pulmonary:      Effort: Pulmonary effort is normal.      Breath sounds: Normal breath sounds.   Abdominal:      General: Abdomen is flat. Bowel sounds are normal.      Palpations: Abdomen is soft.   Musculoskeletal: Normal range of motion.         General: Swelling present.      Comments: S/p R knee I&D, dressing clean, dry, no drainage     Skin:     General: Skin is warm and dry.   Neurological:      General: No focal deficit present.      Mental Status: He is alert and oriented to person, place, and  time.   Psychiatric:         Mood and Affect: Mood normal.         Behavior: Behavior normal.         Fluids    Intake/Output Summary (Last 24 hours) at 12/20/2020 1200  Last data filed at 12/20/2020 1100  Gross per 24 hour   Intake 760 ml   Output 160 ml   Net 600 ml       Laboratory  Recent Labs     12/18/20  0350 12/19/20  0450 12/20/20  0649   WBC 8.7 8.4 8.0   RBC 3.25* 3.48* 3.42*   HEMOGLOBIN 10.5* 11.0* 10.8*   HEMATOCRIT 33.1* 35.3* 34.0*   .8* 101.4* 99.4*   MCH 32.3 31.6 31.6   MCHC 31.7* 31.2* 31.8*   RDW 56.1* 55.3* 54.0*   PLATELETCT 46* 146* 167   MPV 12.1 10.6 10.1     Recent Labs     12/18/20  0350 12/19/20  0450 12/20/20  0649   SODIUM 133* 136 139   POTASSIUM 4.1 3.7 4.2   CHLORIDE 101 104 106   CO2 23 23 23   GLUCOSE 104* 105* 129*   BUN 23* 19 19   CREATININE 1.63* 1.52* 1.33   CALCIUM 8.7 8.6 8.6     Recent Labs     12/17/20  1205   APTT 47.3*   INR 1.23*               Imaging  DX-CHEST-PORTABLE (1 VIEW)   Final Result      Stable chest without acute/new abnormality.      CT-CSPINE WITHOUT PLUS RECONS   Final Result      No acute fracture or traumatic listhesis in the cervical spine.      CT-HEAD W/O   Final Result      1.  Generalized atrophy and chronic microvascular ischemic type changes.   2.  No acute intracranial abnormality.   3.  Sinus disease.   4.  Small left parietal scalp contusion.           Assessment/Plan  * Septic arthritis (HCC)  Assessment & Plan  Right knee septic arthritis, prepatellar abscess. Recent prepatellar bursitis and recurrent lower leg cyst status post I&D  S/p aspiraiton 12/16 and 12/17, culture positive Pseudomonas  S/p I&D 12/19  Blood culture: negative    - cont cefepime  - ID following: No oral options given fluoroquinolone allergy.  Anticipate 4 weeks of IV cefepime  - Ortho following      Pneumonia  Assessment & Plan  Failed bedside swallow eval in ED - risk for aspiration pneumonia, on 2L on admission, was previously on ceftriaxone and azithromycin,  now on cefepime due to septic arthritis  - aspiration precaution  - IS  - mucinex  - weaning off O2    Acute respiratory failure (HCC)  Assessment & Plan  Home oxygen: 2L nightly  Currently uses 1L O2  -Continue to wean off oxygen as tolerated    Sepsis (HCC)- (present on admission)  Assessment & Plan  Resolved  Blood culture negative  R knee aspiration culture, Pseudomonas  Lactate, 1.8; procalcitonin elevated  Source likely Right knee septic arthritis  Continue cefepime, ID following        Oropharyngeal dysphagia- (present on admission)  Assessment & Plan  Speech therapy  Diet: Level 6: Soft and bite sized  Liquid: Level 2: Mildly thick      Prepatellar bursitis of right knee  Assessment & Plan  Hx of recent prepatellar bursitis and recurrent lower leg cyst status post I&D    CKD (chronic kidney disease) stage 3, GFR 30-59 ml/min- (present on admission)  Assessment & Plan  Cr baseline 1.59, CrCl about 36-38  Stable  Renall dose all medications  Avoid nephrotoxins      Chronic obstructive pulmonary disease (HCC)- (present on admission)  Assessment & Plan  Not in acute exacerbation - no increase in cough or sputum production, no wheezing  Cont home Symbicort, glycopyrrolate  RT protocol  IS       VTE prophylaxis: heparin

## 2020-12-20 NOTE — OR SURGEON
Immediate Post OP Note    PreOp Diagnosis: Right septic knee and prepatellar bursitis    PostOp Diagnosis: same    Procedure(s):  IRRIGATION AND DEBRIDEMENT, WOUND    Surgeon(s):  Marc Chowdhury M.D.    Anesthesiologist/Type of Anesthesia:  Anesthesiologist: Rajeev Hyman M.D./* No anesthesia type entered *    Surgical Staff:  Circulator: Sonya Zaidi R.N.  Scrub Person: Dmitry Tello    Specimens removed if any:  * No specimens in log *    Estimated Blood Loss: minimal    Findings: gross purulence in knee and prepatellar bursa    Complications: None noted    Plan: admit to hospitalist, iv abx, ID consult for long term Abx, WBAT, PT, knee immobilizer to be worn when OOB, drain until approximately <20ml/shift.         12/19/2020 5:59 PM Marc Chowdhury M.D.

## 2020-12-20 NOTE — CARE PLAN
Problem: Infection  Goal: Will remain free from infection  Outcome: PROGRESSING AS EXPECTED  Afebrile on IV antibiotics.       Problem: Respiratory:  Goal: Respiratory status will improve  Outcome: PROGRESSING SLOWER THAN EXPECTED   Was unable to wean from O2, was non compliant with IS will encourage use.

## 2020-12-20 NOTE — PROGRESS NOTES
Unsuccessful oxygen weaning between 84-90%, placed on 1L now 93%.   Incontinent of stool, incontinent care performed and barrier paste applied.    Already C.diff negative.   Took imodium this morning.

## 2020-12-20 NOTE — PROGRESS NOTES
Transferred from PACU post I and D, alert and oriented, pain 1/10, VSS, afebrile, right knee with knee immobilizer, dressing CDI, with prevena and hemovac in placed denies any numbness and tingling sensation right leg, also noted scab on face, bruising on upper extremeties..Family at bedside updated with care.

## 2020-12-20 NOTE — OR NURSING
Patient recovered well in post-op. AAOx4. VSS, on 3L via NC. Surgical sites SARITA, surgical dressing in place, CDI with hemovac and preneva in place. Surgical pain managed through PO and IV medication. RLE warm/red, cap refill <3 seconds, sensation intact, 2+ pitting edema, pedal pulse 2+. Patient able to intake fluids without nausea. Unable to contact family for update. No patient belongings in rebcovery. Report called to LISA Thompson. Awaiting transport. O2 tank 3/4 full for transport.

## 2020-12-20 NOTE — ANESTHESIA PREPROCEDURE EVALUATION
Right septic knee. Denies anesthesia problems. Denies angina, worsened dyspnea, GERD. NPO >8 hours.     Relevant Problems   ANESTHESIA   (+) NYASIA (obstructive sleep apnea)      PULMONARY   (+) Aspiration pneumonia (HCC)   (+) COPD (chronic obstructive pulmonary disease) (HCC)   (+) Chronic obstructive pulmonary disease (HCC)   (+) Pneumonia      NEURO   (+) History of pseudomembranous enterocolitis      CARDIAC   (+) Calcified LAD and LCx on CT chest 2015   (+) Hypertension   (+) PVC (premature ventricular contraction)         (+) Acute kidney injury superimposed on chronic kidney disease ([facility])   (+) CKD (chronic kidney disease) stage 3, GFR 30-59 ml/min      Other   (+) RA (rheumatoid arthritis) ([facility])       Physical Exam    Airway   Mallampati: II  TM distance: >3 FB  Neck ROM: full       Cardiovascular - normal exam  Rhythm: regular  Rate: normal  (-) murmur     Dental - normal exam           Pulmonary - normal exam  Breath sounds clear to auscultation     Abdominal    Neurological - normal exam                 Anesthesia Plan    ASA 3- EMERGENT   ASA physical status 3 criteria: COPDASA physical status emergent criteria: acutely contaminated wound or identified infection source    Plan - general and peripheral nerve block     Peripheral nerve block will be post-op pain control  Airway plan will be LMA        Induction: intravenous    Postoperative Plan: Postoperative administration of opioids is intended.    Pertinent diagnostic labs and testing reviewed    Informed Consent:    Anesthetic plan and risks discussed with patient.    Use of blood products discussed with: patient whom consented to blood products.

## 2020-12-20 NOTE — PROGRESS NOTES
Ortho, MICHAEL on call:     I was notified by Manav trauma PA that this patient's second right knee arthrotomy cultures grew Pseudomonas like the first, making it unlikely contaminent. Knee exam is consistent with prepatellar abscess and septic joint. He is s/p two I&D's by Dr. Faulkner. I recommend taking him to surgery now for I&D of the both the abscess and the joint. The patient understands the risks, benefits, and alternatives to surgery and wishes to proceed.

## 2020-12-20 NOTE — ANESTHESIA POSTPROCEDURE EVALUATION
Patient: Barry Torres    Procedure Summary     Date: 12/19/20 Room / Location: [place] [id number] / SURGERY [facility]    Anesthesia Start: 1713 Anesthesia Stop: 1824    Procedure: INCISION AND DRAINAGE (Right Knee) Diagnosis: (RIGHT SEPTIC KNEE)    Surgeons: Marc Chowdhury M.D. Responsible Provider: Rajeev Hyman M.D.    Anesthesia Type: general, peripheral nerve block ASA Status: 3 - Emergent          Final Anesthesia Type: general, peripheral nerve block  Last vitals  BP   Blood Pressure : 147/89    Temp   36.2 °C (97.2 °F)    Pulse   Pulse: 93   Resp   (!) 10    SpO2   96 %      Anesthesia Post Evaluation    Patient location during evaluation: PACU  Patient participation: complete - patient participated  Level of consciousness: awake and alert  Pain score: 3    Airway patency: patent  Anesthetic complications: no  Cardiovascular status: hemodynamically stable  Respiratory status: acceptable  Hydration status: euvolemic    PONV: none           Nurse Pain Score: 8 (NPRS)

## 2020-12-20 NOTE — ASSESSMENT & PLAN NOTE
Right knee septic arthritis, prepatellar abscess. Recent prepatellar bursitis and recurrent lower leg cyst status post I&D  S/p aspiraiton 12/16 and 12/17, culture positive Pseudomonas  S/p I&D 12/19  Blood culture: negative  - cont cefepime  - ID following: No oral options given fluoroquinolone allergy.  Anticipate 4 weeks of IV cefepime  - Ortho following

## 2020-12-20 NOTE — ANESTHESIA TIME REPORT
Anesthesia Start and Stop Event Times     Date Time Event    12/19/2020 1707 Ready for Procedure     1713 Anesthesia Start     1824 Anesthesia Stop        Responsible Staff  12/19/20    Name Role Begin End    Rajeev Hyman M.D. Anesth 1713 1824        Preop Diagnosis (Free Text):  Pre-op Diagnosis     RIGHT SEPTIC KNEE        Preop Diagnosis (Codes):    Post op Diagnosis  Septic arthritis of knee, right (HCC)      Premium Reason  E. Weekend    Comments: Adductor canal block

## 2020-12-21 ENCOUNTER — APPOINTMENT (OUTPATIENT)
Dept: RADIOLOGY | Facility: MEDICAL CENTER | Age: 80
DRG: 853 | End: 2020-12-21
Attending: STUDENT IN AN ORGANIZED HEALTH CARE EDUCATION/TRAINING PROGRAM
Payer: MEDICARE

## 2020-12-21 LAB
ANION GAP SERPL CALC-SCNC: 7 MMOL/L (ref 7–16)
BACTERIA BLD CULT: NORMAL
BACTERIA BLD CULT: NORMAL
BACTERIA WND AEROBE CULT: ABNORMAL
BASOPHILS # BLD AUTO: 0.6 % (ref 0–1.8)
BASOPHILS # BLD: 0.04 K/UL (ref 0–0.12)
BUN SERPL-MCNC: 21 MG/DL (ref 8–22)
CALCIUM SERPL-MCNC: 8.4 MG/DL (ref 8.5–10.5)
CHLORIDE SERPL-SCNC: 108 MMOL/L (ref 96–112)
CO2 SERPL-SCNC: 21 MMOL/L (ref 20–33)
CREAT SERPL-MCNC: 1.49 MG/DL (ref 0.5–1.4)
EOSINOPHIL # BLD AUTO: 0.17 K/UL (ref 0–0.51)
EOSINOPHIL NFR BLD: 2.4 % (ref 0–6.9)
ERYTHROCYTE [DISTWIDTH] IN BLOOD BY AUTOMATED COUNT: 54.4 FL (ref 35.9–50)
GLUCOSE SERPL-MCNC: 91 MG/DL (ref 65–99)
GRAM STN SPEC: ABNORMAL
GRAM STN SPEC: ABNORMAL
HCT VFR BLD AUTO: 32.8 % (ref 42–52)
HGB BLD-MCNC: 10.6 G/DL (ref 14–18)
IMM GRANULOCYTES # BLD AUTO: 0.03 K/UL (ref 0–0.11)
IMM GRANULOCYTES NFR BLD AUTO: 0.4 % (ref 0–0.9)
LYMPHOCYTES # BLD AUTO: 1.65 K/UL (ref 1–4.8)
LYMPHOCYTES NFR BLD: 23.5 % (ref 22–41)
MCH RBC QN AUTO: 32.4 PG (ref 27–33)
MCHC RBC AUTO-ENTMCNC: 32.3 G/DL (ref 33.7–35.3)
MCV RBC AUTO: 100.3 FL (ref 81.4–97.8)
MONOCYTES # BLD AUTO: 0.79 K/UL (ref 0–0.85)
MONOCYTES NFR BLD AUTO: 11.2 % (ref 0–13.4)
NEUTROPHILS # BLD AUTO: 4.35 K/UL (ref 1.82–7.42)
NEUTROPHILS NFR BLD: 61.9 % (ref 44–72)
NRBC # BLD AUTO: 0 K/UL
NRBC BLD-RTO: 0 /100 WBC
PLATELET # BLD AUTO: 167 K/UL (ref 164–446)
PMV BLD AUTO: 9.9 FL (ref 9–12.9)
POTASSIUM SERPL-SCNC: 3.6 MMOL/L (ref 3.6–5.5)
RBC # BLD AUTO: 3.27 M/UL (ref 4.7–6.1)
SIGNIFICANT IND 70042: ABNORMAL
SIGNIFICANT IND 70042: ABNORMAL
SIGNIFICANT IND 70042: NORMAL
SIGNIFICANT IND 70042: NORMAL
SITE SITE: ABNORMAL
SITE SITE: ABNORMAL
SITE SITE: NORMAL
SITE SITE: NORMAL
SODIUM SERPL-SCNC: 136 MMOL/L (ref 135–145)
SOURCE SOURCE: ABNORMAL
SOURCE SOURCE: ABNORMAL
SOURCE SOURCE: NORMAL
SOURCE SOURCE: NORMAL
WBC # BLD AUTO: 7 K/UL (ref 4.8–10.8)

## 2020-12-21 PROCEDURE — 700102 HCHG RX REV CODE 250 W/ 637 OVERRIDE(OP): Performed by: STUDENT IN AN ORGANIZED HEALTH CARE EDUCATION/TRAINING PROGRAM

## 2020-12-21 PROCEDURE — 700111 HCHG RX REV CODE 636 W/ 250 OVERRIDE (IP): Performed by: STUDENT IN AN ORGANIZED HEALTH CARE EDUCATION/TRAINING PROGRAM

## 2020-12-21 PROCEDURE — 05HC33Z INSERTION OF INFUSION DEVICE INTO LEFT BASILIC VEIN, PERCUTANEOUS APPROACH: ICD-10-PCS | Performed by: STUDENT IN AN ORGANIZED HEALTH CARE EDUCATION/TRAINING PROGRAM

## 2020-12-21 PROCEDURE — A9270 NON-COVERED ITEM OR SERVICE: HCPCS | Performed by: STUDENT IN AN ORGANIZED HEALTH CARE EDUCATION/TRAINING PROGRAM

## 2020-12-21 PROCEDURE — 97116 GAIT TRAINING THERAPY: CPT

## 2020-12-21 PROCEDURE — C1751 CATH, INF, PER/CENT/MIDLINE: HCPCS

## 2020-12-21 PROCEDURE — 80048 BASIC METABOLIC PNL TOTAL CA: CPT

## 2020-12-21 PROCEDURE — 97535 SELF CARE MNGMENT TRAINING: CPT

## 2020-12-21 PROCEDURE — 770006 HCHG ROOM/CARE - MED/SURG/GYN SEMI*

## 2020-12-21 PROCEDURE — 99232 SBSQ HOSP IP/OBS MODERATE 35: CPT | Performed by: INTERNAL MEDICINE

## 2020-12-21 PROCEDURE — 700105 HCHG RX REV CODE 258: Performed by: STUDENT IN AN ORGANIZED HEALTH CARE EDUCATION/TRAINING PROGRAM

## 2020-12-21 PROCEDURE — 36415 COLL VENOUS BLD VENIPUNCTURE: CPT

## 2020-12-21 PROCEDURE — 97530 THERAPEUTIC ACTIVITIES: CPT

## 2020-12-21 PROCEDURE — 99232 SBSQ HOSP IP/OBS MODERATE 35: CPT | Performed by: STUDENT IN AN ORGANIZED HEALTH CARE EDUCATION/TRAINING PROGRAM

## 2020-12-21 PROCEDURE — 92526 ORAL FUNCTION THERAPY: CPT

## 2020-12-21 PROCEDURE — 85025 COMPLETE CBC W/AUTO DIFF WBC: CPT

## 2020-12-21 RX ADMIN — GLYCOPYRROLATE 1 CAPSULE: 15.6 CAPSULE RESPIRATORY (INHALATION) at 18:00

## 2020-12-21 RX ADMIN — OXYCODONE 5 MG: 5 TABLET ORAL at 04:51

## 2020-12-21 RX ADMIN — CEFEPIME 2 G: 2 INJECTION, POWDER, FOR SOLUTION INTRAVENOUS at 17:25

## 2020-12-21 RX ADMIN — CEFEPIME 2 G: 2 INJECTION, POWDER, FOR SOLUTION INTRAVENOUS at 04:52

## 2020-12-21 RX ADMIN — GLYCOPYRROLATE 1 CAPSULE: 15.6 CAPSULE RESPIRATORY (INHALATION) at 04:46

## 2020-12-21 RX ADMIN — HEPARIN SODIUM 5000 UNITS: 5000 INJECTION, SOLUTION INTRAVENOUS; SUBCUTANEOUS at 21:15

## 2020-12-21 RX ADMIN — LOPERAMIDE HYDROCHLORIDE 2 MG: 2 CAPSULE ORAL at 04:49

## 2020-12-21 RX ADMIN — LEFLUNOMIDE 20 MG: 20 TABLET ORAL at 04:49

## 2020-12-21 RX ADMIN — BUDESONIDE AND FORMOTEROL FUMARATE DIHYDRATE 2 PUFF: 80; 4.5 AEROSOL RESPIRATORY (INHALATION) at 17:26

## 2020-12-21 RX ADMIN — BUDESONIDE AND FORMOTEROL FUMARATE DIHYDRATE 2 PUFF: 80; 4.5 AEROSOL RESPIRATORY (INHALATION) at 04:46

## 2020-12-21 RX ADMIN — HEPARIN SODIUM 5000 UNITS: 5000 INJECTION, SOLUTION INTRAVENOUS; SUBCUTANEOUS at 08:39

## 2020-12-21 RX ADMIN — GUAIFENESIN 600 MG: 600 TABLET, EXTENDED RELEASE ORAL at 04:49

## 2020-12-21 RX ADMIN — HEPARIN SODIUM 5000 UNITS: 5000 INJECTION, SOLUTION INTRAVENOUS; SUBCUTANEOUS at 13:46

## 2020-12-21 RX ADMIN — GUAIFENESIN 600 MG: 600 TABLET, EXTENDED RELEASE ORAL at 17:26

## 2020-12-21 RX ADMIN — ACETAMINOPHEN 650 MG: 325 TABLET, FILM COATED ORAL at 04:50

## 2020-12-21 ASSESSMENT — COGNITIVE AND FUNCTIONAL STATUS - GENERAL
DRESSING REGULAR LOWER BODY CLOTHING: A LOT
WALKING IN HOSPITAL ROOM: A LITTLE
TURNING FROM BACK TO SIDE WHILE IN FLAT BAD: A LITTLE
MOVING FROM LYING ON BACK TO SITTING ON SIDE OF FLAT BED: A LITTLE
SUGGESTED CMS G CODE MODIFIER MOBILITY: CK
MOBILITY SCORE: 17
PERSONAL GROOMING: A LITTLE
HELP NEEDED FOR BATHING: A LOT
STANDING UP FROM CHAIR USING ARMS: A LITTLE
MOVING TO AND FROM BED TO CHAIR: A LITTLE
TOILETING: A LOT
CLIMB 3 TO 5 STEPS WITH RAILING: A LOT
SUGGESTED CMS G CODE MODIFIER DAILY ACTIVITY: CK
DAILY ACTIVITIY SCORE: 17

## 2020-12-21 ASSESSMENT — ENCOUNTER SYMPTOMS
NAUSEA: 0
HEMOPTYSIS: 0
DEPRESSION: 0
DIARRHEA: 0
CHILLS: 0
FEVER: 0
SHORTNESS OF BREATH: 0
BRUISES/BLEEDS EASILY: 0
BLURRED VISION: 0
MYALGIAS: 0
ABDOMINAL PAIN: 0
WHEEZING: 0
FOCAL WEAKNESS: 0
VOMITING: 0
DIZZINESS: 0
COUGH: 0
PALPITATIONS: 0

## 2020-12-21 ASSESSMENT — GAIT ASSESSMENTS
ASSISTIVE DEVICE: FRONT WHEEL WALKER
GAIT LEVEL OF ASSIST: MINIMAL ASSIST
DEVIATION: DECREASED HEEL STRIKE;DECREASED TOE OFF;DECREASED BASE OF SUPPORT
DISTANCE (FEET): 150

## 2020-12-21 ASSESSMENT — PAIN DESCRIPTION - PAIN TYPE
TYPE: SURGICAL PAIN
TYPE: SURGICAL PAIN

## 2020-12-21 NOTE — PROGRESS NOTES
Infectious Disease Progress Note    Author: Duarte Bell M.D. Date & Time of service: 2020  8:32 AM    Chief Complaint:  Follow-up for septic arthritis    Interval History:   on , patient underwent I&D for prepatellar bursitis and septic arthritis.  Gross purulence was noted in the knee and in the prepatellar bursa.  Full op note is pending.  Multiple cultures are growing Pseudomonas.     Labs Reviewed and Medications Reviewed.    Review of Systems:  Review of Systems   Constitutional: Negative for chills and fever.   Gastrointestinal: Negative for abdominal pain, diarrhea, nausea and vomiting.   Musculoskeletal: Positive for joint pain.   Skin: Negative for itching and rash.   All other systems reviewed and are negative.      Hemodynamics:  Temp (24hrs), Av.7 °C (98.1 °F), Min:36.4 °C (97.5 °F), Max:37 °C (98.6 °F)  Temperature: 36.4 °C (97.6 °F)  Pulse  Av.1  Min: 58  Max: 141   Blood Pressure : 133/77       Physical Exam:  Physical Exam  Vitals signs and nursing note reviewed.   Constitutional:       General: He is not in acute distress.     Appearance: He is not toxic-appearing.      Comments: Appears elderly and frail   HENT:      Mouth/Throat:      Pharynx: No oropharyngeal exudate.   Eyes:      General:         Right eye: No discharge.         Left eye: No discharge.      Conjunctiva/sclera: Conjunctivae normal.   Neck:      Musculoskeletal: No neck rigidity or muscular tenderness.   Cardiovascular:      Rate and Rhythm: Normal rate.      Heart sounds: No murmur.   Pulmonary:      Effort: Pulmonary effort is normal. No respiratory distress.      Breath sounds: No stridor.   Abdominal:      General: There is no distension.      Tenderness: There is no abdominal tenderness.   Musculoskeletal:         General: Swelling and tenderness present.      Right lower leg: Edema present.      Comments: Right knee with surgical dressing in place and drain   Skin:     Findings: Erythema  present. No rash.   Neurological:      General: No focal deficit present.      Mental Status: He is alert and oriented to person, place, and time.   Psychiatric:         Mood and Affect: Mood normal.         Behavior: Behavior normal.      Comments: Pleasant         Meds:    Current Facility-Administered Medications:   •  guaiFENesin ER  •  heparin  •  loperamide  •  cefepime  •  oxyCODONE immediate-release  •  leflunomide  •  fluticasone  •  acetaminophen  •  glycopyrrolate  •  LR  •  budesonide-formoterol    Labs:  Recent Labs     12/19/20  0450 12/20/20  0649   WBC 8.4 8.0   RBC 3.48* 3.42*   HEMOGLOBIN 11.0* 10.8*   HEMATOCRIT 35.3* 34.0*   .4* 99.4*   MCH 31.6 31.6   RDW 55.3* 54.0*   PLATELETCT 146* 167   MPV 10.6 10.1   NEUTSPOLYS 68.00 83.40*   LYMPHOCYTES 13.70* 8.00*   MONOCYTES 16.30* 7.90   EOSINOPHILS 1.10 0.00   BASOPHILS 0.50 0.10     Recent Labs     12/19/20  0450 12/20/20  0649   SODIUM 136 139   POTASSIUM 3.7 4.2   CHLORIDE 104 106   CO2 23 23   GLUCOSE 105* 129*   BUN 19 19     Recent Labs     12/19/20 0450 12/20/20  0649   CREATININE 1.52* 1.33       Imaging:  Ct-cspine Without Plus Recons    Result Date: 12/16/2020 12/16/2020 1:15 PM HISTORY/REASON FOR EXAM: TBI TECHNIQUE/EXAM DESCRIPTION: CT cervical spine without contrast, with reconstructions. The study was performed on a helical multidetector CT scanner. Thin-section helical scanning was performed from the skull base through T1. Sagittal and coronal multiplanar reconstructions were generated from the axial images. Low dose optimization technique was utilized for this CT exam including automated exposure control and adjustment of the mA and/or kV according to patient size. COMPARISON:  None. FINDINGS: Alignment in the cervical spine is normal. There is no fracture or traumatic listhesis. The craniovertebral junction appears intact. The prevertebral and paraspinous soft tissues are unremarkable. There is mild to moderate cervical  spondylosis, most pronounced at C4-C7 level. Mild to moderate multilevel facet hypertrophy. The superior mediastinum and lung apices in the field of view are unremarkable.     No acute fracture or traumatic listhesis in the cervical spine.    Ct-head W/o    Result Date: 12/16/2020 12/16/2020 1:15 PM HISTORY/REASON FOR EXAM:  TBI. Fall, injury TECHNIQUE/EXAM DESCRIPTION AND NUMBER OF VIEWS: CT of the head without contrast. The study was performed on a helical multidetector CT scanner. Contiguous 2.5 mm axial sections were obtained from the skull base through the vertex. Up to date radiation dose reduction adjustments have been utilized to meet ALARA standards for radiation dose reduction. COMPARISON:  None available FINDINGS: Mild/moderate generalized volume loss. Patchy hypodensities in cerebral white matter most likely represent mild chronic microvascular ischemic type changes. No acute intracranial hemorrhage, major vascular territory infarct, mass effect, midline shift or hydrocephalus. Mucosal thickening and small amount of fluid in the right maxillary sinus which could represent acute sinusitis in the appropriate clinical setting. Ethmoid sinus mucosal thickening and small mucous retention cyst in the left maxillary sinus. No depressed calvarial fracture. Small left parietal scalp contusion.     1.  Generalized atrophy and chronic microvascular ischemic type changes. 2.  No acute intracranial abnormality. 3.  Sinus disease. 4.  Small left parietal scalp contusion.    Dx-chest-portable (1 View)    Result Date: 12/16/2020 12/16/2020 1:35 PM HISTORY/REASON FOR EXAM:  Shortness of Breath. Fall TECHNIQUE/EXAM DESCRIPTION AND NUMBER OF VIEWS: Single portable view of the chest. COMPARISON: 11/10/2018 FINDINGS: Cardiomediastinal silhouette is stable. Aortic calcified atherosclerotic plaque. No focal consolidation, pleural effusion, pulmonary edema or pneumothorax. Linear bibasilar atelectasis/scarring. Calcified  granuloma in the left midlung. No acute osseous abnormality.     Stable chest without acute/new abnormality.      Micro:  Results     Procedure Component Value Units Date/Time    AFB Culture [[id number]] Collected: 12/19/20 1801    Order Status: Completed Updated: 12/21/20 0524    GRAM STAIN [[id number]] Collected: 12/19/20 1801    Order Status: Completed Specimen: Wound Updated: 12/20/20 1558     Significant Indicator .     Source WND     Site RIGHT KNEE #1     Gram Stain Result Many WBCs.  Rare Gram negative rods.      Narrative:      Surgery Specimen    Anaerobic Culture [[id number]] Collected: 12/19/20 1801    Order Status: Completed Specimen: Wound Updated: 12/20/20 1558     Significant Indicator NEG     Source WND     Site RIGHT KNEE #1     Culture Result Culture in progress.    Narrative:      Surgery Specimen    CULTURE WOUND W/ GRAM STAIN [[id number]]  (Abnormal) Collected: 12/19/20 1801    Order Status: Completed Specimen: Wound Updated: 12/20/20 1558     Significant Indicator POS     Source WND     Site RIGHT KNEE #1     Culture Result -     Gram Stain Result Many WBCs.  Rare Gram negative rods.       Culture Result Pseudomonas aeruginosa  Light growth  See previous culture for sensitivity report.      Narrative:      Surgery Specimen    GRAM STAIN [[id number]] Collected: 12/19/20 1801    Order Status: Completed Specimen: Wound Updated: 12/20/20 1556     Significant Indicator .     Source WND     Site RIGHT KNEE     Gram Stain Result Moderate WBCs.  No organisms seen.      Narrative:      Surgery - swabs received    CULTURE WOUND W/ GRAM STAIN [[id number]]  (Abnormal) Collected: 12/19/20 1801    Order Status: Completed Specimen: Wound Updated: 12/20/20 1556     Significant Indicator POS     Source WND     Site RIGHT KNEE     Culture Result -     Gram Stain Result Moderate WBCs.  No organisms seen.       Culture Result Pseudomonas aeruginosa  Rare growth  See previous culture for sensitivity report.       Narrative:      Surgery - swabs received    Anaerobic Culture [[id number]] Collected: 12/19/20 1801    Order Status: Completed Specimen: Wound Updated: 12/20/20 1556     Significant Indicator NEG     Source WND     Site RIGHT KNEE     Culture Result Culture in progress.    Narrative:      Surgery - swabs received    Fungal Culture [[id number]] Collected: 12/19/20 1801    Order Status: Completed Specimen: Other Updated: 12/19/20 2115    Fungal Culture [[id number]] Collected: 12/19/20 1801    Order Status: Completed Specimen: Other Updated: 12/19/20 2113    FLUID CULTURE W/GRAM STAIN [[id number]]  (Abnormal) Collected: 12/17/20 1515    Order Status: Completed Specimen: Synovial Updated: 12/19/20 1346     Significant Indicator POS     Source SYNO     Site Right Knee     Culture Result -     Gram Stain Result Few WBCs.  No organisms seen.       Culture Result Pseudomonas aeruginosa  Light growth  See previous culture for sensitivity report.      Narrative:      CALL  [facility]  183 tel. [phone],  CALLED  183 tel. [phone] 12/18/2020, 17:53, RB PERF. RESULTS CALLED TO: RN  [id number]    C Diff Toxin [[id number]] Collected: 12/19/20 0207    Order Status: Completed Updated: 12/19/20 1032     C.Diff Toxin A&B Negative     Comment: Treatment for C. difficile not recommended.  TOXIN NEGATIVE  Toxin negative by EIA; C. difficile detected by PCR.  Indicates colonization, infection unlikely. If clinical  suspicion persists, consider ID or GI Consult.  Repeat testing not indicated within 7 days.         Narrative:      Special Contact [id number] LUCIO VELASQUEZ  Does this patient have risk factors for C-diff?->Yes  C-Diff Risk Factors->antibiotic exposure    C Diff by PCR rflx Toxin [[id number]] Collected: 12/19/20 0207    Order Status: Completed Specimen: Stool Updated: 12/19/20 1032     C Diff by PCR See Toxin     027-NAP1-BI Presumptive Negative     Comment: Presumptive 027/NAP1/BI target DNA sequences are NOT DETECTED.        "Narrative:      Special Contact [id number] LUCIO VELASQUEZ  Does this patient have risk factors for C-diff?->Yes  C-Diff Risk Factors->antibiotic exposure    BLOOD CULTURE [[id number]] Collected: 12/18/20 1221    Order Status: Completed Specimen: Blood from Peripheral Updated: 12/19/20 0849     Significant Indicator NEG     Source BLD     Site PERIPHERAL     Culture Result No Growth  Note: Blood cultures are incubated for 5 days and  are monitored continuously.Positive blood cultures  are called to the RN and reported as soon as  they are identified.      Narrative:      Per Hospital Policy: Only change Specimen Src: to \"Line\" if  specified by physician order.  Right Hand    BLOOD CULTURE [[id number]] Collected: 12/18/20 1221    Order Status: Completed Specimen: Blood from Peripheral Updated: 12/19/20 0849     Significant Indicator NEG     Source BLD     Site PERIPHERAL     Culture Result No Growth  Note: Blood cultures are incubated for 5 days and  are monitored continuously.Positive blood cultures  are called to the RN and reported as soon as  they are identified.      Narrative:      Per Hospital Policy: Only change Specimen Src: to \"Line\" if  specified by physician order.  Left AC    FLUID CULTURE W/GRAM STAIN [[id number]]  (Abnormal)  (Susceptibility) Collected: 12/16/20 1450    Order Status: Completed Specimen: Synovial Fluid Updated: 12/18/20 1215     Significant Indicator POS     Source SYNO     Site Right Knee     Culture Result -     Gram Stain Result No organisms seen.     Culture Result Pseudomonas aeruginosa  Light growth      Narrative:      CALL  [facility]  183 tel. [phone],  CALLED  183 tel. [phone] 12/17/2020, 08:52, RB PERF. RESULTS CALLED TO: RN  [id number]  Droplet, Contact, and Eye Protection  Right knee  Droplet, Contact, and Eye Protection    Susceptibility     Pseudomonas aeruginosa (1)     Antibiotic Interpretation Microscan Method Status    Ceftazidime Sensitive <=1 mcg/mL LA Final    " "Ciprofloxacin Sensitive <=1 mcg/mL LA Final    Cefepime Sensitive <=2 mcg/mL LA Final    Amikacin Sensitive <=16 mcg/mL LA Final    Gentamicin Sensitive <=4 mcg/mL LA Final    Tobramycin Sensitive <=4 mcg/mL LA Final    Meropenem Sensitive <=1 mcg/mL LA Final    Pip/Tazobactam Sensitive <=16 mcg/mL LA Final                   GRAM STAIN [[id number]] Collected: 12/17/20 1515    Order Status: Completed Specimen: Synovial Updated: 12/17/20 1722     Significant Indicator .     Source SYNO     Site Right Knee     Gram Stain Result Few WBCs.  No organisms seen.      FLUID CULTURE W/GRAM STAIN [[id number]]     Order Status: No result Specimen: Other Body Fluid     BLOOD CULTURE x2 [[id number]] Collected: 12/16/20 1418    Order Status: Completed Specimen: Blood from Peripheral Updated: 12/17/20 0914     Significant Indicator NEG     Source BLD     Site PERIPHERAL     Culture Result No Growth  Note: Blood cultures are incubated for 5 days and  are monitored continuously.Positive blood cultures  are called to the RN and reported as soon as  they are identified.      Narrative:      Droplet, Contact, and Eye Protection  Per Hospital Policy: Only change Specimen Src: to \"Line\" if  specified by physician order.  Left AC    BLOOD CULTURE x2 [[id number]] Collected: 12/16/20 1421    Order Status: Completed Specimen: Blood from Peripheral Updated: 12/17/20 0914     Significant Indicator NEG     Source BLD     Site PERIPHERAL     Culture Result No Growth  Note: Blood cultures are incubated for 5 days and  are monitored continuously.Positive blood cultures  are called to the RN and reported as soon as  they are identified.      Narrative:      Droplet, Contact, and Eye Protection  Per Hospital Policy: Only change Specimen Src: to \"Line\" if  specified by physician order.  Left Hand    URINALYSIS,CULTURE IF INDICATED [[id number]]  (Abnormal) Collected: 12/16/20 1550    Order Status: Completed Specimen: Urine Updated: 12/16/20 1627     " "Color DK Yellow     Character Clear     Specific Gravity 1.019     Ph 5.0     Glucose Negative mg/dL      Ketones Negative mg/dL      Protein 30 mg/dL      Bilirubin Negative     Urobilinogen, Urine 0.2     Nitrite Negative     Leukocyte Esterase Negative     Occult Blood Negative     Micro Urine Req Microscopic    Narrative:      Droplet, Contact, and Eye Protection  Indication for culture:->New onset fever with no other  identified cause    GRAM STAIN [[id number]] Collected: 12/16/20 1450    Order Status: Completed Specimen: Synovial Updated: 12/16/20 1536     Significant Indicator .     Source SYNO     Site Right Knee     Gram Stain Result No organisms seen.    Narrative:      Droplet, Contact, and Eye Protection  Right knee  Droplet, Contact, and Eye Protection    CoV-2, Flu A/B, And RSV by PCR [[id number]] Collected: 12/16/20 1351    Order Status: Completed Updated: 12/16/20 1458     Influenza virus A RNA Negative     Influenza virus B, PCR Negative     RSV, PCR Negative     SARS-CoV-2 by PCR NotDetected     Comment: PATIENTS: Important information regarding your results and instructions can  be found at [email or web address]   \"After your  Covid-19 Test\"  [facility] providers: PLEASE REFER TO DE-ESCALATION AND RETESTING PROTOCOL  on [email or web address]  **The [facility] GeneXpert Xpress SARS-CoV-2 Test has been made available for  use under the Emergency Use Authorization (EUA) only.          SARS-CoV-2 Source NP Swab    Narrative:      Droplet, Contact, and Eye Protection  Rule-out COVID-19 panel, includes droplet/contact/eye  isolation order.  Check influenza to order this test only if  specifically indicated.  Is patient being admitted?->Yes  Does this patient meet criteria for Rush/Cepheid per [facility]  Inpatient Workflow? (See workflow link below)->Yes  Expected turn around time?->Rush (Cepheid 2-4 hours)  Have you been in close contact with a person who is suspected  or known to be positive " for COVID-19 within the last 30 days  (e.g. last seen that person < 30 days ago)->Unknown    COVID/SARS CoV-2 PCR [[id number]] Collected: 12/16/20 1351    Order Status: Completed Specimen: Respirate from Nasopharyngeal Updated: 12/16/20 1400     COVID Order Status Received     Comment: The order for SARS CoV-2 testing has been received by the  Laboratory. This result is neither positive nor negative.  Final results of testing will report in 24-48 hours, separately.         Narrative:      Droplet, Contact, and Eye Protection  Rule-out COVID-19 panel, includes droplet/contact/eye  isolation order.  Check influenza to order this test only if  specifically indicated.  Is patient being admitted?->Yes  Does this patient meet criteria for Rush/Cepheid per [facility]  Inpatient Workflow? (See workflow link below)->Yes  Expected turn around time?->Rush (Cepheid 2-4 hours)  Have you been in close contact with a person who is suspected  or known to be positive for COVID-19 within the last 30 days  (e.g. last seen that person < 30 days ago)->Unknown    Culture Respiratory W/ Grm Stn [[id number]] Collected: 12/16/20 0000    Order Status: Canceled Specimen: Sputum           Assessment:  Barry Torres is a 80 y.o. male with a history of  COPD, CKD stage III, hypertension, recent (10/27/2020) right prepatellar bursa resection and washout, right lower leg cyst resection due to recurrent prepatellar bursitis, lower leg cyst recurrence and incision dehiscence. Cultures at that time grew MSSA, treatment course uncertain.  Patient now admitted due to falling out of bed and hitting the left side of his head on 12/16, also noted to be septic and with right knee pain and swelling, with synovial fluid growing Pseudomonas.     Pertinent Diagnoses:  Right knee septic arthritis, prepatellar abscess, Pseudomonas  Recent prepatellar bursitis and recurrent lower leg cyst status post I&D  CKD stage III  COPD  Fluoroquinolone allergy     Plan:    -Agree with IV cefepime 2 g every 12 hours  -On 12/19, patient underwent I&D for prepatellar bursitis and septic arthritis.  Gross purulence was noted in the knee and in the prepatellar bursa.  Full op note is pending.  Multiple cultures are growing Pseudomonas.  -No oral options given fluoroquinolone allergy.  Okay to place midline catheter  -Anticipate 4 weeks of IV cefepime 2 g every 12 hours through 1/15/2021  -Paper orders for home IV infusion faxed to  on 12/21  -At least weekly CBC with differential, CMP while on IV antibiotics.  ESR and CRP every 2 weeks     Plan was discussed with the primary team, Dr. Maier     ID will follow. Please feel free to call with questions.    Okay for discharge once antibiotics are set up.  ID clinic follow-up in 3 to 4 weeks

## 2020-12-21 NOTE — PROGRESS NOTES
[facility] Medicine Daily Progress Note    Date of Service  12/21/2020    Chief Complaint  80 y.o. male admitted 12/16/2020 with   Chief Complaint   Patient presents with   • GLF         Hospital Course    80 y.o. male with hx of COPD on 2L home O2 nightly, CKD3, HTN, who was admitted on 12/16/2020 after having a ground-level fall with a head injury. Patient fell out of bed and hit the left side of his head.  Evaluation in the emergency department showed no evidence of stroke or bleed on CT of the head. Patient states that last several days he has been not been feeling well, he complains of general malaise and a nonproductive cough, shortness of breath but no wheezing. Routine labs showed evidence of leukocytosis.. Acute chest did not show a clear infiltrate but he did have an slightly elevated procalcitonin level.  The patient failed a bedside swallow which increases concern for possible aspiration pneumonitis. He was initially started on ceftriaxone and azithromycin    Of note, on 10/27/2020, patient underwent right prepatellar bursa resection and washout, right lower leg cyst resection due to recurrent prepatellar bursitis, lower leg cyst recurrence and incision dehiscence.  Cultures at that time grew MSSA.  Reportedly, he received 2 weeks of Keflex for an infection.  He noted pain in his right knee on admission, thus fluid is aspirated and it grew only 1700 white cells, neutrophil predominant.  However, the culture returned positive for Pseudomonas.  Aspiration was repeated on 12/17 and this time it showed 15,000 white cells, 90% neutrophils.  Culture again grew Pseudomonas.  He underwent R knee I&D 12/19.  ID and ortho following. Anticipate 4 weeks of IV cefepime (given fluoroquinolone allergy).  Blood culture negative.    Interval Problem Update  Patient was seen and examined at the bedside. No overnight events reported.  S/p R knee I&D 12/19  Denies fever, chills, chest pain, n/v/abd  pain.    Consultants/Specialty  Ortho  ID    Code Status  Full Code    Disposition    PT/OT: [facility]    Review of Systems  Review of Systems   Constitutional: Negative for chills and fever.   HENT: Negative for ear discharge.    Eyes: Negative for blurred vision.   Respiratory: Negative for cough, hemoptysis, shortness of breath and wheezing.    Cardiovascular: Negative for chest pain and palpitations.   Gastrointestinal: Negative for nausea and vomiting.   Genitourinary: Negative for dysuria.   Musculoskeletal: Positive for joint pain (R knee pain). Negative for myalgias.   Skin: Negative for rash.   Neurological: Negative for dizziness and focal weakness.   Endo/Heme/Allergies: Does not bruise/bleed easily.   Psychiatric/Behavioral: Negative for depression.        Physical Exam  Temp:  [36.4 °C (97.5 °F)-37 °C (98.6 °F)] 36.4 °C (97.6 °F)  Pulse:  [] 93  Resp:  [16-19] 17  BP: (118-172)/() 133/77  SpO2:  [90 %-93 %] 93 %    Physical Exam  Vitals signs and nursing note reviewed.   Constitutional:       General: He is not in acute distress.     Appearance: Normal appearance.   HENT:      Head: Normocephalic and atraumatic.      Mouth/Throat:      Mouth: Mucous membranes are dry.      Pharynx: Oropharynx is clear.   Eyes:      Pupils: Pupils are equal, round, and reactive to light.   Neck:      Musculoskeletal: Neck supple.   Cardiovascular:      Rate and Rhythm: Normal rate and regular rhythm.   Pulmonary:      Effort: Pulmonary effort is normal.      Breath sounds: Normal breath sounds.   Abdominal:      General: Abdomen is flat. Bowel sounds are normal.      Palpations: Abdomen is soft.   Musculoskeletal: Normal range of motion.         General: Swelling present.      Comments: S/p R knee I&D, dressing clean, dry, no drainage     Skin:     General: Skin is warm and dry.   Neurological:      General: No focal deficit present.      Mental Status: He is alert and oriented to person, place, and time.    Psychiatric:         Mood and Affect: Mood normal.         Behavior: Behavior normal.         Fluids    Intake/Output Summary (Last 24 hours) at 12/21/2020 1100  Last data filed at 12/21/2020 1000  Gross per 24 hour   Intake 980 ml   Output 525 ml   Net 455 ml       Laboratory  Recent Labs     12/19/20  0450 12/20/20  0649 12/21/20  0827   WBC 8.4 8.0 7.0   RBC 3.48* 3.42* 3.27*   HEMOGLOBIN 11.0* 10.8* 10.6*   HEMATOCRIT 35.3* 34.0* 32.8*   .4* 99.4* 100.3*   MCH 31.6 31.6 32.4   MCHC 31.2* 31.8* 32.3*   RDW 55.3* 54.0* 54.4*   PLATELETCT 146* 167 167   MPV 10.6 10.1 9.9     Recent Labs     12/19/20  0450 12/20/20  0649 12/21/20  0827   SODIUM 136 139 136   POTASSIUM 3.7 4.2 3.6   CHLORIDE 104 106 108   CO2 23 23 21   GLUCOSE 105* 129* 91   BUN 19 19 21   CREATININE 1.52* 1.33 1.49*   CALCIUM 8.6 8.6 8.4*                   Imaging  DX-CHEST-PORTABLE (1 VIEW)   Final Result      Stable chest without acute/new abnormality.      CT-CSPINE WITHOUT PLUS RECONS   Final Result      No acute fracture or traumatic listhesis in the cervical spine.      CT-HEAD W/O   Final Result      1.  Generalized atrophy and chronic microvascular ischemic type changes.   2.  No acute intracranial abnormality.   3.  Sinus disease.   4.  Small left parietal scalp contusion.           Assessment/Plan  * Septic arthritis (HCC)  Assessment & Plan  Right knee septic arthritis, prepatellar abscess. Recent prepatellar bursitis and recurrent lower leg cyst status post I&D  S/p aspiraiton 12/16 and 12/17, culture positive Pseudomonas  S/p I&D 12/19  Blood culture: negative    - cont cefepime  - ID following: No oral options given fluoroquinolone allergy.  Anticipate 4 weeks of IV cefepime  - Ortho following      Pneumonia  Assessment & Plan  Failed bedside swallow eval in ED - risk for aspiration pneumonia, on 2L on admission, was previously on ceftriaxone and azithromycin, now on cefepime due to septic arthritis  - aspiration  precaution  - IS  - mucinex  - Speech therapy: rec outpatient speech    Acute respiratory failure (HCC)  Assessment & Plan  Home oxygen: 2L nightly  - O2 weaned off    Sepsis (HCC)- (present on admission)  Assessment & Plan  Resolved  Blood culture negative  R knee aspiration culture, Pseudomonas  Lactate, 1.8; procalcitonin elevated  Source likely Right knee septic arthritis  Continue cefepime, ID following        Oropharyngeal dysphagia- (present on admission)  Assessment & Plan  Speech therapy  Diet: Level 6: Soft and bite sized  Liquid: Level 2: Mildly thick      Prepatellar bursitis of right knee  Assessment & Plan  Hx of recent prepatellar bursitis and recurrent lower leg cyst status post I&D    CKD (chronic kidney disease) stage 3, GFR 30-59 ml/min- (present on admission)  Assessment & Plan  Cr baseline 1.59, CrCl about 36-38  Stable  Renall dose all medications  Avoid nephrotoxins      Chronic obstructive pulmonary disease (HCC)- (present on admission)  Assessment & Plan  Not in acute exacerbation - no increase in cough or sputum production, no wheezing  Cont home Symbicort, glycopyrrolate  RT protocol  IS       VTE prophylaxis: heparin

## 2020-12-21 NOTE — PROGRESS NOTES
"   Orthopaedic Progress Note    Interval changes:  Patient doing well  Provina in place without issue  HV in place 70cc output over last 24 hours  Operative cultures growing psudomonas    ROS - Patient denies any new issues.  Pain well controlled.    /77   Pulse 93   Temp 36.4 °C (97.6 °F) (Temporal)   Resp 17   Ht 1.854 m (6' 1\")   Wt 94.8 kg (208 lb 15.9 oz)   SpO2 93%       Patient seen and examined  No acute distress  Breathing non labored  RRR  RLE in knee immobilizer, provina incisional vac in place without leak, HV in place with sanguinous exudate, DNVI, moves all toes, cap refill <2 sec.    Recent Labs     12/19/20  0450 12/20/20  0649 12/21/20  0827   WBC 8.4 8.0 7.0   RBC 3.48* 3.42* 3.27*   HEMOGLOBIN 11.0* 10.8* 10.6*   HEMATOCRIT 35.3* 34.0* 32.8*   .4* 99.4* 100.3*   MCH 31.6 31.6 32.4   MCHC 31.2* 31.8* 32.3*   RDW 55.3* 54.0* 54.4*   PLATELETCT 146* 167 167   MPV 10.6 10.1 9.9       Active Hospital Problems    Diagnosis   • Pneumonia [J18.9]     Priority: High   • Acute respiratory failure (HCC) [J96.00]     Priority: High   • Sepsis (HCC) [A41.9]     Priority: High   • Oropharyngeal dysphagia [R13.12]     Priority: Medium   • CKD (chronic kidney disease) stage 3, GFR 30-59 ml/min [N18.30]     Priority: Low   • Chronic obstructive pulmonary disease (HCC) [J44.9]     Priority: Low     absestos exposure  ICD-10 transition     • Septic arthritis (HCC) [M00.9]   • Prepatellar bursitis of right knee [M70.41]       Assessment/Plan:  Doing well  POD#2 S/P right knee I&D  Wt bearing status - WBAT  Wound care/Drains - incisional vac to be left in place  Future Procedures - none planned  Sutures/Staples out- 14-21 days post operatively  PT/OT-initiated  Antibiotics: maxipime 2g IV q12  DVT Prophylaxis- TEDS/SCDs/Foot pumps  Lozano-none  Case Coordination for Discharge Planning - Disposition pending abx needs    "

## 2020-12-21 NOTE — THERAPY
"Occupational Therapy  Daily Treatment     Patient Name: Barry Torres  Age:  80 y.o., Sex:  male  Medical Record #: [id number]  Today's Date: 12/21/2020       Precautions: (P) Fall Risk, Swallow Precautions ( See Comments), Weight Bearing As Tolerated Right Lower Extremity, Immobilizer Right Upper Extremity  Comments: (P) knee immobilizer when ambulating and oob    Assessment    Pt was seen for OT tx today and while basically firing the PT today was cooperative, but seems to be having functional decline. Pt demonstrated impaired balance poor insight into deficits and poor activity tolerance. Pt reports his spouse can assist, but given his age question to what extent she is able. Per chart review pt has a risk of falls and possible hx of non-compliance such as not using a fww. At this time changing recommendations to post acute services, however anticipate pt will likely refuse placement just as he is refusing HH. Recommend further conversation that pts spouse is nella to provide appropriate assist in order to establish a safe d/c plan.     *Pt also needs to continue daily OOB activity with nursing to decrease risk of further decompensation**    Plan  Continue current treatment plan.    DC Equipment Recommendations: (Unable to determine at this time  Discharge Recommendations:  Recommend post-acute placement for additional occupational therapy services prior to discharge home(however pt will likely refuse so HH)    Subjective  \"What do you want me to do\"      Objective   12/21/20 1245   Total Time Spent   Total Time Spent (Mins) 25   Treatment Charges   Charges Yes   OT Self Care / ADL 2   Precautions   Precautions Fall Risk;Swallow Precautions ( See Comments);Weight Bearing As Tolerated Right Lower Extremity;Immobilizer Right Upper Extremity   Comments knee immobilizer when ambulating and oob   Pain 0 - 10 Group   Therapist Pain Assessment During Activity;Nurse Notified;5   Cognition    Cognition / " Consciousness X   Speech/ Communication Hard of Hearing   Level of Consciousness Alert   Safety Awareness Impaired   New Learning Impaired   Comments pt was cooperative w/this therapist but was intolerant and passive aggressive to PT just prior to session    Passive ROM Upper Body   Passive ROM Upper Body WDL   Active ROM Upper Body   Active ROM Upper Body  WDL   Other Treatments   Other Treatments Provided Discussed home safety and pts goals, pt reports SO can assist at d/c but could not specify how or with what seems to lack insight    Balance   Sitting Balance (Static) Fair +   Sitting Balance (Dynamic) Fair   Standing Balance (Static) Fair -   Standing Balance (Dynamic) Poor +   Weight Shift Sitting Fair   Weight Shift Standing Fair   Skilled Intervention Compensatory Strategies;Facilitation   Comments w/fww    Bed Mobility    Supine to Sit Minimal Assist   Skilled Intervention Verbal Cuing   Comments to chair   Activities of Daily Living   Grooming Supervision;Seated   Upper Body Dressing Supervision   Lower Body Dressing Moderate Assist   Toileting   (NT )   Skilled Intervention Verbal Cuing;Sequencing;Tactile Cuing   Comments declined use of toilet    How much help from another person does the patient currently need...   Putting on and taking off regular lower body clothing? 2   Bathing (including washing, rinsing, and drying)? 2   Toileting, which includes using a toilet, bedpan, or urinal? 2   Putting on and taking off regular upper body clothing? 4   Taking care of personal grooming such as brushing teeth? 3   Eating meals? 4   6 Clicks Daily Activity Score 17   Functional Mobility   Sit to Stand Minimal Assist   Bed, Chair, Wheelchair Transfer Minimal Assist   Mobility walking w/fww    Activity Tolerance   Comments c/o fatigue w/activity    Patient / Family Goals   Patient / Family Goal #1 return home   Goal #1 Outcome Goal not met   Short Term Goals   Short Term Goal # 1 supervised level for [facility]  functional transfer, using FWW   Goal Outcome # 1 Goal not met   Short Term Goal # 2 set up for seated UB ADLs   Goal Outcome # 2 Progressing as expected   Short Term Goal # 3 supervised lvel for toileting tasks   Goal Outcome # 3 Goal not met   Education Group   Role of Occupational Therapist Patient Response Patient;Acceptance;Explanation;Demonstration;Reinforcement Needed   Anticipated Discharge Equipment and Recommendations   DC Equipment Recommendations Unable to determine at this time   Discharge Recommendations Recommend post-acute placement for additional occupational therapy services prior to discharge home  (however pt will likely refuse so )   Interdisciplinary Plan of Care Collaboration   IDT Collaboration with  Nursing   Patient Position at End of Therapy Seated;Call Light within Reach;Tray Table within Reach;Phone within Reach;Bed Alarm On   Collaboration Comments RN aware of session    Session Information   Date / Session Number  12/21 #2 (2/3, 12/24)   Priority 2

## 2020-12-21 NOTE — THERAPY
"Physical Therapy   Daily Treatment     Patient Name: Barry Torres  Age:  80 y.o., Sex:  male  Medical Record #: [id number]  Today's Date: 12/21/2020     Precautions: Fall Risk, Swallow Precautions ( See Comments)    Assessment    Met w/ pt at bedside.  Rec'd alert and initially agreeable to work w/ PT.  He then accused this PT of being \"the obnoxious one from two years ago\".  Attempted to reassure pt, after a thorough chart review, going back two years, that this PT never worked w/ him.  He was unconvinced, but did agree to ambulate.  He needs min assist to stand, and today, his gait was unsafe, needing min assist t/o and he did demonstrate one episode of loss of balance, needing min assist for safety.  Last session, he did ambulate 200 ft w/ spv, however he demonstrates a decline in his mobility status today and would best be served w/ post acute placement.     Plan    Continue current treatment plan.    DC Equipment Recommendations: None  Discharge Recommendations: Recommend post-acute placement for additional physical therapy services prior to discharge home        Objective       12/21/20 1244   Balance   Sitting Balance (Static) Fair +   Sitting Balance (Dynamic) Fair   Standing Balance (Static) Fair -   Standing Balance (Dynamic) Poor +   Weight Shift Sitting Fair   Weight Shift Standing Fair   Comments w/ fww   Gait Analysis   Gait Level Of Assist Minimal Assist   Assistive Device Front Wheel Walker   Distance (Feet) 150   Deviation Decreased Heel Strike;Decreased Toe Off;Decreased Base Of Support  (one episode of loss of balance)   Weight Bearing Status wbat   Skilled Intervention Verbal Cuing;Tactile Cuing;Facilitation   Bed Mobility    Supine to Sit Supervised   Skilled Intervention Verbal Cuing   Functional Mobility   Sit to Stand Minimal Assist   Bed, Chair, Wheelchair Transfer Minimal Assist   Skilled Intervention Verbal Cuing;Tactile Cuing;Sequencing;Facilitation   Short Term Goals    Short " Term Goal # 1 Pt will be SPV for all transfers with FWW in 6 txs to improve functional mobility.   Goal Outcome # 1 goal not met   Short Term Goal # 2 Pt will be SPV for gait >350' with FWW in 6 txs to improve functional mobility.   Goal Outcome # 2 Goal not met   Anticipated Discharge Equipment and Recommendations   DC Equipment Recommendations None   Discharge Recommendations Recommend post-acute placement for additional physical therapy services prior to discharge home

## 2020-12-21 NOTE — PROGRESS NOTES
"   Orthopaedic Progress Note    Interval changes:  Patient doing well  Provina in place without issue  HV in place 110cc output over last 24 hours  Cultures showing gram- rods= ID team following     ROS - Patient denies any new issues.  Pain well controlled.    /78   Pulse 95   Temp 36.8 °C (98.3 °F) (Temporal)   Resp 17   Ht 1.854 m (6' 1\")   Wt 94.8 kg (208 lb 15.9 oz)   SpO2 93%       Patient seen and examined  No acute distress  Breathing non labored  RRR  RLE in knee immobilizer, provina incisional vac in place without leak, HV in place with sanguinous exudate, DNVI, moves all toes, cap refill <2 sec.    Recent Labs     12/18/20  0350 12/19/20  0450 12/20/20  0649   WBC 8.7 8.4 8.0   RBC 3.25* 3.48* 3.42*   HEMOGLOBIN 10.5* 11.0* 10.8*   HEMATOCRIT 33.1* 35.3* 34.0*   .8* 101.4* 99.4*   MCH 32.3 31.6 31.6   MCHC 31.7* 31.2* 31.8*   RDW 56.1* 55.3* 54.0*   PLATELETCT 46* 146* 167   MPV 12.1 10.6 10.1       Active Hospital Problems    Diagnosis   • Pneumonia [J18.9]     Priority: High   • Acute respiratory failure (HCC) [J96.00]     Priority: High   • Sepsis (HCC) [A41.9]     Priority: High   • Oropharyngeal dysphagia [R13.12]     Priority: Medium   • CKD (chronic kidney disease) stage 3, GFR 30-59 ml/min [N18.30]     Priority: Low   • Chronic obstructive pulmonary disease (HCC) [J44.9]     Priority: Low     absestos exposure  ICD-10 transition     • Septic arthritis (HCC) [M00.9]   • Prepatellar bursitis of right knee [M70.41]       Assessment/Plan:  Doing well post op  Cultures positive for gram - rods  POD#1 S/P right knee I&D  Wt bearing status - WBAT  Wound care/Drains - incisional vac to be left in place  Future Procedures - none planned  Sutures/Staples out- 10-14 days post operatively  PT/OT-initiated  Antibiotics: maxipime 2g IV q12  DVT Prophylaxis- TEDS/SCDs/Foot pumps  Lozano-none  Case Coordination for Discharge Planning - Disposition pending abx needs    "

## 2020-12-21 NOTE — CARE PLAN
Problem: Communication  Goal: The ability to communicate needs accurately and effectively will improve  Outcome: PROGRESSING AS EXPECTED   Patient uses call light communicate with staff and verbalize needs. Patient updated on plan of care, no further questions at this time.   Problem: Infection  Goal: Will remain free from infection  Outcome: PROGRESSING AS EXPECTED  Standard precautions in place.

## 2020-12-21 NOTE — DOCUMENTATION QUERY
[facility]                                                                       Query Response Note      PATIENT:               FIDELIA MERA  ACCT #:                  [id number]  MRN:                     [id number]  :                      [id number]  ADMIT DATE:       2020 1:15 PM  DISCH DATE:          RESPONDING  PROVIDER #:        [id number]           QUERY TEXT:    Please clarify in documentation the relationship, if any, between Right knee septic arthritis, prepatellar abscess and Right prepatellar bursa resection and washout, right lower leg cyst resection due to recurrent prepatellar bursitis, lower leg cyst  recurrence and incision dehiscence    The patient's Clinical Indicators include:  H&P: Patient is recently finished a 2 week  course of p.o. Keflex for an infection related to a right knee surgery   MD PN: Recent R knee debridement  submeniscal cyst    MD PN: Last procedure in 10/2020 - for irrigation and debridement after having previous operations for prepatellar bursa and lower leg cyst    MD PN: second right knee aspirate also back positive for pseudomonas, support septic arthritis    MD PN: on 10/27/2020, patient underwent right prepatellar bursa resection and washout, right lower leg cyst resection due to recurrent prepatellar bursitis, lower leg cyst  recurrence and incision dehiscence.  Cultures at that time grew MSSA  Treatment: Knee culture, ID consult, Ortho consult, Zosyn, Cefepime, R knee/prepatellar bursa I & D   Risk Factors: Sepsis, Acute Hypoxic respiratory Failure, Septic arthritis       Thank you,  Vianca Cooper RN, BSN  Clinical   Connect via hiogi  Options provided:   -- Right knee septic arthritis, prepatellar abscess is due to or associated with right prepatellar bursa resection and washout, right lower leg cyst resection due to recurrent  prepatellar bursitis, lower leg cyst  recurrence and incision dehiscence   -- Unrelated to each other   -- Unable to determine      Query created by: Vianca Cooper on 12/21/2020 9:59 AM    RESPONSE TEXT:    Unable to determine          Electronically signed by:  JOSUE WOOD MD 12/21/2020 10:22 AM

## 2020-12-21 NOTE — THERAPY
Speech Language Pathology  Daily Treatment     Patient Name: Barry Torres  Age:  80 y.o., Sex:  male  Medical Record #: [id number]  Today's Date: 12/21/2020     Precautions  Precautions: (P) Fall Risk, Swallow Precautions ( See Comments)(hx right knee injury)  Comments: (P) right side facial wekaness, leans to right    Assessment    Pt seen this date for dysphagia intervention with regular/thin breakfast tray. Diet of SB6/MT2 was recommended by SLP per CSE 12/17/20, however after return from PACU pt was placed on regular/thin diet 12/19/20. Nursing reporting no difficulty with meals. PO trials of MTL in addition to reg/thin breakfast tray assessed. Hyolaryngeal elevation palpated as complete, timely initiation of swallow appreciated. Intermittent throat clearing appreciated across consistencies. Mild wet vocal quality appreciated in 2/10 trials of thins, cleared with cues to cough and subsequent swallow. Pt endorses hx of reflux but does not take medications for reflux. Education provided re: reflux precautions (alternate liquids solids, small bites, slow rate, upright 30 min after meals) and pt verbalized understanding.     Recommend continuation of regular/thins with adherence to the following: upright at 90* for PO, upright for 30 min after meals, alternate liquids and solids, no straws, small bites, slow rate. SLP following.     Plan    Continue current treatment plan.    Discharge Recommendations: (P) Recommend outpatient speech therapy services    Subjective    Pt was awake, alert, and participated with encouragement.      Objective       12/21/20 0912   Dysphagia    Dysphagia X   Positioning / Behavior Modification Cough / Clear after Swallow;Self Monitoring;Modulate Rate or Bite Size;Alternate Solids and Liquids  (reflux precautions)   Other Treatments PO trials thins, MTL, reg   Diet / Liquid Recommendation Thin (0);Regular (7)   Nutritional Liquid Intake Rating Scale Non thickened beverages  "  Nutritional Food Intake Rating Scale Total oral diet with no restrictions   Nursing Communication Swallow Precaution Sign Posted at Head of Bed   Skilled Intervention Compensatory Strategies;Verbal Cueing   Recommended Route of Medication Administration   Medication Administration  Whole with Liquid Wash   Patient / Family Goals   Patient / Family Goal #1 \"I want some water.\"   Goal #1 Outcome Goal met   Short Term Goals   Short Term Goal # 1 Pt will be able to consume SB6/MT2 diet with no overt S/Sx of aspiration noted.   Goal Outcome # 1 Goal met, new goal added   Short Term Goal # 1 B  Pt will consume a diet of reg/thins with no s/sx of aspiration and min cues.          "

## 2020-12-22 LAB
ANION GAP SERPL CALC-SCNC: 10 MMOL/L (ref 7–16)
BACTERIA SPEC ANAEROBE CULT: NORMAL
BACTERIA SPEC ANAEROBE CULT: NORMAL
BASOPHILS # BLD AUTO: 1.1 % (ref 0–1.8)
BASOPHILS # BLD: 0.06 K/UL (ref 0–0.12)
BUN SERPL-MCNC: 18 MG/DL (ref 8–22)
CALCIUM SERPL-MCNC: 8.3 MG/DL (ref 8.5–10.5)
CHLORIDE SERPL-SCNC: 108 MMOL/L (ref 96–112)
CO2 SERPL-SCNC: 22 MMOL/L (ref 20–33)
CREAT SERPL-MCNC: 1.27 MG/DL (ref 0.5–1.4)
EOSINOPHIL # BLD AUTO: 0.19 K/UL (ref 0–0.51)
EOSINOPHIL NFR BLD: 3.6 % (ref 0–6.9)
ERYTHROCYTE [DISTWIDTH] IN BLOOD BY AUTOMATED COUNT: 53.8 FL (ref 35.9–50)
GLUCOSE SERPL-MCNC: 101 MG/DL (ref 65–99)
HCT VFR BLD AUTO: 34 % (ref 42–52)
HGB BLD-MCNC: 10.8 G/DL (ref 14–18)
IMM GRANULOCYTES # BLD AUTO: 0.02 K/UL (ref 0–0.11)
IMM GRANULOCYTES NFR BLD AUTO: 0.4 % (ref 0–0.9)
LYMPHOCYTES # BLD AUTO: 1.35 K/UL (ref 1–4.8)
LYMPHOCYTES NFR BLD: 25.3 % (ref 22–41)
MCH RBC QN AUTO: 31.5 PG (ref 27–33)
MCHC RBC AUTO-ENTMCNC: 31.8 G/DL (ref 33.7–35.3)
MCV RBC AUTO: 99.1 FL (ref 81.4–97.8)
MONOCYTES # BLD AUTO: 0.81 K/UL (ref 0–0.85)
MONOCYTES NFR BLD AUTO: 15.2 % (ref 0–13.4)
NEUTROPHILS # BLD AUTO: 2.91 K/UL (ref 1.82–7.42)
NEUTROPHILS NFR BLD: 54.4 % (ref 44–72)
NRBC # BLD AUTO: 0 K/UL
NRBC BLD-RTO: 0 /100 WBC
PLATELET # BLD AUTO: 190 K/UL (ref 164–446)
PMV BLD AUTO: 10.1 FL (ref 9–12.9)
POTASSIUM SERPL-SCNC: 3.7 MMOL/L (ref 3.6–5.5)
RBC # BLD AUTO: 3.43 M/UL (ref 4.7–6.1)
SIGNIFICANT IND 70042: NORMAL
SIGNIFICANT IND 70042: NORMAL
SITE SITE: NORMAL
SITE SITE: NORMAL
SODIUM SERPL-SCNC: 140 MMOL/L (ref 135–145)
SOURCE SOURCE: NORMAL
SOURCE SOURCE: NORMAL
WBC # BLD AUTO: 5.3 K/UL (ref 4.8–10.8)

## 2020-12-22 PROCEDURE — 700105 HCHG RX REV CODE 258: Performed by: STUDENT IN AN ORGANIZED HEALTH CARE EDUCATION/TRAINING PROGRAM

## 2020-12-22 PROCEDURE — A9270 NON-COVERED ITEM OR SERVICE: HCPCS | Performed by: HOSPITALIST

## 2020-12-22 PROCEDURE — 700102 HCHG RX REV CODE 250 W/ 637 OVERRIDE(OP): Performed by: STUDENT IN AN ORGANIZED HEALTH CARE EDUCATION/TRAINING PROGRAM

## 2020-12-22 PROCEDURE — 700111 HCHG RX REV CODE 636 W/ 250 OVERRIDE (IP): Performed by: STUDENT IN AN ORGANIZED HEALTH CARE EDUCATION/TRAINING PROGRAM

## 2020-12-22 PROCEDURE — A9270 NON-COVERED ITEM OR SERVICE: HCPCS | Performed by: INTERNAL MEDICINE

## 2020-12-22 PROCEDURE — A9270 NON-COVERED ITEM OR SERVICE: HCPCS | Performed by: STUDENT IN AN ORGANIZED HEALTH CARE EDUCATION/TRAINING PROGRAM

## 2020-12-22 PROCEDURE — 36415 COLL VENOUS BLD VENIPUNCTURE: CPT

## 2020-12-22 PROCEDURE — 700102 HCHG RX REV CODE 250 W/ 637 OVERRIDE(OP): Performed by: HOSPITALIST

## 2020-12-22 PROCEDURE — 700102 HCHG RX REV CODE 250 W/ 637 OVERRIDE(OP): Performed by: INTERNAL MEDICINE

## 2020-12-22 PROCEDURE — 770006 HCHG ROOM/CARE - MED/SURG/GYN SEMI*

## 2020-12-22 PROCEDURE — 85025 COMPLETE CBC W/AUTO DIFF WBC: CPT

## 2020-12-22 PROCEDURE — 80048 BASIC METABOLIC PNL TOTAL CA: CPT

## 2020-12-22 PROCEDURE — 99233 SBSQ HOSP IP/OBS HIGH 50: CPT | Performed by: INTERNAL MEDICINE

## 2020-12-22 RX ORDER — LOPERAMIDE HYDROCHLORIDE 2 MG/1
2 CAPSULE ORAL 4 TIMES DAILY PRN
Status: DISCONTINUED | OUTPATIENT
Start: 2020-12-22 | End: 2020-12-23 | Stop reason: HOSPADM

## 2020-12-22 RX ADMIN — GUAIFENESIN 600 MG: 600 TABLET, EXTENDED RELEASE ORAL at 05:21

## 2020-12-22 RX ADMIN — GLYCOPYRROLATE 1 CAPSULE: 15.6 CAPSULE RESPIRATORY (INHALATION) at 16:31

## 2020-12-22 RX ADMIN — LOPERAMIDE HYDROCHLORIDE 2 MG: 2 CAPSULE ORAL at 21:49

## 2020-12-22 RX ADMIN — LEFLUNOMIDE 20 MG: 20 TABLET ORAL at 05:21

## 2020-12-22 RX ADMIN — HEPARIN SODIUM 5000 UNITS: 5000 INJECTION, SOLUTION INTRAVENOUS; SUBCUTANEOUS at 05:21

## 2020-12-22 RX ADMIN — CEFEPIME 2 G: 2 INJECTION, POWDER, FOR SOLUTION INTRAVENOUS at 16:30

## 2020-12-22 RX ADMIN — HEPARIN SODIUM 5000 UNITS: 5000 INJECTION, SOLUTION INTRAVENOUS; SUBCUTANEOUS at 21:49

## 2020-12-22 RX ADMIN — HEPARIN SODIUM 5000 UNITS: 5000 INJECTION, SOLUTION INTRAVENOUS; SUBCUTANEOUS at 14:42

## 2020-12-22 RX ADMIN — CEFEPIME 2 G: 2 INJECTION, POWDER, FOR SOLUTION INTRAVENOUS at 05:23

## 2020-12-22 RX ADMIN — GUAIFENESIN 600 MG: 600 TABLET, EXTENDED RELEASE ORAL at 16:31

## 2020-12-22 RX ADMIN — FLUTICASONE PROPIONATE 50 MCG: 50 SPRAY, METERED NASAL at 06:42

## 2020-12-22 RX ADMIN — LOPERAMIDE HYDROCHLORIDE 2 MG: 2 CAPSULE ORAL at 05:21

## 2020-12-22 RX ADMIN — GLYCOPYRROLATE 1 CAPSULE: 15.6 CAPSULE RESPIRATORY (INHALATION) at 05:21

## 2020-12-22 RX ADMIN — BUDESONIDE AND FORMOTEROL FUMARATE DIHYDRATE 2 PUFF: 80; 4.5 AEROSOL RESPIRATORY (INHALATION) at 05:22

## 2020-12-22 RX ADMIN — BUDESONIDE AND FORMOTEROL FUMARATE DIHYDRATE 2 PUFF: 80; 4.5 AEROSOL RESPIRATORY (INHALATION) at 17:45

## 2020-12-22 ASSESSMENT — ENCOUNTER SYMPTOMS
DEPRESSION: 0
FEVER: 0
COUGH: 0
FOCAL WEAKNESS: 0
PALPITATIONS: 0
HEMOPTYSIS: 0
CHILLS: 0
SHORTNESS OF BREATH: 0
DIZZINESS: 0
NAUSEA: 0
MYALGIAS: 0
BLURRED VISION: 0
BRUISES/BLEEDS EASILY: 0
WHEEZING: 0
VOMITING: 0

## 2020-12-22 ASSESSMENT — PAIN DESCRIPTION - PAIN TYPE: TYPE: SURGICAL PAIN

## 2020-12-22 NOTE — FACE TO FACE
Face to Face Supporting Documentation - Home Health    The encounter with this patient was in whole or in part the primary reason for home health admission.    Date of encounter:   Patient:                    MRN:                       YOB: 2021  Barry Torres  [id number]  1940     Home health to see patient for:  Skilled Nursing care for assessment, interventions & education, Wound Care, Physical Therapy evaluation and treatment, Occupational therapy evaluation and treatment and Comment: home infusion of antibiotics    Skilled need for:  Medication Management home IV antibiotic infusions and Comment: weekly lab draws, midline dressing care    Skilled nursing interventions to include:  Home IV infusion therapy, Line/Drain/Airway education and care and Comment: weekly lab draws    Homebound status evidenced by:  Need the aid of supportive devices such as crutches, canes, wheelchairs or walkers or Needs the assistance of another person in order to leave the home. Leaving home requires a considerable and taxing effort. There is a normal inability to leave the home.    Community Physician to provide follow up care: Carlitos Harvey M.D.     Optional Interventions? No      I certify the face to face encounter for this home health care referral meets the CMS requirements and the encounter/clinical assessment with the patient was, in whole, or in part, for the medical condition(s) listed above, which is the primary reason for home health care. Based on my clinical findings: the service(s) are medically necessary, support the need for home health care, and the homebound criteria are met.  I certify that this patient has had a face to face encounter by myself.  Prisca Marino M.D. - NPI: [id number]

## 2020-12-22 NOTE — CARE PLAN
Problem: Communication  Goal: The ability to communicate needs accurately and effectively will improve  Outcome: PROGRESSING AS EXPECTED  Note: POC discussed, no further questions at this time.      Problem: Safety  Goal: Will remain free from injury  Outcome: PROGRESSING AS EXPECTED  Note: Fall precautions in place. Bed locked in and lowest position. Upper side rails in place. Non-skid footwear in place. Call light and personal belongings within reach.

## 2020-12-22 NOTE — PROCEDURES
Vascular Access Team    Date of Insertion: December 21, 2020  Arm Circumference: 32 cm  Internal length: 17 cm  External Length: 0 cm  Vein Occupancy %: 32%  Reason for Midline: IV Antibiotics  Labs: WBC 7, , BUN 21, Cr 1.49, GFR 45, INR 1.23    Orders confirmed, vessel patency confirmed with ultrasound. Risks and benefits of procedure explained to patient and education regarding line associated bloodstream infections provided. Questions answered.     Power Midline placed in LUE per licensed provider order with ultrasound guidance. 4 Fr, single lumen Power Midline placed in basilic vein after 1 attempt(s). 2 mL of 1% lidocaine injected intradermally, 21 gauge microintroducer needle and modified Seldinger technique used. 17 cm catheter inserted with good blood return. Secured at 0 cm marker. Each lumen flushed without resistance with 10 mL 0.9% normal saline. Midline secured with Biopatch and Tegaderm.     Midline placement is confirmed by nurse using ultrasound and ability to flush and draw blood. Midline is appropriate for use at this time.  Patient tolerated procedure well, without complications.  No X-ray is needed for placement confirmation.  Patient condition relayed to unit RN or ordering physician via this post procedure note in the EMR.     Ultrasound images uploaded to PACS and viewable in the EMR - yes  Ultrasound imaged printed and placed in paper chart - no     BARD Power Midline ref # [id number], Lot # [id number], Expiration Date December 31, 2021

## 2020-12-22 NOTE — DISCHARGE PLANNING
Received Choice form at 1145  Agency/Facility Name: [facility]  Referral sent per Choice form @ 1210    Manually Faxed Infusion Order and Referral to [facility].

## 2020-12-22 NOTE — DISCHARGE PLANNING
Agency/Facility Name: [facility]  Spoke To: Pascale  Outcome: Currently running patient's benefits to see if what payments will be.    Faxed Supplement insurance to [facility] via Right Fax @[phone]

## 2020-12-22 NOTE — DISCHARGE PLANNING
Agency/Facility Name: [facility]  Referral sent per Choice form @ 4802     Agency/Facility Name: [facility]  Spoke To: Harry  Outcome: Patient Accepted and this CCA notified that [facility] will teaching patient and patient's wife this AM @ [phone]

## 2020-12-22 NOTE — DISCHARGE PLANNING
Anticipated Discharge Disposition: home with home infusion and home health.     Action: Order rec’d from SUZE CALLAHAN for Cefepime IV Q 12 hrs until 1/15/21. Met at bedside with pt to arrange home plan. Referral to be sent to [facility] for home infusion company and Home health [facility]. Choice forms completed and faxed to Karen HOFFMAN to send referrals. Verified address, PCP, and phone numbers on face sheet are correct.   Reviewed plan with pt’s wife Lola who was on the phone while I spoke to the pt. Lola is a retired nurse and will be able to be taught how to do the home infusions. Pt has medicare and a secondary insurance that may cover the copays. They state they are able to pay the copays if insurance does not fully cover the cost.    Message left for Thea Gomez ([phone]), RN Liaison for [facility], to make her aware of the referral.     Barriers to Discharge: pending HH and home infusion acceptance.     Addendum: spoke to LISA Sidhu with [facility] and she will plan to come to the bedside tomorrow to teach pt and wife on transfusion at 10:00. Pt will notify his wife to be here by 10:00am. WIll review above with Laurie CHENEY. Checked with charge nurse Rhea that it is ok for wife to come in for teaching during non-visiting hours.

## 2020-12-22 NOTE — PROGRESS NOTES
"   Orthopaedic Progress Note    Interval changes:  Patient doing well  Provina in place without issue  HV in place 40cc output over last 24 hours  Operative cultures growing psudomonas  Nursing to remove HV and prevena tomorrow and place mepilex AG island dressing   Cleared by ortho for DC home with outpatient IV abx on HH basis  Follow up in two week post op with Dr Luciana LOPEZ - Patient denies any new issues.  Pain well controlled.    /95   Pulse 67   Temp 36.6 °C (97.9 °F) (Temporal)   Resp 17   Ht 1.854 m (6' 1\")   Wt 94.8 kg (208 lb 15.9 oz)   SpO2 91%       Patient seen and examined  No acute distress  Breathing non labored  RRR  RLE in knee immobilizer, provina incisional vac in place without leak, HV in place with sanguinous exudate, DNVI, moves all toes, cap refill <2 sec.    Recent Labs     12/20/20  0649 12/21/20  0827 12/22/20  1108   WBC 8.0 7.0 5.3   RBC 3.42* 3.27* 3.43*   HEMOGLOBIN 10.8* 10.6* 10.8*   HEMATOCRIT 34.0* 32.8* 34.0*   MCV 99.4* 100.3* 99.1*   MCH 31.6 32.4 31.5   MCHC 31.8* 32.3* 31.8*   RDW 54.0* 54.4* 53.8*   PLATELETCT 167 167 190   MPV 10.1 9.9 10.1       Active Hospital Problems    Diagnosis   • Pneumonia [J18.9]     Priority: High   • Acute respiratory failure (HCC) [J96.00]     Priority: High   • Sepsis (HCC) [A41.9]     Priority: High   • Oropharyngeal dysphagia [R13.12]     Priority: Medium   • CKD (chronic kidney disease) stage 3, GFR 30-59 ml/min [N18.30]     Priority: Low   • Chronic obstructive pulmonary disease (HCC) [J44.9]     Priority: Low     absestos exposure  ICD-10 transition     • Septic arthritis (HCC) [M00.9]   • Prepatellar bursitis of right knee [M70.41]       Assessment/Plan:  Doing well  POD#3 S/P right knee I&D  Wt bearing status - WBAT  Wound care/Drains - incisional prevena vac and HV to be removed by nursing tomorrow  Future Procedures - none planned  Sutures/Staples out- 14-21 days post operatively  PT/OT-initiated  Antibiotics: " maxipime 2g IV q12  DVT Prophylaxis- TEDS/SCDs/Foot pumps  Lozano-none  Case Coordination for Discharge Planning - Disposition pending abx needs

## 2020-12-23 VITALS
SYSTOLIC BLOOD PRESSURE: 139 MMHG | DIASTOLIC BLOOD PRESSURE: 90 MMHG | WEIGHT: 209 LBS | TEMPERATURE: 97.5 F | BODY MASS INDEX: 27.7 KG/M2 | HEART RATE: 76 BPM | HEIGHT: 73 IN | OXYGEN SATURATION: 95 % | RESPIRATION RATE: 17 BRPM

## 2020-12-23 PROBLEM — J96.00 ACUTE RESPIRATORY FAILURE (HCC): Status: RESOLVED | Noted: 2020-12-16 | Resolved: 2020-12-23

## 2020-12-23 PROBLEM — A41.9 SEPSIS (HCC): Status: RESOLVED | Noted: 2018-09-29 | Resolved: 2020-12-23

## 2020-12-23 LAB
ANION GAP SERPL CALC-SCNC: 9 MMOL/L (ref 7–16)
BACTERIA BLD CULT: NORMAL
BACTERIA BLD CULT: NORMAL
BASOPHILS # BLD AUTO: 1.1 % (ref 0–1.8)
BASOPHILS # BLD: 0.05 K/UL (ref 0–0.12)
BUN SERPL-MCNC: 16 MG/DL (ref 8–22)
CALCIUM SERPL-MCNC: 8.4 MG/DL (ref 8.5–10.5)
CHLORIDE SERPL-SCNC: 108 MMOL/L (ref 96–112)
CO2 SERPL-SCNC: 22 MMOL/L (ref 20–33)
CREAT SERPL-MCNC: 1.22 MG/DL (ref 0.5–1.4)
EOSINOPHIL # BLD AUTO: 0.3 K/UL (ref 0–0.51)
EOSINOPHIL NFR BLD: 6.4 % (ref 0–6.9)
ERYTHROCYTE [DISTWIDTH] IN BLOOD BY AUTOMATED COUNT: 53.8 FL (ref 35.9–50)
GLUCOSE SERPL-MCNC: 97 MG/DL (ref 65–99)
HCT VFR BLD AUTO: 34.5 % (ref 42–52)
HGB BLD-MCNC: 10.9 G/DL (ref 14–18)
IMM GRANULOCYTES # BLD AUTO: 0.02 K/UL (ref 0–0.11)
IMM GRANULOCYTES NFR BLD AUTO: 0.4 % (ref 0–0.9)
LYMPHOCYTES # BLD AUTO: 1.67 K/UL (ref 1–4.8)
LYMPHOCYTES NFR BLD: 35.7 % (ref 22–41)
MCH RBC QN AUTO: 31.7 PG (ref 27–33)
MCHC RBC AUTO-ENTMCNC: 31.6 G/DL (ref 33.7–35.3)
MCV RBC AUTO: 100.3 FL (ref 81.4–97.8)
MONOCYTES # BLD AUTO: 0.82 K/UL (ref 0–0.85)
MONOCYTES NFR BLD AUTO: 17.5 % (ref 0–13.4)
NEUTROPHILS # BLD AUTO: 1.82 K/UL (ref 1.82–7.42)
NEUTROPHILS NFR BLD: 38.9 % (ref 44–72)
NRBC # BLD AUTO: 0 K/UL
NRBC BLD-RTO: 0 /100 WBC
PLATELET # BLD AUTO: 204 K/UL (ref 164–446)
PMV BLD AUTO: 10.3 FL (ref 9–12.9)
POTASSIUM SERPL-SCNC: 3.6 MMOL/L (ref 3.6–5.5)
RBC # BLD AUTO: 3.44 M/UL (ref 4.7–6.1)
SIGNIFICANT IND 70042: NORMAL
SIGNIFICANT IND 70042: NORMAL
SITE SITE: NORMAL
SITE SITE: NORMAL
SODIUM SERPL-SCNC: 139 MMOL/L (ref 135–145)
SOURCE SOURCE: NORMAL
SOURCE SOURCE: NORMAL
WBC # BLD AUTO: 4.7 K/UL (ref 4.8–10.8)

## 2020-12-23 PROCEDURE — 700102 HCHG RX REV CODE 250 W/ 637 OVERRIDE(OP): Performed by: INTERNAL MEDICINE

## 2020-12-23 PROCEDURE — 700102 HCHG RX REV CODE 250 W/ 637 OVERRIDE(OP): Performed by: STUDENT IN AN ORGANIZED HEALTH CARE EDUCATION/TRAINING PROGRAM

## 2020-12-23 PROCEDURE — 85025 COMPLETE CBC W/AUTO DIFF WBC: CPT

## 2020-12-23 PROCEDURE — 700111 HCHG RX REV CODE 636 W/ 250 OVERRIDE (IP): Performed by: STUDENT IN AN ORGANIZED HEALTH CARE EDUCATION/TRAINING PROGRAM

## 2020-12-23 PROCEDURE — 700105 HCHG RX REV CODE 258: Performed by: STUDENT IN AN ORGANIZED HEALTH CARE EDUCATION/TRAINING PROGRAM

## 2020-12-23 PROCEDURE — 99239 HOSP IP/OBS DSCHRG MGMT >30: CPT | Performed by: INTERNAL MEDICINE

## 2020-12-23 PROCEDURE — 80048 BASIC METABOLIC PNL TOTAL CA: CPT

## 2020-12-23 PROCEDURE — A9270 NON-COVERED ITEM OR SERVICE: HCPCS | Performed by: STUDENT IN AN ORGANIZED HEALTH CARE EDUCATION/TRAINING PROGRAM

## 2020-12-23 PROCEDURE — A9270 NON-COVERED ITEM OR SERVICE: HCPCS | Performed by: INTERNAL MEDICINE

## 2020-12-23 PROCEDURE — 36415 COLL VENOUS BLD VENIPUNCTURE: CPT

## 2020-12-23 RX ORDER — OXYCODONE HYDROCHLORIDE 5 MG/1
5 TABLET ORAL EVERY 6 HOURS PRN
Qty: 20 TAB | Refills: 0 | Status: SHIPPED | OUTPATIENT
Start: 2020-12-23 | End: 2020-12-30

## 2020-12-23 RX ORDER — POTASSIUM CHLORIDE 20 MEQ/1
40 TABLET, EXTENDED RELEASE ORAL ONCE
Status: COMPLETED | OUTPATIENT
Start: 2020-12-23 | End: 2020-12-23

## 2020-12-23 RX ORDER — ACETAMINOPHEN 325 MG/1
650 TABLET ORAL EVERY 4 HOURS PRN
COMMUNITY
Start: 2020-12-23

## 2020-12-23 RX ADMIN — BUDESONIDE AND FORMOTEROL FUMARATE DIHYDRATE 2 PUFF: 80; 4.5 AEROSOL RESPIRATORY (INHALATION) at 04:49

## 2020-12-23 RX ADMIN — CEFEPIME 2 G: 2 INJECTION, POWDER, FOR SOLUTION INTRAVENOUS at 04:48

## 2020-12-23 RX ADMIN — GUAIFENESIN 600 MG: 600 TABLET, EXTENDED RELEASE ORAL at 04:48

## 2020-12-23 RX ADMIN — GLYCOPYRROLATE 1 CAPSULE: 15.6 CAPSULE RESPIRATORY (INHALATION) at 04:49

## 2020-12-23 RX ADMIN — LEFLUNOMIDE 20 MG: 20 TABLET ORAL at 04:48

## 2020-12-23 RX ADMIN — FLUTICASONE PROPIONATE 50 MCG: 50 SPRAY, METERED NASAL at 04:49

## 2020-12-23 RX ADMIN — HEPARIN SODIUM 5000 UNITS: 5000 INJECTION, SOLUTION INTRAVENOUS; SUBCUTANEOUS at 04:49

## 2020-12-23 RX ADMIN — POTASSIUM CHLORIDE 40 MEQ: 1500 TABLET, EXTENDED RELEASE ORAL at 12:23

## 2020-12-23 RX ADMIN — LOPERAMIDE HYDROCHLORIDE 2 MG: 2 CAPSULE ORAL at 04:48

## 2020-12-23 NOTE — PROGRESS NOTES
[facility] Medicine Daily Progress Note    Date of Service  12/22/2020    Chief Complaint  80 y.o. male admitted 12/16/2020 with   Chief Complaint   Patient presents with   • GLF         Hospital Course  80 y.o. male with hx of COPD on 2L home O2 nightly, CKD3, HTN, who was admitted on 12/16/2020 after having a ground-level fall with a head injury. Patient fell out of bed and hit the left side of his head.  Evaluation in the emergency department showed no evidence of stroke or bleed on CT of the head. Patient states that last several days he has been not been feeling well, he complains of general malaise and a nonproductive cough, shortness of breath but no wheezing. Routine labs showed evidence of leukocytosis.. Acute chest did not show a clear infiltrate but he did have an slightly elevated procalcitonin level.  The patient failed a bedside swallow which increases concern for possible aspiration pneumonitis. He was initially started on ceftriaxone and azithromycin    Of note, on 10/27/2020, patient underwent right prepatellar bursa resection and washout, right lower leg cyst resection due to recurrent prepatellar bursitis, lower leg cyst recurrence and incision dehiscence.  Cultures at that time grew MSSA.  Reportedly, he received 2 weeks of Keflex for an infection.  He noted pain in his right knee on admission, thus fluid is aspirated and it grew only 1700 white cells, neutrophil predominant.  However, the culture returned positive for Pseudomonas.  Aspiration was repeated on 12/17 and this time it showed 15,000 white cells, 90% neutrophils.  Culture again grew Pseudomonas.  He underwent R knee I&D 12/19.  ID and ortho following. Anticipate 4 weeks of IV cefepime (given fluoroquinolone allergy).  Blood culture negative.    Interval Problem Update  S/p R knee I&D 12/19.  Care coordination in place for IV abx.  Mild hypertension, no home meds.    Consultants/Specialty  Ortho  ID    Code Status  Full  Code    Disposition  PT/OT: [facility]    Review of Systems  Review of Systems   Constitutional: Negative for chills and fever.   HENT: Negative for ear discharge.    Eyes: Negative for blurred vision.   Respiratory: Negative for cough, hemoptysis, shortness of breath and wheezing.    Cardiovascular: Negative for chest pain and palpitations.   Gastrointestinal: Negative for nausea and vomiting.   Genitourinary: Negative for dysuria.   Musculoskeletal: Positive for joint pain (R knee pain). Negative for myalgias.   Skin: Negative for rash.   Neurological: Negative for dizziness and focal weakness.   Endo/Heme/Allergies: Does not bruise/bleed easily.   Psychiatric/Behavioral: Negative for depression.        Physical Exam  Temp:  [36.4 °C (97.6 °F)-36.7 °C (98.1 °F)] 36.7 °C (98.1 °F)  Pulse:  [61-88] 64  Resp:  [16-18] 16  BP: (141-178)/(85-97) 141/89  SpO2:  [91 %-92 %] 92 %    Physical Exam  Vitals signs and nursing note reviewed.   Constitutional:       General: He is not in acute distress.     Appearance: Normal appearance.   HENT:      Head: Normocephalic and atraumatic.      Mouth/Throat:      Mouth: Mucous membranes are dry.      Pharynx: Oropharynx is clear.   Eyes:      Pupils: Pupils are equal, round, and reactive to light.   Neck:      Musculoskeletal: Neck supple.   Cardiovascular:      Rate and Rhythm: Normal rate and regular rhythm.   Pulmonary:      Effort: Pulmonary effort is normal.      Breath sounds: Normal breath sounds.   Abdominal:      General: Abdomen is flat. Bowel sounds are normal.      Palpations: Abdomen is soft.   Musculoskeletal: Normal range of motion.         General: Swelling present.      Comments: S/p R knee I&D, dressing clean, dry, no drainage     Skin:     General: Skin is warm and dry.   Neurological:      General: No focal deficit present.      Mental Status: He is alert and oriented to person, place, and time.   Psychiatric:         Mood and Affect: Mood normal.         Behavior:  Behavior normal.         Fluids    Intake/Output Summary (Last 24 hours) at 12/22/2020 2116  Last data filed at 12/22/2020 2000  Gross per 24 hour   Intake 600 ml   Output 655 ml   Net -55 ml       Laboratory  Recent Labs     12/20/20  0649 12/21/20  0827 12/22/20  1108   WBC 8.0 7.0 5.3   RBC 3.42* 3.27* 3.43*   HEMOGLOBIN 10.8* 10.6* 10.8*   HEMATOCRIT 34.0* 32.8* 34.0*   MCV 99.4* 100.3* 99.1*   MCH 31.6 32.4 31.5   MCHC 31.8* 32.3* 31.8*   RDW 54.0* 54.4* 53.8*   PLATELETCT 167 167 190   MPV 10.1 9.9 10.1     Recent Labs     12/20/20  0649 12/21/20  0827 12/22/20  1108   SODIUM 139 136 140   POTASSIUM 4.2 3.6 3.7   CHLORIDE 106 108 108   CO2 23 21 22   GLUCOSE 129* 91 101*   BUN 19 21 18   CREATININE 1.33 1.49* 1.27   CALCIUM 8.6 8.4* 8.3*                   Imaging  IR-MIDLINE CATHETER INSERTION WO GUIDANCE > AGE 3   Final Result                  Ultrasound-guided midline placement performed by qualified nursing staff    as above.          DX-CHEST-PORTABLE (1 VIEW)   Final Result      Stable chest without acute/new abnormality.      CT-CSPINE WITHOUT PLUS RECONS   Final Result      No acute fracture or traumatic listhesis in the cervical spine.      CT-HEAD W/O   Final Result      1.  Generalized atrophy and chronic microvascular ischemic type changes.   2.  No acute intracranial abnormality.   3.  Sinus disease.   4.  Small left parietal scalp contusion.           Assessment/Plan  * Septic arthritis (HCC)  Assessment & Plan  Right knee septic arthritis, prepatellar abscess. Recent prepatellar bursitis and recurrent lower leg cyst status post I&D  S/p aspiraiton 12/16 and 12/17, culture positive Pseudomonas  S/p I&D 12/19  Blood culture: negative  - cont cefepime  - ID following: No oral options given fluoroquinolone allergy.  Anticipate 4 weeks of IV cefepime  - Ortho following    Pneumonia  Assessment & Plan  Failed bedside swallow eval in ED - risk for aspiration pneumonia, on 2L on admission, was previously  on ceftriaxone and azithromycin, now on cefepime due to septic arthritis  - aspiration precaution  - IS  - mucinex  - Speech therapy: rec outpatient speech    Acute respiratory failure (HCC)- (present on admission)  Assessment & Plan  Home oxygen: 2L nightly  - O2 weaned off    Sepsis (HCC)- (present on admission)  Assessment & Plan  Resolved  Blood culture negative  R knee aspiration culture, Pseudomonas  Lactate, 1.8; procalcitonin elevated  Source likely Right knee septic arthritis  Continue cefepime, ID following    Oropharyngeal dysphagia- (present on admission)  Assessment & Plan  Speech therapy  Diet: Level 6: Soft and bite sized  Liquid: Level 2: Mildly thick    Prepatellar bursitis of right knee  Assessment & Plan  Hx of recent prepatellar bursitis and recurrent lower leg cyst status post I&D     CKD (chronic kidney disease) stage 3, GFR 30-59 ml/min- (present on admission)  Assessment & Plan  Cr baseline 1.59, CrCl about 36-38  Stable  Renall dose all medications  Avoid nephrotoxins    Chronic obstructive pulmonary disease (HCC)- (present on admission)  Assessment & Plan  Not in acute exacerbation - no increase in cough or sputum production, no wheezing  Cont home Symbicort, glycopyrrolate  RT protocol  IS       VTE prophylaxis: heparin

## 2020-12-23 NOTE — PROGRESS NOTES
Discovered that patient had pepto-bismol and imodium in nurse . Home meds sealed in silver bag, and brought down to pharmacy. Patient aware.

## 2020-12-23 NOTE — DISCHARGE SUMMARY
Discharge Summary    CHIEF COMPLAINT ON ADMISSION  Chief Complaint   Patient presents with   • GLF       Reason for Admission  TBI     Admission Date  12/16/2020    CODE STATUS  Full Code    HPI & HOSPITAL COURSE  Barry Torres is an 80 y.o. male with hx of COPD on 2L home O2 nightly, CKD3, HTN, who was admitted on 12/16/2020 after having a ground-level fall with a head injury. Patient fell out of bed and hit the left side of his head.  Evaluation in the emergency department showed no evidence of stroke or bleed on CT of the head. Patient states that last several days he has been not been feeling well, he complains of general malaise and a nonproductive cough, shortness of breath but no wheezing. Routine labs showed evidence of leukocytosis. Chest x-ray did not show a clear infiltrate but he did have an slightly elevated procalcitonin level.  The patient failed a bedside swallow which increased concern for possible aspiration pneumonitis. He was initially started on ceftriaxone and azithromycin.     Of note, on 10/27/2020, patient underwent right prepatellar bursa resection and washout, right lower leg cyst resection due to recurrent prepatellar bursitis, lower leg cyst recurrence and incision dehiscence.  Cultures at that time grew MSSA.  Reportedly, he received 2 weeks of Keflex for an infection.  He noted pain in his right knee on this admission, thus fluid is aspirated and it grew only 1700 white cells, neutrophil predominant.  However, the culture returned positive for Pseudomonas.  Aspiration was repeated on 12/17 and this time it showed 15,000 white cells, 90% neutrophils.  Culture again grew Pseudomonas.  He underwent R knee I&D 12/19.  Blood culture remained negative.  Infectious disease specialist recommended 4 weeks of IV cefepime (given fluoroquinolone allergy), which was coordinated with home health care.      Therefore, he is discharged in good and stable condition to home with organized home  healthcare and close outpatient follow-up.    The patient met 2-midnight criteria for an inpatient stay at the time of discharge.    Discharge Date  12/23/2020    FOLLOW UP ITEMS POST DISCHARGE  Septic arthritis of right knee on outpatient IV antibiotics  Mild hypertension    DISCHARGE DIAGNOSES  Principal Problem:    Septic arthritis (HCC) POA: Unknown  Active Problems:    Pneumonia POA: Unknown    Oropharyngeal dysphagia POA: Yes    Prepatellar bursitis of right knee POA: Unknown    Chronic obstructive pulmonary disease (HCC) POA: Yes      Overview: absestos exposure      ICD-10 transition    CKD (chronic kidney disease) stage 3, GFR 30-59 ml/min POA: Yes  Resolved Problems:    Sepsis (HCC) POA: Yes    Acute respiratory failure (HCC) POA: Yes    Effusion of bursa of right knee POA: Unknown    Hypertension POA: Unknown    Thrombocytopenia (HCC) POA: Unknown    Pneumonitis POA: Yes    Hx of right knee surgery POA: Yes      FOLLOW UP  Marc Chowdhury M.D.  [address] [facility] [address]  [phone]    Schedule an appointment as soon as possible for a visit in 2 weeks      [facility] [address] [address]  [phone]        Carlitos Harvey M.D.  [address] [facility] [id number] [phone]            MEDICATIONS ON DISCHARGE     Medication List      START taking these medications      Instructions   NS SOLN 100 mL with cefepime 2 GM SOLR 2 g   Doctor's comments: See paper orders faxed by ID specialist  Infuse 2 g into a venous catheter every 12 hours for 23 days.  Dose: 2 g     oxyCODONE immediate-release 5 MG Tabs  Commonly known as: ROXICODONE   Take 1 Tab by mouth every 6 hours as needed for Severe Pain for up to 7 days.  Dose: 5 mg        CHANGE how you take these medications      Instructions   acetaminophen 325 MG Tabs  What changed:   · medication strength  · how much to take  · when to take this  · reasons to take this  Commonly known as:  Tylenol   Take 2 Tabs by mouth every four hours as needed.  Dose: 650 mg        CONTINUE taking these medications      Instructions   Allegra Allergy 180 MG tablet  Generic drug: fexofenadine   ALLEGRA ALLERGY 180 MG TABS     fluticasone 50 MCG/ACT nasal spray  Commonly known as: FLONASE   FLONASE ALLERGY RELIEF SUSP     IMODIUM A-D PO   Take 1 Tab by mouth every day.  Dose: 1 Tab     leflunomide 20 MG Tabs  Commonly known as: ARAVA   Take 1 Tab by mouth every day.  Dose: 20 mg     Mucinex 600 MG Tb12  Generic drug: guaiFENesin ER   Take 600 mg by mouth 2 Times a Day.  Dose: 600 mg     SYMBICORT INH   Inhale 1 Puff 2 Times a Day.  Dose: 1 Puff     tiotropium 18 MCG Caps  Commonly known as: SPIRIVA   Inhale 1 Cap by mouth every day.  Dose: 18 mcg        STOP taking these medications    cephALEXin 500 MG Caps  Commonly known as: KEFLEX     certolizumab pegol 2 X 200 MG/ML Kit  Commonly known as: Cimzia Prefilled     sulfamethoxazole-trimethoprim 800-160 MG tablet  Commonly known as: BACTRIM DS        ASK your doctor about these medications      Instructions   gabapentin 300 MG Caps  Commonly known as: NEURONTIN   TAKE 1 CAPSULE BY MOUTH THREE TIMES A DAY     MULTIVITAMIN PO   Take 1 Tab by mouth every day.  Dose: 1 Tab     rosuvastatin 5 MG Tabs  Commonly known as: CRESTOR   TAKE 1 TABLET BY MOUTH EVERY EVENING            Allergies  Allergies   Allergen Reactions   • Ciprofloxacin      Other reaction(s): muscle aches   • Levaquin      Other reaction(s): Muscle aches  Muscle aches  Other reaction(s): Muscle aches   • Penicillins Hives     Rash and asthma attack when a child - wife states he has had Keflex in the past and tolerated   • Ciprofloxacin Hcl Unspecified     MUSCLE ACHES   • Levofloxacin Unspecified     MUSCLE ACHES       DIET  Orders Placed This Encounter   Procedures   • Diet Order Diet: Regular; Tray Modifications (optional): No Straws     Standing Status:   Standing     Number of Occurrences:   1      Order Specific Question:   Diet:     Answer:   Regular [1]     Order Specific Question:   Tray Modifications (optional)     Answer:   No Straws       ACTIVITY  As tolerated.  Weight bearing as tolerated    CONSULTATIONS  Orthopedic surgery  Infectious disease    PROCEDURES  12/17/2020 arthrocentesis right knee  12/19/2020 right knee incision and drainage, deep abscess in the anterior aspect of knee, right knee arthrotomy with anterior synovectomy  12/21/2020 midline IV placement    LABORATORY  Lab Results   Component Value Date    SODIUM 139 12/23/2020    POTASSIUM 3.6 12/23/2020    CHLORIDE 108 12/23/2020    CO2 22 12/23/2020    GLUCOSE 97 12/23/2020    BUN 16 12/23/2020    CREATININE 1.22 12/23/2020    CREATININE 1.5 (H) 04/11/2005        Lab Results   Component Value Date    WBC 4.7 (L) 12/23/2020    HEMOGLOBIN 10.9 (L) 12/23/2020    HEMATOCRIT 34.5 (L) 12/23/2020    PLATELETCT 204 12/23/2020        Total time of the discharge process exceeds 45 minutes.

## 2020-12-23 NOTE — OP REPORT
DATE OF SERVICE:  12/19/2020     SURGEON:  Marc Chowdhury MD     ASSISTANT:  Martín Orozco PA-C     PREOPERATIVE DIAGNOSES:  1.  Right septic knee.  2.  Right prepatellar abscess.     POSTOPERATIVE DIAGNOSES:  1.  Right septic knee.  2.  Right prepatellar abscess.     PROCEDURES:  1.  Right knee incision and drainage, deep abscess in the anterior aspect of   the knee.  2.  Right knee arthrotomy with anterior synovectomy.     ANESTHESIOLOGIST:  Rajeev Hyman MD     ANESTHESIA:  General.     COMPLICATIONS:  None noted.     DRAINS:  Hemovac x1.     SPECIMENS:  Cultures x2.     ESTIMATED BLOOD LOSS:  Minimal.     TOURNIQUET TIME:  Less than 45 minutes at 250 mmHg.     INDICATIONS:  The patient is an 80-year-old gentleman well known to me for   left hand surgery.  He has recently undergone two surgeries for cyst in his   left knee by Dr. Faulkner.  He presented to [facility] with left knee   swelling, erythema.  Two aspirations grew out pseudomonas.  I was contacted   the [place] orthopedic surgeon on call on Saturday 12/19/2020.  Given the   cultures, he has severe erythema and fluctuance in the anterior aspect of the   knee and the cultures growing from the joint rather than the prepatellar   bursa.  I recommended both of the above-mentioned procedures.  Prior to the   procedure, the patient understood risks, benefits and alternatives of surgery.    He understood the risks include but not be limited to the risk of ongoing   infection requiring multiple surgeries or even amputation, bleeding requiring   blood transfusion, nerve, blood vessel, tendon repair, chronic pain,   stiffness, complex regional pain syndrome requiring cardiovascular   complications, dissatisfaction with his outcome, DVT, pulmonary embolism or   aortic stroke and even death.  The patient states despite these risks, he   wished to proceed with surgery.     DESCRIPTION OF PROCEDURE:  The patient was met in the preoperative holding    area.  His right knee was initialed as the correct operative site.  He had an   opportunity to ask questions.  All questions were answered and informed   consent was obtained.  The patient was brought to the operating room, laid   supine on the operating room table.  All bony and dependent prominences well   padded.  General anesthesia was induced without complication.  Antibiotics   were already being administered by the hospitalist.  The right lower extremity   was prepped and draped in the usual sterile fashion.  Formal time-out was   performed, in which all parties agreed upon the correct patient, procedure and   operative site.  We began the procedure by elevating the leg and inflating   the tourniquet to 250 mmHg.  His posteromedial incision was well healed   without erythema or drainage.  I therefore elected not to open this incision,   all of his erythema and fluctuance was about the anterior knee incision which   is well healed.  I then excised the prior scar, immediately encountered gross   purulence in the bursal abscess.  I copiously irrigated this with normal   saline and evacuated all of the purulence did so after taking cultures and   then I used a Bovie cautery to perform a bursectomy.  Once I thoroughly   cleaned and debrided this area, I then made a medial parapatellar knee   arthrotomy, immediately encountered significant purulence in the joint.  I   brought the arthrotomy proximal to vastus medialis and distally to the tibia.    I noticed the amount of synovitis in the anterior compartment.  Using Bovie   cautery and scalpel, I performed an anterior compartment synovectomy.  This   was sent for culture.  I then copiously irrigated the wound with normal saline   and placed one deep Hemovac drain, closed the deep capsule layer with #1   Quill suture and closed the skin after excising some small amount of excess   skin from his significant swelling of the prepatellar bursa that was closed   with  2-0 Monocryl suture, skin stapler and #1 Prolene suture.  An incisional   wound VAC and knee immobilizer.  The patient awoke from anesthesia without   known complication and was transferred in stable condition to the PACU where   there were no immediate postoperative complaints.     POSTOPERATIVE PLAN:  The patient will be admitted back to the hospitalist.    Weightbearing as tolerated.  We can discontinue his drain when there is less   than 20 mL per shift.  Infectious disease consultation for long-term   antibiotics.  As long as he improves clinically, he may not warrant repeat   irrigation and debridement.  If he develops increasing white count or fever,   he may warrant repeat incision and drainage.        ______________________________  MD FATUMA Jenkins/ELOY    DD:  12/22/2020 18:27  DT:  12/22/2020 19:24    Job#:  [id number]    Addendum: this op report has been edited to correct that operative side was the right not left and Martín Orozco PA-C assisted.

## 2020-12-23 NOTE — CARE PLAN
Problem: Infection  Goal: Will remain free from infection  12/23/2020 0132 by Yeimy Horn RPriscillaNPriscilla  Note: Washed hands and dawned new gloves prior to interacting with patient. Encouraging frequent hand hygiene for patient. Pt afebrile. Scrubbed hub for 15 seconds prior to accessing midline.   12/23/2020 0131 by Yeimy Horn R.N.  Outcome: PROGRESSING AS EXPECTED     Problem: Bowel/Gastric:  Goal: Normal bowel function is maintained or improved  Outcome: PROGRESSING AS EXPECTED  Note: Patient expressed great discomfort with frequent bowel movements. Administered imodium PRN.

## 2020-12-23 NOTE — PROGRESS NOTES
HVAC removed. Pt received education from infusion RN on home infusion. Went over discharge instructions w/ pt, when to call the doctor, f/u appointments, medications. Copy of discharge paperwork given to pt. Pt had no further questions, pt discharged to home w/ family by WC.

## 2020-12-23 NOTE — DISCHARGE INSTRUCTIONS
Septic Arthritis  Septic arthritis is inflammation of a joint that results from an infection. The infection occurs when bacteria or other germs get inside a joint. The knee and hip joints are most often affected, but other joints may also become infected. Usually, just one joint is affected. Joint infections need to be treated quickly to prevent damage to the joint, and to prevent the infection from spreading to other areas of your body.  What are the causes?  This condition is most often caused by Staphylococcus bacteria. Other causes may include:  · Fungal infections.  · Sexually transmitted infections (STIs).  · Tuberculosis.  Bacteria or other germs can spread to the joint. These bacteria are usually from:  · Blood carrying germs from an infection in another part of your body to your joint. This is the most common cause of septic arthritis.  · An open wound near the joint.  · A needle put into the joint.  · Joint surgery.  · An infection in the bone (osteomyelitis) that spreads to the joint.  What increases the risk?  You may have a higher risk for this condition if you:  · Have an artificial joint.  · Have a blood or skin infection.  · Have open sores or wounds on your skin.  · Had a recent joint surgery or procedure.  · Had a recent joint injury.  · Have a long-term (chronic) disease, such as:  ? Diabetes.  ? Osteoarthritis.  ? Rheumatoid arthritis.  ? HIV (human immunodeficiency virus).  · Have a condition or take medicines that weaken your body’s defense system (immune system).  · Use IV medicines.  · Have gonorrhea.  · Have a central line for IV access.  What are the signs or symptoms?  Symptoms of this condition include:  · Swelling at the joint.  · Severe pain in the joint.  · Redness and warmth in the joint.  · Being unable to move the joint.  · Fever and chills.  How is this diagnosed?  This condition may be diagnosed based on:  · Your symptoms.  · Your medical history.  · A physical exam.  · Other  tests to confirm the diagnosis. These may include:  ? Removing fluid from your joint to look for signs of infection (synovial fluid analysis).  ? Blood tests.  · Imaging studies. These may include:  ? X-rays.  ? MRI.  ? CT scan.  ? Ultrasound.  How is this treated?  This condition may be treated by:  · Draining fluid from your joint. This may be done for several days in order to relieve pain.  · Taking antibiotic medicine. This may be given by IV or by mouth. It may be done in a hospital at first. You may have to continue antibiotics at home by IV or by mouth for several weeks after that.  · Surgery to remove:  ? Infected fluid and tissue from the joint.  ? An infected artificial joint.  After the infection has started to heal, you may need physical therapy to regain strength and motion of the joint.  Follow these instructions at home:  Medicines    · Take over-the-counter and prescription medicines only as told by your health care provider.  · If you were prescribed an antibiotic medicine, take it as told by your health care provider. Do not stop taking the antibiotic even if you start to feel better.  · Follow instructions from your health care provider about how to take antibiotics at home by IV. You may need to have a nurse come to your home to give you antibiotics through IV.  Managing pain, stiffness, and swelling    · If directed, put ice on the affected area:   ? Put ice in a plastic bag.  ? Place a towel between your skin and the bag.  ? Leave the ice on for 20 minutes, 2-3 times a day.  · Raise (elevate) the affected area above the level of your heart while you are sitting or lying down.  Activity  · Return to your normal activities as told by your health care provider. Ask your health care provider what activities are safe for you.  · Do any exercises or stretches as told by your health care provider or physical therapist.  General instructions  · Do not use any products that contain nicotine or tobacco,  such as cigarettes and e-cigarettes. These can delay healing. If you need help quitting, ask your health care provider.  · Wash your hands often with soap and water. If soap and water are not available, use hand .  · Keep all follow-up visits as told by your health care provider. This is important.  Contact a health care provider if you:  · Have pain that is not controlled with medicine.  · Develop a fever or chills.  · Have redness, warmth, pain, or swelling that returns after treatment.  Get help right away if you:  · Have signs of worsening infection in your joint. Watch for:  ? Very severe pain.  ? Redness.  ? Warmth.  ? Swelling.  · Have rapid breathing or you have trouble breathing.  · Have chest pain.  · Cannot drink fluids or make urine.  · Notice that the affected area changed color or turned blue.  · Have numbness or severe pain in the affected area.  Summary  · Septic arthritis is inflammation of a joint that occurs when bacteria or other germs get inside a joint and cause an infection.  · Joint infections need to be treated quickly to prevent damage to the joint, and to prevent the infection from spreading to other areas of your body.  · Symptoms of joint infection include redness, warmth, swelling, pain, and being unable to move a joint.  · Treatment usually involves draining fluid from the joint and taking antibiotic medicine.  This information is not intended to replace advice given to you by your health care provider. Make sure you discuss any questions you have with your health care provider.  Document Released: 03/09/2004 Document Revised: 04/10/2020 Document Reviewed: 01/29/2019  Elsevier Patient Education © 2020 [employer]    Midline Catheter  A midline catheter is a thin, flexible tube that is inserted into a vein in the upper arm or at the bend in the elbow. Its tip ends at or near the armpit (axillary) area. A midline catheter is a type of IV access.  What are the  risks?  Generally, midline catheters are safe to use. However, problems may occur, including:  · Clots. A clot can form in the midline catheter or at its tip.  · Phlebitis. This is when the vein becomes warm, swollen, and tender. A red streak may develop along the vein where the midline catheter is inserted.  · Leakage (infiltration) of IV fluids or medicine into the surrounding tissue of the vein. This can cause swelling, pain, and tissue damage in the arm with the midline catheter.  · Infection.  · Nerve or tendon injury or irritation during midline catheter insertion.  How are midline catheters used?  A midline catheter is used to:  · Give IV fluids and nutrients.  · Give medicines.  · Draw blood.  · Give blood back to the body, such as during a blood transfusion or hemodialysis.  · Inject a contrast dye for a CT scan (power injection).  · Provide IV access for treatment that lasts 1-4 weeks.  Follow these instructions at home:  Follow instructions from your health care provider about how to take care of your midline catheter at home. To make sure that your catheter works well:  · Wash your hands with soap and water before and after caring for or using for your midline catheter. If soap and water are not available, use hand .  · Before connecting a syringe or tubing to your midline catheter, scrub the tip of your catheter with a new alcohol wipe for 10-14 seconds. Allow the catheter tip to dry completely. Do this every time before you connect the syringe or tubing to your midline.  · Do not let the midline catheter bandage (dressing) get wet. Cover it with a watertight covering when you take a bath or a shower. Change the dressing right away if it becomes wet.  · Do not take baths, swim, or use a hot tub until your health care provider approves.  · Do not pull on the midline catheter or tubing. Doing that can move the midline catheter out of its place in the vein. If the midline catheter is pulled out of  place, the IV fluids or medicine you are getting can leak into the surrounding tissue.  · Do not allow blood pressure monitoring or needle punctures on the side where the midline catheter is located.  · Do not lift anything that is heavier than 10 lb (4.5 kg) or the limit given by your health care provider.  Check your insertion site every day for signs of infection. Check for:  · Redness, swelling, or pain.  · Fluid or blood.  · Warmth.  · Pus or a bad smell.  Contact a health care provider if:  · The dressing is loose and the midline catheter insertion site is exposed.  · The skin is irritated where the dressing has been applied.  Get help right away if:  · You have chills or fever.  · There is bleeding at the site where the midline catheter enters your arm.  · There is drainage, redness, swelling, discomfort, or warmth in the arm with the midline catheter.  · You are unable to flush your midline catheter or it feels blocked.  · The midline catheter is partially or completely pulled out.  · Your catheter is broken or leaking.  · You are dizzy.  · You have shortness of breath.  · You have an irregular heartbeat.  Summary  · A midline catheter is a thin, flexible tube that is inserted into a vein in the upper arm or at the bend in the elbow.  · A midline catheter is used to give IV fluids or nutrients, give medicines, draw blood, give blood back to the body, inject a dye for a CT scan (power injection), or provide IV access for treatment that lasts 1-4 weeks.  · Check your insertion site every day for signs of infection. Signs include redness, swelling, pain, fluid, blood, warmth, pus, or a bad smell.  This information is not intended to replace advice given to you by your health care provider. Make sure you discuss any questions you have with your health care provider.  Document Released: 05/21/2012 Document Revised: 02/10/2020 Document Reviewed: 01/19/2018  Elsevier Patient Education © 2020 [employer]    Aspiration Pneumonia  Aspiration pneumonia is an infection in the lungs. It occurs when saliva or liquid contaminated with bacteria is inhaled (aspirated) into the lungs. When these things get into the lungs, swelling (inflammation) and infection can occur. This can make it difficult to breathe. Aspiration pneumonia is a serious condition and can be life threatening.  What are the causes?  This condition is caused when saliva or liquid from the mouth, throat, or stomach is inhaled into the lungs, and when those fluids are contaminated with bacteria.  What increases the risk?  The following factors may make you more likely to develop this condition:  · A narrowing of the tube that carries food to the stomach (esophageal narrowing).  · Having gastroesophageal reflux disease (GERD).  · Having a weak immune system.  · Having diabetes.  · Having poor oral hygiene.  · Being malnourished.  The condition is more likely to occur when a person's cough (gag) reflex, or ability to swallow, has decreased. Some things that can cause this decrease include:  · Having a brain injury or disease, such as stroke, seizures, Parkinson disease, dementia, or amyotrophic lateral sclerosis (ALS).  · Being given a general anesthetic for procedures.  · Drinking too much alcohol. If a person passes out and vomits, vomit can be inhaled into the lungs.  · Taking certain medicines, such as tranquilizers or sedatives.  What are the signs or symptoms?  Symptoms of this condition include:  · Fever.  · A cough with secretions that are yellow, tan, or green.  · Breathing problems, such as wheezing or shortness of breath.  · Chest pain.  · Being more tired than usual (fatigue).  · Having a history of coughing while eating or drinking.  · Bad breath.  · Bluish color to the lips, skin, or fingers.  How is this diagnosed?  This condition may be diagnosed based on:  · A physical exam.  · Tests, such as:  ? Chest X-ray.  ? Sputum culture. Saliva and  mucus (sputum) are collected from the lungs or the tubes that carry air to the lungs (bronchi). The sputum is then tested for bacteria.  ? Oximetry. A sensor or clip is placed on areas such as a finger, earlobe, or toe to measure the oxygen level in your blood.  ? Blood tests.  ? Swallowing study. This test looks at how food is swallowed and whether it goes into your breathing tube (trachea) or esophagus.  ? Bronchoscopy. This test uses a flexible tube (bronchoscope) to see inside the lungs.  How is this treated?  This condition may be treated with:  · Medicines. Antibiotic medicine will be given to kill the pneumonia bacteria. Other medicines may also be used to reduce fever or pain.  · Breathing assistance and oxygen therapy. Depending on how well you are breathing, you may need to be given oxygen, or you may need breathing support from a breathing machine (ventilator).  · Thoracentesis. This is a procedure to remove fluid that has built up in the space between the linings of the chest wall and the lungs.  · Feeding tube and diet change. For people who have difficulty swallowing, a feeding tube might be placed in the stomach, or they may be asked to avoid certain food textures or liquids when eating.  Follow these instructions at home:  Medicines  · Take over-the-counter and prescription medicines only as told by your health care provider.  ? If you were prescribed an antibiotic medicine, take it as told by your health care provider. Do not stop taking the antibiotic even if you start to feel better.  ? Take cough medicine only if you are losing sleep. Cough medicine can prevent your body’s natural ability to remove mucus from your lungs.  General instructions  · Carefully follow any eating instructions you were given, such as avoiding certain food textures or thickening your liquids. Thickening liquids reduces the risk of developing aspiration pneumonia again.  · Use breathing exercises such as postural drainage,  deep breathing, and incentive spirometry to help expel secretions.  · Rest as instructed by your health care provider.  · Sleep in a semi-upright position at night. Try to sleep in a reclining chair, or place a few pillows under your head.  · Do not use any products that contain nicotine or tobacco, such as cigarettes and e-cigarettes. If you need help quitting, ask your health care provider.  · Keep all follow-up visits as told by your health care provider. This is important.  Contact a health care provider if:  · You have a fever.  · You have a worsening cough with yellow, tan, or green secretions.  · You have coughing while eating or drinking.  Get help right away if:  · You have worsening shortness of breath, wheezing, or difficulty breathing.  · You have chest pain.  Summary  · Aspiration pneumonia is an infection in the lungs. It is caused when saliva or liquid from the mouth, throat, or stomach is inhaled into the lungs.  · Aspiration pneumonia is more likely to occur when a person's cough reflex or ability to swallow has decreased.  · Symptoms of aspiration pneumonia include coughing, breathing problems, fever, and chest pain.  · Aspiration pneumonia may be treated with antibiotic medicine, other medicines to reduce pain or fever, and breathing assistance or oxygen therapy.  This information is not intended to replace advice given to you by your health care provider. Make sure you discuss any questions you have with your health care provider.  Document Released: 10/15/2010 Document Revised: 11/30/2018 Document Reviewed: 01/23/2018  [employer] Patient Education © 2020 [employer]    Discharge Instructions    Discharged to home by car with relative. Discharged via wheelchair, hospital escort: Yes.  Special equipment needed: Immobilizer and Walker    Be sure to schedule a follow-up appointment with your primary care doctor or any specialists as instructed.     Discharge Plan:        I understand that a diet low  in cholesterol, fat, and sodium is recommended for good health. Unless I have been given specific instructions below for another diet, I accept this instruction as my diet prescription.   Other diet: N/A    Special Instructions: Discharge instructions for the Orthopedic Patient    Follow up with Primary Care Physician within 2 weeks of discharge to home, regarding:  Review of medications and diagnostic testing.  Surveillance for medical complications.  Workup and treatment of osteoporosis, if appropriate.     -Is this a Hip/Knee/Shoulder Joint Replacement patient? No    -Is this patient being discharged with medication to prevent blood clots?  No and   Sepsis, Diagnosis, Adult  Sepsis is a serious bodily reaction to an infection. The infection that triggers sepsis may be from a bacteria, virus, or fungus. Sepsis can result from an infection in any part of your body. Infections that commonly lead to sepsis include skin, lung, and urinary tract infections.  Sepsis is a medical emergency that must be treated right away in a hospital. In severe cases, it can lead to septic shock. Septic shock can weaken your heart and cause your blood pressure to drop. This can cause your central nervous system and your body's organs to stop working.  What are the causes?  This condition is caused by a severe reaction to infections from bacteria, viruses, or fungus. The germs that most often lead to sepsis include:  · Escherichia coli (E. coli) bacteria.  · Staphylococcus aureus (staph) bacteria.  · Some types of Streptococcus bacteria.  The most common infections affect these organs:  · The lung (pneumonia).  · The kidneys or bladder (urinary tract infection).  · The skin (cellulitis).  · The bowel, gallbladder, or pancreas.  What increases the risk?  You are more likely to develop this condition if:  · Your body's disease-fighting system (immune system) is weakened.  · You are age 65 or older.  · You are male.  · You had surgery or  you have been hospitalized.  · You have these devices inserted into your body:  ? A small, thin tube (catheter).  ? IV line.  ? Breathing tube.  ? Drainage tube.  · You are not getting enough nutrients from food (malnourished).  · You have a long-term (chronic) disease, such as cancer, lung disease, kidney disease, or diabetes.  · You are .  What are the signs or symptoms?  Symptoms of this condition may include:  · Fever.  · Chills or feeling very cold.  · Confusion or anxiety.  · Fatigue.  · Muscle aches.  · Shortness of breath.  · Nausea and vomiting.  · Urinating much less than usual.  · Fast heart rate (tachycardia).  · Rapid breathing (hyperventilation).  · Changes in skin color. Your skin may look blotchy, pale, or blue.  · Cool, clammy, or sweaty skin.  · Skin rash.  Other symptoms depend on the source of your infection.  How is this diagnosed?  This condition is diagnosed based on:  · Your symptoms.  · Your medical history.  · A physical exam.  Other tests may also be done to find out the cause of the infection and how severe the sepsis is. These tests may include:  · Blood tests.  · Urine tests.  · Swabs from other areas of your body that may have an infection. These samples may be tested (cultured) to find out what type of bacteria is causing the infection.  · Chest X-ray to check for pneumonia. Other imaging tests, such as a CT scan, may also be done.  · Lumbar puncture. This removes a small amount of the fluid that surrounds your brain and spinal cord. The fluid is then examined for infection.  How is this treated?  This condition must be treated in a hospital. Based on the cause of your infection, you may be given an antibiotic, antiviral, or antifungal medicine.  You may also receive:  · Fluids through an IV.  · Oxygen and breathing assistance.  · Medicines to increase your blood pressure.  · Kidney dialysis. This process cleans your blood if your kidneys have failed.  · Surgery to  remove infected tissue.  · Blood transfusion if needed.  · Medicine to prevent blood clots.  · Nutrients to correct imbalances in basic body function (metabolism). You may:  ? Receive important salts and minerals (electrolytes) through an IV.  ? Have your blood sugar level adjusted.  Follow these instructions at home:  Medicines    · Take over-the-counter and prescription medicines only as told by your health care provider.  · If you were prescribed an antibiotic, antiviral, or antifungal medicine, take it as told by your health care provider. Do not stop taking the medicine even if you start to feel better.  General instructions  · If you have a catheter or other indwelling device, ask to have it removed as soon as possible.  · Keep all follow-up visits as told by your health care provider. This is important.  Contact a health care provider if:  · You do not feel like you are getting better or regaining strength.  · You are having trouble coping with your recovery.  · You frequently feel tired.  · You feel worse or do not seem to get better after surgery.  · You think you may have an infection after surgery.  Get help right away if:  · You have any symptoms of sepsis.  · You have difficulty breathing.  · You have a rapid or skipping heartbeat.  · You become confused or disoriented.  · You have a high fever.  · Your skin becomes blotchy, pale, or blue.  · You have an infection that is getting worse or not getting better.  These symptoms may represent a serious problem that is an emergency. Do not wait to see if the symptoms will go away. Get medical help right away. Call your local emergency services (911 in the [place] Do not drive yourself to the hospital.  Summary  · Sepsis is a medical emergency that requires immediate treatment in a hospital.  · This condition is caused by a severe reaction to infections from bacteria, viruses, or fungus.  · Based on the cause of your infection, you may be given an antibiotic,  antiviral, or antifungal medicine.  · Treatment may also include IV fluids, breathing assistance, and kidney dialysis.  This information is not intended to replace advice given to you by your health care provider. Make sure you discuss any questions you have with your health care provider.  Document Released: 09/15/2004 Document Revised: 07/26/2019 Document Reviewed: 07/26/2019  [employer] Patient Education © 2020 [employer]      · Is patient discharged on Warfarin / Coumadin?   No     Depression / Suicide Risk    As you are discharged from this [facility] facility, it is important to learn how to keep safe from harming yourself.    Recognize the warning signs:  · Abrupt changes in personality, positive or negative- including increase in energy   · Giving away possessions  · Change in eating patterns- significant weight changes-  positive or negative  · Change in sleeping patterns- unable to sleep or sleeping all the time   · Unwillingness or inability to communicate  · Depression  · Unusual sadness, discouragement and loneliness  · Talk of wanting to die  · Neglect of personal appearance   · Rebelliousness- reckless behavior  · Withdrawal from people/activities they love  · Confusion- inability to concentrate     If you or a loved one observes any of these behaviors or has concerns about self-harm, here's what you can do:  · Talk about it- your feelings and reasons for harming yourself  · Remove any means that you might use to hurt yourself (examples: pills, rope, extension cords, firearm)  · Get professional help from the community (Mental Health, Substance Abuse, psychological counseling)  · Do not be alone:Call your Safe Contact- someone whom you trust who will be there for you.  · Call your local CRISIS HOTLINE 177-9398 or 879-036-8110  · Call your local Children's Mobile Crisis Response Team [place] (224) 604-9034 or www.[employer]  · Call the toll free National Suicide Prevention Hotlines    · National Suicide Prevention Lifeline 610-893-DART (4365)  · National Hope Line Network 800-SUICIDE (337-4275)

## 2020-12-28 ENCOUNTER — PATIENT OUTREACH (OUTPATIENT)
Dept: HEALTH INFORMATION MANAGEMENT | Facility: OTHER | Age: 80
End: 2020-12-28

## 2020-12-29 ENCOUNTER — OUTPATIENT INFUSION SERVICES (OUTPATIENT)
Dept: ONCOLOGY | Facility: MEDICAL CENTER | Age: 80
End: 2020-12-29
Attending: INTERNAL MEDICINE
Payer: MEDICARE

## 2020-12-29 ENCOUNTER — TELEPHONE (OUTPATIENT)
Dept: INFECTIOUS DISEASES | Facility: MEDICAL CENTER | Age: 80
End: 2020-12-29

## 2020-12-29 VITALS
HEART RATE: 115 BPM | TEMPERATURE: 98.7 F | OXYGEN SATURATION: 92 % | RESPIRATION RATE: 18 BRPM | DIASTOLIC BLOOD PRESSURE: 62 MMHG | SYSTOLIC BLOOD PRESSURE: 151 MMHG

## 2020-12-29 DIAGNOSIS — M00.861 ARTHRITIS OF RIGHT KNEE DUE TO OTHER BACTERIA (HCC): ICD-10-CM

## 2020-12-29 LAB
ALBUMIN SERPL BCP-MCNC: 3.5 G/DL (ref 3.2–4.9)
ALBUMIN/GLOB SERPL: 1.3 G/DL
ALP SERPL-CCNC: 77 U/L (ref 30–99)
ALT SERPL-CCNC: 40 U/L (ref 2–50)
ANION GAP SERPL CALC-SCNC: 10 MMOL/L (ref 7–16)
AST SERPL-CCNC: 43 U/L (ref 12–45)
BASOPHILS # BLD AUTO: 0.9 % (ref 0–1.8)
BASOPHILS # BLD: 0.07 K/UL (ref 0–0.12)
BILIRUB SERPL-MCNC: 0.3 MG/DL (ref 0.1–1.5)
BUN SERPL-MCNC: 19 MG/DL (ref 8–22)
CALCIUM SERPL-MCNC: 9.3 MG/DL (ref 8.5–10.5)
CHLORIDE SERPL-SCNC: 106 MMOL/L (ref 96–112)
CO2 SERPL-SCNC: 23 MMOL/L (ref 20–33)
CREAT SERPL-MCNC: 1.85 MG/DL (ref 0.5–1.4)
CRP SERPL HS-MCNC: 0.69 MG/DL (ref 0–0.75)
EOSINOPHIL # BLD AUTO: 0.28 K/UL (ref 0–0.51)
EOSINOPHIL NFR BLD: 3.8 % (ref 0–6.9)
ERYTHROCYTE [DISTWIDTH] IN BLOOD BY AUTOMATED COUNT: 56.9 FL (ref 35.9–50)
ERYTHROCYTE [SEDIMENTATION RATE] IN BLOOD BY WESTERGREN METHOD: 40 MM/HOUR (ref 0–20)
GLOBULIN SER CALC-MCNC: 2.7 G/DL (ref 1.9–3.5)
GLUCOSE SERPL-MCNC: 146 MG/DL (ref 65–99)
HCT VFR BLD AUTO: 33.4 % (ref 42–52)
HGB BLD-MCNC: 10.3 G/DL (ref 14–18)
IMM GRANULOCYTES # BLD AUTO: 0.05 K/UL (ref 0–0.11)
IMM GRANULOCYTES NFR BLD AUTO: 0.7 % (ref 0–0.9)
LYMPHOCYTES # BLD AUTO: 1.53 K/UL (ref 1–4.8)
LYMPHOCYTES NFR BLD: 20.7 % (ref 22–41)
MCH RBC QN AUTO: 31.7 PG (ref 27–33)
MCHC RBC AUTO-ENTMCNC: 30.8 G/DL (ref 33.7–35.3)
MCV RBC AUTO: 102.8 FL (ref 81.4–97.8)
MONOCYTES # BLD AUTO: 0.8 K/UL (ref 0–0.85)
MONOCYTES NFR BLD AUTO: 10.8 % (ref 0–13.4)
NEUTROPHILS # BLD AUTO: 4.65 K/UL (ref 1.82–7.42)
NEUTROPHILS NFR BLD: 63.1 % (ref 44–72)
NRBC # BLD AUTO: 0 K/UL
NRBC BLD-RTO: 0 /100 WBC
PLATELET # BLD AUTO: 275 K/UL (ref 164–446)
PMV BLD AUTO: 10.7 FL (ref 9–12.9)
POTASSIUM SERPL-SCNC: 4.8 MMOL/L (ref 3.6–5.5)
PROT SERPL-MCNC: 6.2 G/DL (ref 6–8.2)
RBC # BLD AUTO: 3.25 M/UL (ref 4.7–6.1)
SODIUM SERPL-SCNC: 139 MMOL/L (ref 135–145)
WBC # BLD AUTO: 7.4 K/UL (ref 4.8–10.8)

## 2020-12-29 PROCEDURE — 86140 C-REACTIVE PROTEIN: CPT

## 2020-12-29 PROCEDURE — 36592 COLLECT BLOOD FROM PICC: CPT

## 2020-12-29 PROCEDURE — 85652 RBC SED RATE AUTOMATED: CPT

## 2020-12-29 PROCEDURE — 99211 OFF/OP EST MAY X REQ PHY/QHP: CPT

## 2020-12-29 PROCEDURE — 85025 COMPLETE CBC W/AUTO DIFF WBC: CPT

## 2020-12-29 PROCEDURE — 80053 COMPREHEN METABOLIC PANEL: CPT

## 2020-12-29 RX ORDER — 0.9 % SODIUM CHLORIDE 0.9 %
5 VIAL (ML) INJECTION PRN
Status: CANCELLED | OUTPATIENT
Start: 2021-01-04

## 2020-12-29 RX ORDER — HEPARIN SODIUM (PORCINE) LOCK FLUSH IV SOLN 100 UNIT/ML 100 UNIT/ML
500 SOLUTION INTRAVENOUS PRN
Status: CANCELLED | OUTPATIENT
Start: 2021-01-04

## 2020-12-29 RX ORDER — 0.9 % SODIUM CHLORIDE 0.9 %
10-20 VIAL (ML) INJECTION PRN
Status: CANCELLED | OUTPATIENT
Start: 2021-01-04

## 2020-12-29 NOTE — PROGRESS NOTES
Community Health Worker Intake  • Social determinates of health intake Complete.   • Identified barriers to none.  • Contact information provided to Barry Torres  • Has PCP appointment scheduled   • Outpatient assessment completed.  • Did the patient receive medications post discharge: Yes    CHW Sukhdev called pt to complete outpatient assessment. Pt identifies no barriers to resources. Pt expressed no needs at this time.     Plan:CHW will d/c pt from CCM team due to all goals being met

## 2020-12-29 NOTE — TELEPHONE ENCOUNTER
Patient Call    Duarte Bell M.D.  You 9 minutes ago (11:26 AM)     Orders placed.    Message text      Notified patient of appointment and Address for 12/29/20 at 3:30pm scheduled with [facility]

## 2020-12-29 NOTE — TELEPHONE ENCOUNTER
"Received a call from [facility] 12/28/20. Patient has refused to schedule with home health care for weekly labs and picc care. I spoke with patient per Dr Bell on the importance of this or ABX would need to be stopped. Patient mentioned he had been confused because no one \"Identifies themselves\" he never knows who is calling. Patient told me he would schedule with home health.  "

## 2020-12-29 NOTE — TELEPHONE ENCOUNTER
Received another call from [facility]. Patient is now discharged from [facility] for non compliance and there are no other home health available this week. [facility] is requesting orders placed for patient to go to [facility] infusion for PICC care and weekly labs for non compliance. [facility] will not provide patient ABX if patient doesn't go to [facility]. Please place orders and I will discuss with patient.

## 2020-12-30 NOTE — PROGRESS NOTES
Patient here for dressing change. Left single lumen MID-line in place; labs drawn as ordered. MID-line in place for home IV antibiotics. Dressing change using sterile technique. Clave changed. MID-line secured gauze/mesh net. Next appointment scheduled. Discharged to self care; no apparent distress noted.

## 2021-01-04 ENCOUNTER — TELEPHONE (OUTPATIENT)
Dept: ONCOLOGY | Facility: MEDICAL CENTER | Age: 81
End: 2021-01-04

## 2021-01-05 ENCOUNTER — OUTPATIENT INFUSION SERVICES (OUTPATIENT)
Dept: ONCOLOGY | Facility: MEDICAL CENTER | Age: 81
End: 2021-01-05
Attending: INTERNAL MEDICINE
Payer: MEDICARE

## 2021-01-05 VITALS
TEMPERATURE: 97 F | SYSTOLIC BLOOD PRESSURE: 139 MMHG | BODY MASS INDEX: 29.26 KG/M2 | HEART RATE: 107 BPM | OXYGEN SATURATION: 99 % | DIASTOLIC BLOOD PRESSURE: 76 MMHG | WEIGHT: 216.05 LBS | HEIGHT: 72 IN | RESPIRATION RATE: 20 BRPM

## 2021-01-05 DIAGNOSIS — M00.861 ARTHRITIS OF RIGHT KNEE DUE TO OTHER BACTERIA (HCC): ICD-10-CM

## 2021-01-05 LAB
ALBUMIN SERPL BCP-MCNC: 3.3 G/DL (ref 3.2–4.9)
ALBUMIN/GLOB SERPL: 1.1 G/DL
ALP SERPL-CCNC: 79 U/L (ref 30–99)
ALT SERPL-CCNC: 31 U/L (ref 2–50)
ANION GAP SERPL CALC-SCNC: 9 MMOL/L (ref 7–16)
AST SERPL-CCNC: 32 U/L (ref 12–45)
BASOPHILS # BLD AUTO: 1.2 % (ref 0–1.8)
BASOPHILS # BLD: 0.07 K/UL (ref 0–0.12)
BILIRUB SERPL-MCNC: 0.4 MG/DL (ref 0.1–1.5)
BUN SERPL-MCNC: 18 MG/DL (ref 8–22)
CALCIUM SERPL-MCNC: 8.6 MG/DL (ref 8.5–10.5)
CHLORIDE SERPL-SCNC: 105 MMOL/L (ref 96–112)
CO2 SERPL-SCNC: 24 MMOL/L (ref 20–33)
CREAT SERPL-MCNC: 1.71 MG/DL (ref 0.5–1.4)
CRP SERPL HS-MCNC: 0.28 MG/DL (ref 0–0.75)
EOSINOPHIL # BLD AUTO: 0.25 K/UL (ref 0–0.51)
EOSINOPHIL NFR BLD: 4.5 % (ref 0–6.9)
ERYTHROCYTE [DISTWIDTH] IN BLOOD BY AUTOMATED COUNT: 60.9 FL (ref 35.9–50)
ERYTHROCYTE [SEDIMENTATION RATE] IN BLOOD BY WESTERGREN METHOD: 25 MM/HOUR (ref 0–20)
GLOBULIN SER CALC-MCNC: 3.1 G/DL (ref 1.9–3.5)
GLUCOSE SERPL-MCNC: 113 MG/DL (ref 65–99)
HCT VFR BLD AUTO: 34.6 % (ref 42–52)
HGB BLD-MCNC: 10.8 G/DL (ref 14–18)
IMM GRANULOCYTES # BLD AUTO: 0.01 K/UL (ref 0–0.11)
IMM GRANULOCYTES NFR BLD AUTO: 0.2 % (ref 0–0.9)
LYMPHOCYTES # BLD AUTO: 1.31 K/UL (ref 1–4.8)
LYMPHOCYTES NFR BLD: 23.4 % (ref 22–41)
MCH RBC QN AUTO: 32.4 PG (ref 27–33)
MCHC RBC AUTO-ENTMCNC: 31.2 G/DL (ref 33.7–35.3)
MCV RBC AUTO: 103.9 FL (ref 81.4–97.8)
MONOCYTES # BLD AUTO: 0.72 K/UL (ref 0–0.85)
MONOCYTES NFR BLD AUTO: 12.8 % (ref 0–13.4)
NEUTROPHILS # BLD AUTO: 3.25 K/UL (ref 1.82–7.42)
NEUTROPHILS NFR BLD: 57.9 % (ref 44–72)
NRBC # BLD AUTO: 0 K/UL
NRBC BLD-RTO: 0 /100 WBC
PLATELET # BLD AUTO: 211 K/UL (ref 164–446)
PMV BLD AUTO: 9.9 FL (ref 9–12.9)
POTASSIUM SERPL-SCNC: 4.4 MMOL/L (ref 3.6–5.5)
PROT SERPL-MCNC: 6.4 G/DL (ref 6–8.2)
RBC # BLD AUTO: 3.33 M/UL (ref 4.7–6.1)
SODIUM SERPL-SCNC: 138 MMOL/L (ref 135–145)
WBC # BLD AUTO: 5.6 K/UL (ref 4.8–10.8)

## 2021-01-05 PROCEDURE — 36592 COLLECT BLOOD FROM PICC: CPT

## 2021-01-05 PROCEDURE — 80053 COMPREHEN METABOLIC PANEL: CPT

## 2021-01-05 PROCEDURE — 85025 COMPLETE CBC W/AUTO DIFF WBC: CPT

## 2021-01-05 PROCEDURE — 85652 RBC SED RATE AUTOMATED: CPT

## 2021-01-05 PROCEDURE — 86140 C-REACTIVE PROTEIN: CPT

## 2021-01-05 RX ORDER — 0.9 % SODIUM CHLORIDE 0.9 %
10-20 VIAL (ML) INJECTION PRN
Status: CANCELLED | OUTPATIENT
Start: 2021-01-11

## 2021-01-05 RX ORDER — 0.9 % SODIUM CHLORIDE 0.9 %
5 VIAL (ML) INJECTION PRN
Status: CANCELLED | OUTPATIENT
Start: 2021-01-11

## 2021-01-05 RX ORDER — HEPARIN SODIUM (PORCINE) LOCK FLUSH IV SOLN 100 UNIT/ML 100 UNIT/ML
500 SOLUTION INTRAVENOUS PRN
Status: CANCELLED | OUTPATIENT
Start: 2021-01-11

## 2021-01-05 ASSESSMENT — FIBROSIS 4 INDEX: FIB4 SCORE: 2

## 2021-01-06 NOTE — PROGRESS NOTES
Pt presented to [facility] for weekly Midline dressing change and labs. Pt denies any s/s of infection. L midline dressing and clave changed using sterile technique per protocol; pt tolerated well. Site CDI with no redness or swelling. Labs drawn per orders; pt tolerated well. Brisk blood return observed from midline before flushed and saline locked; wrapped in gauze and mesh dressing. Next appointment confirmed and education provided. Pt discharged to self care with all personal belongings and in NAD.

## 2021-01-11 ENCOUNTER — TELEPHONE (OUTPATIENT)
Dept: ONCOLOGY | Facility: MEDICAL CENTER | Age: 81
End: 2021-01-11

## 2021-01-12 ENCOUNTER — OFFICE VISIT (OUTPATIENT)
Dept: INFECTIOUS DISEASES | Facility: MEDICAL CENTER | Age: 81
End: 2021-01-12
Payer: MEDICARE

## 2021-01-12 ENCOUNTER — OUTPATIENT INFUSION SERVICES (OUTPATIENT)
Dept: ONCOLOGY | Facility: MEDICAL CENTER | Age: 81
End: 2021-01-12
Attending: INTERNAL MEDICINE
Payer: MEDICARE

## 2021-01-12 VITALS
OXYGEN SATURATION: 90 % | TEMPERATURE: 97.6 F | DIASTOLIC BLOOD PRESSURE: 80 MMHG | WEIGHT: 217 LBS | HEIGHT: 72 IN | SYSTOLIC BLOOD PRESSURE: 150 MMHG | HEART RATE: 107 BPM | BODY MASS INDEX: 29.39 KG/M2 | RESPIRATION RATE: 16 BRPM

## 2021-01-12 VITALS
BODY MASS INDEX: 29.5 KG/M2 | HEIGHT: 72 IN | RESPIRATION RATE: 20 BRPM | WEIGHT: 217.81 LBS | TEMPERATURE: 97.3 F | SYSTOLIC BLOOD PRESSURE: 151 MMHG | DIASTOLIC BLOOD PRESSURE: 78 MMHG | HEART RATE: 99 BPM

## 2021-01-12 DIAGNOSIS — M00.861 ARTHRITIS OF RIGHT KNEE DUE TO OTHER BACTERIA (HCC): ICD-10-CM

## 2021-01-12 DIAGNOSIS — N18.31 STAGE 3A CHRONIC KIDNEY DISEASE: ICD-10-CM

## 2021-01-12 DIAGNOSIS — M70.41 PREPATELLAR BURSITIS OF RIGHT KNEE: ICD-10-CM

## 2021-01-12 DIAGNOSIS — Z23 NEED FOR VACCINATION: ICD-10-CM

## 2021-01-12 DIAGNOSIS — M25.461 KNEE EFFUSION, RIGHT: ICD-10-CM

## 2021-01-12 DIAGNOSIS — A49.8 PSEUDOMONAS INFECTION: ICD-10-CM

## 2021-01-12 LAB
ALBUMIN SERPL BCP-MCNC: 3.8 G/DL (ref 3.2–4.9)
ALBUMIN/GLOB SERPL: 1.5 G/DL
ALP SERPL-CCNC: 75 U/L (ref 30–99)
ALT SERPL-CCNC: 21 U/L (ref 2–50)
ANION GAP SERPL CALC-SCNC: 12 MMOL/L (ref 7–16)
AST SERPL-CCNC: 26 U/L (ref 12–45)
BASOPHILS # BLD AUTO: 1.4 % (ref 0–1.8)
BASOPHILS # BLD: 0.07 K/UL (ref 0–0.12)
BILIRUB SERPL-MCNC: 0.4 MG/DL (ref 0.1–1.5)
BUN SERPL-MCNC: 22 MG/DL (ref 8–22)
CALCIUM SERPL-MCNC: 9.2 MG/DL (ref 8.5–10.5)
CHLORIDE SERPL-SCNC: 105 MMOL/L (ref 96–112)
CO2 SERPL-SCNC: 22 MMOL/L (ref 20–33)
CREAT SERPL-MCNC: 1.7 MG/DL (ref 0.5–1.4)
EOSINOPHIL # BLD AUTO: 0.44 K/UL (ref 0–0.51)
EOSINOPHIL NFR BLD: 9.1 % (ref 0–6.9)
ERYTHROCYTE [DISTWIDTH] IN BLOOD BY AUTOMATED COUNT: 61.9 FL (ref 35.9–50)
GLOBULIN SER CALC-MCNC: 2.6 G/DL (ref 1.9–3.5)
GLUCOSE SERPL-MCNC: 89 MG/DL (ref 65–99)
HCT VFR BLD AUTO: 32.9 % (ref 42–52)
HGB BLD-MCNC: 10.2 G/DL (ref 14–18)
IMM GRANULOCYTES # BLD AUTO: 0.01 K/UL (ref 0–0.11)
IMM GRANULOCYTES NFR BLD AUTO: 0.2 % (ref 0–0.9)
LYMPHOCYTES # BLD AUTO: 1.4 K/UL (ref 1–4.8)
LYMPHOCYTES NFR BLD: 28.9 % (ref 22–41)
MCH RBC QN AUTO: 32 PG (ref 27–33)
MCHC RBC AUTO-ENTMCNC: 31 G/DL (ref 33.7–35.3)
MCV RBC AUTO: 103.1 FL (ref 81.4–97.8)
MONOCYTES # BLD AUTO: 0.85 K/UL (ref 0–0.85)
MONOCYTES NFR BLD AUTO: 17.6 % (ref 0–13.4)
NEUTROPHILS # BLD AUTO: 2.07 K/UL (ref 1.82–7.42)
NEUTROPHILS NFR BLD: 42.8 % (ref 44–72)
NRBC # BLD AUTO: 0 K/UL
NRBC BLD-RTO: 0 /100 WBC
PLATELET # BLD AUTO: 172 K/UL (ref 164–446)
PMV BLD AUTO: 10.3 FL (ref 9–12.9)
POTASSIUM SERPL-SCNC: 4.4 MMOL/L (ref 3.6–5.5)
PROT SERPL-MCNC: 6.4 G/DL (ref 6–8.2)
RBC # BLD AUTO: 3.19 M/UL (ref 4.7–6.1)
SODIUM SERPL-SCNC: 139 MMOL/L (ref 135–145)
WBC # BLD AUTO: 4.8 K/UL (ref 4.8–10.8)

## 2021-01-12 PROCEDURE — 80053 COMPREHEN METABOLIC PANEL: CPT

## 2021-01-12 PROCEDURE — 99214 OFFICE O/P EST MOD 30 MIN: CPT | Performed by: NURSE PRACTITIONER

## 2021-01-12 PROCEDURE — 85025 COMPLETE CBC W/AUTO DIFF WBC: CPT

## 2021-01-12 PROCEDURE — 99211 OFF/OP EST MAY X REQ PHY/QHP: CPT

## 2021-01-12 PROCEDURE — 36592 COLLECT BLOOD FROM PICC: CPT

## 2021-01-12 RX ORDER — 0.9 % SODIUM CHLORIDE 0.9 %
5 VIAL (ML) INJECTION PRN
Status: CANCELLED | OUTPATIENT
Start: 2021-01-18

## 2021-01-12 RX ORDER — 0.9 % SODIUM CHLORIDE 0.9 %
10-20 VIAL (ML) INJECTION PRN
Status: CANCELLED | OUTPATIENT
Start: 2021-01-18

## 2021-01-12 RX ORDER — HEPARIN SODIUM (PORCINE) LOCK FLUSH IV SOLN 100 UNIT/ML 100 UNIT/ML
500 SOLUTION INTRAVENOUS PRN
Status: CANCELLED | OUTPATIENT
Start: 2021-01-18

## 2021-01-12 ASSESSMENT — FIBROSIS 4 INDEX
FIB4 SCORE: 2.21
FIB4 SCORE: 2.21

## 2021-01-12 NOTE — PROGRESS NOTES
Infectious Disease Clinic    Subjective:     Chief Complaint   Patient presents with   • Hospital Follow-up     Right knee septic arthritis     This is my first time meeting Mr. Torres.  Accompanied by his wife, Lola.    Interval History: 80 y.o. male with a PMH of COPD, CKD stage III, hypertension, and on 10/27/2020 underwent right prepatellar bursa resection and washout, right lower leg cyst resection due to recurrent prepatellar bursitis, lower leg cyst recurrence and incision dehiscence with cultures positive MSSA and unknown treatment.  Hospitalized from 12/16-12/23/2028, admitted after falling out of his bed and hitting the left side of his head.  Imaging negative for stroke or bleed.  Patient stated that for the past several days prior to admission, he had not been feeling well, with generalized malaise, nonproductive cough, shortness of breath, no fevers or chills.  Chest x-ray with no acute abnormalities.    On presentation, febrile to 101.2, with leukocytosis of 13.4.  Procalcitonin was elevated to 0.20.  Patient also noting right knee pain on admission, fluid was aspirated on 12/16/20 and 12/17/20- both cxs +Pseudomonas.  On 12/19/2020, patient underwent right knee I&D of deep abscess in the anterior aspect of the knee along with right knee arthrotomy with anterior synovectomy with Dr. Chowdhury, gross purulence noted in the knee and prepatellar bursa per operative note.  OR culture once again +PSAR.  Patient discharged on IV cefepime 2 g every 12 hours x4 weeks, estimated end date 1/15/2021.     Hospital records reviewed    Today, 1/12/2021: Patient reports feeling well and has been tolerating the IV cefepime without adverse effect.  Denies feeling generally ill, fevers/chills, general malaise, headache, n/v/d, abdominal pain, chest pain, cough, sore throat or rash.  Has had some shortness of breath; however, this is a baseline issue and it is no different on the antibiotics.  Was seen by   Luciana last week, he was happy with the progress and has another follow-up in 6 weeks.  States that the right knee and surgical site to the calf are both doing well, slowly healing without any active drainage.  He has occasional intermittent right knee pain that averages 1/10, described as an achy sensation alleviated with rest and Tylenol.    ROS as above in HPI.    Past Medical History:   Diagnosis Date   • Abnormal thyroid stimulating hormone (TSH) level    • Allergic rhinitis    • Asbestosis (HCC)    • Asthma    • Bronchitis    • Chronic diarrhea     declines eval; takes immodium once a day usually and sometimes less; see previous notes; aware of risk    • Chronic low back pain    • Chronic lung disease     asbestos related   • CKD (chronic kidney disease), stage III     sees neph, one kidney   • COPD (chronic obstructive pulmonary disease) ([facility])    • Coronary artery calcification seen on CAT scan 8/2/2016    sees cards   • Epididymitis 2009    h/o listed in chart   • GERD (gastroesophageal reflux disease)    • History of hemorrhoids    • History of skin cancer     non melanoma   • [place] (hard of hearing)     declines hearing aids   • Hypercholesteremia    • Hypertension    • Hypertriglyceridemia    • Indigestion    • Light headedness     2/2 orthostasis? now better off hctz   • Lightheadedness 6/23/2016   • Low back pain    • Nasal drainage    • Olecranon bursitis    • Orthostasis 6/23/2016    better off HCTZ   • Peripheral neuropathy    • Pleural plaque 2005     CT [place]   • Post-nasal drip    • Pulmonary emphysema ([facility])    • RA (rheumatoid arthritis) ([facility])     on meds, sees rheum   • Restless leg syndrome    • Rheumatoid arthritis ([facility])    • Sleep apnea     obstructive and central - not adherent to autopap   • Solitary kidney     history of kidney cyst 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-functioning kidney s/p nephrectomy        Family History   Problem Relation Age of Onset   • Genetic Disorder Father         ALS   • No Known  "Problems Sister    • No Known Problems Son    • No Known Problems Daughter    • Heart Attack Neg Hx    • Heart Disease Neg Hx    • Heart Failure Neg Hx        Social History     Tobacco Use   • Smoking status: Former Smoker     Packs/day: 1.00     Years: 40.00     Pack years: 40.00     Types: Cigarettes     Quit date: 1980     Years since quittin.0   • Smokeless tobacco: Never Used   • Tobacco comment: 1 pk a day for 40 yrs   Substance Use Topics   • Alcohol use: No     Alcohol/week: 0.0 oz   • Drug use: No       Allergies: Ciprofloxacin, Levaquin, Penicillins, Ciprofloxacin hcl, and Levofloxacin    Pt's medication and problem list reviewed.     Objective:     /80 (BP Location: Right arm, Patient Position: Sitting, BP Cuff Size: Adult)   Pulse (!) 107   Temp 36.4 °C (97.6 °F) (Temporal)   Resp 16   Ht 1.83 m (6' 0.04\") Comment: patient reported  Wt 98.4 kg (217 lb)   SpO2 90%   BMI 29.39 kg/m²     Physical Exam   Constitutional: He is oriented to person, place, and time and well-developed, well-nourished, and in no distress. No distress.   Elderly   HENT:   Head: Normocephalic and atraumatic.   Right Ear: External ear normal.   Left Ear: External ear normal.   Eyes: Pupils are equal, round, and reactive to light. Conjunctivae and EOM are normal. No scleral icterus.   Neck: Normal range of motion. Neck supple. No JVD present. No tracheal deviation present.   Cardiovascular: Regular rhythm and normal heart sounds. Tachycardia present.   No murmur heard.  Unable to palpate RLE distal pulse due to edema   Pulmonary/Chest: Effort normal and breath sounds normal. No respiratory distress. He has no wheezes. He has no rales.   Abdominal: Soft. Bowel sounds are normal. He exhibits no distension. There is no abdominal tenderness. There is no rebound and no guarding.   Musculoskeletal:         General: Edema (+1-2 BLE edema) present. No tenderness.      Comments: LUE midline- CDI, non tender, no " erythema.    Right knee, surgical site-well approximated, diffuse scabbing along incision without any active drainage, nontender to palpation, trace erythema along incision, no induration, mild fluctuance along mid incision on both sides (stable per patient), warm not hot to touch, nontender.    Right calf lateral incision-well approximated with diffuse scabbing, no active drainage, trace erythema along incision, no induration or fluctuance, nontender to palpation.   Neurological: He is alert and oriented to person, place, and time. No cranial nerve deficit.   FWW for ambulatory assistance   Skin: Skin is warm and dry. No rash noted. He is not diaphoretic. There is erythema.   Psychiatric: Mood, memory, affect and judgment normal.   Pleasant   Vitals reviewed.      Labs:  WBC   Date/Time Value Ref Range Status   01/05/2021 10:35 AM 5.6 4.8 - 10.8 K/uL Final     RBC   Date/Time Value Ref Range Status   01/05/2021 10:35 AM 3.33 (L) 4.70 - 6.10 M/uL Final     Hemoglobin   Date/Time Value Ref Range Status   01/05/2021 10:35 AM 10.8 (L) 14.0 - 18.0 g/dL Final     Hematocrit   Date/Time Value Ref Range Status   01/05/2021 10:35 AM 34.6 (L) 42.0 - 52.0 % Final     MCV   Date/Time Value Ref Range Status   01/05/2021 10:35 .9 (H) 81.4 - 97.8 fL Final     MCH   Date/Time Value Ref Range Status   01/05/2021 10:35 AM 32.4 27.0 - 33.0 pg Final     MCHC   Date/Time Value Ref Range Status   01/05/2021 10:35 AM 31.2 (L) 33.7 - 35.3 g/dL Final     MPV   Date/Time Value Ref Range Status   01/05/2021 10:35 AM 9.9 9.0 - 12.9 fL Final        Sodium   Date/Time Value Ref Range Status   01/05/2021 10:35  135 - 145 mmol/L Final     Potassium   Date/Time Value Ref Range Status   01/05/2021 10:35 AM 4.4 3.6 - 5.5 mmol/L Final     Chloride   Date/Time Value Ref Range Status   01/05/2021 10:35  96 - 112 mmol/L Final     Co2   Date/Time Value Ref Range Status   01/05/2021 10:35 AM 24 20 - 33 mmol/L Final     Glucose    Date/Time Value Ref Range Status   01/05/2021 10:35  (H) 65 - 99 mg/dL Final     Bun   Date/Time Value Ref Range Status   01/05/2021 10:35 AM 18 8 - 22 mg/dL Final     Creatinine   Date/Time Value Ref Range Status   01/05/2021 10:35 AM 1.71 (H) 0.50 - 1.40 mg/dL Final   04/11/2005 10:13 AM 1.5 (H) 0.5 - 1.4 mg/dL Final       Alkaline Phosphatase   Date/Time Value Ref Range Status   01/05/2021 10:35 AM 79 30 - 99 U/L Final     AST(SGOT)   Date/Time Value Ref Range Status   01/05/2021 10:35 AM 32 12 - 45 U/L Final     ALT(SGPT)   Date/Time Value Ref Range Status   01/05/2021 10:35 AM 31 2 - 50 U/L Final     Total Bilirubin   Date/Time Value Ref Range Status   01/05/2021 10:35 AM 0.4 0.1 - 1.5 mg/dL Final      ESR 25  CRP 0.28    Assessment and Plan:   The following treatment plan was discussed with patient at length:    1. Arthritis of right knee due to other bacteria (HCC)      -Continue with IV cefepime 2 g twice daily through 1/15/2021.  DC midline after last infusion dose.  -No need for p.o. antibiotics to follow as there are no signs of residual infection, inflammatory markers are WNL and will completed a 4-week course of IV antibiotics.  -Monitor for s/sx of worsening off abx: increased redness, pain, swelling, drainage, breakdown of surgical site, fevers, chills, general malaise, etc.  Notify ID or go to ER should these s/sx occur.  -Follow-up with Dr. Chowdhury as directed.   2. Knee effusion, right      Antibiotics as above.  Mild effusion present, surgeon aware.   3. Prepatellar bursitis of right knee      As above   4. Pseudomonas infection      Antibiotics as above   5. Stage 3a chronic kidney disease      CrCl~ 41, no need for renal adjustment to cefepime at this time.  Continue to monitor as directed.     Follow up: PRN, RTC sooner if needed. FU with PCP for ongoing chronic medical conditions.     Karlie N Shearer, A.P.R.N.       Please note that this dictation was created using voice  recognition software. I have  worked with technical experts from [facility] to optimize the interface.  I have made every reasonable attempt to correct obvious errors, but there may be errors of grammar and possibly content that I did not discover before finalizing the note.

## 2021-01-13 NOTE — HOSPITAL COURSE
Barry Torres is an 80 y.o. male with hx of COPD on 2L home O2 nightly, CKD3, HTN, who was admitted on 12/16/2020 after having a ground-level fall with a head injury. Patient fell out of bed and hit the left side of his head.  Evaluation in the emergency department showed no evidence of stroke or bleed on CT of the head. Patient states that last several days he has been not been feeling well, he complains of general malaise and a nonproductive cough, shortness of breath but no wheezing. Routine labs showed evidence of leukocytosis. Chest x-ray did not show a clear infiltrate but he did have an slightly elevated procalcitonin level.  The patient failed a bedside swallow which increased concern for possible aspiration pneumonitis. He was initially started on ceftriaxone and azithromycin.     Of note, on 10/27/2020, patient underwent right prepatellar bursa resection and washout, right lower leg cyst resection due to recurrent prepatellar bursitis, lower leg cyst recurrence and incision dehiscence.  Cultures at that time grew MSSA.  Reportedly, he received 2 weeks of Keflex for an infection.  He noted pain in his right knee on this admission, thus fluid is aspirated and it grew only 1700 white cells, neutrophil predominant.  However, the culture returned positive for Pseudomonas.  Aspiration was repeated on 12/17 and this time it showed 15,000 white cells, 90% neutrophils.  Culture again grew Pseudomonas.  He underwent R knee I&D 12/19.  Blood culture remained negative.  Infectious disease specialist recommended 4 weeks of IV cefepime (given fluoroquinolone allergy), which was coordinated with home health care.

## 2021-01-13 NOTE — PROGRESS NOTES
Pt ambulatory to [facility] for RONNA PICC dressing change and lab draw.  Pt voices no complaints.  PICC intact to RONNA, flushes easily with brisk blood return.  Labs collected.  PICC dressing changed per protocol in sterile fashion, wrapped in gauze and protective sleeve in place.  Pt tolerated well.  Pt discharged to self care in [facility].  Appointment made for PICC line D/C on Friday 01/15/2020.  Order to d/c in paper chart

## 2021-01-14 ENCOUNTER — TELEPHONE (OUTPATIENT)
Dept: ONCOLOGY | Facility: MEDICAL CENTER | Age: 81
End: 2021-01-14

## 2021-01-14 ENCOUNTER — APPOINTMENT (RX ONLY)
Dept: URBAN - METROPOLITAN AREA CLINIC 22 | Facility: CLINIC | Age: 81
Setting detail: DERMATOLOGY
End: 2021-01-14

## 2021-01-14 DIAGNOSIS — L57.0 ACTINIC KERATOSIS: ICD-10-CM

## 2021-01-14 DIAGNOSIS — L82.1 OTHER SEBORRHEIC KERATOSIS: ICD-10-CM

## 2021-01-14 DIAGNOSIS — L29.8 OTHER PRURITUS: ICD-10-CM

## 2021-01-14 DIAGNOSIS — Z85.828 PERSONAL HISTORY OF OTHER MALIGNANT NEOPLASM OF SKIN: ICD-10-CM

## 2021-01-14 DIAGNOSIS — D22 MELANOCYTIC NEVI: ICD-10-CM

## 2021-01-14 DIAGNOSIS — L81.4 OTHER MELANIN HYPERPIGMENTATION: ICD-10-CM

## 2021-01-14 DIAGNOSIS — L85.3 XEROSIS CUTIS: ICD-10-CM

## 2021-01-14 DIAGNOSIS — L29.89 OTHER PRURITUS: ICD-10-CM

## 2021-01-14 PROBLEM — D22.5 MELANOCYTIC NEVI OF TRUNK: Status: ACTIVE | Noted: 2021-01-14

## 2021-01-14 PROCEDURE — 17000 DESTRUCT PREMALG LESION: CPT

## 2021-01-14 PROCEDURE — 17003 DESTRUCT PREMALG LES 2-14: CPT

## 2021-01-14 PROCEDURE — ? LIQUID NITROGEN

## 2021-01-14 PROCEDURE — ? TREATMENT REGIMEN

## 2021-01-14 PROCEDURE — ? SUNSCREEN TREATMENT REGIMEN

## 2021-01-14 PROCEDURE — 99213 OFFICE O/P EST LOW 20 MIN: CPT | Mod: 25

## 2021-01-14 PROCEDURE — ? PRESCRIPTION

## 2021-01-14 PROCEDURE — ? COUNSELING

## 2021-01-14 RX ORDER — TRIAMCINOLONE ACETONIDE 1 MG/G
CREAM TOPICAL BID
Qty: 1 | Refills: 1 | Status: ERX | COMMUNITY
Start: 2021-01-14

## 2021-01-14 RX ADMIN — TRIAMCINOLONE ACETONIDE: 1 CREAM TOPICAL at 00:00

## 2021-01-14 ASSESSMENT — LOCATION ZONE DERM
LOCATION ZONE: ARM
LOCATION ZONE: NECK
LOCATION ZONE: SCALP
LOCATION ZONE: TRUNK
LOCATION ZONE: HAND
LOCATION ZONE: FACE

## 2021-01-14 ASSESSMENT — LOCATION SIMPLE DESCRIPTION DERM
LOCATION SIMPLE: LEFT UPPER BACK
LOCATION SIMPLE: RIGHT HAND
LOCATION SIMPLE: LEFT FOREARM
LOCATION SIMPLE: LEFT CHEEK
LOCATION SIMPLE: NECK
LOCATION SIMPLE: RIGHT FOREARM
LOCATION SIMPLE: RIGHT UPPER BACK
LOCATION SIMPLE: LEFT FOREHEAD
LOCATION SIMPLE: CHEST
LOCATION SIMPLE: LEFT WRIST
LOCATION SIMPLE: UPPER BACK
LOCATION SIMPLE: LEFT SCALP

## 2021-01-14 ASSESSMENT — LOCATION DETAILED DESCRIPTION DERM
LOCATION DETAILED: LEFT DORSAL WRIST
LOCATION DETAILED: LEFT LATERAL FRONTAL SCALP
LOCATION DETAILED: RIGHT VENTRAL PROXIMAL FOREARM
LOCATION DETAILED: LEFT SUPERIOR CENTRAL MALAR CHEEK
LOCATION DETAILED: LEFT DISTAL DORSAL FOREARM
LOCATION DETAILED: RIGHT MEDIAL UPPER BACK
LOCATION DETAILED: LEFT MID-UPPER BACK
LOCATION DETAILED: LEFT INFERIOR MEDIAL FOREHEAD
LOCATION DETAILED: RIGHT ULNAR DORSAL HAND
LOCATION DETAILED: SUPERIOR THORACIC SPINE
LOCATION DETAILED: LEFT SUPERIOR LATERAL NECK
LOCATION DETAILED: STERNAL NOTCH
LOCATION DETAILED: LEFT VENTRAL PROXIMAL FOREARM

## 2021-01-14 NOTE — TELEPHONE ENCOUNTER
"Called  regarding COVID-19 screening questions and updated check-in process at this time. Barry states understanding of updated process and is aware to call provided number. Patient answered \"no\" to screening questions. Barry is ok to proceed with treatment as scheduled for 1/15/2021 at 1430.  "

## 2021-01-15 ENCOUNTER — OUTPATIENT INFUSION SERVICES (OUTPATIENT)
Dept: ONCOLOGY | Facility: MEDICAL CENTER | Age: 81
End: 2021-01-15
Attending: INTERNAL MEDICINE
Payer: MEDICARE

## 2021-01-15 VITALS
SYSTOLIC BLOOD PRESSURE: 165 MMHG | OXYGEN SATURATION: 94 % | TEMPERATURE: 97.8 F | HEART RATE: 106 BPM | DIASTOLIC BLOOD PRESSURE: 75 MMHG | RESPIRATION RATE: 19 BRPM

## 2021-01-15 DIAGNOSIS — M00.861 ARTHRITIS OF RIGHT KNEE DUE TO OTHER BACTERIA (HCC): ICD-10-CM

## 2021-01-15 PROCEDURE — 306780 HCHG STAT FOR TRANSFUSION PER CASE

## 2021-01-15 PROCEDURE — 99211 OFF/OP EST MAY X REQ PHY/QHP: CPT

## 2021-01-15 RX ORDER — 0.9 % SODIUM CHLORIDE 0.9 %
10-20 VIAL (ML) INJECTION PRN
Status: CANCELLED | OUTPATIENT
Start: 2021-01-18

## 2021-01-15 RX ORDER — HEPARIN SODIUM (PORCINE) LOCK FLUSH IV SOLN 100 UNIT/ML 100 UNIT/ML
500 SOLUTION INTRAVENOUS PRN
Status: CANCELLED | OUTPATIENT
Start: 2021-01-18

## 2021-01-15 RX ORDER — 0.9 % SODIUM CHLORIDE 0.9 %
5 VIAL (ML) INJECTION PRN
Status: CANCELLED | OUTPATIENT
Start: 2021-01-18

## 2021-01-15 NOTE — PROGRESS NOTES
Pt arrived ambulatory with walker to IS for PICC removal. POC discussed and pt verbalized understanding.     PICC removed per policy, tip confirmed against placement of midline on 12/21/20 per Chika Andrews RN's note to be at 17cm and pt tolerated well. Sterile gauze, tegaderm and pressure dressing applied; pt monitored for 30 minutes. No signs or symptoms of bleeding or complications noted at this time. Pt instructed to monitor site at home and remove coban dressing tonight prior to bedtime, and remove tegaderm at 24 hours. Pt educated on s/s of infection and given education sheet at this time. Pt verbalized understanding.     Follow-up care discussed and pt states he will be following up with ordering provider for continued POC. Pt discharged home to self care with walker in no apparent distress at this time.

## 2021-01-19 ENCOUNTER — APPOINTMENT (OUTPATIENT)
Dept: ONCOLOGY | Facility: MEDICAL CENTER | Age: 81
End: 2021-01-19
Attending: INTERNAL MEDICINE
Payer: MEDICARE

## 2021-01-21 NOTE — DOCUMENTATION QUERY
"                                                                         [facility]                                                                       Query Response Note      PATIENT:               FIDELIA MERA  ACCT #:                  [id number]  MRN:                     [id number]  :                      1940  ADMIT DATE:       2020 1:15 PM  DISCH DATE:        2020 2:21 PM  RESPONDING  PROVIDER #:        [id number]           QUERY TEXT:     This patient has 2 infections that could have caused sepsis. Please clarify the source of sepsis if known.    NOTE:  If an appropriate response is not listed below, please respond with a new note.    [email or web address]    The patient's Clinical Indicators include:  *Sepsis documented with source noted as pneumonia vs septic knee.    * Patient has septic arthritis per D/S    * Patient diagnosed with Pneumonia, but D/S indicates \" chest x ray did not show a clear infiltrate but he did have a slightly elevated procalcitonin level. \"  Failed bedside swallow, concern for possible aspiration.    * Fluid from knee culture grew Pseudomonas     (Ceftriaxone, Azithromycin, Cefepime)  Options provided:   -- Sepsis is due to or associated with infected knee joint   -- Sepsis is due to or associated with pneumonia   -- Sepsis due to both of the above   -- Unable to determine      Query created by: Marlene Tripp on 2021 2:45 PM    RESPONSE TEXT:    Sepsis is due to or associated with infected knee joint          Electronically signed by:  GEENA DENNIS MD 2021 6:17 PM              "

## 2021-01-22 LAB
FUNGUS SPEC CULT: NORMAL
FUNGUS SPEC CULT: NORMAL
SIGNIFICANT IND 70042: NORMAL
SIGNIFICANT IND 70042: NORMAL
SITE SITE: NORMAL
SITE SITE: NORMAL
SOURCE SOURCE: NORMAL
SOURCE SOURCE: NORMAL

## 2021-02-01 ENCOUNTER — HOSPITAL ENCOUNTER (INPATIENT)
Facility: MEDICAL CENTER | Age: 81
LOS: 6 days | DRG: 856 | End: 2021-02-09
Attending: EMERGENCY MEDICINE | Admitting: STUDENT IN AN ORGANIZED HEALTH CARE EDUCATION/TRAINING PROGRAM
Payer: MEDICARE

## 2021-02-01 ENCOUNTER — APPOINTMENT (OUTPATIENT)
Dept: RADIOLOGY | Facility: MEDICAL CENTER | Age: 81
DRG: 856 | End: 2021-02-01
Attending: EMERGENCY MEDICINE
Payer: MEDICARE

## 2021-02-01 DIAGNOSIS — L08.9 INFECTED WOUND: ICD-10-CM

## 2021-02-01 DIAGNOSIS — T14.8XXA INFECTED WOUND: ICD-10-CM

## 2021-02-01 DIAGNOSIS — L03.115 CELLULITIS OF RIGHT LEG WITHOUT FOOT: ICD-10-CM

## 2021-02-01 DIAGNOSIS — L03.115 CELLULITIS OF RIGHT LOWER EXTREMITY: Primary | ICD-10-CM

## 2021-02-01 DIAGNOSIS — M06.9 RHEUMATOID ARTHRITIS INVOLVING MULTIPLE SITES, UNSPECIFIED WHETHER RHEUMATOID FACTOR PRESENT (HCC): ICD-10-CM

## 2021-02-01 DIAGNOSIS — A41.9 SEPSIS WITHOUT ACUTE ORGAN DYSFUNCTION, DUE TO UNSPECIFIED ORGANISM (HCC): ICD-10-CM

## 2021-02-01 DIAGNOSIS — I82.621 ACUTE DEEP VEIN THROMBOSIS (DVT) OF BRACHIAL VEIN OF RIGHT UPPER EXTREMITY (HCC): ICD-10-CM

## 2021-02-01 DIAGNOSIS — Z74.09 LIMITED MOBILITY: ICD-10-CM

## 2021-02-01 PROBLEM — L03.119 CELLULITIS OF EXTREMITY: Status: ACTIVE | Noted: 2021-02-01

## 2021-02-01 LAB
BASOPHILS # BLD AUTO: 0.4 % (ref 0–1.8)
BASOPHILS # BLD: 0.05 K/UL (ref 0–0.12)
EOSINOPHIL # BLD AUTO: 0.37 K/UL (ref 0–0.51)
EOSINOPHIL NFR BLD: 3 % (ref 0–6.9)
ERYTHROCYTE [DISTWIDTH] IN BLOOD BY AUTOMATED COUNT: 55.8 FL (ref 35.9–50)
HCT VFR BLD AUTO: 35.5 % (ref 42–52)
HGB BLD-MCNC: 11.3 G/DL (ref 14–18)
IMM GRANULOCYTES # BLD AUTO: 0.09 K/UL (ref 0–0.11)
IMM GRANULOCYTES NFR BLD AUTO: 0.7 % (ref 0–0.9)
LACTATE BLD-SCNC: 1.6 MMOL/L (ref 0.5–2)
LYMPHOCYTES # BLD AUTO: 0.8 K/UL (ref 1–4.8)
LYMPHOCYTES NFR BLD: 6.5 % (ref 22–41)
MCH RBC QN AUTO: 31.7 PG (ref 27–33)
MCHC RBC AUTO-ENTMCNC: 31.8 G/DL (ref 33.7–35.3)
MCV RBC AUTO: 99.7 FL (ref 81.4–97.8)
MONOCYTES # BLD AUTO: 1.61 K/UL (ref 0–0.85)
MONOCYTES NFR BLD AUTO: 13.2 % (ref 0–13.4)
NEUTROPHILS # BLD AUTO: 9.3 K/UL (ref 1.82–7.42)
NEUTROPHILS NFR BLD: 76.2 % (ref 44–72)
NRBC # BLD AUTO: 0 K/UL
NRBC BLD-RTO: 0 /100 WBC
PLATELET # BLD AUTO: 197 K/UL (ref 164–446)
PMV BLD AUTO: 10.3 FL (ref 9–12.9)
RBC # BLD AUTO: 3.56 M/UL (ref 4.7–6.1)
SARS-COV+SARS-COV-2 AG RESP QL IA.RAPID: NOTDETECTED
SPECIMEN SOURCE: NORMAL
WBC # BLD AUTO: 12.2 K/UL (ref 4.8–10.8)

## 2021-02-01 PROCEDURE — 700105 HCHG RX REV CODE 258: Performed by: EMERGENCY MEDICINE

## 2021-02-01 PROCEDURE — 96375 TX/PRO/DX INJ NEW DRUG ADDON: CPT

## 2021-02-01 PROCEDURE — 82533 TOTAL CORTISOL: CPT

## 2021-02-01 PROCEDURE — 700111 HCHG RX REV CODE 636 W/ 250 OVERRIDE (IP): Performed by: STUDENT IN AN ORGANIZED HEALTH CARE EDUCATION/TRAINING PROGRAM

## 2021-02-01 PROCEDURE — 83605 ASSAY OF LACTIC ACID: CPT

## 2021-02-01 PROCEDURE — 87205 SMEAR GRAM STAIN: CPT

## 2021-02-01 PROCEDURE — 99220 PR INITIAL OBSERVATION CARE,LEVL III: CPT | Performed by: STUDENT IN AN ORGANIZED HEALTH CARE EDUCATION/TRAINING PROGRAM

## 2021-02-01 PROCEDURE — C9803 HOPD COVID-19 SPEC COLLECT: HCPCS | Performed by: EMERGENCY MEDICINE

## 2021-02-01 PROCEDURE — 96365 THER/PROPH/DIAG IV INF INIT: CPT

## 2021-02-01 PROCEDURE — 80053 COMPREHEN METABOLIC PANEL: CPT

## 2021-02-01 PROCEDURE — 96368 THER/DIAG CONCURRENT INF: CPT

## 2021-02-01 PROCEDURE — 87186 SC STD MICRODIL/AGAR DIL: CPT

## 2021-02-01 PROCEDURE — 96366 THER/PROPH/DIAG IV INF ADDON: CPT

## 2021-02-01 PROCEDURE — 87147 CULTURE TYPE IMMUNOLOGIC: CPT

## 2021-02-01 PROCEDURE — G0378 HOSPITAL OBSERVATION PER HR: HCPCS

## 2021-02-01 PROCEDURE — 700102 HCHG RX REV CODE 250 W/ 637 OVERRIDE(OP): Performed by: EMERGENCY MEDICINE

## 2021-02-01 PROCEDURE — 700105 HCHG RX REV CODE 258: Performed by: STUDENT IN AN ORGANIZED HEALTH CARE EDUCATION/TRAINING PROGRAM

## 2021-02-01 PROCEDURE — 700111 HCHG RX REV CODE 636 W/ 250 OVERRIDE (IP): Performed by: EMERGENCY MEDICINE

## 2021-02-01 PROCEDURE — 71045 X-RAY EXAM CHEST 1 VIEW: CPT

## 2021-02-01 PROCEDURE — 87040 BLOOD CULTURE FOR BACTERIA: CPT | Mod: 91

## 2021-02-01 PROCEDURE — A9270 NON-COVERED ITEM OR SERVICE: HCPCS | Performed by: EMERGENCY MEDICINE

## 2021-02-01 PROCEDURE — 85025 COMPLETE CBC W/AUTO DIFF WBC: CPT

## 2021-02-01 PROCEDURE — 87426 SARSCOV CORONAVIRUS AG IA: CPT

## 2021-02-01 PROCEDURE — 99285 EMERGENCY DEPT VISIT HI MDM: CPT

## 2021-02-01 PROCEDURE — 87070 CULTURE OTHR SPECIMN AEROBIC: CPT

## 2021-02-01 RX ORDER — ONDANSETRON 4 MG/1
4 TABLET, ORALLY DISINTEGRATING ORAL EVERY 4 HOURS PRN
Status: DISCONTINUED | OUTPATIENT
Start: 2021-02-01 | End: 2021-02-09 | Stop reason: HOSPADM

## 2021-02-01 RX ORDER — OXYCODONE HYDROCHLORIDE 5 MG/1
5 TABLET ORAL
Status: DISCONTINUED | OUTPATIENT
Start: 2021-02-01 | End: 2021-02-09 | Stop reason: HOSPADM

## 2021-02-01 RX ORDER — AMOXICILLIN 250 MG
2 CAPSULE ORAL 2 TIMES DAILY
Status: DISCONTINUED | OUTPATIENT
Start: 2021-02-02 | End: 2021-02-09 | Stop reason: HOSPADM

## 2021-02-01 RX ORDER — MORPHINE SULFATE 4 MG/ML
2 INJECTION, SOLUTION INTRAMUSCULAR; INTRAVENOUS
Status: DISCONTINUED | OUTPATIENT
Start: 2021-02-01 | End: 2021-02-09 | Stop reason: HOSPADM

## 2021-02-01 RX ORDER — ONDANSETRON 2 MG/ML
4 INJECTION INTRAMUSCULAR; INTRAVENOUS EVERY 4 HOURS PRN
Status: DISCONTINUED | OUTPATIENT
Start: 2021-02-01 | End: 2021-02-09 | Stop reason: HOSPADM

## 2021-02-01 RX ORDER — LABETALOL HYDROCHLORIDE 5 MG/ML
10 INJECTION, SOLUTION INTRAVENOUS EVERY 4 HOURS PRN
Status: DISCONTINUED | OUTPATIENT
Start: 2021-02-01 | End: 2021-02-09 | Stop reason: HOSPADM

## 2021-02-01 RX ORDER — OXYCODONE HYDROCHLORIDE 5 MG/1
2.5 TABLET ORAL
Status: DISCONTINUED | OUTPATIENT
Start: 2021-02-01 | End: 2021-02-09 | Stop reason: HOSPADM

## 2021-02-01 RX ORDER — MORPHINE SULFATE 4 MG/ML
4 INJECTION, SOLUTION INTRAMUSCULAR; INTRAVENOUS ONCE
Status: COMPLETED | OUTPATIENT
Start: 2021-02-01 | End: 2021-02-01

## 2021-02-01 RX ORDER — POLYETHYLENE GLYCOL 3350 17 G/17G
1 POWDER, FOR SOLUTION ORAL
Status: DISCONTINUED | OUTPATIENT
Start: 2021-02-01 | End: 2021-02-09 | Stop reason: HOSPADM

## 2021-02-01 RX ORDER — ONDANSETRON 2 MG/ML
4 INJECTION INTRAMUSCULAR; INTRAVENOUS ONCE
Status: COMPLETED | OUTPATIENT
Start: 2021-02-01 | End: 2021-02-01

## 2021-02-01 RX ORDER — BISACODYL 10 MG
10 SUPPOSITORY, RECTAL RECTAL
Status: DISCONTINUED | OUTPATIENT
Start: 2021-02-01 | End: 2021-02-09 | Stop reason: HOSPADM

## 2021-02-01 RX ORDER — ACETAMINOPHEN 325 MG/1
650 TABLET ORAL EVERY 6 HOURS PRN
Status: DISCONTINUED | OUTPATIENT
Start: 2021-02-01 | End: 2021-02-09 | Stop reason: HOSPADM

## 2021-02-01 RX ORDER — SODIUM CHLORIDE 9 MG/ML
INJECTION, SOLUTION INTRAVENOUS CONTINUOUS
Status: DISCONTINUED | OUTPATIENT
Start: 2021-02-02 | End: 2021-02-01

## 2021-02-01 RX ORDER — SODIUM CHLORIDE, SODIUM LACTATE, POTASSIUM CHLORIDE, AND CALCIUM CHLORIDE .6; .31; .03; .02 G/100ML; G/100ML; G/100ML; G/100ML
30 INJECTION, SOLUTION INTRAVENOUS
Status: DISCONTINUED | OUTPATIENT
Start: 2021-02-01 | End: 2021-02-09 | Stop reason: HOSPADM

## 2021-02-01 RX ORDER — ACETAMINOPHEN 500 MG
1000 TABLET ORAL ONCE
Status: COMPLETED | OUTPATIENT
Start: 2021-02-01 | End: 2021-02-01

## 2021-02-01 RX ORDER — SODIUM CHLORIDE, SODIUM LACTATE, POTASSIUM CHLORIDE, CALCIUM CHLORIDE 600; 310; 30; 20 MG/100ML; MG/100ML; MG/100ML; MG/100ML
1000 INJECTION, SOLUTION INTRAVENOUS CONTINUOUS
Status: DISCONTINUED | OUTPATIENT
Start: 2021-02-01 | End: 2021-02-02

## 2021-02-01 RX ADMIN — SODIUM CHLORIDE, POTASSIUM CHLORIDE, SODIUM LACTATE AND CALCIUM CHLORIDE 1000 ML: 600; 310; 30; 20 INJECTION, SOLUTION INTRAVENOUS at 22:44

## 2021-02-01 RX ADMIN — ONDANSETRON 4 MG: 2 INJECTION INTRAMUSCULAR; INTRAVENOUS at 22:11

## 2021-02-01 RX ADMIN — CEFEPIME 2 G: 2 INJECTION, POWDER, FOR SOLUTION INTRAVENOUS at 23:52

## 2021-02-01 RX ADMIN — MORPHINE SULFATE 4 MG: 4 INJECTION INTRAVENOUS at 22:10

## 2021-02-01 RX ADMIN — VANCOMYCIN HYDROCHLORIDE 2250 MG: 500 INJECTION, POWDER, LYOPHILIZED, FOR SOLUTION INTRAVENOUS at 22:11

## 2021-02-01 RX ADMIN — ACETAMINOPHEN 1000 MG: 500 TABLET ORAL at 22:10

## 2021-02-01 ASSESSMENT — LIFESTYLE VARIABLES
TOTAL SCORE: 0
EVER FELT BAD OR GUILTY ABOUT YOUR DRINKING: NO
HAVE PEOPLE ANNOYED YOU BY CRITICIZING YOUR DRINKING: NO
DO YOU DRINK ALCOHOL: NO
CONSUMPTION TOTAL: NEGATIVE
ON A TYPICAL DAY WHEN YOU DRINK ALCOHOL HOW MANY DRINKS DO YOU HAVE: 0
AVERAGE NUMBER OF DAYS PER WEEK YOU HAVE A DRINK CONTAINING ALCOHOL: 0
HAVE YOU EVER FELT YOU SHOULD CUT DOWN ON YOUR DRINKING: NO
TOTAL SCORE: 0
SUBSTANCE_ABUSE: 0
DOES PATIENT WANT TO STOP DRINKING: NO
TOTAL SCORE: 0
EVER HAD A DRINK FIRST THING IN THE MORNING TO STEADY YOUR NERVES TO GET RID OF A HANGOVER: NO
HOW MANY TIMES IN THE PAST YEAR HAVE YOU HAD 5 OR MORE DRINKS IN A DAY: 0

## 2021-02-01 ASSESSMENT — ENCOUNTER SYMPTOMS
FEVER: 0
NAUSEA: 0
SHORTNESS OF BREATH: 0
BACK PAIN: 0
FOCAL WEAKNESS: 0
HEADACHES: 0
VOMITING: 0
DIZZINESS: 0
FALLS: 0
INSOMNIA: 0
BLURRED VISION: 0
SENSORY CHANGE: 0
PALPITATIONS: 0
COUGH: 0
DIAPHORESIS: 0
ABDOMINAL PAIN: 0
CHILLS: 1
EYE PAIN: 0

## 2021-02-01 ASSESSMENT — FIBROSIS 4 INDEX
FIB4 SCORE: 2.67
FIB4 SCORE: 2.67

## 2021-02-02 ENCOUNTER — ANESTHESIA EVENT (OUTPATIENT)
Dept: SURGERY | Facility: MEDICAL CENTER | Age: 81
DRG: 856 | End: 2021-02-02
Payer: MEDICARE

## 2021-02-02 ENCOUNTER — ANESTHESIA (OUTPATIENT)
Dept: SURGERY | Facility: MEDICAL CENTER | Age: 81
DRG: 856 | End: 2021-02-02
Payer: MEDICARE

## 2021-02-02 LAB
ALBUMIN SERPL BCP-MCNC: 3.2 G/DL (ref 3.2–4.9)
ALBUMIN SERPL BCP-MCNC: 3.6 G/DL (ref 3.2–4.9)
ALBUMIN/GLOB SERPL: 1.1 G/DL
ALBUMIN/GLOB SERPL: 1.2 G/DL
ALP SERPL-CCNC: 71 U/L (ref 30–99)
ALP SERPL-CCNC: 81 U/L (ref 30–99)
ALT SERPL-CCNC: 19 U/L (ref 2–50)
ALT SERPL-CCNC: 20 U/L (ref 2–50)
ANION GAP SERPL CALC-SCNC: 11 MMOL/L (ref 7–16)
ANION GAP SERPL CALC-SCNC: 13 MMOL/L (ref 7–16)
APTT PPP: 40.1 SEC (ref 24.7–36)
AST SERPL-CCNC: 44 U/L (ref 12–45)
AST SERPL-CCNC: 54 U/L (ref 12–45)
BASOPHILS # BLD AUTO: 0.6 % (ref 0–1.8)
BASOPHILS # BLD: 0.06 K/UL (ref 0–0.12)
BILIRUB SERPL-MCNC: 0.9 MG/DL (ref 0.1–1.5)
BILIRUB SERPL-MCNC: 1.2 MG/DL (ref 0.1–1.5)
BUN SERPL-MCNC: 26 MG/DL (ref 8–22)
BUN SERPL-MCNC: 26 MG/DL (ref 8–22)
CALCIUM SERPL-MCNC: 8.2 MG/DL (ref 8.5–10.5)
CALCIUM SERPL-MCNC: 8.8 MG/DL (ref 8.5–10.5)
CHLORIDE SERPL-SCNC: 102 MMOL/L (ref 96–112)
CHLORIDE SERPL-SCNC: 103 MMOL/L (ref 96–112)
CO2 SERPL-SCNC: 23 MMOL/L (ref 20–33)
CO2 SERPL-SCNC: 23 MMOL/L (ref 20–33)
CORTIS SERPL-MCNC: 42.7 UG/DL (ref 0–23)
CREAT SERPL-MCNC: 1.64 MG/DL (ref 0.5–1.4)
CREAT SERPL-MCNC: 1.66 MG/DL (ref 0.5–1.4)
CRP SERPL HS-MCNC: 34.2 MG/DL (ref 0–0.75)
D DIMER PPP IA.FEU-MCNC: 1.41 UG/ML (FEU) (ref 0–0.5)
EOSINOPHIL # BLD AUTO: 0.01 K/UL (ref 0–0.51)
EOSINOPHIL NFR BLD: 0.1 % (ref 0–6.9)
ERYTHROCYTE [DISTWIDTH] IN BLOOD BY AUTOMATED COUNT: 57.6 FL (ref 35.9–50)
GLOBULIN SER CALC-MCNC: 2.7 G/DL (ref 1.9–3.5)
GLOBULIN SER CALC-MCNC: 3.2 G/DL (ref 1.9–3.5)
GLUCOSE SERPL-MCNC: 124 MG/DL (ref 65–99)
GLUCOSE SERPL-MCNC: 139 MG/DL (ref 65–99)
GRAM STN SPEC: NORMAL
HCT VFR BLD AUTO: 33 % (ref 42–52)
HGB BLD-MCNC: 10.5 G/DL (ref 14–18)
IMM GRANULOCYTES # BLD AUTO: 0.05 K/UL (ref 0–0.11)
IMM GRANULOCYTES NFR BLD AUTO: 0.5 % (ref 0–0.9)
INR PPP: 1.23 (ref 0.87–1.13)
LYMPHOCYTES # BLD AUTO: 1.05 K/UL (ref 1–4.8)
LYMPHOCYTES NFR BLD: 10.3 % (ref 22–41)
MAGNESIUM SERPL-MCNC: 1.7 MG/DL (ref 1.5–2.5)
MCH RBC QN AUTO: 32.5 PG (ref 27–33)
MCHC RBC AUTO-ENTMCNC: 31.8 G/DL (ref 33.7–35.3)
MCV RBC AUTO: 102.2 FL (ref 81.4–97.8)
MONOCYTES # BLD AUTO: 1.41 K/UL (ref 0–0.85)
MONOCYTES NFR BLD AUTO: 13.9 % (ref 0–13.4)
NEUTROPHILS # BLD AUTO: 7.6 K/UL (ref 1.82–7.42)
NEUTROPHILS NFR BLD: 74.6 % (ref 44–72)
NRBC # BLD AUTO: 0 K/UL
NRBC BLD-RTO: 0 /100 WBC
NT-PROBNP SERPL IA-MCNC: 2297 PG/ML (ref 0–125)
PHOSPHATE SERPL-MCNC: 3.4 MG/DL (ref 2.5–4.5)
PLATELET # BLD AUTO: 150 K/UL (ref 164–446)
PMV BLD AUTO: 10.2 FL (ref 9–12.9)
POTASSIUM SERPL-SCNC: 4.1 MMOL/L (ref 3.6–5.5)
POTASSIUM SERPL-SCNC: 4.2 MMOL/L (ref 3.6–5.5)
PROCALCITONIN SERPL-MCNC: 1.13 NG/ML
PROT SERPL-MCNC: 5.9 G/DL (ref 6–8.2)
PROT SERPL-MCNC: 6.8 G/DL (ref 6–8.2)
PROTHROMBIN TIME: 15.9 SEC (ref 12–14.6)
RBC # BLD AUTO: 3.23 M/UL (ref 4.7–6.1)
SIGNIFICANT IND 70042: NORMAL
SITE SITE: NORMAL
SODIUM SERPL-SCNC: 137 MMOL/L (ref 135–145)
SODIUM SERPL-SCNC: 138 MMOL/L (ref 135–145)
SOURCE SOURCE: NORMAL
WBC # BLD AUTO: 10.2 K/UL (ref 4.8–10.8)

## 2021-02-02 PROCEDURE — 83880 ASSAY OF NATRIURETIC PEPTIDE: CPT

## 2021-02-02 PROCEDURE — 96375 TX/PRO/DX INJ NEW DRUG ADDON: CPT

## 2021-02-02 PROCEDURE — 87116 MYCOBACTERIA CULTURE: CPT | Mod: 91

## 2021-02-02 PROCEDURE — 85730 THROMBOPLASTIN TIME PARTIAL: CPT

## 2021-02-02 PROCEDURE — 700105 HCHG RX REV CODE 258: Performed by: STUDENT IN AN ORGANIZED HEALTH CARE EDUCATION/TRAINING PROGRAM

## 2021-02-02 PROCEDURE — 501838 HCHG SUTURE GENERAL: Performed by: ORTHOPAEDIC SURGERY

## 2021-02-02 PROCEDURE — 700111 HCHG RX REV CODE 636 W/ 250 OVERRIDE (IP): Performed by: ANESTHESIOLOGY

## 2021-02-02 PROCEDURE — 89051 BODY FLUID CELL COUNT: CPT

## 2021-02-02 PROCEDURE — 160027 HCHG SURGERY MINUTES - 1ST 30 MINS LEVEL 2: Performed by: ORTHOPAEDIC SURGERY

## 2021-02-02 PROCEDURE — 160002 HCHG RECOVERY MINUTES (STAT): Performed by: ORTHOPAEDIC SURGERY

## 2021-02-02 PROCEDURE — 87015 SPECIMEN INFECT AGNT CONCNTJ: CPT

## 2021-02-02 PROCEDURE — 87147 CULTURE TYPE IMMUNOLOGIC: CPT

## 2021-02-02 PROCEDURE — L1830 KO IMMOB CANVAS LONG PRE OTS: HCPCS | Performed by: ORTHOPAEDIC SURGERY

## 2021-02-02 PROCEDURE — 96366 THER/PROPH/DIAG IV INF ADDON: CPT

## 2021-02-02 PROCEDURE — 84145 PROCALCITONIN (PCT): CPT

## 2021-02-02 PROCEDURE — 99225 PR SUBSEQUENT OBSERVATION CARE,LEVEL II: CPT | Performed by: STUDENT IN AN ORGANIZED HEALTH CARE EDUCATION/TRAINING PROGRAM

## 2021-02-02 PROCEDURE — 36415 COLL VENOUS BLD VENIPUNCTURE: CPT

## 2021-02-02 PROCEDURE — 84100 ASSAY OF PHOSPHORUS: CPT

## 2021-02-02 PROCEDURE — 96376 TX/PRO/DX INJ SAME DRUG ADON: CPT

## 2021-02-02 PROCEDURE — 700111 HCHG RX REV CODE 636 W/ 250 OVERRIDE (IP): Performed by: STUDENT IN AN ORGANIZED HEALTH CARE EDUCATION/TRAINING PROGRAM

## 2021-02-02 PROCEDURE — 0M9N0ZZ DRAINAGE OF RIGHT KNEE BURSA AND LIGAMENT, OPEN APPROACH: ICD-10-PCS | Performed by: ORTHOPAEDIC SURGERY

## 2021-02-02 PROCEDURE — 700102 HCHG RX REV CODE 250 W/ 637 OVERRIDE(OP): Performed by: STUDENT IN AN ORGANIZED HEALTH CARE EDUCATION/TRAINING PROGRAM

## 2021-02-02 PROCEDURE — G0378 HOSPITAL OBSERVATION PER HR: HCPCS

## 2021-02-02 PROCEDURE — 85610 PROTHROMBIN TIME: CPT

## 2021-02-02 PROCEDURE — A6454 SELF-ADHER BAND W>=3" <5"/YD: HCPCS | Performed by: ORTHOPAEDIC SURGERY

## 2021-02-02 PROCEDURE — 87205 SMEAR GRAM STAIN: CPT | Mod: 91

## 2021-02-02 PROCEDURE — 86140 C-REACTIVE PROTEIN: CPT

## 2021-02-02 PROCEDURE — 500881 HCHG PACK, EXTREMITY: Performed by: ORTHOPAEDIC SURGERY

## 2021-02-02 PROCEDURE — 0JBN0ZZ EXCISION OF RIGHT LOWER LEG SUBCUTANEOUS TISSUE AND FASCIA, OPEN APPROACH: ICD-10-PCS | Performed by: ORTHOPAEDIC SURGERY

## 2021-02-02 PROCEDURE — 160035 HCHG PACU - 1ST 60 MINS PHASE I: Performed by: ORTHOPAEDIC SURGERY

## 2021-02-02 PROCEDURE — 700101 HCHG RX REV CODE 250: Performed by: STUDENT IN AN ORGANIZED HEALTH CARE EDUCATION/TRAINING PROGRAM

## 2021-02-02 PROCEDURE — A9270 NON-COVERED ITEM OR SERVICE: HCPCS | Performed by: STUDENT IN AN ORGANIZED HEALTH CARE EDUCATION/TRAINING PROGRAM

## 2021-02-02 PROCEDURE — 87070 CULTURE OTHR SPECIMN AEROBIC: CPT

## 2021-02-02 PROCEDURE — 501330 HCHG SET, CYSTO IRRIG TUBING: Performed by: ORTHOPAEDIC SURGERY

## 2021-02-02 PROCEDURE — 700101 HCHG RX REV CODE 250: Performed by: ORTHOPAEDIC SURGERY

## 2021-02-02 PROCEDURE — 87075 CULTR BACTERIA EXCEPT BLOOD: CPT | Mod: 91

## 2021-02-02 PROCEDURE — 85025 COMPLETE CBC W/AUTO DIFF WBC: CPT

## 2021-02-02 PROCEDURE — 80053 COMPREHEN METABOLIC PANEL: CPT

## 2021-02-02 PROCEDURE — 83735 ASSAY OF MAGNESIUM: CPT

## 2021-02-02 PROCEDURE — 700105 HCHG RX REV CODE 258: Performed by: EMERGENCY MEDICINE

## 2021-02-02 PROCEDURE — 700101 HCHG RX REV CODE 250: Performed by: ANESTHESIOLOGY

## 2021-02-02 PROCEDURE — 160048 HCHG OR STATISTICAL LEVEL 1-5: Performed by: ORTHOPAEDIC SURGERY

## 2021-02-02 PROCEDURE — 0S993ZX DRAINAGE OF RIGHT HIP JOINT, PERCUTANEOUS APPROACH, DIAGNOSTIC: ICD-10-PCS | Performed by: ORTHOPAEDIC SURGERY

## 2021-02-02 PROCEDURE — 87102 FUNGUS ISOLATION CULTURE: CPT | Mod: 91

## 2021-02-02 PROCEDURE — A6222 GAUZE <=16 IN NO W/SAL W/O B: HCPCS | Performed by: ORTHOPAEDIC SURGERY

## 2021-02-02 PROCEDURE — 160009 HCHG ANES TIME/MIN: Performed by: ORTHOPAEDIC SURGERY

## 2021-02-02 PROCEDURE — 160038 HCHG SURGERY MINUTES - EA ADDL 1 MIN LEVEL 2: Performed by: ORTHOPAEDIC SURGERY

## 2021-02-02 PROCEDURE — 85379 FIBRIN DEGRADATION QUANT: CPT

## 2021-02-02 PROCEDURE — 160036 HCHG PACU - EA ADDL 30 MINS PHASE I: Performed by: ORTHOPAEDIC SURGERY

## 2021-02-02 RX ORDER — ONDANSETRON 2 MG/ML
INJECTION INTRAMUSCULAR; INTRAVENOUS PRN
Status: DISCONTINUED | OUTPATIENT
Start: 2021-02-02 | End: 2021-02-02 | Stop reason: SURG

## 2021-02-02 RX ORDER — SODIUM CHLORIDE, SODIUM LACTATE, POTASSIUM CHLORIDE, CALCIUM CHLORIDE 600; 310; 30; 20 MG/100ML; MG/100ML; MG/100ML; MG/100ML
1000 INJECTION, SOLUTION INTRAVENOUS CONTINUOUS
Status: DISCONTINUED | OUTPATIENT
Start: 2021-02-02 | End: 2021-02-03

## 2021-02-02 RX ORDER — ONDANSETRON 2 MG/ML
4 INJECTION INTRAMUSCULAR; INTRAVENOUS
Status: COMPLETED | OUTPATIENT
Start: 2021-02-02 | End: 2021-02-02

## 2021-02-02 RX ORDER — OXYCODONE HCL 5 MG/5 ML
10 SOLUTION, ORAL ORAL
Status: DISCONTINUED | OUTPATIENT
Start: 2021-02-02 | End: 2021-02-02 | Stop reason: HOSPADM

## 2021-02-02 RX ORDER — MAGNESIUM HYDROXIDE 1200 MG/15ML
LIQUID ORAL
Status: COMPLETED | OUTPATIENT
Start: 2021-02-02 | End: 2021-02-02

## 2021-02-02 RX ORDER — LIDOCAINE HYDROCHLORIDE 20 MG/ML
INJECTION, SOLUTION EPIDURAL; INFILTRATION; INTRACAUDAL; PERINEURAL PRN
Status: DISCONTINUED | OUTPATIENT
Start: 2021-02-02 | End: 2021-02-02 | Stop reason: SURG

## 2021-02-02 RX ORDER — OXYCODONE HCL 5 MG/5 ML
5 SOLUTION, ORAL ORAL
Status: DISCONTINUED | OUTPATIENT
Start: 2021-02-02 | End: 2021-02-02 | Stop reason: HOSPADM

## 2021-02-02 RX ORDER — LABETALOL HYDROCHLORIDE 5 MG/ML
5 INJECTION, SOLUTION INTRAVENOUS
Status: DISCONTINUED | OUTPATIENT
Start: 2021-02-02 | End: 2021-02-02 | Stop reason: HOSPADM

## 2021-02-02 RX ORDER — MEPERIDINE HYDROCHLORIDE 25 MG/ML
12.5 INJECTION INTRAMUSCULAR; INTRAVENOUS; SUBCUTANEOUS
Status: DISCONTINUED | OUTPATIENT
Start: 2021-02-02 | End: 2021-02-02 | Stop reason: HOSPADM

## 2021-02-02 RX ORDER — GUAIFENESIN 600 MG/1
600 TABLET, EXTENDED RELEASE ORAL EVERY 12 HOURS
Status: DISCONTINUED | OUTPATIENT
Start: 2021-02-02 | End: 2021-02-03

## 2021-02-02 RX ORDER — DIPHENHYDRAMINE HYDROCHLORIDE 50 MG/ML
12.5 INJECTION INTRAMUSCULAR; INTRAVENOUS
Status: DISCONTINUED | OUTPATIENT
Start: 2021-02-02 | End: 2021-02-02 | Stop reason: HOSPADM

## 2021-02-02 RX ORDER — HYDROMORPHONE HYDROCHLORIDE 1 MG/ML
0.4 INJECTION, SOLUTION INTRAMUSCULAR; INTRAVENOUS; SUBCUTANEOUS
Status: DISCONTINUED | OUTPATIENT
Start: 2021-02-02 | End: 2021-02-02 | Stop reason: HOSPADM

## 2021-02-02 RX ORDER — HYDROMORPHONE HYDROCHLORIDE 1 MG/ML
0.1 INJECTION, SOLUTION INTRAMUSCULAR; INTRAVENOUS; SUBCUTANEOUS
Status: DISCONTINUED | OUTPATIENT
Start: 2021-02-02 | End: 2021-02-02 | Stop reason: HOSPADM

## 2021-02-02 RX ORDER — SODIUM CHLORIDE, SODIUM LACTATE, POTASSIUM CHLORIDE, CALCIUM CHLORIDE 600; 310; 30; 20 MG/100ML; MG/100ML; MG/100ML; MG/100ML
INJECTION, SOLUTION INTRAVENOUS CONTINUOUS
Status: DISCONTINUED | OUTPATIENT
Start: 2021-02-02 | End: 2021-02-02 | Stop reason: HOSPADM

## 2021-02-02 RX ORDER — HYDROMORPHONE HYDROCHLORIDE 1 MG/ML
0.2 INJECTION, SOLUTION INTRAMUSCULAR; INTRAVENOUS; SUBCUTANEOUS
Status: DISCONTINUED | OUTPATIENT
Start: 2021-02-02 | End: 2021-02-02 | Stop reason: HOSPADM

## 2021-02-02 RX ADMIN — CEFEPIME 2 G: 2 INJECTION, POWDER, FOR SOLUTION INTRAVENOUS at 23:27

## 2021-02-02 RX ADMIN — SODIUM CHLORIDE, POTASSIUM CHLORIDE, SODIUM LACTATE AND CALCIUM CHLORIDE 1000 ML: 600; 310; 30; 20 INJECTION, SOLUTION INTRAVENOUS at 13:46

## 2021-02-02 RX ADMIN — PROPOFOL 120 MG: 10 INJECTION, EMULSION INTRAVENOUS at 08:21

## 2021-02-02 RX ADMIN — FENTANYL CITRATE 25 MCG: 50 INJECTION, SOLUTION INTRAMUSCULAR; INTRAVENOUS at 08:21

## 2021-02-02 RX ADMIN — ONDANSETRON 4 MG: 2 INJECTION INTRAMUSCULAR; INTRAVENOUS at 08:50

## 2021-02-02 RX ADMIN — SODIUM CHLORIDE, POTASSIUM CHLORIDE, SODIUM LACTATE AND CALCIUM CHLORIDE 1000 ML: 600; 310; 30; 20 INJECTION, SOLUTION INTRAVENOUS at 21:01

## 2021-02-02 RX ADMIN — FENTANYL CITRATE 25 MCG: 50 INJECTION, SOLUTION INTRAMUSCULAR; INTRAVENOUS at 08:39

## 2021-02-02 RX ADMIN — SODIUM CHLORIDE, POTASSIUM CHLORIDE, SODIUM LACTATE AND CALCIUM CHLORIDE 1000 ML: 600; 310; 30; 20 INJECTION, SOLUTION INTRAVENOUS at 04:34

## 2021-02-02 RX ADMIN — CEFEPIME 2 G: 2 INJECTION, POWDER, FOR SOLUTION INTRAVENOUS at 11:45

## 2021-02-02 RX ADMIN — ONDANSETRON 4 MG: 2 INJECTION INTRAMUSCULAR; INTRAVENOUS at 10:20

## 2021-02-02 RX ADMIN — LABETALOL HYDROCHLORIDE 10 MG: 5 INJECTION, SOLUTION INTRAVENOUS at 15:38

## 2021-02-02 RX ADMIN — FENTANYL CITRATE 25 MCG: 50 INJECTION, SOLUTION INTRAMUSCULAR; INTRAVENOUS at 08:29

## 2021-02-02 RX ADMIN — GUAIFENESIN 600 MG: 600 TABLET, EXTENDED RELEASE ORAL at 13:29

## 2021-02-02 RX ADMIN — LIDOCAINE HYDROCHLORIDE 30 MG: 20 INJECTION, SOLUTION EPIDURAL; INFILTRATION; INTRACAUDAL at 08:21

## 2021-02-02 ASSESSMENT — COGNITIVE AND FUNCTIONAL STATUS - GENERAL
HELP NEEDED FOR BATHING: A LOT
MOVING TO AND FROM BED TO CHAIR: A LITTLE
DAILY ACTIVITIY SCORE: 14
SUGGESTED CMS G CODE MODIFIER DAILY ACTIVITY: CK
TURNING FROM BACK TO SIDE WHILE IN FLAT BAD: A LITTLE
STANDING UP FROM CHAIR USING ARMS: A LOT
DRESSING REGULAR LOWER BODY CLOTHING: A LOT
PERSONAL GROOMING: A LITTLE
EATING MEALS: A LITTLE
SUGGESTED CMS G CODE MODIFIER MOBILITY: CK
MOVING FROM LYING ON BACK TO SITTING ON SIDE OF FLAT BED: A LOT
DRESSING REGULAR UPPER BODY CLOTHING: A LOT
TOILETING: A LOT
WALKING IN HOSPITAL ROOM: A LITTLE
MOBILITY SCORE: 15
CLIMB 3 TO 5 STEPS WITH RAILING: A LOT

## 2021-02-02 ASSESSMENT — PAIN DESCRIPTION - PAIN TYPE
TYPE: SURGICAL PAIN
TYPE: ACUTE PAIN;CHRONIC PAIN
TYPE: SURGICAL PAIN

## 2021-02-02 ASSESSMENT — ENCOUNTER SYMPTOMS
BLURRED VISION: 0
CONSTIPATION: 0
DOUBLE VISION: 0
CHILLS: 0
VOMITING: 0
NAUSEA: 0
ABDOMINAL PAIN: 0
SORE THROAT: 0
SINUS PAIN: 0
NERVOUS/ANXIOUS: 0
HEADACHES: 0
COUGH: 0
FEVER: 0
PALPITATIONS: 0
FOCAL WEAKNESS: 0
DIZZINESS: 0
SPUTUM PRODUCTION: 0
WHEEZING: 0
DIARRHEA: 0
SHORTNESS OF BREATH: 0
MYALGIAS: 0

## 2021-02-02 ASSESSMENT — PAIN SCALES - GENERAL: PAIN_LEVEL: 0

## 2021-02-02 ASSESSMENT — PATIENT HEALTH QUESTIONNAIRE - PHQ9
2. FEELING DOWN, DEPRESSED, IRRITABLE, OR HOPELESS: NOT AT ALL
SUM OF ALL RESPONSES TO PHQ9 QUESTIONS 1 AND 2: 0
1. LITTLE INTEREST OR PLEASURE IN DOING THINGS: NOT AT ALL

## 2021-02-02 NOTE — WOUND TEAM
Wound team consulted for VAC changes to start 2/4.  Patient placed on wound team VAC list and will be changed on 2/4 as directed.  Wound consult completed.

## 2021-02-02 NOTE — CARE PLAN
Problem: Respiratory:  Goal: Respiratory status will improve  Outcome: PROGRESSING AS EXPECTED     Problem: Safety  Goal: Will remain free from injury  Outcome: PROGRESSING AS EXPECTED     Problem: Infection  Goal: Will remain free from infection  Outcome: PROGRESSING AS EXPECTED       Pt a/o X4; RLE cellulitis; extrimity, red,warm, swollen. Wound care consult order placed. Pt is npo for  possible sx interventions.

## 2021-02-02 NOTE — PROGRESS NOTES
Recurrent infection, right prepatellar bursa  Plan I&D, possible VAC  Discussed with patient  Right leg marked  Consent signed

## 2021-02-02 NOTE — ED NOTES
Med rec complete per Pt and Pt's family member at bedside   Pt verified that he takes Symbicort, but was unsure with the medication strength  Verified strength through chart review   Pt states that he was on Cimzia, but stopped taking it since November 2020  Allergies reviewed   No ABX in last 14 days     Pharmacy-[facility] [address]

## 2021-02-02 NOTE — ANESTHESIA TIME REPORT
Anesthesia Start and Stop Event Times     Date Time Event    2/2/2021 0809 Ready for Procedure     0814 Anesthesia Start     0909 Anesthesia Stop        Responsible Staff  02/02/21    Name Role Begin End    Shabbir Raymond M.D. Anesth 0814 0909        Preop Diagnosis (Free Text):  Pre-op Diagnosis     Right knee infected pre patellar bursa        Preop Diagnosis (Codes):    Post op Diagnosis  Infection of prepatellar bursa, right      Premium Reason  Non-Premium    Comments:

## 2021-02-02 NOTE — PROGRESS NOTES
Pt A&Ox4, denies any numbness/tingling/pain.    Bed alarm on, call light within reach, pt educated in importance of using the call light when he needs assistance, pt verbalized understanding.

## 2021-02-02 NOTE — ANESTHESIA PREPROCEDURE EVALUATION
Relevant Problems   ANESTHESIA   (+) NYASIA (obstructive sleep apnea)      PULMONARY   (+) Aspiration pneumonia (HCC)   (+) COPD (chronic obstructive pulmonary disease) (HCC)   (+) Chronic obstructive pulmonary disease (HCC)   (+) Pneumonia      NEURO   (+) History of pseudomembranous enterocolitis      CARDIAC   (+) Calcified LAD and LCx on CT chest 2015   (+) PVC (premature ventricular contraction)         (+) Acute kidney injury superimposed on chronic kidney disease ([facility])   (+) Stage 3 chronic kidney disease      Other   (+) Prepatellar bursitis of right knee   (+) Pyogenic arthritis of right knee joint ([facility])   (+) RA (rheumatoid arthritis) ([facility])       Physical Exam    Airway   Mallampati: II  TM distance: >3 FB  Neck ROM: full       Cardiovascular - normal exam  Rhythm: regular  Rate: normal  (-) murmur     Dental - normal exam             Pulmonary - normal exam  Breath sounds clear to auscultation  (+) decreased breath sounds     Abdominal    Neurological - normal exam                 Anesthesia Plan    ASA 3 (Chronic Kidney Disease)   ASA physical status 3 criteria: respiratory insufficiency or compromise and COPD    Plan - general       Airway plan will be LMA        Induction: intravenous    Postoperative Plan: Postoperative administration of opioids is intended.    Pertinent diagnostic labs and testing reviewed    Informed Consent:    Anesthetic plan and risks discussed with patient.    Use of blood products discussed with: patient whom consented to blood products.

## 2021-02-02 NOTE — ASSESSMENT & PLAN NOTE
"This is Sepsis Present on admission  SIRS criteria identified on my evaluation include: Tachycardia, with heart rate greater than 90 BPM  Source is leg cellulitis  Sepsis protocol initiated  Fluid resuscitation ordered per protocol  IV antibiotics as appropriate for source of sepsis  While organ dysfunction may be noted elsewhere in this problem list or in the chart, degree of organ dysfunction does not meet CMS criteria for severe sepsis    -S/p  incision and drainage of right prepatellar bursa 2/2 by Dr. Sol. Hold knee ROM x 2 weeks. Staples out- 10-14 days post operatively. Follow-Up: needs appointment with Dr. Sol at [facility] at 10-14 days post-op for re-evaluation, staple removal and radiographs.    ID on Board.   Per ID: \"Continue IV Ancef 2 g every 8 hours. Follow pending synovial fluid cultures.  If this turns positive, may need arthrotomy and drainage. Anticipate a 4-week course of oral antibiotics -probably doxycycline instead of Bactrim given CKD \" OR Cx showing MSSA.  PT/OT   SNF pending       "

## 2021-02-02 NOTE — ASSESSMENT & PLAN NOTE
"History of septic arthritis of R knee s/p multiple surgeries and washout, hx of pseudomonas growth on knee aspiration  S/p 4 week course of cefepime prior to this hospitalization, completed 1/15/2021  New cellulitis with purulent drainage  Wound and blood cultures taken    -S/p  incision and drainage of right prepatellar bursa 2/2 by Dr. Sol. Hold knee ROM x 2 weeks. Staples out- 10-14 days post operatively. Follow-Up: needs appointment with Dr. Sol at [facility] at 10-14 days post-op for re-evaluation, staple removal and radiographs.    ID on Board.   Per ID: \"Continue IV Ancef 2 g every 8 hours. Follow pending synovial fluid cultures.  If this turns positive, may need arthrotomy and drainage. Anticipate a 4-week course of oral antibiotics -probably doxycycline instead of Bactrim given CKD \" OR Cx showing MSSA.  PT/OT   Pain control   Fall Precautions   SNF pending       "

## 2021-02-02 NOTE — H&P
Hospital Medicine History & Physical Note    Date of Service  2/1/2021    Primary Care Physician  Carlitos Harvey M.D.    Consultants  Orthopedic surgery    Code Status  Full Code    Chief Complaint  Chief Complaint   Patient presents with   • Leg Swelling     has had infection in R leg since feb   • Fever       History of Presenting Illness  80 y.o. male who presented 2/1/2021 with leg swelling. Patient with hx of septic arthritis of right knee, s/p multiple surgeries recently completed IV cefepime x4 weeks end date 1/15/2021, COPD, CKD, who presents for right leg swelling. Patient reports right leg swelling, redness, purulent drainage for past week, with associated chills. Denies fever, sweating, joint pain, chest pain, cough, dyspnea. No recent trauma to leg.  In the ED, , RR 22, WBC 12.2, LA 1.6. Sepsis protocol initiated, given IV fluids and started on vancomycin. Orthopedic surgery team to see patient in AM. He will be admitted for evaluation of leg cellulitis.    Review of Systems  Review of Systems   Constitutional: Positive for chills. Negative for diaphoresis, fever and malaise/fatigue.   Eyes: Negative for blurred vision and pain.   Respiratory: Negative for cough and shortness of breath.    Cardiovascular: Positive for leg swelling. Negative for chest pain and palpitations.   Gastrointestinal: Negative for abdominal pain, nausea and vomiting.   Genitourinary: Negative for dysuria and urgency.   Musculoskeletal: Negative for back pain, falls and joint pain.   Skin: Negative for itching and rash.        Purulent drainage from leg wound   Neurological: Negative for dizziness, sensory change, focal weakness and headaches.   Psychiatric/Behavioral: Negative for substance abuse. The patient does not have insomnia.        Past Medical History   has a past medical history of Abnormal thyroid stimulating hormone (TSH) level, Allergic rhinitis, Asbestosis (HCC), Asthma, Bronchitis, Chronic diarrhea,  Chronic low back pain, Chronic lung disease, CKD (chronic kidney disease), stage III, COPD (chronic obstructive pulmonary disease) (HCC), Coronary artery calcification seen on CAT scan (8/2/2016), Epididymitis (2009), GERD (gastroesophageal reflux disease), History of hemorrhoids, History of skin cancer, [place] (hard of hearing), Hypercholesteremia, Hypertension, Hypertriglyceridemia, Indigestion, Light headedness, Lightheadedness (6/23/2016), Low back pain, Nasal drainage, Olecranon bursitis, Orthostasis (6/23/2016), Peripheral neuropathy, Pleural plaque (2005 ), Post-nasal drip, Pulmonary emphysema (HCC), RA (rheumatoid arthritis) (HCC), Restless leg syndrome, Rheumatoid arthritis (HCC), Sleep apnea, and Solitary kidney.    Surgical History   has a past surgical history that includes testicle exploration (2004); cystoscopy (2/1/2010); retrogrades (2/1/2010); pyelogram (2/1/2010); ureteroscopy (2/1/2010); nephrectomy laparoscopic (2009); nasal polypectomy (Bilateral, 10/6/2015); tonsillectomy; pr remv 2nd cataract,corn-scler sectn; colonoscopy (11/10/2018); flexor tendon repair (Left, 4/3/2019); tendon transfer (Left, 4/3/2019); irrigation & debridement ortho (Right, 10/27/2020); and incision and drainage general (Right, 12/19/2020).     Family History  family history includes Genetic Disorder in his father; No Known Problems in his daughter, sister, and son.     Social History   reports that he quit smoking about 41 years ago. His smoking use included cigarettes. He has a 40.00 pack-year smoking history. He has never used smokeless tobacco. He reports that he does not drink alcohol or use drugs.    Allergies  Allergies   Allergen Reactions   • Ciprofloxacin      Other reaction(s): muscle aches  Pt's family states that Pt had a reaction when he was a kid noted 2/1/2021   • Levaquin      Other reaction(s): Muscle aches  Muscle aches  Other reaction(s): Muscle aches  Pt's son states that Pt had a reaction when he was a  kid noted 2/1/2021   • Penicillins Hives     Rash and asthma attack when a child - wife states he has had Keflex in the past and tolerated  Pt's son states that Pt had a reaction when he was a kid noted 2/1/2021        Medications  Prior to Admission Medications   Prescriptions Last Dose Informant Patient Reported? Taking?   CALCIUM PO 1/31/2021 at AM Patient Yes Yes   Sig: Take 1 Tab by mouth every day.   Loperamide HCl (IMODIUM A-D PO) 1/31/2021 at PM Patient Yes No   Sig: Take 1 Tab by mouth 2 (two) times a day.   acetaminophen (TYLENOL) 325 MG Tab 2 days at unknown Patient Yes No   Sig: Take 650 mg by mouth every four hours as needed. Indications: Pain   budesonide-formoterol (SYMBICORT) 80-4.5 MCG/ACT Aerosol 1/31/2021 at PM Patient Yes No   Sig: Inhale 2 Puffs 2 Times a Day.   fexofenadine (ALLEGRA ALLERGY) 180 MG tablet 1/31/2021 at AM Patient Yes No   Sig: Take 180 mg by mouth every day.   fluticasone (FLONASE) 50 MCG/ACT nasal spray 1/31/2021 at AM Patient Yes No   Sig: Administer 2 Sprays into affected nostril(S) every day.   gabapentin (NEURONTIN) 300 MG Cap 1/31/2021 at PM Patient No No   Sig: TAKE 1 CAPSULE BY MOUTH THREE TIMES A DAY   Patient taking differently: Take 300-600 mg by mouth 2 Times a Day. 1 cap AM  2 caps PM   guaiFENesin LA (MUCINEX) 600 MG TABLET SR 12 HR 1/31/2021 at PM Patient Yes No   Sig: Take 1,200 mg by mouth 2 Times a Day.   leflunomide (ARAVA) 20 MG Tab 2/1/2021 at AM Patient No No   Sig: Take 1 Tab by mouth every day.   rosuvastatin (CRESTOR) 5 MG Tab 2 days at PM Patient No No   Sig: TAKE 1 TABLET BY MOUTH EVERY EVENING   Patient taking differently: Take 5 mg by mouth every evening.   tiotropium (SPIRIVA) 18 MCG Cap 1/31/2021 at AM Patient No No   Sig: Inhale 1 Cap by mouth every day.      Facility-Administered Medications: None       Physical Exam  Temp:  [37.7 °C (99.9 °F)] 37.7 °C (99.9 °F)  Pulse:  [] 87  Resp:  [20-26] 26  BP: (143-149)/(70-82) 149/70  SpO2:  [93  %-95 %] 93 %    Physical Exam  Constitutional:       General: He is not in acute distress.     Appearance: He is not ill-appearing.   HENT:      Head: Normocephalic and atraumatic.      Right Ear: External ear normal.      Left Ear: External ear normal.      Mouth/Throat:      Pharynx: No oropharyngeal exudate or posterior oropharyngeal erythema.   Eyes:      Extraocular Movements: Extraocular movements intact.      Pupils: Pupils are equal, round, and reactive to light.   Neck:      Musculoskeletal: Normal range of motion and neck supple.   Cardiovascular:      Rate and Rhythm: Normal rate and regular rhythm.      Pulses: Normal pulses.      Heart sounds: Normal heart sounds. No murmur.   Pulmonary:      Effort: Pulmonary effort is normal. No respiratory distress.      Breath sounds: Normal breath sounds. No wheezing.   Abdominal:      General: Bowel sounds are normal. There is no distension.      Palpations: Abdomen is soft.      Tenderness: There is no abdominal tenderness.   Musculoskeletal:         General: Swelling and tenderness present.      Comments: Right anterolateral leg and medial shin with multiple scabbed over wounds, anterolateral leg wound with purulent drainage, confluent erythema/inflammation from right knee to midshin   Skin:     General: Skin is warm.      Findings: Erythema present.   Neurological:      General: No focal deficit present.      Mental Status: He is oriented to person, place, and time.      Sensory: No sensory deficit.      Motor: No weakness.   Psychiatric:         Mood and Affect: Mood normal.         Behavior: Behavior normal.         Laboratory:  Recent Labs     02/01/21  2108   WBC 12.2*   RBC 3.56*   HEMOGLOBIN 11.3*   HEMATOCRIT 35.5*   MCV 99.7*   MCH 31.7   MCHC 31.8*   RDW 55.8*   PLATELETCT 197   MPV 10.3         No results for input(s): ALTSGPT, ASTSGOT, ALKPHOSPHAT, TBILIRUBIN, DBILIRUBIN, GAMMAGT, AMYLASE, LIPASE, ALB, PREALBUMIN, GLUCOSE in the last 72 hours.       No results for input(s): NTPROBNP in the last 72 hours.      No results for input(s): TROPONINT in the last 72 hours.    Imaging:  DX-CHEST-PORTABLE (1 VIEW)   Final Result      Stable chest without acute/new abnormality.            Assessment/Plan:  I anticipate this patient is appropriate for observation status at this time.    * Cellulitis of extremity  Assessment & Plan  History of septic arthritis of R knee s/p multiple surgeries and washout, hx of pseudomonas growth on knee aspiration  S/p 4 week course of cefepime prior to this hospitalization, completed 1/15/2021  New cellulitis with purulent drainage  Wound and blood cultures taken  Started on vancomycin and cefepime  Orthopedic surgery consulted by ER, to see patient in AM  Day team to consult infectious disease for antibiotic recommendations  NPO in case of surgery, hold anticoagulation    Sepsis (HCC)  Assessment & Plan  This is Sepsis Present on admission  SIRS criteria identified on my evaluation include: Tachycardia, with heart rate greater than 90 BPM  Source is leg cellulitis  Sepsis protocol initiated  Fluid resuscitation ordered per protocol  IV antibiotics as appropriate for source of sepsis  While organ dysfunction may be noted elsewhere in this problem list or in the chart, degree of organ dysfunction does not meet CMS criteria for severe sepsis    Chronic respiratory failure with hypoxia (HCC)- (present on admission)  Assessment & Plan  Due to COPD, on 2L O2 at night  On 2L in ER  No new respiratory complaints, CXR with no acute abnormalities  Wean oxygen  RT protocol    Stage 3 chronic kidney disease- (present on admission)  Assessment & Plan  Obtain BMP for vancomycin dosing  monitor

## 2021-02-02 NOTE — PROGRESS NOTES
2 RN skin check off: LISA Alejo    RLE: red, edematous, warm; weeping. Old scar from previous knee sx.     No other wounds noted.

## 2021-02-02 NOTE — ASSESSMENT & PLAN NOTE
Due to COPD, on 2L O2 at night  On 2L in ER  No new respiratory complaints, CXR with no acute abnormalities  Wean oxygen  RT protocol  Mucinex for congestion

## 2021-02-02 NOTE — OR NURSING
Patient AAOx4, calm, no c/o pain or nausea. Right LE surgical dressing with wound vac CDI, no drainage noted, knee immobilizer in place. Palpable pedal pulses, toes pink, warm, brisk cap refill less than 3 seconds.VSS, afebrile, 3L nasal cannula 94% breathing even and unlabored. Tolerating sips of water. Attempted to contact spouse three times, unable to reach.      Oxygen tank full for transport.

## 2021-02-02 NOTE — ANESTHESIA POSTPROCEDURE EVALUATION
Patient: Barry Torres    Procedure Summary     Date: 02/02/21 Room / Location: [place] [address] / SURGERY [facility]    Anesthesia Start: 0814 Anesthesia Stop: 0909    Procedures:       IRRIGATION AND DEBRIDEMENT, WOUND-KNEE (Right Knee)      INCISION AND DRAINAGE- arthrocentesis right knee (Right Knee) Diagnosis: (Right knee infected pre patellar bursa)    Surgeons: Kush Sol M.D. Responsible Provider: Shabbir Raymond M.D.    Anesthesia Type: general ASA Status: 3          Final Anesthesia Type: general  Last vitals  BP   Blood Pressure : 155/86    Temp   36.2 °C (97.1 °F)    Pulse   Pulse: 77   Resp   16    SpO2   95 %      Anesthesia Post Evaluation    Patient location during evaluation: PACU  Patient participation: complete - patient participated  Level of consciousness: awake and alert  Pain score: 0    Airway patency: patent  Anesthetic complications: no  Cardiovascular status: hemodynamically stable  Respiratory status: acceptable  Hydration status: euvolemic    PONV: none           Nurse Pain Score: 0 (NPRS)

## 2021-02-02 NOTE — DISCHARGE PLANNING
Care Transition Team Assessment  Pt uses home O2 at 2 liters at night.    Information Source  Orientation : Oriented x 4  Information Given By: Patient  Who is responsible for making decisions for patient? : Patient    Readmission Evaluation  Is this a readmission?: No    Elopement Risk  Legal Hold: No  Ambulatory or Self Mobile in Wheelchair: No-Not an Elopement Risk  Elopement Risk: Not at Risk for Elopement    Interdisciplinary Discharge Planning  Does Admitting Nurse Feel This Could be a Complex Discharge?: No  Primary Care Physician: (Carlitos Harvey MD)  Lives with - Patient's Self Care Capacity: Spouse  Patient or legal guardian wants to designate a caregiver: No  Support Systems: Children, Spouse / Significant Other  Housing / Facility: [address]  Do You Take your Prescribed Medications Regularly: Yes  Able to Return to Previous ADL's: Yes  Mobility Issues: Yes(Has a cane & walker but doesn't use.)  Prior Services: Skilled Home Health Services  Patient Prefers to be Discharged to:: (Home)  Assistance Needed: Unknown at this Time  Durable Medical Equipment: Walker    Discharge Preparedness  What is your plan after discharge?: (Home)  What are your discharge supports?: Child, Spouse  Prior Functional Level: Ambulatory  Difficulity with ADLs: None  Difficulity with IADLs: None    Functional Assesment  Prior Functional Level: Ambulatory    Finances  Financial Barriers to Discharge: No  Prescription Coverage: Yes    Vision / Hearing Impairment  Vision Impairment : No(wears glasses)  Right Eye Vision: Impaired  Left Eye Vision: Impaired  Hearing Impairment : Yes  Hearing Impairment: Hearing Device Not Available, Both Ears    Advance Directive  Advance Directive?: POLST    Domestic Abuse  Have you ever been the victim of abuse or violence?: No  Physical Abuse or Sexual Abuse: No  Verbal Abuse or Emotional Abuse: No  Possible Abuse/Neglect Reported to:: Not Applicable    Psychological Assessment  History of  Substance Abuse: None  History of Psychiatric Problems: No  Non-compliant with Treatment: No  Newly Diagnosed Illness: No    Discharge Risks or Barriers  Discharge risks or barriers?: No    Anticipated Discharge Information  Discharge Disposition: Still a Patient (30)  Discharge Address: ([address] [place] [address] [address]. [address]os)  Discharge Contact Phone Number: (Lola Torres (spouse) [phone])

## 2021-02-02 NOTE — CONSULTS
Date of Service: 02/02/21    CC: Right knee draining wound    HPI: Barry Torres is a 80 y.o. male who presents with complaints of pain to right knee.  This started over the last several months after drainage of a knee cyst and debridement of a prepatellar bursitis.  Following this he had multiple wound complications.  He underwent different debridements as well as the most recent being on December 19.  He was on cefepime following this and after completing this course he began to have increasing erythema and new drainage.  The pain is 4/10 and is described as achy.  The pain is made worse by palpation of the area and made better by rest and immobilization.    PMH:   Past Medical History:   Diagnosis Date   • Abnormal thyroid stimulating hormone (TSH) level    • Allergic rhinitis    • Asbestosis ([facility])    • Asthma    • Bronchitis    • Chronic diarrhea     declines eval; takes immodium once a day usually and sometimes less; see previous notes; aware of risk    • Chronic low back pain    • Chronic lung disease     asbestos related   • CKD (chronic kidney disease), stage III     sees neph, one kidney   • COPD (chronic obstructive pulmonary disease) ([facility])    • Coronary artery calcification seen on CAT scan 8/2/2016    sees cards   • Epididymitis 2009    h/o listed in chart   • GERD (gastroesophageal reflux disease)    • History of hemorrhoids    • History of skin cancer     non melanoma   • [place] (hard of hearing)     declines hearing aids   • Hypercholesteremia    • Hypertension    • Hypertriglyceridemia    • Indigestion    • Light headedness     2/2 orthostasis? now better off hctz   • Lightheadedness 6/23/2016   • Low back pain    • Nasal drainage    • Olecranon bursitis    • Orthostasis 6/23/2016    better off HCTZ   • Peripheral neuropathy    • Pleural plaque 2005     CT [place]   • Post-nasal drip    • Pulmonary emphysema ([facility])    • RA (rheumatoid arthritis) ([facility])     on meds, sees rheum   • Restless leg  syndrome    • Rheumatoid arthritis (HCC)    • Sleep apnea     obstructive and central - not adherent to autopap   • Solitary kidney     history of kidney cyst 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-functioning kidney s/p nephrectomy        PSH:   Past Surgical History:   Procedure Laterality Date   • INCISION AND DRAINAGE GENERAL Right 12/19/2020    Procedure: INCISION AND DRAINAGE;  Surgeon: Marc Chowdhury M.D.;  Location: SURGERY [facility];  Service: Orthopedics   • IRRIGATION & DEBRIDEMENT ORTHO Right 10/27/2020    Procedure: IRRIGATION AND DEBRIDEMENT, WOUND - KNEE OPEN;  Surgeon: Elijah Faulkner M.D.;  Location: [facility];  Service: Orthopedics   • FLEXOR TENDON REPAIR Left 4/3/2019    Procedure: FLEXOR TENDON REPAIR- SMALL FINGER VS;  Surgeon: Marc Chowdhury M.D.;  Location: SURGERY [facility];  Service: Orthopedics   • TENDON TRANSFER Left 4/3/2019    Procedure: TENDON TRANSFER;  Surgeon: Marc Chowdhury M.D.;  Location: SURGERY [facility];  Service: Orthopedics   • COLONOSCOPY  11/10/2018    Procedure: COLONOSCOPY;  Surgeon: Ganesh Peacock M.D.;  Location: [facility];  Service: Gastroenterology   • NASAL POLYPECTOMY Bilateral 10/6/2015    Procedure: NASAL POLYPECTOMY WITH SCOTT ENDOSCOPIC NASAL DEBRIDEMENT;  Surgeon: Param Maurer M.D.;  Location: SURGERY SAME DAY [facility];  Service:    • CYSTOSCOPY  2/1/2010    Performed by ANGIE VERDUZCO at [facility]   • RETROGRADES  2/1/2010    Performed by ANGIE VERDUZCO at [facility]   • PYELOGRAM  2/1/2010    Performed by ANGIE VERDUZCO at [facility]   • URETEROSCOPY  2/1/2010    Performed by ANGIE VERDUZCO at [facility]   • NEPHRECTOMY LAPAROSCOPIC  2009    left kidney   • TESTICLE EXPLORATION  2004   • PB REMV 2ND CATARACT,CORN-SCLER SECTN     • TONSILLECTOMY         FH:   Family History   Problem Relation Age of Onset   • Genetic Disorder  Father         ALS   • No Known Problems Sister    • No Known Problems Son    • No Known Problems Daughter    • Heart Attack Neg Hx    • Heart Disease Neg Hx    • Heart Failure Neg Hx        SH:   Social History     Socioeconomic History   • Marital status:      Spouse name: Not on file   • Number of children: Not on file   • Years of education: Not on file   • Highest education level: Not on file   Occupational History   • Not on file   Social Needs   • Financial resource strain: Not very hard   • Food insecurity     Worry: Never true     Inability: Never true   • Transportation needs     Medical: No     Non-medical: No   Tobacco Use   • Smoking status: Former Smoker     Packs/day: 1.00     Years: 40.00     Pack years: 40.00     Types: Cigarettes     Quit date: 1980     Years since quittin.1   • Smokeless tobacco: Never Used   • Tobacco comment: 1 pk a day for 40 yrs   Substance and Sexual Activity   • Alcohol use: No     Alcohol/week: 0.0 oz   • Drug use: No   • Sexual activity: Never     Partners: Female     Comment: retired    Lifestyle   • Physical activity     Days per week: Not on file     Minutes per session: Not on file   • Stress: Not on file   Relationships   • Social connections     Talks on phone: Not on file     Gets together: Not on file     Attends Mu-ism service: Not on file     Active member of club or organization: Not on file     Attends meetings of clubs or organizations: Not on file     Relationship status: Not on file   • Intimate partner violence     Fear of current or ex partner: Not on file     Emotionally abused: Not on file     Physically abused: Not on file     Forced sexual activity: Not on file   Other Topics Concern   • Not on file   Social History Narrative    See H&P    Lives with wife       ROS: A 10 system review of systems was negative outside what was listed in the HPI    /80   Pulse 78   Temp 35.9 °C (96.6 °F) (Temporal)   Resp 19   Ht 1.854 m (6'  "1\")   Wt 90.7 kg (200 lb)   SpO2 93%     Physical Exam:  General: AAOx3, NAD  HEENT: normocephalic, atraumatic  Psych: Normal mood and affect  Neck: supple, no pain to motion  Chest/Pulmonary: breathing unlabored, no audible wheezing  Cardio: regular heart rate and rhythm  Neuro: sensation grossly intact to BUE and BLE, moving all four extremities  Skin: Multiple incisions to the right knee and leg including the prepatellar incision that has purulent drainage.  MSK: No obvious knee effusion.  He has pitting edema from the knee to the ankle.  No tenderness to passive and active assisted range of motion of the knee.  Left lower extremity bilateral upper extremities are atraumatic nontender and nonerythematous    Imaging and labs:   Recent Labs     02/01/21 2108 02/02/21  0431   WBC 12.2* 10.2   RBC 3.56* 3.23*   HEMOGLOBIN 11.3* 10.5*   HEMATOCRIT 35.5* 33.0*   MCV 99.7* 102.2*   MCH 31.7 32.5   RDW 55.8* 57.6*   PLATELETCT 197 150*   MPV 10.3 10.2   NEUTSPOLYS 76.20* 74.60*   LYMPHOCYTES 6.50* 10.30*   MONOCYTES 13.20 13.90*   EOSINOPHILS 3.00 0.10   BASOPHILS 0.40 0.60     CRP 35    Assessment:   1. Cellulitis of right leg without foot     2. Infected wound     3. Sepsis without acute organ dysfunction, due to unspecified organism (HCC)         We discussed the diagnosis and findings with the patient at length.  We reviewed possible non operative and operative interventions and the risks and benefits of these.  Given the active purulent drainage and cellulitis and concern for sepsis, recommended debridement in the operating room and repeat cultures.  He very well may need lifelong suppressive antibiotics given his ability to heal these infections. he had a chance to ask questions and all of these were answered to his satisfaction.        Plan:  N.p.o.  OR today for debridement of right knee and aspiration of knee joint  Weightbearing and range of motion as tolerated  Antibiotics      "

## 2021-02-02 NOTE — PROGRESS NOTES
[facility] Medicine Daily Progress Note    Date of Service  2/2/2021    Chief Complaint  80 y.o. male admitted 2/1/2021 with right leg swelling    Hospital Course  An 80 y.o. male who presented 2/1/2021 with leg swelling. Patient with hx of septic arthritis of right knee, s/p multiple surgeries recently completed IV cefepime x4 weeks end date 1/15/2021, COPD, CKD, who presents for right leg swelling. Patient reports right leg swelling, redness, purulent drainage for past week, with associated chills. Denies fever, sweating, joint pain, chest pain, cough, dyspnea. No recent trauma to leg.  In the ED, , RR 22, WBC 12.2, LA 1.6. Sepsis protocol initiated, given IV fluids and started on vancomycin. Orthopedic surgery team to see patient in AM. He will be admitted for evaluation of leg cellulitis.      Interval Problem Update  Patient was seen and examined at bedside, patient's son at bedside.  S/p  incision and drainage of right prepatellar bursa 2/2 early morning by Dr. Sol.   Patient was complaining of congestion during a.m. rounds and asking for Mucinex.  Denied fever, chills, shortness of breath.  Discussed with infectious disease, will continue cefepime.  Patient is well-known to the infectious disease team and they will evaluate the patient tomorrow a.m.      Consultants/Specialty  Orthopedic surgery  ID    Code Status  Full Code    Disposition  TBD    Review of Systems  Review of Systems   Constitutional: Positive for malaise/fatigue. Negative for chills and fever.   HENT: Positive for congestion. Negative for ear discharge, ear pain, sinus pain and sore throat.    Eyes: Negative for blurred vision and double vision.   Respiratory: Negative for cough, sputum production, shortness of breath and wheezing.    Cardiovascular: Negative for chest pain, palpitations and leg swelling.   Gastrointestinal: Negative for abdominal pain, constipation, diarrhea, nausea and vomiting.   Genitourinary: Negative for  dysuria, frequency and urgency.   Musculoskeletal: Positive for joint pain. Negative for myalgias.   Neurological: Negative for dizziness, focal weakness and headaches.   Psychiatric/Behavioral: The patient is not nervous/anxious.         Physical Exam  Temp:  [35.9 °C (96.6 °F)-37.7 °C (99.9 °F)] 37.1 °C (98.8 °F)  Pulse:  [] 95  Resp:  [12-26] 16  BP: (120-165)/(69-97) 165/93  SpO2:  [92 %-100 %] 96 %    Physical Exam  Constitutional:       General: He is not in acute distress.  HENT:      Head: Normocephalic and atraumatic.      Nose: Nose normal.      Mouth/Throat:      Mouth: Mucous membranes are moist.      Pharynx: No posterior oropharyngeal erythema.   Eyes:      General: No scleral icterus.     Extraocular Movements: Extraocular movements intact.      Conjunctiva/sclera: Conjunctivae normal.      Pupils: Pupils are equal, round, and reactive to light.   Neck:      Musculoskeletal: Normal range of motion and neck supple. No neck rigidity.   Cardiovascular:      Rate and Rhythm: Normal rate and regular rhythm.      Pulses: Normal pulses.      Heart sounds: Normal heart sounds. No murmur. No gallop.    Pulmonary:      Effort: Pulmonary effort is normal.      Breath sounds: Normal breath sounds. No stridor. No wheezing, rhonchi or rales.   Abdominal:      General: Bowel sounds are normal.      Palpations: Abdomen is soft.   Musculoskeletal:         General: Swelling and tenderness present.      Comments: Right knee covered in dressing, s/p I & D surgery.   Skin:     General: Skin is warm.   Neurological:      General: No focal deficit present.      Mental Status: He is alert and oriented to person, place, and time.   Psychiatric:         Mood and Affect: Mood normal.         Behavior: Behavior normal.         Fluids    Intake/Output Summary (Last 24 hours) at 2/2/2021 1329  Last data filed at 2/2/2021 1048  Gross per 24 hour   Intake 950 ml   Output 225 ml   Net 725 ml       Laboratory  Recent Labs      02/01/21 2108 02/02/21  0431   WBC 12.2* 10.2   RBC 3.56* 3.23*   HEMOGLOBIN 11.3* 10.5*   HEMATOCRIT 35.5* 33.0*   MCV 99.7* 102.2*   MCH 31.7 32.5   MCHC 31.8* 31.8*   RDW 55.8* 57.6*   PLATELETCT 197 150*   MPV 10.3 10.2     Recent Labs     02/01/21 2108 02/02/21  0431   SODIUM 138 137   POTASSIUM 4.2 4.1   CHLORIDE 102 103   CO2 23 23   GLUCOSE 139* 124*   BUN 26* 26*   CREATININE 1.66* 1.64*   CALCIUM 8.8 8.2*     Recent Labs     02/02/21  0459   APTT 40.1*   INR 1.23*               Imaging  DX-CHEST-PORTABLE (1 VIEW)   Final Result      Stable chest without acute/new abnormality.           Assessment/Plan  * Cellulitis of extremity  Assessment & Plan  History of septic arthritis of R knee s/p multiple surgeries and washout, hx of pseudomonas growth on knee aspiration  S/p 4 week course of cefepime prior to this hospitalization, completed 1/15/2021  New cellulitis with purulent drainage  Wound and blood cultures taken    -S/p  incision and drainage of right prepatellar bursa 2/2 early morning by Dr. Sol.   -Discussed with infectious disease, will continue IV cefepime.  Patient is well-known to the infectious disease team and they will evaluate the patient tomorrow a.m.          Sepsis ([facility])  Assessment & Plan  This is Sepsis Present on admission  SIRS criteria identified on my evaluation include: Tachycardia, with heart rate greater than 90 BPM  Source is leg cellulitis  Sepsis protocol initiated  Fluid resuscitation ordered per protocol  IV antibiotics as appropriate for source of sepsis  While organ dysfunction may be noted elsewhere in this problem list or in the chart, degree of organ dysfunction does not meet CMS criteria for severe sepsis    -S/p  incision and drainage of right prepatellar bursa 2/2 early morning by Dr. Sol.   -Discussed with infectious disease, will continue IV cefepime.  Patient is well-known to the infectious disease team and they will evaluate the patient tomorrow  a.m.        Chronic respiratory failure with hypoxia (HCC)- (present on admission)  Assessment & Plan  Due to COPD, on 2L O2 at night  On 2L in ER  No new respiratory complaints, CXR with no acute abnormalities  Wean oxygen  RT protocol  Mucinex for congestion    Stage 3 chronic kidney disease- (present on admission)  Assessment & Plan  Will monitor         VTE prophylaxis: SCDs  Hold pharmacological DVT prophylaxis until orthopedic surgery clearance

## 2021-02-02 NOTE — ED PROVIDER NOTES
ED Provider Note     Scribed for Eulalia Braun D.O. by Mayda Villar. 2/1/2021, 9:09 PM.     Primary care provider: Carlitos Harvey M.D.  Means of arrival: EMS         History obtained from: Patient  History limited by: None    CHIEF COMPLAINT  Chief Complaint   Patient presents with   • Leg Swelling     has had infection in R leg since feb   • Fever       HPI  Barry Torres is a 80 y.o. male who presents to the emergency Department for evaluation of swelling to the right leg, secondary to infection. He now endorses associated nausea, emesis, drainage, erythema and pain to the right knee. The patient had a cyst found on the right knee, with a subcutaneous connection to another cyst on the inner right lower leg. The patient had both cysts drained in September, but has since had 3 knee surgeries, following reoccurring infection of the area. The first two surgeries were performed by Dr. Faulkner, and the final one, performed in December 2020, was performed by Dr. Espino. The patient's son has been taking care of him over the course of his treatment. He states that he last completed a regimen of midline antibiotics 3 weeks ago. At that time, none of his symptoms were occurring. His son states that his infection has gotten much worse over the course of the past couple of weeks. He has no hardware in the knee from surgical history. He has no fever or joint pain at this time.     REVIEW OF SYSTEMS  Pertinent positives include nausea, emesis, drainage, erythema and pain to the right knee region. Pertinent negatives include no fever or joint pain.   See HPI for further details. All other systems are negative.    PAST MEDICAL HISTORY  Past Medical History:   Diagnosis Date   • Abnormal thyroid stimulating hormone (TSH) level    • Allergic rhinitis    • Asbestosis (HCC)    • Asthma    • Bronchitis    • Chronic diarrhea     declines eval; takes immodium once a day usually and sometimes less; see  previous notes; aware of risk    • Chronic low back pain    • Chronic lung disease     asbestos related   • CKD (chronic kidney disease), stage III     sees neph, one kidney   • COPD (chronic obstructive pulmonary disease) ([facility])    • Coronary artery calcification seen on CAT scan 2016    sees cards   • Epididymitis 2009    h/o listed in chart   • GERD (gastroesophageal reflux disease)    • History of hemorrhoids    • History of skin cancer     non melanoma   • [place] (hard of hearing)     declines hearing aids   • Hypercholesteremia    • Hypertension    • Hypertriglyceridemia    • Indigestion    • Light headedness     2/2 orthostasis? now better off hctz   • Lightheadedness 2016   • Low back pain    • Nasal drainage    • Olecranon bursitis    • Orthostasis 2016    better off HCTZ   • Peripheral neuropathy    • Pleural plaque 2005     CT [place]   • Post-nasal drip    • Pulmonary emphysema ([facility])    • RA (rheumatoid arthritis) ([facility])     on meds, sees rheum   • Restless leg syndrome    • Rheumatoid arthritis ([facility])    • Sleep apnea     obstructive and central - not adherent to autopap   • Solitary kidney     history of kidney cyst 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-functioning kidney s/p nephrectomy        FAMILY HISTORY  Family History   Problem Relation Age of Onset   • Genetic Disorder Father         ALS   • No Known Problems Sister    • No Known Problems Son    • No Known Problems Daughter    • Heart Attack Neg Hx    • Heart Disease Neg Hx    • Heart Failure Neg Hx        SOCIAL HISTORY  Social History     Tobacco Use   • Smoking status: Former Smoker     Packs/day: 1.00     Years: 40.00     Pack years: 40.00     Types: Cigarettes     Quit date: 1980     Years since quittin.1   • Smokeless tobacco: Never Used   • Tobacco comment: 1 pk a day for 40 yrs   Substance Use Topics   • Alcohol use: No     Alcohol/week: 0.0 oz   • Drug use: No      Social History     Substance and Sexual Activity   Drug Use No       SURGICAL  HISTORY  Past Surgical History:   Procedure Laterality Date   • INCISION AND DRAINAGE GENERAL Right 12/19/2020    Procedure: INCISION AND DRAINAGE;  Surgeon: Marc Chowdhury M.D.;  Location: SURGERY [facility];  Service: Orthopedics   • IRRIGATION & DEBRIDEMENT ORTHO Right 10/27/2020    Procedure: IRRIGATION AND DEBRIDEMENT, WOUND - KNEE OPEN;  Surgeon: Eliajh Faulkner M.D.;  Location: SURGERY [facility];  Service: Orthopedics   • FLEXOR TENDON REPAIR Left 4/3/2019    Procedure: FLEXOR TENDON REPAIR- SMALL FINGER VS;  Surgeon: Marc Chowdhury M.D.;  Location: SURGERY [facility];  Service: Orthopedics   • TENDON TRANSFER Left 4/3/2019    Procedure: TENDON TRANSFER;  Surgeon: Marc Chowdhury M.D.;  Location: SURGERY [facility];  Service: Orthopedics   • COLONOSCOPY  11/10/2018    Procedure: COLONOSCOPY;  Surgeon: Ganesh Peacock M.D.;  Location: SURGERY [facility];  Service: Gastroenterology   • NASAL POLYPECTOMY Bilateral 10/6/2015    Procedure: NASAL POLYPECTOMY WITH SCOTT ENDOSCOPIC NASAL DEBRIDEMENT;  Surgeon: Param Maurer M.D.;  Location: SURGERY SAME DAY [facility];  Service:    • CYSTOSCOPY  2/1/2010    Performed by ANGIE VERDUZCO at [facility]   • RETROGRADES  2/1/2010    Performed by ANGIE VERDUZCO at [facility]   • PYELOGRAM  2/1/2010    Performed by ANGIE VERDUZCO at [facility]   • URETEROSCOPY  2/1/2010    Performed by ANGIE VERDUZCO at [facility]   • NEPHRECTOMY LAPAROSCOPIC  2009    left kidney   • TESTICLE EXPLORATION  2004   • PB REMV 2ND CATARACT,CORN-SCLER SECTN     • TONSILLECTOMY         CURRENT MEDICATIONS    Current Facility-Administered Medications:   •  lactated ringers infusion (BOLUS): BMI less than or equal to 30, 30 mL/kg, Intravenous, Once PRN, GEOVANNI Leiva.O.  •  lactated ringers infusion, 1,000 mL, Intravenous, Continuous, Eulalia Braun D.O., Last Rate: 125  mL/hr at 02/01/21 2244, 1,000 mL at 02/01/21 2244  •  vancomycin (VANCOCIN) 2,250 mg in  mL IVPB, 25 mg/kg, Intravenous, Once, Eulalia Braun D.O., Last Rate: 166.7 mL/hr at 02/01/21 2211, 2,250 mg at 02/01/21 2211    Current Outpatient Medications:   •  acetaminophen (TYLENOL) 325 MG Tab, Take 650 mg by mouth every four hours as needed. Indications: Pain, Disp: , Rfl:   •  fluticasone (FLONASE) 50 MCG/ACT nasal spray, FLONASE ALLERGY RELIEF SUSP, Disp: , Rfl:   •  guaiFENesin LA (MUCINEX) 600 MG TABLET SR 12 HR, Take 600 mg by mouth 2 Times a Day., Disp: , Rfl:   •  Loperamide HCl (IMODIUM A-D PO), Take 1 Tab by mouth every day., Disp: , Rfl:   •  Budesonide-Formoterol Fumarate (SYMBICORT INH), Inhale 2 Puffs 2 Times a Day., Disp: , Rfl:   •  fexofenadine (ALLEGRA ALLERGY) 180 MG tablet, ALLEGRA ALLERGY 180 MG TABS, Disp: , Rfl:   •  gabapentin (NEURONTIN) 300 MG Cap, TAKE 1 CAPSULE BY MOUTH THREE TIMES A DAY (Patient taking differently: Take 300-600 mg by mouth 2 Times a Day. Pt takes 1 tab qam and 2 tabs qhs), Disp: 180 Cap, Rfl: 0  •  rosuvastatin (CRESTOR) 5 MG Tab, TAKE 1 TABLET BY MOUTH EVERY EVENING, Disp: 90 Tab, Rfl: 1  •  tiotropium (SPIRIVA) 18 MCG Cap, Inhale 1 Cap by mouth every day., Disp: 30 Cap, Rfl: 3  •  leflunomide (ARAVA) 20 MG Tab, Take 1 Tab by mouth every day., Disp: 30 Tab, Rfl: 0    ALLERGIES  Allergies   Allergen Reactions   • Ciprofloxacin      Other reaction(s): muscle aches  Pt's family states that Pt had a reaction when he was a kid noted 2/1/2021   • Levaquin      Other reaction(s): Muscle aches  Muscle aches  Other reaction(s): Muscle aches  Pt's son states that Pt had a reaction when he was a kid noted 2/1/2021   • Penicillins Hives     Rash and asthma attack when a child - wife states he has had Keflex in the past and tolerated  Pt's son states that Pt had a reaction when he was a kid noted 2/1/2021        PHYSICAL EXAM  VITAL SIGNS: /70   Pulse 87   Temp 37.7 °C  "(99.9 °F) (Tympanic)   Resp (!) 26   Ht 1.854 m (6' 1\")   Wt 90.7 kg (200 lb)   SpO2 93%   BMI 26.39 kg/m²     Constitutional: Patient is an ill-appearing elderly male in moderate distress.  HENT: Normocephalic, very dry oropharynx with a parched tongue and lips..  Eyes: PERRL, EOMI   Neck: Supple. Normal range of motion   Lymphatic: No lymphadenopathy noted.   Cardiovascular: Tachycardic without murmur  Thorax & Lungs: Clear and equal breath sounds with good excursion. No respiratory distress, no rhonchi, wheezing or rales.   Abdomen: Bowel sounds normal in all four quadrants. Soft,nontender   Skin: 10x10 cm area of increased erythema and warmth to the right knee. Yellowish drainage. Warm, Dry, No rashes.    Extremities: Peripheral pulses 4/4, right knee reveals limited range of motion secondary to swelling and pain.  Neurologic: Alert & oriented x 3, Normal motor function, Normal sensory function, No lateralizing or focal deficits noted. DTR's 4/4 bilaterally.  Psychiatric: Affect normal, Judgment normal, Mood normal.     DIAGNOSTICS/PROCEDURES    LABS  Results for orders placed or performed during the hospital encounter of 02/01/21   Lactic acid (lactate)   Result Value Ref Range    Lactic Acid 1.6 0.5 - 2.0 mmol/L   CBC WITH DIFFERENTIAL   Result Value Ref Range    WBC 12.2 (H) 4.8 - 10.8 K/uL    RBC 3.56 (L) 4.70 - 6.10 M/uL    Hemoglobin 11.3 (L) 14.0 - 18.0 g/dL    Hematocrit 35.5 (L) 42.0 - 52.0 %    MCV 99.7 (H) 81.4 - 97.8 fL    MCH 31.7 27.0 - 33.0 pg    MCHC 31.8 (L) 33.7 - 35.3 g/dL    RDW 55.8 (H) 35.9 - 50.0 fL    Platelet Count 197 164 - 446 K/uL    MPV 10.3 9.0 - 12.9 fL    Neutrophils-Polys 76.20 (H) 44.00 - 72.00 %    Lymphocytes 6.50 (L) 22.00 - 41.00 %    Monocytes 13.20 0.00 - 13.40 %    Eosinophils 3.00 0.00 - 6.90 %    Basophils 0.40 0.00 - 1.80 %    Immature Granulocytes 0.70 0.00 - 0.90 %    Nucleated RBC 0.00 /100 WBC    Neutrophils (Absolute) 9.30 (H) 1.82 - 7.42 K/uL    Lymphs " (Absolute) 0.80 (L) 1.00 - 4.80 K/uL    Monos (Absolute) 1.61 (H) 0.00 - 0.85 K/uL    Eos (Absolute) 0.37 0.00 - 0.51 K/uL    Baso (Absolute) 0.05 0.00 - 0.12 K/uL    Immature Granulocytes (abs) 0.09 0.00 - 0.11 K/uL    NRBC (Absolute) 0.00 K/uL   SARS-COV Antigen [place]: Collect dry nasal swab AND NP swab in VTM   Result Value Ref Range    SARS-CoV-2 Source Nasal Swab    Labs reviewed by me      RADIOLOGY/PROCEDURES  DX-CHEST-PORTABLE (1 VIEW)   Final Result      Stable chest without acute/new abnormality.        Results and radiologist interpretation reviewed by me.     COURSE & MEDICAL DECISION MAKING  Pertinent Labs & Imaging studies reviewed. (See chart for details)    9:09 PM - Patient seen and evaluated at bedside. Patient has a wet sound when he breaths and speaks. I verified that the patient was wearing a mask and I was wearing appropriate PPE every time I entered the room. The patient's mask was on the patient at all times during my encounter except for a brief view of the oropharynx. Ordered for DX-chest, Lactic acid, Sars-COV, ProBrain Natriuretic Peptide NT, Cortisol, Magnesium, Phosphorus, APTT, Prothrombin Time, D-dimer, Procalcitonin, Culture wound, CBC with differential, UA, Urine culture, Blood culture to evaluate. Patient will be treated with lactated ringer infusion, morphine, Zofran, Vancocin, Tylenol, Bolus, for his symptoms. Differential diagnoses include, but are not limited to, septic arthritis, Cellulitis, Post-op wound infection  sepsis protocol was initiated, patient's labs were not to remarkable.  His white count is 12.2 he is no significant left shift.  Covid swab was obtained, lactic acid was 1.6.  Chest x-ray is unremarkable.  Patient was given vancomycin.    10:47 PM PM I discussed the patient's case and the above findings with Dr. Viera (Ortho) who agreed to see the patient first thing in the morning.     10:47 PM- I discussed the patient's case and the above findings with Dr. Novoa  (hospitalist) who agrees to hospitalize the patient and take over the plan of care from here.     11:18 PM- I informed the patient of plans of hospitalization. He understands and gives verbal agreement to the plan of care at this time.     DISPOSITION:  Patient will be hospitalized by Dr. Novoa in guarded condition.    FINAL IMPRESSION  1. Cellulitis of right leg without foot    2. Infected wound    3. Sepsis without acute organ dysfunction, due to unspecified organism (HCC)         Mayda GUO ([place]), am scribing for, and in the presence of, Eulalia Braun D.O..    Electronically signed by: Mayda Villar ([place]), 2/1/2021    Eulalia GUO D.O. personally performed the services described in this documentation, as scribed by Mayda Villar in my presence, and it is both accurate and complete.    The note accurately reflects work and decisions made by me.  Eulalia Braun D.O.  2/2/2021  5:20 AM

## 2021-02-03 ENCOUNTER — PATIENT OUTREACH (OUTPATIENT)
Dept: HEALTH INFORMATION MANAGEMENT | Facility: OTHER | Age: 81
End: 2021-02-03

## 2021-02-03 LAB
ALBUMIN SERPL BCP-MCNC: 3.1 G/DL (ref 3.2–4.9)
ALBUMIN/GLOB SERPL: 1.1 G/DL
ALP SERPL-CCNC: 66 U/L (ref 30–99)
ALT SERPL-CCNC: 18 U/L (ref 2–50)
ANION GAP SERPL CALC-SCNC: 10 MMOL/L (ref 7–16)
APPEARANCE FLD: NORMAL
AST SERPL-CCNC: 30 U/L (ref 12–45)
BASOPHILS # BLD AUTO: 0.4 % (ref 0–1.8)
BASOPHILS # BLD: 0.03 K/UL (ref 0–0.12)
BILIRUB SERPL-MCNC: 0.6 MG/DL (ref 0.1–1.5)
BODY FLD TYPE: NORMAL
BUN SERPL-MCNC: 24 MG/DL (ref 8–22)
CALCIUM SERPL-MCNC: 8.3 MG/DL (ref 8.5–10.5)
CHLORIDE SERPL-SCNC: 107 MMOL/L (ref 96–112)
CO2 SERPL-SCNC: 23 MMOL/L (ref 20–33)
COLOR FLD: YELLOW
CREAT SERPL-MCNC: 1.37 MG/DL (ref 0.5–1.4)
EOSINOPHIL # BLD AUTO: 0.16 K/UL (ref 0–0.51)
EOSINOPHIL NFR BLD: 2.1 % (ref 0–6.9)
ERYTHROCYTE [DISTWIDTH] IN BLOOD BY AUTOMATED COUNT: 57.1 FL (ref 35.9–50)
GLOBULIN SER CALC-MCNC: 2.9 G/DL (ref 1.9–3.5)
GLUCOSE SERPL-MCNC: 97 MG/DL (ref 65–99)
HCT VFR BLD AUTO: 32.3 % (ref 42–52)
HGB BLD-MCNC: 9.9 G/DL (ref 14–18)
HISTIOCYTES NFR FLD: 9 %
IMM GRANULOCYTES # BLD AUTO: 0.03 K/UL (ref 0–0.11)
IMM GRANULOCYTES NFR BLD AUTO: 0.4 % (ref 0–0.9)
LYMPHOCYTES # BLD AUTO: 0.78 K/UL (ref 1–4.8)
LYMPHOCYTES NFR BLD: 10.4 % (ref 22–41)
LYMPHOCYTES NFR FLD: 60 %
MCH RBC QN AUTO: 31.2 PG (ref 27–33)
MCHC RBC AUTO-ENTMCNC: 30.7 G/DL (ref 33.7–35.3)
MCV RBC AUTO: 101.9 FL (ref 81.4–97.8)
MESOTHL CELL NFR FLD: 1 %
MONOCYTES # BLD AUTO: 0.99 K/UL (ref 0–0.85)
MONOCYTES NFR BLD AUTO: 13.1 % (ref 0–13.4)
MONONUC CELLS NFR FLD: 30 %
NEUTROPHILS # BLD AUTO: 5.54 K/UL (ref 1.82–7.42)
NEUTROPHILS NFR BLD: 73.6 % (ref 44–72)
NRBC # BLD AUTO: 0 K/UL
NRBC BLD-RTO: 0 /100 WBC
PLATELET # BLD AUTO: 163 K/UL (ref 164–446)
PMV BLD AUTO: 10.8 FL (ref 9–12.9)
POTASSIUM SERPL-SCNC: 4.1 MMOL/L (ref 3.6–5.5)
PROT SERPL-MCNC: 6 G/DL (ref 6–8.2)
RBC # BLD AUTO: 3.17 M/UL (ref 4.7–6.1)
RBC # FLD: >2000 CELLS/UL
SODIUM SERPL-SCNC: 140 MMOL/L (ref 135–145)
WBC # BLD AUTO: 7.5 K/UL (ref 4.8–10.8)
WBC # FLD: 180 CELLS/UL

## 2021-02-03 PROCEDURE — A9270 NON-COVERED ITEM OR SERVICE: HCPCS | Performed by: INTERNAL MEDICINE

## 2021-02-03 PROCEDURE — 99223 1ST HOSP IP/OBS HIGH 75: CPT | Mod: GC | Performed by: INTERNAL MEDICINE

## 2021-02-03 PROCEDURE — 36415 COLL VENOUS BLD VENIPUNCTURE: CPT

## 2021-02-03 PROCEDURE — 700111 HCHG RX REV CODE 636 W/ 250 OVERRIDE (IP): Performed by: INTERNAL MEDICINE

## 2021-02-03 PROCEDURE — 700101 HCHG RX REV CODE 250

## 2021-02-03 PROCEDURE — 80053 COMPREHEN METABOLIC PANEL: CPT

## 2021-02-03 PROCEDURE — 85025 COMPLETE CBC W/AUTO DIFF WBC: CPT

## 2021-02-03 PROCEDURE — 770006 HCHG ROOM/CARE - MED/SURG/GYN SEMI*

## 2021-02-03 PROCEDURE — A9270 NON-COVERED ITEM OR SERVICE: HCPCS | Performed by: STUDENT IN AN ORGANIZED HEALTH CARE EDUCATION/TRAINING PROGRAM

## 2021-02-03 PROCEDURE — 97162 PT EVAL MOD COMPLEX 30 MIN: CPT

## 2021-02-03 PROCEDURE — 700105 HCHG RX REV CODE 258: Performed by: STUDENT IN AN ORGANIZED HEALTH CARE EDUCATION/TRAINING PROGRAM

## 2021-02-03 PROCEDURE — 700111 HCHG RX REV CODE 636 W/ 250 OVERRIDE (IP): Performed by: STUDENT IN AN ORGANIZED HEALTH CARE EDUCATION/TRAINING PROGRAM

## 2021-02-03 PROCEDURE — 99232 SBSQ HOSP IP/OBS MODERATE 35: CPT | Performed by: STUDENT IN AN ORGANIZED HEALTH CARE EDUCATION/TRAINING PROGRAM

## 2021-02-03 PROCEDURE — 97166 OT EVAL MOD COMPLEX 45 MIN: CPT

## 2021-02-03 PROCEDURE — 700102 HCHG RX REV CODE 250 W/ 637 OVERRIDE(OP): Performed by: STUDENT IN AN ORGANIZED HEALTH CARE EDUCATION/TRAINING PROGRAM

## 2021-02-03 PROCEDURE — 96376 TX/PRO/DX INJ SAME DRUG ADON: CPT

## 2021-02-03 PROCEDURE — 94640 AIRWAY INHALATION TREATMENT: CPT

## 2021-02-03 PROCEDURE — 97535 SELF CARE MNGMENT TRAINING: CPT

## 2021-02-03 PROCEDURE — 700102 HCHG RX REV CODE 250 W/ 637 OVERRIDE(OP): Performed by: INTERNAL MEDICINE

## 2021-02-03 RX ORDER — PROCHLORPERAZINE EDISYLATE 5 MG/ML
10 INJECTION INTRAMUSCULAR; INTRAVENOUS EVERY 6 HOURS PRN
Status: DISCONTINUED | OUTPATIENT
Start: 2021-02-03 | End: 2021-02-09 | Stop reason: HOSPADM

## 2021-02-03 RX ORDER — FLUTICASONE PROPIONATE 50 MCG
2 SPRAY, SUSPENSION (ML) NASAL DAILY
Status: DISCONTINUED | OUTPATIENT
Start: 2021-02-03 | End: 2021-02-09 | Stop reason: HOSPADM

## 2021-02-03 RX ORDER — BUDESONIDE AND FORMOTEROL FUMARATE DIHYDRATE 80; 4.5 UG/1; UG/1
2 AEROSOL RESPIRATORY (INHALATION) 2 TIMES DAILY
Status: DISCONTINUED | OUTPATIENT
Start: 2021-02-03 | End: 2021-02-09 | Stop reason: HOSPADM

## 2021-02-03 RX ORDER — HEPARIN SODIUM 5000 [USP'U]/ML
5000 INJECTION, SOLUTION INTRAVENOUS; SUBCUTANEOUS EVERY 8 HOURS
Status: DISCONTINUED | OUTPATIENT
Start: 2021-02-03 | End: 2021-02-08

## 2021-02-03 RX ORDER — LEFLUNOMIDE 20 MG/1
20 TABLET ORAL DAILY
Status: DISCONTINUED | OUTPATIENT
Start: 2021-02-03 | End: 2021-02-09 | Stop reason: HOSPADM

## 2021-02-03 RX ORDER — GABAPENTIN 300 MG/1
600 CAPSULE ORAL EVERY EVENING
Status: DISCONTINUED | OUTPATIENT
Start: 2021-02-03 | End: 2021-02-09 | Stop reason: HOSPADM

## 2021-02-03 RX ORDER — FEXOFENADINE HCL 180 MG/1
180 TABLET ORAL DAILY
Status: DISCONTINUED | OUTPATIENT
Start: 2021-02-03 | End: 2021-02-03

## 2021-02-03 RX ORDER — LINEZOLID 600 MG/1
600 TABLET, FILM COATED ORAL EVERY 12 HOURS
Status: DISCONTINUED | OUTPATIENT
Start: 2021-02-03 | End: 2021-02-03

## 2021-02-03 RX ORDER — GABAPENTIN 300 MG/1
300 CAPSULE ORAL 2 TIMES DAILY
Status: DISCONTINUED | OUTPATIENT
Start: 2021-02-03 | End: 2021-02-03

## 2021-02-03 RX ORDER — CEFAZOLIN SODIUM 2 G/100ML
2 INJECTION, SOLUTION INTRAVENOUS EVERY 8 HOURS
Status: DISCONTINUED | OUTPATIENT
Start: 2021-02-03 | End: 2021-02-09 | Stop reason: HOSPADM

## 2021-02-03 RX ORDER — GUAIFENESIN 600 MG/1
1200 TABLET, EXTENDED RELEASE ORAL 2 TIMES DAILY
Status: DISCONTINUED | OUTPATIENT
Start: 2021-02-03 | End: 2021-02-09 | Stop reason: HOSPADM

## 2021-02-03 RX ORDER — GABAPENTIN 300 MG/1
300 CAPSULE ORAL EVERY MORNING
Status: DISCONTINUED | OUTPATIENT
Start: 2021-02-03 | End: 2021-02-09 | Stop reason: HOSPADM

## 2021-02-03 RX ADMIN — ALBUTEROL SULFATE 2.5 MG: 2.5 SOLUTION RESPIRATORY (INHALATION) at 07:16

## 2021-02-03 RX ADMIN — LEFLUNOMIDE 20 MG: 20 TABLET ORAL at 08:33

## 2021-02-03 RX ADMIN — ONDANSETRON 4 MG: 2 INJECTION INTRAMUSCULAR; INTRAVENOUS at 20:20

## 2021-02-03 RX ADMIN — HEPARIN SODIUM 5000 UNITS: 5000 INJECTION, SOLUTION INTRAVENOUS; SUBCUTANEOUS at 22:19

## 2021-02-03 RX ADMIN — LINEZOLID 600 MG: 600 TABLET, FILM COATED ORAL at 11:41

## 2021-02-03 RX ADMIN — CEFEPIME 2 G: 2 INJECTION, POWDER, FOR SOLUTION INTRAVENOUS at 11:41

## 2021-02-03 RX ADMIN — GUAIFENESIN 600 MG: 600 TABLET, EXTENDED RELEASE ORAL at 05:00

## 2021-02-03 RX ADMIN — DOCUSATE SODIUM 50 MG AND SENNOSIDES 8.6 MG 2 TABLET: 8.6; 5 TABLET, FILM COATED ORAL at 05:00

## 2021-02-03 RX ADMIN — GLYCOPYRROLATE 1 CAPSULE: 15.6 CAPSULE RESPIRATORY (INHALATION) at 21:37

## 2021-02-03 RX ADMIN — BUDESONIDE AND FORMOTEROL FUMARATE DIHYDRATE 2 PUFF: 80; 4.5 AEROSOL RESPIRATORY (INHALATION) at 08:33

## 2021-02-03 RX ADMIN — PROCHLORPERAZINE EDISYLATE 10 MG: 5 INJECTION INTRAMUSCULAR; INTRAVENOUS at 22:19

## 2021-02-03 RX ADMIN — GUAIFENESIN 1200 MG: 600 TABLET, EXTENDED RELEASE ORAL at 17:15

## 2021-02-03 RX ADMIN — FLUTICASONE PROPIONATE 100 MCG: 50 SPRAY, METERED NASAL at 08:33

## 2021-02-03 RX ADMIN — GLYCOPYRROLATE 1 CAPSULE: 15.6 CAPSULE RESPIRATORY (INHALATION) at 08:34

## 2021-02-03 RX ADMIN — GUAIFENESIN 600 MG: 600 TABLET, EXTENDED RELEASE ORAL at 08:42

## 2021-02-03 RX ADMIN — GABAPENTIN 300 MG: 300 CAPSULE ORAL at 08:42

## 2021-02-03 RX ADMIN — ACETAMINOPHEN 650 MG: 325 TABLET ORAL at 08:33

## 2021-02-03 RX ADMIN — ONDANSETRON 4 MG: 2 INJECTION INTRAMUSCULAR; INTRAVENOUS at 04:23

## 2021-02-03 RX ADMIN — HEPARIN SODIUM 5000 UNITS: 5000 INJECTION, SOLUTION INTRAVENOUS; SUBCUTANEOUS at 13:54

## 2021-02-03 RX ADMIN — BUDESONIDE AND FORMOTEROL FUMARATE DIHYDRATE 2 PUFF: 80; 4.5 AEROSOL RESPIRATORY (INHALATION) at 17:15

## 2021-02-03 RX ADMIN — ACETAMINOPHEN 650 MG: 325 TABLET ORAL at 17:22

## 2021-02-03 RX ADMIN — CEFAZOLIN SODIUM 2 G: 2 INJECTION, SOLUTION INTRAVENOUS at 22:19

## 2021-02-03 RX ADMIN — CEFAZOLIN SODIUM 2 G: 2 INJECTION, SOLUTION INTRAVENOUS at 13:54

## 2021-02-03 RX ADMIN — GABAPENTIN 600 MG: 300 CAPSULE ORAL at 17:15

## 2021-02-03 ASSESSMENT — COGNITIVE AND FUNCTIONAL STATUS - GENERAL
SUGGESTED CMS G CODE MODIFIER MOBILITY: CL
MOVING FROM LYING ON BACK TO SITTING ON SIDE OF FLAT BED: UNABLE
SUGGESTED CMS G CODE MODIFIER DAILY ACTIVITY: CK
DRESSING REGULAR UPPER BODY CLOTHING: A LITTLE
DRESSING REGULAR LOWER BODY CLOTHING: A LOT
TURNING FROM BACK TO SIDE WHILE IN FLAT BAD: UNABLE
TOILETING: A LOT
MOVING TO AND FROM BED TO CHAIR: UNABLE
MOBILITY SCORE: 11
EATING MEALS: A LITTLE
PERSONAL GROOMING: A LITTLE
DAILY ACTIVITIY SCORE: 15
CLIMB 3 TO 5 STEPS WITH RAILING: A LOT
WALKING IN HOSPITAL ROOM: A LITTLE
HELP NEEDED FOR BATHING: A LOT
STANDING UP FROM CHAIR USING ARMS: A LITTLE

## 2021-02-03 ASSESSMENT — GAIT ASSESSMENTS
GAIT LEVEL OF ASSIST: MINIMAL ASSIST
ASSISTIVE DEVICE: FRONT WHEEL WALKER
DEVIATION: STEP TO;BRADYKINETIC
DISTANCE (FEET): 10

## 2021-02-03 ASSESSMENT — ENCOUNTER SYMPTOMS
SINUS PAIN: 0
DIZZINESS: 0
FEVER: 0
CONSTIPATION: 0
DOUBLE VISION: 0
VOMITING: 0
WHEEZING: 0
NAUSEA: 0
MYALGIAS: 0
SHORTNESS OF BREATH: 0
BLURRED VISION: 0
HEADACHES: 0
CHILLS: 0
NERVOUS/ANXIOUS: 0
SORE THROAT: 0
FOCAL WEAKNESS: 0
PALPITATIONS: 0
SPUTUM PRODUCTION: 0
DIARRHEA: 0
COUGH: 0
ABDOMINAL PAIN: 0

## 2021-02-03 ASSESSMENT — PAIN DESCRIPTION - PAIN TYPE
TYPE: ACUTE PAIN;SURGICAL PAIN
TYPE: ACUTE PAIN
TYPE: ACUTE PAIN;SURGICAL PAIN

## 2021-02-03 ASSESSMENT — ACTIVITIES OF DAILY LIVING (ADL): TOILETING: INDEPENDENT

## 2021-02-03 ASSESSMENT — FIBROSIS 4 INDEX: FIB4 SCORE: 3.51

## 2021-02-03 NOTE — RESPIRATORY CARE
COPD EDUCATION by COPD CLINICAL EDUCATOR  2/3/2021 at 3:45 PM by Jose Miguel Thomas, RRT     COPD Education provided    Patient interviewed by COPD education team.  Patient unable to participate in full program.  Short intervention has been conducted.  A comprehensive packet including information about COPD, treatments, and smoking cessation given.    Action Plan: Completed and reviewed with patient.    Pulmonary Function Testing (PFT)? 5/2019    Does patient currently use inhalers/nebulizer at home? Albuterol PRN, Symbicort BID, Spiriva Qday. Reviewed proper use of inhalers with spacer.    Additional medication/equipment recommendations: No recommendations at this time.    Is all Respiratory DME in place? Patient uses [employer] portable oxygen, 2L at [facility]. Does not use a home care company for equipment.                     Have all necessary follow up appointments been scheduled?   PCP or D/C clinic Pt declines any help with follow up appointments.    Specialty No longer sees [facility], last visit 2018.     Palliative Care Team involved in care, been suggested or consulted? No, patient declines.    Home Health referral placed? No, patient lives with wife, declines at this time.

## 2021-02-03 NOTE — THERAPY
Physical Therapy   Initial Evaluation     Patient Name: Barry Torres  Age:  80 y.o., Sex:  male  Medical Record #: [id number]  Today's Date: 2/3/2021     Precautions: Fall Risk, Weight Bearing As Tolerated Right Lower Extremity, Immobilizer Right Lower Extremity    Assessment  Patient is 80 y.o. male presenting to physical therapy s/p I&D R prepatellar bursa with excisional debridement and wound vac placement; WBAT in knee immobilizer, no knee ROM x2 weeks. Pt demonstrates impairments in functional mobility, balance, gait and activity tolerance related to post operative status and restrictions. Pt tolerated WBAT on R LE and ambulation 2x10 ft with FWW well, slow gait and noted dizziness on 3L O2. Pt does require extra time to initiate and execute task requested. PT to follow while in house.       Plan    Recommend Physical Therapy 4 times per week until therapy goals are met for the following treatments:  Bed Mobility, Equipment, Gait Training, Neuro Re-Education / Balance, Self Care/Home Evaluation, Stair Training, Therapeutic Activities and Therapeutic Exercises    DC Equipment Recommendations: None  Discharge Recommendations: Recommend post-acute placement for additional physical therapy services prior to discharge home(currently; however, will continue to assess)        02/03/21 1053   Precautions   Precautions Fall Risk;Weight Bearing As Tolerated Right Lower Extremity;Immobilizer Right Lower Extremity   Comments knee immobilizer on at all times, no ROM for 2 weeks   Vitals   O2 (LPM) 3   O2 Delivery Device Silicone Nasal Cannula   Pain 0 - 10 Group   Therapist Pain Assessment During Activity;Nurse Notified  (pain during mobility not rated)   Prior Living Situation   Housing / Facility 1 Story House   Steps Into Home 1   Steps In Home 0   Equipment Owned Front-Wheel Walker;4-Wheel Walker;Tub / Shower Seat   Lives with - Patient's Self Care Capacity Spouse   Comments pt reports spouse is able to help but  "\"she can't lift me off the floor\"   Prior Level of Functional Mobility   Bed Mobility Independent   Transfer Status Independent   Ambulation Independent   Distance Ambulation (Feet)   (limited community)   Assistive Devices Used Front-Wheel Walker   Stairs Independent   History of Falls   Date of Last Fall   (reports recent fall, used a chair to help him get up)   Cognition    Speech/ Communication Hard of Hearing   Level of Consciousness Alert   Comments pleasant, requires extra time to initiate and execute task. Son present during therapy session   Active ROM Lower Body    Active ROM Lower Body  X   Comments R LE in knee immobilizer, otherwise WFL   Strength Lower Body   Lower Body Strength  X   Comments Able to perform partial SLR R LE during bed mobility   Balance Assessment   Sitting Balance (Static) Fair   Sitting Balance (Dynamic) Fair   Standing Balance (Static) Fair -   Standing Balance (Dynamic) Poor +   Weight Shift Sitting Fair   Weight Shift Standing Fair   Comments w/ FWW   Gait Analysis   Gait Level Of Assist Minimal Assist   Assistive Device Front Wheel Walker   Distance (Feet) 10   # of Times Distance was Traveled 2   Deviation Step To;Bradykinetic   # of Stairs Climbed 0   Weight Bearing Status WBAT R LE in immobilizer   Comments no increased pain with WBing through R LE   Bed Mobility    Supine to Sit Moderate Assist   Sit to Supine Minimal Assist   Scooting Minimal Assist   Rolling Minimal Assist to Rt.   Comments HOB at 15*, railing used   Functional Mobility   Sit to Stand Minimal Assist   Bed, Chair, Wheelchair Transfer Minimal Assist   Transfer Method Stand Step   Comments cues for safety to ensure pt reaches seated destination   Patient / Family Goals    Patient / Family Goal #1 to return home   Short Term Goals    Short Term Goal # 1 pt will perform supine <> sit with HOB flat, no railing and SPV in 6 vistis   Short Term Goal # 2 pt will perform sit <> stand and functional transfers with " LRAD and SPV in 6 visits   Short Term Goal # 3 pt will ambulate > 150 ft with LRAD and SPV to access home in 6 visits   Short Term Goal # 4 pt will negotiate 1 step with LRAD and SPV to access home in 6 visits   Anticipated Discharge Equipment and Recommendations   DC Equipment Recommendations None   Discharge Recommendations Recommend post-acute placement for additional physical therapy services prior to discharge home  (currently; however, will continue to assess)

## 2021-02-03 NOTE — THERAPY
Occupational Therapy   Initial Evaluation     Patient Name: Barry Torres  Age:  80 y.o., Sex:  male  Medical Record #: [id number]  Today's Date: 2/3/2021     Precautions  Precautions: Fall Risk, Weight Bearing As Tolerated Right Lower Extremity, Immobilizer Right Lower Extremity  Comments: knee immobilizer on at all times, no knee ROM for 2weeks, wound vac RLE    Assessment  Patient is 80 y.o. male presents to skilled OT services following I&D of R prepatellar bursa with excisional debridement with wound vac placement and arthrocentesis of R knee, WBAT with knee immobilizer on at all times. Pt currently requires significant time to carry out a transitional movements, limited by c/o nausea during session. Pt demonstrates decreased balance, decreased attention, cues for task progression, generalized strength and endurance deficits limiting pt's safety and independence with ADLs and functional mobility at this time. Will follow while in house and post acute placement recommended at this time.     Plan    Recommend Occupational Therapy 4 times per week until therapy goals are met for the following treatments:  Adaptive Equipment, Self Care/Activities of Daily Living, Therapeutic Activities and Therapeutic Exercises.    DC Equipment Recommendations: Unable to determine at this time  Discharge Recommendations: Recommend post-acute placement for additional occupational therapy services prior to discharge home     Objective       02/03/21 1001   Prior Living Situation   Housing / Facility 1 Story House   Steps Into Home 1   Bathroom Set up Walk In Shower;Grab Bars;Shower Chair   Equipment Owned Front-Wheel Walker;4-Wheel Walker;Tub / Shower Seat;Oxygen;Grab Bar(s) In Tub / Shower;Grab Bar(s) By Toilet   Lives with - Patient's Self Care Capacity Spouse   Comments I with ADLs/IADLs baseline. Spouse can assist with IADLs but doesn't drive. Son present during evalaution and reports he lives in [place]   Prior Level of ADL  Function   Self Feeding Independent   Grooming / Hygiene Independent   Bathing Independent   Dressing Independent   Toileting Independent   Prior Level of IADL Function   Medication Management Independent   Laundry Requires Assist   Kitchen Mobility Independent   Finances Independent   Home Management Requires Assist   Shopping Independent   Prior Level Of Mobility Independent With Device in Home   Driving / Transportation Driving Independent   Occupation (Pre-Hospital Vocational) Retired Due To Age   Cognition    Cognition / Consciousness X   Speech/ Communication Hard of Hearing   Level of Consciousness Alert   Comments Pleasant, increased time requires to carry out direction d/t fatigue and distractibiliy, c/o n/v   Balance Assessment   Sitting Balance (Static) Fair   Sitting Balance (Dynamic) Fair -   Standing Balance (Static) Fair -   Standing Balance (Dynamic) Fair -   Weight Shift Sitting Fair   Weight Shift Standing Fair   Comments w/FWW   Bed Mobility    Supine to Sit Moderate Assist   Sit to Supine Moderate Assist   Scooting Supervised  (seated)   Rolling   (sit pivot)   Comments slightly raised hob and use of gb, cues for sequencing   ADL Assessment   Eating Supervision   Grooming Supervision;Seated   Bathing   (NT)   Upper Body Dressing Supervision  (to don/doff hospital gown)   Lower Body Dressing Moderate Assist   Toileting Maximal Assist  (had accident on way to [place] and required max a for cleanup)   Functional Mobility   Sit to Stand Minimal Assist   Bed, Chair, Wheelchair Transfer Minimal Assist   Toilet Transfers Moderate Assist  (min to sit and mod a to come to standing)   Mobility bed mobility, eob<>BR   Short Term Goals   Short Term Goal # 1 Pt will perform toileting task with min a   Short Term Goal # 2 Pt will perform standing h/g tasks with supervision   Short Term Goal # 3 Pt will perform LB dressing with min a using AE as needed/trained   Anticipated Discharge Equipment and Recommendations    DC Equipment Recommendations Unable to determine at this time

## 2021-02-03 NOTE — PROGRESS NOTES
No c/o  SCDs currently off  + DF/PF  VAC in place  AVSS  Synovial fluid - NGTD, gm stain neg  Prepatellar fluid - staph aureus    Plan:    VAC therapy  SCDs, lovenox  Hold knee ROM approx 2 weeks  WBAT in brace  ABX  D/c planning

## 2021-02-03 NOTE — OP REPORT
DATE OF SERVICE:  02/02/2021     PREOPERATIVE DIAGNOSIS:      1.  Recurrent infected right prepatellar bursitis.  2.  Rule out septic arthritis, right knee.     POSTOPERATIVE DIAGNOSIS:     1.  Recurrent infected right prepatellar bursitis.  2.  Rule out septic arthritis, right knee.     PROCEDURES PERFORMED:   1.  Incision and drainage of right prepatellar bursa with excisional   debridement and application of wound VAC dressing.  2.  Arthrocentesis, right knee.     SURGEON:  Kush Sol MD     ASSISTANT:  Rajeev Cooper DO     ANESTHESIOLOGIST:  Shabbir Raymond MD     ANESTHETIC:  General.     TOURNIQUET TIME:  Less than 10 minutes.     ESTIMATED BLOOD LOSS:  Approximately 10 mL.     INDICATIONS FOR PROCEDURE:  The patient is 80 years old.  He had an infected   right prepatellar bursitis in 12/2020, treated with incision and drainage by   Dr. Chowdhury.  He was then on antibiotics for several weeks and then   shortly after the antibiotics were discontinued, he developed recurrent   redness, swelling and fluctuance.  He has purulent drainage.  Admitted to the   hospitalist service last night.  Dr. Viera saw him and asked me to oversee   his definitive orthopedic care.  I recommended incision and drainage.  Risks   include bleeding, persistent or worsening infection, pain, stiffness, wound   healing problems, thromboembolic phenomena, anesthetic and medical   complications, etc.     DESCRIPTION OF PROCEDURE:  The patient was identified in the preoperative   holding area, right leg was marked.  He was taken to the operating room where   general anesthetic was administered.  He was already on intravenous   antibiotics.  No additional doses were given.  He was placed supine on the   operating table with a nonsterile tourniquet on the right thigh.  The right   lower extremity was then prepped and draped in a sterile fashion.  A timeout   was held to confirm the patient identity, correct surgical site.     No  effusion was noted in the knee.  Most of the swelling and redness were all   anterior, underneath the anterior incision.  However, because of his recurrent   infections, we did aspirate the knee through a remote region that was not   erythematous.  I obtained 1.5 mL of normal-appearing synovial fluid.  This was   sent to the lab for culture. This fluid was not from the infected prepatellar bursa.     We then opened up the previous surgical incision longitudinally, separate from  the area used for arthrocentesis.  We encountered a large amount of pus which   was swabbed and sent for culture.  We noted smooth membranous tissue on   both of the superficial and deep sides of the wound encapsulating the fluid. I used  a rongeur and electrocautery to remove some of this tissue to get fresh bleeding   tissue to hopefully allow for healing rather than recurrent fluid collection. Some of   the tissue was sent to the lab for culture.  Once we had evacuated all the pus and   removed as much of the membrane as possible without significantly compromising   skin viability, we irrigated the wound with sterile saline.  This was then closed   centrally and distally with 3-0 nylon.  We left two areas open into which we placed a   VAC dressing sponge and then we placed additional sponge over the entire incision,   protecting the closed portions with Adaptic. The whole sponge was then sealed with   Ioban. The suction was set to 125 mmHg and the seal was maintained.  The patient   was extubated and taken to the recovery room in stable condition.  Tourniquet had   been deflated prior to wound closure.     POSTOPERATIVE PLAN:    1.  Intravenous antibiotics and follow cultures.  2.  Followup with synovial fluid cultures.  3.  Weightbearing as tolerated in knee immobilizer.  4.  Hold knee range of motion for approximately 2 weeks.  5.  VAC therapy with either secondary healing or eventual split thickness skin   grafting to the residual open  wound.  6.  Nutritional support.  7.  Ongoing preoperative medical management per hospitalist.        ______________________________  MD WILLIAN MARRUFOO/SUB    DD:  02/03/2021 06:03  DT:  02/03/2021 07:05    Job#:  [id number]

## 2021-02-03 NOTE — CONSULTS
[facility] INFECTIOUS DISEASES INPATIENT CONSULT NOTE     Date of Service: 2/3/2021    Consult Requested By: Rimma Brenner M.D.    Reason for Consultation: Recurrent prepatellar bursitis    Chief Complaint: Right knee pain    History of Present Illness:     Barry Torres is a 80 y.o. male admitted 2/1/2021.  His past medical history is significant for COPD on 2 L oxygen at night, CKD stage III/solitary kidney, hypertension.  Patient is well-known to ID from his past admission.  In September 2020 he developed prepatellar bursitis, right lower leg cyst and underwent open resection.  This was complicated by dehiscence of both wounds (bursitis and lower leg cyst) requiring secondary closure on 10/27/2020.  Wound cultures from that time grew MSSA.  Patient had another ground-level fall on 12/16/2020 and presented with sepsis, and was found to have pseudomonas aeruginosa septic arthritis as well as second episode of prepatellar bursitis.  He underwent right knee irrigation and debridement, arthrotomy and anterior synovectomy on 12/19/2020, and was discharged home on 4 weeks of cefepime, of which he completed course.  Patient now presents with right knee swelling, redness, purulent discharge along with fever and chills.  On presentation he was tachycardic up to 107, tachypneic 26, with a white count of 12.2.  His procalcitonin had gone up from 0.54 (12/20) to 1.13, and CRP had increased from 0.28 (1/21) to 34.2.  He was started on vancomycin/cefepime.  Patient was evaluated by orthopedic surgery, and it was noted that most of the swelling and redness were anterior in the area of the prepatellar bursa, without any significant swelling of the knee joint.  He underwent incision and drainage of right prepatellar bursa with excisional debridement with application of wound VAC dressing on 2/2/2020.  Due to history of recent septic arthritis arthrocentesis of right knee joint was done as well.    Review of Systems:  All  other systems reviewed and are negative expect as noted in HPI    Past Medical History:   Diagnosis Date   • Abnormal thyroid stimulating hormone (TSH) level    • Allergic rhinitis    • Asbestosis (HCC)    • Asthma    • Bronchitis    • Chronic diarrhea     declines eval; takes immodium once a day usually and sometimes less; see previous notes; aware of risk    • Chronic low back pain    • Chronic lung disease     asbestos related   • CKD (chronic kidney disease), stage III     sees neph, one kidney   • COPD (chronic obstructive pulmonary disease) ([facility])    • Coronary artery calcification seen on CAT scan 8/2/2016    sees cards   • Epididymitis 2009    h/o listed in chart   • GERD (gastroesophageal reflux disease)    • History of hemorrhoids    • History of skin cancer     non melanoma   • [place] (hard of hearing)     declines hearing aids   • Hypercholesteremia    • Hypertension    • Hypertriglyceridemia    • Indigestion    • Light headedness     2/2 orthostasis? now better off hctz   • Lightheadedness 6/23/2016   • Low back pain    • Nasal drainage    • Olecranon bursitis    • Orthostasis 6/23/2016    better off HCTZ   • Peripheral neuropathy    • Pleural plaque 2005     CT Malin   • Post-nasal drip    • Pulmonary emphysema (HCC)    • RA (rheumatoid arthritis) (HCC)     on meds, sees rheum   • Restless leg syndrome    • Rheumatoid arthritis (HCC)    • Sleep apnea     obstructive and central - not adherent to autopap   • Solitary kidney     history of kidney cyst 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-functioning kidney s/p nephrectomy        Past Surgical History:   Procedure Laterality Date   • IRRIGATION & DEBRIDEMENT ORTHO Right 2/2/2021    Procedure: IRRIGATION AND DEBRIDEMENT, WOUND-KNEE;  Surgeon: Kush Sol M.D.;  Location: SURGERY [facility];  Service: Orthopedics   • INCISION AND DRAINAGE GENERAL Right 2/2/2021    Procedure: INCISION AND DRAINAGE- arthrocentesis right knee;  Surgeon: Kush Sol M.D.;  Location: SURGERY  [facility];  Service: Orthopedics   • INCISION AND DRAINAGE GENERAL Right 12/19/2020    Procedure: INCISION AND DRAINAGE;  Surgeon: Marc Chowdhury M.D.;  Location: SURGERY [facility];  Service: Orthopedics   • IRRIGATION & DEBRIDEMENT ORTHO Right 10/27/2020    Procedure: IRRIGATION AND DEBRIDEMENT, WOUND - KNEE OPEN;  Surgeon: Elijah Faulkner M.D.;  Location: SURGERY [facility];  Service: Orthopedics   • FLEXOR TENDON REPAIR Left 4/3/2019    Procedure: FLEXOR TENDON REPAIR- SMALL FINGER VS;  Surgeon: Marc Chowdhury M.D.;  Location: SURGERY [facility];  Service: Orthopedics   • TENDON TRANSFER Left 4/3/2019    Procedure: TENDON TRANSFER;  Surgeon: Marc Chowdhury M.D.;  Location: SURGERY [facility];  Service: Orthopedics   • COLONOSCOPY  11/10/2018    Procedure: COLONOSCOPY;  Surgeon: Ganesh Peacock M.D.;  Location: SURGERY [facility];  Service: Gastroenterology   • NASAL POLYPECTOMY Bilateral 10/6/2015    Procedure: NASAL POLYPECTOMY WITH SCOTT ENDOSCOPIC NASAL DEBRIDEMENT;  Surgeon: Param Maurer M.D.;  Location: SURGERY SAME DAY [facility];  Service:    • CYSTOSCOPY  2/1/2010    Performed by ANGIE VERDUZCO at [facility]   • RETROGRADES  2/1/2010    Performed by ANGIE VERDUZCO at [facility]   • PYELOGRAM  2/1/2010    Performed by ANGIE VERDUZCO at [facility]   • URETEROSCOPY  2/1/2010    Performed by ANGIE VERDUZCO at [facility]   • NEPHRECTOMY LAPAROSCOPIC  2009    left kidney   • TESTICLE EXPLORATION  2004   • PB REMV 2ND CATARACT,CORN-SCLER SECTN     • TONSILLECTOMY         Family History   Problem Relation Age of Onset   • Genetic Disorder Father         ALS   • No Known Problems Sister    • No Known Problems Son    • No Known Problems Daughter    • Heart Attack Neg Hx    • Heart Disease Neg Hx    • Heart Failure Neg Hx        Social History     Socioeconomic History   • Marital  status:      Spouse name: Not on file   • Number of children: Not on file   • Years of education: Not on file   • Highest education level: Not on file   Occupational History   • Not on file   Social Needs   • Financial resource strain: Not very hard   • Food insecurity     Worry: Never true     Inability: Never true   • Transportation needs     Medical: No     Non-medical: No   Tobacco Use   • Smoking status: Former Smoker     Packs/day: 1.00     Years: 40.00     Pack years: 40.00     Types: Cigarettes     Quit date: 1980     Years since quittin.1   • Smokeless tobacco: Never Used   • Tobacco comment: 1 pk a day for 40 yrs   Substance and Sexual Activity   • Alcohol use: No     Alcohol/week: 0.0 oz   • Drug use: No   • Sexual activity: Never     Partners: Female     Comment: retired    Lifestyle   • Physical activity     Days per week: Not on file     Minutes per session: Not on file   • Stress: Not on file   Relationships   • Social connections     Talks on phone: Not on file     Gets together: Not on file     Attends [employer] service: Not on file     Active member of club or organization: Not on file     Attends meetings of clubs or organizations: Not on file     Relationship status: Not on file   • Intimate partner violence     Fear of current or ex partner: Not on file     Emotionally abused: Not on file     Physically abused: Not on file     Forced sexual activity: Not on file   Other Topics Concern   • Not on file   Social History Narrative    See H&P    Lives with wife       Allergies   Allergen Reactions   • Ciprofloxacin      Other reaction(s): muscle aches  Pt's family states that Pt had a reaction when he was a kid noted 2021   • Levaquin      Other reaction(s): Muscle aches  Muscle aches  Other reaction(s): Muscle aches  Pt's son states that Pt had a reaction when he was a kid noted 2021   • Penicillins Hives     Rash and asthma attack when a child - wife states he has had Keflex  in the past and tolerated  Pt's son states that Pt had a reaction when he was a kid noted 2/1/2021        Medications:    Current Facility-Administered Medications:   •  albuterol (PROVENTIL) 2.5mg/0.5ml nebulizer solution 2.5 mg, 2.5 mg, Nebulization, Q2HRS PRN (RT), Rimma Brenner M.D.  •  budesonide-formoterol (SYMBICORT) 80-4.5 MCG/ACT inhaler 2 Puff, 2 Puff, Inhalation, BID, Rimma Brenner M.D., 2 Puff at 02/03/21 0833  •  fluticasone (FLONASE) nasal spray 100 mcg, 2 Spray, Nasal, DAILY, Rimma Brenner M.D., 100 mcg at 02/03/21 0833  •  glycopyrrolate (Seebri) 15.6 mcg inhalation capsule 1 Cap, 1 Cap, Inhalation, BID (RT), Rimma Brenner M.D., 1 Cap at 02/03/21 0834  •  guaiFENesin ER (MUCINEX) tablet 1,200 mg, 1,200 mg, Oral, BID, Rimma Brenner M.D., 600 mg at 02/03/21 0842  •  leflunomide (ARAVA) tablet 20 mg, 20 mg, Oral, DAILY, Rimma Brenner M.D., 20 mg at 02/03/21 0833  •  gabapentin (NEURONTIN) capsule 300 mg, 300 mg, Oral, QAM, 300 mg at 02/03/21 0842 **AND** gabapentin (NEURONTIN) capsule 600 mg, 600 mg, Oral, Q EVENING, Rimma Brenner M.D.  •  ceFAZolin in dextrose (ANCEF) IVPB premix 2 g, 2 g, Intravenous, Q8HRS, Duarte Bell M.D., Last Rate: 200 mL/hr at 02/03/21 1354, 2 g at 02/03/21 1354  •  heparin injection 5,000 Units, 5,000 Units, Subcutaneous, Q8HRS, Rimma Brenner M.D., 5,000 Units at 02/03/21 1354  •  lactated ringers infusion (BOLUS): BMI less than or equal to 30, 30 mL/kg, Intravenous, Once PRN, Eulalia Braun D.O.  •  senna-docusate (PERICOLACE or SENOKOT S) 8.6-50 MG per tablet 2 Tab, 2 Tab, Oral, BID, 2 Tab at 02/03/21 0500 **AND** polyethylene glycol/lytes (MIRALAX) PACKET 1 Packet, 1 Packet, Oral, QDAY PRN **AND** magnesium hydroxide (MILK OF MAGNESIA) suspension 30 mL, 30 mL, Oral, QDAY PRN **AND** bisacodyl (DULCOLAX) suppository 10 mg, 10 mg, Rectal, QDAY PRN, Shabbir Novoa M.D.  •  Respiratory Therapy Consult, , Nebulization, Continuous RT, Shabbir Novoa M.D.  •  acetaminophen (Tylenol)  "tablet 650 mg, 650 mg, Oral, Q6HRS PRN, Shabbir Novoa M.D., 650 mg at 21 0833  •  Notify provider if pain remains uncontrolled, , , CONTINUOUS **AND** Use the numeric rating scale (NRS-11) on regular floors and Critical-Care Pain Observation Tool (CPOT) on ICUs/Trauma to assess pain, , , CONTINUOUS **AND** Pulse Ox (Oximetry), , , CONTINUOUS **AND** Pharmacy Consult Request ...Pain Management Review 1 Each, 1 Each, Other, PHARMACY TO DOSE **AND** If patient difficult to arouse and/or has respiratory depression, stop any opiates that are currently infusing and call a Rapid Response., , , CONTINUOUS **AND** oxyCODONE immediate-release (ROXICODONE) tablet 2.5 mg, 2.5 mg, Oral, Q3HRS PRN **AND** oxyCODONE immediate-release (ROXICODONE) tablet 5 mg, 5 mg, Oral, Q3HRS PRN **AND** morphine (pf) 4 mg/mL injection 2 mg, 2 mg, Intravenous, Q3HRS PRN, Shabbir Novoa M.D.  •  ondansetron (ZOFRAN) syringe/vial injection 4 mg, 4 mg, Intravenous, Q4HRS PRN, Shabbir Novoa M.D., 4 mg at 21 0423  •  ondansetron (ZOFRAN ODT) dispertab 4 mg, 4 mg, Oral, Q4HRS PRN, Shabbir Novoa M.D.  •  labetalol (NORMODYNE/TRANDATE) injection 10 mg, 10 mg, Intravenous, Q4HRS PRN, Shabbir Novoa M.D., 10 mg at 21 1538    Physical Exam:   Vital Signs: /79   Pulse 83   Temp 36.5 °C (97.7 °F) (Temporal)   Resp 16   Ht 1.854 m (6' 1\")   Wt 89.9 kg (198 lb 3.1 oz)   SpO2 95%   BMI 26.15 kg/m²   Temp  Av.8 °C (98.2 °F)  Min: 35.8 °C (96.5 °F)  Max: 38.4 °C (101.2 °F)  Vital signs reviewed    Constitutional: Patient is oriented to person, place, and time. Appears well-developed and well-nourished. No distress  Head: Atraumatic, normocephalic  Eyes: Conjunctivae normal, EOM intact. Pupils are equal, round, and reactive to light.   Mouth/Throat: Lips without lesions, good dentition, oropharynx is clear and moist.  Neck: Neck supple. No masses/lymphadenopathy  Cardiovascular: Normal rate, regular rhythm, normal S1S2 and intact " distal pulses. No murmur, gallop, or friction rub. No pedal edema.  Pulmonary/Chest: No respiratory distress. Unlabored respiratory effort, lungs clear to auscultation. No wheezes or rales.   Abdominal: Soft, non tender. BS + x 4. No masses or hepatosplenomegaly.   Musculoskeletal: Right knee joint in dressing which is clean and dry.  No distal neurovascular deficits.  Neurological: Alert and oriented to person, place, and time. No gross cranial nerve deficit. No focal neural deficit noted  Skin: Skin is warm and dry. No rashes or embolic phenomena noted on exposed skin  Psychiatric: Normal mood and affect. Behavior is normal.     LABS:  Recent Labs     02/01/21 2108 02/02/21 0431 02/03/21 0438   WBC 12.2* 10.2 7.5      Recent Labs     02/01/21 2108 02/02/21 0431 02/03/21 0438   HEMOGLOBIN 11.3* 10.5* 9.9*   HEMATOCRIT 35.5* 33.0* 32.3*   MCV 99.7* 102.2* 101.9*   MCH 31.7 32.5 31.2   PLATELETCT 197 150* 163*       Recent Labs     02/01/21 2108 02/02/21 0431 02/03/21 0438   SODIUM 138 137 140   POTASSIUM 4.2 4.1 4.1   CHLORIDE 102 103 107   CO2 23 23 23   CREATININE 1.66* 1.64* 1.37        Recent Labs     02/01/21 2108 02/02/21 0431 02/03/21 0438   ALBUMIN 3.6 3.2 3.1*        MICRO:  Blood Culture   Date Value Ref Range Status   11/10/2018 No growth after 5 days of incubation.  Final   11/10/2018 No growth after 5 days of incubation.  Final        Latest pertinent labs were reviewed    IMAGING STUDIES:  DX-CHEST-PORTABLE (1 VIEW)   Final Result      Stable chest without acute/new abnormality.          Hospital Course/Assessment:   Barry Torres is a 80 y.o. male with a history of COPD on 2 L oxygen at night, CKD stage III/solitary kidney, hypertension, who is presenting with sepsis secondary to third episode of prepatellar bursitis.  Patient did complete 4 weeks of cefepime for septic arthritis, and per orthopedic evaluation did not appear to have inflammation signs in the joint, synovial fluid  analysis shows only 180 WBCs, 60% lymphs therefore at this time do not believe patient has recurrent septic arthritis.  Prepatellar bursa culture showing MSSA.    Pertinent Diagnoses:  -Sepsis secondary to right prepatellar recurrent bursitis status post incision and drainage of right prepatellar bursa with excisional debridement  -MSSA prepatellar bursitis    Plan:   -Follow-up on bursa fluid, synovial fluid, blood cultures  -Discontinue Vanco cefepime, de-escalate to Ancef.  ID will continue to follow.     Plan was discussed with the primary team    Please feel free to call with questions.    This illness poses a threat to the patient's life or bodily function    Nahomy Dukes M.D.    Please note that this dictation was created using voice recognition software. I have worked with technical experts from [facility] to optimize the interface.  I have made every reasonable attempt to correct obvious errors, but there may be errors of grammar and possibly content that I did not discover before finalizing the note.

## 2021-02-03 NOTE — PROGRESS NOTES
[facility] Medicine Daily Progress Note    Date of Service  2/3/2021    Chief Complaint  80 y.o. male admitted 2/1/2021 with right leg swelling    Hospital Course  An 80 y.o. male who presented 2/1/2021 with leg swelling. Patient with hx of septic arthritis of right knee, s/p multiple surgeries recently completed IV cefepime x4 weeks end date 1/15/2021, COPD, CKD, who presents for right leg swelling. Patient reports right leg swelling, redness, purulent drainage for past week, with associated chills. Denies fever, sweating, joint pain, chest pain, cough, dyspnea. No recent trauma to leg.  In the ED, , RR 22, WBC 12.2, LA 1.6. Sepsis protocol initiated, given IV fluids and started on vancomycin. Orthopedic surgery team to see patient in AM. He will be admitted for evaluation of leg cellulitis.      Interval Problem Update  Patient was seen and examined at bedside, patient's son at bedside.  S/p  incision and drainage of right prepatellar bursa 2/2 by Dr. Sol. Hold knee ROM approx 2 weeks  Discussed with infectious disease, will continue cefepime.  Patient is well-known to the infectious disease team and they will evaluate today.   OR Cx showing Staph Aureus.  PT/OT     Consultants/Specialty  Orthopedic surgery  ID    Code Status  Full Code    Disposition  TBD    Review of Systems  Review of Systems   Constitutional: Positive for malaise/fatigue. Negative for chills and fever.   HENT: Positive for congestion. Negative for ear discharge, ear pain, sinus pain and sore throat.    Eyes: Negative for blurred vision and double vision.   Respiratory: Negative for cough, sputum production, shortness of breath and wheezing.    Cardiovascular: Negative for chest pain, palpitations and leg swelling.   Gastrointestinal: Negative for abdominal pain, constipation, diarrhea, nausea and vomiting.   Genitourinary: Negative for dysuria, frequency and urgency.   Musculoskeletal: Positive for joint pain. Negative for myalgias.    Neurological: Negative for dizziness, focal weakness and headaches.   Psychiatric/Behavioral: The patient is not nervous/anxious.         Physical Exam  Temp:  [36.3 °C (97.4 °F)-36.8 °C (98.3 °F)] 36.5 °C (97.7 °F)  Pulse:  [79-91] 83  Resp:  [16-18] 16  BP: (132-156)/(79-99) 132/79  SpO2:  [95 %-96 %] 95 %    Physical Exam  Constitutional:       General: He is not in acute distress.  HENT:      Head: Normocephalic and atraumatic.      Nose: Nose normal.      Mouth/Throat:      Mouth: Mucous membranes are moist.      Pharynx: No posterior oropharyngeal erythema.   Eyes:      General: No scleral icterus.     Extraocular Movements: Extraocular movements intact.      Conjunctiva/sclera: Conjunctivae normal.      Pupils: Pupils are equal, round, and reactive to light.   Neck:      Musculoskeletal: Normal range of motion and neck supple. No neck rigidity.   Cardiovascular:      Rate and Rhythm: Normal rate and regular rhythm.      Pulses: Normal pulses.      Heart sounds: Normal heart sounds. No murmur. No gallop.    Pulmonary:      Effort: Pulmonary effort is normal.      Breath sounds: Normal breath sounds. No stridor. No wheezing, rhonchi or rales.   Abdominal:      General: Bowel sounds are normal.      Palpations: Abdomen is soft.   Musculoskeletal:         General: Swelling and tenderness present.      Comments: Right knee covered in dressing, s/p I & D surgery.  Wound Vac in placed   Skin:     General: Skin is warm.   Neurological:      General: No focal deficit present.      Mental Status: He is alert and oriented to person, place, and time.   Psychiatric:         Mood and Affect: Mood normal.         Behavior: Behavior normal.         Fluids    Intake/Output Summary (Last 24 hours) at 2/3/2021 1309  Last data filed at 2/3/2021 1000  Gross per 24 hour   Intake 120 ml   Output 230 ml   Net -110 ml       Laboratory  Recent Labs     02/01/21 2108 02/02/21 0431 02/03/21 0438   WBC 12.2* 10.2 7.5   RBC 3.56*  3.23* 3.17*   HEMOGLOBIN 11.3* 10.5* 9.9*   HEMATOCRIT 35.5* 33.0* 32.3*   MCV 99.7* 102.2* 101.9*   MCH 31.7 32.5 31.2   MCHC 31.8* 31.8* 30.7*   RDW 55.8* 57.6* 57.1*   PLATELETCT 197 150* 163*   MPV 10.3 10.2 10.8     Recent Labs     02/01/21  2108 02/02/21  0431 02/03/21  0438   SODIUM 138 137 140   POTASSIUM 4.2 4.1 4.1   CHLORIDE 102 103 107   CO2 23 23 23   GLUCOSE 139* 124* 97   BUN 26* 26* 24*   CREATININE 1.66* 1.64* 1.37   CALCIUM 8.8 8.2* 8.3*     Recent Labs     02/02/21 0459   APTT 40.1*   INR 1.23*               Imaging  DX-CHEST-PORTABLE (1 VIEW)   Final Result      Stable chest without acute/new abnormality.           Assessment/Plan  * Cellulitis of extremity  Assessment & Plan  History of septic arthritis of R knee s/p multiple surgeries and washout, hx of pseudomonas growth on knee aspiration  S/p 4 week course of cefepime prior to this hospitalization, completed 1/15/2021  New cellulitis with purulent drainage  Wound and blood cultures taken    -S/p  incision and drainage of right prepatellar bursa 2/2 early morning by Dr. Sol.   -Discussed with infectious disease, will continue IV cefepime.  Patient is well-known to the infectious disease team and they will evaluate the patient  - OR Cx showing Staph Aureus.  - PT/OT   -Pain control  -Fall precautions          Sepsis (HCC)  Assessment & Plan  This is Sepsis Present on admission  SIRS criteria identified on my evaluation include: Tachycardia, with heart rate greater than 90 BPM  Source is leg cellulitis  Sepsis protocol initiated  Fluid resuscitation ordered per protocol  IV antibiotics as appropriate for source of sepsis  While organ dysfunction may be noted elsewhere in this problem list or in the chart, degree of organ dysfunction does not meet CMS criteria for severe sepsis    -S/p  incision and drainage of right prepatellar bursa 2/2 early morning by Dr. Sol.   -Discussed with infectious disease, will continue IV cefepime.  Patient  is well-known to the infectious disease team and they will evaluate the patient        Chronic respiratory failure with hypoxia (HCC)- (present on admission)  Assessment & Plan  Due to COPD, on 2L O2 at night  On 2L in ER  No new respiratory complaints, CXR with no acute abnormalities  Wean oxygen  RT protocol  Mucinex for congestion    Stage 3 chronic kidney disease- (present on admission)  Assessment & Plan  Will monitor         VTE prophylaxis: SCDs & Heparin

## 2021-02-03 NOTE — PROGRESS NOTES
Report received from Day RN at 1915. Assumed care of patient. Plan of care discussed, questions answered. Assessment completed. VSS, A&O x4, denies pain. PRN med ordered. Call light in reach. Patient educated on use of call light for assistance.    Wound vac in place.   Knee immobilizer in place.  Fall precautions in place.

## 2021-02-03 NOTE — CARE PLAN
Problem: Safety  Goal: Will remain free from injury  Outcome: PROGRESSING AS EXPECTED  Note:  Treaded sock in place, Side rails upx2.  Bed in low, locked position  Call light within reach, Patient able to call appropriately.     Problem: Pain Management  Goal: Pain level will decrease to patient's comfort goal  Outcome: PROGRESSING AS EXPECTED  Flowsheets (Taken 2/3/2021 1593)  Comfort Goal:   Comfort with Movement   Perform Activity   Sleep Comfortably  Non Verbal Scale:   Calm   Unlabored Breathing  Pain Rating Scale (NPRS): 3  Note:  Patient reports mild discomfort which the ordered pain interventions are working per patient. Will continue to monitor for pain and response to pain interventions.

## 2021-02-03 NOTE — CARE PLAN
Problem: Respiratory:  Goal: Respiratory status will improve  Outcome: PROGRESSING AS EXPECTED  Note: Incentive spirometer encouraged.  Problem: Safety  Goal: Will remain free from injury  Outcome: PROGRESSING AS EXPECTED  Note: Call light in reach, bed in lowest and locked position, necessary belongings in reach. Patient educated on use of call light for assistance. Verbalized understanding.   Problem: Infection  Goal: Will remain free from infection  Outcome: PROGRESSING AS EXPECTED  Note: Patient educated on proper hand hygiene. Verbalized understanding. Healthcare team educated on proper hand hygiene while providing patient care and after patient care.

## 2021-02-04 ENCOUNTER — APPOINTMENT (OUTPATIENT)
Dept: RADIOLOGY | Facility: MEDICAL CENTER | Age: 81
DRG: 856 | End: 2021-02-04
Attending: STUDENT IN AN ORGANIZED HEALTH CARE EDUCATION/TRAINING PROGRAM
Payer: MEDICARE

## 2021-02-04 LAB
BACTERIA TISS AEROBE CULT: ABNORMAL
BACTERIA TISS AEROBE CULT: ABNORMAL
BACTERIA WND AEROBE CULT: ABNORMAL
GRAM STN SPEC: ABNORMAL
SIGNIFICANT IND 70042: ABNORMAL
SITE SITE: ABNORMAL
SOURCE SOURCE: ABNORMAL

## 2021-02-04 PROCEDURE — 700111 HCHG RX REV CODE 636 W/ 250 OVERRIDE (IP): Performed by: INTERNAL MEDICINE

## 2021-02-04 PROCEDURE — 700111 HCHG RX REV CODE 636 W/ 250 OVERRIDE (IP): Performed by: STUDENT IN AN ORGANIZED HEALTH CARE EDUCATION/TRAINING PROGRAM

## 2021-02-04 PROCEDURE — A9270 NON-COVERED ITEM OR SERVICE: HCPCS | Performed by: STUDENT IN AN ORGANIZED HEALTH CARE EDUCATION/TRAINING PROGRAM

## 2021-02-04 PROCEDURE — 94640 AIRWAY INHALATION TREATMENT: CPT

## 2021-02-04 PROCEDURE — 97605 NEG PRS WND THER DME<=50SQCM: CPT

## 2021-02-04 PROCEDURE — 700102 HCHG RX REV CODE 250 W/ 637 OVERRIDE(OP): Performed by: STUDENT IN AN ORGANIZED HEALTH CARE EDUCATION/TRAINING PROGRAM

## 2021-02-04 PROCEDURE — 94760 N-INVAS EAR/PLS OXIMETRY 1: CPT

## 2021-02-04 PROCEDURE — 99232 SBSQ HOSP IP/OBS MODERATE 35: CPT | Mod: GC | Performed by: INTERNAL MEDICINE

## 2021-02-04 PROCEDURE — 770006 HCHG ROOM/CARE - MED/SURG/GYN SEMI*

## 2021-02-04 PROCEDURE — 99232 SBSQ HOSP IP/OBS MODERATE 35: CPT | Performed by: STUDENT IN AN ORGANIZED HEALTH CARE EDUCATION/TRAINING PROGRAM

## 2021-02-04 RX ADMIN — CEFAZOLIN SODIUM 2 G: 2 INJECTION, SOLUTION INTRAVENOUS at 22:59

## 2021-02-04 RX ADMIN — GABAPENTIN 600 MG: 300 CAPSULE ORAL at 17:29

## 2021-02-04 RX ADMIN — PROCHLORPERAZINE EDISYLATE 10 MG: 5 INJECTION INTRAMUSCULAR; INTRAVENOUS at 05:57

## 2021-02-04 RX ADMIN — ACETAMINOPHEN 650 MG: 325 TABLET ORAL at 20:31

## 2021-02-04 RX ADMIN — BUDESONIDE AND FORMOTEROL FUMARATE DIHYDRATE 2 PUFF: 80; 4.5 AEROSOL RESPIRATORY (INHALATION) at 05:54

## 2021-02-04 RX ADMIN — ONDANSETRON 4 MG: 2 INJECTION INTRAMUSCULAR; INTRAVENOUS at 17:36

## 2021-02-04 RX ADMIN — GUAIFENESIN 1200 MG: 600 TABLET, EXTENDED RELEASE ORAL at 17:29

## 2021-02-04 RX ADMIN — HEPARIN SODIUM 5000 UNITS: 5000 INJECTION, SOLUTION INTRAVENOUS; SUBCUTANEOUS at 22:59

## 2021-02-04 RX ADMIN — LEFLUNOMIDE 20 MG: 20 TABLET ORAL at 05:55

## 2021-02-04 RX ADMIN — CEFAZOLIN SODIUM 2 G: 2 INJECTION, SOLUTION INTRAVENOUS at 05:55

## 2021-02-04 RX ADMIN — GLYCOPYRROLATE 1 CAPSULE: 15.6 CAPSULE RESPIRATORY (INHALATION) at 09:39

## 2021-02-04 RX ADMIN — GUAIFENESIN 1200 MG: 600 TABLET, EXTENDED RELEASE ORAL at 05:56

## 2021-02-04 RX ADMIN — HEPARIN SODIUM 5000 UNITS: 5000 INJECTION, SOLUTION INTRAVENOUS; SUBCUTANEOUS at 15:09

## 2021-02-04 RX ADMIN — DOCUSATE SODIUM 50 MG AND SENNOSIDES 8.6 MG 2 TABLET: 8.6; 5 TABLET, FILM COATED ORAL at 05:57

## 2021-02-04 RX ADMIN — FLUTICASONE PROPIONATE 100 MCG: 50 SPRAY, METERED NASAL at 05:55

## 2021-02-04 RX ADMIN — HEPARIN SODIUM 5000 UNITS: 5000 INJECTION, SOLUTION INTRAVENOUS; SUBCUTANEOUS at 05:57

## 2021-02-04 RX ADMIN — LABETALOL HYDROCHLORIDE 10 MG: 5 INJECTION, SOLUTION INTRAVENOUS at 20:24

## 2021-02-04 RX ADMIN — BUDESONIDE AND FORMOTEROL FUMARATE DIHYDRATE 2 PUFF: 80; 4.5 AEROSOL RESPIRATORY (INHALATION) at 17:27

## 2021-02-04 RX ADMIN — CEFAZOLIN SODIUM 2 G: 2 INJECTION, SOLUTION INTRAVENOUS at 15:01

## 2021-02-04 RX ADMIN — GABAPENTIN 300 MG: 300 CAPSULE ORAL at 05:56

## 2021-02-04 RX ADMIN — PROCHLORPERAZINE EDISYLATE 10 MG: 5 INJECTION INTRAMUSCULAR; INTRAVENOUS at 20:31

## 2021-02-04 ASSESSMENT — ENCOUNTER SYMPTOMS
DOUBLE VISION: 0
VOMITING: 0
HEADACHES: 0
ABDOMINAL PAIN: 0
BLURRED VISION: 0
CONSTIPATION: 0
WHEEZING: 0
SHORTNESS OF BREATH: 0
SPUTUM PRODUCTION: 0
CHILLS: 0
SORE THROAT: 0
FOCAL WEAKNESS: 0
PALPITATIONS: 0
NERVOUS/ANXIOUS: 0
SINUS PAIN: 0
NAUSEA: 0
DIARRHEA: 0
FEVER: 0
MYALGIAS: 0
DIZZINESS: 0
COUGH: 0

## 2021-02-04 ASSESSMENT — PAIN DESCRIPTION - PAIN TYPE
TYPE: ACUTE PAIN;SURGICAL PAIN
TYPE: ACUTE PAIN
TYPE: ACUTE PAIN;SURGICAL PAIN

## 2021-02-04 NOTE — PROGRESS NOTES
Paged Dr. De La Cruz regarding patient nausea and vomiting. Received orders for Compazine 10 mg IV PRN Q 6 hours.

## 2021-02-04 NOTE — CARE PLAN
Problem: Communication  Goal: The ability to communicate needs accurately and effectively will improve  Outcome: PROGRESSING AS EXPECTED   Plan of Care discussed, all questions answered. Pt effectively communicates needs to staff.       Problem: Safety  Goal: Will remain free from injury  Outcome: PROGRESSING AS EXPECTED   Bed locked and in lowest position. Treaded slipper socks on. Call light within reach. Pt educated to call for assistance.

## 2021-02-04 NOTE — PROGRESS NOTES
Report received from Day RN at 1915. Assumed care of patient. Plan of care discussed, questions answered. Assessment completed. VSS, A&O x4, denies pain. PRN med ordered. Call light in reach. Patient educated on use of call light for assistance.    Wound vac in place.  Immobilizer in place.  Hourly rounding in effect.

## 2021-02-04 NOTE — PROGRESS NOTES
[facility] INFECTIOUS DISEASES INPATIENT PROGRESS NOTE   Reason for Consultation: Recurrent prepatellar bursitis    Chief Complaint: Right knee pain    History of Present Illness:     Barry Torres is a 80 y.o. male admitted 2/1/2021.  His past medical history is significant for COPD on 2 L oxygen at night, CKD stage III/solitary kidney, hypertension.  Patient is well-known to ID from his past admission.  In September 2020 he developed prepatellar bursitis, right lower leg cyst and underwent open resection.  This was complicated by dehiscence of both wounds (bursitis and lower leg cyst) requiring secondary closure on 10/27/2020.  Wound cultures from that time grew MSSA.  Patient had another ground-level fall on 12/16/2020 and presented with sepsis, and was found to have pseudomonas aeruginosa septic arthritis as well as second episode of prepatellar bursitis.  He underwent right knee irrigation and debridement, arthrotomy and anterior synovectomy on 12/19/2020, and was discharged home on 4 weeks of cefepime, of which he completed course.  Patient now presents with right knee swelling, redness, purulent discharge along with fever and chills.  On presentation he was tachycardic up to 107, tachypneic 26, with a white count of 12.2.  His procalcitonin had gone up from 0.54 (12/20) to 1.13, and CRP had increased from 0.28 (1/21) to 34.2.  He was started on vancomycin/cefepime.  Patient was evaluated by orthopedic surgery, and it was noted that most of the swelling and redness were anterior in the area of the prepatellar bursa, without any significant swelling of the knee joint.  He underwent incision and drainage of right prepatellar bursa with excisional debridement with application of wound VAC dressing on 2/2/2020.  Due to history of recent septic arthritis arthrocentesis of right knee joint was done as well.    INTERVAL UPDATE:  2/4/21: No acute events overnight, vital signs stable.  Patient has mild knee pain,  but denies other complaints.  White count has trended down to 7.5.  Cultures from prepatellar bursa show MSSA, blood cultures and synovial cultures show no growth till date    Review of Systems:  All other systems reviewed and are negative expect as noted in HPI    Past Medical History:   Diagnosis Date   • Abnormal thyroid stimulating hormone (TSH) level    • Allergic rhinitis    • Asbestosis ([facility])    • Asthma    • Bronchitis    • Chronic diarrhea     declines eval; takes immodium once a day usually and sometimes less; see previous notes; aware of risk    • Chronic low back pain    • Chronic lung disease     asbestos related   • CKD (chronic kidney disease), stage III     sees neph, one kidney   • COPD (chronic obstructive pulmonary disease) ([facility])    • Coronary artery calcification seen on CAT scan 8/2/2016    sees cards   • Epididymitis 2009    h/o listed in chart   • GERD (gastroesophageal reflux disease)    • History of hemorrhoids    • History of skin cancer     non melanoma   • [place] (hard of hearing)     declines hearing aids   • Hypercholesteremia    • Hypertension    • Hypertriglyceridemia    • Indigestion    • Light headedness     2/2 orthostasis? now better off hctz   • Lightheadedness 6/23/2016   • Low back pain    • Nasal drainage    • Olecranon bursitis    • Orthostasis 6/23/2016    better off HCTZ   • Peripheral neuropathy    • Pleural plaque 2005     CT [facility]   • Post-nasal drip    • Pulmonary emphysema ([facility])    • RA (rheumatoid arthritis) ([facility])     on meds, sees rheum   • Restless leg syndrome    • Rheumatoid arthritis ([facility])    • Sleep apnea     obstructive and central - not adherent to autopap   • Solitary kidney     history of kidney cyst 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-functioning kidney s/p nephrectomy        Past Surgical History:   Procedure Laterality Date   • IRRIGATION & DEBRIDEMENT ORTHO Right 2/2/2021    Procedure: IRRIGATION AND DEBRIDEMENT, WOUND-KNEE;  Surgeon: Kush Sol M.D.;  Location: SURGERY  [facility];  Service: Orthopedics   • INCISION AND DRAINAGE GENERAL Right 2/2/2021    Procedure: INCISION AND DRAINAGE- arthrocentesis right knee;  Surgeon: Kush Sol M.D.;  Location: SURGERY [facility];  Service: Orthopedics   • INCISION AND DRAINAGE GENERAL Right 12/19/2020    Procedure: INCISION AND DRAINAGE;  Surgeon: Marc Chowdhury M.D.;  Location: SURGERY [facility];  Service: Orthopedics   • IRRIGATION & DEBRIDEMENT ORTHO Right 10/27/2020    Procedure: IRRIGATION AND DEBRIDEMENT, WOUND - KNEE OPEN;  Surgeon: Elijah Faulkner M.D.;  Location: SURGERY [facility];  Service: Orthopedics   • FLEXOR TENDON REPAIR Left 4/3/2019    Procedure: FLEXOR TENDON REPAIR- SMALL FINGER VS;  Surgeon: Marc Chowdhury M.D.;  Location: [facility];  Service: Orthopedics   • TENDON TRANSFER Left 4/3/2019    Procedure: TENDON TRANSFER;  Surgeon: Marc Chowdhury M.D.;  Location: SURGERY [facility];  Service: Orthopedics   • COLONOSCOPY  11/10/2018    Procedure: COLONOSCOPY;  Surgeon: Ganesh Peacock M.D.;  Location: SURGERY [facility];  Service: Gastroenterology   • NASAL POLYPECTOMY Bilateral 10/6/2015    Procedure: NASAL POLYPECTOMY WITH SCOTT ENDOSCOPIC NASAL DEBRIDEMENT;  Surgeon: Param Maurer M.D.;  Location: SURGERY SAME DAY [facility];  Service:    • CYSTOSCOPY  2/1/2010    Performed by ANGIE VERDUZCO at [facility]   • RETROGRADES  2/1/2010    Performed by ANIGE VERDUZCO at [facility]   • PYELOGRAM  2/1/2010    Performed by ANGIE VERDUZCO at [facility]   • URETEROSCOPY  2/1/2010    Performed by ANGIE VERDUZCO at [facility]   • NEPHRECTOMY LAPAROSCOPIC  2009    left kidney   • TESTICLE EXPLORATION  2004   • PB REMV 2ND CATARACT,CORN-SCLER SECTN     • TONSILLECTOMY         Family History   Problem Relation Age of Onset   • Genetic Disorder Father         ALS   • No Known Problems Sister     • No Known Problems Son    • No Known Problems Daughter    • Heart Attack Neg Hx    • Heart Disease Neg Hx    • Heart Failure Neg Hx        Social History     Socioeconomic History   • Marital status:      Spouse name: Not on file   • Number of children: Not on file   • Years of education: Not on file   • Highest education level: Not on file   Occupational History   • Not on file   Social Needs   • Financial resource strain: Not very hard   • Food insecurity     Worry: Never true     Inability: Never true   • Transportation needs     Medical: No     Non-medical: No   Tobacco Use   • Smoking status: Former Smoker     Packs/day: 1.00     Years: 40.00     Pack years: 40.00     Types: Cigarettes     Quit date: 1980     Years since quittin.1   • Smokeless tobacco: Never Used   • Tobacco comment: 1 pk a day for 40 yrs   Substance and Sexual Activity   • Alcohol use: No     Alcohol/week: 0.0 oz   • Drug use: No   • Sexual activity: Never     Partners: Female     Comment: retired    Lifestyle   • Physical activity     Days per week: Not on file     Minutes per session: Not on file   • Stress: Not on file   Relationships   • Social connections     Talks on phone: Not on file     Gets together: Not on file     Attends Christian service: Not on file     Active member of club or organization: Not on file     Attends meetings of clubs or organizations: Not on file     Relationship status: Not on file   • Intimate partner violence     Fear of current or ex partner: Not on file     Emotionally abused: Not on file     Physically abused: Not on file     Forced sexual activity: Not on file   Other Topics Concern   • Not on file   Social History Narrative    See H&P    Lives with wife       Allergies   Allergen Reactions   • Ciprofloxacin      Other reaction(s): muscle aches  Pt's family states that Pt had a reaction when he was a kid noted 2021   • Levaquin      Other reaction(s): Muscle aches  Muscle  aches  Other reaction(s): Muscle aches  Pt's son states that Pt had a reaction when he was a kid noted 2/1/2021   • Penicillins Hives     Rash and asthma attack when a child - wife states he has had Keflex in the past and tolerated  Pt's son states that Pt had a reaction when he was a kid noted 2/1/2021        Medications:    Current Facility-Administered Medications:   •  albuterol (PROVENTIL) 2.5mg/0.5ml nebulizer solution 2.5 mg, 2.5 mg, Nebulization, Q2HRS PRN (RT), Rimma Brenner M.D.  •  budesonide-formoterol (SYMBICORT) 80-4.5 MCG/ACT inhaler 2 Puff, 2 Puff, Inhalation, BID, Rimma Brenner M.D., 2 Puff at 02/04/21 0554  •  fluticasone (FLONASE) nasal spray 100 mcg, 2 Spray, Nasal, DAILY, Rimma Brenner M.D., 100 mcg at 02/04/21 0555  •  glycopyrrolate (Seebri) 15.6 mcg inhalation capsule 1 Cap, 1 Cap, Inhalation, BID (RT), Rimma Brenner M.D., 1 Cap at 02/04/21 0939  •  guaiFENesin ER (MUCINEX) tablet 1,200 mg, 1,200 mg, Oral, BID, Rimma Brenner M.D., 1,200 mg at 02/04/21 0556  •  leflunomide (ARAVA) tablet 20 mg, 20 mg, Oral, DAILY, Rimma Brenner M.D., 20 mg at 02/04/21 0555  •  gabapentin (NEURONTIN) capsule 300 mg, 300 mg, Oral, QAM, 300 mg at 02/04/21 0556 **AND** gabapentin (NEURONTIN) capsule 600 mg, 600 mg, Oral, Q EVENING, Rimma Brenner M.D., 600 mg at 02/03/21 1715  •  ceFAZolin in dextrose (ANCEF) IVPB premix 2 g, 2 g, Intravenous, Q8HRS, Duarte Bell M.D., Last Rate: 200 mL/hr at 02/04/21 1501, 2 g at 02/04/21 1501  •  heparin injection 5,000 Units, 5,000 Units, Subcutaneous, Q8HRS, Rimma Brenner M.D., 5,000 Units at 02/04/21 1509  •  prochlorperazine (COMPAZINE) injection 10 mg, 10 mg, Intravenous, Q6HRS PRN, Juan De La Cruz M.D., 10 mg at 02/04/21 0523  •  lactated ringers infusion (BOLUS): BMI less than or equal to 30, 30 mL/kg, Intravenous, Once PRN, Eulalia Braun D.O.  •  senna-docusate (PERICOLACE or SENOKOT S) 8.6-50 MG per tablet 2 Tab, 2 Tab, Oral, BID, 2 Tab at 02/04/21 0557 **AND**  "polyethylene glycol/lytes (MIRALAX) PACKET 1 Packet, 1 Packet, Oral, QDAY PRN **AND** magnesium hydroxide (MILK OF MAGNESIA) suspension 30 mL, 30 mL, Oral, QDAY PRN **AND** bisacodyl (DULCOLAX) suppository 10 mg, 10 mg, Rectal, QDAY PRN, Shabbir Novoa M.D.  •  Respiratory Therapy Consult, , Nebulization, Continuous RT, Shabbir Novoa M.D.  •  acetaminophen (Tylenol) tablet 650 mg, 650 mg, Oral, Q6HRS PRN, Shabbir Novoa M.D., 650 mg at 21 1722  •  Notify provider if pain remains uncontrolled, , , CONTINUOUS **AND** Use the numeric rating scale (NRS-11) on regular floors and Critical-Care Pain Observation Tool (CPOT) on ICUs/Trauma to assess pain, , , CONTINUOUS **AND** Pulse Ox (Oximetry), , , CONTINUOUS **AND** Pharmacy Consult Request ...Pain Management Review 1 Each, 1 Each, Other, PHARMACY TO DOSE **AND** If patient difficult to arouse and/or has respiratory depression, stop any opiates that are currently infusing and call a Rapid Response., , , CONTINUOUS **AND** oxyCODONE immediate-release (ROXICODONE) tablet 2.5 mg, 2.5 mg, Oral, Q3HRS PRN **AND** oxyCODONE immediate-release (ROXICODONE) tablet 5 mg, 5 mg, Oral, Q3HRS PRN **AND** morphine (pf) 4 mg/mL injection 2 mg, 2 mg, Intravenous, Q3HRS PRN, Shabbir Novoa M.D.  •  ondansetron (ZOFRAN) syringe/vial injection 4 mg, 4 mg, Intravenous, Q4HRS PRN, Shabbir Novoa M.D., 4 mg at 21  •  ondansetron (ZOFRAN ODT) dispertab 4 mg, 4 mg, Oral, Q4HRS PRN, Shabbir Novoa M.D.  •  labetalol (NORMODYNE/TRANDATE) injection 10 mg, 10 mg, Intravenous, Q4HRS PRN, Shabbir Novoa M.D., 10 mg at 21 [id number]    Physical Exam:   Vital Signs: /92   Pulse 92   Temp 36.2 °C (97.2 °F) (Temporal)   Resp 16   Ht 1.854 m (6' 1\")   Wt 89.9 kg (198 lb 3.1 oz)   SpO2 94%   BMI 26.15 kg/m²   Temp  Av.8 °C (98.2 °F)  Min: 35.8 °C (96.5 °F)  Max: 38.4 °C (101.2 °F)  Vital signs reviewed    Constitutional: Patient is oriented to person, place, and time. Appears " well-developed and well-nourished. No distress  Head: Atraumatic, normocephalic  Eyes: Conjunctivae normal, EOM intact. Pupils are equal, round, and reactive to light.   Mouth/Throat: Lips without lesions, good dentition, oropharynx is clear and moist.  Neck: Neck supple. No masses/lymphadenopathy  Cardiovascular: Normal rate, regular rhythm, normal S1S2 and intact distal pulses. No murmur, gallop, or friction rub. No pedal edema.  Pulmonary/Chest: No respiratory distress. Unlabored respiratory effort, lungs clear to auscultation. No wheezes or rales.   Abdominal: Soft, non tender. BS + x 4. No masses or hepatosplenomegaly.   Musculoskeletal: Right knee joint in dressing which is clean and dry.  No distal neurovascular deficits.  Neurological: Alert and oriented to person, place, and time. No gross cranial nerve deficit. No focal neural deficit noted  Skin: Skin is warm and dry. No rashes or embolic phenomena noted on exposed skin  Psychiatric: Normal mood and affect. Behavior is normal.     LABS:  Recent Labs     02/01/21 2108 02/02/21 0431 02/03/21 0438   WBC 12.2* 10.2 7.5      Recent Labs     02/01/21 2108 02/02/21 0431 02/03/21 0438   HEMOGLOBIN 11.3* 10.5* 9.9*   HEMATOCRIT 35.5* 33.0* 32.3*   MCV 99.7* 102.2* 101.9*   MCH 31.7 32.5 31.2   PLATELETCT 197 150* 163*       Recent Labs     02/01/21 2108 02/02/21 0431 02/03/21 0438   SODIUM 138 137 140   POTASSIUM 4.2 4.1 4.1   CHLORIDE 102 103 107   CO2 23 23 23   CREATININE 1.66* 1.64* 1.37        Recent Labs     02/01/21 2108 02/02/21 0431 02/03/21 0438   ALBUMIN 3.6 3.2 3.1*        MICRO:  Blood Culture   Date Value Ref Range Status   11/10/2018 No growth after 5 days of incubation.  Final   11/10/2018 No growth after 5 days of incubation.  Final        Latest pertinent labs were reviewed    IMAGING STUDIES:  DX-CHEST-PORTABLE (1 VIEW)   Final Result      Stable chest without acute/new abnormality.          Hospital Course/Assessment:   Barry  Marc Torres is a 80 y.o. male with a history of COPD on 2 L oxygen at night, CKD stage III/solitary kidney, hypertension, who is presenting with sepsis secondary to third episode of prepatellar bursitis.  Patient did complete 4 weeks of cefepime for septic arthritis, and per orthopedic evaluation did not appear to have inflammation signs in the joint, synovial fluid analysis shows only 180 WBCs, 60% lymphs therefore at this time do not believe patient has recurrent septic arthritis.  Prepatellar bursa culture showing MSSA.     Pertinent Diagnoses:  -Sepsis secondary to right prepatellar recurrent bursitis status post incision and drainage of right prepatellar bursa with excisional debridement  -MSSA prepatellar bursitis    Plan:     -We will continue IV Ancef today.  If synovial cultures are still negative by tomorrow (at 72 hours) we will switch patient to doxycycline.  Patient will need 4 weeks of antibiotics from day of surgery through 3/2/2021.  -Follow-up on synovial fluid, blood cultures    Plan was discussed with the primary team    Please feel free to call with questions.    This illness poses a threat to the patient's life or bodily function    Nahomy Dukes M.D.    Please note that this dictation was created using voice recognition software. I have worked with technical experts from [employer] to optimize the interface.  I have made every reasonable attempt to correct obvious errors, but there may be errors of grammar and possibly content that I did not discover before finalizing the note.

## 2021-02-04 NOTE — CARE PLAN
Problem: Respiratory:  Goal: Respiratory status will improve  Outcome: PROGRESSING AS EXPECTED  Note: RT consulting as appropriate. Bretahing exercises encouraged. IS encouraged.  Problem: Safety  Goal: Will remain free from injury  Outcome: PROGRESSING AS EXPECTED  Note: Call light in reach, bed in lowest and locked position, necessary belongings in reach. Patient educated on use of call light for assistance. Verbalized understanding.   Problem: Infection  Goal: Will remain free from infection  Outcome: PROGRESSING AS EXPECTED  Note: Patient educated on proper hand hygiene. Verbalized understanding. Healthcare team educated on proper hand hygiene while providing patient care and after patient care.

## 2021-02-04 NOTE — DOCUMENTATION QUERY
[facility]                                                                       Query Response Note      PATIENT:               FIDELIA MERA  ACCT #:                  [id number]  MRN:                     [id number]  :                      1940  ADMIT DATE:       2021 8:50 PM  DISCH DATE:          RESPONDING  PROVIDER #:        [id number]           QUERY TEXT:    Cellulitis has developed in the postoperative period.  Can the relationship between cellulitis and previous right knee surgeries be further specified.    NOTE:  If an appropriate response is not listed below, please respond with a new note.        The patient's Clinical Indicators include:  Cellulitis of extremity, history of septic arthritis of R knee s/p multiple surgeries and washout, hx of pseudomonas growth on knee aspiration.  New cellulitis w/ purulent drainage  Risk Factors: septic arthritis w/ multiple knee surgeries   Treatment: ancef, vanco, cefepime, zyvox     Thank You,  Jessenia Barajas RN, BSN  Clinical    Connect via [employer]  Options provided:   -- Cellulitis is a recurrent infection and was present prior to any surgery   -- Cellulitis is unexpected and a complication of the procedure performed   -- Cellulitis is not routinely expected, but integral/inherent to the procedure performed   -- Cellulitis is routinely expected and integral/inherent to the procedure performed   -- Cellulitis is related to another etiology, (please document under lying etiology)   -- Cellulitis is incidental and without clinical significance   -- Unable to determine      Query created by: Jessenia Barajas on 2021 8:24 AM    RESPONSE TEXT:    Cellulitis is unexpected and a complication of the procedure performed       QUERY TEXT:    MSSA is noted in the  Wound Culture results.    Per coding guidelines, the documentation needs to show a direct  link between sepsis and the causative organism in order to assign a code for MSSA sepsis .  Please clarify  the relationship, if any, between sepsis and MSSA.      NOTE:  If an appropriate response is not listed below, please respond with a new note.      The patient's Clinical Indicators include:  2/2 right prepatellar fluid culture:  Staphylococcus aureus, heavy growth.  Methicillin sensitive  Risk Factors: cellulitis, recurrent infected right prepatellar bursitis   Treatment: ancef, vanco, cefepime, zyvox    Thank You,  Jessenia Barajas RN, BSN  Clinical    Connect via [email or web address]  Options provided:   -- Sepsis is due to or associated with methicillin sensitive staphylococcus aureus   -- Sepsis is not due to or associated with methicillin sensitive staphylococcus aureus   -- Unable to determine      Query created by: Jessenia Barajas on 2/4/2021 8:28 AM    RESPONSE TEXT:    Unable to determine          Electronically signed by:  THERESA SPENCER MD 2/4/2021 8:53 AM

## 2021-02-04 NOTE — PROGRESS NOTES
CHW met with pt bedside to introduce CCM program, pt states he remembers the CCM assessment and refused services. Pt states he has no needs and has requested CHW to stop following him at this time.

## 2021-02-04 NOTE — PROGRESS NOTES
"Hospital Medicine Daily Progress Note    Date of Service  2/4/2021    Chief Complaint  80 y.o. male admitted 2/1/2021 with right leg swelling    Hospital Course  An 80 y.o. male who presented 2/1/2021 with leg swelling. Patient with hx of septic arthritis of right knee, s/p multiple surgeries recently completed IV cefepime x4 weeks end date 1/15/2021, COPD, CKD, who presents for right leg swelling. Patient reports right leg swelling, redness, purulent drainage for past week, with associated chills. Denies fever, sweating, joint pain, chest pain, cough, dyspnea. No recent trauma to leg.  In the ED, , RR 22, WBC 12.2, LA 1.6. Sepsis protocol initiated, given IV fluids and started on vancomycin. Orthopedic surgery team to see patient in AM. He will be admitted for evaluation of leg cellulitis.      Interval Problem Update  Patient was seen and examined at bedside, patient's son at bedside.  No acute events overnight. No active c/o on AM round.   S/p  incision and drainage of right prepatellar bursa 2/2 by Dr. Sol. Hold knee ROM approx 2 weeks.   Discussed with infectious disease, will continue cefepime. Per ID: \"will follow pending synovial fluid cultures.  If this turns positive, may need arthrotomy and drainage. Anticipate a 2 to 4-week course of oral antibiotics. \" OR Cx showing MSSA.  PT/OT   SNF order placed.     Consultants/Specialty  Orthopedic surgery  ID    Code Status  Full Code    Disposition  TBD    Review of Systems  Review of Systems   Constitutional: Positive for malaise/fatigue. Negative for chills and fever.   HENT: Positive for congestion. Negative for ear discharge, ear pain, sinus pain and sore throat.    Eyes: Negative for blurred vision and double vision.   Respiratory: Negative for cough, sputum production, shortness of breath and wheezing.    Cardiovascular: Negative for chest pain, palpitations and leg swelling.   Gastrointestinal: Negative for abdominal pain, constipation, diarrhea, " nausea and vomiting.   Genitourinary: Negative for dysuria, frequency and urgency.   Musculoskeletal: Positive for joint pain. Negative for myalgias.   Neurological: Negative for dizziness, focal weakness and headaches.   Psychiatric/Behavioral: The patient is not nervous/anxious.         Physical Exam  Temp:  [35.8 °C (96.5 °F)-36.5 °C (97.7 °F)] 36.2 °C (97.2 °F)  Pulse:  [67-95] 92  Resp:  [15-16] 16  BP: (132-172)/(79-96) 160/92  SpO2:  [93 %-98 %] 94 %    Physical Exam  Constitutional:       General: He is not in acute distress.  HENT:      Head: Normocephalic and atraumatic.      Nose: Nose normal.      Mouth/Throat:      Mouth: Mucous membranes are moist.      Pharynx: No posterior oropharyngeal erythema.   Eyes:      General: No scleral icterus.     Extraocular Movements: Extraocular movements intact.      Conjunctiva/sclera: Conjunctivae normal.      Pupils: Pupils are equal, round, and reactive to light.   Neck:      Musculoskeletal: Normal range of motion and neck supple. No neck rigidity.   Cardiovascular:      Rate and Rhythm: Normal rate and regular rhythm.      Pulses: Normal pulses.      Heart sounds: Normal heart sounds. No murmur. No gallop.    Pulmonary:      Effort: Pulmonary effort is normal.      Breath sounds: Normal breath sounds. No stridor. No wheezing, rhonchi or rales.   Abdominal:      General: Bowel sounds are normal.      Palpations: Abdomen is soft.   Musculoskeletal:         General: Swelling and tenderness present.      Comments: Right knee covered in dressing, s/p I & D surgery.  Wound Vac in placed   Skin:     General: Skin is warm.   Neurological:      General: No focal deficit present.      Mental Status: He is alert and oriented to person, place, and time.   Psychiatric:         Mood and Affect: Mood normal.         Behavior: Behavior normal.         Fluids    Intake/Output Summary (Last 24 hours) at 2/4/2021 1005  Last data filed at 2/4/2021 0935  Gross per 24 hour   Intake  "480 ml   Output 175 ml   Net 305 ml       Laboratory  Recent Labs     02/01/21 2108 02/02/21 0431 02/03/21  0438   WBC 12.2* 10.2 7.5   RBC 3.56* 3.23* 3.17*   HEMOGLOBIN 11.3* 10.5* 9.9*   HEMATOCRIT 35.5* 33.0* 32.3*   MCV 99.7* 102.2* 101.9*   MCH 31.7 32.5 31.2   MCHC 31.8* 31.8* 30.7*   RDW 55.8* 57.6* 57.1*   PLATELETCT 197 150* 163*   MPV 10.3 10.2 10.8     Recent Labs     02/01/21 2108 02/02/21 0431 02/03/21 0438   SODIUM 138 137 140   POTASSIUM 4.2 4.1 4.1   CHLORIDE 102 103 107   CO2 23 23 23   GLUCOSE 139* 124* 97   BUN 26* 26* 24*   CREATININE 1.66* 1.64* 1.37   CALCIUM 8.8 8.2* 8.3*     Recent Labs     02/02/21 0459   APTT 40.1*   INR 1.23*               Imaging  DX-CHEST-PORTABLE (1 VIEW)   Final Result      Stable chest without acute/new abnormality.           Assessment/Plan  * Cellulitis of extremity  Assessment & Plan  History of septic arthritis of R knee s/p multiple surgeries and washout, hx of pseudomonas growth on knee aspiration  S/p 4 week course of cefepime prior to this hospitalization, completed 1/15/2021  New cellulitis with purulent drainage  Wound and blood cultures taken    -S/p  incision and drainage of right prepatellar bursa 2/2 by Dr. Sol. Hold knee ROM approx 2 weeks.   Discussed with infectious disease, will continue cefepime. Per ID: \"will follow pending synovial fluid cultures.  If this turns positive, may need arthrotomy and drainage. Anticipate a 2 to 4-week course of oral antibiotics. \" OR Cx showing MSSA.  PT/OT   SNF order placed.   -Pain control  -Fall precautions          Sepsis (HCC)  Assessment & Plan  This is Sepsis Present on admission  SIRS criteria identified on my evaluation include: Tachycardia, with heart rate greater than 90 BPM  Source is leg cellulitis  Sepsis protocol initiated  Fluid resuscitation ordered per protocol  IV antibiotics as appropriate for source of sepsis  While organ dysfunction may be noted elsewhere in this problem list or in the " "chart, degree of organ dysfunction does not meet CMS criteria for severe sepsis    -S/p  incision and drainage of right prepatellar bursa 2/2 by Dr. Sol. Hold knee ROM approx 2 weeks.   Discussed with infectious disease, will continue cefepime. Per ID: \"will follow pending synovial fluid cultures.  If this turns positive, may need arthrotomy and drainage. Anticipate a 2 to 4-week course of oral antibiotics. \" OR Cx showing MSSA.  PT/OT   SNF order placed.       Chronic respiratory failure with hypoxia (HCC)- (present on admission)  Assessment & Plan  Due to COPD, on 2L O2 at night  On 2L in ER  No new respiratory complaints, CXR with no acute abnormalities  Wean oxygen  RT protocol  Mucinex for congestion    Stage 3 chronic kidney disease- (present on admission)  Assessment & Plan  Will monitor         VTE prophylaxis: SCDs & Heparin     "

## 2021-02-05 LAB
ANION GAP SERPL CALC-SCNC: 7 MMOL/L (ref 7–16)
BACTERIA FLD AEROBE CULT: NORMAL
BASOPHILS # BLD AUTO: 1 % (ref 0–1.8)
BASOPHILS # BLD: 0.07 K/UL (ref 0–0.12)
BUN SERPL-MCNC: 22 MG/DL (ref 8–22)
CALCIUM SERPL-MCNC: 8 MG/DL (ref 8.5–10.5)
CHLORIDE SERPL-SCNC: 106 MMOL/L (ref 96–112)
CO2 SERPL-SCNC: 25 MMOL/L (ref 20–33)
CREAT SERPL-MCNC: 1.46 MG/DL (ref 0.5–1.4)
EOSINOPHIL # BLD AUTO: 0.32 K/UL (ref 0–0.51)
EOSINOPHIL NFR BLD: 4.5 % (ref 0–6.9)
ERYTHROCYTE [DISTWIDTH] IN BLOOD BY AUTOMATED COUNT: 57.8 FL (ref 35.9–50)
GLUCOSE SERPL-MCNC: 118 MG/DL (ref 65–99)
GRAM STN SPEC: NORMAL
HCT VFR BLD AUTO: 32.4 % (ref 42–52)
HGB BLD-MCNC: 10 G/DL (ref 14–18)
IMM GRANULOCYTES # BLD AUTO: 0.03 K/UL (ref 0–0.11)
IMM GRANULOCYTES NFR BLD AUTO: 0.4 % (ref 0–0.9)
LYMPHOCYTES # BLD AUTO: 1.13 K/UL (ref 1–4.8)
LYMPHOCYTES NFR BLD: 15.9 % (ref 22–41)
MCH RBC QN AUTO: 31.9 PG (ref 27–33)
MCHC RBC AUTO-ENTMCNC: 30.9 G/DL (ref 33.7–35.3)
MCV RBC AUTO: 103.5 FL (ref 81.4–97.8)
MONOCYTES # BLD AUTO: 0.77 K/UL (ref 0–0.85)
MONOCYTES NFR BLD AUTO: 10.9 % (ref 0–13.4)
NEUTROPHILS # BLD AUTO: 4.77 K/UL (ref 1.82–7.42)
NEUTROPHILS NFR BLD: 67.3 % (ref 44–72)
NRBC # BLD AUTO: 0 K/UL
NRBC BLD-RTO: 0 /100 WBC
PLATELET # BLD AUTO: 185 K/UL (ref 164–446)
PMV BLD AUTO: 10.3 FL (ref 9–12.9)
POTASSIUM SERPL-SCNC: 3.5 MMOL/L (ref 3.6–5.5)
RBC # BLD AUTO: 3.13 M/UL (ref 4.7–6.1)
SIGNIFICANT IND 70042: NORMAL
SITE SITE: NORMAL
SODIUM SERPL-SCNC: 138 MMOL/L (ref 135–145)
SOURCE SOURCE: NORMAL
WBC # BLD AUTO: 7.1 K/UL (ref 4.8–10.8)

## 2021-02-05 PROCEDURE — 700101 HCHG RX REV CODE 250: Performed by: STUDENT IN AN ORGANIZED HEALTH CARE EDUCATION/TRAINING PROGRAM

## 2021-02-05 PROCEDURE — 700102 HCHG RX REV CODE 250 W/ 637 OVERRIDE(OP): Performed by: STUDENT IN AN ORGANIZED HEALTH CARE EDUCATION/TRAINING PROGRAM

## 2021-02-05 PROCEDURE — 80048 BASIC METABOLIC PNL TOTAL CA: CPT

## 2021-02-05 PROCEDURE — 770006 HCHG ROOM/CARE - MED/SURG/GYN SEMI*

## 2021-02-05 PROCEDURE — 700111 HCHG RX REV CODE 636 W/ 250 OVERRIDE (IP): Performed by: INTERNAL MEDICINE

## 2021-02-05 PROCEDURE — 97530 THERAPEUTIC ACTIVITIES: CPT | Mod: CQ

## 2021-02-05 PROCEDURE — 700111 HCHG RX REV CODE 636 W/ 250 OVERRIDE (IP): Performed by: STUDENT IN AN ORGANIZED HEALTH CARE EDUCATION/TRAINING PROGRAM

## 2021-02-05 PROCEDURE — 85025 COMPLETE CBC W/AUTO DIFF WBC: CPT

## 2021-02-05 PROCEDURE — A9270 NON-COVERED ITEM OR SERVICE: HCPCS | Performed by: STUDENT IN AN ORGANIZED HEALTH CARE EDUCATION/TRAINING PROGRAM

## 2021-02-05 PROCEDURE — 36415 COLL VENOUS BLD VENIPUNCTURE: CPT

## 2021-02-05 PROCEDURE — 99232 SBSQ HOSP IP/OBS MODERATE 35: CPT | Performed by: STUDENT IN AN ORGANIZED HEALTH CARE EDUCATION/TRAINING PROGRAM

## 2021-02-05 PROCEDURE — 94640 AIRWAY INHALATION TREATMENT: CPT

## 2021-02-05 PROCEDURE — 99232 SBSQ HOSP IP/OBS MODERATE 35: CPT | Mod: GC | Performed by: INTERNAL MEDICINE

## 2021-02-05 PROCEDURE — 94760 N-INVAS EAR/PLS OXIMETRY 1: CPT

## 2021-02-05 RX ADMIN — CEFAZOLIN SODIUM 2 G: 2 INJECTION, SOLUTION INTRAVENOUS at 14:13

## 2021-02-05 RX ADMIN — BUDESONIDE AND FORMOTEROL FUMARATE DIHYDRATE 2 PUFF: 80; 4.5 AEROSOL RESPIRATORY (INHALATION) at 17:01

## 2021-02-05 RX ADMIN — BUDESONIDE AND FORMOTEROL FUMARATE DIHYDRATE 2 PUFF: 80; 4.5 AEROSOL RESPIRATORY (INHALATION) at 04:54

## 2021-02-05 RX ADMIN — GABAPENTIN 600 MG: 300 CAPSULE ORAL at 17:01

## 2021-02-05 RX ADMIN — HEPARIN SODIUM 5000 UNITS: 5000 INJECTION, SOLUTION INTRAVENOUS; SUBCUTANEOUS at 04:54

## 2021-02-05 RX ADMIN — GUAIFENESIN 1200 MG: 600 TABLET, EXTENDED RELEASE ORAL at 04:53

## 2021-02-05 RX ADMIN — GUAIFENESIN 1200 MG: 600 TABLET, EXTENDED RELEASE ORAL at 17:01

## 2021-02-05 RX ADMIN — FLUTICASONE PROPIONATE 100 MCG: 50 SPRAY, METERED NASAL at 04:53

## 2021-02-05 RX ADMIN — LEFLUNOMIDE 20 MG: 20 TABLET ORAL at 04:53

## 2021-02-05 RX ADMIN — GABAPENTIN 300 MG: 300 CAPSULE ORAL at 04:54

## 2021-02-05 RX ADMIN — CEFAZOLIN SODIUM 2 G: 2 INJECTION, SOLUTION INTRAVENOUS at 22:13

## 2021-02-05 RX ADMIN — GLYCOPYRROLATE 1 CAPSULE: 15.6 CAPSULE RESPIRATORY (INHALATION) at 07:06

## 2021-02-05 RX ADMIN — CEFAZOLIN SODIUM 2 G: 2 INJECTION, SOLUTION INTRAVENOUS at 04:54

## 2021-02-05 RX ADMIN — LABETALOL HYDROCHLORIDE 10 MG: 5 INJECTION, SOLUTION INTRAVENOUS at 09:25

## 2021-02-05 RX ADMIN — HEPARIN SODIUM 5000 UNITS: 5000 INJECTION, SOLUTION INTRAVENOUS; SUBCUTANEOUS at 14:13

## 2021-02-05 RX ADMIN — DOCUSATE SODIUM 50 MG AND SENNOSIDES 8.6 MG 2 TABLET: 8.6; 5 TABLET, FILM COATED ORAL at 04:53

## 2021-02-05 RX ADMIN — HEPARIN SODIUM 5000 UNITS: 5000 INJECTION, SOLUTION INTRAVENOUS; SUBCUTANEOUS at 22:13

## 2021-02-05 RX ADMIN — LABETALOL HYDROCHLORIDE 10 MG: 5 INJECTION, SOLUTION INTRAVENOUS at 08:19

## 2021-02-05 ASSESSMENT — COGNITIVE AND FUNCTIONAL STATUS - GENERAL
TURNING FROM BACK TO SIDE WHILE IN FLAT BAD: A LOT
MOVING FROM LYING ON BACK TO SITTING ON SIDE OF FLAT BED: A LOT
CLIMB 3 TO 5 STEPS WITH RAILING: A LOT
SUGGESTED CMS G CODE MODIFIER MOBILITY: CL
WALKING IN HOSPITAL ROOM: A LITTLE
STANDING UP FROM CHAIR USING ARMS: A LITTLE
MOVING TO AND FROM BED TO CHAIR: UNABLE
MOBILITY SCORE: 13

## 2021-02-05 ASSESSMENT — ENCOUNTER SYMPTOMS
SPUTUM PRODUCTION: 0
ABDOMINAL PAIN: 0
BLURRED VISION: 0
WHEEZING: 0
SHORTNESS OF BREATH: 0
COUGH: 0
MYALGIAS: 0
DIARRHEA: 0
NAUSEA: 0
CHILLS: 0
DIZZINESS: 0
FOCAL WEAKNESS: 0
CONSTIPATION: 0
VOMITING: 0
DOUBLE VISION: 0
FEVER: 0
SORE THROAT: 0
PALPITATIONS: 0
SINUS PAIN: 0
NERVOUS/ANXIOUS: 0
HEADACHES: 0

## 2021-02-05 ASSESSMENT — GAIT ASSESSMENTS
DISTANCE (FEET): 5
GAIT LEVEL OF ASSIST: MINIMAL ASSIST
DEVIATION: STEP TO;BRADYKINETIC;SHUFFLED GAIT
ASSISTIVE DEVICE: FRONT WHEEL WALKER

## 2021-02-05 ASSESSMENT — PAIN DESCRIPTION - PAIN TYPE
TYPE: SURGICAL PAIN

## 2021-02-05 NOTE — FACE TO FACE
Face to Face Supporting Documentation - Home Health    The encounter with this patient was in whole or in part the primary reason for home health admission.    Date of encounter:   Patient:                    MRN:                       YOB: 2022  Barry Torres  [id number]  1940     Home health to see patient for:  Skilled Nursing care for assessment, interventions & education, Physical Therapy evaluation and treatment and Occupational therapy evaluation and treatment    Skilled need for:  Surgical Aftercare Right septic bursitis     Skilled nursing interventions to include:  Wound Care and Comment: PT/OT    Homebound status evidenced by:  Need the aid of supportive devices such as crutches, canes, wheelchairs or walkers. Leaving home requires a considerable and taxing effort. There is a normal inability to leave the home.    Community Physician to provide follow up care: Carlitos Harvey M.D.     Optional Interventions? No      I certify the face to face encounter for this home health care referral meets the CMS requirements and the encounter/clinical assessment with the patient was, in whole, or in part, for the medical condition(s) listed above, which is the primary reason for home health care. Based on my clinical findings: the service(s) are medically necessary, support the need for home health care, and the homebound criteria are met.  I certify that this patient has had a face to face encounter by myself.  Rimma Brenner M.D. - NPI: [id number]

## 2021-02-05 NOTE — PROGRESS NOTES
2 RN Skin Check    2 RN skin check completed with LISA Thompson.    Devices in place: SCDs and Nasal Cannula. RLE Immobilizer.   Skin assessed under devices: yes.  Confirmed pressure ulcers found on: N/A.  New potential pressure ulcers noted on N/A. Wound consult placed N/A.  The following interventions in place Pillows. Waffle overlay refused, education provided.    Immobilizer checked. Skin intact. Wound vac to R knee.

## 2021-02-05 NOTE — PROGRESS NOTES
DATE OF SERVICE:  02/05/2021     TIME:  Approximately 9:25 a.m.     SUBJECTIVE:  The patient is lying in bed.  He is in no acute distress.  He   does not have any pain.  He tolerated dressing change yesterday.  His son is   at the bedside and showed me pictures.     OBJECTIVE:  The patient's most recent blood pressure 166/108. Earlier this   morning, his heart rate was 86, respirations 14, temperature 97.5.     Knee immobilizer is in place.  He is able to dorsiflex and plantarflex his   toes.     LABORATORY DATA:  White blood cell count 7100, hematocrit 32.4%, platelet   count 185,000.  Sodium ____, potassium 3.5, creatinine 1.46.     Prepatellar swabs and tissue show MSSA.  A separate synovial fluid specimen   shows no growth.     ASSESSMENT:  Recurrent infection, right prepatellar bursa.     PLAN:  Continue VAC therapy and antibiotics.  Discharge planning.  Knee   immobilizer for about two weeks.  Avoid excessive tension on the anterior   skin.  He can weightbear as tolerated.        ______________________________  MD TYRESE MARRUFO/BETH/ZION    DD:  02/05/2021 12:22  DT:  02/05/2021 14:07    Job#:  [id number]

## 2021-02-05 NOTE — DISCHARGE PLANNING
Anticipated Discharge Disposition: Home with HHC    Action: LSW met with pt and pt's son Marc at bedside to obtain HHC choice. Marc requested some time to do research on the [facility] agencies before providing choice.    Addendum 2/5 @8906  LSW met with pt at bedside to obtain HHC choice. Pt would like the following referrals sent:    1)[facility]  2) [facility]  3) [facility]    LSW faxed choice form to MAGDIEL Jones.    Barriers to Discharge: HHC choice    Plan: This LSW will follow up with pt and Marc regarding HHC choice.

## 2021-02-05 NOTE — DISCHARGE PLANNING
Received Choice form at 1406  Agency/Facility Name: [facility]  Referral sent per Choice form @ 1772     Agency/Facility Name: [facility]   Spoke To: Bridger  Outcome: Patient Accepted

## 2021-02-05 NOTE — WOUND TEAM
[facility]  Inpatient Services  Initial Wound and Skin Care Evaluation    Admission Date: 2/1/2021     Last order of IP CONSULT TO WOUND CARE was found on 2/2/2021 from Hospital Encounter on 2/1/2021     HPI, PMH, SH: Reviewed    Past Surgical History:   Procedure Laterality Date   • IRRIGATION & DEBRIDEMENT ORTHO Right 2/2/2021    Procedure: IRRIGATION AND DEBRIDEMENT, WOUND-KNEE;  Surgeon: Kush Sol M.D.;  Location: [facility];  Service: Orthopedics   • INCISION AND DRAINAGE GENERAL Right 2/2/2021    Procedure: INCISION AND DRAINAGE- arthrocentesis right knee;  Surgeon: Kush Sol M.D.;  Location: [facility];  Service: Orthopedics   • INCISION AND DRAINAGE GENERAL Right 12/19/2020    Procedure: INCISION AND DRAINAGE;  Surgeon: Marc Chowdhury M.D.;  Location: [facility];  Service: Orthopedics   • IRRIGATION & DEBRIDEMENT ORTHO Right 10/27/2020    Procedure: IRRIGATION AND DEBRIDEMENT, WOUND - KNEE OPEN;  Surgeon: Elijah Faulkner M.D.;  Location: [facility];  Service: Orthopedics   • FLEXOR TENDON REPAIR Left 4/3/2019    Procedure: FLEXOR TENDON REPAIR- SMALL FINGER VS;  Surgeon: Marc Chowdhury M.D.;  Location: [facility];  Service: Orthopedics   • TENDON TRANSFER Left 4/3/2019    Procedure: TENDON TRANSFER;  Surgeon: Marc Chowdhury M.D.;  Location: [facility];  Service: Orthopedics   • COLONOSCOPY  11/10/2018    Procedure: COLONOSCOPY;  Surgeon: Ganesh Peacock M.D.;  Location: [facility];  Service: Gastroenterology   • NASAL POLYPECTOMY Bilateral 10/6/2015    Procedure: NASAL POLYPECTOMY WITH SCOTT ENDOSCOPIC NASAL DEBRIDEMENT;  Surgeon: Param Maurer M.D.;  Location: SURGERY SAME DAY [facility];  Service:    • CYSTOSCOPY  2/1/2010    Performed by ANGIE VERDUZCO at [facility]   • RETROGRADES  2/1/2010    Performed by ANGIE VERDUZCO at [facility]  ORS   • PYELOGRAM  2010    Performed by ANGIE VERDUZCO at SURGERY [facility] ORS   • URETEROSCOPY  2010    Performed by ANGIE VERDUZCO at SURGERY [facility] ORS   • NEPHRECTOMY LAPAROSCOPIC      left kidney   • TESTICLE EXPLORATION     • PB REMV 2ND CATARACT,CORN-SCLER SECTN     • TONSILLECTOMY       Social History     Tobacco Use   • Smoking status: Former Smoker     Packs/day: 1.00     Years: 40.00     Pack years: 40.00     Types: Cigarettes     Quit date: 1980     Years since quittin.1   • Smokeless tobacco: Never Used   • Tobacco comment: 1 pk a day for 40 yrs   Substance Use Topics   • Alcohol use: No     Alcohol/week: 0.0 oz     Chief Complaint   Patient presents with   • Leg Swelling     has had infection in R leg since feb   • Fever     Diagnosis: Cellulitis [L03.90]    Unit where seen by Wound Team: T3     WOUND CONSULT/FOLLOW UP RELATED TO:  NPWT change to right knee, first one since OR     WOUND HISTORY:  Patient admitted with complaints of pain to right knee, over several months he has had multiple procedures completed on the wound, followed by wound complications. States this was his 4th surgery on this knee. Had I&D with MD oSl on  and NPWT was placed then.      WOUND ASSESSMENT/LDA       Negative Pressure Wound Therapy 21 Knee Right (Active)   NPWT Pump Mode / Pressure Setting Ulta;Continuous;125 mmHg 21 1600   Dressing Type Black Foam (Regular);Medium 21 1600   Number of Foam Pieces Used 3 21 1600   Canister Changed No 21 1600   Output (mL) 10 mL 21 0829   NEXT Dressing Change/Treatment Date 21 1600   WOUND NURSE ONLY - Time Spent with Patient (mins) 60 21 1600     Wound 21 Incision Knee Right s/p wound I&D and arthrocentesis with Dr Sol 21 (Active)   Wound Image    21 1600   Site Assessment White;Pink;Red;Undermining 21 1600   Periwound Assessment Red;Maceration  02/04/21 1600   Margins Unattached edges;Undefined edges 02/04/21 1600   Closure Secondary intention 02/04/21 1600   Drainage Amount Small 02/04/21 1600   Drainage Description Serosanguineous 02/04/21 1600   Treatments Cleansed;Site care;Tape change 02/04/21 1600   Wound Cleansing Approved Wound Cleanser 02/04/21 1600   Periwound Protectant Mepitel;Skin Protectant Wipes to Periwound;Drape 02/04/21 1600   Dressing Cleansing/Solutions Not Applicable 02/04/21 1600   Dressing Options Wound Vac;Mepilex;Immobilizer;Ace Wrap 02/04/21 1600   Dressing Changed Changed 02/04/21 1600   Dressing Status Clean;Dry;Intact 02/04/21 1600   Dressing Change/Treatment Frequency Tuesday, Thursday, Saturday, and As Needed 02/04/21 1600   NEXT Dressing Change/Treatment Date 02/06/21 02/04/21 1600   NEXT Weekly Photo (Inpatient Only) 02/11/21 02/04/21 1600   Non-staged Wound Description Full thickness 02/04/21 1600   Undermining of Wound, 1st Location From 12 o'clock;To 12 o'clock 02/04/21 1600   Exposed Structures Tendon 02/04/21 1600       MEASUREMENTS:  Superior wound: 3 x 1.4 x 0.6CM  Inferior wound: 3 x 1.4 x 0.8CM  Wounds communicate with eachother. Circumferential undermining present for both wounds, ranging from 0.5CM - 3.7CM (deepest point was inferior wound at 0900.          Vascular:    NIKKY:   No results found.    Lab Values:    Lab Results   Component Value Date/Time    WBC 7.5 02/03/2021 04:38 AM    RBC 3.17 (L) 02/03/2021 04:38 AM    HEMOGLOBIN 9.9 (L) 02/03/2021 04:38 AM    HEMATOCRIT 32.3 (L) 02/03/2021 04:38 AM    CREACTPROT 34.20 (H) 02/02/2021 04:59 AM    SEDRATEWES 25 (H) 01/05/2021 10:35 AM        Culture Results show:  Recent Results (from the past 720 hour(s))   CULTURE WOUND W/ GRAM STAIN    Collection Time: 02/02/21  8:33 AM    Specimen: Wound   Result Value Ref Range    Significant Indicator POS (POS)     Source WND     Site Right Prepatellar Fluid     Culture Result - (A)     Gram Stain Result Many WBCs.  Moderate  Gram positive cocci.       Culture Result (A)      Staphylococcus aureus  Heavy growth  See previous culture for sensitivity report.         Pain Level/Medicated:  no premed. Tolerated well. 2/10.       INTERVENTIONS BY WOUND TEAM:  Chart and images reviewed. Discussed with bedside RN. This RN in to assess patient. Performed standard wound care which includes appropriate positioning, dressing removal and non-selective debridement. Pictures and measurements obtained weekly if/when required.  Preparation for Dressing removal: Dressing soaked with n/a  Cleansed with:  wound cleanser and gauze.  Sharp debridement: n/a  Dona wound: Cleansed with wound cleanser and gauze, Prepped with no sting skin prep, then mepitel to incisions, and drape.   Primary Dressing: Applied one piece of regular black foam into the superior wound and tucked into some undermining, and applied another separate piece of regular black foam to inferior wound bed and tucked into undermining. Then took one piece of foam and applied over top the entire suture line, secured with drape.   Secondary (Outer) Dressing: Hole cut to middle, button and trak pad applied. Suction obtained at 125mmhg continuous suction, mepilex under tubing. Reapplied immobilizer.     Interdisciplinary consultation: Patient, Bedside RN (Dianelys), wound RN Diana    EVALUATION / RATIONALE FOR TREATMENT:  Most Recent Date:  2/4/21: Patient right knee with full thickness surgical wound present. Two separate openings noted, however they do communicate with eachother and there is circumferential undermining involved. Tendon visible in superior wound bed.  Mepitel over sutures to protect, and then applied NPWT to assist in closure by secondary intention, management of drainage, increase oxygenation and granulation to the area. Wound team to follow up 3x/week for changes.      Goals: Steady decrease in wound area and depth weekly.    WOUND TEAM PLAN OF CARE ([X] for frequency of wound  follow up,):   Nursing to follow orders written for wound care. Contact wound team if area fails to progress, deteriorates or with any questions/concerns  Dressing changes by wound team:                   Follow up 3 times weekly:                NPWT change 3 times weekly: X    Follow up 1-2 times weekly:      Follow up Bi-Monthly:                   Follow up as needed:     Other (explain):     NURSING PLAN OF CARE ORDERS (X):  Dressing changes: See Dressing Care orders: X  Skin care: See Skin Care orders:   RN Prevention Protocol:  Rectal tube care: See Rectal Tube Care orders:   Other orders:    RSKIN:   CURRENTLY IN PLACE (X), APPLIED THIS VISIT (A), ORDERED (O):   Q shift Silverio:  X  Q shift pressure point assessments:  X    Surface/Positioning   Pressure redistribution mattress   X         Low Airloss          Bariatric foam      Bariatric DELANEY     Waffle cushion        Waffle Overlay          Reposition q 2 hours      TAPs Turning system     Z Syd Pillow     Offloading/Redistribution SARITA  Sacral Mepilex (Silicone dressing)     Heel Mepilex (Silicone dressing)         Heel float boots (Prevalon boot)             Float Heels off Bed with Pillows           Respiratory SARITA  Silicone O2 tubing         Gray Foam Ear protectors     Cannula fixation Device (Tender )          High flow offloading Clip    Elastic head band offloading device      Anchorfast                                                         Trach with Optifoam split foam             Containment/Moisture Prevention SARITA    Rectal tube or BMS    Purwick/Condom Cath        Lozano Catheter    Barrier wipes           Barrier paste       Antifungal tx      Interdry        Mobilization SARITA      Up to chair        Ambulate      PT/OT      Nutrition       Dietician        Diabetes Education      PO  X   TF     TPN     NPO   # days     Other        Anticipated discharge plans: will need ongoing wound vac changes once discharged.   LTACH:        SNF/Rehab:                   Home Health Care: X          Outpatient Wound Center:            Self/Family Care:        Other:

## 2021-02-05 NOTE — THERAPY
"Physical Therapy   Daily Treatment     Patient Name: Barry Torres  Age:  80 y.o., Sex:  male  Medical Record #: [id number]  Today's Date: 2/5/2021     Precautions: Fall Risk, Weight Bearing As Tolerated Right Lower Extremity, Immobilizer Right Lower Extremity    Assessment    Pt progressing well w/ therapy. Pt was able to perform mobility w/ improved ease today and able to actively move his R LE w/out assist. Pt did require a lot of cues to sequence mobility w/ rest breaks in between. Pt w/ c/o dizziness intermittently and would have posterior leaning to compensate. With fatigue, increased posterior leaning noted w/ pt more delayed in cue following. Pt was able to stand w/ only [place] and maintains upright posture. Pt able to take a couple of steps today before fatiguing. Per son, pt had some falls at home but they were d/t external factors. Pt son stating he will come stay w/ his parents upon DC home but only for a day or so. Pt encouraged to increase OOB activity if he wants to reach his goal of DC home. He currently is at a level in which he will require post acute therapy.    Plan    Continue current treatment plan.    DC Equipment Recommendations: None  Discharge Recommendations: Recommend post-acute placement for additional physical therapy services prior to discharge home      Subjective    \"I've been dealing w/ this for a year.\"     Objective       02/05/21 1209   Precautions   Precautions Fall Risk;Weight Bearing As Tolerated Right Lower Extremity;Immobilizer Right Lower Extremity   Comments knee immobilizer on at all times, no ROM for 2 weeks   Gait Analysis   Gait Level Of Assist Minimal Assist   Assistive Device Front Wheel Walker   Distance (Feet) 5   # of Times Distance was Traveled 1   Deviation Step To;Bradykinetic;Shuffled Gait   Comments Pt only able to tolerate short distance gait after multiple pericare epsiodes,   Bed Mobility    Supine to Sit Minimal Assist   Sit to Supine Minimal Assist "   Scooting Minimal Assist   Rolling Minimal Assist to Rt.   Comments Pt w/ improved active movement and speed of R LE. Pt needing more cues to sequence bed mobility. Heavy HR use from flat bed.   Functional Mobility   Sit to Stand Minimal Assist   Bed, Chair, Wheelchair Transfer Minimal Assist   Transfer Method Stand Step   Mobility With FWW. Pt w/ good hand placement for STS. Decreased balance w/ transfer of hands from bed to FWW w/ fatigue.   Short Term Goals    Short Term Goal # 1 pt will perform supine <> sit with HOB flat, no railing and SPV in 6 vistis   Goal Outcome # 1 goal not met   Short Term Goal # 2 pt will perform sit <> stand and functional transfers with LRAD and SPV in 6 visits   Goal Outcome # 2 Goal not met   Short Term Goal # 3 pt will ambulate > 150 ft with LRAD and SPV to access home in 6 visits   Goal Outcome # 3 Goal not met   Short Term Goal # 4 pt will negotiate 1 step with LRAD and SPV to access home in 6 visits   Goal Outcome # 4 Goal not met

## 2021-02-05 NOTE — PROGRESS NOTES
"   Orthopaedic PA Progress Note    Interval changes:did well overnight, no new Micro findings    ROS - Patient denies any new issues. No chest pain, dyspnea, or fever.  Pain well controlled.    /81   Pulse 91   Temp 36.2 °C (97.2 °F) (Temporal)   Resp 16   Ht 1.854 m (6' 1\")   Wt 89.9 kg (198 lb 3.1 oz)   SpO2 95%     Patient seen and examined  No acute distress  Breathing non labored  RRR  Wound vac patent    Recent Labs     02/01/21 2108 02/02/21 0431 02/03/21 0438   WBC 12.2* 10.2 7.5   RBC 3.56* 3.23* 3.17*   HEMOGLOBIN 11.3* 10.5* 9.9*   HEMATOCRIT 35.5* 33.0* 32.3*   MCV 99.7* 102.2* 101.9*   MCH 31.7 32.5 31.2   MCHC 31.8* 31.8* 30.7*   RDW 55.8* 57.6* 57.1*   PLATELETCT 197 150* 163*   MPV 10.3 10.2 10.8       Active Hospital Problems    Diagnosis   • Cellulitis of extremity [L03.119]     Priority: High   • Sepsis ([facility]) [A41.9]     Priority: High   • Stage 3 chronic kidney disease [N18.30]     Priority: Low   • Chronic respiratory failure with hypoxia ([facility]) [J96.11]       Assessment/Plan:  POD#2  Wt bearing status -AT, no ROM x 2wks  PT/OT-initiated  Wound care:vac changes by WCT  Sutures/Staples out- 10-14 days post operatively  Antibiotics: per Med/ID  DVT Prophylaxis- TEDS/SCDs/Foot pumps.   Lovenox: Start 40mg daily, Duration-until ambulatory > 150'  Future Procedures - none planned  Case Coordination for Discharge Planning - Disposition per Med. Follow-Up: needs appointment with Dr. Sol at [facility] at 10-14 days post-op for re-evaluation, staple removal and radiographs.        "

## 2021-02-05 NOTE — DISCHARGE PLANNING
Anticipated Discharge Disposition: Uncertain-pending medical team collaboration    Action: LSW met with pt and pt's son Marc at bedside to obtain SNF choice. Pt reported that he does not want to go to a SNF and would prefer to go home with C.    Barriers to Discharge: DC disposition.    Plan: Care coordination will continue to follow up with DC needs and barriers.

## 2021-02-05 NOTE — PROGRESS NOTES
[facility] INFECTIOUS DISEASES INPATIENT PROGRESS NOTE   Reason for Consultation: Recurrent prepatellar bursitis    Chief Complaint: Right knee pain    History of Present Illness:     Barry Torres is a 80 y.o. male admitted 2/1/2021.  His past medical history is significant for COPD on 2 L oxygen at night, CKD stage III/solitary kidney, hypertension.  Patient is well-known to ID from his past admission.  In September 2020 he developed prepatellar bursitis, right lower leg cyst and underwent open resection.  This was complicated by dehiscence of both wounds (bursitis and lower leg cyst) requiring secondary closure on 10/27/2020.  Wound cultures from that time grew MSSA.  Patient had another ground-level fall on 12/16/2020 and presented with sepsis, and was found to have pseudomonas aeruginosa septic arthritis as well as second episode of prepatellar bursitis.  He underwent right knee irrigation and debridement, arthrotomy and anterior synovectomy on 12/19/2020, and was discharged home on 4 weeks of cefepime, of which he completed course.  Patient now presents with right knee swelling, redness, purulent discharge along with fever and chills.  On presentation he was tachycardic up to 107, tachypneic 26, with a white count of 12.2.  His procalcitonin had gone up from 0.54 (12/20) to 1.13, and CRP had increased from 0.28 (1/21) to 34.2.  He was started on vancomycin/cefepime.  Patient was evaluated by orthopedic surgery, and it was noted that most of the swelling and redness were anterior in the area of the prepatellar bursa, without any significant swelling of the knee joint.  He underwent incision and drainage of right prepatellar bursa with excisional debridement with application of wound VAC dressing on 2/2/2020.  Due to history of recent septic arthritis arthrocentesis of right knee joint was done as well.    INTERVAL UPDATE:  2/4/21: No acute events overnight, vital signs stable.  Patient has mild knee pain,  but denies other complaints.  White count has trended down to 7.5.  Cultures from prepatellar bursa show MSSA, blood cultures and synovial cultures show no growth till date.  2/5/21: No acute events overnight, vital signs are stable.  Patient denies any significant pain in his knee.  White count 7.1.  No new growth on synovitic culture blood culture.    Review of Systems:  All other systems reviewed and are negative expect as noted in HPI    Past Medical History:   Diagnosis Date   • Abnormal thyroid stimulating hormone (TSH) level    • Allergic rhinitis    • Asbestosis ([facility])    • Asthma    • Bronchitis    • Chronic diarrhea     declines eval; takes immodium once a day usually and sometimes less; see previous notes; aware of risk    • Chronic low back pain    • Chronic lung disease     asbestos related   • CKD (chronic kidney disease), stage III     sees neph, one kidney   • COPD (chronic obstructive pulmonary disease) ([facility])    • Coronary artery calcification seen on CAT scan 8/2/2016    sees cards   • Epididymitis 2009    h/o listed in chart   • GERD (gastroesophageal reflux disease)    • History of hemorrhoids    • History of skin cancer     non melanoma   • [place] (hard of hearing)     declines hearing aids   • Hypercholesteremia    • Hypertension    • Hypertriglyceridemia    • Indigestion    • Light headedness     2/2 orthostasis? now better off hctz   • Lightheadedness 6/23/2016   • Low back pain    • Nasal drainage    • Olecranon bursitis    • Orthostasis 6/23/2016    better off HCTZ   • Peripheral neuropathy    • Pleural plaque 2005     CT [place]   • Post-nasal drip    • Pulmonary emphysema ([facility])    • RA (rheumatoid arthritis) ([facility])     on meds, sees rheum   • Restless leg syndrome    • Rheumatoid arthritis ([facility])    • Sleep apnea     obstructive and central - not adherent to autopap   • Solitary kidney     history of kidney cyst 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-functioning kidney s/p nephrectomy        Past Surgical History:    Procedure Laterality Date   • IRRIGATION & DEBRIDEMENT ORTHO Right 2/2/2021    Procedure: IRRIGATION AND DEBRIDEMENT, WOUND-KNEE;  Surgeon: Kush Sol M.D.;  Location: SURGERY [facility];  Service: Orthopedics   • INCISION AND DRAINAGE GENERAL Right 2/2/2021    Procedure: INCISION AND DRAINAGE- arthrocentesis right knee;  Surgeon: Kush Sol M.D.;  Location: SURGERY [facility];  Service: Orthopedics   • INCISION AND DRAINAGE GENERAL Right 12/19/2020    Procedure: INCISION AND DRAINAGE;  Surgeon: Marc Chowdhury M.D.;  [place];  Service: Orthopedics   • IRRIGATION & DEBRIDEMENT ORTHO Right 10/27/2020    Procedure: IRRIGATION AND DEBRIDEMENT, WOUND - KNEE OPEN;  Surgeon: Elijah Faulkner M.D.;  Location: [facility];  Service: Orthopedics   • FLEXOR TENDON REPAIR Left 4/3/2019    Procedure: FLEXOR TENDON REPAIR- SMALL FINGER VS;  Surgeon: Marc Chowdhury M.D.;  Location: [facility];  Service: Orthopedics   • TENDON TRANSFER Left 4/3/2019    Procedure: TENDON TRANSFER;  Surgeon: Marc Chowdhury M.D.;  Location: [facility];  Service: Orthopedics   • COLONOSCOPY  11/10/2018    Procedure: COLONOSCOPY;  Surgeon: Ganesh Peacock M.D.;  Location: [facility];  Service: Gastroenterology   • NASAL POLYPECTOMY Bilateral 10/6/2015    Procedure: NASAL POLYPECTOMY WITH SCOTT ENDOSCOPIC NASAL DEBRIDEMENT;  Surgeon: Param Maurer M.D.;  Location: SURGERY SAME DAY [facility];  Service:    • CYSTOSCOPY  2/1/2010    Performed by ANGIE VERDUZCO at [place]   • RETROGRADES  2/1/2010    Performed by ANGIE VERDUZCO at [place]   • PYELOGRAM  2/1/2010    Performed by ANGIE VERDUZCO at [place]   • URETEROSCOPY  2/1/2010    Performed by ANGIE VERDUZCO at [place]   • NEPHRECTOMY LAPAROSCOPIC  2009    left kidney   • TESTICLE EXPLORATION  2004    • PB REMV 2ND CATARACT,CORN-SCLER SECTN     • TONSILLECTOMY         Family History   Problem Relation Age of Onset   • Genetic Disorder Father         ALS   • No Known Problems Sister    • No Known Problems Son    • No Known Problems Daughter    • Heart Attack Neg Hx    • Heart Disease Neg Hx    • Heart Failure Neg Hx        Social History     Socioeconomic History   • Marital status:      Spouse name: Not on file   • Number of children: Not on file   • Years of education: Not on file   • Highest education level: Not on file   Occupational History   • Not on file   Social Needs   • Financial resource strain: Not very hard   • Food insecurity     Worry: Never true     Inability: Never true   • Transportation needs     Medical: No     Non-medical: No   Tobacco Use   • Smoking status: Former Smoker     Packs/day: 1.00     Years: 40.00     Pack years: 40.00     Types: Cigarettes     Quit date: 1980     Years since quittin.1   • Smokeless tobacco: Never Used   • Tobacco comment: 1 pk a day for 40 yrs   Substance and Sexual Activity   • Alcohol use: No     Alcohol/week: 0.0 oz   • Drug use: No   • Sexual activity: Never     Partners: Female     Comment: retired    Lifestyle   • Physical activity     Days per week: Not on file     Minutes per session: Not on file   • Stress: Not on file   Relationships   • Social connections     Talks on phone: Not on file     Gets together: Not on file     Attends Alevism service: Not on file     Active member of club or organization: Not on file     Attends meetings of clubs or organizations: Not on file     Relationship status: Not on file   • Intimate partner violence     Fear of current or ex partner: Not on file     Emotionally abused: Not on file     Physically abused: Not on file     Forced sexual activity: Not on file   Other Topics Concern   • Not on file   Social History Narrative    See H&P    Lives with wife       Allergies   Allergen Reactions   •  Ciprofloxacin      Other reaction(s): muscle aches  Pt's family states that Pt had a reaction when he was a kid noted 2/1/2021   • Levaquin      Other reaction(s): Muscle aches  Muscle aches  Other reaction(s): Muscle aches  Pt's son states that Pt had a reaction when he was a kid noted 2/1/2021   • Penicillins Hives     Rash and asthma attack when a child - wife states he has had Keflex in the past and tolerated  Pt's son states that Pt had a reaction when he was a kid noted 2/1/2021        Medications:    Current Facility-Administered Medications:   •  albuterol (PROVENTIL) 2.5mg/0.5ml nebulizer solution 2.5 mg, 2.5 mg, Nebulization, Q2HRS PRN (RT), Rimma Brenner M.D.  •  budesonide-formoterol (SYMBICORT) 80-4.5 MCG/ACT inhaler 2 Puff, 2 Puff, Inhalation, BID, Rimma Brenner M.D., 2 Puff at 02/05/21 0454  •  fluticasone (FLONASE) nasal spray 100 mcg, 2 Spray, Nasal, DAILY, Rimma Brenner M.D., 100 mcg at 02/05/21 0453  •  glycopyrrolate (Seebri) 15.6 mcg inhalation capsule 1 Cap, 1 Cap, Inhalation, BID (RT), Rimma Brenner M.D., 1 Cap at 02/05/21 0706  •  guaiFENesin ER (MUCINEX) tablet 1,200 mg, 1,200 mg, Oral, BID, Rimma Brenner M.D., 1,200 mg at 02/05/21 0453  •  leflunomide (ARAVA) tablet 20 mg, 20 mg, Oral, DAILY, Rimma Brenner M.D., 20 mg at 02/05/21 0453  •  gabapentin (NEURONTIN) capsule 300 mg, 300 mg, Oral, QAM, 300 mg at 02/05/21 0454 **AND** gabapentin (NEURONTIN) capsule 600 mg, 600 mg, Oral, Q EVENING, Rimma Brenner M.D., 600 mg at 02/04/21 1729  •  ceFAZolin in dextrose (ANCEF) IVPB premix 2 g, 2 g, Intravenous, Q8HRS, Duarte Bell M.D., Last Rate: 200 mL/hr at 02/05/21 1413, 2 g at 02/05/21 1413  •  heparin injection 5,000 Units, 5,000 Units, Subcutaneous, Q8HRS, Rimma Brenner M.D., 5,000 Units at 02/05/21 1413  •  prochlorperazine (COMPAZINE) injection 10 mg, 10 mg, Intravenous, Q6HRS PRN, Juan De La Cruz M.D., 10 mg at 02/04/21 2031  •  lactated ringers infusion (BOLUS): BMI less than or equal to 30,  "30 mL/kg, Intravenous, Once PRN, Eulalia Braun D.O.  •  senna-docusate (PERICOLACE or SENOKOT S) 8.6-50 MG per tablet 2 Tab, 2 Tab, Oral, BID, 2 Tab at 02/05/21 0453 **AND** polyethylene glycol/lytes (MIRALAX) PACKET 1 Packet, 1 Packet, Oral, QDAY PRN **AND** magnesium hydroxide (MILK OF MAGNESIA) suspension 30 mL, 30 mL, Oral, QDAY PRN **AND** bisacodyl (DULCOLAX) suppository 10 mg, 10 mg, Rectal, QDAY PRN, Shabbir Novoa M.D.  •  Respiratory Therapy Consult, , Nebulization, Continuous RT, Shabbir Novoa M.D.  •  acetaminophen (Tylenol) tablet 650 mg, 650 mg, Oral, Q6HRS PRN, Shabbir Novoa M.D., 650 mg at 02/04/21 2031  •  Notify provider if pain remains uncontrolled, , , CONTINUOUS **AND** Use the numeric rating scale (NRS-11) on regular floors and Critical-Care Pain Observation Tool (CPOT) on ICUs/Trauma to assess pain, , , CONTINUOUS **AND** Pulse Ox (Oximetry), , , CONTINUOUS **AND** Pharmacy Consult Request ...Pain Management Review 1 Each, 1 Each, Other, PHARMACY TO DOSE **AND** If patient difficult to arouse and/or has respiratory depression, stop any opiates that are currently infusing and call a Rapid Response., , , CONTINUOUS **AND** oxyCODONE immediate-release (ROXICODONE) tablet 2.5 mg, 2.5 mg, Oral, Q3HRS PRN **AND** oxyCODONE immediate-release (ROXICODONE) tablet 5 mg, 5 mg, Oral, Q3HRS PRN **AND** morphine (pf) 4 mg/mL injection 2 mg, 2 mg, Intravenous, Q3HRS PRN, Shabbir Novoa M.D.  •  ondansetron (ZOFRAN) syringe/vial injection 4 mg, 4 mg, Intravenous, Q4HRS PRN, Shabbir Novoa M.D., 4 mg at 02/04/21 1736  •  ondansetron (ZOFRAN ODT) dispertab 4 mg, 4 mg, Oral, Q4HRS PRN, Shabbir Novoa M.D.  •  labetalol (NORMODYNE/TRANDATE) injection 10 mg, 10 mg, Intravenous, Q4HRS PRN, Shabbir Novoa M.D., 10 mg at 02/05/21 0925    Physical Exam:   Vital Signs: BP (!) 166/108 Comment: RN notified  Pulse 86   Temp 36.4 °C (97.5 °F) (Temporal)   Resp 14   Ht 1.854 m (6' 1\")   Wt 89.9 kg (198 lb 3.1 oz)   SpO2 " 92%   BMI 26.15 kg/m²   Temp  Av.8 °C (98.2 °F)  Min: 35.8 °C (96.5 °F)  Max: 38.4 °C (101.2 °F)  Vital signs reviewed    Constitutional: Patient is oriented to person, place, and time. Appears well-developed and well-nourished. No distress  Head: Atraumatic, normocephalic  Eyes: Conjunctivae normal, EOM intact. Pupils are equal, round, and reactive to light.   Mouth/Throat: Lips without lesions, good dentition, oropharynx is clear and moist.  Neck: Neck supple. No masses/lymphadenopathy  Cardiovascular: Normal rate, regular rhythm, normal S1S2 and intact distal pulses. No murmur, gallop, or friction rub. No pedal edema.  Pulmonary/Chest: No respiratory distress. Unlabored respiratory effort, lungs clear to auscultation. No wheezes or rales.   Abdominal: Soft, non tender. BS + x 4. No masses or hepatosplenomegaly.   Musculoskeletal: Right knee joint in dressing which is clean and dry.  No distal neurovascular deficits.  Neurological: Alert and oriented to person, place, and time. No gross cranial nerve deficit. No focal neural deficit noted  Skin: Skin is warm and dry. No rashes or embolic phenomena noted on exposed skin  Psychiatric: Normal mood and affect. Behavior is normal.     LABS:  Recent Labs     21   WBC 12.2* 10.2 7.5      Recent Labs     21  0553   HEMOGLOBIN 9.9* 10.0*   HEMATOCRIT 32.3* 32.4*   .9* 103.5*   MCH 31.2 31.9   PLATELETCT 163* 185       Recent Labs     21   SODIUM 138 137 140   POTASSIUM 4.2 4.1 4.1   CHLORIDE 102 103 107   CO2 23 23 23   CREATININE 1.66* 1.64* 1.37        Recent Labs     21   ALBUMIN 3.6 3.2 3.1*        MICRO:  Blood Culture   Date Value Ref Range Status   11/10/2018 No growth after 5 days of incubation.  Final   11/10/2018 No growth after 5 days of incubation.  Final        Latest pertinent labs were  reviewed    IMAGING STUDIES:  IR-US GUIDED PIV   Final Result    Ultrasound-guided PERIPHERAL IV INSERTION performed by    qualified nursing staff as above.      DX-CHEST-PORTABLE (1 VIEW)   Final Result      Stable chest without acute/new abnormality.          Hospital Course/Assessment:   Barry Torres is a 80 y.o. male with a history of COPD on 2 L oxygen at night, CKD stage III/solitary kidney, hypertension, who is presenting with sepsis secondary to third episode of prepatellar bursitis.  Patient did complete 4 weeks of cefepime for septic arthritis, and per orthopedic evaluation did not appear to have inflammation signs in the joint, synovial fluid analysis shows only 180 WBCs, 60% lymphs therefore at this time do not believe patient has recurrent septic arthritis.  Prepatellar bursa culture growing MSSA, with no growth from synovial cultures or blood cultures after 72 hours.    Pertinent Diagnoses:  -Sepsis secondary to right prepatellar recurrent bursitis status post incision and drainage of right prepatellar bursa with excisional debridement  -MSSA prepatellar bursitis    Plan:   -Switch to p.o. doxycycline 100 mg twice daily as synovial cultures are still negative at 72 hours. Patient will need 4 weeks of antibiotics from day of surgery through 3/1/2021.    Plan was discussed with the primary team    Please feel free to call with questions.    This illness poses a threat to the patient's life or bodily function    Nahomy Dukes M.D.    Please note that this dictation was created using voice recognition software. I have worked with technical experts from [facility] to optimize the interface.  I have made every reasonable attempt to correct obvious errors, but there may be errors of grammar and possibly content that I did not discover before finalizing the note.

## 2021-02-05 NOTE — CARE PLAN
Problem: Skin Integrity  Goal: Risk for impaired skin integrity will decrease  Outcome: PROGRESSING AS EXPECTED  Note: Pt educated on importance of repositioning every 2 hours. Pillows in use for support. Sacrum red and blanching. Pt refused waffle overlay.     Problem: Safety  Goal: Will remain free from falls  Outcome: PROGRESSING AS EXPECTED  Note: Bed locked and in lowest position. Call light and belongings within reach. Hourly rounding in place.

## 2021-02-06 PROBLEM — L03.119 CELLULITIS OF EXTREMITY: Status: RESOLVED | Noted: 2021-02-01 | Resolved: 2021-02-06

## 2021-02-06 PROBLEM — A41.9 SEPSIS (HCC): Status: RESOLVED | Noted: 2018-09-29 | Resolved: 2021-02-06

## 2021-02-06 LAB
BACTERIA BLD CULT: NORMAL
BACTERIA BLD CULT: NORMAL
BACTERIA SPEC ANAEROBE CULT: NORMAL
BACTERIA SPEC ANAEROBE CULT: NORMAL
SIGNIFICANT IND 70042: NORMAL
SITE SITE: NORMAL
SOURCE SOURCE: NORMAL

## 2021-02-06 PROCEDURE — 94640 AIRWAY INHALATION TREATMENT: CPT

## 2021-02-06 PROCEDURE — 99232 SBSQ HOSP IP/OBS MODERATE 35: CPT | Performed by: STUDENT IN AN ORGANIZED HEALTH CARE EDUCATION/TRAINING PROGRAM

## 2021-02-06 PROCEDURE — 700102 HCHG RX REV CODE 250 W/ 637 OVERRIDE(OP): Performed by: STUDENT IN AN ORGANIZED HEALTH CARE EDUCATION/TRAINING PROGRAM

## 2021-02-06 PROCEDURE — A9270 NON-COVERED ITEM OR SERVICE: HCPCS | Performed by: STUDENT IN AN ORGANIZED HEALTH CARE EDUCATION/TRAINING PROGRAM

## 2021-02-06 PROCEDURE — 700111 HCHG RX REV CODE 636 W/ 250 OVERRIDE (IP): Performed by: INTERNAL MEDICINE

## 2021-02-06 PROCEDURE — 700111 HCHG RX REV CODE 636 W/ 250 OVERRIDE (IP): Performed by: STUDENT IN AN ORGANIZED HEALTH CARE EDUCATION/TRAINING PROGRAM

## 2021-02-06 PROCEDURE — 770006 HCHG ROOM/CARE - MED/SURG/GYN SEMI*

## 2021-02-06 PROCEDURE — 97530 THERAPEUTIC ACTIVITIES: CPT

## 2021-02-06 PROCEDURE — 97605 NEG PRS WND THER DME<=50SQCM: CPT

## 2021-02-06 PROCEDURE — 97535 SELF CARE MNGMENT TRAINING: CPT

## 2021-02-06 RX ORDER — DOXYCYCLINE 100 MG/1
100 CAPSULE ORAL 2 TIMES DAILY
Qty: 46 CAP | Refills: 0 | Status: SHIPPED | OUTPATIENT
Start: 2021-02-06 | End: 2021-03-01

## 2021-02-06 RX ORDER — OXYCODONE HYDROCHLORIDE 5 MG/1
2.5 TABLET ORAL EVERY 6 HOURS PRN
Qty: 15 TAB | Refills: 0 | Status: SHIPPED | OUTPATIENT
Start: 2021-02-06 | End: 2021-02-09

## 2021-02-06 RX ADMIN — HEPARIN SODIUM 5000 UNITS: 5000 INJECTION, SOLUTION INTRAVENOUS; SUBCUTANEOUS at 21:29

## 2021-02-06 RX ADMIN — GLYCOPYRROLATE 1 CAPSULE: 15.6 CAPSULE RESPIRATORY (INHALATION) at 08:22

## 2021-02-06 RX ADMIN — GABAPENTIN 300 MG: 300 CAPSULE ORAL at 05:08

## 2021-02-06 RX ADMIN — GABAPENTIN 600 MG: 300 CAPSULE ORAL at 17:21

## 2021-02-06 RX ADMIN — CEFAZOLIN SODIUM 2 G: 2 INJECTION, SOLUTION INTRAVENOUS at 14:28

## 2021-02-06 RX ADMIN — BUDESONIDE AND FORMOTEROL FUMARATE DIHYDRATE 2 PUFF: 80; 4.5 AEROSOL RESPIRATORY (INHALATION) at 17:21

## 2021-02-06 RX ADMIN — LEFLUNOMIDE 20 MG: 20 TABLET ORAL at 05:08

## 2021-02-06 RX ADMIN — HEPARIN SODIUM 5000 UNITS: 5000 INJECTION, SOLUTION INTRAVENOUS; SUBCUTANEOUS at 14:28

## 2021-02-06 RX ADMIN — GUAIFENESIN 1200 MG: 600 TABLET, EXTENDED RELEASE ORAL at 17:21

## 2021-02-06 RX ADMIN — GLYCOPYRROLATE 1 CAPSULE: 15.6 CAPSULE RESPIRATORY (INHALATION) at 21:18

## 2021-02-06 RX ADMIN — HEPARIN SODIUM 5000 UNITS: 5000 INJECTION, SOLUTION INTRAVENOUS; SUBCUTANEOUS at 05:08

## 2021-02-06 RX ADMIN — CEFAZOLIN SODIUM 2 G: 2 INJECTION, SOLUTION INTRAVENOUS at 21:29

## 2021-02-06 RX ADMIN — OXYCODONE 5 MG: 5 TABLET ORAL at 15:18

## 2021-02-06 RX ADMIN — BUDESONIDE AND FORMOTEROL FUMARATE DIHYDRATE 2 PUFF: 80; 4.5 AEROSOL RESPIRATORY (INHALATION) at 05:07

## 2021-02-06 RX ADMIN — FLUTICASONE PROPIONATE 100 MCG: 50 SPRAY, METERED NASAL at 05:06

## 2021-02-06 RX ADMIN — CEFAZOLIN SODIUM 2 G: 2 INJECTION, SOLUTION INTRAVENOUS at 05:06

## 2021-02-06 RX ADMIN — GUAIFENESIN 1200 MG: 600 TABLET, EXTENDED RELEASE ORAL at 05:07

## 2021-02-06 ASSESSMENT — COGNITIVE AND FUNCTIONAL STATUS - GENERAL
MOBILITY SCORE: 12
SUGGESTED CMS G CODE MODIFIER MOBILITY: CL
TURNING FROM BACK TO SIDE WHILE IN FLAT BAD: A LOT
CLIMB 3 TO 5 STEPS WITH RAILING: A LOT
MOVING FROM LYING ON BACK TO SITTING ON SIDE OF FLAT BED: UNABLE
WALKING IN HOSPITAL ROOM: A LITTLE
STANDING UP FROM CHAIR USING ARMS: A LITTLE
MOVING TO AND FROM BED TO CHAIR: UNABLE

## 2021-02-06 ASSESSMENT — PAIN DESCRIPTION - PAIN TYPE
TYPE: SURGICAL PAIN

## 2021-02-06 ASSESSMENT — GAIT ASSESSMENTS
ASSISTIVE DEVICE: FRONT WHEEL WALKER
GAIT LEVEL OF ASSIST: MINIMAL ASSIST
DISTANCE (FEET): 10

## 2021-02-06 ASSESSMENT — ENCOUNTER SYMPTOMS
SINUS PAIN: 0
MYALGIAS: 0
FOCAL WEAKNESS: 0
HEADACHES: 0
NERVOUS/ANXIOUS: 0
SPUTUM PRODUCTION: 0
PALPITATIONS: 0
CHILLS: 0
NAUSEA: 0
FEVER: 0
CONSTIPATION: 0
SHORTNESS OF BREATH: 0
DIARRHEA: 0
DIZZINESS: 0
VOMITING: 0
SORE THROAT: 0
BLURRED VISION: 0
WHEEZING: 0
DOUBLE VISION: 0
ABDOMINAL PAIN: 0
COUGH: 0

## 2021-02-06 NOTE — PROGRESS NOTES
"   Orthopaedic PA Progress Note    Interval changes:did well overnight, no new Micro findings. Syno Cx NGTD (x 72h)  Clear for dispo per Medicine plan    ROS - Patient denies any new issues. No chest pain, dyspnea, or fever.  Pain well controlled.    /93   Pulse 78   Temp 36.4 °C (97.6 °F) (Temporal)   Resp 18   Ht 1.854 m (6' 1\")   Wt 89.9 kg (198 lb 3.1 oz)   SpO2 94%     Patient seen and examined  No acute distress  Breathing non labored  RRR  Wound vac patent    Recent Labs     02/05/21  0553   WBC 7.1   RBC 3.13*   HEMOGLOBIN 10.0*   HEMATOCRIT 32.4*   .5*   MCH 31.9   MCHC 30.9*   RDW 57.8*   PLATELETCT 185   MPV 10.3       Active Hospital Problems    Diagnosis   • Stage 3 chronic kidney disease [N18.30]     Priority: Low   • Chronic respiratory failure with hypoxia ([facility]) [J96.11]       Assessment/Plan:  POD#4 S/P I+D PPB knee with joint aspiration  Wt bearing status -AT, no ROM x 2wks  PT/OT-initiated  Wound care:vac changes by WCT  Sutures/Staples out- 10-14 days post operatively  Antibiotics: per Med/ID  DVT Prophylaxis- TEDS/SCDs/Foot pumps.   Lovenox: Start 40mg daily, Duration-until ambulatory > 150'  Future Procedures - none planned  Case Coordination for Discharge Planning - Disposition per Med. Follow-Up: needs appointment with Dr. Sol at [facility] at 10-14 days post-op for re-evaluation, staple removal and radiographs.        "

## 2021-02-06 NOTE — PROGRESS NOTES
"Hospital Medicine Daily Progress Note    Date of Service  2/6/2021    Chief Complaint  80 y.o. male admitted 2/1/2021 with right leg swelling    Hospital Course  An 80 y.o. male who presented 2/1/2021 with leg swelling. Patient with hx of septic arthritis of right knee, s/p multiple surgeries recently completed IV cefepime x4 weeks end date 1/15/2021, COPD, CKD, who presents for right leg swelling. Patient reports right leg swelling, redness, purulent drainage for past week, with associated chills. Denies fever, sweating, joint pain, chest pain, cough, dyspnea. No recent trauma to leg.  In the ED, , RR 22, WBC 12.2, LA 1.6. Sepsis protocol initiated, given IV fluids and started on vancomycin. Orthopedic surgery team to see patient in AM. He will be admitted for evaluation of leg cellulitis.      Interval Problem Update  Patient was seen and examined at bedside, patient's son at bedside.  No acute events overnight. No active c/o on AM round.   S/p  incision and drainage of right prepatellar bursa 2/2 by Dr. Sol. Hold knee ROM x 2 weeks. Staples out- 10-14 days post operatively. Follow-Up: needs appointment with Dr. Sol at [facility] at 10-14 days post-op for re-evaluation, staple removal and radiographs.    ID on Board.   Per ID: \"Continue IV Ancef 2 g every 8 hours. Recommend 4 weeks of p.o. doxycycline 100 mg twice daily through 3/1/2021\"  PT/OT   SNF order placed but the pt wants to go home with . Son is also agreed with . Last admission, Pt completed IV ABx course at home.   HH order placed.   Pending wound vac to go home.     Consultants/Specialty  Orthopedic surgery  ID    Code Status  Full Code    Disposition  Home with HH   Pending wound vac to go home      Review of Systems  Review of Systems   Constitutional: Positive for malaise/fatigue. Negative for chills and fever.   HENT: Positive for congestion. Negative for ear discharge, ear pain, sinus pain and sore throat.    Eyes: " Negative for blurred vision and double vision.   Respiratory: Negative for cough, sputum production, shortness of breath and wheezing.    Cardiovascular: Negative for chest pain, palpitations and leg swelling.   Gastrointestinal: Negative for abdominal pain, constipation, diarrhea, nausea and vomiting.   Genitourinary: Negative for dysuria, frequency and urgency.   Musculoskeletal: Positive for joint pain. Negative for myalgias.   Neurological: Negative for dizziness, focal weakness and headaches.   Psychiatric/Behavioral: The patient is not nervous/anxious.         Physical Exam  Temp:  [36.4 °C (97.6 °F)-37.5 °C (99.5 °F)] 36.4 °C (97.6 °F)  Pulse:  [78-97] 78  Resp:  [16-18] 18  BP: (134-154)/(61-93) 154/93  SpO2:  [90 %-94 %] 94 %    Physical Exam  Constitutional:       General: He is not in acute distress.  HENT:      Head: Normocephalic and atraumatic.      Nose: Nose normal.      Mouth/Throat:      Mouth: Mucous membranes are moist.      Pharynx: No posterior oropharyngeal erythema.   Eyes:      General: No scleral icterus.     Extraocular Movements: Extraocular movements intact.      Conjunctiva/sclera: Conjunctivae normal.      Pupils: Pupils are equal, round, and reactive to light.   Neck:      Musculoskeletal: Normal range of motion and neck supple. No neck rigidity.   Cardiovascular:      Rate and Rhythm: Normal rate and regular rhythm.      Pulses: Normal pulses.      Heart sounds: Normal heart sounds. No murmur. No gallop.    Pulmonary:      Effort: Pulmonary effort is normal.      Breath sounds: Normal breath sounds. No stridor. No wheezing, rhonchi or rales.   Abdominal:      General: Bowel sounds are normal.      Palpations: Abdomen is soft.   Musculoskeletal:         General: Swelling and tenderness present.      Comments: Right knee covered in dressing, s/p I & D surgery.  Wound Vac in placed   Skin:     General: Skin is warm.   Neurological:      General: No focal deficit present.      Mental  Status: He is alert and oriented to person, place, and time.   Psychiatric:         Mood and Affect: Mood normal.         Behavior: Behavior normal.         Fluids    Intake/Output Summary (Last 24 hours) at 2/6/2021 1230  Last data filed at 2/5/2021 1356  Gross per 24 hour   Intake 240 ml   Output --   Net 240 ml       Laboratory  Recent Labs     02/05/21  0553   WBC 7.1   RBC 3.13*   HEMOGLOBIN 10.0*   HEMATOCRIT 32.4*   .5*   MCH 31.9   MCHC 30.9*   RDW 57.8*   PLATELETCT 185   MPV 10.3     Recent Labs     02/05/21  0553   SODIUM 138   POTASSIUM 3.5*   CHLORIDE 106   CO2 25   GLUCOSE 118*   BUN 22   CREATININE 1.46*   CALCIUM 8.0*                   Imaging  IR-US GUIDED PIV   Final Result    Ultrasound-guided PERIPHERAL IV INSERTION performed by    qualified nursing staff as above.      DX-CHEST-PORTABLE (1 VIEW)   Final Result      Stable chest without acute/new abnormality.           Assessment/Plan  Chronic respiratory failure with hypoxia (HCC)- (present on admission)  Assessment & Plan  Due to COPD, on 2L O2 at night  On 2L in ER  No new respiratory complaints, CXR with no acute abnormalities  Wean oxygen  RT protocol  Mucinex for congestion    Stage 3 chronic kidney disease- (present on admission)  Assessment & Plan  Will monitor       Sepsis (HCC)  Assessment & Plan  This is Sepsis Present on admission  SIRS criteria identified on my evaluation include: Tachycardia, with heart rate greater than 90 BPM  Source is leg cellulitis  Sepsis protocol initiated  Fluid resuscitation ordered per protocol  IV antibiotics as appropriate for source of sepsis  While organ dysfunction may be noted elsewhere in this problem list or in the chart, degree of organ dysfunction does not meet CMS criteria for severe sepsis     -S/p  incision and drainage of right prepatellar bursa 2/2 by Dr. Sol. Hold knee ROM approx 2 weeks.   Sepsis (HCC)  Assessment & Plan  This is Sepsis Present on admission  SIRS criteria  "identified on my evaluation include: Tachycardia, with heart rate greater than 90 BPM  Source is leg cellulitis  Sepsis protocol initiated  Fluid resuscitation ordered per protocol  IV antibiotics as appropriate for source of sepsis  While organ dysfunction may be noted elsewhere in this problem list or in the chart, degree of organ dysfunction does not meet CMS criteria for severe sepsis     -S/p  incision and drainage of right prepatellar bursa 2/2 by Dr. Sol. Hold knee ROM approx 2 weeks.   Sepsis (HCC)  Assessment & Plan  This is Sepsis Present on admission  SIRS criteria identified on my evaluation include: Tachycardia, with heart rate greater than 90 BPM  Source is leg cellulitis  Sepsis protocol initiated  Fluid resuscitation ordered per protocol  IV antibiotics as appropriate for source of sepsis  While organ dysfunction may be noted elsewhere in this problem list or in the chart, degree of organ dysfunction does not meet CMS criteria for severe sepsis     -S/p  incision and drainage of right prepatellar bursa 2/2 by Dr. Sol. Hold knee ROM approx 2 weeks.   Discussed with infectious disease, will continue cefepime. Per ID: \"will follow pending synovial fluid cultures.  If this turns positive, may need arthrotomy and drainage. Anticipate a 2 to 4-week course of oral antibiotics. \" OR Cx showing MSSA.  PT/OT   SNF order placed.   PT/OT   SNF order placed. PT/OT   Home with     Cellulitis of extremity  Assessment & Plan  History of septic arthritis of R knee s/p multiple surgeries and washout, hx of pseudomonas growth on knee aspiration  S/p 4 week course of cefepime prior to this hospitalization, completed 1/15/2021  New cellulitis with purulent drainage  Wound and blood cultures taken     -S/p  incision and drainage of right prepatellar bursa 2/2 by Dr. Sol. Hold knee ROM approx 2 weeks.   Cellulitis of extremity  Assessment & Plan  History of septic arthritis of R knee s/p multiple surgeries and " "washout, hx of pseudomonas growth on knee aspiration  S/p 4 week course of cefepime prior to this hospitalization, completed 1/15/2021  New cellulitis with purulent drainage  Wound and blood cultures taken     -S/p  incision and drainage of right prepatellar bursa 2/2 by Dr. Sol. Hold knee ROM approx 2 weeks.   Discussed with infectious disease, will continue cefepime. Per ID: \"will follow pending synovial fluid cultures.  If this turns positive, may need arthrotomy and drainage. Anticipate a 2 to 4-week course of oral antibiotics. \" OR Cx showing MSSA.  PT/OT   SNF order placed.   -Pain control  -Fall precautions  PT/OT   SNF order placed.   -Pain control  -Fall precautions      VTE prophylaxis: SCDs & Heparin     "

## 2021-02-06 NOTE — CARE PLAN
Problem: Skin Integrity  Goal: Risk for impaired skin integrity will decrease  Outcome: PROGRESSING AS EXPECTED  Note: Sacrum red and blanching. Pt refused waffle cushion. Pillows in use for support. Q2 hour turns.     Problem: Communication  Goal: The ability to communicate needs accurately and effectively will improve  Outcome: PROGRESSING AS EXPECTED  Note: Pt updated on POC and discharge plan. Pt uses call light appropriately to communicate needs.     Problem: Safety  Goal: Will remain free from falls  Outcome: PROGRESSING AS EXPECTED  Note: Bed locked and in lowest position. Call light and belongings within reach. Hourly rounding in place.

## 2021-02-06 NOTE — DISCHARGE PLANNING
Anticipated Discharge Disposition: SNF vs HH    Action: Pt's case discussed during IDT rounds. Per MD, pt is cleared to d/c if wound vac is approved and delivered. Uncertain if wound vac can be approved over the weekend.     DPA spoke with [facility] liaison, Bridger, who states they will need an updated order for HH that includes wound vac changes.     LSW requested updated order from MD. MD states PT assessed pt today and continues to recommend SNF. MD will approach SNF again with pt and son. If pt continues to decline MD will place new HH order.     Barriers to Discharge: None    Plan: F/U with medical team

## 2021-02-06 NOTE — DISCHARGE SUMMARY
"Discharge Summary    CHIEF COMPLAINT ON ADMISSION  Chief Complaint   Patient presents with   • Leg Swelling     has had infection in R leg since feb   • Fever       Reason for Admission  Right leg swelling    Admission Date  2/1/2021    CODE STATUS  Full Code    HPI & HOSPITAL COURSE  An 80 y.o. male who presented 2/1/2021 with leg swelling. Patient with hx of septic arthritis of right knee, s/p multiple surgeries recently completed IV cefepime x4 weeks end date 1/15/2021, COPD, CKD, who presents for right leg swelling. Patient reports right leg swelling, redness, purulent drainage for past week, with associated chills. Denies fever, sweating, joint pain, chest pain, cough, dyspnea. No recent trauma to leg.  In the ED, , RR 22, WBC 12.2, LA 1.6. Sepsis protocol initiated, given IV fluids and started on vancomycin. S/p  incision and drainage of right prepatellar bursa 2/2 by Dr. Sol. Hold knee ROM x 2 weeks. Staples out- 10-14 days post operatively. Follow-Up: needs appointment with Dr. Sol at [facility] at 10-14 days post-op for re-evaluation, staple removal and radiographs. ID was consulted.   Per ID: \"Continue IV Ancef 2 g every 8 hours while inpatient. Recommend 4 weeks of p.o. doxycycline 100 mg twice daily through 3/1/2021\". PT/OT recommended post acute placement. SNF order placed but the pt wants to go home with HH. Son is also agreed with HH. Last admission, Pt completed IV ABx course at home.   HH order placed. Will be discharged home with HH & wound vac.     Therefore, he is discharged in fair and stable condition to home with organized home healthcare and close outpatient follow-up.    The patient met 2-midnight criteria for an inpatient stay at the time of discharge.    Discharge Date  2/6/2021    FOLLOW UP ITEMS POST DISCHARGE  Follow up with PCP, ID & Ortho    DISCHARGE DIAGNOSES  Principal Problem (Resolved):    Cellulitis of extremity POA: Unknown  Active Problems:    Chronic " respiratory failure with hypoxia (HCC) POA: Yes    Stage 3 chronic kidney disease POA: Yes  Resolved Problems:    Sepsis (HCC) POA: Unknown      FOLLOW UP  No future appointments.  Kush Sol M.D.  [address]  [place] [address]  [phone]    Schedule an appointment as soon as possible for a visit in 2 weeks  For wound re-check, staple removal, and radiographs    Duarte Bell M.D.  [address] [facility] [address]  [place] [id number] [phone]    Schedule an appointment as soon as possible for a visit in 3 weeks        MEDICATIONS ON DISCHARGE     Medication List      START taking these medications      Instructions   doxycycline 100 MG capsule  Commonly known as: MONODOX   Take 1 Cap by mouth 2 times a day for 23 days.  Dose: 100 mg     oxyCODONE immediate-release 5 MG Tabs  Commonly known as: ROXICODONE   Take 0.5 Tabs by mouth every 6 hours as needed for Severe Pain for up to 3 days.  Dose: 2.5 mg        CHANGE how you take these medications      Instructions   gabapentin 300 MG Caps  What changed:   · how much to take  · when to take this  · additional instructions  Commonly known as: NEURONTIN   TAKE 1 CAPSULE BY MOUTH THREE TIMES A DAY     rosuvastatin 5 MG Tabs  What changed: when to take this  Commonly known as: CRESTOR   TAKE 1 TABLET BY MOUTH EVERY EVENING        CONTINUE taking these medications      Instructions   acetaminophen 325 MG Tabs  Commonly known as: Tylenol   Take 650 mg by mouth every four hours as needed. Indications: Pain  Dose: 650 mg     Allegra Allergy 180 MG tablet  Generic drug: fexofenadine   Take 180 mg by mouth every day.  Dose: 180 mg     CALCIUM PO   Take 1 Tab by mouth every day.  Dose: 1 Tab     fluticasone 50 MCG/ACT nasal spray  Commonly known as: FLONASE   Administer 2 Sprays into affected nostril(S) every day.  Dose: 2 Spray     IMODIUM A-D PO   Take 1 Tab by mouth 2 (two) times a day.  Dose: 1 Tab     leflunomide 20 MG Tabs  Commonly known as: ARAVA   Take 1 Tab  by mouth every day.  Dose: 20 mg     Mucinex 600 MG Tb12  Generic drug: guaiFENesin ER   Take 1,200 mg by mouth 2 Times a Day.  Dose: 1,200 mg     Symbicort 80-4.5 MCG/ACT Aero  Generic drug: budesonide-formoterol   Inhale 2 Puffs 2 Times a Day.  Dose: 2 Puff     tiotropium 18 MCG Caps  Commonly known as: SPIRIVA   Inhale 1 Cap by mouth every day.  Dose: 18 mcg            Allergies  Allergies   Allergen Reactions   • Ciprofloxacin      Other reaction(s): muscle aches  Pt's family states that Pt had a reaction when he was a kid noted 2/1/2021   • Levaquin      Other reaction(s): Muscle aches  Muscle aches  Other reaction(s): Muscle aches  Pt's son states that Pt had a reaction when he was a kid noted 2/1/2021   • Penicillins Hives     Rash and asthma attack when a child - wife states he has had Keflex in the past and tolerated  Pt's son states that Pt had a reaction when he was a kid noted 2/1/2021        DIET  Orders Placed This Encounter   Procedures   • Diet Order Diet: Regular     Standing Status:   Standing     Number of Occurrences:   1     Order Specific Question:   Diet:     Answer:   Regular [1]       ACTIVITY  As tolerated.  Weight bearing as tolerated    CONSULTATIONS  ID   Ortho Sx    PROCEDURES  S/p  incision and drainage of right prepatellar bursa 2/2 by Dr. Sol    LABORATORY  Lab Results   Component Value Date    SODIUM 138 02/05/2021    POTASSIUM 3.5 (L) 02/05/2021    CHLORIDE 106 02/05/2021    CO2 25 02/05/2021    GLUCOSE 118 (H) 02/05/2021    BUN 22 02/05/2021    CREATININE 1.46 (H) 02/05/2021    CREATININE 1.5 (H) 04/11/2005        Lab Results   Component Value Date    WBC 7.1 02/05/2021    HEMOGLOBIN 10.0 (L) 02/05/2021    HEMATOCRIT 32.4 (L) 02/05/2021    PLATELETCT 185 02/05/2021        Total time of the discharge process exceeds 41 minutes.

## 2021-02-06 NOTE — THERAPY
Physical Therapy   Daily Treatment     Patient Name: Barry Torres  Age:  80 y.o., Sex:  male  Medical Record #: [id number]  Today's Date: 2/6/2021     Precautions: Fall Risk, Weight Bearing As Tolerated Right Lower Extremity, Immobilizer Right Lower Extremity    Assessment    Pt was able to demonstrate bed mobility with min/mod A, sit<>stands with min/mod A dependent on surface height, cueing and fatigue levels and initiation of ambulation with FWW and min A/CGA. Noted once upright with FWW, pt mobilizing fair. However he appears to have decreased insight into current deficits and affect on safety and function and appears to be a high fall risk currently. Noted pt had episode of bowel incontinence upon attempt to stand and required total A for cleanup. When sitting EOB, pt also have multiple posterior LOB requiring cueing and physical assist to maintain static sitting and was unable to maintain without assist. Will continue to visit and discussed concerns with dc plan with nursing. Will continue to visit for progression/dc planning.      Plan    Continue current treatment plan.    DC Equipment Recommendations: Unable to determine at this time  Discharge Recommendations: Recommend post-acute placement for additional physical therapy services prior to discharge home (high risk for falls/readmission); note that pt also reports son to return to [place] in several days for work and pt will be alone with spouse who appears limited with ability to assist with current level needed       02/06/21 1251   Cognition    Cognition / Consciousness X   Speech/ Communication Hard of Hearing   Level of Consciousness Alert   Safety Awareness Impaired   New Learning Impaired   Comments pleasant and cooperative; appears to have poor insight into current deficits and affect on safety and function; anticipate this may be baseline cog/behavior   Neurological Concerns   Neurological Concerns Yes   Comments given motor delay   Balance    Sitting Balance (Static) Poor +   Sitting Balance (Dynamic) Poor +   Standing Balance (Static) Fair -   Standing Balance (Dynamic) Fair -   Weight Shift Sitting Poor   Weight Shift Standing Fair   Skilled Intervention Compensatory Strategies;Facilitation;Postural Facilitation;Tactile Cuing;Verbal Cuing;Sequencing   Comments noted pt unable to maintain consistent static sitting at EOB without cueing and/or intermittent physical assist; generally delayed reaction to LOB and approrpiate compensatory strategies; standing and initaitoin of amnbulation with FWW with min A/CGA; does best with use of UEs to compensate for LE/balance deficits   Gait Analysis   Gait Level Of Assist Minimal Assist  (CGA)   Assistive Device Front Wheel Walker   Distance (Feet) 10   # of Times Distance was Traveled 1   Deviation Decreased Heel Strike;Decreased Toe Off;Shuffled Gait;Other (Comment)  (dec frantz; heavy reliance on FWW)   # of Stairs Climbed 0   Weight Bearing Status WBAT R LE in immobilizer   Skilled Intervention Compensatory Strategies;Facilitation;Postural Facilitation;Sequencing;Tactile Cuing;Verbal Cuing   Comments pt limited with standing/ambulatory activity as pt had episode of bowl incontinence in standing; noted required total A for cleanup   Bed Mobility    Supine to Sit Minimal Assist   Sit to Supine   (left in recliner)   Skilled Intervention Compensatory Strategies;Facilitation;Sequencing;Tactile Cuing;Verbal Cuing   Comments HOB elevated; pt was unable to come to EOB without physical assistd; attepmted multiple times unsuccessfully    Functional Mobility   Sit to Stand Moderate Assist  (mod A with EOB lowered/or from chair; min A with EOB elevate)   Bed, Chair, Wheelchair Transfer Minimal Assist  (CGA once standing; noted min/mod A to come to stand)   Transfer Method Other (Comments)  (walks to chair)   Mobility with FWW   Skilled Intervention Compensatory Strategies;Facilitation;Postural  Facilitation;Sequencing;Tactile Cuing;Verbal Cuing   Comments noted varying degrees of assist to come to stand dependent on surface height and fatigue level   How much difficulty does the patient currently have...   Turning over in bed (including adjusting bedclothes, sheets and blankets)? 2   Sitting down on and standing up from a chair with arms (e.g., wheelchair, bedside commode, etc.) 1   Moving from lying on back to sitting on the side of the bed? 1   How much help from another person does the patient currently need...   Moving to and from a bed to a chair (including a wheelchair)? 3   Need to walk in a hospital room? 3   Climbing 3-5 steps with a railing? 2   6 clicks Mobility Score 12   Activity Tolerance   Sitting in Chair at least 10 mins   Sitting Edge of Bed at least 5 mins   Standing ~6-7 mins total   Comments fatigues with standing activity; noted decresed trunk and LE endurnace   Patient / Family Goals    Patient / Family Goal #1 to return home   Goal #1 Outcome Goal not met   Short Term Goals    Short Term Goal # 1 pt will perform supine <> sit with HOB flat, no railing and SPV in 6 vistis   Goal Outcome # 1 goal not met   Short Term Goal # 2 pt will perform sit <> stand and functional transfers with LRAD and SPV in 6 visits   Goal Outcome # 2 Goal not met   Short Term Goal # 3 pt will ambulate > 150 ft with LRAD and SPV to access home in 6 visits   Goal Outcome # 3 Goal not met   Short Term Goal # 4 pt will negotiate 1 step with LRAD and SPV to access home in 6 visits   Goal Outcome # 4 Goal not met   Education Group   Role of Physical Therapist Patient Response Patient;Acceptance;Explanation;Verbal Demonstration   Use of Assistive Device Patient Response Patient;Acceptance;Explanation;Demonstration;Teach Back;Verbal Demonstration;Action Demonstration;Reinforcement Needed   Weight Bearing Status Patient Response Patient;Family;Acceptance;Explanation;Verbal Demonstration;Action Demonstration   Brace  Wear & Care Patient Response Patient;Family;Acceptance;Explanation;Verbal Demonstration   Additional Comments education re: concerns iwth dc plan to home as pt unable to effectively demonstrate bed mobiltiy or sit<>stands as well as self care wihtout significant cueing and assistd

## 2021-02-07 PROCEDURE — 700102 HCHG RX REV CODE 250 W/ 637 OVERRIDE(OP): Performed by: STUDENT IN AN ORGANIZED HEALTH CARE EDUCATION/TRAINING PROGRAM

## 2021-02-07 PROCEDURE — A9270 NON-COVERED ITEM OR SERVICE: HCPCS | Performed by: STUDENT IN AN ORGANIZED HEALTH CARE EDUCATION/TRAINING PROGRAM

## 2021-02-07 PROCEDURE — 770006 HCHG ROOM/CARE - MED/SURG/GYN SEMI*

## 2021-02-07 PROCEDURE — 700111 HCHG RX REV CODE 636 W/ 250 OVERRIDE (IP): Performed by: STUDENT IN AN ORGANIZED HEALTH CARE EDUCATION/TRAINING PROGRAM

## 2021-02-07 PROCEDURE — 700111 HCHG RX REV CODE 636 W/ 250 OVERRIDE (IP): Performed by: INTERNAL MEDICINE

## 2021-02-07 PROCEDURE — 99232 SBSQ HOSP IP/OBS MODERATE 35: CPT | Performed by: STUDENT IN AN ORGANIZED HEALTH CARE EDUCATION/TRAINING PROGRAM

## 2021-02-07 RX ORDER — LACTOBACILLUS RHAMNOSUS GG 10B CELL
1 CAPSULE ORAL
Status: DISCONTINUED | OUTPATIENT
Start: 2021-02-07 | End: 2021-02-09 | Stop reason: HOSPADM

## 2021-02-07 RX ADMIN — LEFLUNOMIDE 20 MG: 20 TABLET ORAL at 05:24

## 2021-02-07 RX ADMIN — HEPARIN SODIUM 5000 UNITS: 5000 INJECTION, SOLUTION INTRAVENOUS; SUBCUTANEOUS at 05:23

## 2021-02-07 RX ADMIN — CEFAZOLIN SODIUM 2 G: 2 INJECTION, SOLUTION INTRAVENOUS at 13:48

## 2021-02-07 RX ADMIN — Medication 1 CAPSULE: at 11:53

## 2021-02-07 RX ADMIN — CEFAZOLIN SODIUM 2 G: 2 INJECTION, SOLUTION INTRAVENOUS at 05:22

## 2021-02-07 RX ADMIN — GABAPENTIN 300 MG: 300 CAPSULE ORAL at 05:24

## 2021-02-07 RX ADMIN — GUAIFENESIN 1200 MG: 600 TABLET, EXTENDED RELEASE ORAL at 05:24

## 2021-02-07 RX ADMIN — HEPARIN SODIUM 5000 UNITS: 5000 INJECTION, SOLUTION INTRAVENOUS; SUBCUTANEOUS at 13:48

## 2021-02-07 RX ADMIN — BUDESONIDE AND FORMOTEROL FUMARATE DIHYDRATE 2 PUFF: 80; 4.5 AEROSOL RESPIRATORY (INHALATION) at 05:22

## 2021-02-07 RX ADMIN — GUAIFENESIN 1200 MG: 600 TABLET, EXTENDED RELEASE ORAL at 18:26

## 2021-02-07 RX ADMIN — HEPARIN SODIUM 5000 UNITS: 5000 INJECTION, SOLUTION INTRAVENOUS; SUBCUTANEOUS at 22:55

## 2021-02-07 RX ADMIN — FLUTICASONE PROPIONATE 100 MCG: 50 SPRAY, METERED NASAL at 05:23

## 2021-02-07 RX ADMIN — GABAPENTIN 600 MG: 300 CAPSULE ORAL at 18:26

## 2021-02-07 RX ADMIN — CEFAZOLIN SODIUM 2 G: 2 INJECTION, SOLUTION INTRAVENOUS at 22:55

## 2021-02-07 RX ADMIN — BUDESONIDE AND FORMOTEROL FUMARATE DIHYDRATE 2 PUFF: 80; 4.5 AEROSOL RESPIRATORY (INHALATION) at 18:26

## 2021-02-07 ASSESSMENT — ENCOUNTER SYMPTOMS
SHORTNESS OF BREATH: 0
WHEEZING: 0
MYALGIAS: 0
DIARRHEA: 1
BLURRED VISION: 0
SORE THROAT: 0
FOCAL WEAKNESS: 0
NAUSEA: 0
HEADACHES: 0
ABDOMINAL PAIN: 0
SPUTUM PRODUCTION: 0
VOMITING: 0
CHILLS: 0
NERVOUS/ANXIOUS: 0
CONSTIPATION: 0
PALPITATIONS: 0
COUGH: 0
DOUBLE VISION: 0
FEVER: 0
DIZZINESS: 0
SINUS PAIN: 0

## 2021-02-07 ASSESSMENT — PAIN DESCRIPTION - PAIN TYPE
TYPE: SURGICAL PAIN

## 2021-02-07 NOTE — PROGRESS NOTES
"Hospital Medicine Daily Progress Note    Date of Service  2/7/2021    Chief Complaint  80 y.o. male admitted 2/1/2021 with right leg swelling    Hospital Course  An 80 y.o. male who presented 2/1/2021 with leg swelling. Patient with hx of septic arthritis of right knee, s/p multiple surgeries recently completed IV cefepime x4 weeks end date 1/15/2021, COPD, CKD, who presents for right leg swelling. Patient reports right leg swelling, redness, purulent drainage for past week, with associated chills. Denies fever, sweating, joint pain, chest pain, cough, dyspnea. No recent trauma to leg.  In the ED, , RR 22, WBC 12.2, LA 1.6. Sepsis protocol initiated, given IV fluids and started on vancomycin. Orthopedic surgery team to see patient in AM. He will be admitted for evaluation of leg cellulitis.      Interval Problem Update  Patient was seen and examined at bedside, patient's son at bedside.  C/o watery diarrhea.   Discussed extensively about pt is not safe for discharge to home. Strongly recommended SNF. Per pt, he will think about SNF & had discussion with his son.   C Diff PCR ordered. Started Probiotics.   Will order imodium if C Diff -ve.     S/p  incision and drainage of right prepatellar bursa 2/2 by Dr. Sol. Hold knee ROM x 2 weeks. Staples out- 10-14 days post operatively. Follow-Up: needs appointment with Dr. Sol at [facility] at 10-14 days post-op for re-evaluation, staple removal and radiographs.  ID on Board.   Per ID: \"Continue IV Ancef 2 g every 8 hours. Recommend 4 weeks of p.o. doxycycline 100 mg twice daily through 3/1/2021\"  PT/OT     Consultants/Specialty  Orthopedic surgery  ID    Code Status  Full Code    Disposition  TBD    Review of Systems  Review of Systems   Constitutional: Positive for malaise/fatigue. Negative for chills and fever.   HENT: Positive for congestion. Negative for ear discharge, ear pain, sinus pain and sore throat.    Eyes: Negative for blurred vision " and double vision.   Respiratory: Negative for cough, sputum production, shortness of breath and wheezing.    Cardiovascular: Negative for chest pain, palpitations and leg swelling.   Gastrointestinal: Positive for diarrhea. Negative for abdominal pain, constipation, nausea and vomiting.   Genitourinary: Negative for dysuria, frequency and urgency.   Musculoskeletal: Positive for joint pain. Negative for myalgias.   Neurological: Negative for dizziness, focal weakness and headaches.   Psychiatric/Behavioral: The patient is not nervous/anxious.         Physical Exam  Temp:  [36.2 °C (97.2 °F)-36.8 °C (98.2 °F)] 36.2 °C (97.2 °F)  Pulse:  [73-78] 75  Resp:  [16-18] 16  BP: (149-162)/(82-91) 155/83  SpO2:  [90 %-92 %] 90 %    Physical Exam  Constitutional:       General: He is not in acute distress.  HENT:      Head: Normocephalic and atraumatic.      Nose: Nose normal.      Mouth/Throat:      Mouth: Mucous membranes are moist.      Pharynx: No posterior oropharyngeal erythema.   Eyes:      General: No scleral icterus.     Extraocular Movements: Extraocular movements intact.      Conjunctiva/sclera: Conjunctivae normal.      Pupils: Pupils are equal, round, and reactive to light.   Neck:      Musculoskeletal: Normal range of motion and neck supple. No neck rigidity.   Cardiovascular:      Rate and Rhythm: Normal rate and regular rhythm.      Pulses: Normal pulses.      Heart sounds: Normal heart sounds. No murmur. No gallop.    Pulmonary:      Effort: Pulmonary effort is normal.      Breath sounds: Normal breath sounds. No stridor. No wheezing, rhonchi or rales.   Abdominal:      General: Bowel sounds are normal.      Palpations: Abdomen is soft.   Musculoskeletal:         General: Swelling and tenderness present.      Comments: Right knee covered in dressing, s/p I & D surgery.  Wound Vac in placed   Skin:     General: Skin is warm.   Neurological:      General: No focal deficit present.      Mental Status: He is  "alert and oriented to person, place, and time.   Psychiatric:         Mood and Affect: Mood normal.         Behavior: Behavior normal.         Fluids    Intake/Output Summary (Last 24 hours) at 2/7/2021 1200  Last data filed at 2/7/2021 0900  Gross per 24 hour   Intake 240 ml   Output 450 ml   Net -210 ml       Laboratory  Recent Labs     02/05/21  0553   WBC 7.1   RBC 3.13*   HEMOGLOBIN 10.0*   HEMATOCRIT 32.4*   .5*   MCH 31.9   MCHC 30.9*   RDW 57.8*   PLATELETCT 185   MPV 10.3     Recent Labs     02/05/21  0553   SODIUM 138   POTASSIUM 3.5*   CHLORIDE 106   CO2 25   GLUCOSE 118*   BUN 22   CREATININE 1.46*   CALCIUM 8.0*                   Imaging  IR-US GUIDED PIV   Final Result    Ultrasound-guided PERIPHERAL IV INSERTION performed by    qualified nursing staff as above.      DX-CHEST-PORTABLE (1 VIEW)   Final Result      Stable chest without acute/new abnormality.           Assessment/Plan  Chronic respiratory failure with hypoxia (HCC)- (present on admission)  Assessment & Plan  Due to COPD, on 2L O2 at night  On 2L in ER  No new respiratory complaints, CXR with no acute abnormalities  Wean oxygen  RT protocol  Mucinex for congestion    Stage 3 chronic kidney disease- (present on admission)  Assessment & Plan  Will monitor       Sepsis (HCC)  Assessment & Plan  This is Sepsis Present on admission  SIRS criteria identified on my evaluation include: Tachycardia, with heart rate greater than 90 BPM  Source is leg cellulitis  Sepsis protocol initiated  Fluid resuscitation ordered per protocol  IV antibiotics as appropriate for source of sepsis  While organ dysfunction may be noted elsewhere in this problem list or in the chart, degree of organ dysfunction does not meet CMS criteria for severe sepsis     -S/p  incision and drainage of right prepatellar bursa 2/2 by Dr. Sol. Hold knee ROM approx 2 weeks.       Per ID: \"Continue IV Ancef 2 g every 8 hours.        Recommend 4 weeks of p.o. doxycycline 100 " "mg twice daily through 3/1/2021\"  PT/OT   SNF Vs HH    Cellulitis of extremity  Assessment & Plan  History of septic arthritis of R knee s/p multiple surgeries and washout, hx of pseudomonas growth on knee aspiration  S/p 4 week course of cefepime prior to this hospitalization, completed 1/15/2021  New cellulitis with purulent drainage  Wound and blood cultures taken     -S/p  incision and drainage of right prepatellar bursa 2/2 by Dr. oSl. Hold knee ROM approx 2 weeks.       Per ID: \"Continue IV Ancef 2 g every 8 hours.        Recommend 4 weeks of p.o.  doxycycline 100 mg twice daily through 3/1/2021\"  PT/OT   SNF Vs         VTE prophylaxis: SCDs & Heparin     "

## 2021-02-07 NOTE — CARE PLAN
Problem: Safety  Goal: Will remain free from injury  Outcome: PROGRESSING AS EXPECTED  Note: Tread socks in place, call light within reach, educated to call before getting out of bed, bed in lowest position.

## 2021-02-07 NOTE — PROGRESS NOTES
"   Orthopaedic PA Progress Note    Interval changes:did well overnight, no new Micro findings. Syno Cx NGTD (x 72h)  Clear for dispo per Medicine plan - interested in [facility] - Patient denies any new issues. No chest pain, dyspnea, or fever.  Pain well controlled.    /83   Pulse 75   Temp 36.2 °C (97.2 °F) (Temporal)   Resp 16   Ht 1.854 m (6' 1\")   Wt 89.9 kg (198 lb 3.1 oz)   SpO2 90%     Patient seen and examined  No acute distress  Breathing non labored  RRR  Wound vac patent    Recent Labs     02/05/21  0553   WBC 7.1   RBC 3.13*   HEMOGLOBIN 10.0*   HEMATOCRIT 32.4*   .5*   MCH 31.9   MCHC 30.9*   RDW 57.8*   PLATELETCT 185   MPV 10.3       Active Hospital Problems    Diagnosis   • Stage 3 chronic kidney disease [N18.30]     Priority: Low   • Chronic respiratory failure with hypoxia (HCC) [J96.11]       Assessment/Plan:  POD#5 S/P I+D PPB knee with joint aspiration  Wt bearing status -AT, no ROM x 2wks  PT/OT-initiated  Wound care:vac changes by WCT  Sutures/Staples out- 10-14 days post operatively  Antibiotics: per Med/ID  DVT Prophylaxis- TEDS/SCDs/Foot pumps.   Lovenox: Start 40mg daily, Duration-until ambulatory > 150'  Future Procedures - none planned  Case Coordination for Discharge Planning - Disposition per Med. Follow-Up: needs appointment with Dr. Sol at [facility] at 10-14 days post-op for re-evaluation, staple removal and radiographs.        "

## 2021-02-08 ENCOUNTER — APPOINTMENT (OUTPATIENT)
Dept: RADIOLOGY | Facility: MEDICAL CENTER | Age: 81
DRG: 856 | End: 2021-02-08
Attending: STUDENT IN AN ORGANIZED HEALTH CARE EDUCATION/TRAINING PROGRAM
Payer: MEDICARE

## 2021-02-08 PROBLEM — L03.90 CELLULITIS: Status: ACTIVE | Noted: 2021-02-01

## 2021-02-08 LAB
C DIFF DNA SPEC QL NAA+PROBE: NEGATIVE
C DIFF TOX GENS STL QL NAA+PROBE: NEGATIVE

## 2021-02-08 PROCEDURE — 87493 C DIFF AMPLIFIED PROBE: CPT

## 2021-02-08 PROCEDURE — 700111 HCHG RX REV CODE 636 W/ 250 OVERRIDE (IP): Performed by: INTERNAL MEDICINE

## 2021-02-08 PROCEDURE — 770006 HCHG ROOM/CARE - MED/SURG/GYN SEMI*

## 2021-02-08 PROCEDURE — 99232 SBSQ HOSP IP/OBS MODERATE 35: CPT | Performed by: STUDENT IN AN ORGANIZED HEALTH CARE EDUCATION/TRAINING PROGRAM

## 2021-02-08 PROCEDURE — 93971 EXTREMITY STUDY: CPT | Mod: RT

## 2021-02-08 PROCEDURE — A9270 NON-COVERED ITEM OR SERVICE: HCPCS | Performed by: STUDENT IN AN ORGANIZED HEALTH CARE EDUCATION/TRAINING PROGRAM

## 2021-02-08 PROCEDURE — 700111 HCHG RX REV CODE 636 W/ 250 OVERRIDE (IP): Performed by: STUDENT IN AN ORGANIZED HEALTH CARE EDUCATION/TRAINING PROGRAM

## 2021-02-08 PROCEDURE — 97530 THERAPEUTIC ACTIVITIES: CPT

## 2021-02-08 PROCEDURE — 94760 N-INVAS EAR/PLS OXIMETRY 1: CPT

## 2021-02-08 PROCEDURE — 93971 EXTREMITY STUDY: CPT | Mod: 26 | Performed by: INTERNAL MEDICINE

## 2021-02-08 PROCEDURE — 700102 HCHG RX REV CODE 250 W/ 637 OVERRIDE(OP): Performed by: STUDENT IN AN ORGANIZED HEALTH CARE EDUCATION/TRAINING PROGRAM

## 2021-02-08 PROCEDURE — 94640 AIRWAY INHALATION TREATMENT: CPT

## 2021-02-08 PROCEDURE — 97535 SELF CARE MNGMENT TRAINING: CPT

## 2021-02-08 PROCEDURE — 97605 NEG PRS WND THER DME<=50SQCM: CPT

## 2021-02-08 RX ORDER — HEPARIN SODIUM 5000 [USP'U]/100ML
0-30 INJECTION, SOLUTION INTRAVENOUS CONTINUOUS
Status: DISCONTINUED | OUTPATIENT
Start: 2021-02-09 | End: 2021-02-09

## 2021-02-08 RX ORDER — LOPERAMIDE HCL 1 MG/7.5ML
1 SUSPENSION ORAL 4 TIMES DAILY PRN
Status: DISCONTINUED | OUTPATIENT
Start: 2021-02-08 | End: 2021-02-09 | Stop reason: HOSPADM

## 2021-02-08 RX ORDER — LACTOBACILLUS RHAMNOSUS GG 10B CELL
1 CAPSULE ORAL
Qty: 30 CAP | Refills: 0 | Status: SHIPPED | OUTPATIENT
Start: 2021-02-09 | End: 2021-03-11

## 2021-02-08 RX ORDER — HEPARIN SODIUM 1000 [USP'U]/ML
40 INJECTION, SOLUTION INTRAVENOUS; SUBCUTANEOUS PRN
Status: DISCONTINUED | OUTPATIENT
Start: 2021-02-08 | End: 2021-02-09

## 2021-02-08 RX ORDER — HEPARIN SODIUM 1000 [USP'U]/ML
80 INJECTION, SOLUTION INTRAVENOUS; SUBCUTANEOUS ONCE
Status: COMPLETED | OUTPATIENT
Start: 2021-02-09 | End: 2021-02-09

## 2021-02-08 RX ADMIN — OXYCODONE 5 MG: 5 TABLET ORAL at 15:21

## 2021-02-08 RX ADMIN — HEPARIN SODIUM 5000 UNITS: 5000 INJECTION, SOLUTION INTRAVENOUS; SUBCUTANEOUS at 06:17

## 2021-02-08 RX ADMIN — CEFAZOLIN SODIUM 2 G: 2 INJECTION, SOLUTION INTRAVENOUS at 06:16

## 2021-02-08 RX ADMIN — GABAPENTIN 300 MG: 300 CAPSULE ORAL at 06:17

## 2021-02-08 RX ADMIN — GABAPENTIN 600 MG: 300 CAPSULE ORAL at 17:28

## 2021-02-08 RX ADMIN — BUDESONIDE AND FORMOTEROL FUMARATE DIHYDRATE 2 PUFF: 80; 4.5 AEROSOL RESPIRATORY (INHALATION) at 17:28

## 2021-02-08 RX ADMIN — FLUTICASONE PROPIONATE 100 MCG: 50 SPRAY, METERED NASAL at 06:16

## 2021-02-08 RX ADMIN — GUAIFENESIN 1200 MG: 600 TABLET, EXTENDED RELEASE ORAL at 06:17

## 2021-02-08 RX ADMIN — HEPARIN SODIUM 5000 UNITS: 5000 INJECTION, SOLUTION INTRAVENOUS; SUBCUTANEOUS at 15:05

## 2021-02-08 RX ADMIN — CEFAZOLIN SODIUM 2 G: 2 INJECTION, SOLUTION INTRAVENOUS at 23:02

## 2021-02-08 RX ADMIN — GLYCOPYRROLATE 1 CAPSULE: 15.6 CAPSULE RESPIRATORY (INHALATION) at 20:39

## 2021-02-08 RX ADMIN — Medication 1 CAPSULE: at 08:39

## 2021-02-08 RX ADMIN — GUAIFENESIN 1200 MG: 600 TABLET, EXTENDED RELEASE ORAL at 17:28

## 2021-02-08 RX ADMIN — GLYCOPYRROLATE 1 CAPSULE: 15.6 CAPSULE RESPIRATORY (INHALATION) at 08:39

## 2021-02-08 RX ADMIN — BUDESONIDE AND FORMOTEROL FUMARATE DIHYDRATE 2 PUFF: 80; 4.5 AEROSOL RESPIRATORY (INHALATION) at 06:17

## 2021-02-08 RX ADMIN — HEPARIN SODIUM 5000 UNITS: 5000 INJECTION, SOLUTION INTRAVENOUS; SUBCUTANEOUS at 23:02

## 2021-02-08 RX ADMIN — LEFLUNOMIDE 20 MG: 20 TABLET ORAL at 06:17

## 2021-02-08 RX ADMIN — CEFAZOLIN SODIUM 2 G: 2 INJECTION, SOLUTION INTRAVENOUS at 15:05

## 2021-02-08 ASSESSMENT — ENCOUNTER SYMPTOMS
NERVOUS/ANXIOUS: 0
ABDOMINAL PAIN: 0
SINUS PAIN: 0
WHEEZING: 0
PALPITATIONS: 0
DOUBLE VISION: 0
VOMITING: 0
DIZZINESS: 0
NAUSEA: 0
MYALGIAS: 0
SORE THROAT: 0
SHORTNESS OF BREATH: 0
SPUTUM PRODUCTION: 0
BLURRED VISION: 0
CHILLS: 0
COUGH: 0
FOCAL WEAKNESS: 0
CONSTIPATION: 0
DIARRHEA: 1
HEADACHES: 0
FEVER: 0

## 2021-02-08 ASSESSMENT — COGNITIVE AND FUNCTIONAL STATUS - GENERAL
DRESSING REGULAR UPPER BODY CLOTHING: A LITTLE
DAILY ACTIVITIY SCORE: 17
DRESSING REGULAR LOWER BODY CLOTHING: A LOT
HELP NEEDED FOR BATHING: A LOT
TOILETING: A LOT
SUGGESTED CMS G CODE MODIFIER DAILY ACTIVITY: CK

## 2021-02-08 ASSESSMENT — GAIT ASSESSMENTS: DISTANCE (FEET): 0

## 2021-02-08 ASSESSMENT — PAIN DESCRIPTION - PAIN TYPE
TYPE: SURGICAL PAIN
TYPE: SURGICAL PAIN

## 2021-02-08 NOTE — DISCHARGE PLANNING
Anticipated Discharge Disposition: Home with HHC    Action: Pt was discussed in IDT rounds. Per MD Brenner the pt was agreeable to SNF. LSW met with pt at bedside to obtain SNF choice. Pt reported that they have been unsure whether to go to SNF or home with HHC. Pt stated that they would rather go home with HHC.    LSW sent Voalte message to MD Brenner to update her on pt wishes.    Barriers to Discharge: HHC acceptance    Plan: Care coordination will continue to follow up with DC needs and barriers.

## 2021-02-08 NOTE — CARE PLAN
Problem: Skin Integrity  Goal: Risk for impaired skin integrity will decrease  Outcome: PROGRESSING AS EXPECTED  Note: Pt educated about importance of repositioning Q2 hours. Pillows in use for support.     Problem: Safety  Goal: Will remain free from falls  Outcome: PROGRESSING AS EXPECTED  Note: Bed locked and in lowest position. Call light and belongings within reach.

## 2021-02-08 NOTE — PROGRESS NOTES
"Hospital Medicine Daily Progress Note    Date of Service  2/8/2021    Chief Complaint  80 y.o. male admitted 2/1/2021 with right leg swelling    Hospital Course  An 80 y.o. male who presented 2/1/2021 with leg swelling. Patient with hx of septic arthritis of right knee, s/p multiple surgeries recently completed IV cefepime x4 weeks end date 1/15/2021, COPD, CKD, who presents for right leg swelling. Patient reports right leg swelling, redness, purulent drainage for past week, with associated chills. Denies fever, sweating, joint pain, chest pain, cough, dyspnea. No recent trauma to leg.  In the ED, , RR 22, WBC 12.2, LA 1.6. Sepsis protocol initiated, given IV fluids and started on vancomycin. Orthopedic surgery team to see patient in AM. He will be admitted for evaluation of leg cellulitis.      Interval Problem Update  Patient was seen and examined at bedside, patient's wife at bedside.  C/o watery diarrhea.    C Diff PCR ordered. Started Probiotics.   Will order imodium if C Diff -ve.     S/p  incision and drainage of right prepatellar bursa 2/2 by Dr. Sol. Hold knee ROM x 2 weeks. Staples out- 10-14 days post operatively. Follow-Up: needs appointment with Dr. Sol at [facility] at 10-14 days post-op for re-evaluation, staple removal and radiographs.  ID on Board.   Per ID: \"Continue IV Ancef 2 g every 8 hours. Recommend 4 weeks of p.o. doxycycline 100 mg twice daily through 3/1/2021\"  PT/OT - SNF     Consultants/Specialty  Orthopedic surgery  ID    Code Status  Full Code    Disposition  SNF pending     Review of Systems  Review of Systems   Constitutional: Positive for malaise/fatigue. Negative for chills and fever.   HENT: Positive for congestion. Negative for ear discharge, ear pain, sinus pain and sore throat.    Eyes: Negative for blurred vision and double vision.   Respiratory: Negative for cough, sputum production, shortness of breath and wheezing.    Cardiovascular: Negative for " chest pain, palpitations and leg swelling.   Gastrointestinal: Positive for diarrhea. Negative for abdominal pain, constipation, nausea and vomiting.   Genitourinary: Negative for dysuria, frequency and urgency.   Musculoskeletal: Positive for joint pain. Negative for myalgias.   Neurological: Negative for dizziness, focal weakness and headaches.   Psychiatric/Behavioral: The patient is not nervous/anxious.         Physical Exam  Temp:  [36.4 °C (97.5 °F)-36.9 °C (98.5 °F)] 36.4 °C (97.5 °F)  Pulse:  [70-82] 70  Resp:  [16] 16  BP: (149-175)/(68-83) 161/68  SpO2:  [93 %-98 %] 98 %    Physical Exam  Constitutional:       General: He is not in acute distress.  HENT:      Head: Normocephalic and atraumatic.      Nose: Nose normal.      Mouth/Throat:      Mouth: Mucous membranes are moist.      Pharynx: No posterior oropharyngeal erythema.   Eyes:      General: No scleral icterus.     Extraocular Movements: Extraocular movements intact.      Conjunctiva/sclera: Conjunctivae normal.      Pupils: Pupils are equal, round, and reactive to light.   Neck:      Musculoskeletal: Normal range of motion and neck supple. No neck rigidity.   Cardiovascular:      Rate and Rhythm: Normal rate and regular rhythm.      Pulses: Normal pulses.      Heart sounds: Normal heart sounds. No murmur. No gallop.    Pulmonary:      Effort: Pulmonary effort is normal.      Breath sounds: Normal breath sounds. No stridor. No wheezing, rhonchi or rales.   Abdominal:      General: Bowel sounds are normal.      Palpations: Abdomen is soft.   Musculoskeletal:         General: Swelling and tenderness present.      Comments: Right knee covered in dressing, s/p I & D surgery.  Wound Vac in placed   Skin:     General: Skin is warm.   Neurological:      General: No focal deficit present.      Mental Status: He is alert and oriented to person, place, and time.   Psychiatric:         Mood and Affect: Mood normal.         Behavior: Behavior normal.  "        Fluids    Intake/Output Summary (Last 24 hours) at 2/8/2021 1030  Last data filed at 2/8/2021 0900  Gross per 24 hour   Intake 480 ml   Output 200 ml   Net 280 ml       Laboratory                        Imaging  IR-US GUIDED PIV   Final Result    Ultrasound-guided PERIPHERAL IV INSERTION performed by    qualified nursing staff as above.      DX-CHEST-PORTABLE (1 VIEW)   Final Result      Stable chest without acute/new abnormality.           Assessment/Plan  * Cellulitis  Assessment & Plan  History of septic arthritis of R knee s/p multiple surgeries and washout, hx of pseudomonas growth on knee aspiration  S/p 4 week course of cefepime prior to this hospitalization, completed 1/15/2021  New cellulitis with purulent drainage  Wound and blood cultures taken    -S/p  incision and drainage of right prepatellar bursa 2/2 by Dr. Sol. Hold knee ROM x 2 weeks. Staples out- 10-14 days post operatively. Follow-Up: needs appointment with Dr. Sol at [facility] at 10-14 days post-op for re-evaluation, staple removal and radiographs.    ID on Board.   Per ID: \"Continue IV Ancef 2 g every 8 hours. Follow pending synovial fluid cultures.  If this turns positive, may need arthrotomy and drainage. Anticipate a 4-week course of oral antibiotics -probably doxycycline instead of Bactrim given CKD \" OR Cx showing MSSA.  PT/OT   Pain control   Fall Precautions   SNF pending         Sepsis ([facility])  Assessment & Plan  This is Sepsis Present on admission  SIRS criteria identified on my evaluation include: Tachycardia, with heart rate greater than 90 BPM  Source is leg cellulitis  Sepsis protocol initiated  Fluid resuscitation ordered per protocol  IV antibiotics as appropriate for source of sepsis  While organ dysfunction may be noted elsewhere in this problem list or in the chart, degree of organ dysfunction does not meet CMS criteria for severe sepsis    -S/p  incision and drainage of right prepatellar bursa 2/2 by " "Dr. Sol. Hold knee ROM x 2 weeks. Staples out- 10-14 days post operatively. Follow-Up: needs appointment with Dr. Sol at [facility] at 10-14 days post-op for re-evaluation, staple removal and radiographs.    ID on Board.   Per ID: \"Continue IV Ancef 2 g every 8 hours. Follow pending synovial fluid cultures.  If this turns positive, may need arthrotomy and drainage. Anticipate a 4-week course of oral antibiotics -probably doxycycline instead of Bactrim given CKD \" OR Cx showing MSSA.  PT/OT   SNF pending         Chronic respiratory failure with hypoxia (HCC)- (present on admission)  Assessment & Plan  Due to COPD, on 2L O2 at night  On 2L in ER  No new respiratory complaints, CXR with no acute abnormalities  Wean oxygen  RT protocol  Mucinex for congestion    Stage 3 chronic kidney disease- (present on admission)  Assessment & Plan  Will monitor        VTE prophylaxis: SCDs & Heparin     "

## 2021-02-08 NOTE — PROGRESS NOTES
"   Orthopaedic PA Progress Note    Interval changes:did well overnight, no new Micro findings. Syno Cx NGTD (x 72h)  Clear for dispo per Medicine plan - interested in [facility] but now preparing for home with home vac/infusion vs oral abx (per ID)    ROS - Patient denies any new issues. No chest pain, dyspnea, or fever.  Pain well controlled.    BP (!) 161/68   Pulse 70   Temp 36.4 °C (97.5 °F) (Temporal)   Resp 16   Ht 1.854 m (6' 1\")   Wt 89.9 kg (198 lb 3.1 oz)   SpO2 98%     Patient seen and examined  No acute distress  Breathing non labored  RRR  Wound vac patent  Surgical dressing is clean, dry, and intact. Patient clearly fires tibialis anterior, EHL, and gastrocnemius/soleus. Sensation is intact to light touch throughout superficial peroneal, deep peroneal, tibial, saphenous, and sural nerve distributions. Strong and palpable 2+ dorsalis pedis and posterior tibial pulses with capillary refill less than 2 seconds. No lower leg tenderness or discomfort.    Active Hospital Problems    Diagnosis   • Stage 3 chronic kidney disease [N18.30]     Priority: Low   • Chronic respiratory failure with hypoxia (HCC) [J96.11]     Assessment/Plan:  POD#6 S/P I+D PPB knee with joint aspiration  Wt bearing status -AT, no ROM x 2wks  PT/OT-initiated  Wound care:vac changes by WCT  Sutures/Staples out- 10-14 days post operatively  Antibiotics: per Med/ID  DVT Prophylaxis- TEDS/SCDs/Foot pumps.   Lovenox: Start 40mg daily, Duration-until ambulatory > 150'  Future Procedures - none planned  Case Coordination for Discharge Planning - Disposition per Med. Follow-Up: needs appointment with Dr. Sol at [facility] at 10-14 days post-op for re-evaluation, staple removal and radiographs.        "

## 2021-02-08 NOTE — DISCHARGE PLANNING
Anticipated Discharge Disposition: Home with C and wound vac    Action: LSW made phone call to Emily with [facility]. Emily requested that this LSW fax her pt facesheet, H&P, OP report, wound team note, and [employer] Insurance Auth form. LSW faxed forms to [phone].    Barriers to Discharge: wound vac    Plan: Care coordination will continue to follow up with DC needs and barriers.

## 2021-02-08 NOTE — DISCHARGE SUMMARY
"Discharge Summary    CHIEF COMPLAINT ON ADMISSION  Chief Complaint   Patient presents with   • Leg Swelling     has had infection in R leg since feb   • Fever       Reason for Admission  Right leg swelling    Admission Date  2/1/2021    CODE STATUS  Full Code    HPI & HOSPITAL COURSE  An 80 y.o. male who presented 2/1/2021 with leg swelling. Patient with hx of septic arthritis of right knee, s/p multiple surgeries recently completed IV cefepime x4 weeks end date 1/15/2021, COPD, CKD, who presents for right leg swelling. Patient reports right leg swelling, redness, purulent drainage for past week, with associated chills. Denies fever, sweating, joint pain, chest pain, cough, dyspnea. No recent trauma to leg.  In the ED, , RR 22, WBC 12.2, LA 1.6. Sepsis protocol initiated, given IV fluids and started on vancomycin. S/p  incision and drainage of right prepatellar bursa 2/2 by Dr. Sol. Hold knee ROM x 2 weeks. Staples out- 10-14 days post operatively. Follow-Up: needs appointment with Dr. Sol at [facility] at 10-14 days post-op for re-evaluation, staple removal and radiographs. ID was consulted.   Per ID: \"Continue IV Ancef 2 g every 8 hours while inpatient. Recommend 4 weeks of p.o. doxycycline 100 mg twice daily through 3/1/2021\". PT/OT recommended post acute placement. SNF order placed & multiple discussions with pt, pt's son & pt's wife about risks of going home, strongly encouraged to consider SNF but the pt wants to go home with HH. Son & Wife is also agreed for Home with  HH. Last admission, Pt completed IV ABx course at home.   HH order placed. Will be discharged home with HH & wound vac.     Therefore, he is discharged in fair and stable condition to home with organized home healthcare and close outpatient follow-up.    The patient met 2-midnight criteria for an inpatient stay at the time of discharge.    Discharge Date  2/6/2021    FOLLOW UP ITEMS POST DISCHARGE  Follow up with PCP, " ID & Ortho    DISCHARGE DIAGNOSES  Principal Problem:    Cellulitis POA: Unknown  Active Problems:    Sepsis (HCC) POA: Unknown    Chronic respiratory failure with hypoxia (HCC) POA: Yes    Stage 3 chronic kidney disease POA: Yes  Resolved Problems:    * No resolved hospital problems. *      FOLLOW UP  No future appointments.  Kush Sol M.D.  [address]  [place] [address]  [phone]    Schedule an appointment as soon as possible for a visit in 2 weeks  For wound re-check, staple removal, and radiographs    Duarte Bell M.D.  [address]  [place] [id number] [phone]    Schedule an appointment as soon as possible for a visit in 3 weeks        MEDICATIONS ON DISCHARGE     Medication List      START taking these medications      Instructions   doxycycline 100 MG capsule  Commonly known as: MONODOX   Take 1 Cap by mouth 2 times a day for 23 days.  Dose: 100 mg     lactobacillus rhamnosus Caps capsule  Start taking on: February 9, 2021   Take 1 Cap by mouth every morning with breakfast for 30 days.  Dose: 1 Cap     oxyCODONE immediate-release 5 MG Tabs  Commonly known as: ROXICODONE   Take 0.5 Tabs by mouth every 6 hours as needed for Severe Pain for up to 3 days.  Dose: 2.5 mg        CHANGE how you take these medications      Instructions   gabapentin 300 MG Caps  What changed:   · how much to take  · when to take this  · additional instructions  Commonly known as: NEURONTIN   TAKE 1 CAPSULE BY MOUTH THREE TIMES A DAY     rosuvastatin 5 MG Tabs  What changed: when to take this  Commonly known as: CRESTOR   TAKE 1 TABLET BY MOUTH EVERY EVENING        CONTINUE taking these medications      Instructions   acetaminophen 325 MG Tabs  Commonly known as: Tylenol   Take 650 mg by mouth every four hours as needed. Indications: Pain  Dose: 650 mg     Allegra Allergy 180 MG tablet  Generic drug: fexofenadine   Take 180 mg by mouth every day.  Dose: 180 mg     CALCIUM PO   Take 1 Tab by mouth  every day.  Dose: 1 Tab     fluticasone 50 MCG/ACT nasal spray  Commonly known as: FLONASE   Administer 2 Sprays into affected nostril(S) every day.  Dose: 2 Spray     IMODIUM A-D PO   Take 1 Tab by mouth 2 (two) times a day.  Dose: 1 Tab     leflunomide 20 MG Tabs  Commonly known as: ARAVA   Take 1 Tab by mouth every day.  Dose: 20 mg     Mucinex 600 MG Tb12  Generic drug: guaiFENesin ER   Take 1,200 mg by mouth 2 Times a Day.  Dose: 1,200 mg     Symbicort 80-4.5 MCG/ACT Aero  Generic drug: budesonide-formoterol   Inhale 2 Puffs 2 Times a Day.  Dose: 2 Puff     tiotropium 18 MCG Caps  Commonly known as: SPIRIVA   Inhale 1 Cap by mouth every day.  Dose: 18 mcg            Allergies  Allergies   Allergen Reactions   • Ciprofloxacin      Other reaction(s): muscle aches  Pt's family states that Pt had a reaction when he was a kid noted 2/1/2021   • Levaquin      Other reaction(s): Muscle aches  Muscle aches  Other reaction(s): Muscle aches  Pt's son states that Pt had a reaction when he was a kid noted 2/1/2021   • Penicillins Hives     Rash and asthma attack when a child - wife states he has had Keflex in the past and tolerated  Pt's son states that Pt had a reaction when he was a kid noted 2/1/2021        DIET  Orders Placed This Encounter   Procedures   • Diet Order Diet: Regular     Standing Status:   Standing     Number of Occurrences:   1     Order Specific Question:   Diet:     Answer:   Regular [1]       ACTIVITY  As tolerated.  Weight bearing as tolerated    CONSULTATIONS  ID   Ortho Sx    PROCEDURES  S/p  incision and drainage of right prepatellar bursa 2/2 by Dr. Sol    LABORATORY  Lab Results   Component Value Date    SODIUM 138 02/05/2021    POTASSIUM 3.5 (L) 02/05/2021    CHLORIDE 106 02/05/2021    CO2 25 02/05/2021    GLUCOSE 118 (H) 02/05/2021    BUN 22 02/05/2021    CREATININE 1.46 (H) 02/05/2021    CREATININE 1.5 (H) 04/11/2005        Lab Results   Component Value Date    WBC 7.1 02/05/2021     HEMOGLOBIN 10.0 (L) 02/05/2021    HEMATOCRIT 32.4 (L) 02/05/2021    PLATELETCT 185 02/05/2021        Total time of the discharge process exceeds 41 minutes.

## 2021-02-08 NOTE — THERAPY
Occupational Therapy  Daily Treatment     Patient Name: Barry Torres  Age:  80 y.o., Sex:  male  Medical Record #: [id number]  Today's Date: 2/8/2021       Precautions: Fall Risk, Immobilizer Right Lower Extremity, Weight Bearing As Tolerated Right Lower Extremity  Comments: knee immobilizer AAT    Assessment    Pt seen for OT tx to include: transfers, LB dressing, seated grooming, attempted toileting (no results). Introduced reacher and sock-aid and trained on use for compensatory LB dressing. Fair demo; ace wrap to R foot negatively impacts. Pt required total A for ray care (residual BM from earlier BM). Tolerated <5 min standing due to c/o feeling weak. Pt is very chatty, tends to go into great detail regarding medical challenges leading to this admission. Pt is limited by: generalized weakness, balance impairment, immobilizer to RLE. Fortunately, pain is quite well controlled this session. Will continue to benefit from acute OT to maximize functional independence and safety.     Plan    Continue current treatment plan.    DC Equipment Recommendations: Unable to determine at this time  Discharge Recommendations: Recommend post-acute placement for additional occupational therapy services prior to discharge home    Subjective    Pt with multiple complaints regarding response time to call lights for toileting needs. Nurse Manager informed of concerns.      Objective       02/08/21 1113   Balance   Sitting Balance (Static) Fair +   Sitting Balance (Dynamic) Fair   Standing Balance (Static) Fair -   Standing Balance (Dynamic) Poor +   Weight Shift Sitting Fair   Weight Shift Standing Fair   Activities of Daily Living   Grooming Modified Independent;Seated  (oral care )   Lower Body Dressing Moderate Assist  (doff/don B socks, don/doff pants to knees only )   Toileting Maximal Assist  (attempted BM, no results; hygiene for residue )   Functional Mobility   Sit to Stand Minimal Assist   Bed, Chair, Wheelchair  Transfer Minimal Assist   Toilet Transfers Minimal Assist   Transfer Method Stand Step  (FWW)   Activity Tolerance   Comments fatigues with standing    Short Term Goals   Short Term Goal # 1 Pt will perform toileting task with min a   Goal Outcome # 1 Progressing as expected   Short Term Goal # 2 Pt will perform standing h/g tasks with supervision   Goal Outcome # 2 Progressing as expected   Short Term Goal # 3 Pt will perform LB dressing with min a using AE as needed/trained   Goal Outcome # 3 Progressing as expected

## 2021-02-08 NOTE — WOUND TEAM
[facility]  Inpatient Services  Wound and Skin Care Progress Note    Admission Date: 2/1/2021     Last order of IP CONSULT TO WOUND CARE was found on 2/2/2021 from Hospital Encounter on 2/1/2021     HPI, PMH, SH: Reviewed    Past Surgical History:   Procedure Laterality Date   • IRRIGATION & DEBRIDEMENT ORTHO Right 2/2/2021    Procedure: IRRIGATION AND DEBRIDEMENT, WOUND-KNEE;  Surgeon: Kush Sol M.D.;  Location: [facility];  Service: Orthopedics   • INCISION AND DRAINAGE GENERAL Right 2/2/2021    Procedure: INCISION AND DRAINAGE- arthrocentesis right knee;  Surgeon: Kush Sol M.D.;  Location: [facility];  Service: Orthopedics   • INCISION AND DRAINAGE GENERAL Right 12/19/2020    Procedure: INCISION AND DRAINAGE;  Surgeon: Marc Chowdhury M.D.;  Location: [facility];  Service: Orthopedics   • IRRIGATION & DEBRIDEMENT ORTHO Right 10/27/2020    Procedure: IRRIGATION AND DEBRIDEMENT, WOUND - KNEE OPEN;  Surgeon: Elijah Faulkner M.D.;  Location: [facility];  Service: Orthopedics   • FLEXOR TENDON REPAIR Left 4/3/2019    Procedure: FLEXOR TENDON REPAIR- SMALL FINGER VS;  Surgeon: Marc Chowdhury M.D.;  Location: [facility];  Service: Orthopedics   • TENDON TRANSFER Left 4/3/2019    Procedure: TENDON TRANSFER;  Surgeon: Marc Chowdhury M.D.;  Location: [facility];  Service: Orthopedics   • COLONOSCOPY  11/10/2018    Procedure: COLONOSCOPY;  Surgeon: Ganesh Peacock M.D.;  Location: [facility];  Service: Gastroenterology   • NASAL POLYPECTOMY Bilateral 10/6/2015    Procedure: NASAL POLYPECTOMY WITH SCOTT ENDOSCOPIC NASAL DEBRIDEMENT;  Surgeon: Param Maurer M.D.;  Location: SURGERY SAME DAY [facility];  Service:    • CYSTOSCOPY  2/1/2010    Performed by ANGIE VERDUZCO at [facility]   • RETROGRADES  2/1/2010    Performed by ANGIE VERDUZCO at [facility]     • PYELOGRAM  2010    Performed by ANGIE VERDUZCO at SURGERY [facility] ORS   • URETEROSCOPY  2010    Performed by ANGIE VERDUZCO at SURGERY [facility] ORS   • NEPHRECTOMY LAPAROSCOPIC      left kidney   • TESTICLE EXPLORATION     • PB REMV 2ND CATARACT,CORN-SCLER SECTN     • TONSILLECTOMY       Social History     Tobacco Use   • Smoking status: Former Smoker     Packs/day: 1.00     Years: 40.00     Pack years: 40.00     Types: Cigarettes     Quit date: 1980     Years since quittin.1   • Smokeless tobacco: Never Used   • Tobacco comment: 1 pk a day for 40 yrs   Substance Use Topics   • Alcohol use: No     Alcohol/week: 0.0 oz     Chief Complaint   Patient presents with   • Leg Swelling     has had infection in R leg since feb   • Fever     Diagnosis: Cellulitis [L03.90]    Unit where seen by Wound Team: T3/     WOUND CONSULT/FOLLOW UP RELATED TO:  NPWT change to right knee per protocol    WOUND HISTORY:  Patient admitted with complaints of pain to right knee, over several months he has had multiple procedures completed on the wound, followed by wound complications. States this was his 4th surgery on this knee. Had I&D with MD Sol on  and NPWT was placed then.      WOUND ASSESSMENT/LDA     Negative Pressure Wound Therapy 21 Knee Right (Active)   NPWT Pump Mode / Pressure Setting Ulta;Continuous;125 mmHg 21 1600   Dressing Type Small;Black Foam (Regular) 21 1600   Number of Foam Pieces Used 2 21 1600   Canister Changed No 21 1600   Output (mL) 10 mL 21 0829   NEXT Dressing Change/Treatment Date 02/10/21 02/08/21 1600   WOUND NURSE ONLY - Time Spent with Patient (mins) 60 21 1600     Wound 21 Incision Knee Right s/p wound I&D and arthrocentesis with Dr Sol 21 (Active)   Wound Image    21 1600   Site Assessment Pink;Red;White 21 1600   Periwound Assessment Clean;Dry;Intact 21 1600   Margins  Unattached edges 02/08/21 1600   Closure Secondary intention 02/08/21 1600   Drainage Amount Small 02/08/21 1600   Drainage Description Serosanguineous 02/08/21 1600   Treatments Cleansed;Irrigation;Site care 02/08/21 1600   Wound Cleansing Approved Wound Cleanser 02/08/21 1600   Periwound Protectant Skin Protectant Wipes to Periwound;Drape;Hydrofiber;Mepitel 02/08/21 1600   Dressing Cleansing/Solutions Not Applicable 02/08/21 1600   Dressing Options Collagen Dressing;Wound Vac 02/08/21 1600   Dressing Changed Changed 02/08/21 1600   Dressing Status Clean;Dry;Intact 02/08/21 1600   Dressing Change/Treatment Frequency By Wound Team Only 02/08/21 1600   NEXT Dressing Change/Treatment Date 02/10/21 02/08/21 1600   NEXT Weekly Photo (Inpatient Only) 02/10/21 02/08/21 1600   Non-staged Wound Description Full thickness 02/08/21 1600   Undermining of Wound, 1st Location From 12 o'clock;To 12 o'clock 02/04/21 1600   Shape irregular 3 open oval shaped openings 02/08/21 1600   Wound Odor None 02/08/21 1600   Exposed Structures Tendon 02/08/21 1600   WOUND NURSE ONLY - Time Spent with Patient (mins) 60 02/06/21 1600           MEASUREMENTS 2/4/21:  Superior wound: 3 x 1.4 x 0.6CM  Inferior wound: 3 x 1.4 x 0.8CM  Wounds communicate with eachother. Circumferential undermining present for both wounds, ranging from 0.5CM - 3.7CM (deepest point was inferior wound at 0900.          Vascular:    NIKKY:   No results found.    Lab Values:    Lab Results   Component Value Date/Time    WBC 7.1 02/05/2021 05:53 AM    RBC 3.13 (L) 02/05/2021 05:53 AM    HEMOGLOBIN 10.0 (L) 02/05/2021 05:53 AM    HEMATOCRIT 32.4 (L) 02/05/2021 05:53 AM    CREACTPROT 34.20 (H) 02/02/2021 04:59 AM    SEDRATEWES 25 (H) 01/05/2021 10:35 AM        Culture Results show:  Recent Results (from the past 720 hour(s))   CULTURE WOUND W/ GRAM STAIN    Collection Time: 02/02/21  8:33 AM    Specimen: Wound   Result Value Ref Range    Significant Indicator POS (POS)      Source WND     Site Right Prepatellar Fluid     Culture Result - (A)     Gram Stain Result Many WBCs.  Moderate Gram positive cocci.       Culture Result (A)      Staphylococcus aureus  Heavy growth  See previous culture for sensitivity report.         Pain Level/Medicated:  no premed. Tolerated well. 2/10.       INTERVENTIONS BY WOUND TEAM:  Chart and images reviewed. Discussed with bedside RN. This RN in to assess patient. Performed standard wound care which includes appropriate positioning, dressing removal and non-selective debridement. Pictures and measurements obtained weekly if/when required.  Preparation for Dressing removal: Dressing soaked with wound cleanser  Cleansed with:  wound cleanser and gauze.  Sharp debridement: n/a  Dona wound: Cleansed with wound cleanser and gauze, Prepped with no sting skin prep. Applied hydrofiber silver to macerated area around incisions and open wounds. Mepitel applied over incisions. Drape then applied to periwound over hydrofiber silver.   Primary Dressing: Collagen andree into wound bed moistened with normall saline.  Applied one piece of regular black foam into the superior wound and tucked into some undermining. Then took one piece of foam and applied over top the entire suture line, secured with drape.   Secondary (Outer) Dressing: Hole cut at distal aspect. 1 piece of foam applied as a button and trac pad applied. Suction obtained at 125mmHg continuous suction, mepilex under tubing. Reapplied immobilizer.     Interdisciplinary consultation: Patient, Bedside RN (Opal)    EVALUATION / RATIONALE FOR TREATMENT:  Most Recent Date:  2/8/21: Collagen added to wound bed to help close undermining and provide a scaffold for wound granulation tissue.  2/6/21: Wound appears to be clean. Periwound macerated in a few different areas. Hydrofiber silver applied over macerated area. Continued mepitel over sutures to apply an incisional vac. Continued NPWT dressing to assist in  closure, management of drainage, increase oxygenation and granulation to area.    2/4/21: Patient right knee with full thickness surgical wound present. Two separate openings noted, however they do communicate with eachother and there is circumferential undermining involved. Tendon visible in superior wound bed.  Mepitel over sutures to protect, and then applied NPWT to assist in closure by secondary intention, management of drainage, increase oxygenation and granulation to the area. Wound team to follow up 3x/week for changes.      Goals: Steady decrease in wound area and depth weekly.    WOUND TEAM PLAN OF CARE ([X] for frequency of wound follow up,):   Nursing to follow orders written for wound care. Contact wound team if area fails to progress, deteriorates or with any questions/concerns  Dressing changes by wound team:                   Follow up 3 times weekly:                NPWT change 3 times weekly: X    Follow up 1-2 times weekly:      Follow up Bi-Monthly:                   Follow up as needed:     Other (explain):     NURSING PLAN OF CARE ORDERS (X):  Dressing changes: See Dressing Care orders: X  Skin care: See Skin Care orders:   RN Prevention Protocol:  Rectal tube care: See Rectal Tube Care orders:   Other orders:    Anticipated discharge plans: will need ongoing wound vac changes once discharged.   LTACH:        SNF/Rehab:                  Home Health Care: X          Outpatient Wound Center:            Self/Family Care:        Other:

## 2021-02-09 VITALS
HEART RATE: 80 BPM | SYSTOLIC BLOOD PRESSURE: 156 MMHG | WEIGHT: 198.19 LBS | RESPIRATION RATE: 16 BRPM | TEMPERATURE: 97.5 F | DIASTOLIC BLOOD PRESSURE: 82 MMHG | BODY MASS INDEX: 26.27 KG/M2 | HEIGHT: 73 IN | OXYGEN SATURATION: 92 %

## 2021-02-09 LAB
APTT PPP: 50 SEC (ref 24.7–36)
INR PPP: 1.44 (ref 0.87–1.13)
PROTHROMBIN TIME: 18 SEC (ref 12–14.6)
UFH PPP CHRO-ACNC: <0.1 IU/ML
UFH PPP CHRO-ACNC: >1.1 IU/ML

## 2021-02-09 PROCEDURE — 85520 HEPARIN ASSAY: CPT

## 2021-02-09 PROCEDURE — 97530 THERAPEUTIC ACTIVITIES: CPT

## 2021-02-09 PROCEDURE — A9270 NON-COVERED ITEM OR SERVICE: HCPCS | Performed by: STUDENT IN AN ORGANIZED HEALTH CARE EDUCATION/TRAINING PROGRAM

## 2021-02-09 PROCEDURE — 36415 COLL VENOUS BLD VENIPUNCTURE: CPT

## 2021-02-09 PROCEDURE — 700111 HCHG RX REV CODE 636 W/ 250 OVERRIDE (IP): Performed by: INTERNAL MEDICINE

## 2021-02-09 PROCEDURE — 97116 GAIT TRAINING THERAPY: CPT

## 2021-02-09 PROCEDURE — 85730 THROMBOPLASTIN TIME PARTIAL: CPT

## 2021-02-09 PROCEDURE — 700102 HCHG RX REV CODE 250 W/ 637 OVERRIDE(OP): Performed by: STUDENT IN AN ORGANIZED HEALTH CARE EDUCATION/TRAINING PROGRAM

## 2021-02-09 PROCEDURE — 700111 HCHG RX REV CODE 636 W/ 250 OVERRIDE (IP): Performed by: STUDENT IN AN ORGANIZED HEALTH CARE EDUCATION/TRAINING PROGRAM

## 2021-02-09 PROCEDURE — 94640 AIRWAY INHALATION TREATMENT: CPT

## 2021-02-09 PROCEDURE — 97535 SELF CARE MNGMENT TRAINING: CPT

## 2021-02-09 PROCEDURE — 94760 N-INVAS EAR/PLS OXIMETRY 1: CPT

## 2021-02-09 PROCEDURE — 99239 HOSP IP/OBS DSCHRG MGMT >30: CPT | Performed by: STUDENT IN AN ORGANIZED HEALTH CARE EDUCATION/TRAINING PROGRAM

## 2021-02-09 PROCEDURE — 85610 PROTHROMBIN TIME: CPT

## 2021-02-09 RX ADMIN — BUDESONIDE AND FORMOTEROL FUMARATE DIHYDRATE 2 PUFF: 80; 4.5 AEROSOL RESPIRATORY (INHALATION) at 05:18

## 2021-02-09 RX ADMIN — LEFLUNOMIDE 20 MG: 20 TABLET ORAL at 05:18

## 2021-02-09 RX ADMIN — LOPERAMIDE HCL 1 MG: 1 SOLUTION ORAL at 07:11

## 2021-02-09 RX ADMIN — APIXABAN 2.5 MG: 5 TABLET, FILM COATED ORAL at 12:40

## 2021-02-09 RX ADMIN — HEPARIN SODIUM 18 UNITS/KG/HR: 5000 INJECTION, SOLUTION INTRAVENOUS at 01:06

## 2021-02-09 RX ADMIN — GLYCOPYRROLATE 1 CAPSULE: 15.6 CAPSULE RESPIRATORY (INHALATION) at 09:18

## 2021-02-09 RX ADMIN — Medication 1 CAPSULE: at 08:26

## 2021-02-09 RX ADMIN — HEPARIN SODIUM 7200 UNITS: 1000 INJECTION, SOLUTION INTRAVENOUS; SUBCUTANEOUS at 01:07

## 2021-02-09 RX ADMIN — GABAPENTIN 300 MG: 300 CAPSULE ORAL at 05:18

## 2021-02-09 RX ADMIN — GUAIFENESIN 1200 MG: 600 TABLET, EXTENDED RELEASE ORAL at 05:18

## 2021-02-09 RX ADMIN — CEFAZOLIN SODIUM 2 G: 2 INJECTION, SOLUTION INTRAVENOUS at 08:22

## 2021-02-09 RX ADMIN — FLUTICASONE PROPIONATE 100 MCG: 50 SPRAY, METERED NASAL at 05:18

## 2021-02-09 ASSESSMENT — COGNITIVE AND FUNCTIONAL STATUS - GENERAL
SUGGESTED CMS G CODE MODIFIER DAILY ACTIVITY: CJ
HELP NEEDED FOR BATHING: A LITTLE
MOVING FROM LYING ON BACK TO SITTING ON SIDE OF FLAT BED: A LITTLE
CLIMB 3 TO 5 STEPS WITH RAILING: A LITTLE
MOBILITY SCORE: 16
MOVING TO AND FROM BED TO CHAIR: A LOT
STANDING UP FROM CHAIR USING ARMS: A LITTLE
WALKING IN HOSPITAL ROOM: A LITTLE
TURNING FROM BACK TO SIDE WHILE IN FLAT BAD: A LOT
TOILETING: A LOT
DRESSING REGULAR LOWER BODY CLOTHING: A LITTLE
DAILY ACTIVITIY SCORE: 20
SUGGESTED CMS G CODE MODIFIER MOBILITY: CK

## 2021-02-09 ASSESSMENT — GAIT ASSESSMENTS
GAIT LEVEL OF ASSIST: SUPERVISED
DEVIATION: STEP TO;DECREASED HEEL STRIKE;DECREASED TOE OFF
DISTANCE (FEET): 40
ASSISTIVE DEVICE: FRONT WHEEL WALKER

## 2021-02-09 ASSESSMENT — PAIN DESCRIPTION - PAIN TYPE: TYPE: ACUTE PAIN

## 2021-02-09 NOTE — CARE PLAN
Problem: Skin Integrity  Goal: Risk for impaired skin integrity will decrease  Outcome: PROGRESSING AS EXPECTED     Problem: Safety  Goal: Will remain free from injury  Outcome: PROGRESSING AS EXPECTED  Goal: Will remain free from falls  Outcome: PROGRESSING AS EXPECTED

## 2021-02-09 NOTE — DISCHARGE SUMMARY
"Discharge Summary    CHIEF COMPLAINT ON ADMISSION  Chief Complaint   Patient presents with   • Leg Swelling     has had infection in R leg since feb   • Fever       Reason for Admission  Right knee swelling    Admission Date  2/1/2021    CODE STATUS  Full Code    HPI & HOSPITAL COURSE  Mr. Torres is 80 y.o. male who presented 2/1/2021 with leg swelling. Patient has h/o septic arthritis of right knee, s/p multiple surgeries recently completed IV cefepime x4 weeks end date 1/15/2021, COPD, CKD, who presents for right leg swelling. Patient reports right leg swelling, redness, purulent drainage for past week, with associated chills.    In the ED, , RR 22, WBC 12.2, LA 1.6. Sepsis protocol initiated, given IV fluids and started on vancomycin. S/p  incision and drainage of right prepatellar bursa 2/2/2021 by Dr. Sol. Hold knee ROM x 2 weeks. Staples out- 10-14 days post operatively. Follow-Up: needs appointment with Dr. Sol at [facility] at 10-14 days post-op for re-evaluation, staple removal and radiographs. ID was consulted.   Per ID: \"Continue IV Ancef 2 g every 8 hours while inpatient. Recommend 4 weeks of p.o. doxycycline 100 mg twice daily through 3/1/2021\". PT/OT recommended SNF - SNF order placed & multiple discussions with pt, pt's son & pt's wife about risks of going home, strongly encouraged to consider SNF but the pt elected to go home with HH. Son & Wife also agreed for Home with  HH. Home health and wound vac arranged prior to discharge.    Of note, he was found to have Right brachial DVT by US - started on heparin gtt and discharged with Eliquis. Discharged with Eliquis 2.5mg BID due to age, S.Cr and weight as well as recent orthopedic surgical intervention that may put him at higher risk for bleeding. Outpatient anticoagulation arrangement made prior to discharge.     Therefore, he is discharged in fair and stable condition to home with organized home healthcare and close " outpatient follow-up.     The patient met 2-midnight criteria for an inpatient stay at the time of discharge.    Therefore, he is discharged in good and stable condition to home with close outpatient follow-up.    The patient met 2-midnight criteria for an inpatient stay at the time of discharge.    Discharge Date  2/9/2021    FOLLOW UP ITEMS POST DISCHARGE  Sepsis secondary to septic arthritis/cellulitis with right prepatellar bursa s/p I&D - continue taking antibiotic as instructed - f/u with PCP, ID and Orthopedic    RUE DVT - continue taking Eliquis as instructed and f/u with anticoagulation clinic    DISCHARGE DIAGNOSES  Principal Problem:    Cellulitis POA: Unknown  Active Problems:    Sepsis (HCC) POA: Unknown    Chronic respiratory failure with hypoxia (HCC) POA: Yes    Stage 3 chronic kidney disease POA: Yes  Resolved Problems:    * No resolved hospital problems. *      FOLLOW UP  No future appointments.  Kush Sol M.D.  [address] [facility] [address]  [phone]    Schedule an appointment as soon as possible for a visit in 2 weeks  2/22    Duarte Bell M.D.  [address]  [facility] [address]  [phone]    Schedule an appointment as soon as possible for a visit in 3 weeks      [facility]  [address]  [facility] [id number] [phone]        Carlitos aHrvey M.D.  [address] [facility] [address]  [phone]    In 1 week      [facility]  [address] [facility] [id number] [phone]  In 10 days        MEDICATIONS ON DISCHARGE     Medication List      START taking these medications      Instructions   apixaban 2.5mg Tabs  Commonly known as: ELIQUIS   Take 1 Tab by mouth 2 Times a Day for 30 days.  Dose: 2.5 mg     doxycycline 100 MG capsule  Commonly known as: MONODOX   Take 1 Cap by mouth 2 times a day for 23 days.  Dose: 100 mg     lactobacillus rhamnosus Caps capsule   Take 1 Cap by mouth every morning with  breakfast for 30 days.  Dose: 1 Cap     oxyCODONE immediate-release 5 MG Tabs  Commonly known as: ROXICODONE   Take 0.5 Tabs by mouth every 6 hours as needed for Severe Pain for up to 3 days.  Dose: 2.5 mg        CHANGE how you take these medications      Instructions   gabapentin 300 MG Caps  What changed:   · how much to take  · when to take this  · additional instructions  Commonly known as: NEURONTIN   TAKE 1 CAPSULE BY MOUTH THREE TIMES A DAY     rosuvastatin 5 MG Tabs  What changed: when to take this  Commonly known as: CRESTOR   TAKE 1 TABLET BY MOUTH EVERY EVENING        CONTINUE taking these medications      Instructions   acetaminophen 325 MG Tabs  Commonly known as: Tylenol   Take 650 mg by mouth every four hours as needed. Indications: Pain  Dose: 650 mg     Allegra Allergy 180 MG tablet  Generic drug: fexofenadine   Take 180 mg by mouth every day.  Dose: 180 mg     CALCIUM PO   Take 1 Tab by mouth every day.  Dose: 1 Tab     fluticasone 50 MCG/ACT nasal spray  Commonly known as: FLONASE   Administer 2 Sprays into affected nostril(S) every day.  Dose: 2 Spray     IMODIUM A-D PO   Take 1 Tab by mouth 2 (two) times a day.  Dose: 1 Tab     leflunomide 20 MG Tabs  Commonly known as: ARAVA   Take 1 Tab by mouth every day.  Dose: 20 mg     Mucinex 600 MG Tb12  Generic drug: guaiFENesin ER   Take 1,200 mg by mouth 2 Times a Day.  Dose: 1,200 mg     Symbicort 80-4.5 MCG/ACT Aero  Generic drug: budesonide-formoterol   Inhale 2 Puffs 2 Times a Day.  Dose: 2 Puff     tiotropium 18 MCG Caps  Commonly known as: SPIRIVA   Inhale 1 Cap by mouth every day.  Dose: 18 mcg            Allergies  Allergies   Allergen Reactions   • Ciprofloxacin      Other reaction(s): muscle aches  Pt's family states that Pt had a reaction when he was a kid noted 2/1/2021   • Levaquin      Other reaction(s): Muscle aches  Muscle aches  Other reaction(s): Muscle aches  Pt's son states that Pt had a reaction when he was a kid noted 2/1/2021    • Penicillins Hives     Rash and asthma attack when a child - wife states he has had Keflex in the past and tolerated  Pt's son states that Pt had a reaction when he was a kid noted 2/1/2021        DIET  Orders Placed This Encounter   Procedures   • Diet Order Diet: Regular     Standing Status:   Standing     Number of Occurrences:   1     Order Specific Question:   Diet:     Answer:   Regular [1]       ACTIVITY  As tolerated.  Wt bearing status - AT, no ROM x 2wks      CONSULTATIONS  Orthopedic  Infectious disease    PROCEDURES    2/2/2021 by Dr. Kush Sol    1.  Incision and drainage of right prepatellar bursa with excisional  debridement and application of wound VAC dressing.  2.  Arthrocentesis, right knee.    LABORATORY  Lab Results   Component Value Date    SODIUM 138 02/05/2021    POTASSIUM 3.5 (L) 02/05/2021    CHLORIDE 106 02/05/2021    CO2 25 02/05/2021    GLUCOSE 118 (H) 02/05/2021    BUN 22 02/05/2021    CREATININE 1.46 (H) 02/05/2021    CREATININE 1.5 (H) 04/11/2005        Lab Results   Component Value Date    WBC 7.1 02/05/2021    HEMOGLOBIN 10.0 (L) 02/05/2021    HEMATOCRIT 32.4 (L) 02/05/2021    PLATELETCT 185 02/05/2021      US-EXTREMITY VENOUS UPPER UNILAT RIGHT   Final Result      IR-US GUIDED PIV   Final Result    Ultrasound-guided PERIPHERAL IV INSERTION performed by    qualified nursing staff as above.      IR-US GUIDED PIV   Final Result    Ultrasound-guided PERIPHERAL IV INSERTION performed by    qualified nursing staff as above.      DX-CHEST-PORTABLE (1 VIEW)   Final Result      Stable chest without acute/new abnormality.            Total time of the discharge process exceeds 40 minutes.

## 2021-02-09 NOTE — PROGRESS NOTES
US did show an occlusive thrombus in pt's R brachial vein. Texted Dr. Shelli Rudd, hospitalist, with the results per her request.

## 2021-02-09 NOTE — PROGRESS NOTES
Received order for heparin bolus and drip, initiated at 0100. Discussed the reason and protocol for heparin drip as well as anticoagulation precautions with pt, such as shaving with electric razor instead of a blade, using soft bristled toothbrush, extra care to avoid falls, etc. Pt endorsed understanding. Xa lab draw due at 0700.

## 2021-02-09 NOTE — CARE PLAN
Problem: Venous Thromboembolism (VTW)/Deep Vein Thrombosis (DVT) Prevention:  Goal: Patient will participate in Venous Thrombosis (VTE)/Deep Vein Thrombosis (DVT)Prevention Measures  Outcome: PROGRESSING SLOWER THAN EXPECTED  Note: US confirmed occlusive thrombus in RUE, heparin drip ordered and initiated at 0100.     Problem: Bowel/Gastric:  Goal: Normal bowel function is maintained or improved  Outcome: PROGRESSING AS EXPECTED  Note: Pt has had no incidents of diarrhea tonight

## 2021-02-09 NOTE — THERAPY
Physical Therapy   Daily Treatment     Patient Name: Barry Torres  Age:  80 y.o., Sex:  male  Medical Record #: [id number]  Today's Date: 2/9/2021     Precautions: Fall Risk, Weight Bearing As Tolerated Right Lower Extremity, Immobilizer Right Lower Extremity    Assessment    Pt agreeable to PT session, son present during therapy session. Tolerated increased ambulation distance 2x40 ft with seated rest in between ambulation efforts as pt fatigues quickly and noted to be slightly SOB. Unable to practice steps to enter home due to bowel urgency. Completed transfers with FWW at SP level with intermittent cues for safety as pt prefers a wide pivot vs backing up to sitting surface. Pt reports plan is to DC home today, Son will be staying for next couple days to assist as needed. PT will continue to follow while in house.     Plan    Continue current treatment plan.    DC Equipment Recommendations: None  (has FWW at home, may benefit from BSC)  Discharge Recommendations: Recommend home health for continued physical therapy services(as pt is anticipating DC home today)       02/09/21 1315   Precautions   Precautions Fall Risk;Weight Bearing As Tolerated Right Lower Extremity;Immobilizer Right Lower Extremity   Comments knee immobilizer on at all times   Pain 0 - 10 Group   Therapist Pain Assessment During Activity;Nurse Notified  (no pain reported)   Cognition    Speech/ Communication Hard of Hearing   Level of Consciousness Alert   Comments pleasant and eager to DC home, son present for PT session   Balance   Sitting Balance (Static) Fair +   Sitting Balance (Dynamic) Fair +   Standing Balance (Static) Fair   Standing Balance (Dynamic) Fair   Weight Shift Sitting Fair   Weight Shift Standing Fair   Skilled Intervention Verbal Cuing;Compensatory Strategies   Comments w/ FWW   Gait Analysis   Gait Level Of Assist Supervised   Assistive Device Front Wheel Walker   Distance (Feet) 40   # of Times Distance was  Traveled 2  (seated rest x3-4 min between ambulation bouts)   Deviation Step To;Decreased Heel Strike;Decreased Toe Off  (immobilizer limiting)   # of Stairs Climbed 0   Weight Bearing Status WBAT R LE in immobilizer   Skilled Intervention Verbal Cuing;Sequencing;Compensatory Strategies   Comments unable to practice stairs due to bowel urgency   Bed Mobility    Supine to Sit   (in chair pre/post PT session)   Scooting Supervised   Skilled Intervention Verbal Cuing   Functional Mobility   Sit to Stand Supervised   Bed, Chair, Wheelchair Transfer Supervised   Toilet Transfers Supervised   Transfer Method Stand Step   Skilled Intervention Verbal Cuing   Comments cues for safety during stands as pt prefers to pivot to seat vs backing up   Patient / Family Goals    Patient / Family Goal #1 to return home   Goal #1 Outcome Goal not met   Short Term Goals    Short Term Goal # 1 pt will perform supine <> sit with HOB flat, no railing and SPV in 6 vistis   Goal Outcome # 1   (NT)   Short Term Goal # 2 pt will perform sit <> stand and functional transfers with LRAD and SPV in 6 visits   Goal Outcome # 2 Goal met   Short Term Goal # 3 pt will ambulate > 150 ft with LRAD and SPV to access home in 6 visits   Goal Outcome # 3 Goal not met   Short Term Goal # 4 pt will negotiate 1 step with LRAD and SPV to access home in 6 visits   Goal Outcome # 4 Goal not met   Anticipated Discharge Equipment and Recommendations   DC Equipment Recommendations None  (has FWW at home, may benefit from BSC)   Discharge Recommendations Recommend home health for continued physical therapy services  (as pt is anticipating DC home today)

## 2021-02-09 NOTE — THERAPY
Physical Therapy Contact Note    PT treatment attempted. Patient with wound care on attempt and patient declined attempts at mobility after wound care. Will re attempt as appropriate and able.    Of note: chart indicates plan for DC home with home health. PT DC recommendation was for post acute placement following most recent PT visit and continue to recommend post acute placement after discussion with OT today.    Celine Conteh, PT, DPT  [phone]

## 2021-02-09 NOTE — RESPIRATORY CARE
REMOTE MONITORING PROGRAM by COPD NAVIGATOR  2/9/2021 at 8:47 AM by Diamante Fernandez, RRT     Patient interviewed by COPD Navigator for COPD remote monitoring. Patient declines remote monitoring at this time.

## 2021-02-09 NOTE — PROGRESS NOTES
Problem: Safety  Goal: Will remain free from falls  Outcome: PROGRESSING AS EXPECTED   Safety precautions in place.  Call light and personal items within reach. Bed at lowest position and locked.  Siderails up X 2.  Clutter free environment & adequate lighting. Educated on level of risk and reminded to call for assistance.  Hourly rounding in effect.     Problem: Infection  Goal: Will remain free from infection  Outcome: PROGRESSING AS EXPECTED   Afebrile. Standard precautions in effect.  Hand washing every encounter, and before & after patient care.  Verbalized understanding.     Problem: Knowledge Deficit  Goal: Knowledge of disease process/condition, treatment plan, diagnostic tests, and medications will improve  Outcome: PROGRESSING AS EXPECTED   Discussed plan of care.  Questions answered.  Verbalized understanding.     Problem: Mobility  Goal: Risk for activity intolerance will decrease  Outcome: PROGRESSING AS EXPECTED   Educated on ROM exercises, risks and benefits.  Verbalized understanding.

## 2021-02-09 NOTE — PROGRESS NOTES
This nurse expressed concern over discharging pt with new DVT. Provider said it is ok to discharge patient

## 2021-02-09 NOTE — PROGRESS NOTES
Pt has lump in R upper arm near bicep that is tender to palpation. No redness or swelling present. Pt denies SOB or chest pain.  Phoned hospitalist to request US to r/o DVT, placed order for STAT US.

## 2021-02-09 NOTE — DISCHARGE PLANNING
Agency/Facility Name: [facility]   Spoke To: Bridger  Outcome: Patient Accepted, this patient was accepted last week.

## 2021-02-09 NOTE — DISCHARGE INSTRUCTIONS
Discharge Instructions    Discharged to home by car with relative. Discharged via wheelchair, hospital escort: Yes.  Special equipment needed: Immobilizer    Be sure to schedule a follow-up appointment with your primary care doctor or any specialists as instructed.     Discharge Plan:   Diet Plan: Discussed  Activity Level: Discussed  Confirmed Follow up Appointment: Patient to Call and Schedule Appointment  Confirmed Symptoms Management: Discussed  Medication Reconciliation Updated: Yes  Influenza Vaccine Indication: Not indicated: Previously immunized this influenza season and > 8 years of age    I understand that a diet low in cholesterol, fat, and sodium is recommended for good health. Unless I have been given specific instructions below for another diet, I accept this instruction as my diet prescription.   Other diet: regular    Special Instructions: None    · Is patient discharged on Warfarin / Coumadin?   No     Depression / Suicide Risk    As you are discharged from this [facility] facility, it is important to learn how to keep safe from harming yourself.    Recognize the warning signs:  · Abrupt changes in personality, positive or negative- including increase in energy   · Giving away possessions  · Change in eating patterns- significant weight changes-  positive or negative  · Change in sleeping patterns- unable to sleep or sleeping all the time   · Unwillingness or inability to communicate  · Depression  · Unusual sadness, discouragement and loneliness  · Talk of wanting to die  · Neglect of personal appearance   · Rebelliousness- reckless behavior  · Withdrawal from people/activities they love  · Confusion- inability to concentrate     If you or a loved one observes any of these behaviors or has concerns about self-harm, here's what you can do:  · Talk about it- your feelings and reasons for harming yourself  · Remove any means that you might use to hurt yourself (examples: pills, rope, extension  cords, firearm)  · Get professional help from the community (Mental Health, Substance Abuse, psychological counseling)  · Do not be alone:Call your Safe Contact- someone whom you trust who will be there for you.  · Call your local CRISIS HOTLINE 655-6806 or 756-177-7744  · Call your local Children's Mobile Crisis Response Team [place] (478) 149-4691 or www.[employer]  · Call the toll free National Suicide Prevention Hotlines   · National Suicide Prevention Lifeline 115-477-EWSH (1551)  · [facility] Hope Line Network 800-SUICIDE (579-6012)            Incision and Drainage, Care After  This sheet gives you information about how to care for yourself after your procedure. Your health care provider may also give you more specific instructions. If you have problems or questions, contact your health care provider.  What can I expect after the procedure?  After the procedure, it is common to have:  · Pain or discomfort around the incision site.  · Blood, fluid, or pus (drainage) from the incision.  · Redness and firm skin around the incision site.  Follow these instructions at home:  Medicines  · Take over-the-counter and prescription medicines only as told by your health care provider.  · If you were prescribed an antibiotic medicine, use or take it as told by your health care provider. Do not stop using the antibiotic even if you start to feel better.  Wound care  Follow instructions from your health care provider about how to take care of your wound. Make sure you:  · Wash your hands with soap and water before and after you change your bandage (dressing). If soap and water are not available, use hand .  · Change your dressing and packing as told by your health care provider.  ? If your dressing is dry or stuck when you try to remove it, moisten or wet the dressing with saline or water so that it can be removed without harming your skin or tissues.  ? If your wound is packed, leave it in place until  your health care provider tells you to remove it. To remove the packing, moisten or wet the packing with saline or water so that it can be removed without harming your skin or tissues.  · Leave stitches (sutures), skin glue, or adhesive strips in place. These skin closures may need to stay in place for 2 weeks or longer. If adhesive strip edges start to loosen and curl up, you may trim the loose edges. Do not remove adhesive strips completely unless your health care provider tells you to do that.  Check your wound every day for signs of infection. Check for:  · More redness, swelling, or pain.  · More fluid or blood.  · Warmth.  · Pus or a bad smell.  If you were sent home with a drain tube in place, follow instructions from your health care provider about:  · How to empty it.  · How to care for it at home.    General instructions  · Rest the affected area.  · Do not take baths, swim, or use a hot tub until your health care provider approves. Ask your health care provider if you may take showers. You may only be allowed to take sponge baths.  · Return to your normal activities as told by your health care provider. Ask your health care provider what activities are safe for you. Your health care provider may put you on activity or lifting restrictions.  · The incision will continue to drain. It is normal to have some clear or slightly bloody drainage. The amount of drainage should lessen each day.  · Do not apply any creams, ointments, or liquids unless you have been told to by your health care provider.  · Keep all follow-up visits as told by your health care provider. This is important.  Contact a health care provider if:  · Your cyst or abscess returns.  · You have a fever or chills.  · You have more redness, swelling, or pain around your incision.  · You have more fluid or blood coming from your incision.  · Your incision feels warm to the touch.  · You have pus or a bad smell coming from your incision.  · You  have red streaks above or below the incision site.  Get help right away if:  · You have severe pain or bleeding.  · You cannot eat or drink without vomiting.  · You have decreased urine output.  · You become short of breath.  · You have chest pain.  · You cough up blood.  · The affected area becomes numb or starts to tingle.  These symptoms may represent a serious problem that is an emergency. Do not wait to see if the symptoms will go away. Get medical help right away. Call your local emergency services (911 in the [place] Do not drive yourself to the hospital.  Summary  · After this procedure, it is common to have fluid, blood, or pus coming from the surgery site.  · Follow all home care instructions. You will be told how to take care of your incision, how to check for infection, and how to take medicines.  · If you were prescribed an antibiotic medicine, take it as told by your health care provider. Do not stop taking the antibiotic even if you start to feel better.  · Contact a health care provider if you have increased redness, swelling, or pain around your incision. Get help right away if you have chest pain, you vomit, you cough up blood, or you have shortness of breath.  · Keep all follow-up visits as told by your health care provider. This is important.  This information is not intended to replace advice given to you by your health care provider. Make sure you discuss any questions you have with your health care provider.  Document Released: 03/11/2013 Document Revised: 11/18/2019 Document Reviewed: 11/18/2019  [employer] Patient Education © 2020 [employer]      Deep Vein Thrombosis    Deep vein thrombosis (DVT) is a condition in which a blood clot forms in a deep vein, such as a lower leg, thigh, or arm vein. A clot is blood that has thickened into a gel or solid. This condition is dangerous. It can lead to serious and even life-threatening complications if the clot travels to the lungs and causes a  blockage (pulmonary embolism). It can also damage veins in the leg. This can result in leg pain, swelling, discoloration, and sores (post-thrombotic syndrome).  What are the causes?  This condition may be caused by:  · A slowdown of blood flow.  · Damage to a vein.  · A condition that causes blood to clot more easily, such as an inherited clotting disorder.  What increases the risk?  The following factors may make you more likely to develop this condition:  · Being overweight.  · Being older, especially over age 60.  · Sitting or lying down for more than four hours.  · Being in the hospital.  · Lack of physical activity (sedentary lifestyle).  · Pregnancy, being in childbirth, or having recently given birth.  · Taking medicines that contain estrogen, such as medicines to prevent pregnancy.  · Smoking.  · A history of any of the following:  ? Blood clots or a blood clotting disease.  ? Peripheral vascular disease.  ? Inflammatory bowel disease.  ? Cancer.  ? Heart disease.  ? Genetic conditions that affect how your blood clots, such as Factor V Leiden mutation.  ? Neurological diseases that affect your legs (leg paresis).  ? A recent injury, such as a car accident.  ? Major or lengthy surgery.  ? A central line placed inside a large vein.  What are the signs or symptoms?  Symptoms of this condition include:  · Swelling, pain, or tenderness in an arm or leg.  · Warmth, redness, or discoloration in an arm or leg.  If the clot is in your leg, symptoms may be more noticeable or worse when you stand or walk. Some people may not develop any symptoms.  How is this diagnosed?  This condition is diagnosed with:  · A medical history and physical exam.  · Tests, such as:  ? Blood tests. These are done to check how well your blood clots.  ? Ultrasound. This is done to check for clots.  ? Venogram. For this test, contrast dye is injected into a vein and X-rays are taken to check for any clots.  How is this treated?  Treatment for  this condition depends on:  · The cause of your DVT.  · Your risk for bleeding or developing more clots.  · Any other medical conditions that you have.  Treatment may include:  · Taking a blood thinner (anticoagulant). This type of medicine prevents clots from forming. It may be taken by mouth, injected under the skin, or injected through an IV (catheter).  · Injecting clot-dissolving medicines into the affected vein (catheter-directed thrombolysis).  · Having surgery. Surgery may be done to:  ? Remove the clot.  ? Place a filter in a large vein to catch blood clots before they reach the lungs.  Some treatments may be continued for up to six months.  Follow these instructions at home:  If you are taking blood thinners:  · Take the medicine exactly as told by your health care provider. Some blood thinners need to be taken at the same time every day. Do not skip a dose.  · Talk with your health care provider before you take any medicines that contain aspirin or NSAIDs. These medicines increase your risk for dangerous bleeding.  · Ask your health care provider about foods and drugs that could change the way the medicine works (may interact). Avoid those things if your health care provider tells you to do so.  · Blood thinners can cause easy bruising and may make it difficult to stop bleeding. Because of this:  ? Be very careful when using knives, scissors, or other sharp objects.  ? Use an electric razor instead of a blade.  ? Avoid activities that could cause injury or bruising, and follow instructions about how to prevent falls.  · Wear a medical alert bracelet or carry a card that lists what medicines you take.  General instructions  · Take over-the-counter and prescription medicines only as told by your health care provider.  · Return to your normal activities as told by your health care provider. Ask your health care provider what activities are safe for you.  · Wear compression stockings if recommended by your  health care provider.  · Keep all follow-up visits as told by your health care provider. This is important.  How is this prevented?  To lower your risk of developing this condition again:  · For 30 or more minutes every day, do an activity that:  ? Involves moving your arms and legs.  ? Increases your heart rate.  · When traveling for longer than four hours:  ? Exercise your arms and legs every hour.  ? Drink plenty of water.  ? Avoid drinking alcohol.  · Avoid sitting or lying for a long time without moving your legs.  · If you have surgery or you are hospitalized, ask about ways to prevent blood clots. These may include taking frequent walks or using anticoagulants.  · Stay at a healthy weight.  · If you are a woman who is older than age 35, avoid unnecessary use of medicines that contain estrogen, such as some birth control pills.  · Do not use any products that contain nicotine or tobacco, such as cigarettes and e-cigarettes. This is especially important if you take estrogen medicines. If you need help quitting, ask your health care provider.  Contact a health care provider if:  · You miss a dose of your blood thinner.  · Your menstrual period is heavier than usual.  · You have unusual bruising.  Get help right away if:  · You have:  ? New or increased pain, swelling, or redness in an arm or leg.  ? Numbness or tingling in an arm or leg.  ? Shortness of breath.  ? Chest pain.  ? A rapid or irregular heartbeat.  ? A severe headache or confusion.  ? A cut that will not stop bleeding.  · There is blood in your vomit, stool, or urine.  · You have a serious fall or accident, or you hit your head.  · You feel light-headed or dizzy.  · You cough up blood.  These symptoms may represent a serious problem that is an emergency. Do not wait to see if the symptoms will go away. Get medical help right away. Call your local emergency services (911 in the [place] Do not drive yourself to the hospital.  Summary  · Deep vein  thrombosis (DVT) is a condition in which a blood clot forms in a deep vein, such as a lower leg, thigh, or arm vein.  · Symptoms can include swelling, warmth, pain, and redness in your leg or arm.  · This condition may be treated with a blood thinner (anticoagulant medicine), medicine that is injected to dissolve blood clots,compression stockings, or surgery.  · If you are prescribed blood thinners, take them exactly as told.  This information is not intended to replace advice given to you by your health care provider. Make sure you discuss any questions you have with your health care provider.  Document Released: 12/18/2006 Document Revised: 11/30/2018 Document Reviewed: 05/18/2018  Elsevier Patient Education © 2020 [employer].

## 2021-02-09 NOTE — PROGRESS NOTES
Pt is being discharged from the facility. Home wound vac is at bedside, Home health is set up, Iv has been taken out. Discharge paperwork has been reviewed with pt and son. All questions answered.

## 2021-02-09 NOTE — PROGRESS NOTES
Tolerating dressing changes but mobilizing slowly  AVSS  VAC in place  WBAT in brace  Continue ABX/VAC  D/c planning  F/u MICHAEL 2/22

## 2021-02-09 NOTE — DISCHARGE PLANNING
Anticipated Discharge Disposition: Home with [facility] and home wound vac    Action: LAZAROW called [facility] pharmacy [phone] and spoke with Aga. Aga reported that pt's insurance approved the eliquis and no prior auth is necessary.    Barriers to Discharge: none    Plan: Care coordination will continue to follow up with DC needs and barriers.

## 2021-02-10 ENCOUNTER — TELEPHONE (OUTPATIENT)
Dept: VASCULAR LAB | Facility: MEDICAL CENTER | Age: 81
End: 2021-02-10

## 2021-02-10 NOTE — THERAPY
"Occupational Therapy  Daily Treatment     Patient Name: Barry Torres  Age:  80 y.o., Sex:  male  Medical Record #: [id number]  Today's Date: 2/9/2021       Precautions: Fall Risk, Weight Bearing As Tolerated Right Lower Extremity, Immobilizer Right Lower Extremity  Comments: knee immobilizer on at all times    Assessment    Pt seen for OT tx to include: bed mobility, transfer training, LB dressing. Son present for CG training. Pt demos improved functional mobility this session, but continues to be limited by generalized weakness. Required min A for LB dressing, primarily due to limitations of knee immobilizer. Educated pt and son on option of obtaining WC or transport chair for longer distances, but cautioned against becoming reliant on chair versus progressing gait. Plan is for HH on DC. Pt will benefit from continued acute OT if remains in-house to maximize functional independence and safety (with focus on standing tolerance).     Plan    Continue current treatment plan.    DC Equipment Recommendations: Bed Side Commode  Discharge Recommendations: Recommend home health for continued occupational therapy services    Subjective    \"I hope to discharge today. I think I'll do better at home.\"     Objective       02/09/21 1214   Total Time Spent   Total Time Spent (Mins) 43   Balance   Sitting Balance (Static) Fair +   Sitting Balance (Dynamic) Fair +   Standing Balance (Static) Fair   Standing Balance (Dynamic) Fair -   Weight Shift Sitting Fair   Weight Shift Standing Fair   Comments FWW   Bed Mobility    Supine to Sit Supervised  (used arm of chair for leverage; flat bed )   Scooting Supervised  (seated)   Activities of Daily Living   Lower Body Dressing Minimal Assist  (doff/don B socks, don adult brief & sweat pants )   Toileting   (ray care for residual BM (diarrhea earlier))   Functional Mobility   Sit to Stand Supervised   Bed, Chair, Wheelchair Transfer Supervised   Transfer Method Stand Step  (FWW) "   Short Term Goals   Short Term Goal # 1 Pt will perform toileting task with min a   Goal Outcome # 1 Goal not met   Short Term Goal # 2 Pt will perform standing h/g tasks with supervision   Goal Outcome # 2 Progressing slower than expected  (NT this session)   Short Term Goal # 3 Pt will perform LB dressing with min a using AE as needed/trained   Goal Outcome # 3 Goal met, new goal added   Short Term Goal # 3 B pt will complete LB dressing with supv using AE   Goal Outcome # 3 B Progressing as expected

## 2021-02-10 NOTE — TELEPHONE ENCOUNTER
Pt refuses to establish care with us.  Pt will only see his PCP.      Anticoagulation on Eliquis.  Cami Bella, Clinical Pharmacist, CDE, CACP

## 2021-02-15 LAB
FINAL REPORT Q0603: NORMAL
PRELIMINARY RPT Q0601: NORMAL
RHODAMINE-AURAMINE STN SPEC: NORMAL

## 2021-02-15 NOTE — DOCUMENTATION QUERY
"                                                                         [facility]                                                                       Query Response Note      PATIENT:               FIDELIA MERA  ACCT #:                  [id number]  MRN:                     [id number]  :                      [id number]  ADMIT DATE:       2021 8:50 PM  DISCH DATE:        2021 2:42 PM  RESPONDING  PROVIDER #:        [id number]           QUERY TEXT:    Excisional debridement (or documentation describing an excision)  has been documented in the  OP Report.  Please document the deepest level of debridement treated.      NOTE:  If an appropriate response is not listed below, please respond with a new note.        The patient's Clinical Indicators include:  Per  OP Report:  incision and drainage or right prepatellar bursa with excisional debridement.  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   Treatment: I&D, excisional debridement    Thank You,  Jessenia Barajas RN, BSN  Clinical    Connect via Risk I/O  Options provided:   -- Deepest level of debridement treated - skin   -- Deepest level of debridement treated - subcutaneous tissue or fascia   -- Deepest level of debridement treated - muscle   -- Deepest level of debridement treated - bone   -- Unable to determine      Query created by: Jessenia Barajas on 2021 8:17 AM    RESPONSE TEXT:    Deepest level of debridement treated - subcutaneous tissue or fascia          Electronically signed by:  JAMES GARCIA MD 2021 7:54 PM              "

## 2021-02-20 RX ORDER — OXYCODONE HYDROCHLORIDE 5 MG/1
2.5 TABLET ORAL EVERY 6 HOURS PRN
Qty: 15 TABLET | Refills: 0 | OUTPATIENT
Start: 2021-02-20 | End: 2021-02-23

## 2021-03-02 ENCOUNTER — OFFICE VISIT (OUTPATIENT)
Dept: INFECTIOUS DISEASES | Facility: MEDICAL CENTER | Age: 81
End: 2021-03-02
Payer: MEDICARE

## 2021-03-02 VITALS
TEMPERATURE: 98.6 F | BODY MASS INDEX: 26.51 KG/M2 | HEIGHT: 73 IN | SYSTOLIC BLOOD PRESSURE: 114 MMHG | DIASTOLIC BLOOD PRESSURE: 70 MMHG | RESPIRATION RATE: 16 BRPM | WEIGHT: 200 LBS | HEART RATE: 115 BPM | OXYGEN SATURATION: 95 %

## 2021-03-02 DIAGNOSIS — N18.31 STAGE 3A CHRONIC KIDNEY DISEASE: ICD-10-CM

## 2021-03-02 DIAGNOSIS — M70.41 PREPATELLAR BURSITIS OF RIGHT KNEE: ICD-10-CM

## 2021-03-02 DIAGNOSIS — A49.01 MSSA (METHICILLIN SUSCEPTIBLE STAPHYLOCOCCUS AUREUS) INFECTION: ICD-10-CM

## 2021-03-02 PROBLEM — I82.621 ACUTE EMBOLISM AND THROMBOSIS OF DEEP VEINS OF RIGHT UPPER EXTREMITY (HCC): Status: ACTIVE | Noted: 2021-02-16

## 2021-03-02 PROCEDURE — 99214 OFFICE O/P EST MOD 30 MIN: CPT | Performed by: NURSE PRACTITIONER

## 2021-03-02 RX ORDER — DOXYCYCLINE HYCLATE 100 MG
100 TABLET ORAL 2 TIMES DAILY
Qty: 60 TABLET | Refills: 0 | Status: SHIPPED | OUTPATIENT
Start: 2021-03-02 | End: 2021-04-01

## 2021-03-02 ASSESSMENT — FIBROSIS 4 INDEX: FIB4 SCORE: 3.1

## 2021-03-02 ASSESSMENT — PAIN SCALES - GENERAL: PAINLEVEL: NO PAIN

## 2021-03-02 NOTE — PROGRESS NOTES
[facility]fectious Disease [facility]    Subjective:     Chief Complaint   Patient presents with   • Follow-Up     Prepatellar bursitis of right knee, positive MSSA     Interval History: 80 y.o. male with a PMH of COPD, CKD stage III, hypertension, and on 10/27/2020 underwent right prepatellar bursa resection and washout, right lower leg cyst resection due to recurrent prepatellar bursitis, lower leg cyst recurrence and incision dehiscence with cultures positive MSSA and unknown treatment.  Hospitalized from 12/16-12/23/2028, admitted after falling out of his bed and hitting the left side of his head.  Imaging negative for stroke or bleed.  Patient stated that for the past several days prior to admission, he had not been feeling well, with generalized malaise, nonproductive cough, shortness of breath, no fevers or chills.  Chest x-ray with no acute abnormalities.    On presentation, febrile to 101.2, with leukocytosis of 13.4.  Procalcitonin was elevated to 0.20.  Patient also noting right knee pain on admission, fluid was aspirated on 12/16/20 and 12/17/20- both cxs +Pseudomonas.  On 12/19/2020, patient underwent right knee I&D of deep abscess in the anterior aspect of the knee along with right knee arthrotomy with anterior synovectomy with Dr. Chowdhury, gross purulence noted in the knee and prepatellar bursa per operative note.  OR culture once again +PSAR.  Patient discharged on IV cefepime 2 g every 12 hours x4 weeks, estimated end date 1/15/2021.     1/12/2021: Seen by EBONIE Caceres.  Was seen by Dr. Chowdhury last week, he was happy with the progress and has another follow-up in 6 weeks.  States that the right knee and surgical site to the calf are both doing well, slowly healing without any active drainage.  Continue with IV cefepime 2 g twice daily through 1/15/2021.  No need for p.o. antibiotics to follow as there are no signs of residual infection, inflammatory markers are WNL and will completed a 4-week  course of IV antibiotics.    Hospitalized once again from 2/1-2/9/2021.  Patient had been doing well following completion of the antibiotics on 1/15/2021.  Unfortunately he developed a wound around the surgical site that caused dehiscence and required readmittance for prepatellar bursitis.  Wound culture of the right knee on 2/1+ MSSA.  Was taken to the OR on 2/2 with Dr. Sol, underwent I&D of the right prepatellar bursa with excisional debridement and arthrocentesis.  OR cultures positive MSSA.  Discharged home on p.o. doxycycline 100 mg twice daily x4 weeks, estimated end date 3/1/2021.    Hospital records reviewed    Today, 3/2/2021: Has been tolerating the p.o. doxycycline with minimal issue.  States that he is having loose stools but they are starting to become more soft than watery, additionally it is only once a day.  Otherwise, he denies feeling generally ill, fevers/chills, general malaise, headache, n/v, abdominal pain, chest pain, shortness of breath, cough, sore throat or rash.  Needs to be seen by home health 3 times a week due to wound VAC pain in place.  He states that the right knee wound site is healing in nicely.  He has less drainage; however, when he is active and moving around there is a heavy amount of serous drainage.  They deny any odor with dressing changes.  There is very little erythema to the surrounding tissue.  Was seen by Dr. Sol last week, has another follow-up with him again on 3/8, at which time they will discuss skin graft versus plastics.  He denies pain overall to the right knee, but states every now and then it will bother him for short period of time.    ROS as above in HPI.    Past Medical History:   Diagnosis Date   • Abnormal thyroid stimulating hormone (TSH) level    • Allergic rhinitis    • Asbestosis (HCC)    • Asthma    • Bronchitis    • Chronic diarrhea     declines eval; takes immodium once a day usually and sometimes less; see previous notes; aware of risk    •  Chronic low back pain    • Chronic lung disease     asbestos related   • CKD (chronic kidney disease), stage III     sees neph, one kidney   • COPD (chronic obstructive pulmonary disease) ([facility])    • Coronary artery calcification seen on CAT scan 2016    sees cards   • Epididymitis 2009    h/o listed in chart   • GERD (gastroesophageal reflux disease)    • History of hemorrhoids    • History of skin cancer     non melanoma   • [place] (hard of hearing)     declines hearing aids   • Hypercholesteremia    • Hypertension    • Hypertriglyceridemia    • Indigestion    • Light headedness     2/2 orthostasis? now better off hctz   • Lightheadedness 2016   • Low back pain    • Nasal drainage    • Olecranon bursitis    • Orthostasis 2016    better off HCTZ   • Peripheral neuropathy    • Pleural plaque      CT [place]   • Post-nasal drip    • Pulmonary emphysema ([facility])    • RA (rheumatoid arthritis) ([facility])     on meds, sees rheum   • Restless leg syndrome    • Rheumatoid arthritis ([facility])    • Sleep apnea     obstructive and central - not adherent to autopap   • Solitary kidney     history of kidney cyst 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-functioning kidney s/p nephrectomy        Family History   Problem Relation Age of Onset   • Genetic Disorder Father         ALS   • No Known Problems Sister    • No Known Problems Son    • No Known Problems Daughter    • Heart Attack Neg Hx    • Heart Disease Neg Hx    • Heart Failure Neg Hx        Social History     Tobacco Use   • Smoking status: Former Smoker     Packs/day: 1.00     Years: 40.00     Pack years: 40.00     Types: Cigarettes     Quit date: 1980     Years since quittin.1   • Smokeless tobacco: Never Used   • Tobacco comment: 1 pk a day for 40 yrs   Substance Use Topics   • Alcohol use: No     Alcohol/week: 0.0 oz   • Drug use: No       Allergies: Ciprofloxacin, Levaquin, Penicillins, Ciprofloxacin hcl, and Levofloxacin    Pt's medication and problem list reviewed.     Objective:  "    /70 (BP Location: Left arm, Patient Position: Sitting, BP Cuff Size: Adult)   Pulse (!) 115   Temp 37 °C (98.6 °F) (Temporal)   Resp 16   Ht 1.854 m (6' 1\") Comment: patient reported  Wt 90.7 kg (200 lb)   SpO2 95%   BMI 26.39 kg/m²     Physical Exam   Constitutional: He is oriented to person, place, and time and well-developed, well-nourished, and in no distress. No distress.   Elderly   HENT:   Head: Normocephalic and atraumatic.   Eyes: Pupils are equal, round, and reactive to light. EOM are normal. No scleral icterus.   Neck: No JVD present. No tracheal deviation present.   Cardiovascular: Regular rhythm and normal heart sounds. Tachycardia present. Exam reveals no friction rub.   No murmur heard.  Unable to palpate RLE distal pulse due to edema   Pulmonary/Chest: Effort normal and breath sounds normal. No respiratory distress. He has no wheezes.   Abdominal: Soft. Bowel sounds are normal. He exhibits no distension. There is no abdominal tenderness.   Musculoskeletal:         General: Edema (+2 BLE edema, R>L) present. No tenderness.      Cervical back: Normal range of motion and neck supple.      Comments: Right knee, surgical site-wound VAC in place, unable to see wound bed.  Trace erythema to surrounding tissue.  Heavy amount of serous drainage in canister.  Trace warmth, not hot to touch.  Nontender to palpation.  No induration or fluctuance.   Neurological: He is alert and oriented to person, place, and time. No cranial nerve deficit.   Shuffling gait, FWW for ambulatory assistance   Skin: Skin is warm and dry. No rash noted. He is not diaphoretic. There is erythema.   Psychiatric: Mood, memory, affect and judgment normal.   Pleasant   Vitals reviewed.      Assessment and Plan:   The following treatment plan was discussed with patient at length:    1. Prepatellar bursitis of right knee  doxycycline (VIBRAMYCIN) 100 MG Tab    -Continue p.o. doxycycline 100 mg twice daily for an additional " month due to wound VAC being in place and possible need for skin graft or plastics.  Risk versus benefit weighed on extension of antibiotics.  Patient has been dealing with right knee issues for the past year.  Have greater concern that if he comes off the antibiotics at this time, he will have a recurrent infection once again as he has done so in the past.  -Follow-up with Dr. Sol as directed.  -Continue with wound care as directed.   2. MSSA (methicillin susceptible Staphylococcus aureus) infection      As above   3. Stage 3a chronic kidney disease      Doxycycline does not need to be renally adjusted     Follow up: 4 weeks, RTC sooner if needed. FU with PCP for ongoing chronic medical conditions.     Karlie Shearer, DEL.P.R.N.    My total time spent caring for the patient on the day of the encounter was 30 minutes.        Please note that this dictation was created using voice recognition software. I have  worked with technical experts from [facility] to optimize the interface.  I have made every reasonable attempt to correct obvious errors, but there may be errors of grammar and possibly content that I did not discover before finalizing the note.

## 2021-03-03 LAB
FUNGUS SPEC CULT: NORMAL
SIGNIFICANT IND 70042: NORMAL
SITE SITE: NORMAL
SOURCE SOURCE: NORMAL

## 2021-03-17 ENCOUNTER — IMMUNIZATION (OUTPATIENT)
Dept: FAMILY PLANNING/WOMEN'S HEALTH CLINIC | Facility: IMMUNIZATION CENTER | Age: 81
End: 2021-03-17
Attending: INTERNAL MEDICINE
Payer: MEDICARE

## 2021-03-17 DIAGNOSIS — Z23 NEED FOR VACCINATION: ICD-10-CM

## 2021-03-17 DIAGNOSIS — Z23 ENCOUNTER FOR VACCINATION: Primary | ICD-10-CM

## 2021-03-17 PROCEDURE — 91300 PFIZER SARS-COV-2 VACCINE: CPT | Performed by: INTERNAL MEDICINE

## 2021-03-17 PROCEDURE — 0001A PFIZER SARS-COV-2 VACCINE: CPT | Performed by: INTERNAL MEDICINE

## 2021-03-30 ENCOUNTER — OFFICE VISIT (OUTPATIENT)
Dept: INFECTIOUS DISEASES | Facility: MEDICAL CENTER | Age: 81
End: 2021-03-30
Payer: MEDICARE

## 2021-03-30 VITALS
HEIGHT: 72 IN | OXYGEN SATURATION: 95 % | WEIGHT: 200 LBS | HEART RATE: 107 BPM | DIASTOLIC BLOOD PRESSURE: 60 MMHG | BODY MASS INDEX: 27.09 KG/M2 | TEMPERATURE: 97.7 F | RESPIRATION RATE: 16 BRPM | SYSTOLIC BLOOD PRESSURE: 118 MMHG

## 2021-03-30 DIAGNOSIS — N18.31 STAGE 3A CHRONIC KIDNEY DISEASE: ICD-10-CM

## 2021-03-30 DIAGNOSIS — M70.41 PREPATELLAR BURSITIS OF RIGHT KNEE: ICD-10-CM

## 2021-03-30 DIAGNOSIS — A49.01 MSSA (METHICILLIN SUSCEPTIBLE STAPHYLOCOCCUS AUREUS) INFECTION: ICD-10-CM

## 2021-03-30 PROCEDURE — 99214 OFFICE O/P EST MOD 30 MIN: CPT | Performed by: NURSE PRACTITIONER

## 2021-03-30 RX ORDER — APIXABAN 2.5 MG/1
2.5 TABLET, FILM COATED ORAL DAILY
Status: ON HOLD | COMMUNITY
Start: 2021-03-18 | End: 2021-05-12

## 2021-03-30 RX ORDER — TRIAMCINOLONE ACETONIDE 1 MG/G
1 CREAM TOPICAL
COMMUNITY
Start: 2021-01-14 | End: 2021-10-18

## 2021-03-30 ASSESSMENT — PAIN SCALES - GENERAL: PAINLEVEL: NO PAIN

## 2021-03-30 ASSESSMENT — FIBROSIS 4 INDEX: FIB4 SCORE: 3.1

## 2021-03-30 NOTE — PROGRESS NOTES
Infectious Disease Clinic    Subjective:     Chief Complaint   Patient presents with   • Follow-Up     bursitis of right knee     Interval History: 80 y.o. male with a PMH of COPD, CKD stage III, hypertension, and on 10/27/2020 underwent right prepatellar bursa resection and washout, right lower leg cyst resection due to recurrent prepatellar bursitis, lower leg cyst recurrence and incision dehiscence with cultures positive MSSA and unknown treatment.  Hospitalized from 12/16-12/23/2028, admitted after falling out of his bed and hitting the left side of his head.  Imaging negative for stroke or bleed.  Patient stated that for the past several days prior to admission, he had not been feeling well, with generalized malaise, nonproductive cough, shortness of breath, no fevers or chills.  Chest x-ray with no acute abnormalities.    On presentation, febrile to 101.2, with leukocytosis of 13.4.  Procalcitonin was elevated to 0.20.  Patient also noting right knee pain on admission, fluid was aspirated on 12/16/20 and 12/17/20- both cxs +Pseudomonas.  On 12/19/2020, patient underwent right knee I&D of deep abscess in the anterior aspect of the knee along with right knee arthrotomy with anterior synovectomy with Dr. Chowdhury, gross purulence noted in the knee and prepatellar bursa per operative note.  OR culture once again +PSAR.  Patient discharged on IV cefepime 2 g every 12 hours x4 weeks, estimated end date 1/15/2021.     1/12/2021: Seen by EBONIE Caceres.  Was seen by Dr. Chowdhury last week, he was happy with the progress and has another follow-up in 6 weeks.  States that the right knee and surgical site to the calf are both doing well, slowly healing without any active drainage.  Continue with IV cefepime 2 g twice daily through 1/15/2021.  No need for p.o. antibiotics to follow as there are no signs of residual infection, inflammatory markers are WNL and will completed a 4-week course of IV  antibiotics.    Hospitalized once again from 2/1-2/9/2021.  Patient had been doing well following completion of the antibiotics on 1/15/2021.  Unfortunately he developed a wound around the surgical site that caused dehiscence and required readmittance for prepatellar bursitis.  Wound culture of the right knee on 2/1+ MSSA.  Was taken to the OR on 2/2 with Dr. Sol, underwent I&D of the right prepatellar bursa with excisional debridement and arthrocentesis.  OR cultures positive MSSA.  Discharged home on p.o. doxycycline 100 mg twice daily x4 weeks, estimated end date 3/1/2021.    3/2/2021: Seen by EBONIE Caceres.  Seen by home health 3 times a week due to wound VAC pain in place.  He states that the right knee wound site is healing in nicely.  He has less drainage; however, when he is active and moving around there is a heavy amount of serous drainage.  Was seen by Dr. Sol last week, has another follow-up with him again on 3/8, at which time they will discuss skin graft versus plastics.    Continue p.o. doxycycline 100 mg twice daily for an additional month due to wound VAC being in place and possible need for skin graft or plastics.     Today, 3/30/2021: Continues to tolerate the p.o. doxycycline without issue. Denies feeling generally ill, fevers/chills, general malaise, headache, n/v/d, abdominal pain, chest pain, shortness of breath, cough, sore throat or rash.  Had a follow-up with Dr. Sol last week, sees him again in a couple of weeks.  No referral has been placed to plastics as of yet; however, this may occur in the near future.  Wound VAC is no longer being applied.  Being seen by home health twice a week.  Wounds are slowly improving.  Patient and wife are changing the dressings twice a day at the direction of home health.  They deny any drainage, there is no odor.  Patient denies pain to right knee overall and has fairly good range of motion.    ROS as above in HPI.    Past Medical History:    Diagnosis Date   • Abnormal thyroid stimulating hormone (TSH) level    • Allergic rhinitis    • Asbestosis (HCC)    • Asthma    • Bronchitis    • Chronic diarrhea     declines eval; takes immodium once a day usually and sometimes less; see previous notes; aware of risk    • Chronic low back pain    • Chronic lung disease     asbestos related   • CKD (chronic kidney disease), stage III     sees neph, one kidney   • COPD (chronic obstructive pulmonary disease) ([facility])    • Coronary artery calcification seen on CAT scan 2016    sees cards   • Epididymitis 2009    h/o listed in chart   • GERD (gastroesophageal reflux disease)    • History of hemorrhoids    • History of skin cancer     non melanoma   • [place] (hard of hearing)     declines hearing aids   • Hypercholesteremia    • Hypertension    • Hypertriglyceridemia    • Indigestion    • Light headedness     2/2 orthostasis? now better off hctz   • Lightheadedness 2016   • Low back pain    • Nasal drainage    • Olecranon bursitis    • Orthostasis 2016    better off HCTZ   • Peripheral neuropathy    • Pleural plaque      CT [place]   • Post-nasal drip    • Pulmonary emphysema ([facility])    • RA (rheumatoid arthritis) ([facility])     on meds, sees rheum   • Restless leg syndrome    • Rheumatoid arthritis (HCC)    • Sleep apnea     obstructive and central - not adherent to autopap   • Solitary kidney     history of kidney cyst 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-functioning kidney s/p nephrectomy        Family History   Problem Relation Age of Onset   • Genetic Disorder Father         ALS   • No Known Problems Sister    • No Known Problems Son    • No Known Problems Daughter    • Heart Attack Neg Hx    • Heart Disease Neg Hx    • Heart Failure Neg Hx        Social History     Tobacco Use   • Smoking status: Former Smoker     Packs/day: 1.00     Years: 40.00     Pack years: 40.00     Types: Cigarettes     Quit date: 1980     Years since quittin.2   • Smokeless tobacco: Never Used   •  Tobacco comment: 1 pk a day for 40 yrs   Substance Use Topics   • Alcohol use: No     Alcohol/week: 0.0 oz   • Drug use: No       Allergies: Ciprofloxacin, Levaquin, Penicillins, Ciprofloxacin hcl, and Levofloxacin    Pt's medication and problem list reviewed.     Objective:     /60 (BP Location: Left arm, Patient Position: Sitting, BP Cuff Size: Adult)   Pulse (!) 107   Temp 36.5 °C (97.7 °F) (Temporal)   Resp 16   Ht 1.829 m (6') Comment: patient reported  Wt 90.7 kg (200 lb) Comment: patient reported  SpO2 95%   BMI 27.12 kg/m²     Physical Exam   Constitutional: He is oriented to person, place, and time and well-developed, well-nourished, and in no distress. No distress.   Elderly   HENT:   Head: Normocephalic and atraumatic.   Right Ear: External ear normal.   Left Ear: External ear normal.   Eyes: Pupils are equal, round, and reactive to light. EOM are normal. No scleral icterus.   Neck: No JVD present. No tracheal deviation present.   Cardiovascular: Regular rhythm and normal heart sounds. Tachycardia present.   No murmur heard.  Unable to palpate RLE distal pulse due to edema   Pulmonary/Chest: Effort normal and breath sounds normal. No respiratory distress. He has no wheezes. He has no rales.   Abdominal: Soft. Bowel sounds are normal. He exhibits no distension. There is no abdominal tenderness. There is no rebound and no guarding.   Musculoskeletal:         General: Edema (+2 BLE edema, R>L) present. No tenderness.      Cervical back: Normal range of motion and neck supple.      Comments: Right knee, surgical site-2 large wounds over the knee, proximal wound roughly the size of a quarter while the distal wound is about that of a half dollar, wound beds are pink with trace amounts of yellow exudate, no odor, trace serous drainage on dressing, trace erythema to surrounding tissue, no maceration, no induration or fluctuance, nontender to palpation.   Neurological: He is alert and oriented to  person, place, and time. No cranial nerve deficit.   Shuffling gait, FWW for ambulatory assistance    Decreased sensation to right knee   Skin: Skin is warm and dry. No rash noted. He is not diaphoretic. There is erythema.   Psychiatric: Mood, memory, affect and judgment normal.   Pleasant   Vitals reviewed.      Assessment and Plan:   The following treatment plan was discussed with patient at length:    1. Prepatellar bursitis of right knee      -Continue p.o. doxycycline 100 mg twice daily for at least another 6 weeks due to continued wounds that are slow to heal.  Have concerned that if the doxycycline is stopped, patient will be at higher risk for new infection.  Fortunately patient has been tolerating the doxycycline without issue.  -Continue with wound care and follow-up with Dr. Sol as directed.   2. MSSA (methicillin susceptible Staphylococcus aureus) infection      As above   3. Stage 3a chronic kidney disease      No need for renal adjustment to doxycycline     Follow up: 6 weeks, RTC sooner if needed. FU with PCP for ongoing chronic medical conditions.     Karlie Shearer, A.P.R.N.    My total time spent caring for the patient on the day of the encounter was 32 minutes.   This does not include time spent on separately billable procedures/tests.       Please note that this dictation was created using voice recognition software. I have  worked with technical experts from [employer] to optimize the interface.  I have made every reasonable attempt to correct obvious errors, but there may be errors of grammar and possibly content that I did not discover before finalizing the note.

## 2021-03-31 LAB
FINAL REPORT Q0603: NORMAL
PRELIMINARY RPT Q0601: NORMAL
RHODAMINE-AURAMINE STN SPEC: NORMAL

## 2021-04-02 DIAGNOSIS — A49.01 BACTERIAL INFECTION DUE TO STAPHYLOCOCCUS AUREUS: ICD-10-CM

## 2021-04-02 RX ORDER — DOXYCYCLINE HYCLATE 100 MG
100 TABLET ORAL 2 TIMES DAILY
COMMUNITY
End: 2021-04-02 | Stop reason: SDUPTHER

## 2021-04-02 RX ORDER — DOXYCYCLINE HYCLATE 100 MG
100 TABLET ORAL 2 TIMES DAILY
Qty: 60 TABLET | Refills: 1 | Status: SHIPPED | OUTPATIENT
Start: 2021-04-02 | End: 2021-06-01

## 2021-04-08 ENCOUNTER — IMMUNIZATION (OUTPATIENT)
Dept: FAMILY PLANNING/WOMEN'S HEALTH CLINIC | Facility: IMMUNIZATION CENTER | Age: 81
End: 2021-04-08
Attending: INTERNAL MEDICINE
Payer: MEDICARE

## 2021-04-08 DIAGNOSIS — Z23 ENCOUNTER FOR VACCINATION: Primary | ICD-10-CM

## 2021-04-09 PROCEDURE — 0002A PFIZER SARS-COV-2 VACCINE: CPT

## 2021-04-09 PROCEDURE — 91300 PFIZER SARS-COV-2 VACCINE: CPT

## 2021-05-07 ENCOUNTER — HOSPITAL ENCOUNTER (INPATIENT)
Facility: MEDICAL CENTER | Age: 81
LOS: 4 days | DRG: 379 | End: 2021-05-12
Attending: EMERGENCY MEDICINE | Admitting: STUDENT IN AN ORGANIZED HEALTH CARE EDUCATION/TRAINING PROGRAM
Payer: MEDICARE

## 2021-05-07 ENCOUNTER — APPOINTMENT (OUTPATIENT)
Dept: RADIOLOGY | Facility: MEDICAL CENTER | Age: 81
DRG: 379 | End: 2021-05-07
Attending: STUDENT IN AN ORGANIZED HEALTH CARE EDUCATION/TRAINING PROGRAM
Payer: MEDICARE

## 2021-05-07 DIAGNOSIS — K92.2 GASTROINTESTINAL HEMORRHAGE, UNSPECIFIED GASTROINTESTINAL HEMORRHAGE TYPE: ICD-10-CM

## 2021-05-07 DIAGNOSIS — R55 NEAR SYNCOPE: ICD-10-CM

## 2021-05-07 DIAGNOSIS — Z74.09 LIMITED MOBILITY: ICD-10-CM

## 2021-05-07 PROBLEM — Z79.899 DVT PROPHYLAXIS: Status: ACTIVE | Noted: 2021-05-07

## 2021-05-07 LAB
ABO GROUP BLD: ABNORMAL
ALBUMIN SERPL BCP-MCNC: 2.7 G/DL (ref 3.2–4.9)
ALBUMIN/GLOB SERPL: 0.9 G/DL
ALP SERPL-CCNC: 133 U/L (ref 30–99)
ALT SERPL-CCNC: 15 U/L (ref 2–50)
ANION GAP SERPL CALC-SCNC: 13 MMOL/L (ref 7–16)
ANISOCYTOSIS BLD QL SMEAR: ABNORMAL
AST SERPL-CCNC: 26 U/L (ref 12–45)
BARCODED ABORH UBTYP: 600
BARCODED ABORH UBTYP: 6200
BARCODED PRD CODE UBPRD: ABNORMAL
BARCODED PRD CODE UBPRD: ABNORMAL
BARCODED UNIT NUM UBUNT: ABNORMAL
BARCODED UNIT NUM UBUNT: ABNORMAL
BASOPHILS # BLD AUTO: 0.7 % (ref 0–1.8)
BASOPHILS # BLD: 0.06 K/UL (ref 0–0.12)
BILIRUB SERPL-MCNC: 0.3 MG/DL (ref 0.1–1.5)
BLD GP AB SCN SERPL QL: ABNORMAL
BUN SERPL-MCNC: 35 MG/DL (ref 8–22)
CALCIUM SERPL-MCNC: 8.8 MG/DL (ref 8.5–10.5)
CHLORIDE SERPL-SCNC: 112 MMOL/L (ref 96–112)
CO2 SERPL-SCNC: 18 MMOL/L (ref 20–33)
COMMENT 1642: NORMAL
COMPONENT R 8504R: ABNORMAL
COMPONENT R 8504R: ABNORMAL
CREAT SERPL-MCNC: 1.62 MG/DL (ref 0.5–1.4)
EKG IMPRESSION: NORMAL
EOSINOPHIL # BLD AUTO: 0.06 K/UL (ref 0–0.51)
EOSINOPHIL NFR BLD: 0.7 % (ref 0–6.9)
ERYTHROCYTE [DISTWIDTH] IN BLOOD BY AUTOMATED COUNT: 65.8 FL (ref 35.9–50)
GLOBULIN SER CALC-MCNC: 3 G/DL (ref 1.9–3.5)
GLUCOSE SERPL-MCNC: 138 MG/DL (ref 65–99)
HCT VFR BLD AUTO: 25.2 % (ref 42–52)
HGB BLD-MCNC: 7.6 G/DL (ref 14–18)
IMM GRANULOCYTES # BLD AUTO: 0.05 K/UL (ref 0–0.11)
IMM GRANULOCYTES NFR BLD AUTO: 0.5 % (ref 0–0.9)
INR PPP: 1.27 (ref 0.87–1.13)
LYMPHOCYTES # BLD AUTO: 1.29 K/UL (ref 1–4.8)
LYMPHOCYTES NFR BLD: 14.1 % (ref 22–41)
MACROCYTES BLD QL SMEAR: ABNORMAL
MCH RBC QN AUTO: 30.5 PG (ref 27–33)
MCHC RBC AUTO-ENTMCNC: 30.2 G/DL (ref 33.7–35.3)
MCV RBC AUTO: 101.2 FL (ref 81.4–97.8)
MONOCYTES # BLD AUTO: 0.6 K/UL (ref 0–0.85)
MONOCYTES NFR BLD AUTO: 6.5 % (ref 0–13.4)
MORPHOLOGY BLD-IMP: NORMAL
NEUTROPHILS # BLD AUTO: 7.11 K/UL (ref 1.82–7.42)
NEUTROPHILS NFR BLD: 77.5 % (ref 44–72)
NRBC # BLD AUTO: 0 K/UL
NRBC BLD-RTO: 0 /100 WBC
OVALOCYTES BLD QL SMEAR: NORMAL
PLATELET # BLD AUTO: 364 K/UL (ref 164–446)
PLATELET BLD QL SMEAR: NORMAL
PMV BLD AUTO: 10.3 FL (ref 9–12.9)
POIKILOCYTOSIS BLD QL SMEAR: NORMAL
POLYCHROMASIA BLD QL SMEAR: NORMAL
POTASSIUM SERPL-SCNC: 4.5 MMOL/L (ref 3.6–5.5)
PRODUCT TYPE UPROD: ABNORMAL
PRODUCT TYPE UPROD: ABNORMAL
PROT SERPL-MCNC: 5.7 G/DL (ref 6–8.2)
PROTHROMBIN TIME: 16.3 SEC (ref 12–14.6)
RBC # BLD AUTO: 2.49 M/UL (ref 4.7–6.1)
RBC BLD AUTO: PRESENT
RH BLD: ABNORMAL
SARS-COV+SARS-COV-2 AG RESP QL IA.RAPID: NOTDETECTED
SARS-COV-2 RNA RESP QL NAA+PROBE: NOTDETECTED
SCHISTOCYTES BLD QL SMEAR: NORMAL
SODIUM SERPL-SCNC: 143 MMOL/L (ref 135–145)
SPECIMEN SOURCE: NORMAL
SPECIMEN SOURCE: NORMAL
TROPONIN T SERPL-MCNC: 49 NG/L (ref 6–19)
UNIT STATUS USTAT: ABNORMAL
UNIT STATUS USTAT: ABNORMAL
WBC # BLD AUTO: 9.2 K/UL (ref 4.8–10.8)

## 2021-05-07 PROCEDURE — 96375 TX/PRO/DX INJ NEW DRUG ADDON: CPT

## 2021-05-07 PROCEDURE — 700102 HCHG RX REV CODE 250 W/ 637 OVERRIDE(OP): Performed by: STUDENT IN AN ORGANIZED HEALTH CARE EDUCATION/TRAINING PROGRAM

## 2021-05-07 PROCEDURE — 93005 ELECTROCARDIOGRAM TRACING: CPT

## 2021-05-07 PROCEDURE — 302128 INFUSION PUMP: Performed by: EMERGENCY MEDICINE

## 2021-05-07 PROCEDURE — 85610 PROTHROMBIN TIME: CPT

## 2021-05-07 PROCEDURE — 70450 CT HEAD/BRAIN W/O DYE: CPT | Mod: MG

## 2021-05-07 PROCEDURE — 86850 RBC ANTIBODY SCREEN: CPT

## 2021-05-07 PROCEDURE — 94640 AIRWAY INHALATION TREATMENT: CPT

## 2021-05-07 PROCEDURE — 96366 THER/PROPH/DIAG IV INF ADDON: CPT

## 2021-05-07 PROCEDURE — 80053 COMPREHEN METABOLIC PANEL: CPT

## 2021-05-07 PROCEDURE — 36430 TRANSFUSION BLD/BLD COMPNT: CPT

## 2021-05-07 PROCEDURE — 700111 HCHG RX REV CODE 636 W/ 250 OVERRIDE (IP): Performed by: STUDENT IN AN ORGANIZED HEALTH CARE EDUCATION/TRAINING PROGRAM

## 2021-05-07 PROCEDURE — U0005 INFEC AGEN DETEC AMPLI PROBE: HCPCS

## 2021-05-07 PROCEDURE — 700105 HCHG RX REV CODE 258: Performed by: STUDENT IN AN ORGANIZED HEALTH CARE EDUCATION/TRAINING PROGRAM

## 2021-05-07 PROCEDURE — A9270 NON-COVERED ITEM OR SERVICE: HCPCS | Performed by: STUDENT IN AN ORGANIZED HEALTH CARE EDUCATION/TRAINING PROGRAM

## 2021-05-07 PROCEDURE — P9016 RBC LEUKOCYTES REDUCED: HCPCS

## 2021-05-07 PROCEDURE — 30233N1 TRANSFUSION OF NONAUTOLOGOUS RED BLOOD CELLS INTO PERIPHERAL VEIN, PERCUTANEOUS APPROACH: ICD-10-PCS | Performed by: STUDENT IN AN ORGANIZED HEALTH CARE EDUCATION/TRAINING PROGRAM

## 2021-05-07 PROCEDURE — 87426 SARSCOV CORONAVIRUS AG IA: CPT

## 2021-05-07 PROCEDURE — U0003 INFECTIOUS AGENT DETECTION BY NUCLEIC ACID (DNA OR RNA); SEVERE ACUTE RESPIRATORY SYNDROME CORONAVIRUS 2 (SARS-COV-2) (CORONAVIRUS DISEASE [COVID-19]), AMPLIFIED PROBE TECHNIQUE, MAKING USE OF HIGH THROUGHPUT TECHNOLOGIES AS DESCRIBED BY CMS-2020-01-R: HCPCS

## 2021-05-07 PROCEDURE — 84484 ASSAY OF TROPONIN QUANT: CPT

## 2021-05-07 PROCEDURE — 96365 THER/PROPH/DIAG IV INF INIT: CPT

## 2021-05-07 PROCEDURE — G0378 HOSPITAL OBSERVATION PER HR: HCPCS

## 2021-05-07 PROCEDURE — 86901 BLOOD TYPING SEROLOGIC RH(D): CPT

## 2021-05-07 PROCEDURE — 94760 N-INVAS EAR/PLS OXIMETRY 1: CPT

## 2021-05-07 PROCEDURE — 93005 ELECTROCARDIOGRAM TRACING: CPT | Performed by: EMERGENCY MEDICINE

## 2021-05-07 PROCEDURE — 85025 COMPLETE CBC W/AUTO DIFF WBC: CPT

## 2021-05-07 PROCEDURE — 86900 BLOOD TYPING SEROLOGIC ABO: CPT

## 2021-05-07 PROCEDURE — C9113 INJ PANTOPRAZOLE SODIUM, VIA: HCPCS | Performed by: STUDENT IN AN ORGANIZED HEALTH CARE EDUCATION/TRAINING PROGRAM

## 2021-05-07 PROCEDURE — 99285 EMERGENCY DEPT VISIT HI MDM: CPT

## 2021-05-07 PROCEDURE — 86923 COMPATIBILITY TEST ELECTRIC: CPT

## 2021-05-07 PROCEDURE — C9803 HOPD COVID-19 SPEC COLLECT: HCPCS | Performed by: EMERGENCY MEDICINE

## 2021-05-07 PROCEDURE — 99219 PR INITIAL OBSERVATION CARE,LEVL II: CPT | Performed by: STUDENT IN AN ORGANIZED HEALTH CARE EDUCATION/TRAINING PROGRAM

## 2021-05-07 RX ORDER — GABAPENTIN 300 MG/1
600 CAPSULE ORAL EVERY EVENING
Status: DISCONTINUED | OUTPATIENT
Start: 2021-05-07 | End: 2021-05-12 | Stop reason: HOSPADM

## 2021-05-07 RX ORDER — ENALAPRILAT 1.25 MG/ML
1.25 INJECTION INTRAVENOUS EVERY 6 HOURS PRN
Status: DISCONTINUED | OUTPATIENT
Start: 2021-05-07 | End: 2021-05-11

## 2021-05-07 RX ORDER — DIPHENHYDRAMINE HYDROCHLORIDE 50 MG/ML
25 INJECTION INTRAMUSCULAR; INTRAVENOUS EVERY 6 HOURS PRN
Status: DISCONTINUED | OUTPATIENT
Start: 2021-05-07 | End: 2021-05-12 | Stop reason: HOSPADM

## 2021-05-07 RX ORDER — ACETAMINOPHEN 325 MG/1
650 TABLET ORAL EVERY 6 HOURS PRN
Status: DISCONTINUED | OUTPATIENT
Start: 2021-05-07 | End: 2021-05-07

## 2021-05-07 RX ORDER — LEFLUNOMIDE 20 MG/1
20 TABLET ORAL DAILY
Status: DISCONTINUED | OUTPATIENT
Start: 2021-05-07 | End: 2021-05-12 | Stop reason: HOSPADM

## 2021-05-07 RX ORDER — BUDESONIDE AND FORMOTEROL FUMARATE DIHYDRATE 80; 4.5 UG/1; UG/1
2 AEROSOL RESPIRATORY (INHALATION) 2 TIMES DAILY
Status: DISCONTINUED | OUTPATIENT
Start: 2021-05-07 | End: 2021-05-12 | Stop reason: HOSPADM

## 2021-05-07 RX ORDER — LOPERAMIDE HYDROCHLORIDE 2 MG/1
2 CAPSULE ORAL 2 TIMES DAILY
Status: DISCONTINUED | OUTPATIENT
Start: 2021-05-07 | End: 2021-05-12 | Stop reason: HOSPADM

## 2021-05-07 RX ORDER — GUAIFENESIN/DEXTROMETHORPHAN 100-10MG/5
10 SYRUP ORAL EVERY 6 HOURS PRN
Status: DISCONTINUED | OUTPATIENT
Start: 2021-05-07 | End: 2021-05-12 | Stop reason: HOSPADM

## 2021-05-07 RX ORDER — GABAPENTIN 300 MG/1
300-600 CAPSULE ORAL 2 TIMES DAILY
Status: DISCONTINUED | OUTPATIENT
Start: 2021-05-07 | End: 2021-05-07

## 2021-05-07 RX ORDER — ONDANSETRON 4 MG/1
4 TABLET, ORALLY DISINTEGRATING ORAL EVERY 4 HOURS PRN
Status: DISCONTINUED | OUTPATIENT
Start: 2021-05-07 | End: 2021-05-12 | Stop reason: HOSPADM

## 2021-05-07 RX ORDER — FEXOFENADINE HCL 60 MG/1
60 TABLET, FILM COATED ORAL DAILY
Status: DISCONTINUED | OUTPATIENT
Start: 2021-05-08 | End: 2021-05-12 | Stop reason: HOSPADM

## 2021-05-07 RX ORDER — SODIUM CHLORIDE 9 MG/ML
INJECTION, SOLUTION INTRAVENOUS CONTINUOUS
Status: DISCONTINUED | OUTPATIENT
Start: 2021-05-07 | End: 2021-05-09

## 2021-05-07 RX ORDER — GABAPENTIN 300 MG/1
300 CAPSULE ORAL EVERY MORNING
Status: DISCONTINUED | OUTPATIENT
Start: 2021-05-08 | End: 2021-05-12 | Stop reason: HOSPADM

## 2021-05-07 RX ORDER — TIOTROPIUM BROMIDE INHALATION SPRAY 3.12 UG/1
5 SPRAY, METERED RESPIRATORY (INHALATION) DAILY
COMMUNITY
End: 2021-10-12

## 2021-05-07 RX ORDER — AMOXICILLIN 250 MG
2 CAPSULE ORAL 2 TIMES DAILY
Status: DISCONTINUED | OUTPATIENT
Start: 2021-05-07 | End: 2021-05-12 | Stop reason: HOSPADM

## 2021-05-07 RX ORDER — FLUTICASONE PROPIONATE 50 MCG
2 SPRAY, SUSPENSION (ML) NASAL DAILY
Status: DISCONTINUED | OUTPATIENT
Start: 2021-05-08 | End: 2021-05-12 | Stop reason: HOSPADM

## 2021-05-07 RX ORDER — POLYETHYLENE GLYCOL 3350 17 G/17G
1 POWDER, FOR SOLUTION ORAL
Status: DISCONTINUED | OUTPATIENT
Start: 2021-05-07 | End: 2021-05-12 | Stop reason: HOSPADM

## 2021-05-07 RX ORDER — BISACODYL 10 MG
10 SUPPOSITORY, RECTAL RECTAL
Status: DISCONTINUED | OUTPATIENT
Start: 2021-05-07 | End: 2021-05-12 | Stop reason: HOSPADM

## 2021-05-07 RX ORDER — ONDANSETRON 2 MG/ML
4 INJECTION INTRAMUSCULAR; INTRAVENOUS EVERY 4 HOURS PRN
Status: DISCONTINUED | OUTPATIENT
Start: 2021-05-07 | End: 2021-05-12 | Stop reason: HOSPADM

## 2021-05-07 RX ORDER — GUAIFENESIN 600 MG/1
1200 TABLET, EXTENDED RELEASE ORAL DAILY
Status: DISCONTINUED | OUTPATIENT
Start: 2021-05-08 | End: 2021-05-12 | Stop reason: HOSPADM

## 2021-05-07 RX ORDER — ROSUVASTATIN CALCIUM 10 MG/1
5 TABLET, COATED ORAL EVERY EVENING
Status: DISCONTINUED | OUTPATIENT
Start: 2021-05-07 | End: 2021-05-12 | Stop reason: HOSPADM

## 2021-05-07 RX ORDER — POTASSIUM CHLORIDE 20 MEQ/1
40 TABLET, EXTENDED RELEASE ORAL ONCE
Status: DISCONTINUED | OUTPATIENT
Start: 2021-05-07 | End: 2021-05-07

## 2021-05-07 RX ORDER — ACETAMINOPHEN 325 MG/1
650 TABLET ORAL EVERY 4 HOURS PRN
Status: DISCONTINUED | OUTPATIENT
Start: 2021-05-07 | End: 2021-05-12 | Stop reason: HOSPADM

## 2021-05-07 RX ADMIN — SODIUM CHLORIDE 8 MG/HR: 9 INJECTION, SOLUTION INTRAVENOUS at 21:40

## 2021-05-07 RX ADMIN — LOPERAMIDE HYDROCHLORIDE 2 MG: 2 CAPSULE ORAL at 18:18

## 2021-05-07 RX ADMIN — GABAPENTIN 600 MG: 300 CAPSULE ORAL at 18:16

## 2021-05-07 RX ADMIN — SODIUM CHLORIDE 80 MG: 9 INJECTION, SOLUTION INTRAVENOUS at 11:45

## 2021-05-07 RX ADMIN — BUDESONIDE AND FORMOTEROL FUMARATE DIHYDRATE 2 PUFF: 80; 4.5 AEROSOL RESPIRATORY (INHALATION) at 20:31

## 2021-05-07 RX ADMIN — ROSUVASTATIN CALCIUM 5 MG: 10 TABLET, FILM COATED ORAL at 18:18

## 2021-05-07 RX ADMIN — SODIUM CHLORIDE: 9 INJECTION, SOLUTION INTRAVENOUS at 13:15

## 2021-05-07 RX ADMIN — SODIUM CHLORIDE 8 MG/HR: 9 INJECTION, SOLUTION INTRAVENOUS at 12:15

## 2021-05-07 RX ADMIN — SODIUM CHLORIDE: 9 INJECTION, SOLUTION INTRAVENOUS at 21:39

## 2021-05-07 ASSESSMENT — COGNITIVE AND FUNCTIONAL STATUS - GENERAL
SUGGESTED CMS G CODE MODIFIER DAILY ACTIVITY: CJ
SUGGESTED CMS G CODE MODIFIER MOBILITY: CK
STANDING UP FROM CHAIR USING ARMS: A LITTLE
WALKING IN HOSPITAL ROOM: A LOT
HELP NEEDED FOR BATHING: A LITTLE
DRESSING REGULAR UPPER BODY CLOTHING: A LITTLE
CLIMB 3 TO 5 STEPS WITH RAILING: A LOT
DRESSING REGULAR LOWER BODY CLOTHING: A LITTLE
TOILETING: A LITTLE
MOBILITY SCORE: 18
DAILY ACTIVITIY SCORE: 20
MOVING FROM LYING ON BACK TO SITTING ON SIDE OF FLAT BED: A LITTLE

## 2021-05-07 ASSESSMENT — ENCOUNTER SYMPTOMS
WEAKNESS: 1
DIZZINESS: 1
ABDOMINAL PAIN: 0
SORE THROAT: 0
TINGLING: 0
CHILLS: 0
INSOMNIA: 0
FALLS: 1
FOCAL WEAKNESS: 0
FEVER: 0
HALLUCINATIONS: 0
EYE REDNESS: 0
PALPITATIONS: 0
BACK PAIN: 0
WHEEZING: 0
NERVOUS/ANXIOUS: 0
SENSORY CHANGE: 0
EYE DISCHARGE: 0
NAUSEA: 0
CONSTIPATION: 0
HEARTBURN: 0
HEADACHES: 0
DEPRESSION: 0
SHORTNESS OF BREATH: 0
COUGH: 0
VOMITING: 0
DIARRHEA: 0
WEIGHT LOSS: 0

## 2021-05-07 ASSESSMENT — FIBROSIS 4 INDEX
FIB4 SCORE: 1.51
FIB4 SCORE: 3.13

## 2021-05-07 ASSESSMENT — LIFESTYLE VARIABLES
TOTAL SCORE: 0
EVER HAD A DRINK FIRST THING IN THE MORNING TO STEADY YOUR NERVES TO GET RID OF A HANGOVER: NO
EVER_SMOKED: YES
SUBSTANCE_ABUSE: 0
ALCOHOL_USE: NO
DOES PATIENT WANT TO STOP DRINKING: NO
EVER FELT BAD OR GUILTY ABOUT YOUR DRINKING: NO
TOTAL SCORE: 0
HAVE YOU EVER FELT YOU SHOULD CUT DOWN ON YOUR DRINKING: NO
ON A TYPICAL DAY WHEN YOU DRINK ALCOHOL HOW MANY DRINKS DO YOU HAVE: 0
HOW MANY TIMES IN THE PAST YEAR HAVE YOU HAD 5 OR MORE DRINKS IN A DAY: 0
CONSUMPTION TOTAL: NEGATIVE
AVERAGE NUMBER OF DAYS PER WEEK YOU HAVE A DRINK CONTAINING ALCOHOL: 0
TOTAL SCORE: 0
HAVE PEOPLE ANNOYED YOU BY CRITICIZING YOUR DRINKING: NO

## 2021-05-07 ASSESSMENT — PAIN DESCRIPTION - PAIN TYPE
TYPE: ACUTE PAIN
TYPE: ACUTE PAIN

## 2021-05-07 NOTE — ASSESSMENT & PLAN NOTE
Cr ~ 1.6; baseline 1.4-1.7  - Urine: non-oliguric  - BP: Goal < 140/90  - Volume: euvolemic  - Acid/Base: no sig. acid base disturbance  - Electrolytes: stable  - Anemia: Goal Hgb 10-11  - MBD: Ca normal, PO4 unknown  - dose medication based on GFR  - avoid nephrotoxicity  - nephrology consult as needed  - IVF    5/11: improving, Cr 1.32 today.

## 2021-05-07 NOTE — ASSESSMENT & PLAN NOTE
On eliquis 2.5mg bid, which is on hold due to GI bleeding    SCD    5/11: US of Upper extremity showed: Thrombus seen in one of the paired brachial veins around the PICC line. We have consulted hematology to evaluate the need of further anticoagulation moving forward.  I discussed the patient with Dr. Palomino and due to risk vs benefit, hematology recommends not continuing anticoagulation therapy at this time. We have discontinued midline as per hematology recommendations, and patient could be discharged tomorrow if no further signs of bleeding and to follow up as outpatient with PCP if patient develops extremity edema or further symptoms to re-evaluate need of anticoagulation.

## 2021-05-07 NOTE — ASSESSMENT & PLAN NOTE
Likely upper GI bleeding   Transfuse  Hold eliquis  IV fluid  IV PPI  Diet as tolerated   Pending GI evaluation     5/11: It appears GI performed EGD on 5/9 and found a duodenal ulcer. We will consult Hematology to see whether patient actually needs to be on anticoagulation.

## 2021-05-07 NOTE — H&P
Hospital Medicine History & Physical Note    Date of Service  5/7/2021    Primary Care Physician  Carlitos Harvey M.D.    Consultants  None  GI is aware, will call patient and arrange outpatient EGD    Code Status  Full Code    Chief Complaint  Chief Complaint   Patient presents with   • Weakness     Generalized x1 week   • Dizziness     Near syncope when changing positions.   • Syncope     Per EMS pt had a syncopal event when transferring to [facility].       History of Presenting Illness  Barry Torres is a 81 y.o. male with past medical history of CKD stage III, hypertension not on meds, COPD on inhaler, right upper extremity DVT in February currently on Eliquis 2.5mg bid  Patient presents to the ED with weakness/debilitation which has been going on for about a week and fall hurt his head.  Patient and wife state that patient has been not as active for several months. Yesterday morning patient had a mechanical fall while he was washing his hands at the sink, patient states that he felt lightheaded lost balance and fell backwards hitting his head.  This morning patient went to the restroom and was unable to stand up from it he is laid down on the floor and called his wife for help, wife was not able to help him up and call 911.    Patient states that he felt weak to stand up on his own, chronic shortness of breath due to COPD; and  Lightheadedness and dizziness starting recently, denied chest pain, palpitations, visual changes,  altered mentation, or focal weakness. Patient also states that for the past several days he has had dark stools, but he thought that they were due to to ingestion of Pepto-Bismol.  Patient is currently taking Eliquis for almost months since he was diagnosed with a upper extremity DVT.  He was told that he will complete a 3-month treatment.    Review of Systems  Review of Systems   Constitutional: Negative for chills, fever, malaise/fatigue and weight loss.   HENT: Negative for  congestion and sore throat.    Eyes: Negative for discharge and redness.   Respiratory: Negative for cough, shortness of breath and wheezing.    Cardiovascular: Positive for leg swelling. Negative for chest pain and palpitations.   Gastrointestinal: Positive for melena. Negative for abdominal pain, constipation, diarrhea, heartburn, nausea and vomiting.   Genitourinary: Negative for dysuria, frequency and urgency.   Musculoskeletal: Positive for falls. Negative for back pain and joint pain.   Skin: Negative for itching and rash.   Neurological: Positive for dizziness and weakness. Negative for tingling, sensory change, focal weakness and headaches.   Psychiatric/Behavioral: Negative for depression, hallucinations and substance abuse. The patient is not nervous/anxious and does not have insomnia.    All other systems reviewed and are negative.      Past Medical History   has a past medical history of Abnormal thyroid stimulating hormone (TSH) level, Allergic rhinitis, Asbestosis ([facility]), Asthma, Bronchitis, Chronic diarrhea, Chronic low back pain, Chronic lung disease, CKD (chronic kidney disease), stage III, COPD (chronic obstructive pulmonary disease) (HCC), Coronary artery calcification seen on CAT scan (8/2/2016), Epididymitis (2009), GERD (gastroesophageal reflux disease), History of hemorrhoids, History of skin cancer, [place] (hard of hearing), Hypercholesteremia, Hypertension, Hypertriglyceridemia, Indigestion, Light headedness, Lightheadedness (6/23/2016), Low back pain, Nasal drainage, Olecranon bursitis, Orthostasis (6/23/2016), Peripheral neuropathy, Pleural plaque (2005 ), Post-nasal drip, Pulmonary emphysema (HCC), RA (rheumatoid arthritis) ([facility]), Restless leg syndrome, Rheumatoid arthritis ([facility]), Sleep apnea, and Solitary kidney.    Surgical History   has a past surgical history that includes testicle exploration (2004); cystoscopy (2/1/2010); retrogrades (2/1/2010); pyelogram (2/1/2010); ureteroscopy  (2/1/2010); nephrectomy laparoscopic (2009); nasal polypectomy (Bilateral, 10/6/2015); tonsillectomy; pr remv 2nd cataract,corn-scler sectn; colonoscopy (11/10/2018); flexor tendon repair (Left, 4/3/2019); tendon transfer (Left, 4/3/2019); irrigation & debridement ortho (Right, 10/27/2020); incision and drainage general (Right, 12/19/2020); irrigation & debridement ortho (Right, 2/2/2021); and incision and drainage general (Right, 2/2/2021).     Family History  family history includes Genetic Disorder in his father; No Known Problems in his daughter, sister, and son.     Social History   reports that he quit smoking about 41 years ago. His smoking use included cigarettes. He has a 40.00 pack-year smoking history. He has never used smokeless tobacco. He reports that he does not drink alcohol and does not use drugs.    Allergies  Allergies   Allergen Reactions   • Ciprofloxacin      Other reaction(s): muscle aches  Pt's family states that Pt had a reaction when he was a kid noted 2/1/2021   • Levaquin      Other reaction(s): Muscle aches  Muscle aches  Other reaction(s): Muscle aches  Pt's son states that Pt had a reaction when he was a kid noted 2/1/2021   • Penicillins Hives     Rash and asthma attack when a child - wife states he has had Keflex in the past and tolerated  Pt's son states that Pt had a reaction when he was a kid noted 2/1/2021        Medications  Prior to Admission Medications   Prescriptions Last Dose Informant Patient Reported? Taking?   ELIQUIS 2.5 MG Tab 5/6/2021 at am  Yes No   Sig: Take 2.5 mg by mouth every day.   Loperamide HCl (IMODIUM A-D PO) 5/6/2021 at Unknown time Patient Yes No   Sig: Take 1 Tab by mouth 2 (two) times a day.   acetaminophen (TYLENOL) 325 MG Tab 5/7/2021 at 0400 Patient Yes No   Sig: Take 650 mg by mouth every four hours as needed. Indications: Pain   budesonide-formoterol (SYMBICORT) 80-4.5 MCG/ACT Aerosol 5/6/2021 at PM Patient Yes No   Sig: Inhale 2 Puffs 2 Times a  Day.   doxycycline (VIBRAMYCIN) 100 MG Tab 5/6/2021 at am  No No   Sig: Take 1 tablet by mouth 2 times a day for 60 days.   fexofenadine (ALLEGRA ALLERGY) 180 MG tablet 5/6/2021 at am Patient Yes No   Sig: Take 180 mg by mouth every day.   fluticasone (FLONASE) 50 MCG/ACT nasal spray 5/6/2021 at am Patient Yes No   Sig: Administer 2 Sprays into affected nostril(S) every day.   gabapentin (NEURONTIN) 300 MG Cap 5/6/2021 at pm Patient No No   Sig: TAKE 1 CAPSULE BY MOUTH THREE TIMES A DAY   Patient taking differently: Take 300-600 mg by mouth 2 Times a Day. 1 cap AM  2 caps PM   guaiFENesin LA (MUCINEX) 600 MG TABLET SR 12 HR 5/6/2021 at am Patient Yes No   Sig: Take 1,200 mg by mouth every day.   leflunomide (ARAVA) 20 MG Tab 5/6/2021 at am Patient No No   Sig: Take 1 Tab by mouth every day.   rosuvastatin (CRESTOR) 5 MG Tab 5/6/2021 at PM Patient No No   Sig: TAKE 1 TABLET BY MOUTH EVERY EVENING   Patient taking differently: Take 5 mg by mouth every evening.   tiotropium (SPIRIVA RESPIMAT) 2.5 mcg/Act Aero Soln 5/6/2021 at Unknown time  Yes Yes   Sig: Inhale 5 mcg every day.   triamcinolone acetonide (KENALOG) 0.1 % Cream unk at unk  Yes No   Sig: Apply 1 Application topically one time as needed.      Facility-Administered Medications: None       Physical Exam  Temp:  [36.6 °C (97.8 °F)] 36.6 °C (97.8 °F)  Pulse:  [] 108  Resp:  [17-25] 17  BP: (105-147)/(56-78) 145/73  SpO2:  [91 %-95 %] 91 %    Physical Exam  Vitals and nursing note reviewed.   Constitutional:       General: He is not in acute distress.     Appearance: Normal appearance.   HENT:      Head: Normocephalic and atraumatic.      Nose: Nose normal. No congestion or rhinorrhea.      Mouth/Throat:      Pharynx: Oropharynx is clear. No posterior oropharyngeal erythema.   Eyes:      Extraocular Movements: Extraocular movements intact.      Conjunctiva/sclera: Conjunctivae normal.      Pupils: Pupils are equal, round, and reactive to light.   Neck:       Vascular: No carotid bruit.   Cardiovascular:      Rate and Rhythm: Normal rate and regular rhythm.      Pulses: Normal pulses.      Heart sounds: Normal heart sounds.   Pulmonary:      Effort: Pulmonary effort is normal. No respiratory distress.      Breath sounds: Normal breath sounds. No wheezing.   Chest:      Chest wall: No tenderness.   Abdominal:      General: Abdomen is flat. Bowel sounds are normal. There is no distension.      Palpations: Abdomen is soft.      Tenderness: There is no abdominal tenderness. There is no guarding or rebound.   Musculoskeletal:         General: Swelling (b/l lower leg) present. Normal range of motion.      Cervical back: Normal range of motion and neck supple.      Left lower leg: No edema.   Skin:     General: Skin is warm.   Neurological:      General: No focal deficit present.      Mental Status: He is alert and oriented to person, place, and time.      Cranial Nerves: No cranial nerve deficit.      Motor: No weakness.      Gait: Gait normal.      Deep Tendon Reflexes: Reflexes normal.   Psychiatric:         Mood and Affect: Mood normal.         Behavior: Behavior normal.         Judgment: Judgment normal.         Laboratory:  Recent Labs     05/07/21  0940   WBC 9.2   RBC 2.49*   HEMOGLOBIN 7.6*   HEMATOCRIT 25.2*   .2*   MCH 30.5   MCHC 30.2*   RDW 65.8*   PLATELETCT 364   MPV 10.3     Recent Labs     05/07/21  0940   SODIUM 143   POTASSIUM 4.5   CHLORIDE 112   CO2 18*   GLUCOSE 138*   BUN 35*   CREATININE 1.62*   CALCIUM 8.8     Recent Labs     05/07/21  0940   ALTSGPT 15   ASTSGOT 26   ALKPHOSPHAT 133*   TBILIRUBIN 0.3   GLUCOSE 138*     Recent Labs     05/07/21  0940   INR 1.27*     No results for input(s): NTPROBNP in the last 72 hours.      Recent Labs     05/07/21  0940   TROPONINT 49*       Imaging:  CT-HEAD W/O   Final Result      1.  No acute intracranial process.      2.  Atrophy and small vessel ischemic change.            Assessment/Plan:  I anticipate  this patient is appropriate for observation status at this time.    * Acute GI bleeding  Assessment & Plan  Likely upper GI bleeding   Transfuse  Hold eliquis  IV fluid  IV PPI  Diet as tolerated   GI will arrange outpatient EGD    History of pseudomembranous enterocolitis- (present on admission)  Assessment & Plan  Loperamide 1tab bid    Limited mobility- (present on admission)  Assessment & Plan  Likely due to age    chronic kidney disease (HCC)- (present on admission)  Assessment & Plan  Cr ~ 1.6; baseline 1.4-1.7  - Urine: non-oliguric  - BP: Goal < 140/90  - Volume: euvolemic  - Acid/Base: no sig. acid base disturbance  - Electrolytes: stable  - Anemia: Goal Hgb 10-11  - MBD: Ca normal, PO4 unknown  - dose medication based on GFR  - avoid nephrotoxicity  - nephrology consult as needed  - IVF      Neuropathy- (present on admission)  Assessment & Plan  Gabapentin 600mg am, 300mg pm    COPD (chronic obstructive pulmonary disease) ([facility])- (present on admission)  Assessment & Plan  symbicort  On inhaler PRN  stable    Acute embolism and thrombosis of deep veins of right upper extremity (HCC)  Assessment & Plan  On eliquis 2.5mg bid, which is on hold due to GI bleeding    SCD    Chronic lower extremity edema- (present on admission)  Assessment & Plan  EF 60% In 9/8/2020      Hyperlipidemia- (present on admission)  Assessment & Plan  On rosuvastatin 5mg qd    Orthostasis- (present on admission)  Assessment & Plan  Blood transfuse  IV fluid    DVT prophylaxis  Assessment & Plan  scd

## 2021-05-07 NOTE — ASSESSMENT & PLAN NOTE
Likely due to age  Pending PT/OT evaluation     5/11: PT/OT recommended home health for which we have placed referral.

## 2021-05-07 NOTE — ED NOTES
Med Rec completed: per patient at bedside.    Preferred Pharmacy: [facility] [place] P: [phone]    Pt confirmed following allergies:  Allergies   Allergen Reactions   • Ciprofloxacin      Other reaction(s): muscle aches  Pt's family states that Pt had a reaction when he was a kid noted 2/1/2021   • Levaquin      Other reaction(s): Muscle aches  Muscle aches  Other reaction(s): Muscle aches  Pt's son states that Pt had a reaction when he was a kid noted 2/1/2021   • Penicillins Hives     Rash and asthma attack when a child - wife states he has had Keflex in the past and tolerated  Pt's son states that Pt had a reaction when he was a kid noted 2/1/2021         Pt taking 60 day Doxycycline regimen. 100mg tab BID.

## 2021-05-07 NOTE — ED PROVIDER NOTES
ED Provider Note    CHIEF COMPLAINT  Chief Complaint   Patient presents with    Weakness     Generalized x1 week    Dizziness     Near syncope when changing positions.    Syncope     Per EMS pt had a syncopal event when transferring to [facility].       HPI  Barry Torres is a 81 y.o. male with past medical history of CKD stage III, hypertension, COPD, right upper extremity DVT in February currently on Eliquis.  Patient presents to the ED with a chief complaint of weakness/debilitation which has been going on for about a week.  This morning patient went to the restroom and was unable to stand up from it he is laid down on the floor and called his wife for help, wife was not able to help him up and call 911.  Patient states that he felt weak to stand up on his own, denied chest pain, palpitations, shortness of breath, lightheadedness, dizziness, visual changes,  altered mentation, or focal weakness.  Patient and wife state that patient has been very inactive for several months he only walks from his bed to the couch and to the restroom, and he spends most of the time of the day sitting on his chair.  Yesterday morning patient had a mechanical fall while he was washing his hands at the sink, patient states that he felt lightheaded lost balance and fell backwards hitting his head.  Patient also states that for the past several days he has had dark stools, but he thought that they were due to to ingestion of Pepto-Bismol.  Patient is currently taking Eliquis for almost months since he was diagnosed with a upper extremity DVT.  He was told that he will complete a 3-month treatment.  Patient denied fever, chills, night sweats, SOB, palpitations or chest pain, hematochezia or rectal bleed.    ALLERGIES  Allergies   Allergen Reactions    Ciprofloxacin      Other reaction(s): muscle aches  Pt's family states that Pt had a reaction when he was a kid noted 2/1/2021    Levaquin      Other reaction(s): Muscle aches  Muscle  aches  Other reaction(s): Muscle aches  Pt's son states that Pt had a reaction when he was a kid noted 2/1/2021    Penicillins Hives     Rash and asthma attack when a child - wife states he has had Keflex in the past and tolerated  Pt's son states that Pt had a reaction when he was a kid noted 2/1/2021        CURRENT MEDICATIONS  Home Medications       Reviewed by Beto Salinas (Pharmacy Tech) on 05/07/21 at 1119  Med List Status: Complete     Medication Last Dose Status   acetaminophen (TYLENOL) 325 MG Tab 5/7/2021 Active   budesonide-formoterol (SYMBICORT) 80-4.5 MCG/ACT Aerosol 5/6/2021 Active   doxycycline (VIBRAMYCIN) 100 MG Tab 5/6/2021 Active   ELIQUIS 2.5 MG Tab 5/6/2021 Active   fexofenadine (ALLEGRA ALLERGY) 180 MG tablet 5/6/2021 Active   fluticasone (FLONASE) 50 MCG/ACT nasal spray 5/6/2021 Active   gabapentin (NEURONTIN) 300 MG Cap 5/6/2021 Active   guaiFENesin LA (MUCINEX) 600 MG TABLET SR 12 HR 5/6/2021 Active   leflunomide (ARAVA) 20 MG Tab 5/6/2021 Active   Loperamide HCl (IMODIUM A-D PO) 5/6/2021 Active   rosuvastatin (CRESTOR) 5 MG Tab 5/6/2021 Active   tiotropium (SPIRIVA RESPIMAT) 2.5 mcg/Act Aero Soln 5/6/2021 Active   tiotropium (SPIRIVA) 18 MCG Cap 5/6/2021 Active   triamcinolone acetonide (KENALOG) 0.1 % Cream unk Active                    PAST MEDICAL HISTORY   has a past medical history of Abnormal thyroid stimulating hormone (TSH) level, Allergic rhinitis, Asbestosis (HCC), Asthma, Bronchitis, Chronic diarrhea, Chronic low back pain, Chronic lung disease, CKD (chronic kidney disease), stage III, COPD (chronic obstructive pulmonary disease) ([facility]), Coronary artery calcification seen on CAT scan (8/2/2016), Epididymitis (2009), GERD (gastroesophageal reflux disease), History of hemorrhoids, History of skin cancer, [place] (hard of hearing), Hypercholesteremia, Hypertension, Hypertriglyceridemia, Indigestion, Light headedness, Lightheadedness (6/23/2016), Low back pain, Nasal drainage,  "Olecranon bursitis, Orthostasis (2016), Peripheral neuropathy, Pleural plaque ( ), Post-nasal drip, Pulmonary emphysema (), RA (rheumatoid arthritis) (HCC), Restless leg syndrome, Rheumatoid arthritis (HCC), Sleep apnea, and Solitary kidney.    SURGICAL HISTORY   has a past surgical history that includes testicle exploration (); cystoscopy (2010); retrogrades (2010); pyelogram (2010); ureteroscopy (2010); nephrectomy laparoscopic (); nasal polypectomy (Bilateral, 10/6/2015); tonsillectomy; remv 2nd cataract,corn-scler sectn; colonoscopy (11/10/2018); flexor tendon repair (Left, 4/3/2019); tendon transfer (Left, 4/3/2019); irrigation & debridement ortho (Right, 10/27/2020); incision and drainage general (Right, 2020); irrigation & debridement ortho (Right, 2021); and incision and drainage general (Right, 2021).    SOCIAL HISTORY  Social History     Tobacco Use    Smoking status: Former Smoker     Packs/day: 1.00     Years: 40.00     Pack years: 40.00     Types: Cigarettes     Quit date: 1980     Years since quittin.3    Smokeless tobacco: Never Used    Tobacco comment: 1 pk a day for 40 yrs   Substance and Sexual Activity    Alcohol use: No     Alcohol/week: 0.0 oz    Drug use: No    Sexual activity: Never     Partners: Female     Comment: retired        Family Hx:    None relevant      REVIEW OF SYSTEMS  See HPI for further details.  All other systems are negative except as above in HPI.      PHYSICAL EXAM  VITAL SIGNS: /73   Pulse (!) 108   Temp 36.6 °C (97.8 °F) (Temporal)   Resp 17   Ht 1.854 m (6' 1\")   Wt 90.7 kg (200 lb)   SpO2 91%   BMI 26.39 kg/m²     General:  WDWN elderly male nontoxic appearing in NAD; A+Ox3; V/S as above   Skin: warm and dry; pale color; no rash  HEENT: NCAT; EOMs intact patient is pale.  Neck: soft, no stridor, trachea midline  Cardiovascular: Regular heart rate and irregular rhythm.  No murmurs, rubs, or gallops; " pulses 2+ bilaterally radially and DP areas. Orthostatic hypotension.   Lungs: Clear to auscultation with good air movement bilaterally.  No wheezes, rhonchi, or rales.   Abdomen: BS present; soft; NTND; no rebound, guarding, or rigidity.  No organomegaly or pulsatile mass  Extremities: BROWN x 4; no e/o trauma; no pedal edema; neg Melissa's  Neurologic: CNs III-XII grossly intact; speech clear; distal sensation intact; strength 4/5 UE/LEs  Psychiatric: Appropriate affect, normal mood.    LABS:  Hemoglobin 7.6 it was 10.03 months ago.  Bicarb 18, creatinine 1.62 at baseline, mild elevated alkaline phosphatase.  INR 1.27.     EKG    12 Lead EKG obtained at 09:45 am  and interpreted by me to show:  Rhythm: Sinus rhythm   Rate: 76  Intervals:   MS:   QRS:   QTC: 340   All intervals are within normal limits  No ectopy    No ST changes  Clinical Impression: Atrial fibrillation.  Compared to previous EKG dated 12/16/20 which shows sinus tachycardia      IMAGING:  CT-HEAD W/O   Final Result      1.  No acute intracranial process.      2.  Atrophy and small vessel ischemic change.            PROCEDURES:  None    MEDICAL RECORD  I have reviewed patient's medical record and pertinent results are listed below.      COURSE & MEDICAL DECISION MAKING  I have reviewed any medical record information, laboratory studies and radiographic results as noted.    Barry Torres is a 81 y.o. male who presents complaining of generalized weakness, he also had a mechanical fall yesterday in the morning hitting his head after an episode of lightheadedness, patient also noticing dark stools for the past several days.  Lab work showed significant drop in his hemoglobin levels from 10 to 9.6 and developing orthostasis.  Emergency department patient was orthostatic, his systolic blood pressure while lying down was 133, then dropped to 105 while sitting, patient unable to stand up due to lightheadedness.  Occult blood at the bedside was  positive.  His vital signs were stable during in the emergency department, patient will be getting 1 unit of red blood cells in the emergency department.  We spoke with his GI physician Dr. Peacock, which stated that he will see him outpatient next week since there were no hemodynamically instability.  Hospital service was also consulted for admission since patient has been orthostatic and may require more blood transfusions and monitoring  of his hemoglobin levels prior to safe discharge.  We will hold Eliquis and will continue him in Protonix.    The total critical care time on this patient is 40 minutes, resuscitating patient, speaking with admitting physician, and deciphering test results. This 40 minutes is exclusive of separately billable procedures.      FINAL IMPRESSION  1. Near syncope     2. Gastrointestinal hemorrhage, unspecified gastrointestinal hemorrhage type       I independently evaluated the patient and repeated the important components of the history and physical. I discussed the management with the resident. I have reviewed and agree with the pertinent clinical information above including history, exam, study findings, and recommendations.     Electronically signed by: Lola Marks M.D., 5/7/2021 6:35 PM       Electronically signed by: Lola Marks M.D., 5/7/2021 10:25 AM

## 2021-05-07 NOTE — PROGRESS NOTES
Received report and assumed care of patient. Patient is A & O x 4 and awake in bed. Patient is in no signs of distress and denies pain at this time. Patient was updated on the plan of care for the day. Call light within reach, bed in low position, 2 side rails up. All fall precautions in place. Tele box is on and cardiac rhythm is being monitored. Will continue to monitor.

## 2021-05-07 NOTE — ED NOTES
Bedside report given to RN. Pt brought to T712-2 by RN on the monitor with family. All belongings taken.

## 2021-05-07 NOTE — ED TRIAGE NOTES
"Chief Complaint   Patient presents with   • Weakness     Generalized x1 week   • Dizziness     Near syncope when changing positions.   • Syncope     Per EMS pt had a syncopal event when transferring to [facility].     /77   Pulse (!) 107   Temp 36.6 °C (97.8 °F) (Temporal)   Resp 20   Ht 1.854 m (6' 1\")   Wt 90.7 kg (200 lb)   SpO2 95%   BMI 26.39 kg/m²     Pt brought in by [facility] from home to RD 11, mask in place. Pt also reports \"black\" stool today, reports taking pepto bismol. EKG ordered. PIV line established. Blood drawn and sent to the lab. Pt in a gown and on monitor. Chart flagged for ERP to see.      "

## 2021-05-07 NOTE — PROGRESS NOTES
2 RN Skin Check    2 RN skin check complete, with LISA Walden.   Devices in place: tele box, Peripheral IVs.  Skin assessed under devices: yes.  Confirmed pressure ulcers found on: N/A.  New potential pressure ulcers noted on N/A. Wound consult placed Yes.  The following interventions in place Pillows and wound dressing.     Pt has a skin tear to the L FA.  Pt has two skin tears to the L elbow.  Pt has a wound to the R knee.   Pt has scabbing to the back of the head.  Sacrum pink and blanching  BLE +3 pitting edema.   Pt has dry, flaky feet.  All other skin has been assessed. Skin is clean, dry, and intact

## 2021-05-08 ENCOUNTER — APPOINTMENT (OUTPATIENT)
Dept: RADIOLOGY | Facility: MEDICAL CENTER | Age: 81
DRG: 379 | End: 2021-05-08
Attending: INTERNAL MEDICINE
Payer: MEDICARE

## 2021-05-08 PROBLEM — K21.9 GERD (GASTROESOPHAGEAL REFLUX DISEASE): Status: ACTIVE | Noted: 2021-05-08

## 2021-05-08 LAB
ALBUMIN SERPL BCP-MCNC: 2.5 G/DL (ref 3.2–4.9)
ALBUMIN/GLOB SERPL: 0.9 G/DL
ALP SERPL-CCNC: 116 U/L (ref 30–99)
ALT SERPL-CCNC: 14 U/L (ref 2–50)
ANION GAP SERPL CALC-SCNC: 8 MMOL/L (ref 7–16)
AST SERPL-CCNC: 17 U/L (ref 12–45)
BASOPHILS # BLD AUTO: 0.5 % (ref 0–1.8)
BASOPHILS # BLD: 0.05 K/UL (ref 0–0.12)
BILIRUB SERPL-MCNC: 0.6 MG/DL (ref 0.1–1.5)
BUN SERPL-MCNC: 40 MG/DL (ref 8–22)
CALCIUM SERPL-MCNC: 8.1 MG/DL (ref 8.5–10.5)
CHLORIDE SERPL-SCNC: 117 MMOL/L (ref 96–112)
CO2 SERPL-SCNC: 21 MMOL/L (ref 20–33)
CREAT SERPL-MCNC: 1.47 MG/DL (ref 0.5–1.4)
EOSINOPHIL # BLD AUTO: 0.04 K/UL (ref 0–0.51)
EOSINOPHIL NFR BLD: 0.4 % (ref 0–6.9)
ERYTHROCYTE [DISTWIDTH] IN BLOOD BY AUTOMATED COUNT: 73.2 FL (ref 35.9–50)
GLOBULIN SER CALC-MCNC: 2.8 G/DL (ref 1.9–3.5)
GLUCOSE SERPL-MCNC: 100 MG/DL (ref 65–99)
HCT VFR BLD AUTO: 24.3 % (ref 42–52)
HGB BLD-MCNC: 7.4 G/DL (ref 14–18)
IMM GRANULOCYTES # BLD AUTO: 0.05 K/UL (ref 0–0.11)
IMM GRANULOCYTES NFR BLD AUTO: 0.5 % (ref 0–0.9)
LYMPHOCYTES # BLD AUTO: 1.65 K/UL (ref 1–4.8)
LYMPHOCYTES NFR BLD: 15.9 % (ref 22–41)
MCH RBC QN AUTO: 28.4 PG (ref 27–33)
MCHC RBC AUTO-ENTMCNC: 30.5 G/DL (ref 33.7–35.3)
MCV RBC AUTO: 93.1 FL (ref 81.4–97.8)
MONOCYTES # BLD AUTO: 1.1 K/UL (ref 0–0.85)
MONOCYTES NFR BLD AUTO: 10.6 % (ref 0–13.4)
NEUTROPHILS # BLD AUTO: 7.47 K/UL (ref 1.82–7.42)
NEUTROPHILS NFR BLD: 72.1 % (ref 44–72)
NRBC # BLD AUTO: 0 K/UL
NRBC BLD-RTO: 0 /100 WBC
PLATELET # BLD AUTO: 272 K/UL (ref 164–446)
PMV BLD AUTO: 10.3 FL (ref 9–12.9)
POTASSIUM SERPL-SCNC: 4.4 MMOL/L (ref 3.6–5.5)
PROT SERPL-MCNC: 5.3 G/DL (ref 6–8.2)
RBC # BLD AUTO: 2.61 M/UL (ref 4.7–6.1)
SODIUM SERPL-SCNC: 146 MMOL/L (ref 135–145)
WBC # BLD AUTO: 10.4 K/UL (ref 4.8–10.8)

## 2021-05-08 PROCEDURE — 36415 COLL VENOUS BLD VENIPUNCTURE: CPT

## 2021-05-08 PROCEDURE — A9270 NON-COVERED ITEM OR SERVICE: HCPCS | Performed by: STUDENT IN AN ORGANIZED HEALTH CARE EDUCATION/TRAINING PROGRAM

## 2021-05-08 PROCEDURE — 700111 HCHG RX REV CODE 636 W/ 250 OVERRIDE (IP): Performed by: STUDENT IN AN ORGANIZED HEALTH CARE EDUCATION/TRAINING PROGRAM

## 2021-05-08 PROCEDURE — 94664 DEMO&/EVAL PT USE INHALER: CPT

## 2021-05-08 PROCEDURE — 99232 SBSQ HOSP IP/OBS MODERATE 35: CPT | Performed by: INTERNAL MEDICINE

## 2021-05-08 PROCEDURE — 85025 COMPLETE CBC W/AUTO DIFF WBC: CPT

## 2021-05-08 PROCEDURE — 700105 HCHG RX REV CODE 258: Performed by: STUDENT IN AN ORGANIZED HEALTH CARE EDUCATION/TRAINING PROGRAM

## 2021-05-08 PROCEDURE — 80053 COMPREHEN METABOLIC PANEL: CPT

## 2021-05-08 PROCEDURE — 05HC33Z INSERTION OF INFUSION DEVICE INTO LEFT BASILIC VEIN, PERCUTANEOUS APPROACH: ICD-10-PCS | Performed by: INTERNAL MEDICINE

## 2021-05-08 PROCEDURE — 94640 AIRWAY INHALATION TREATMENT: CPT

## 2021-05-08 PROCEDURE — C9113 INJ PANTOPRAZOLE SODIUM, VIA: HCPCS | Performed by: STUDENT IN AN ORGANIZED HEALTH CARE EDUCATION/TRAINING PROGRAM

## 2021-05-08 PROCEDURE — 76937 US GUIDE VASCULAR ACCESS: CPT

## 2021-05-08 PROCEDURE — 96366 THER/PROPH/DIAG IV INF ADDON: CPT

## 2021-05-08 PROCEDURE — 700102 HCHG RX REV CODE 250 W/ 637 OVERRIDE(OP): Performed by: STUDENT IN AN ORGANIZED HEALTH CARE EDUCATION/TRAINING PROGRAM

## 2021-05-08 PROCEDURE — 770020 HCHG ROOM/CARE - TELE (206)

## 2021-05-08 RX ADMIN — BUDESONIDE AND FORMOTEROL FUMARATE DIHYDRATE 2 PUFF: 80; 4.5 AEROSOL RESPIRATORY (INHALATION) at 05:05

## 2021-05-08 RX ADMIN — FEXOFENADINE HCL 60 MG: 60 TABLET, FILM COATED ORAL at 05:15

## 2021-05-08 RX ADMIN — LEFLUNOMIDE 20 MG: 20 TABLET ORAL at 05:07

## 2021-05-08 RX ADMIN — LOPERAMIDE HYDROCHLORIDE 2 MG: 2 CAPSULE ORAL at 17:34

## 2021-05-08 RX ADMIN — SODIUM CHLORIDE: 9 INJECTION, SOLUTION INTRAVENOUS at 18:14

## 2021-05-08 RX ADMIN — BUDESONIDE AND FORMOTEROL FUMARATE DIHYDRATE 2 PUFF: 80; 4.5 AEROSOL RESPIRATORY (INHALATION) at 18:00

## 2021-05-08 RX ADMIN — FLUTICASONE PROPIONATE 100 MCG: 50 SPRAY, METERED NASAL at 05:05

## 2021-05-08 RX ADMIN — TIOTROPIUM BROMIDE INHALATION SPRAY 5 MCG: 3.12 SPRAY, METERED RESPIRATORY (INHALATION) at 05:05

## 2021-05-08 RX ADMIN — GUAIFENESIN 1200 MG: 600 TABLET, EXTENDED RELEASE ORAL at 05:15

## 2021-05-08 RX ADMIN — GABAPENTIN 300 MG: 300 CAPSULE ORAL at 05:06

## 2021-05-08 RX ADMIN — ROSUVASTATIN CALCIUM 5 MG: 10 TABLET, FILM COATED ORAL at 17:28

## 2021-05-08 RX ADMIN — LOPERAMIDE HYDROCHLORIDE 2 MG: 2 CAPSULE ORAL at 05:04

## 2021-05-08 RX ADMIN — GABAPENTIN 600 MG: 300 CAPSULE ORAL at 17:28

## 2021-05-08 RX ADMIN — SODIUM CHLORIDE 8 MG/HR: 9 INJECTION, SOLUTION INTRAVENOUS at 14:48

## 2021-05-08 ASSESSMENT — ENCOUNTER SYMPTOMS
BLURRED VISION: 0
SENSORY CHANGE: 0
SHORTNESS OF BREATH: 0
CHILLS: 0
SPUTUM PRODUCTION: 0
PHOTOPHOBIA: 0
ORTHOPNEA: 0
DIZZINESS: 0
VOMITING: 1
MYALGIAS: 0
DOUBLE VISION: 0
FEVER: 0
HEARTBURN: 0
SPEECH CHANGE: 0
WEAKNESS: 0
NAUSEA: 0
BACK PAIN: 0
DIARRHEA: 0
SORE THROAT: 0
CLAUDICATION: 0
BLOOD IN STOOL: 1
PALPITATIONS: 0
HEADACHES: 0
WHEEZING: 0
DEPRESSION: 0
EYE DISCHARGE: 0
BRUISES/BLEEDS EASILY: 0
COUGH: 0
FLANK PAIN: 0
HEMOPTYSIS: 0
ABDOMINAL PAIN: 0

## 2021-05-08 ASSESSMENT — PAIN DESCRIPTION - PAIN TYPE
TYPE: ACUTE PAIN
TYPE: ACUTE PAIN

## 2021-05-08 NOTE — RESPIRATORY CARE
"  COPD EDUCATION by COPD CLINICAL EDUCATOR  (Phone: [phone])  5/8/2021 at 1:01 PM by Guillermina Durán, DEIDRE     Patient was interviewed by Respiratory Education team for COPD program. Patient refused COPD program. He has our information packet about lung disease from a prior admission. We reviewed together his medications, his Action Plan and answered his questions.   COPD Screen  COPD Risk Screening  Do you have a history of COPD?: Yes  Do you have a Pulmonologist?: Yes    COPD Assessment  COPD Clinical Specialists ONLY  COPD Education Initiated: Yes--Short Intervention(On Home Oxygen 2 LPM at night Medications reviewed as noted below He has a spacer.)  Physician Name: [facility]- pt to make appointment  Is this a COPD exacerbation patient?: No  $ Demo/Eval of SVN's, MDI's and Aerosols: Yes (he has a spacer and reviewed technique)  (OP) Pulmonary Function Testing: Yes (last PFT 2019:  FEV1 47; FEV1/FVC Ratio 60)    Meds to Beds  Would the patient like to opt in for Bedside Medication Delivery at Discharge?: Yes, interested     MY COPD ACTION PLAN     It is recommended that patients and physicians /healthcare providers complete this action plan together. This plan should be discussed at each physician visit and updated as needed.    The green, yellow and red zones show groups of symptoms of COPD. This list of symptoms is not comprehensive, and you may experience other symptoms. In the \"Actions\" column, your healthcare provider has recommended actions for you to take based on your symptoms.    Patient Name: Barry Torres   YOB: 1940   Last Updated on:     Green Zone:  I am doing well today Actions   •  Usual activitiy and exercise level •  Take daily medications   •  Usual amounts of cough and phlegm/mucus •  Use oxygen as prescribed   •  Sleep well at night •  Continue regular exercise/diet plan   •  Appetite is good •  At all times avoid cigarette smoke, inhaled irritants     Daily " "Medications (these medications are taken every day):   Budesonide-Formoterol Fumarate (Symbicort)  Tiotropium Bromide Monohydrate (Spiriva) 2 Puffs  2 Puffs Twice daily  Once daily     Additional Information:  Use Spacer and Rinse mouth after use of Symbicort  Take medications as directed by physician.        Yellow Zone:  I am having a bad day or a COPD flare Actions   •  More breathless than usual •  Continue daily medications   •  I have less energy for my daily activities •  Use quick relief inhaler as ordered   •  Increased or thicker phlegm/mucus •  Use oxygen as prescribed   •  Using quick relief inhaler/nebulizer more often •  Get plenty of rest   •  Swelling of ankles more than usual •  Use pursed lip breathing   •  More coughing than usual •  At all times avoid cigarette smoke, inhaled irritants   •  I feel like I have a \"chest cold\"   •  Poor sleep and my symptoms woke me up   •  My appetite is not good   •  My medicine is not helping    •  Call provider immediately if symptoms don’t improve     Continue daily medications, add rescue medications:   Albuterol 2 Puffs Every 4 hours PRN       Medications to be used during a flare up, (as Discussed with Provider):           Additional Information:  Use Spacer    Red Zone:  I need urgent medical care Actions   •  Severe shortness of breath even at rest •  Call 911 or seek medical care immediately   •  Not able to do any activity because of breathing    •  Fever or shaking chills    •  Feeling confused or very drowsy     •  Chest pains    •  Coughing up blood              "

## 2021-05-08 NOTE — PROGRESS NOTES
Updated Tobin CALLAHAN that IV access was lost on this patient. Multiple attempts to get access unsuccessful. Have reached out to rapid nurse to try US IV.

## 2021-05-08 NOTE — CARE PLAN
Problem: Safety  Goal: Will remain free from falls  Outcome: PROGRESSING AS EXPECTED  Note:   Patient will verbalize understanding of when to call RN to get up to use the restroom.  Pt will agree to have all fall interventions in place including: bed alarm, socks, well lit room, etc.  Pt will verbalize understanding of dangling feet before standing, and will agree to remain in bed using a urinal if he feels dizzy or lightheaded.     Problem: Skin Integrity  Goal: Risk for impaired skin integrity will decrease  Outcome: PROGRESSING AS EXPECTED  Note:   Pt is at high risk for skin breakdown due to fragile skin, immobility, and loose stool.   Pt will verbalize understanding of turning, and moving extremities frequently  Pt will agree to a wound consult and will verbalize understanding of education about the cause of pressure injuries and skin tears.   PT will monitor for signs and symptoms of infection, and new wounds.

## 2021-05-08 NOTE — DISCHARGE PLANNING
Anticipated Discharge Disposition: TBD    Action:   Patient admitted for GIB; pending recs from PT/OT and GI evaluation. May need endoscopy.    Barriers to Discharge: medical clearance, placement     Plan: HCM will continue to follow to assist with d/c planning needs.

## 2021-05-08 NOTE — CARE PLAN
Problem: Safety  Goal: Will remain free from falls  Outcome: PROGRESSING AS EXPECTED  Note:   Patient will use call light and notify staff when wanting to ambulate.  Patient will verbalize interventions to help prevent falls.  Call light is within reach, fall precautions are in place, non skid socks are patient, clutter is removed from floor.     Problem: Mobility  Goal: Risk for activity intolerance will decrease  Outcome: PROGRESSING AS EXPECTED  Note:   Patient will ambulate to the restroom.  Patient will ambulate to chair.  Patient is encouraged to ambulate to build up strength, patient is assisted when getting up from chair.

## 2021-05-08 NOTE — PROGRESS NOTES
Assumed care of PT A&O 4. Pt resting in bed with no signs of labored breathing. On RA. Tele monitor in place, cardiac rhythm being monitored. Call light within reach, bed in lowest position, upper bed rails up. Pt was updated on plan of care for the day . Will continue to monitor.

## 2021-05-08 NOTE — PROGRESS NOTES
[facility] Medicine Daily Progress Note    Date of Service  5/8/2021    Chief Complaint  81 y.o. male admitted 5/7/2021 with black tarry stools    Hospital Course    Barry Torres is a 81 y.o. male with past medical history of CKD stage III, hypertension not on meds, COPD on inhaler, right upper extremity DVT in February currently on Eliquis 2.5mg bid  Patient presents to the ED with weakness/debilitation which has been going on for about a week and fall hurt his head.  Patient and wife state that patient has been not as active for several months. Yesterday morning patient had a mechanical fall while he was washing his hands at the sink, patient states that he felt lightheaded lost balance and fell backwards hitting his head.  This morning patient went to the restroom and was unable to stand up from it he is laid down on the floor and called his wife for help, wife was not able to help him up and call 911.     Patient states that he felt weak to stand up on his own, chronic shortness of breath due to COPD; and  Lightheadedness and dizziness starting recently, denied chest pain, palpitations, visual changes,  altered mentation, or focal weakness. Patient also states that for the past several days he has had dark stools, but he thought that they were due to to ingestion of Pepto-Bismol.  Patient is currently taking Eliquis for almost months since he was diagnosed with a upper extremity DVT.  He was told that he will complete a 3-month treatment.    Interval Problem Update    5/8/2021: The patient was seen and evaluated at bedside and appears comfortable.  Patient states that he is tolerating oral intake without epigastric pain.  He was initially admitted for further evaluation and management of possible upper GI bleed.  He did receive transfusion of 1 unit of packed RBCs however his hemoglobin had minimal decline.  He did have a repeat episode overnight.  At this point we are pending GI evaluation and possible  possible endoscopic intervention.  Patient denies any chest pains, palpitations, shortness of breath.  No other overnight events reported.    Consultants/Specialty  GI    Code Status  Full Code    Disposition  Pending GI evaluation and PT/OT recommendations    Review of Systems  Review of Systems   Constitutional: Negative for chills, fever and malaise/fatigue.   HENT: Negative for congestion, hearing loss, sore throat and tinnitus.    Eyes: Negative for blurred vision, double vision, photophobia and discharge.   Respiratory: Negative for cough, hemoptysis, sputum production, shortness of breath and wheezing.    Cardiovascular: Negative for chest pain, palpitations, orthopnea, claudication and leg swelling.   Gastrointestinal: Positive for blood in stool and vomiting. Negative for abdominal pain, diarrhea, heartburn, melena and nausea.   Genitourinary: Negative for dysuria, flank pain, hematuria and urgency.   Musculoskeletal: Negative for back pain, joint pain and myalgias.   Skin: Negative for itching and rash.   Neurological: Negative for dizziness, sensory change, speech change, weakness and headaches.   Endo/Heme/Allergies: Does not bruise/bleed easily.   Psychiatric/Behavioral: Negative for depression and suicidal ideas.        Physical Exam  Temp:  [35.9 °C (96.6 °F)-36.6 °C (97.9 °F)] 35.9 °C (96.6 °F)  Pulse:  [] 66  Resp:  [16-22] 16  BP: (128-161)/(66-87) 128/87  SpO2:  [91 %-96 %] 91 %    Physical Exam  Vitals reviewed.   Constitutional:       Appearance: Normal appearance.   HENT:      Head: Normocephalic and atraumatic.      Nose: No congestion or rhinorrhea.      Mouth/Throat:      Mouth: Mucous membranes are moist.      Pharynx: Oropharynx is clear.   Eyes:      General: No scleral icterus.     Extraocular Movements: Extraocular movements intact.      Conjunctiva/sclera: Conjunctivae normal.      Pupils: Pupils are equal, round, and reactive to light.   Cardiovascular:      Rate and Rhythm:  Normal rate and regular rhythm.      Heart sounds: No murmur. No friction rub. No gallop.    Pulmonary:      Effort: Pulmonary effort is normal. No respiratory distress.      Breath sounds: Normal breath sounds. No stridor. No wheezing, rhonchi or rales.   Abdominal:      General: Abdomen is flat. Bowel sounds are normal. There is no distension.      Palpations: Abdomen is soft. There is no mass.      Tenderness: There is no abdominal tenderness. There is no guarding or rebound.   Musculoskeletal:         General: No swelling, tenderness or deformity.      Cervical back: Neck supple. No rigidity. No muscular tenderness.   Lymphadenopathy:      Cervical: No cervical adenopathy.   Skin:     General: Skin is warm and dry.      Findings: No erythema or rash.   Neurological:      General: No focal deficit present.      Mental Status: He is alert and oriented to person, place, and time. Mental status is at baseline.      Cranial Nerves: No cranial nerve deficit.      Sensory: No sensory deficit.      Motor: No weakness.   Psychiatric:         Mood and Affect: Mood normal.         Behavior: Behavior normal.         Thought Content: Thought content normal.         Fluids    Intake/Output Summary (Last 24 hours) at 5/8/2021 1131  Last data filed at 5/8/2021 0000  Gross per 24 hour   Intake 1050 ml   Output 200 ml   Net 850 ml       Laboratory  Recent Labs     05/07/21  0940 05/08/21  0114   WBC 9.2 10.4   RBC 2.49* 2.61*   HEMOGLOBIN 7.6* 7.4*   HEMATOCRIT 25.2* 24.3*   .2* 93.1   MCH 30.5 28.4   MCHC 30.2* 30.5*   RDW 65.8* 73.2*   PLATELETCT 364 272   MPV 10.3 10.3     Recent Labs     05/07/21  0940 05/08/21  0114   SODIUM 143 146*   POTASSIUM 4.5 4.4   CHLORIDE 112 117*   CO2 18* 21   GLUCOSE 138* 100*   BUN 35* 40*   CREATININE 1.62* 1.47*   CALCIUM 8.8 8.1*     Recent Labs     05/07/21  0940   INR 1.27*               Imaging  IR-MIDLINE CATHETER INSERTION WO GUIDANCE > AGE 3   Final Result                   Ultrasound-guided midline placement performed by qualified nursing staff    as above.          CT-HEAD W/O   Final Result      1.  No acute intracranial process.      2.  Atrophy and small vessel ischemic change.           Assessment/Plan  * Acute GI bleeding  Assessment & Plan  Likely upper GI bleeding   Transfuse  Hold eliquis  IV fluid  IV PPI  Diet as tolerated   Pending GI evaluation     History of pseudomembranous enterocolitis- (present on admission)  Assessment & Plan  Loperamide 1tab bid  Continue immunosuppressive therapy     Limited mobility- (present on admission)  Assessment & Plan  Likely due to age  Pending PT/OT evaluation     chronic kidney disease (HCC)- (present on admission)  Assessment & Plan  Cr ~ 1.6; baseline 1.4-1.7  - Urine: non-oliguric  - BP: Goal < 140/90  - Volume: euvolemic  - Acid/Base: no sig. acid base disturbance  - Electrolytes: stable  - Anemia: Goal Hgb 10-11  - MBD: Ca normal, PO4 unknown  - dose medication based on GFR  - avoid nephrotoxicity  - nephrology consult as needed  - IVF      Neuropathy- (present on admission)  Assessment & Plan  Gabapentin 600mg am, 300mg pm    COPD (chronic obstructive pulmonary disease) (HCC)- (present on admission)  Assessment & Plan  symbicort  On inhaler PRN  stable    Acute embolism and thrombosis of deep veins of right upper extremity (HCC)- (present on admission)  Assessment & Plan  On eliquis 2.5mg bid, which is on hold due to GI bleeding    SCD    Chronic lower extremity edema- (present on admission)  Assessment & Plan  EF 60% In 9/8/2020      Hyperlipidemia- (present on admission)  Assessment & Plan  On rosuvastatin 5mg qd    Orthostasis- (present on admission)  Assessment & Plan  Blood transfuse  IV fluid    DVT prophylaxis  Assessment & Plan  scd         VTE prophylaxis: SCDS

## 2021-05-08 NOTE — PROGRESS NOTES
Bedside report received and patient care assumed. Pt is resting in bed, A&Ox4, with no complaints of pain, and is on RA. Tele box on. All fall precautions are in place, belongings at bedside table.  Pt was updated on POC, no questions or concerns. Pt educated on use of call light for assistance. Will continue to monitor.

## 2021-05-08 NOTE — PROCEDURES
Vascular Access Team    Date of Insertion: 5/8/21  Arm Circumference: n/a  Line Length: 10  Line Size: 20  Vein Occupancy %: 37  Reason for Midline: access  Labs: WBC 10.4, , BUN 40, Cr 1.47, GFR 46, INR 1.27 on 5/7/21    Orders confirmed, vessel patency confirmed with ultrasound. Risks and benefits of procedure explained to patient and education regarding line associated bloodstream infections provided. Questions answered.     PowerGlide Midline placed in LUE per licensed provider order with ultrasound guidance. 20g, 10 cm line placed in basilic vein after 1 attempt(s).  Catheter inserted with brisk blood return. Secured with 0cm external from insertion site.  Line flushed without resistance with 10 mL 0.9% normal saline.  Midline secured with Biopatch and Tegaderm.     Midline placement is confirmed by nurse using ultrasound and ability to flush and draw blood. Midline is appropriate for use at this time.  No X-ray is needed for placement confirmation. Pt tolerated procedure well.  Patient condition relayed to unit RN or ordering physician via this post procedure note in the EMR.    Ultrasound images uploaded to PACS and viewable in the EMR - yes  Ultrasound imaged printed and placed in paper chart - no      BARD PowerGlide Midline ref # [id number], Lot # [id number], Expiration Date 10/31/2021

## 2021-05-09 ENCOUNTER — ANESTHESIA (OUTPATIENT)
Dept: SURGERY | Facility: MEDICAL CENTER | Age: 81
DRG: 379 | End: 2021-05-09
Payer: MEDICARE

## 2021-05-09 ENCOUNTER — ANESTHESIA EVENT (OUTPATIENT)
Dept: SURGERY | Facility: MEDICAL CENTER | Age: 81
DRG: 379 | End: 2021-05-09
Payer: MEDICARE

## 2021-05-09 LAB
ANION GAP SERPL CALC-SCNC: 6 MMOL/L (ref 7–16)
BASOPHILS # BLD AUTO: 0.8 % (ref 0–1.8)
BASOPHILS # BLD: 0.07 K/UL (ref 0–0.12)
BUN SERPL-MCNC: 30 MG/DL (ref 8–22)
CALCIUM SERPL-MCNC: 7.8 MG/DL (ref 8.5–10.5)
CHLORIDE SERPL-SCNC: 115 MMOL/L (ref 96–112)
CO2 SERPL-SCNC: 21 MMOL/L (ref 20–33)
CREAT SERPL-MCNC: 1.57 MG/DL (ref 0.5–1.4)
EKG IMPRESSION: NORMAL
EOSINOPHIL # BLD AUTO: 0.47 K/UL (ref 0–0.51)
EOSINOPHIL NFR BLD: 5.4 % (ref 0–6.9)
ERYTHROCYTE [DISTWIDTH] IN BLOOD BY AUTOMATED COUNT: 68 FL (ref 35.9–50)
ERYTHROCYTE [DISTWIDTH] IN BLOOD BY AUTOMATED COUNT: 76.9 FL (ref 35.9–50)
GLUCOSE SERPL-MCNC: 94 MG/DL (ref 65–99)
HCT VFR BLD AUTO: 20.9 % (ref 42–52)
HCT VFR BLD AUTO: 25.8 % (ref 42–52)
HGB BLD-MCNC: 6.4 G/DL (ref 14–18)
HGB BLD-MCNC: 8.3 G/DL (ref 14–18)
IMM GRANULOCYTES # BLD AUTO: 0.04 K/UL (ref 0–0.11)
IMM GRANULOCYTES NFR BLD AUTO: 0.5 % (ref 0–0.9)
LYMPHOCYTES # BLD AUTO: 1.71 K/UL (ref 1–4.8)
LYMPHOCYTES NFR BLD: 19.5 % (ref 22–41)
MAGNESIUM SERPL-MCNC: 1.7 MG/DL (ref 1.5–2.5)
MCH RBC QN AUTO: 29 PG (ref 27–33)
MCH RBC QN AUTO: 29.2 PG (ref 27–33)
MCHC RBC AUTO-ENTMCNC: 30.6 G/DL (ref 33.7–35.3)
MCHC RBC AUTO-ENTMCNC: 31.2 G/DL (ref 33.7–35.3)
MCV RBC AUTO: 93.6 FL (ref 81.4–97.8)
MCV RBC AUTO: 94.6 FL (ref 81.4–97.8)
MONOCYTES # BLD AUTO: 0.8 K/UL (ref 0–0.85)
MONOCYTES NFR BLD AUTO: 9.1 % (ref 0–13.4)
NEUTROPHILS # BLD AUTO: 5.66 K/UL (ref 1.82–7.42)
NEUTROPHILS NFR BLD: 64.7 % (ref 44–72)
NRBC # BLD AUTO: 0.02 K/UL
NRBC BLD-RTO: 0.2 /100 WBC
PHOSPHATE SERPL-MCNC: 3 MG/DL (ref 2.5–4.5)
PLATELET # BLD AUTO: 253 K/UL (ref 164–446)
PLATELET # BLD AUTO: 264 K/UL (ref 164–446)
PMV BLD AUTO: 10.4 FL (ref 9–12.9)
PMV BLD AUTO: 9.8 FL (ref 9–12.9)
POTASSIUM SERPL-SCNC: 4 MMOL/L (ref 3.6–5.5)
RBC # BLD AUTO: 2.21 M/UL (ref 4.7–6.1)
RBC # BLD AUTO: 2.81 M/UL (ref 4.7–6.1)
SODIUM SERPL-SCNC: 142 MMOL/L (ref 135–145)
WBC # BLD AUTO: 10.4 K/UL (ref 4.8–10.8)
WBC # BLD AUTO: 8.8 K/UL (ref 4.8–10.8)

## 2021-05-09 PROCEDURE — 770020 HCHG ROOM/CARE - TELE (206)

## 2021-05-09 PROCEDURE — 86923 COMPATIBILITY TEST ELECTRIC: CPT

## 2021-05-09 PROCEDURE — A9270 NON-COVERED ITEM OR SERVICE: HCPCS | Performed by: INTERNAL MEDICINE

## 2021-05-09 PROCEDURE — 700102 HCHG RX REV CODE 250 W/ 637 OVERRIDE(OP): Performed by: INTERNAL MEDICINE

## 2021-05-09 PROCEDURE — A9270 NON-COVERED ITEM OR SERVICE: HCPCS | Performed by: STUDENT IN AN ORGANIZED HEALTH CARE EDUCATION/TRAINING PROGRAM

## 2021-05-09 PROCEDURE — 93005 ELECTROCARDIOGRAM TRACING: CPT | Performed by: INTERNAL MEDICINE

## 2021-05-09 PROCEDURE — 160202 HCHG ENDO MINUTES - 1ST 30 MINS LEVEL 3: Performed by: INTERNAL MEDICINE

## 2021-05-09 PROCEDURE — 160002 HCHG RECOVERY MINUTES (STAT): Performed by: INTERNAL MEDICINE

## 2021-05-09 PROCEDURE — 700105 HCHG RX REV CODE 258: Performed by: STUDENT IN AN ORGANIZED HEALTH CARE EDUCATION/TRAINING PROGRAM

## 2021-05-09 PROCEDURE — P9016 RBC LEUKOCYTES REDUCED: HCPCS

## 2021-05-09 PROCEDURE — 36430 TRANSFUSION BLD/BLD COMPNT: CPT

## 2021-05-09 PROCEDURE — 0DJ08ZZ INSPECTION OF UPPER INTESTINAL TRACT, VIA NATURAL OR ARTIFICIAL OPENING ENDOSCOPIC: ICD-10-PCS | Performed by: INTERNAL MEDICINE

## 2021-05-09 PROCEDURE — 700102 HCHG RX REV CODE 250 W/ 637 OVERRIDE(OP): Performed by: STUDENT IN AN ORGANIZED HEALTH CARE EDUCATION/TRAINING PROGRAM

## 2021-05-09 PROCEDURE — 84100 ASSAY OF PHOSPHORUS: CPT

## 2021-05-09 PROCEDURE — 99232 SBSQ HOSP IP/OBS MODERATE 35: CPT | Performed by: INTERNAL MEDICINE

## 2021-05-09 PROCEDURE — 160009 HCHG ANES TIME/MIN: Performed by: INTERNAL MEDICINE

## 2021-05-09 PROCEDURE — 160048 HCHG OR STATISTICAL LEVEL 1-5: Performed by: INTERNAL MEDICINE

## 2021-05-09 PROCEDURE — 93010 ELECTROCARDIOGRAM REPORT: CPT | Performed by: INTERNAL MEDICINE

## 2021-05-09 PROCEDURE — 85025 COMPLETE CBC W/AUTO DIFF WBC: CPT

## 2021-05-09 PROCEDURE — 85027 COMPLETE CBC AUTOMATED: CPT

## 2021-05-09 PROCEDURE — 160035 HCHG PACU - 1ST 60 MINS PHASE I: Performed by: INTERNAL MEDICINE

## 2021-05-09 PROCEDURE — 83735 ASSAY OF MAGNESIUM: CPT

## 2021-05-09 PROCEDURE — 700111 HCHG RX REV CODE 636 W/ 250 OVERRIDE (IP): Performed by: STUDENT IN AN ORGANIZED HEALTH CARE EDUCATION/TRAINING PROGRAM

## 2021-05-09 PROCEDURE — 700111 HCHG RX REV CODE 636 W/ 250 OVERRIDE (IP): Performed by: ANESTHESIOLOGY

## 2021-05-09 PROCEDURE — 80048 BASIC METABOLIC PNL TOTAL CA: CPT

## 2021-05-09 PROCEDURE — C9113 INJ PANTOPRAZOLE SODIUM, VIA: HCPCS | Performed by: STUDENT IN AN ORGANIZED HEALTH CARE EDUCATION/TRAINING PROGRAM

## 2021-05-09 RX ORDER — ONDANSETRON 2 MG/ML
4 INJECTION INTRAMUSCULAR; INTRAVENOUS
Status: DISCONTINUED | OUTPATIENT
Start: 2021-05-09 | End: 2021-05-09 | Stop reason: HOSPADM

## 2021-05-09 RX ORDER — HALOPERIDOL 5 MG/ML
1 INJECTION INTRAMUSCULAR
Status: DISCONTINUED | OUTPATIENT
Start: 2021-05-09 | End: 2021-05-09 | Stop reason: HOSPADM

## 2021-05-09 RX ORDER — OMEPRAZOLE 20 MG/1
40 CAPSULE, DELAYED RELEASE ORAL 2 TIMES DAILY
Status: DISCONTINUED | OUTPATIENT
Start: 2021-05-09 | End: 2021-05-12 | Stop reason: HOSPADM

## 2021-05-09 RX ADMIN — FLUTICASONE PROPIONATE 100 MCG: 50 SPRAY, METERED NASAL at 05:37

## 2021-05-09 RX ADMIN — GABAPENTIN 600 MG: 300 CAPSULE ORAL at 17:50

## 2021-05-09 RX ADMIN — SODIUM CHLORIDE: 9 INJECTION, SOLUTION INTRAVENOUS at 04:31

## 2021-05-09 RX ADMIN — PROPOFOL 50 MG: 10 INJECTION, EMULSION INTRAVENOUS at 08:38

## 2021-05-09 RX ADMIN — GUAIFENESIN 1200 MG: 600 TABLET, EXTENDED RELEASE ORAL at 05:37

## 2021-05-09 RX ADMIN — FEXOFENADINE HCL 60 MG: 60 TABLET, FILM COATED ORAL at 05:37

## 2021-05-09 RX ADMIN — SODIUM CHLORIDE: 9 INJECTION, SOLUTION INTRAVENOUS at 08:34

## 2021-05-09 RX ADMIN — BUDESONIDE AND FORMOTEROL FUMARATE DIHYDRATE 2 PUFF: 80; 4.5 AEROSOL RESPIRATORY (INHALATION) at 18:54

## 2021-05-09 RX ADMIN — TIOTROPIUM BROMIDE INHALATION SPRAY 5 MCG: 3.12 SPRAY, METERED RESPIRATORY (INHALATION) at 05:37

## 2021-05-09 RX ADMIN — LOPERAMIDE HYDROCHLORIDE 2 MG: 2 CAPSULE ORAL at 17:50

## 2021-05-09 RX ADMIN — LOPERAMIDE HYDROCHLORIDE 2 MG: 2 CAPSULE ORAL at 05:37

## 2021-05-09 RX ADMIN — LEFLUNOMIDE 20 MG: 20 TABLET ORAL at 05:37

## 2021-05-09 RX ADMIN — OMEPRAZOLE 40 MG: 20 CAPSULE, DELAYED RELEASE ORAL at 17:49

## 2021-05-09 RX ADMIN — ROSUVASTATIN CALCIUM 5 MG: 10 TABLET, FILM COATED ORAL at 17:51

## 2021-05-09 RX ADMIN — GABAPENTIN 300 MG: 300 CAPSULE ORAL at 05:37

## 2021-05-09 RX ADMIN — OMEPRAZOLE 40 MG: 20 CAPSULE, DELAYED RELEASE ORAL at 11:44

## 2021-05-09 RX ADMIN — SODIUM CHLORIDE 8 MG/HR: 9 INJECTION, SOLUTION INTRAVENOUS at 01:58

## 2021-05-09 ASSESSMENT — ENCOUNTER SYMPTOMS
DIZZINESS: 0
FLANK PAIN: 0
BLURRED VISION: 0
HEMOPTYSIS: 0
COUGH: 0
PHOTOPHOBIA: 0
HEARTBURN: 0
EYE DISCHARGE: 0
BRUISES/BLEEDS EASILY: 0
CHILLS: 0
DOUBLE VISION: 0
DIARRHEA: 0
SORE THROAT: 0
SENSORY CHANGE: 0
MYALGIAS: 0
BLOOD IN STOOL: 0
HEADACHES: 0
FEVER: 0
VOMITING: 0
DEPRESSION: 0
NAUSEA: 0
BACK PAIN: 0
SPUTUM PRODUCTION: 0
SPEECH CHANGE: 0
PALPITATIONS: 0

## 2021-05-09 ASSESSMENT — COGNITIVE AND FUNCTIONAL STATUS - GENERAL
MOBILITY SCORE: 17
WALKING IN HOSPITAL ROOM: A LOT
SUGGESTED CMS G CODE MODIFIER MOBILITY: CK
DRESSING REGULAR UPPER BODY CLOTHING: A LITTLE
PERSONAL GROOMING: A LITTLE
CLIMB 3 TO 5 STEPS WITH RAILING: A LITTLE
DRESSING REGULAR LOWER BODY CLOTHING: A LITTLE
EATING MEALS: A LITTLE
SUGGESTED CMS G CODE MODIFIER DAILY ACTIVITY: CK
TOILETING: A LOT
MOVING TO AND FROM BED TO CHAIR: A LITTLE
DAILY ACTIVITIY SCORE: 17
STANDING UP FROM CHAIR USING ARMS: A LOT
HELP NEEDED FOR BATHING: A LITTLE
MOVING FROM LYING ON BACK TO SITTING ON SIDE OF FLAT BED: A LITTLE

## 2021-05-09 ASSESSMENT — PAIN DESCRIPTION - PAIN TYPE
TYPE: ACUTE PAIN
TYPE: ACUTE PAIN;CHRONIC PAIN
TYPE: ACUTE PAIN
TYPE: ACUTE PAIN;CHRONIC PAIN

## 2021-05-09 ASSESSMENT — PAIN SCALES - GENERAL: PAIN_LEVEL: 0

## 2021-05-09 NOTE — ANESTHESIA TIME REPORT
Anesthesia Start and Stop Event Times     Date Time Event    5/9/2021 0830 Ready for Procedure     0834 Anesthesia Start     0850 Anesthesia Stop        Responsible Staff  05/09/21    Name Role Begin End    Miriam Guido M.D. Anesth 0834 0850        Preop Diagnosis (Free Text):  Pre-op Diagnosis     GI BLEED        Preop Diagnosis (Codes):    Post op Diagnosis  Duodenal ulcer      Premium Reason  E. Weekend    Comments:

## 2021-05-09 NOTE — PROGRESS NOTES
Patient brought back to floor. PRBC infusion completed in OR. Tele box placed, monitors notified.

## 2021-05-09 NOTE — CARE PLAN
Problem: Communication  Goal: The ability to communicate needs accurately and effectively will improve  Outcome: PROGRESSING AS EXPECTED     Problem: Safety  Goal: Will remain free from injury  Outcome: PROGRESSING AS EXPECTED  Goal: Will remain free from falls  Outcome: PROGRESSING AS EXPECTED     Problem: Knowledge Deficit  Goal: Knowledge of disease process/condition, treatment plan, diagnostic tests, and medications will improve  Outcome: PROGRESSING AS EXPECTED  Goal: Knowledge of the prescribed therapeutic regimen will improve  Outcome: PROGRESSING AS EXPECTED  Note: Patient verbalizes understanding to plan of care for the day.

## 2021-05-09 NOTE — OR NURSING
Pt on RA.  No c/o nausea.   Pt denies pain or discomfort.   Blood infusion completed in OR.  Midline Hub changed. VSS, afebrile, BROWN, A/O x4.      No belongings in PACU.  Pt transferred on monitor by this RN.  Transferred with surgical mask in place.     Bedside handoff to Sisi GONZALEZ.

## 2021-05-09 NOTE — CONSULTS
DATE OF SERVICE:  05/08/2021     GASTROENTEROLOGY CONSULTATION     REFERRING PHYSICIAN:  Medhat Rios MD     REASON FOR CONSULTATION:  Concern for GI bleed.     HISTORY OF PRESENT ILLNESS:  This is an 81-year-old gentleman well known to me   as I did his colonoscopy in 2018, which was unremarkable.  He has a history   of a stage III chronic kidney disease, hypertension, COPD on inhaler and   recent upper extremity deep vein thrombosis and was recently placed on Eliquis   approximately three months ago.  He states that over the past month, he has   been having increasing weakness and debilitation and he states that he fell   while he was washing his hands, because due to his weakness and   lightheadedness he lost his balance and fell backwards.  He was unable to   stand up and then per his wife became concerned and called 911 for further   assistance.  He states that his new weakness is greater than his baseline as   he is chronically short of breath from COPD.  He states that over the past   week, he has been appreciating more liquid stools that have been black in   nature.  He states that he has been utilizing Pepto-Bismol only one time.  His   wife who was at bedside as his son was as well, his wife stated that on   several of the bowel movements that she had cleaned she noted that the stool   was quite black and sticky.  Since being in the hospital, he has received   blood transfusions without change in his hemoglobin, but he has not shown any   signs of hematemesis or melena while being here.  Because of the lack of   change in his hemoglobin despite blood transfusions concern was raised by Dr. Rios and GI consultation was obtained.     REVIEW OF SYSTEMS:  CONSTITUTIONAL:  Positive malaise and fatigue.  No fevers or chills.  CARDIOVASCULAR:  Positive leg swelling.  No chest pain or palpitations.  PULMONARY:  Baseline shortness of breath.  No cough or hemoptysis.  GENITOURINARY:  No dysuria or  hematuria.  GASTROINTESTINAL:  No hematemesis.  Positive black stools. No abdominal pain,   no nausea, vomiting.  PSYCHIATRIC:  No suicidal ideation.  NEUROLOGIC:  No focal weakness or numbness, although he has been dizzy and   weak diffusely.     PAST MEDICAL HISTORY:  1.  Chronic kidney disease.  2.  Chronic lung disease.  3.  COPD.  4.  Gastroesophageal reflux disease.  5.  History of skin cancer.  6.  Dyslipidemia.  7.  Hypertension.  8.  Hypertriglyceridemia.  9.  Peripheral neuropathy.  10.  Pulmonary emphysema.  11.  Rheumatoid arthritis.  12.  Restless legs syndrome.  13.  Obstructive sleep apnea.  14.  Recent diagnosis of right upper extremity deep vein thrombosis, on   Eliquis.     PAST SURGICAL HISTORY:  1.  Testicle exploration.  2.  Nasal polypectomy.  3.  Colonoscopy 2018.  4.  Flexor tendon repair.     FAMILY HISTORY:  Genetic disorder in his father.  No history of GI malignancy.     ALLERGIES:  CIPROFLOXACIN, LEVAQUIN, PENICILLINS.     OUTPATIENT MEDICATIONS:  Included Eliquis which has since been discontinued,   loperamide, acetaminophen, budesonide, doxycycline, ____, fluticasone,   gabapentin, guaifenesin, leflunomide, rosuvastatin, Spiriva, triamcinolone.     PHYSICAL EXAMINATION:  VITAL SIGNS:  Stable.  Temperature is afebrile at 96.6; blood pressure is   149/90; pulse between 66, 79 and 107; respirations 16.  GENERAL:  The patient is in good spirits.  He recognized me immediately as did   his wife from the prior care provided to him in good spirits.  He is alert   x4, in no acute distress.  HEENT:  PERRLA, EOMI.  Sclerae anicteric.  Oropharynx is clear.  NECK:  Supple.  HEART:  Regular rhythm.  LUNGS:  Clear to auscultation.  ABDOMEN:  Soft, nontender, nondistended.  EXTREMITIES:  Bilateral lower extremity edema, 2 mm pitting edema extending   above the knee.  He has a large eschar over the right kneecap that is healing   without erythema or concern for infection.     LABORATORY DATA:  White  blood cell count is 10.4, H and H of 7.4/24.3,   platelet count of 272.  Sodium is 150, potassium 4.4, chloride of 117, BUN and   creatinine of 40/1.47.  AST, ALT of 17/14, alk phos of 116.  CT of the head   without contrast reveals no acute intracranial process.     IMPRESSION AND PLAN:  1.  This is an 81-year-old gentleman who presents with black stools of 1   week's duration during his treatment of an upper extremity deep vein   thrombosis with Eliquis over the past 3 months, who has had a drop in his   hemoglobin and an elevated BUN and creatinine ratio of greater than 20 to 1   concerning for an upper GI source.  At this time, his Eliquis has been held   and he has received blood transfusions and he has been started on a proton   pump inhibitor, specifically Protonix drip, which appears to be working   medically quite well as he has not shown signs of black stools. Concern for an   upper GI bleed is raised.  Risks and benefits of endoscopic evaluation in the   hospital versus outpatient reviewed with the patient, patient's wife and son.    Risks and benefits reviewed thoroughly.  Risks including but not limited to   bleeding, perforation, side effects of medication were informed.  The patient   voiced understanding and opted for endoscopic evaluation here in the hospital.    The patient will be n.p.o. after midnight and will be scheduled for an EGD   for diagnostic and therapeutic intervention tomorrow morning.  2.  COPD, on fluticasone and Symbicort.  3.  Chronic kidney disease.  Nonoliguric.  Creatinine baseline 1.6.  Avoid any   nephrotoxic agents.  4.  History of deep vein thrombosis of the upper extremity, achieved 3 months'   duration of Eliquis, which has been on hold.  5.  Chronic lower extremity edema.  Ejection fraction 60%.  6.  Dyslipidemia, on rosuvastatin.  7.  Orthostasis on admission.  This is improved since blood transfusion, IV   fluids.  8.  Acute gastrointestinal bleed.  Hold Eliquis.   Continue IV fluids, continue   IV PPI, n.p.o. after midnight.  EGD for further evaluation.  Risks and   benefits are reviewed as above.  The above history and physical, impression   and plan thoroughly reviewed with the patient, patient's wife and son.    Discussed with Dr. Rios as well, the hospital medicine physician who   consulted me and a total time provided for the care of this patient during   consultation including examination, dictation, scheduling discussion with   patient and all members stated above was 73 minutes.        ______________________________  MD BLUE Contreras/MARCO/GENA    DD:  05/08/2021 16:03  DT:  05/08/2021 19:36    Job#:  [id number]

## 2021-05-09 NOTE — PROGRESS NOTES
Per MD Rudd ok to pause Protonix in order to run blood. Verified with pharmacist Shivani Neil that it Is ok to pause protonix.

## 2021-05-09 NOTE — PROGRESS NOTES
Assumed patient care. Patient A&O x 4 on RA. Patient has tele box in place. Patient denies pain at this time. Patient updated on plan of care, verbalizes understanding. Patient has fall precautions in place, call light within reach. Patient has bed in low and locked position. Will continue to monitor.

## 2021-05-09 NOTE — CARE PLAN
Problem: Communication  Goal: The ability to communicate needs accurately and effectively will improve  Outcome: PROGRESSING AS EXPECTED     Problem: Mobility  Goal: Risk for activity intolerance will decrease  Outcome: PROGRESSING AS EXPECTED  Note: Pt calls appropriately when he wants to get up. PT is able to sit at the edge of bed to use urinal.

## 2021-05-09 NOTE — ANESTHESIA PREPROCEDURE EVALUATION
81M w/melena receiving transfusions here for EGD    Relevant Problems   ANESTHESIA   (+) NYASIA (obstructive sleep apnea)      PULMONARY   (+) COPD (chronic obstructive pulmonary disease) (HCC)   (+) Chronic obstructive pulmonary disease (HCC)      NEURO   (+) History of pseudomembranous enterocolitis      CARDIAC   (+) Acute embolism and thrombosis of deep veins of right upper extremity (HCC)   (+) Calcified LAD and LCx on CT chest 2015   (+) PVC (premature ventricular contraction)      GI   (+) GERD (gastroesophageal reflux disease)         (+) Stage 3 chronic kidney disease   (+) chronic kidney disease (HCC)      Other   (+) Acute GI bleeding   (+) Neuropathy     Past Medical History:   Diagnosis Date   • Abnormal thyroid stimulating hormone (TSH) level    • Allergic rhinitis    • Asbestosis (Formerly [facility])    • Asthma    • Bronchitis    • Chronic diarrhea     declines eval; takes immodium once a day usually and sometimes less; see previous notes; aware of risk    • Chronic low back pain    • Chronic lung disease     asbestos related   • CKD (chronic kidney disease), stage III     sees neph, one kidney   • COPD (chronic obstructive pulmonary disease) (Formerly [facility])    • Coronary artery calcification seen on CAT scan 8/2/2016    sees cards   • Epididymitis 2009    h/o listed in chart   • GERD (gastroesophageal reflux disease)    • History of hemorrhoids    • History of skin cancer     non melanoma   • [place] (hard of hearing)     declines hearing aids   • Hypercholesteremia    • Hypertension    • Hypertriglyceridemia    • Indigestion    • Light headedness     2/2 orthostasis? now better off hctz   • Lightheadedness 6/23/2016   • Low back pain    • Nasal drainage    • Olecranon bursitis    • Orthostasis 6/23/2016    better off HCTZ   • Peripheral neuropathy    • Pleural plaque 2005     CT [place]   • Post-nasal drip    • Pulmonary emphysema (Formerly [facility])    • RA (rheumatoid arthritis) (Formerly [facility])     on meds, sees rheum   • Restless leg syndrome    •  Rheumatoid arthritis (HCC)    • Sleep apnea     obstructive and central - not adherent to autopap   • Solitary kidney     history of kidney cyst 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-functioning kidney s/p nephrectomy       No current facility-administered medications on file prior to encounter.     Current Outpatient Medications on File Prior to Encounter   Medication Sig Dispense Refill   • tiotropium (SPIRIVA RESPIMAT) 2.5 mcg/Act Aero Soln Inhale 5 mcg every day.     • doxycycline (VIBRAMYCIN) 100 MG Tab Take 1 tablet by mouth 2 times a day for 60 days. 60 tablet 1   • ELIQUIS 2.5 MG Tab Take 2.5 mg by mouth every day.     • triamcinolone acetonide (KENALOG) 0.1 % Cream Apply 1 Application topically one time as needed.     • acetaminophen (TYLENOL) 325 MG Tab Take 650 mg by mouth every four hours as needed. Indications: Pain     • fluticasone (FLONASE) 50 MCG/ACT nasal spray Administer 2 Sprays into affected nostril(S) every day.     • guaiFENesin LA (MUCINEX) 600 MG TABLET SR 12 HR Take 1,200 mg by mouth every day.     • Loperamide HCl (IMODIUM A-D PO) Take 1 Tab by mouth 2 (two) times a day.     • budesonide-formoterol (SYMBICORT) 80-4.5 MCG/ACT Aerosol Inhale 2 Puffs 2 Times a Day.     • fexofenadine (ALLEGRA ALLERGY) 180 MG tablet Take 180 mg by mouth every day.     • gabapentin (NEURONTIN) 300 MG Cap TAKE 1 CAPSULE BY MOUTH THREE TIMES A DAY (Patient taking differently: Take 300-600 mg by mouth 2 Times a Day. 1 cap AM  2 caps PM) 180 Cap 0   • rosuvastatin (CRESTOR) 5 MG Tab TAKE 1 TABLET BY MOUTH EVERY EVENING (Patient taking differently: Take 5 mg by mouth every evening.) 90 Tab 1   • leflunomide (ARAVA) 20 MG Tab Take 1 Tab by mouth every day. 30 Tab 0      Past Surgical History:   Procedure Laterality Date   • IRRIGATION & DEBRIDEMENT ORTHO Right 2/2/2021    Procedure: IRRIGATION AND DEBRIDEMENT, WOUND-KNEE;  Surgeon: Kush Sol M.D.;  Location: SURGERY [facility];  Service: Orthopedics   • INCISION AND DRAINAGE  GENERAL Right 2/2/2021    Procedure: INCISION AND DRAINAGE- arthrocentesis right knee;  Surgeon: Kush Sol M.D.;  Location: SURGERY [facility];  Service: Orthopedics   • INCISION AND DRAINAGE GENERAL Right 12/19/2020    Procedure: INCISION AND DRAINAGE;  Surgeon: Marc Chowdhury M.D.;  Location: SURGERY [facility];  Service: Orthopedics   • IRRIGATION & DEBRIDEMENT ORTHO Right 10/27/2020    Procedure: IRRIGATION AND DEBRIDEMENT, WOUND - KNEE OPEN;  Surgeon: Elijah Faulkner M.D.;  Location: [facility];  Service: Orthopedics   • FLEXOR TENDON REPAIR Left 4/3/2019    Procedure: FLEXOR TENDON REPAIR- SMALL FINGER VS;  Surgeon: Marc Chowdhury M.D.;  Location: SURGERY [facility];  Service: Orthopedics   • TENDON TRANSFER Left 4/3/2019    Procedure: TENDON TRANSFER;  Surgeon: Marc Chowdhury M.D.;  Location: [facility];  Service: Orthopedics   • COLONOSCOPY  11/10/2018    Procedure: COLONOSCOPY;  Surgeon: Ganesh Peacock M.D.;  Location: [facility];  Service: Gastroenterology   • NASAL POLYPECTOMY Bilateral 10/6/2015    Procedure: NASAL POLYPECTOMY WITH SCOTT ENDOSCOPIC NASAL DEBRIDEMENT;  Surgeon: Param Maurer M.D.;  Location: SURGERY SAME DAY [facility];  Service:    • CYSTOSCOPY  2/1/2010    Performed by ANGIE VERDUZCO at [facility]   • RETROGRADES  2/1/2010    Performed by ANGIE VERDUZCO at [facility]   • PYELOGRAM  2/1/2010    Performed by ANGIE VERDUZCO at [facility]   • URETEROSCOPY  2/1/2010    Performed by ANGIE VERDUZCO at [facility] ORS   • NEPHRECTOMY LAPAROSCOPIC  2009    left kidney   • TESTICLE EXPLORATION  2004   • PB REMV 2ND CATARACT,CORN-SCLER SECTN     • TONSILLECTOMY        Vitals:    05/08/21 2300 05/09/21 0356 05/09/21 0640 05/09/21 0659   BP: 139/74 151/88 132/69 130/71   Pulse: 89 96 86 75   Resp: 17 17 17 17   Temp: 36.1 °C (96.9 °F) 36 °C  (96.8 °F) 35.9 °C (96.6 °F) 36 °C (96.8 °F)   TempSrc: Temporal Temporal  Temporal   SpO2: 92% 96% 96% 94%   Weight:       Height:         Physical Exam    Airway   Mallampati: IV  TM distance: <3 FB  Neck ROM: limited       Cardiovascular - normal exam     Dental - normal exam           Pulmonary - normal exam     Abdominal    Neurological - normal exam                 Anesthesia Plan    ASA 3- EMERGENT   ASA physical status 3 criteria: COPDASA physical status emergent criteria: acute hemorrhage    Plan - MAC             Plan Factors:   Patient was previously instructed to abstain from smoking on day of procedure.  Patient did not smoke on day of procedure.      Induction: intravenous      Pertinent diagnostic labs and testing reviewed    Informed Consent:    Anesthetic plan and risks discussed with patient.

## 2021-05-09 NOTE — WOUND TEAM
[facility]  Inpatient Services  Initial Wound and Skin Care Evaluation    Admission Date: 5/7/2021     Last order of IP CONSULT TO WOUND CARE was found on 5/7/2021 from Hospital Encounter on 5/7/2021     HPI, PMH, SH: Reviewed    Past Surgical History:   Procedure Laterality Date   • PB UPPER GI ENDOSCOPY,DIAGNOSIS N/A 5/9/2021    Procedure: GASTROSCOPY;  Surgeon: Ganesh Peacock M.D.;  Location: [facility];  Service: Gastroenterology   • IRRIGATION & DEBRIDEMENT ORTHO Right 2/2/2021    Procedure: IRRIGATION AND DEBRIDEMENT, WOUND-KNEE;  Surgeon: Kush Sol M.D.;  Location: [facility];  Service: Orthopedics   • INCISION AND DRAINAGE GENERAL Right 2/2/2021    Procedure: INCISION AND DRAINAGE- arthrocentesis right knee;  Surgeon: Kush Sol M.D.;  Location: [facility];  Service: Orthopedics   • INCISION AND DRAINAGE GENERAL Right 12/19/2020    Procedure: INCISION AND DRAINAGE;  Surgeon: Marc Chowdhury M.D.;  Location: [facility];  Service: Orthopedics   • IRRIGATION & DEBRIDEMENT ORTHO Right 10/27/2020    Procedure: IRRIGATION AND DEBRIDEMENT, WOUND - KNEE OPEN;  Surgeon: Elijah Faulkner M.D.;  Location: [facility];  Service: Orthopedics   • FLEXOR TENDON REPAIR Left 4/3/2019    Procedure: FLEXOR TENDON REPAIR- SMALL FINGER VS;  Surgeon: Marc Chowdhury M.D.;  Location: [facility];  Service: Orthopedics   • TENDON TRANSFER Left 4/3/2019    Procedure: TENDON TRANSFER;  Surgeon: Marc Chowdhury M.D.;  Location: [facility];  Service: Orthopedics   • COLONOSCOPY  11/10/2018    Procedure: COLONOSCOPY;  Surgeon: Ganesh Peacock M.D.;  Location: [facility];  Service: Gastroenterology   • NASAL POLYPECTOMY Bilateral 10/6/2015    Procedure: NASAL POLYPECTOMY WITH SCOTT ENDOSCOPIC NASAL DEBRIDEMENT;  Surgeon: Param Maurer M.D.;  Location: SURGERY SAME DAY [facility];  Service:    •  CYSTOSCOPY  2010    Performed by ANGIE VERDUZCO at SURGERY [facility] ORS   • RETROGRADES  2010    Performed by ANGIE VERDUZCO at SURGERY [facility] ORS   • PYELOGRAM  2010    Performed by ANGIE VERDUZCO at SURGERY [facility] ORS   • URETEROSCOPY  2010    Performed by ANGIE VERDUZCO at SURGERY [facility] ORS   • NEPHRECTOMY LAPAROSCOPIC      left kidney   • TESTICLE EXPLORATION     • PB REMV 2ND CATARACT,CORN-SCLER SECTN     • TONSILLECTOMY       Social History     Tobacco Use   • Smoking status: Former Smoker     Packs/day: 1.00     Years: 40.00     Pack years: 40.00     Types: Cigarettes     Quit date: 1980     Years since quittin.3   • Smokeless tobacco: Never Used   • Tobacco comment: 1 pk a day for 40 yrs   Substance Use Topics   • Alcohol use: No     Alcohol/week: 0.0 oz     Chief Complaint   Patient presents with   • Weakness     Generalized x1 week   • Dizziness     Near syncope when changing positions.   • Syncope     Per EMS pt had a syncopal event when transferring to [facility].     Diagnosis: GI bleeding [K92.2]    Unit where seen by Wound Team: T7     WOUND CONSULT/FOLLOW UP RELATED TO:  Bilateral elbow skin tears and right knee     WOUND HISTORY:  Pt was admitted back in February with complaints of right knee pain, pt had multiple orthopedic surgeries with wound complications prior to February admission. Pt again underwent surgical intervention with Dr. Sol on  regarding right knee and a wound vac was placed. Wound team followed the patient until he was discharged home with [facility] and home wound vac. Pt reports LISA Acuna with home health sees him several times a week. Wound vac had been DC'd and wet to dry dressings were being applied by pt family and home health. Pt now presents with acute GI Bleed. Wound team was consulted to manage bilateral elbow skin tears as well as right knee wound.    WOUND  ASSESSMENT/LDA  Wound 02/04/21 Full Thickness Wound Knee Anterior R Knee wound (Active)   Wound Image     05/09/21 1600   Site Assessment Red;Drainage    Periwound Assessment Clean;Dry;Intact    Margins Attached edges;Defined edges    Closure Adhesive bandage    Drainage Amount Scant    Drainage Description Serosanguineous    Treatments Cleansed;Site care;Offloading    Wound Cleansing Approved Wound Cleanser    Periwound Protectant Skin Protectant Wipes to Periwound    Dressing Cleansing/Solutions Not Applicable    Dressing Options Honey Colloid;Mepilex Heel    Dressing Changed Changed    Dressing Status Clean;Dry;Intact    Dressing Change/Treatment Frequency Every 48 hrs, and As Needed    NEXT Dressing Change/Treatment Date 05/11/21    NEXT Weekly Photo (Inpatient Only) 05/16/21    Non-staged Wound Description Full thickness    Wound Length (cm) 4.4 cm    Wound Width (cm) 2 cm    Wound Depth (cm) 0.1 cm    Wound Surface Area (cm^2) 8.8 cm^2    Wound Volume (cm^3) 0.88 cm^3    Shape Oval x2    Exposed Structures None    WOUND NURSE ONLY - Time Spent with Patient (mins) 60    Number of days: 94      Vascular:    NIKKY:   No results found.    Lab Values:    Lab Results   Component Value Date/Time    WBC 8.8 05/09/2021 01:52 PM    RBC 2.81 (L) 05/09/2021 01:52 PM    HEMOGLOBIN 8.3 (L) 05/09/2021 01:52 PM    HEMATOCRIT 25.8 (L) 05/09/2021 01:52 PM    CREACTPROT 34.20 (H) 02/02/2021 04:59 AM    SEDRATEWES 25 (H) 01/05/2021 10:35 AM      Culture Results show:  No results found for this or any previous visit (from the past 720 hour(s)).    Pain Level/Medicated:  Denies pain       INTERVENTIONS BY WOUND TEAM:  Chart and images reviewed. Discussed with bedside RN. All areas of concern (based on picture review, LDA review and discussion with bedside RN) have been thoroughly assessed. Documentation of areas based on significant findings. This RN in to assess patient. Performed standard wound care which includes appropriate  positioning, dressing removal and non-selective debridement. Pictures and measurements obtained weekly if/when required.  Preparation for Dressing removal: NA open to air  Cleansed with:  wound cleanser and gauze.  Sharp debridement: Crust/eschar was removed using forceps, wound cleanser and gauze.  Dona wound: Cleansed with wound cleanser and gauze, Prepped with no sting  Primary Dressing: a piece of honey colloid was applied over each wound  Secondary (Outer) Dressing: Secured in place with heel mepilex    **Bilateral elbows assessed, right elbow left open to air. Left elbow with skin tear was cleansed and then dressed with mepitel one and heel mepilex.     Interdisciplinary consultation: Patient, Bedside RN, Wound RN (Farooq), family at bedside (Wife and Son)    EVALUATION / RATIONALE FOR TREATMENT:  Most Recent Date:  5/9/21: Right knee surgical wound with dry crust and boggy center with purulent drainage. Honeycolloid applied to cleanse and autolytically debride while providing for moist wound healing.     Goals: Steady decrease in wound area and depth weekly.    WOUND TEAM PLAN OF CARE ([X] for frequency of wound follow up,):   Nursing to follow orders written for wound care. Contact wound team if area fails to progress, deteriorates or with any questions/concerns  Dressing changes by wound team:                   Follow up 3 times weekly:                NPWT change 3 times weekly:     Follow up 1-2 times weekly:      Follow up Bi-Monthly:                   Follow up as needed:   X  Other (explain):     NURSING PLAN OF CARE ORDERS (X):  Dressing changes: See Dressing Care orders: X  Skin care: See Skin Care orders:   RN Prevention Protocol:   Rectal tube care: See Rectal Tube Care orders:   Other orders:    RSKIN:   CURRENTLY IN PLACE (X), APPLIED THIS VISIT (A), ORDERED (O):   Q shift Silverio:  X  Q shift pressure point assessments:  X    Surface/Positioning   Pressure redistribution mattress    X        Low  Airloss          Bariatric foam      Bariatric DELANEY     Waffle cushion        Waffle Overlay          Reposition q 2 hours    X  TAPs Turning system     Z Syd Pillow     Offloading/Redistribution   Sacral Mepilex (Silicone dressing)   SARITA  Heel Mepilex (Silicone dressing)   A      Heel float boots (Prevalon boot)             Float Heels off Bed with Pillows  X         Respiratory   Silicone O2 tubing       X  Gray Foam Ear protectors     Cannula fixation Device (Tender )          High flow offloading Clip    Elastic head band offloading device      Anchorfast                                                         Trach with Optifoam split foam             Containment/Moisture Prevention     Rectal tube or BMS    Purwick/Condom Cath        Lozano Catheter    Barrier wipes           Barrier paste       Antifungal tx      Interdry        Mobilization       Up to chair        Ambulate      PT/OT      Nutrition       Dietician        Diabetes Education      PO  X   TF     TPN     NPO   # days     Other        Anticipated discharge plans:   LTACH:        SNF/Rehab:                  Home Health Care:   [facility]        Outpatient Wound Center:            Self/Family Care:        Other:

## 2021-05-09 NOTE — ANESTHESIA POSTPROCEDURE EVALUATION
Patient: Barry Torres    Procedure Summary     Date: 05/09/21 Room / Location: [place] [id number] / SURGERY [facility]    Anesthesia Start: 0834 Anesthesia Stop: 0850    Procedure: GASTROSCOPY (N/A Esophagus) Diagnosis: (LARGE DUODENAL ULCER)    Surgeons: Ganesh Peacock M.D. Responsible Provider: Miriam Guido M.D.    Anesthesia Type: MAC ASA Status: 3 - Emergent          Final Anesthesia Type: MAC  Last vitals  BP   Blood Pressure : 146/73    Temp   36.6 °C (97.8 °F)    Pulse   83   Resp   15    SpO2   93 %      Anesthesia Post Evaluation    Patient location during evaluation: PACU  Patient participation: complete - patient participated  Level of consciousness: awake and alert  Pain score: 0    Airway patency: patent  Anesthetic complications: no  Cardiovascular status: hemodynamically stable  Respiratory status: acceptable  Hydration status: euvolemic    PONV: none          There were no known complications for this encounter.

## 2021-05-09 NOTE — OP REPORT
DATE OF SERVICE:  05/09/2021     PROCEDURE PERFORMED:  Esophagogastroduodenoscopy.     PHYSICIAN:  Ganesh Peacock MD     ANESTHESIOLOGIST:  Ganesh Peacock MD     MEDICATION:  Deep sedation.     PREPROCEDURE DIAGNOSIS:  Melena.     POSTPROCEDURE DIAGNOSES:  Large duodenal ulcer in the duodenal bulb causing a   small degree of swelling and prevention of the scope to be passed into the   second and third portion of the duodenum, but without any evidence for gastric   outlet obstruction as the entire stomach was empty.  Procedure, risks and   benefits reviewed thoroughly with the patient and the patient's family, wife   and son.  Risks including but not limited to bleeding, perforation, side   effects of medication were informed.  The patient voiced understanding and   agreed to proceed.     DESCRIPTION OF PROCEDURE:  The patient was placed in the left lateral   decubitus position.  After sedation was achieved, bite block was inserted in   the mouth and a forward-viewing gastroscope was passed carefully and easily   under direct visualization into the esophagus.  All esophageal views were   normal.  Examination of the stomach was normal, both forward and retroflexed.    Upon examination in to the pyloric valve, and the duodenal bulb, immediately   at the apex of the duodenal cap, was a near-obstructing duodenal ulcer.    Examination, irrigation with fluids, water specifically, allowed for clearing   and there was no evidence for any active bleeding, visible vessel or overlying   clot.  It was in the healing phases, but quite large.  It was causing some   degree of duodenal lumen narrowing and the scope could not be passed into the   second and third portions.  This ulcer did not have any features of malignant   ulcer.  The antrum was examined, it was not atrophic.  There were no other   ulcers besides this large one.  The stomach was suctioned, desufflated and   scope was removed.     COMPLICATIONS:  None.     BLOOD  LOSS:  None.     SPECIMENS:  None.     RECOMMENDATIONS:  Duodenal ulcer, likely secondary to Effient and NSAID use.    The risks and benefit ratio of re-introducing Effient in the setting of a   history of a 3-month utilization of Effient treatment for deep vein thrombosis   of the upper extremity should be reviewed as well as the risk for recurrent   bleeding is quite high if antiplatelet agents were utilized.  Absolutely no   NSAIDs are highly recommended for the next 4 weeks.  The patient has an   appointment with my nurse practitioner this coming Tuesday and should keep   that for followup and the patient will have a repeat EGD in 4 weeks' time to   ensure healing of this ulcer and/or biopsy if it persists to rule out   malignancy.  The above plan was reviewed thoroughly with the patient's wife,   the patient's son.  Images of the procedure, results were provided to them.  A   detailed description of the procedure, a detailed description of the followup   plan was also reviewed.  All questions were answered to their satisfaction   and the above with a followup plan as an outpatient was reviewed thoroughly   with Dr. Rios.  All questions were answered to his satisfaction.  Please call   with any further questions or concerns.  Email has been sent to my office for   scheduling of the above.  Please continue proton pump inhibitor therapy in   max dose fashion, omeprazole 40 mg every 12 hours for the next 3 months.        ______________________________  MD BLUE Contreras/SUB/MICHAEL    DD:  05/09/2021 09:02  DT:  05/09/2021 10:12    Job#:  [id number]

## 2021-05-09 NOTE — PROGRESS NOTES
[facility] Medicine Daily Progress Note    Date of Service  5/9/2021    Chief Complaint  81 y.o. male admitted 5/7/2021 with black tarry stools    Hospital Course    Barry Torres is a 81 y.o. male with past medical history of CKD stage III, hypertension not on meds, COPD on inhaler, right upper extremity DVT in February currently on Eliquis 2.5mg bid  Patient presents to the ED with weakness/debilitation which has been going on for about a week and fall hurt his head.  Patient and wife state that patient has been not as active for several months. Yesterday morning patient had a mechanical fall while he was washing his hands at the sink, patient states that he felt lightheaded lost balance and fell backwards hitting his head.  This morning patient went to the restroom and was unable to stand up from it he is laid down on the floor and called his wife for help, wife was not able to help him up and call 911.     Patient states that he felt weak to stand up on his own, chronic shortness of breath due to COPD; and  Lightheadedness and dizziness starting recently, denied chest pain, palpitations, visual changes,  altered mentation, or focal weakness. Patient also states that for the past several days he has had dark stools, but he thought that they were due to to ingestion of Pepto-Bismol.  Patient is currently taking Eliquis for almost months since he was diagnosed with a upper extremity DVT.  He was told that he will complete a 3-month treatment.    Interval Problem Update    5/8/2021: The patient was seen and evaluated at bedside and appears comfortable.  Patient states that he is tolerating oral intake without epigastric pain.  He was initially admitted for further evaluation and management of possible upper GI bleed.  He did receive transfusion of 1 unit of packed RBCs however his hemoglobin had minimal decline.  He did have a repeat episode overnight.  At this point we are pending GI evaluation and possible  possible endoscopic intervention.  Patient denies any chest pains, palpitations, shortness of breath.  No other overnight events reported.    5/9/2021: The patient was seen and evaluated at bedside and appears comfortable.  He underwent upper endoscopy with GI this morning which revealed large clean-based ulcer in the duodenum.  We are pending results of H. pylori testing.  Ulceration and subsequent upper GI bleed likely secondary to Eliquis use for prior upper extremity DVT.  According the patient he does not have any personal history of malignancy, no recent surgery or immobilization, interestingly his son does have a history of DVTs and PE so I suspect that patient does have a familial component in his coagulation disorder.  In that case, he is at high risk for redeveloping DVT despite initially being told to complete therapy with Eliquis for only 3 months.  He is currently 2 months into treatment.  At this time we will reevaluate patient with upper and lower extremity venous Dopplers to evaluate for persistence of DVT prior to pursuing superior IVC filter placement while he is off anticoagulation.  He is at high risk for rebleeding on any anticoagulation. Will discuss with interventional radiology once doppler results are available. Of note he did receive 2 units of packed RBCs during this admission.  No other overnight events.    Consultants/Specialty  GI    Code Status  Full Code    Disposition   PT/OT recommendations and venous Doppler results    Review of Systems  Review of Systems   Constitutional: Negative for chills, fever and malaise/fatigue.   HENT: Negative for congestion, hearing loss, sore throat and tinnitus.    Eyes: Negative for blurred vision, double vision, photophobia and discharge.   Respiratory: Negative for cough, hemoptysis and sputum production.    Cardiovascular: Negative for chest pain and palpitations.   Gastrointestinal: Negative for blood in stool, diarrhea, heartburn, melena, nausea  and vomiting.   Genitourinary: Negative for dysuria, flank pain, hematuria and urgency.   Musculoskeletal: Negative for back pain, joint pain and myalgias.   Skin: Negative for itching and rash.   Neurological: Negative for dizziness, sensory change, speech change and headaches.   Endo/Heme/Allergies: Does not bruise/bleed easily.   Psychiatric/Behavioral: Negative for depression and suicidal ideas.        Physical Exam  Temp:  [35.9 °C (96.6 °F)-37 °C (98.6 °F)] 36.6 °C (97.8 °F)  Pulse:  [] 83  Resp:  [15-19] 15  BP: (112-154)/(58-90) 146/73  SpO2:  [92 %-100 %] 93 %    Physical Exam  Vitals reviewed.   Constitutional:       Appearance: Normal appearance.   HENT:      Head: Normocephalic and atraumatic.      Nose: No congestion or rhinorrhea.      Mouth/Throat:      Mouth: Mucous membranes are moist.      Pharynx: Oropharynx is clear.   Eyes:      General: No scleral icterus.     Extraocular Movements: Extraocular movements intact.      Conjunctiva/sclera: Conjunctivae normal.      Pupils: Pupils are equal, round, and reactive to light.   Cardiovascular:      Rate and Rhythm: Normal rate and regular rhythm.      Heart sounds: No murmur. No friction rub. No gallop.    Pulmonary:      Effort: Pulmonary effort is normal. No respiratory distress.      Breath sounds: Normal breath sounds. No stridor.   Abdominal:      General: Abdomen is flat. Bowel sounds are normal.      Palpations: Abdomen is soft.   Musculoskeletal:         General: No swelling, tenderness or deformity.      Cervical back: Neck supple. No rigidity. No muscular tenderness.   Lymphadenopathy:      Cervical: No cervical adenopathy.   Skin:     General: Skin is warm and dry.      Findings: No erythema or rash.   Neurological:      General: No focal deficit present.      Mental Status: He is alert and oriented to person, place, and time. Mental status is at baseline.      Cranial Nerves: No cranial nerve deficit.      Sensory: No sensory deficit.    Psychiatric:         Mood and Affect: Mood normal.         Behavior: Behavior normal.         Thought Content: Thought content normal.         Fluids    Intake/Output Summary (Last 24 hours) at 5/9/2021 1154  Last data filed at 5/9/2021 0900  Gross per 24 hour   Intake 350 ml   Output 500 ml   Net -150 ml       Laboratory  Recent Labs     05/07/21  0940 05/08/21  0114 05/09/21  0339   WBC 9.2 10.4 10.4   RBC 2.49* 2.61* 2.21*   HEMOGLOBIN 7.6* 7.4* 6.4*   HEMATOCRIT 25.2* 24.3* 20.9*   .2* 93.1 94.6   MCH 30.5 28.4 29.0   MCHC 30.2* 30.5* 30.6*   RDW 65.8* 73.2* 76.9*   PLATELETCT 364 272 264   MPV 10.3 10.3 10.4     Recent Labs     05/07/21 0940 05/08/21  0114 05/09/21  0339   SODIUM 143 146* 142   POTASSIUM 4.5 4.4 4.0   CHLORIDE 112 117* 115*   CO2 18* 21 21   GLUCOSE 138* 100* 94   BUN 35* 40* 30*   CREATININE 1.62* 1.47* 1.57*   CALCIUM 8.8 8.1* 7.8*     Recent Labs     05/07/21 0940   INR 1.27*               Imaging  IR-MIDLINE CATHETER INSERTION WO GUIDANCE > AGE 3   Final Result                  Ultrasound-guided midline placement performed by qualified nursing staff    as above.          CT-HEAD W/O   Final Result      1.  No acute intracranial process.      2.  Atrophy and small vessel ischemic change.      US-EXTREMITY VENOUS LOWER BILAT    (Results Pending)   US-EXTREMITY VENOUS UPPER BILAT    (Results Pending)        Assessment/Plan  * Acute GI bleeding  Assessment & Plan  Likely upper GI bleeding   Transfuse  Hold eliquis  IV fluid  IV PPI  Diet as tolerated   Pending GI evaluation     History of pseudomembranous enterocolitis- (present on admission)  Assessment & Plan  Loperamide 1tab bid  Continue immunosuppressive therapy     Limited mobility- (present on admission)  Assessment & Plan  Likely due to age  Pending PT/OT evaluation     chronic kidney disease (HCC)- (present on admission)  Assessment & Plan  Cr ~ 1.6; baseline 1.4-1.7  - Urine: non-oliguric  - BP: Goal < 140/90  - Volume:  euvolemic  - Acid/Base: no sig. acid base disturbance  - Electrolytes: stable  - Anemia: Goal Hgb 10-11  - MBD: Ca normal, PO4 unknown  - dose medication based on GFR  - avoid nephrotoxicity  - nephrology consult as needed  - IVF      Neuropathy- (present on admission)  Assessment & Plan  Gabapentin 600mg am, 300mg pm    COPD (chronic obstructive pulmonary disease) (HCC)- (present on admission)  Assessment & Plan  symbicort  On inhaler PRN  stable    GERD (gastroesophageal reflux disease)  Assessment & Plan  Currently not on chronic PPI therapy  He will likely need this prior to discharge    Acute embolism and thrombosis of deep veins of right upper extremity (HCC)- (present on admission)  Assessment & Plan  On eliquis 2.5mg bid, which is on hold due to GI bleeding    SCD    Chronic lower extremity edema- (present on admission)  Assessment & Plan  EF 60% In 9/8/2020      Hyperlipidemia- (present on admission)  Assessment & Plan  On rosuvastatin 5mg qd    Orthostasis- (present on admission)  Assessment & Plan  Blood transfuse  IV fluid    DVT prophylaxis  Assessment & Plan  scd       VTE prophylaxis: SCDS

## 2021-05-10 ENCOUNTER — APPOINTMENT (OUTPATIENT)
Dept: RADIOLOGY | Facility: MEDICAL CENTER | Age: 81
DRG: 379 | End: 2021-05-10
Attending: INTERNAL MEDICINE
Payer: MEDICARE

## 2021-05-10 LAB
ANION GAP SERPL CALC-SCNC: 8 MMOL/L (ref 7–16)
BUN SERPL-MCNC: 23 MG/DL (ref 8–22)
CALCIUM SERPL-MCNC: 7.7 MG/DL (ref 8.5–10.5)
CHLORIDE SERPL-SCNC: 113 MMOL/L (ref 96–112)
CO2 SERPL-SCNC: 22 MMOL/L (ref 20–33)
CREAT SERPL-MCNC: 1.44 MG/DL (ref 0.5–1.4)
ERYTHROCYTE [DISTWIDTH] IN BLOOD BY AUTOMATED COUNT: 67.7 FL (ref 35.9–50)
GLUCOSE SERPL-MCNC: 96 MG/DL (ref 65–99)
HCT VFR BLD AUTO: 24.4 % (ref 42–52)
HGB BLD-MCNC: 7.6 G/DL (ref 14–18)
MAGNESIUM SERPL-MCNC: 1.8 MG/DL (ref 1.5–2.5)
MCH RBC QN AUTO: 29.1 PG (ref 27–33)
MCHC RBC AUTO-ENTMCNC: 31.1 G/DL (ref 33.7–35.3)
MCV RBC AUTO: 93.5 FL (ref 81.4–97.8)
PHOSPHATE SERPL-MCNC: 2.9 MG/DL (ref 2.5–4.5)
PLATELET # BLD AUTO: 233 K/UL (ref 164–446)
PMV BLD AUTO: 10.2 FL (ref 9–12.9)
POTASSIUM SERPL-SCNC: 4.1 MMOL/L (ref 3.6–5.5)
RBC # BLD AUTO: 2.61 M/UL (ref 4.7–6.1)
SODIUM SERPL-SCNC: 143 MMOL/L (ref 135–145)
WBC # BLD AUTO: 7.2 K/UL (ref 4.8–10.8)

## 2021-05-10 PROCEDURE — 770020 HCHG ROOM/CARE - TELE (206)

## 2021-05-10 PROCEDURE — 700102 HCHG RX REV CODE 250 W/ 637 OVERRIDE(OP): Performed by: INTERNAL MEDICINE

## 2021-05-10 PROCEDURE — 99232 SBSQ HOSP IP/OBS MODERATE 35: CPT | Performed by: INTERNAL MEDICINE

## 2021-05-10 PROCEDURE — 97166 OT EVAL MOD COMPLEX 45 MIN: CPT

## 2021-05-10 PROCEDURE — 93970 EXTREMITY STUDY: CPT

## 2021-05-10 PROCEDURE — A9270 NON-COVERED ITEM OR SERVICE: HCPCS | Performed by: STUDENT IN AN ORGANIZED HEALTH CARE EDUCATION/TRAINING PROGRAM

## 2021-05-10 PROCEDURE — 700102 HCHG RX REV CODE 250 W/ 637 OVERRIDE(OP): Performed by: STUDENT IN AN ORGANIZED HEALTH CARE EDUCATION/TRAINING PROGRAM

## 2021-05-10 PROCEDURE — A9270 NON-COVERED ITEM OR SERVICE: HCPCS | Performed by: INTERNAL MEDICINE

## 2021-05-10 PROCEDURE — 85027 COMPLETE CBC AUTOMATED: CPT

## 2021-05-10 PROCEDURE — 97161 PT EVAL LOW COMPLEX 20 MIN: CPT

## 2021-05-10 PROCEDURE — 83735 ASSAY OF MAGNESIUM: CPT

## 2021-05-10 PROCEDURE — 84100 ASSAY OF PHOSPHORUS: CPT

## 2021-05-10 PROCEDURE — 80048 BASIC METABOLIC PNL TOTAL CA: CPT

## 2021-05-10 RX ADMIN — BUDESONIDE AND FORMOTEROL FUMARATE DIHYDRATE 2 PUFF: 80; 4.5 AEROSOL RESPIRATORY (INHALATION) at 05:12

## 2021-05-10 RX ADMIN — TIOTROPIUM BROMIDE INHALATION SPRAY 5 MCG: 3.12 SPRAY, METERED RESPIRATORY (INHALATION) at 05:12

## 2021-05-10 RX ADMIN — GABAPENTIN 300 MG: 300 CAPSULE ORAL at 05:13

## 2021-05-10 RX ADMIN — LOPERAMIDE HYDROCHLORIDE 2 MG: 2 CAPSULE ORAL at 17:57

## 2021-05-10 RX ADMIN — ROSUVASTATIN CALCIUM 5 MG: 10 TABLET, FILM COATED ORAL at 17:58

## 2021-05-10 RX ADMIN — FLUTICASONE PROPIONATE 100 MCG: 50 SPRAY, METERED NASAL at 05:18

## 2021-05-10 RX ADMIN — FEXOFENADINE HCL 60 MG: 60 TABLET, FILM COATED ORAL at 05:13

## 2021-05-10 RX ADMIN — DOCUSATE SODIUM 50 MG AND SENNOSIDES 8.6 MG 2 TABLET: 8.6; 5 TABLET, FILM COATED ORAL at 05:13

## 2021-05-10 RX ADMIN — OMEPRAZOLE 40 MG: 20 CAPSULE, DELAYED RELEASE ORAL at 05:12

## 2021-05-10 RX ADMIN — GABAPENTIN 600 MG: 300 CAPSULE ORAL at 17:57

## 2021-05-10 RX ADMIN — OMEPRAZOLE 40 MG: 20 CAPSULE, DELAYED RELEASE ORAL at 17:57

## 2021-05-10 RX ADMIN — GUAIFENESIN 1200 MG: 600 TABLET, EXTENDED RELEASE ORAL at 05:13

## 2021-05-10 RX ADMIN — BUDESONIDE AND FORMOTEROL FUMARATE DIHYDRATE 2 PUFF: 80; 4.5 AEROSOL RESPIRATORY (INHALATION) at 17:56

## 2021-05-10 RX ADMIN — LEFLUNOMIDE 20 MG: 20 TABLET ORAL at 05:13

## 2021-05-10 ASSESSMENT — COGNITIVE AND FUNCTIONAL STATUS - GENERAL
SUGGESTED CMS G CODE MODIFIER DAILY ACTIVITY: CI
DAILY ACTIVITIY SCORE: 23
TURNING FROM BACK TO SIDE WHILE IN FLAT BAD: A LITTLE
STANDING UP FROM CHAIR USING ARMS: A LITTLE
HELP NEEDED FOR BATHING: A LITTLE
WALKING IN HOSPITAL ROOM: A LITTLE
MOBILITY SCORE: 18
CLIMB 3 TO 5 STEPS WITH RAILING: A LITTLE
MOVING FROM LYING ON BACK TO SITTING ON SIDE OF FLAT BED: A LITTLE
MOVING TO AND FROM BED TO CHAIR: A LITTLE
SUGGESTED CMS G CODE MODIFIER MOBILITY: CK

## 2021-05-10 ASSESSMENT — ENCOUNTER SYMPTOMS
HEARTBURN: 0
NAUSEA: 0
COUGH: 0
MYALGIAS: 0
BACK PAIN: 0
BRUISES/BLEEDS EASILY: 0
FEVER: 0
DIZZINESS: 0
VOMITING: 0
BLOOD IN STOOL: 0
DEPRESSION: 0
FLANK PAIN: 0
SPEECH CHANGE: 0
PALPITATIONS: 0
PHOTOPHOBIA: 0
CHILLS: 0
SENSORY CHANGE: 0
HEMOPTYSIS: 0
HEADACHES: 0
DIARRHEA: 0
DOUBLE VISION: 0
BLURRED VISION: 0

## 2021-05-10 ASSESSMENT — GAIT ASSESSMENTS
ASSISTIVE DEVICE: FRONT WHEEL WALKER
GAIT LEVEL OF ASSIST: SUPERVISED
DISTANCE (FEET): 100
DEVIATION: SHUFFLED GAIT;DECREASED HEEL STRIKE;DECREASED TOE OFF

## 2021-05-10 ASSESSMENT — ACTIVITIES OF DAILY LIVING (ADL): TOILETING: INDEPENDENT

## 2021-05-10 NOTE — THERAPY
"Occupational Therapy   Initial Evaluation     Patient Name: Barry Torres  Age:  81 y.o., Sex:  male  Medical Record #: [id number]  Today's Date: 5/10/2021     Precautions  Precautions: Fall Risk    Assessment  Patient is 81 y.o. male admitted for black tarry stools. PMHX: CKD stage III, hypertension not on meds, COPD on inhaler, right upper extremity DVT, sepsis, pneumonia and knee effusion. This admission pt is dx w/acute GI bleed, and hx of pseudomembranous enterocolitis. Pt is familiar to this patient from previous admits and at this time pt is at/near his functional baseline.     Plan  Recommend Occupational Therapy for Evaluation only      DC Equipment Recommendations: None  Discharge Recommendations: Recommend home health for continued occupational therapy services(prefers [facility] )     Subjective  \"I feel like I am ready to get out of here\"      Objective     05/10/21 1042   Prior Living Situation   Prior Services Skilled Home Health Services   Housing / Facility 1 Story House   Steps Into Home 0   Steps In Home 0   Bathroom Set up Walk In Shower;Shower Chair   Equipment Owned Front-Wheel Walker;4-Wheel Walker;Single Point Cane   Lives with - Patient's Self Care Capacity Spouse   Comments Spouse is able to assist w/IADl's but not to physically assist pt, also have house keeping 2x/week    Prior Level of ADL Function   Self Feeding Independent   Grooming / Hygiene Independent   Bathing Independent   Dressing Independent   Toileting Independent   Prior Level of IADL Function   Medication Management Independent   Laundry Independent   Kitchen Mobility Independent   Finances Independent   Home Management Requires Assist   Shopping Independent   Prior Level Of Mobility Independent With Device in Community   Driving / Transportation Driving Independent   Precautions   Precautions Fall Risk   Pain 0 - 10 Group   Therapist Pain Assessment 0;Post Activity Pain Same as Prior to Activity   Cognition    Cognition / " Consciousness WDL   Level of Consciousness Alert   Comments pleasnat and cooperative    Passive ROM Upper Body   Passive ROM Upper Body WDL   Active ROM Upper Body   Active ROM Upper Body  WDL   Strength Upper Body   Upper Body Strength  WDL   Sensation Upper Body   Upper Extremity Sensation  WDL   Upper Body Muscle Tone   Upper Body Muscle Tone  WDL   Neurological Concerns   Neurological Concerns No   Coordination Upper Body   Coordination WDL   Balance Assessment   Sitting Balance (Static) Good   Sitting Balance (Dynamic) Fair +   Standing Balance (Static) Fair   Standing Balance (Dynamic) Fair   Weight Shift Sitting Good   Weight Shift Standing Fair   Comments w/fww    Bed Mobility    Supine to Sit Supervised   Sit to Supine Supervised   Scooting Supervised   Rolling Supervised   ADL Assessment   Grooming Supervision;Seated;Standing   Upper Body Dressing Supervision   Lower Body Dressing Supervision   Toileting Supervision   How much help from another person does the patient currently need...   6 Clicks Daily Activity Score 23   Functional Mobility   Sit to Stand Supervised   Bed, Chair, Wheelchair Transfer Supervised   Toilet Transfers Supervised   Mobility walking in room w/fww    Visual Perception   Visual Perception  Not Tested   Edema / Skin Assessment   Edema / Skin  Not Assessed   Activity Tolerance   Comments no c/o pain or fatigue    Education Group   Role of Occupational Therapist Patient Response Patient;Significant Other;Acceptance;Explanation   Problem List   Problem List None   Anticipated Discharge Equipment and Recommendations   DC Equipment Recommendations None   Discharge Recommendations Recommend home health for continued occupational therapy services  ([facility] )   Interdisciplinary Plan of Care Collaboration   IDT Collaboration with  Nursing   Patient Position at End of Therapy In Bed;Call Light within Reach;Tray Table within Reach;Phone within Reach   Collaboration Comments RN aware of  OT eval and pts efforts    Session Information   Date / Session Number  5/10 #1    Priority 0  (eval only )

## 2021-05-10 NOTE — CARE PLAN
Problem: Communication  Goal: The ability to communicate needs accurately and effectively will improve  Outcome: PROGRESSING AS EXPECTED     Problem: Safety  Goal: Will remain free from injury  Outcome: PROGRESSING AS EXPECTED  Goal: Will remain free from falls  Outcome: PROGRESSING AS EXPECTED     Problem: Bowel/Gastric:  Goal: Normal bowel function is maintained or improved  Outcome: PROGRESSING AS EXPECTED  Goal: Will not experience complications related to bowel motility  Outcome: PROGRESSING AS EXPECTED     Problem: Knowledge Deficit  Goal: Knowledge of disease process/condition, treatment plan, diagnostic tests, and medications will improve  Outcome: PROGRESSING AS EXPECTED  Goal: Knowledge of the prescribed therapeutic regimen will improve  Outcome: PROGRESSING AS EXPECTED

## 2021-05-10 NOTE — PROGRESS NOTES
[facility] Medicine Daily Progress Note    Date of Service  5/10/2021    Chief Complaint  81 y.o. male admitted 5/7/2021 with black tarry stools    Hospital Course    Barry Torres is a 81 y.o. male with past medical history of CKD stage III, hypertension not on meds, COPD on inhaler, right upper extremity DVT in February currently on Eliquis 2.5mg bid  Patient presents to the ED with weakness/debilitation which has been going on for about a week and fall hurt his head.  Patient and wife state that patient has been not as active for several months. Yesterday morning patient had a mechanical fall while he was washing his hands at the sink, patient states that he felt lightheaded lost balance and fell backwards hitting his head.  This morning patient went to the restroom and was unable to stand up from it he is laid down on the floor and called his wife for help, wife was not able to help him up and call 911.     Patient states that he felt weak to stand up on his own, chronic shortness of breath due to COPD; and  Lightheadedness and dizziness starting recently, denied chest pain, palpitations, visual changes,  altered mentation, or focal weakness. Patient also states that for the past several days he has had dark stools, but he thought that they were due to to ingestion of Pepto-Bismol.  Patient is currently taking Eliquis for almost months since he was diagnosed with a upper extremity DVT.  He was told that he will complete a 3-month treatment.    Interval Problem Update    5/8/2021: The patient was seen and evaluated at bedside and appears comfortable.  Patient states that he is tolerating oral intake without epigastric pain.  He was initially admitted for further evaluation and management of possible upper GI bleed.  He did receive transfusion of 1 unit of packed RBCs however his hemoglobin had minimal decline.  He did have a repeat episode overnight.  At this point we are pending GI evaluation and  possible possible endoscopic intervention.  Patient denies any chest pains, palpitations, shortness of breath.  No other overnight events reported.    5/9/2021: The patient was seen and evaluated at bedside and appears comfortable.  He underwent upper endoscopy with GI this morning which revealed large clean-based ulcer in the duodenum.  We are pending results of H. pylori testing.  Ulceration and subsequent upper GI bleed likely secondary to Eliquis use for prior upper extremity DVT.  According the patient he does not have any personal history of malignancy, no recent surgery or immobilization, interestingly his son does have a history of DVTs and PE so I suspect that patient does have a familial component in his coagulation disorder.  In that case, he is at high risk for redeveloping DVT despite initially being told to complete therapy with Eliquis for only 3 months.  He is currently 2 months into treatment.  At this time we will reevaluate patient with upper and lower extremity venous Dopplers to evaluate for persistence of DVT prior to pursuing superior IVC filter placement while he is off anticoagulation.  He is at high risk for rebleeding on any anticoagulation. Will discuss with interventional radiology once doppler results are available. Of note he did receive 2 units of packed RBCs during this admission.  No other overnight events.    5/10/2021: Venous Doppler ultrasound of upper extremities shows resolution of right upper extremity thrombus however there is new thrombus found in the left brachial vein.  This case was discussed with interventional radiology who states that there is no role for IVC filter and upper extremity DVT and their recommendations are for prophylactic embolization of the GDA.  Possible treatment options were discussed with patient and family members at bedside and they would like to discuss how to proceed amongst themselves.  Patient still remains high risk for rebleed on  anticoagulation.  At this time patient is tolerating oral intake and denies any nausea/vomiting.  He denies any chest pains, palpitations, shortness of breath.  No other overnight events reported.  Of note, pending final PT/OT recommendations however patient states that he would refuse skilled nursing and would prefer to resume home health with [facility].  Case management aware.    Consultants/Specialty  GI    Code Status  Full Code    Disposition   PT/OT recommendations     Review of Systems  Review of Systems   Constitutional: Negative for chills and fever.   HENT: Negative for congestion, hearing loss and tinnitus.    Eyes: Negative for blurred vision, double vision and photophobia.   Respiratory: Negative for cough and hemoptysis.    Cardiovascular: Negative for chest pain and palpitations.   Gastrointestinal: Negative for blood in stool, diarrhea, heartburn, nausea and vomiting.   Genitourinary: Negative for dysuria, flank pain, hematuria and urgency.   Musculoskeletal: Negative for back pain and myalgias.   Skin: Negative for itching and rash.   Neurological: Negative for dizziness, sensory change, speech change and headaches.   Endo/Heme/Allergies: Does not bruise/bleed easily.   Psychiatric/Behavioral: Negative for depression and suicidal ideas.        Physical Exam  Temp:  [35.9 °C (96.6 °F)-36.6 °C (97.8 °F)] 36.2 °C (97.1 °F)  Pulse:  [64-98] 84  Resp:  [16-18] 18  BP: (116-158)/(71-81) 116/71  SpO2:  [90 %-94 %] 92 %    Physical Exam  Vitals reviewed.   Constitutional:       Appearance: Normal appearance.   HENT:      Head: Normocephalic and atraumatic.      Nose: No congestion or rhinorrhea.      Mouth/Throat:      Mouth: Mucous membranes are moist.      Pharynx: Oropharynx is clear.   Eyes:      General: No scleral icterus.     Extraocular Movements: Extraocular movements intact.      Conjunctiva/sclera: Conjunctivae normal.      Pupils: Pupils are equal, round, and reactive to light.   Cardiovascular:       Rate and Rhythm: Normal rate and regular rhythm.      Pulses: Normal pulses.      Heart sounds: Normal heart sounds.   Pulmonary:      Effort: Pulmonary effort is normal.      Breath sounds: Normal breath sounds.   Abdominal:      General: Abdomen is flat. Bowel sounds are normal.      Palpations: Abdomen is soft.   Musculoskeletal:         General: No swelling, tenderness or deformity.      Cervical back: Normal range of motion and neck supple. No rigidity. No muscular tenderness.   Lymphadenopathy:      Cervical: No cervical adenopathy.   Skin:     General: Skin is warm and dry.   Neurological:      General: No focal deficit present.      Mental Status: He is alert and oriented to person, place, and time. Mental status is at baseline.   Psychiatric:         Mood and Affect: Mood normal.         Behavior: Behavior normal.         Thought Content: Thought content normal.         Fluids    Intake/Output Summary (Last 24 hours) at 5/10/2021 1245  Last data filed at 5/10/2021 1240  Gross per 24 hour   Intake 540 ml   Output 850 ml   Net -310 ml       Laboratory  Recent Labs     05/09/21  0339 05/09/21  1352 05/10/21  0353   WBC 10.4 8.8 7.2   RBC 2.21* 2.81* 2.61*   HEMOGLOBIN 6.4* 8.3* 7.6*   HEMATOCRIT 20.9* 25.8* 24.4*   MCV 94.6 93.6 93.5   MCH 29.0 29.2 29.1   MCHC 30.6* 31.2* 31.1*   RDW 76.9* 68.0* 67.7*   PLATELETCT 264 253 233   MPV 10.4 9.8 10.2     Recent Labs     05/08/21  0114 05/09/21  0339 05/10/21  0353   SODIUM 146* 142 143   POTASSIUM 4.4 4.0 4.1   CHLORIDE 117* 115* 113*   CO2 21 21 22   GLUCOSE 100* 94 96   BUN 40* 30* 23*   CREATININE 1.47* 1.57* 1.44*   CALCIUM 8.1* 7.8* 7.7*                   Imaging  US-EXTREMITY VENOUS UPPER BILAT   Final Result      US-EXTREMITY VENOUS LOWER BILAT   Final Result      IR-MIDLINE CATHETER INSERTION WO GUIDANCE > AGE 3   Final Result                  Ultrasound-guided midline placement performed by qualified nursing staff    as above.          CT-HEAD W/O   Final  Result      1.  No acute intracranial process.      2.  Atrophy and small vessel ischemic change.           Assessment/Plan  * Acute GI bleeding  Assessment & Plan  Likely upper GI bleeding   Transfuse  Hold eliquis  IV fluid  IV PPI  Diet as tolerated   Pending GI evaluation     History of pseudomembranous enterocolitis- (present on admission)  Assessment & Plan  Loperamide 1tab bid  Continue immunosuppressive therapy     Limited mobility- (present on admission)  Assessment & Plan  Likely due to age  Pending PT/OT evaluation     chronic kidney disease (HCC)- (present on admission)  Assessment & Plan  Cr ~ 1.6; baseline 1.4-1.7  - Urine: non-oliguric  - BP: Goal < 140/90  - Volume: euvolemic  - Acid/Base: no sig. acid base disturbance  - Electrolytes: stable  - Anemia: Goal Hgb 10-11  - MBD: Ca normal, PO4 unknown  - dose medication based on GFR  - avoid nephrotoxicity  - nephrology consult as needed  - IVF      Neuropathy- (present on admission)  Assessment & Plan  Gabapentin 600mg am, 300mg pm    COPD (chronic obstructive pulmonary disease) ([facility])- (present on admission)  Assessment & Plan  symbicort  On inhaler PRN  stable    GERD (gastroesophageal reflux disease)  Assessment & Plan  Currently not on chronic PPI therapy  He will likely need this prior to discharge    Acute embolism and thrombosis of deep veins of right upper extremity (HCC)- (present on admission)  Assessment & Plan  On eliquis 2.5mg bid, which is on hold due to GI bleeding    SCD    Chronic lower extremity edema- (present on admission)  Assessment & Plan  EF 60% In 9/8/2020      Hyperlipidemia- (present on admission)  Assessment & Plan  On rosuvastatin 5mg qd    Orthostasis- (present on admission)  Assessment & Plan  Blood transfuse  IV fluid    DVT prophylaxis  Assessment & Plan  scd       VTE prophylaxis: SCDS

## 2021-05-10 NOTE — THERAPY
Physical Therapy   Initial Evaluation     Patient Name: Barry Torres  Age:  81 y.o., Sex:  male  Medical Record #: [id number]  Today's Date: 5/10/2021     Precautions: Fall Risk    Assessment  Patient is 81 y.o. male admitted with black tarry stools. PMH includes CKD stage III, hypertension, COPD, right UE DVT, sepsis, PNA and knee effusion. Pt appears to be at his functional baseline ambulating well with FWW and SPV. No further acute PT needs     Plan    Recommend Physical Therapy for Evaluation only     DC Equipment Recommendations: None  Discharge Recommendations: Recommend home health for continued physical therapy services(resume with [facility] )        05/10/21 1041   Prior Living Situation   Prior Services Skilled Home Health Services;Housekeeping / Homemaker Services   Housing / Facility 1 Story House   Steps Into Home 0   Steps In Home 0   Bathroom Set up Walk In Shower;Shower Chair   Equipment Owned Front-Wheel Walker;4-Wheel Walker;Single Point Cane   Lives with - Patient's Self Care Capacity Spouse   Comments spouse is able to assist some with IADLs   Prior Level of Functional Mobility   Bed Mobility Independent   Transfer Status Independent   Ambulation Independent   Distance Ambulation (Feet)   (community)   Assistive Devices Used Single Point Cane   Comments independent prior with mobility but limited by SOB   History of Falls   History of Falls Yes   Cognition    Level of Consciousness Alert   Comments pleasant and cooperative   Gait Analysis   Gait Level Of Assist Supervised   Assistive Device Front Wheel Walker   Distance (Feet) 100   # of Times Distance was Traveled 1   Deviation Shuffled Gait;Decreased Heel Strike;Decreased Toe Off   # of Stairs Climbed 0   Weight Bearing Status no restrictions   Comments limited by activity tolerance   Bed Mobility    Supine to Sit Supervised   Sit to Supine Supervised   Scooting Supervised   Rolling Supervised   Functional Mobility   Sit to Stand  Supervised   Bed, Chair, Wheelchair Transfer Supervised   Toilet Transfers Supervised   Mobility in room and hallway with FWW   Anticipated Discharge Equipment and Recommendations   DC Equipment Recommendations None   Discharge Recommendations Recommend home health for continued physical therapy services  (resume with [facility] )

## 2021-05-11 ENCOUNTER — APPOINTMENT (OUTPATIENT)
Dept: INFECTIOUS DISEASES | Facility: MEDICAL CENTER | Age: 81
End: 2021-05-11
Payer: MEDICARE

## 2021-05-11 LAB
ANION GAP SERPL CALC-SCNC: 8 MMOL/L (ref 7–16)
BUN SERPL-MCNC: 21 MG/DL (ref 8–22)
CALCIUM SERPL-MCNC: 7.8 MG/DL (ref 8.5–10.5)
CHLORIDE SERPL-SCNC: 111 MMOL/L (ref 96–112)
CO2 SERPL-SCNC: 22 MMOL/L (ref 20–33)
CREAT SERPL-MCNC: 1.32 MG/DL (ref 0.5–1.4)
ERYTHROCYTE [DISTWIDTH] IN BLOOD BY AUTOMATED COUNT: 67.9 FL (ref 35.9–50)
GLUCOSE SERPL-MCNC: 99 MG/DL (ref 65–99)
HCT VFR BLD AUTO: 25 % (ref 42–52)
HGB BLD-MCNC: 7.8 G/DL (ref 14–18)
MAGNESIUM SERPL-MCNC: 1.8 MG/DL (ref 1.5–2.5)
MCH RBC QN AUTO: 29.2 PG (ref 27–33)
MCHC RBC AUTO-ENTMCNC: 31.2 G/DL (ref 33.7–35.3)
MCV RBC AUTO: 93.6 FL (ref 81.4–97.8)
PHOSPHATE SERPL-MCNC: 2.8 MG/DL (ref 2.5–4.5)
PLATELET # BLD AUTO: 218 K/UL (ref 164–446)
PMV BLD AUTO: 9.6 FL (ref 9–12.9)
POTASSIUM SERPL-SCNC: 3.9 MMOL/L (ref 3.6–5.5)
RBC # BLD AUTO: 2.67 M/UL (ref 4.7–6.1)
SODIUM SERPL-SCNC: 141 MMOL/L (ref 135–145)
WBC # BLD AUTO: 6.6 K/UL (ref 4.8–10.8)

## 2021-05-11 PROCEDURE — 770020 HCHG ROOM/CARE - TELE (206)

## 2021-05-11 PROCEDURE — 94760 N-INVAS EAR/PLS OXIMETRY 1: CPT

## 2021-05-11 PROCEDURE — 700102 HCHG RX REV CODE 250 W/ 637 OVERRIDE(OP): Performed by: INTERNAL MEDICINE

## 2021-05-11 PROCEDURE — 85027 COMPLETE CBC AUTOMATED: CPT

## 2021-05-11 PROCEDURE — A9270 NON-COVERED ITEM OR SERVICE: HCPCS | Performed by: INTERNAL MEDICINE

## 2021-05-11 PROCEDURE — 80048 BASIC METABOLIC PNL TOTAL CA: CPT

## 2021-05-11 PROCEDURE — A9270 NON-COVERED ITEM OR SERVICE: HCPCS | Performed by: STUDENT IN AN ORGANIZED HEALTH CARE EDUCATION/TRAINING PROGRAM

## 2021-05-11 PROCEDURE — 83735 ASSAY OF MAGNESIUM: CPT

## 2021-05-11 PROCEDURE — 700102 HCHG RX REV CODE 250 W/ 637 OVERRIDE(OP): Performed by: STUDENT IN AN ORGANIZED HEALTH CARE EDUCATION/TRAINING PROGRAM

## 2021-05-11 PROCEDURE — 84100 ASSAY OF PHOSPHORUS: CPT

## 2021-05-11 PROCEDURE — 99232 SBSQ HOSP IP/OBS MODERATE 35: CPT | Performed by: INTERNAL MEDICINE

## 2021-05-11 RX ORDER — LABETALOL HYDROCHLORIDE 5 MG/ML
10 INJECTION, SOLUTION INTRAVENOUS EVERY 4 HOURS PRN
Status: DISCONTINUED | OUTPATIENT
Start: 2021-05-11 | End: 2021-05-12 | Stop reason: HOSPADM

## 2021-05-11 RX ORDER — AMLODIPINE BESYLATE 5 MG/1
5 TABLET ORAL
Status: DISCONTINUED | OUTPATIENT
Start: 2021-05-11 | End: 2021-05-12 | Stop reason: HOSPADM

## 2021-05-11 RX ADMIN — GABAPENTIN 300 MG: 300 CAPSULE ORAL at 05:33

## 2021-05-11 RX ADMIN — ROSUVASTATIN CALCIUM 5 MG: 10 TABLET, FILM COATED ORAL at 17:19

## 2021-05-11 RX ADMIN — FEXOFENADINE HCL 60 MG: 60 TABLET, FILM COATED ORAL at 05:33

## 2021-05-11 RX ADMIN — LEFLUNOMIDE 20 MG: 20 TABLET ORAL at 05:33

## 2021-05-11 RX ADMIN — TIOTROPIUM BROMIDE INHALATION SPRAY 5 MCG: 3.12 SPRAY, METERED RESPIRATORY (INHALATION) at 05:32

## 2021-05-11 RX ADMIN — BUDESONIDE AND FORMOTEROL FUMARATE DIHYDRATE 2 PUFF: 80; 4.5 AEROSOL RESPIRATORY (INHALATION) at 05:32

## 2021-05-11 RX ADMIN — GABAPENTIN 600 MG: 300 CAPSULE ORAL at 17:19

## 2021-05-11 RX ADMIN — AMLODIPINE BESYLATE 5 MG: 5 TABLET ORAL at 19:47

## 2021-05-11 RX ADMIN — GUAIFENESIN 1200 MG: 600 TABLET, EXTENDED RELEASE ORAL at 05:32

## 2021-05-11 RX ADMIN — BUDESONIDE AND FORMOTEROL FUMARATE DIHYDRATE 2 PUFF: 80; 4.5 AEROSOL RESPIRATORY (INHALATION) at 17:19

## 2021-05-11 RX ADMIN — LOPERAMIDE HYDROCHLORIDE 2 MG: 2 CAPSULE ORAL at 05:33

## 2021-05-11 RX ADMIN — OMEPRAZOLE 40 MG: 20 CAPSULE, DELAYED RELEASE ORAL at 17:18

## 2021-05-11 RX ADMIN — FLUTICASONE PROPIONATE 100 MCG: 50 SPRAY, METERED NASAL at 05:32

## 2021-05-11 RX ADMIN — OMEPRAZOLE 40 MG: 20 CAPSULE, DELAYED RELEASE ORAL at 05:32

## 2021-05-11 ASSESSMENT — ENCOUNTER SYMPTOMS
DEPRESSION: 0
HEMOPTYSIS: 0
HEADACHES: 0
DIZZINESS: 0
BLOOD IN STOOL: 0
CHILLS: 0
MYALGIAS: 0
SENSORY CHANGE: 0
FEVER: 0
VOMITING: 0
SPEECH CHANGE: 0
HEARTBURN: 0
DOUBLE VISION: 0
FLANK PAIN: 0
COUGH: 0
BLURRED VISION: 0
BRUISES/BLEEDS EASILY: 0
BACK PAIN: 0
NAUSEA: 0
PHOTOPHOBIA: 0
DIARRHEA: 0
PALPITATIONS: 0

## 2021-05-11 ASSESSMENT — FIBROSIS 4 INDEX: FIB4 SCORE: 1.71

## 2021-05-11 ASSESSMENT — PAIN DESCRIPTION - PAIN TYPE: TYPE: ACUTE PAIN

## 2021-05-11 NOTE — PROGRESS NOTES
Monitor Summary   SR-ST   (F) PAC, (R) PVC   First degree Heart Block   .22/.10/.32    Per monitor room

## 2021-05-11 NOTE — CARE PLAN
Problem: Communication  Goal: The ability to communicate needs accurately and effectively will improve  Outcome: PROGRESSING AS EXPECTED  Patient updated on plan of care and is agreeable. Encouraged patient to voice feelings and ask questions.      Problem: Safety  Goal: Will remain free from falls  Outcome: PROGRESSING AS EXPECTED  Fall risk assessed and appropriated interventions in place. Patient updated on plan and educated on importance of fall prevention and safety.     Problem: Infection  Goal: Will remain free from infection  Outcome: PROGRESSING AS EXPECTED  Standard precautions in place. Hand hygiene performed before and after patient contact.

## 2021-05-11 NOTE — PROGRESS NOTES
Bedside reports received from day shift RN. Patient A&Ox4. No signs of respiratory distress noted or reported, on RA. Patient reports pain under control at this time. Bed locked in lowest position, 2 side rails up. Call light within reach. Fall precautions in place. Patient reports no additional needs at this time.

## 2021-05-11 NOTE — CONSULTS
Consult Note: Hematology/Oncology    Date of consultation: 5/11/2021 3:37 PM    Referring provider: Dr Param Alva    Reason for consultation: Recurrent upper extremity provoked DVT      History of presenting illness:   81-year-old gentleman with PMH of CKD, COPD, CAD, hypertension, he was diagnosed with right upper extremity DVT in February and has been on 2.5 mg of Eliquis twice daily.  The patient does not remember very well, he never had symptoms in the right upper extremity.  They were checking for an ultrasound given poor veins when he was found to have a clot.  Apparently he did have a line at that point in time.    The patient recently finished antibiotics for septic arthritis.  He is being admitted for weakness, debility.  He was found to have worsening hemoglobin during hospital stay and he underwent for an EGD to have a large duodenal bulb ulcer with swelling and preventing the scope to be passed, there is no evidence of gastric outlet obstruction.    The patient does have a midline in the left upper extremity now, he did have a bilateral Doppler ultrasound on 5-10-21 which showed a thrombus located in the left brachial vein adjacent to the catheter with no other evidence of upper extremity DVT the patient has been off anticoagulation given recent GI bleeding and we were consulted for further recommendations regarding anticoagulation.    The patient does not report any previous personal or family history of DVT/PE.  He did have Doppler ultrasound of bilateral lower extremities which was negative during this admission       Past Medical History:    Past Medical History:   Diagnosis Date   • Abnormal thyroid stimulating hormone (TSH) level    • Allergic rhinitis    • Asbestosis (HCC)    • Asthma    • Bronchitis    • Chronic diarrhea     declines eval; takes immodium once a day usually and sometimes less; see previous notes; aware of risk    • Chronic low back pain    • Chronic lung disease     asbestos  related   • CKD (chronic kidney disease), stage III     sees neph, one kidney   • COPD (chronic obstructive pulmonary disease) (HCC)    • Coronary artery calcification seen on CAT scan 8/2/2016    sees cards   • Epididymitis 2009    h/o listed in chart   • GERD (gastroesophageal reflux disease)    • History of hemorrhoids    • History of skin cancer     non melanoma   • [place] (hard of hearing)     declines hearing aids   • Hypercholesteremia    • Hypertension    • Hypertriglyceridemia    • Indigestion    • Light headedness     2/2 orthostasis? now better off hctz   • Lightheadedness 6/23/2016   • Low back pain    • Nasal drainage    • Olecranon bursitis    • Orthostasis 6/23/2016    better off HCTZ   • Peripheral neuropathy    • Pleural plaque 2005     [place]   • Post-nasal drip    • Pulmonary emphysema (HCC)    • RA (rheumatoid arthritis) (HCC)     on meds, sees rheum   • Restless leg syndrome    • Rheumatoid arthritis (HCC)    • Sleep apnea     obstructive and central - not adherent to autopap   • Solitary kidney     history of kidney cyst 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-functioning kidney s/p nephrectomy        Past surgical history:    Past Surgical History:   Procedure Laterality Date   • PB UPPER GI ENDOSCOPY,DIAGNOSIS N/A 5/9/2021    Procedure: GASTROSCOPY;  Surgeon: Ganesh Peacock M.D.;  Location: [facility];  Service: Gastroenterology   • IRRIGATION & DEBRIDEMENT ORTHO Right 2/2/2021    Procedure: IRRIGATION AND DEBRIDEMENT, WOUND-KNEE;  Surgeon: Kush Sol M.D.;  Location: [facility];  Service: Orthopedics   • INCISION AND DRAINAGE GENERAL Right 2/2/2021    Procedure: INCISION AND DRAINAGE- arthrocentesis right knee;  Surgeon: Kush Sol M.D.;  Location: [facility];  Service: Orthopedics   • INCISION AND DRAINAGE GENERAL Right 12/19/2020    Procedure: INCISION AND DRAINAGE;  Surgeon: Marc Chowdhury M.D.;  Location: [facility];  Service: Orthopedics   • IRRIGATION &  DEBRIDEMENT ORTHO Right 10/27/2020    Procedure: IRRIGATION AND DEBRIDEMENT, WOUND - KNEE OPEN;  Surgeon: Elijah Faulkner M.D.;  Location: SUR[facility]ERY [facility];  Service: Orthopedics   • FLEXOR TENDON REPAIR Left 4/3/2019    Procedure: FLEXOR TENDON REPAIR- SMALL FINGER VS;  Surgeon: Marc Chowdhury M.D.;  Location: SURGERY [facility];  Service: Orthopedics   • TENDON TRANSFER Left 4/3/2019    Procedure: TENDON TRANSFER;  Surgeon: Marc Chowdhury M.D.;  Location: SURGERY [facility];  Service: Orthopedics   • COLONOSCOPY  11/10/2018    Procedure: COLONOSCOPY;  Surgeon: Ganesh Peacock M.D.;  Location: SURGERY [facility];  Service: Gastroenterology   • NASAL POLYPECTOMY Bilateral 10/6/2015    Procedure: NASAL POLYPECTOMY WITH SCOTT ENDOSCOPIC NASAL DEBRIDEMENT;  Surgeon: Param Maurer M.D.;  Location: SURGERY SAME DAY [facility];  Service:    • CYSTOSCOPY  2/1/2010    Performed by ANGIE VERDUZCO at [facility]   • RETROGRADES  2/1/2010    Performed by ANGIE VERDUZCO at [facility]   • PYELOGRAM  2/1/2010    Performed by ANGIE VERDUZCO at [facility]   • URETEROSCOPY  2/1/2010    Performed by ANGIE VERDUZCO at [facility]   • NEPHRECTOMY LAPAROSCOPIC  2009    left kidney   • TESTICLE EXPLORATION  2004   • PB REMV 2ND CATARACT,CORN-SCLER SECTN     • TONSILLECTOMY         Allergies:  Ciprofloxacin, Levaquin, and Penicillins    Medications:    Current Facility-Administered Medications   Medication Dose Route Frequency Provider Last Rate Last Admin   • omeprazole (PRILOSEC) capsule 40 mg  40 mg Oral BID Medhat Rios M.D.   40 mg at 05/11/21 0532   • acetaminophen (Tylenol) tablet 650 mg  650 mg Oral Q4HRS PRN Jamal Pagan M.D.       • budesonide-formoterol (SYMBICORT) 80-4.5 MCG/ACT inhaler 2 Puff  2 Puff Inhalation BID Jamal Pagan M.D.   2 Puff at 05/11/21 0532   • fexofenadine (ALLEGRA) tablet 60 mg  60 mg Oral  DAILY Jamal Pagan M.D.   60 mg at 05/11/21 0533   • fluticasone (FLONASE) nasal spray 100 mcg  2 Spray Nasal DAILY Jamal Pagan M.D.   100 mcg at 05/11/21 0532   • guaiFENesin ER (MUCINEX) tablet 1,200 mg  1,200 mg Oral DAILY Jamal Pagan M.D.   1,200 mg at 05/11/21 0532   • leflunomide (ARAVA) tablet 20 mg  20 mg Oral DAILY Jamal Pagan M.D.   20 mg at 05/11/21 0533   • loperamide (IMODIUM) capsule 2 mg  2 mg Oral BID Jamal Pagan M.D.   2 mg at 05/11/21 0533   • rosuvastatin (CRESTOR) tablet 5 mg  5 mg Oral Q EVENING Jamal Pagan M.D.   5 mg at 05/10/21 1758   • tiotropium (Spiriva Respimat) 2.5 mcg/Act inhalation spray 5 mcg  5 mcg Inhalation DAILY Jamal Pagan M.D.   5 mcg at 05/11/21 0532   • senna-docusate (PERICOLACE or SENOKOT S) 8.6-50 MG per tablet 2 tablet  2 tablet Oral BID Jamal Pagan M.D.   2 tablet at 05/10/21 0513    And   • polyethylene glycol/lytes (MIRALAX) PACKET 1 Packet  1 Packet Oral QDAY PRN Jamal Pagan M.D.        And   • magnesium hydroxide (MILK OF MAGNESIA) suspension 30 mL  30 mL Oral QDAY PRN Jamal Pagan M.D.        And   • bisacodyl (DULCOLAX) suppository 10 mg  10 mg Rectal QDAY PRN Jamal Pagan M.D.       • enalaprilat (VASOTEC) injection 1.25 mg  1.25 mg Intravenous Q6HRS PRN Jamal Pagan M.D.       • ondansetron (ZOFRAN) syringe/vial injection 4 mg  4 mg Intravenous Q4HRS PRN Jamal Pagan M.D.       • ondansetron (ZOFRAN ODT) dispertab 4 mg  4 mg Oral Q4HRS PRN Jamal Pagan M.D.       • guaiFENesin dextromethorphan (ROBITUSSIN DM) 100-10 MG/5ML syrup 10 mL  10 mL Oral Q6HRS PRN Jamal Pagan M.D.       • Respiratory Therapy Consult   Nebulization Continuous RT Jamal Pagan M.D.       • diphenhydrAMINE (BENADRYL) injection 25 mg  25 mg Intravenous Q6HRS PRN Jamal Pagan M.D.       • gabapentin (NEURONTIN) capsule 300 mg  300 mg Oral QAM Jamal Pagan M.D.   300 mg at 05/11/21 0533    And   • gabapentin (NEURONTIN) capsule 600 mg  600 mg Oral Q EVENING Jamal Pagan M.D.   600 mg at 05/10/21 1757   • albuterol  (PROVENTIL) 2.5mg/0.5ml nebulizer solution 2.5 mg  2.5 mg Nebulization Q2HRS PRN (RT) Jamal Pagan M.D.           Social History:     Social History     Socioeconomic History   • Marital status:      Spouse name: Not on file   • Number of children: Not on file   • Years of education: Not on file   • Highest education level: Not on file   Occupational History   • Not on file   Tobacco Use   • Smoking status: Former Smoker     Packs/day: 1.00     Years: 40.00     Pack years: 40.00     Types: Cigarettes     Quit date: 1980     Years since quittin.3   • Smokeless tobacco: Never Used   • Tobacco comment: 1 pk a day for 40 yrs   Substance and Sexual Activity   • Alcohol use: No     Alcohol/week: 0.0 oz   • Drug use: No   • Sexual activity: Never     Partners: Female     Comment: retired    Other Topics Concern   • Not on file   Social History Narrative    See H&P    Lives with wife     Social Determinants of Health     Financial Resource Strain: Low Risk    • Difficulty of Paying Living Expenses: Not very hard   Food Insecurity: No Food Insecurity   • Worried About Running Out of Food in the Last Year: Never true   • Ran Out of Food in the Last Year: Never true   Transportation Needs: No Transportation Needs   • Lack of Transportation (Medical): No   • Lack of Transportation (Non-Medical): No   Physical Activity:    • Days of Exercise per Week:    • Minutes of Exercise per Session:    Stress:    • Feeling of Stress :    Social Connections:    • Frequency of Communication with Friends and Family:    • Frequency of Social Gatherings with Friends and Family:    • Attends Christianity Services:    • Active Member of Clubs or Organizations:    • Attends Club or Organization Meetings:    • Marital Status:    Intimate Partner Violence:    • Fear of Current or Ex-Partner:    • Emotionally Abused:    • Physically Abused:    • Sexually Abused:        Family History:     Family History   Problem Relation Age of Onset   •  "Genetic Disorder Father         ALS   • No Known Problems Sister    • No Known Problems Son    • No Known Problems Daughter    • Heart Attack Neg Hx    • Heart Disease Neg Hx    • Heart Failure Neg Hx        Review of Systems:  All other review of systems are negative except what was mentioned above in the HPI.    Constitutional: No fever, chills, weight loss he does have mild fatigue  HEENT: No new auditory or visual complaints. No sore throat and neck pain.     Respiratory:No new cough, sputum production, shortness of breath and wheezing.    Cardiovascular: No new chest pain, palpitations, orthopnea bilateral leg swelling  Gastrointestinal: GI bleed   Genitourinary: Negative for dysuria, hematuria    Musculoskeletal: No new arthralgias or myalgias, mild left-sided upper extremity swelling  Skin: Negative for rash and itching.    Neurological: Negative for focal weakness or headaches.    Endo/Heme/Allergies: No abnormal bleed/bruise.    Psychiatric/Behavioral: No new depression/anxiety.    Physical Exam: Vitals:   BP (!) 165/82   Pulse 90   Temp 36.4 °C (97.6 °F) (Temporal)   Resp 16   Ht 1.854 m (6' 1\")   Wt 88.2 kg (194 lb 7.1 oz)   SpO2 93%   BMI 25.65 kg/m²     General: Not in acute distress, alert and oriented x 3  HEENT: No pallor, icterus. Oropharynx clear.   Lymph nodes: No palpable cervical, supraclavicular, axillary or inguinal lymphadenopathy.    CVS: regular rate and rhythm, no rubs or gallops  RESP: Clear to auscultate bilaterally  ABD: Soft, non tender, non distended, positive bowel sounds, no palpable organomegaly  EXT: Mild bilateral lower extremity swelling, mild left upper extremity swelling  CNS: Alert and oriented x3, No focal deficits.  Skin- No rash      Labs:   Recent Labs     05/09/21  1352 05/10/21  0353 05/11/21  0230   RBC 2.81* 2.61* 2.67*   HEMOGLOBIN 8.3* 7.6* 7.8*   HEMATOCRIT 25.8* 24.4* 25.0*   PLATELETCT 253 233 218     Lab Results   Component Value Date/Time    SODIUM 141 " 05/11/2021 02:30 AM    POTASSIUM 3.9 05/11/2021 02:30 AM    CHLORIDE 111 05/11/2021 02:30 AM    CO2 22 05/11/2021 02:30 AM    GLUCOSE 99 05/11/2021 02:30 AM    BUN 21 05/11/2021 02:30 AM    CREATININE 1.32 05/11/2021 02:30 AM    CREATININE 1.5 (H) 04/11/2005 10:13 AM        Assessment and Plan:  1.  New provoked left upper extremity DVT in the setting of a midline catheter  2.  History of provoked right upper extremity DVT in February 2021  3.  Large duodenal bulb ulcer and GI bleed  4.  Anemia, due to GI bleed and multifactorial  5.  Chronic kidney disease      Plan  -Extensive review of patient past medical history.  He does not have any previous personal history of DVT/PE or a family history of thromboembolic event.  The patient was found to have a probably provoked right upper extremity DVT during hospital stay in 2/2021, he did 3 months of anticoagulation and now he is coming with a GI bleed.  He has been having worsening left upper extremity swelling with a new thrombus located in the left brachial vein adjacent to the catheter.  Otherwise there is resolution of right upper extremity DVT and there is no lower extremity DVT.    Given recent GI bleed I will recommend the patient to be off anticoagulation for now.  I will recommend to remove the due  midline catheter  during this hospital stay.  Okay to discharge the patient tomorrow after 48 hours if no signs of GI bleed, follow GI recommendations and continue following with PCP.    If the patient is having worsening left upper extremity swelling or pain advised him to restart anticoagulation in the outpatient setting.  He will be followed by PCP closely.    The patient understand the risk of being anticoagulation outweigh the benefits    He agreed and verbalized his agreement and understanding with the current plan.  I answered all questions and concerns he has at this time.              Thank you for allowing me to participate in his care.    Please note that  this dictation was created using voice recognition software. I have made every reasonable attempt to correct obvious errors, but I expect that there are errors of grammar and possibly content that I did not discover before finalizing the note.      SIGNATURES:  Hector Palomino III, M.D.    CC:  Carlitos Harvey M.D.

## 2021-05-12 ENCOUNTER — PATIENT OUTREACH (OUTPATIENT)
Dept: HEALTH INFORMATION MANAGEMENT | Facility: OTHER | Age: 81
End: 2021-05-12

## 2021-05-12 ENCOUNTER — PHARMACY VISIT (OUTPATIENT)
Dept: PHARMACY | Facility: MEDICAL CENTER | Age: 81
End: 2021-05-12
Payer: COMMERCIAL

## 2021-05-12 VITALS
RESPIRATION RATE: 18 BRPM | DIASTOLIC BLOOD PRESSURE: 84 MMHG | TEMPERATURE: 96.5 F | WEIGHT: 207.23 LBS | OXYGEN SATURATION: 90 % | BODY MASS INDEX: 27.47 KG/M2 | SYSTOLIC BLOOD PRESSURE: 141 MMHG | HEIGHT: 73 IN | HEART RATE: 92 BPM

## 2021-05-12 LAB
ALBUMIN SERPL BCP-MCNC: 2.4 G/DL (ref 3.2–4.9)
ALBUMIN/GLOB SERPL: 0.9 G/DL
ALP SERPL-CCNC: 104 U/L (ref 30–99)
ALT SERPL-CCNC: 15 U/L (ref 2–50)
ANION GAP SERPL CALC-SCNC: 8 MMOL/L (ref 7–16)
AST SERPL-CCNC: 17 U/L (ref 12–45)
BASOPHILS # BLD AUTO: 0.9 % (ref 0–1.8)
BASOPHILS # BLD: 0.05 K/UL (ref 0–0.12)
BILIRUB SERPL-MCNC: 0.2 MG/DL (ref 0.1–1.5)
BUN SERPL-MCNC: 19 MG/DL (ref 8–22)
CALCIUM SERPL-MCNC: 7.6 MG/DL (ref 8.5–10.5)
CHLORIDE SERPL-SCNC: 113 MMOL/L (ref 96–112)
CO2 SERPL-SCNC: 22 MMOL/L (ref 20–33)
CREAT SERPL-MCNC: 1.34 MG/DL (ref 0.5–1.4)
EOSINOPHIL # BLD AUTO: 0.46 K/UL (ref 0–0.51)
EOSINOPHIL NFR BLD: 8 % (ref 0–6.9)
ERYTHROCYTE [DISTWIDTH] IN BLOOD BY AUTOMATED COUNT: 65.9 FL (ref 35.9–50)
GLOBULIN SER CALC-MCNC: 2.6 G/DL (ref 1.9–3.5)
GLUCOSE SERPL-MCNC: 102 MG/DL (ref 65–99)
HCT VFR BLD AUTO: 24.8 % (ref 42–52)
HGB BLD-MCNC: 7.8 G/DL (ref 14–18)
IMM GRANULOCYTES # BLD AUTO: 0.02 K/UL (ref 0–0.11)
IMM GRANULOCYTES NFR BLD AUTO: 0.3 % (ref 0–0.9)
LYMPHOCYTES # BLD AUTO: 1.46 K/UL (ref 1–4.8)
LYMPHOCYTES NFR BLD: 25.3 % (ref 22–41)
MAGNESIUM SERPL-MCNC: 1.8 MG/DL (ref 1.5–2.5)
MCH RBC QN AUTO: 29.4 PG (ref 27–33)
MCHC RBC AUTO-ENTMCNC: 31.5 G/DL (ref 33.7–35.3)
MCV RBC AUTO: 93.6 FL (ref 81.4–97.8)
MONOCYTES # BLD AUTO: 0.79 K/UL (ref 0–0.85)
MONOCYTES NFR BLD AUTO: 13.7 % (ref 0–13.4)
NEUTROPHILS # BLD AUTO: 3 K/UL (ref 1.82–7.42)
NEUTROPHILS NFR BLD: 51.8 % (ref 44–72)
NRBC # BLD AUTO: 0 K/UL
NRBC BLD-RTO: 0 /100 WBC
PLATELET # BLD AUTO: 211 K/UL (ref 164–446)
PMV BLD AUTO: 9.5 FL (ref 9–12.9)
POTASSIUM SERPL-SCNC: 3.9 MMOL/L (ref 3.6–5.5)
PROT SERPL-MCNC: 5 G/DL (ref 6–8.2)
RBC # BLD AUTO: 2.65 M/UL (ref 4.7–6.1)
SODIUM SERPL-SCNC: 143 MMOL/L (ref 135–145)
WBC # BLD AUTO: 5.8 K/UL (ref 4.8–10.8)

## 2021-05-12 PROCEDURE — 83735 ASSAY OF MAGNESIUM: CPT

## 2021-05-12 PROCEDURE — 36415 COLL VENOUS BLD VENIPUNCTURE: CPT

## 2021-05-12 PROCEDURE — 700102 HCHG RX REV CODE 250 W/ 637 OVERRIDE(OP): Performed by: INTERNAL MEDICINE

## 2021-05-12 PROCEDURE — 80053 COMPREHEN METABOLIC PANEL: CPT

## 2021-05-12 PROCEDURE — 700102 HCHG RX REV CODE 250 W/ 637 OVERRIDE(OP): Performed by: STUDENT IN AN ORGANIZED HEALTH CARE EDUCATION/TRAINING PROGRAM

## 2021-05-12 PROCEDURE — A9270 NON-COVERED ITEM OR SERVICE: HCPCS | Performed by: STUDENT IN AN ORGANIZED HEALTH CARE EDUCATION/TRAINING PROGRAM

## 2021-05-12 PROCEDURE — 99239 HOSP IP/OBS DSCHRG MGMT >30: CPT | Performed by: INTERNAL MEDICINE

## 2021-05-12 PROCEDURE — 85025 COMPLETE CBC W/AUTO DIFF WBC: CPT

## 2021-05-12 PROCEDURE — A9270 NON-COVERED ITEM OR SERVICE: HCPCS | Performed by: INTERNAL MEDICINE

## 2021-05-12 PROCEDURE — RXMED WILLOW AMBULATORY MEDICATION CHARGE: Performed by: INTERNAL MEDICINE

## 2021-05-12 RX ORDER — AMLODIPINE BESYLATE 5 MG/1
5 TABLET ORAL DAILY
Qty: 30 TABLET | Refills: 0 | Status: SHIPPED | OUTPATIENT
Start: 2021-05-13 | End: 2021-10-18

## 2021-05-12 RX ORDER — OMEPRAZOLE 40 MG/1
40 CAPSULE, DELAYED RELEASE ORAL 2 TIMES DAILY
Qty: 60 CAPSULE | Refills: 0 | Status: SHIPPED | OUTPATIENT
Start: 2021-05-12 | End: 2021-10-18

## 2021-05-12 RX ORDER — FERROUS SULFATE 325(65) MG
325 TABLET ORAL
Qty: 30 TABLET | Refills: 0 | Status: SHIPPED | OUTPATIENT
Start: 2021-05-12 | End: 2022-03-08

## 2021-05-12 RX ADMIN — TIOTROPIUM BROMIDE INHALATION SPRAY 5 MCG: 3.12 SPRAY, METERED RESPIRATORY (INHALATION) at 05:31

## 2021-05-12 RX ADMIN — GABAPENTIN 300 MG: 300 CAPSULE ORAL at 05:31

## 2021-05-12 RX ADMIN — OMEPRAZOLE 40 MG: 20 CAPSULE, DELAYED RELEASE ORAL at 05:31

## 2021-05-12 RX ADMIN — FLUTICASONE PROPIONATE 100 MCG: 50 SPRAY, METERED NASAL at 05:32

## 2021-05-12 RX ADMIN — FEXOFENADINE HCL 60 MG: 60 TABLET, FILM COATED ORAL at 05:31

## 2021-05-12 RX ADMIN — BUDESONIDE AND FORMOTEROL FUMARATE DIHYDRATE 2 PUFF: 80; 4.5 AEROSOL RESPIRATORY (INHALATION) at 05:31

## 2021-05-12 RX ADMIN — LEFLUNOMIDE 20 MG: 20 TABLET ORAL at 05:34

## 2021-05-12 RX ADMIN — LOPERAMIDE HYDROCHLORIDE 2 MG: 2 CAPSULE ORAL at 05:32

## 2021-05-12 RX ADMIN — GUAIFENESIN 1200 MG: 600 TABLET, EXTENDED RELEASE ORAL at 05:31

## 2021-05-12 NOTE — FACE TO FACE
Face to Face Supporting Documentation - Home Health    The encounter with this patient was in whole or in part the primary reason for home health admission.    Date of encounter:   Patient:                    MRN:                       YOB: 2022  Barry Torres  [id number]  1940     Home health to see patient for:  Physical Therapy evaluation and treatment   And OT    Skilled need for:  Exacerbation of Chronic Disease State exacerbation of chronic disease    Skilled nursing interventions to include:  Comment: as per PT/OT recommendations.    Homebound status evidenced by:  Needs the assistance of another person in order to leave the home. Leaving home requires a considerable and taxing effort. There is a normal inability to leave the home.    Community Physician to provide follow up care: Carlitos Harvey M.D.     Optional Interventions? Yes, Details: as per OT/PT recommendations.      I certify the face to face encounter for this home health care referral meets the CMS requirements and the encounter/clinical assessment with the patient was, in whole, or in part, for the medical condition(s) listed above, which is the primary reason for home health care. Based on my clinical findings: the service(s) are medically necessary, support the need for home health care, and the homebound criteria are met.  I certify that this patient has had a face to face encounter by myself.  Param Thorpe M.D. - NPI: [id number]

## 2021-05-12 NOTE — DISCHARGE SUMMARY
"Discharge Summary    CHIEF COMPLAINT ON ADMISSION  Chief Complaint   Patient presents with   • Weakness     Generalized x1 week   • Dizziness     Near syncope when changing positions.   • Syncope     Per EMS pt had a syncopal event when transferring to [facility].       Reason for Admission  EMS     Admission Date  5/7/2021    CODE STATUS  Full Code    HPI & HOSPITAL COURSE  \"Barry Torres is a 81 y.o. male with past medical history of CKD stage III, hypertension not on meds, COPD on inhaler, right upper extremity DVT in February currently on Eliquis 2.5mg bid  Patient presents to the ED with weakness/debilitation which has been going on for about a week and fall hurt his head.  Patient and wife state that patient has been not as active for several months. Yesterday morning patient had a mechanical fall while he was washing his hands at the sink, patient states that he felt lightheaded lost balance and fell backwards hitting his head.  This morning patient went to the restroom and was unable to stand up from it he is laid down on the floor and called his wife for help, wife was not able to help him up and call 911.     Patient states that he felt weak to stand up on his own, chronic shortness of breath due to COPD; and  Lightheadedness and dizziness starting recently, denied chest pain, palpitations, visual changes,  altered mentation, or focal weakness. Patient also states that for the past several days he has had dark stools, but he thought that they were due to to ingestion of Pepto-Bismol.  Patient is currently taking Eliquis for almost months since he was diagnosed with a upper extremity DVT.  He was told that he will complete a 3-month treatment.\"    Patient was admitted and had a CT scan of the head which did not report any acute abnormality.    Due to the patient's GI bleeding, Eliquis was stopped.  GI was consulted and the patient underwent EGD while hospitalized.  GI reported a duodenal ulcer most " "likely secondary to anticoagulation and NSAID use.  GI recommended to stop anticoagulation and to continue PPI twice daily for 3 months and follow-up as outpatient.  Patient should also have a follow-up EGD in 4 weeks as per GI recommendation.    Patient with a history of DVT and patient on Eliquis.  We have also consulted hematology for guidance on further anticoagulation needs. Patient had a US of upper extremities which reported: \"LUE: Thrombus seen in one of the paired brachial veins around the PICC line\".  Hematology recommended to stop anticoagulation due to risk versus benefit.  Hematology believes the he had most likely appropriate intervention therapy prior to hospitalization.  As per hematology, the risk of pulmonary embolism after extremity DVT is lower.  Patient was advised that if he starts developing symptoms such as increased swelling or shortness of breath to come back to the hospital or go to his PCP evaluation.  At that time which should be reevaluated further need of anticoagulation.    Today the patient is asymptomatic, the patient feels better.  Hemoglobin has remained stable.  She will require close follow-up with GI as an outpatient on PCP as well.    Due to high blood pressure the patient was started on amlodipine 5 mg daily, he will require close follow-up with PCP to titrate blood pressure medications as needed.    PT/OT recommend patient to be sent home with home health also with wound care at home health.  Referral has been sent, and it appears the patient had already been established with Home Health prior to hospitalization.    The patient is ready to go home, and she will follow-up closely with PCP and GI.      Therefore, he is discharged in fair and stable condition to home with organized home healthcare and close outpatient follow-up.    The patient met 2-midnight criteria for an inpatient stay at the time of discharge.    Discharge Date  5/12/21    FOLLOW UP ITEMS POST " DISCHARGE  Patient will require close follow-up with PCP and GI.  Patient will have home health for physical therapy, Occupational Therapy and wound care.    DISCHARGE DIAGNOSES  Principal Problem:    Acute GI bleeding POA: Unknown  Active Problems:    COPD (chronic obstructive pulmonary disease) (HCC) POA: Yes    Hyperlipidemia POA: Yes    Hypertension POA: Unknown    Chronic lower extremity edema (Chronic) POA: Yes    Neuropathy POA: Yes    Limited mobility POA: Yes    History of pseudomembranous enterocolitis POA: Yes    chronic kidney disease (HCC) POA: Yes    Acute embolism and thrombosis of deep veins of right upper extremity (HCC) POA: Yes    GERD (gastroesophageal reflux disease) POA: Unknown  Resolved Problems:    Orthostasis POA: Yes      Overview: IMO load March 2020    Mixed dyslipidemia POA: Yes      FOLLOW UP  No future appointments.  Carlitos Harvey M.D.  [address] [facility] [address]  [phone]    In 1 week      Ganesh Peacock M.D.  [address]  [facility] [address]  [phone]    In 1 week        MEDICATIONS ON DISCHARGE     Medication List      START taking these medications      Instructions   amLODIPine 5 MG Tabs  Start taking on: May 13, 2021  Commonly known as: NORVASC   Take 1 tablet by mouth every day.  Dose: 5 mg     ferrous sulfate 325 (65 Fe) MG tablet   Take 1 tablet by mouth every 48 hours.  Dose: 325 mg     omeprazole 40 MG delayed-release capsule  Commonly known as: PRILOSEC   Take 1 capsule by mouth 2 times a day.  Dose: 40 mg        CHANGE how you take these medications      Instructions   gabapentin 300 MG Caps  What changed:   · how much to take  · when to take this  · additional instructions  Commonly known as: NEURONTIN   TAKE 1 CAPSULE BY MOUTH THREE TIMES A DAY     rosuvastatin 5 MG Tabs  What changed: when to take this  Commonly known as: CRESTOR   TAKE 1 TABLET BY MOUTH EVERY EVENING        CONTINUE taking these medications      Instructions   acetaminophen  325 MG Tabs  Commonly known as: Tylenol   Take 650 mg by mouth every four hours as needed. Indications: Pain  Dose: 650 mg     Allegra Allergy 180 MG tablet  Generic drug: fexofenadine   Take 180 mg by mouth every day.  Dose: 180 mg     doxycycline 100 MG Tabs  Commonly known as: VIBRAMYCIN   Take 1 tablet by mouth 2 times a day for 60 days.  Dose: 100 mg     fluticasone 50 MCG/ACT nasal spray  Commonly known as: FLONASE   Administer 2 Sprays into affected nostril(S) every day.  Dose: 2 Spray     IMODIUM A-D PO   Take 1 Tab by mouth 2 (two) times a day.  Dose: 1 tablet     leflunomide 20 MG Tabs  Commonly known as: ARAVA   Take 1 Tab by mouth every day.  Dose: 20 mg     Mucinex 600 MG Tb12  Generic drug: guaiFENesin ER   Take 1,200 mg by mouth every day.  Dose: 1,200 mg     Spiriva Respimat 2.5 mcg/Act Aers  Generic drug: tiotropium   Inhale 5 mcg every day.  Dose: 5 mcg     Symbicort 80-4.5 MCG/ACT Aero  Generic drug: budesonide-formoterol   Inhale 2 Puffs 2 Times a Day.  Dose: 2 Puff     triamcinolone acetonide 0.1 % Crea  Commonly known as: KENALOG   Apply 1 Application topically one time as needed.  Dose: 1 Application        STOP taking these medications    Eliquis 2.5mg Tabs  Generic drug: apixaban            Allergies  Allergies   Allergen Reactions   • Ciprofloxacin      Other reaction(s): muscle aches  Pt's family states that Pt had a reaction when he was a kid noted 2/1/2021   • Levaquin      Other reaction(s): Muscle aches  Muscle aches  Other reaction(s): Muscle aches  Pt's son states that Pt had a reaction when he was a kid noted 2/1/2021   • Penicillins Hives     Rash and asthma attack when a child - wife states he has had Keflex in the past and tolerated  Pt's son states that Pt had a reaction when he was a kid noted 2/1/2021        DIET  Orders Placed This Encounter   Procedures   • Diet Order Diet: Regular     Standing Status:   Standing     Number of Occurrences:   1     Order Specific Question:    Diet:     Answer:   Regular [1]       ACTIVITY  As tolerated. and directed by PT.  Weight bearing as tolerated and directed by PT.    CONSULTATIONS  GI  Hematology    PROCEDURES  EGD on 5/9/21    US-EXTREMITY VENOUS UPPER BILAT   Final Result      US-EXTREMITY VENOUS LOWER BILAT   Final Result      IR-MIDLINE CATHETER INSERTION WO GUIDANCE > AGE 3   Final Result                  Ultrasound-guided midline placement performed by qualified nursing staff    as above.          CT-HEAD W/O   Final Result      1.  No acute intracranial process.      2.  Atrophy and small vessel ischemic change.          LABORATORY  Lab Results   Component Value Date    SODIUM 143 05/12/2021    POTASSIUM 3.9 05/12/2021    CHLORIDE 113 (H) 05/12/2021    CO2 22 05/12/2021    GLUCOSE 102 (H) 05/12/2021    BUN 19 05/12/2021    CREATININE 1.34 05/12/2021    CREATININE 1.5 (H) 04/11/2005        Lab Results   Component Value Date    WBC 5.8 05/12/2021    HEMOGLOBIN 7.8 (L) 05/12/2021    HEMATOCRIT 24.8 (L) 05/12/2021    PLATELETCT 211 05/12/2021        Total time of the discharge process exceeds 35 minutes.

## 2021-05-12 NOTE — DISCHARGE PLANNING
Meds-to-Beds: Discharge prescription orders listed below delivered to patient's bedside. RN Tyshawn notified. Patient and family member counseled.       Maxi Torres   Home Medication Instructions KRISTI:[id number]    Printed on:05/12/21 1222   Medication Information                      amLODIPine (NORVASC) 5 MG Tab  Take 1 tablet by mouth every day.             ferrous sulfate 325 (65 Fe) MG tablet  Take 1 tablet by mouth every 48 hours.             omeprazole (PRILOSEC) 40 MG delayed-release capsule  Take 1 capsule by mouth 2 times a day.                 Bekah Pride, PharmD

## 2021-05-12 NOTE — DISCHARGE PLANNING
Anticipated Discharge Disposition: home with home health     Action: Met with patient to get choice for HHC. Patient chose:     1. [facility]       Faxed to MAGDIEL Holguin.     Pt was on services with [facility] prior to admission of hospital and would like to continue services with them.     Issued IMM to patient at  9:20 am on 5/12/21. Patient does not plan to appeal. Provided patient with copy.       Barriers to Discharge: hhc set up     Plan: LSW to assist as needed and monitor for barriers to discharge.

## 2021-05-12 NOTE — CARE PLAN
Problem: Communication  Goal: The ability to communicate needs accurately and effectively will improve  Outcome: PROGRESSING AS EXPECTED     Problem: Knowledge Deficit  Goal: Knowledge of disease process/condition, treatment plan, diagnostic tests, and medications will improve  Outcome: PROGRESSING AS EXPECTED  Goal: Knowledge of the prescribed therapeutic regimen will improve  Outcome: PROGRESSING AS EXPECTED

## 2021-05-12 NOTE — FACE TO FACE
Face to Face Supporting Documentation - Home Health    The encounter with this patient was in whole or in part the primary reason for home health admission.    Date of encounter:   Patient:                    MRN:                       YOB: 2022  Barry Torres  [id number]  1940     Home health to see patient for:  Wound Care   Physical Therapy  Occupational Therapy    Skilled need for:  Exacerbation of Chronic Disease State exacerbation of chronic disease state.    Skilled nursing interventions to include:  Comment: as per Wound care, PT and OT recommendations.    Homebound status evidenced by:  Needs the assistance of another person in order to leave the home. Leaving home requires a considerable and taxing effort. There is a normal inability to leave the home.    Community Physician to provide follow up care: Carlitos Harvey M.D.     Optional Interventions? Yes, Details: as per wound care, PT and OT recommendations.      I certify the face to face encounter for this home health care referral meets the CMS requirements and the encounter/clinical assessment with the patient was, in whole, or in part, for the medical condition(s) listed above, which is the primary reason for home health care. Based on my clinical findings: the service(s) are medically necessary, support the need for home health care, and the homebound criteria are met.  I certify that this patient has had a face to face encounter by myself.  Param Thorpe M.D. - WALKERI: [phone]

## 2021-05-12 NOTE — DISCHARGE INSTRUCTIONS
Discharge Instructions    Discharged to home by car with relative. Discharged via wheelchair, hospital escort: Yes.  Special equipment needed: Not Applicable    Be sure to schedule a follow-up appointment with your primary care doctor or any specialists as instructed.     Discharge Plan:   Diet Plan: Discussed  Activity Level: Discussed  Confirmed Follow up Appointment: Appointment Scheduled  Confirmed Symptoms Management: Discussed  Medication Reconciliation Updated: Yes    I understand that a diet low in cholesterol, fat, and sodium is recommended for good health. Unless I have been given specific instructions below for another diet, I accept this instruction as my diet prescription.   Other diet: Regular    Special Instructions: None    · Is patient discharged on Warfarin / Coumadin?   No     Depression / Suicide Risk    As you are discharged from this [facility] facility, it is important to learn how to keep safe from harming yourself.    Recognize the warning signs:  · Abrupt changes in personality, positive or negative- including increase in energy   · Giving away possessions  · Change in eating patterns- significant weight changes-  positive or negative  · Change in sleeping patterns- unable to sleep or sleeping all the time   · Unwillingness or inability to communicate  · Depression  · Unusual sadness, discouragement and loneliness  · Talk of wanting to die  · Neglect of personal appearance   · Rebelliousness- reckless behavior  · Withdrawal from people/activities they love  · Confusion- inability to concentrate     If you or a loved one observes any of these behaviors or has concerns about self-harm, here's what you can do:  · Talk about it- your feelings and reasons for harming yourself  · Remove any means that you might use to hurt yourself (examples: pills, rope, extension cords, firearm)  · Get professional help from the community (Mental Health, Substance Abuse, psychological counseling)  · Do not  be alone:Call your Safe Contact- someone whom you trust who will be there for you.  · Call your local CRISIS HOTLINE 332-7578 or 933-123-7326  · Call your local Children's Mobile Crisis Response Team [place] (078) 178-2951 or www.[employer]  · Call the toll free National Suicide Prevention Hotlines   · National Suicide Prevention Lifeline 613-980-GVRX (4996)  · [employer] 800-SUICIDE (914-6671)      - Discharge Instructions to Pt:  · Follow up with your Primary Care Physician in 3 to 5 days   · Follow up with your Gastroenterologist  · Be compliant with your follow up appointments.  · Please be compliant with your medications, including new ones.  · A new medication has been given please take as advised.  · Keep blood pressure controlled and be compliant to keep systolic blood pressure <140.  · Please adhere to a healthy diet, that consist of low fat, low salt, low calorie.  · Weight loss and physical activity as tolerated is encouraged.   · Ambulate with assistance.  ·  If there any signs or symptoms of worsening or symptoms recurring, please call your Primary Care Physician or return to Emergency Department for further assessment.  · Avoid sudden changes in position, like standing up quickly.  · Do not drive until cleared by PCP.  · Refrain from drinking alcohol and smoking.     Recommendation to PCP:  · Would recommend a CBC, BMP in 3-5 days.  · Your pt will need close monitoring.  · If  failure to respond to therapy, we suggest having patient return for further care, or if cannot be treated as an outpatient, return to ED if worsening of symptoms.  · Please make certain your pt follows up with specialist appointments  · Ensure patient adheres to diet and lifestyle modifications and reconcile.  · Please note the change to medication and reconcile.  · Patient without progression of symptoms. Prognosis and risk factors discussed in detail with patient and he understands.

## 2021-05-12 NOTE — DISCHARGE PLANNING
Agency/Facility Name: [facility]  Plan or Request: Left vm on referral status.    1140- pt accepted with [facility]

## 2021-05-12 NOTE — ASSESSMENT & PLAN NOTE
We have started the patient on amlodipine 5 mg  Labetalol PRN  Monitor and make changes accordingly.

## 2021-05-12 NOTE — PROGRESS NOTES
[facility] Medicine Daily Progress Note    Date of Service  5/11/2021    Chief Complaint  81 y.o. male admitted 5/7/2021 with black tarry stools    Hospital Course    Barry Torres is a 81 y.o. male with past medical history of CKD stage III, hypertension not on meds, COPD on inhaler, right upper extremity DVT in February currently on Eliquis 2.5mg bid  Patient presents to the ED with weakness/debilitation which has been going on for about a week and fall hurt his head.  Patient and wife state that patient has been not as active for several months. Yesterday morning patient had a mechanical fall while he was washing his hands at the sink, patient states that he felt lightheaded lost balance and fell backwards hitting his head.  This morning patient went to the restroom and was unable to stand up from it he is laid down on the floor and called his wife for help, wife was not able to help him up and call 911.     Patient states that he felt weak to stand up on his own, chronic shortness of breath due to COPD; and  Lightheadedness and dizziness starting recently, denied chest pain, palpitations, visual changes,  altered mentation, or focal weakness. Patient also states that for the past several days he has had dark stools, but he thought that they were due to to ingestion of Pepto-Bismol.  Patient is currently taking Eliquis for almost months since he was diagnosed with a upper extremity DVT.  He was told that he will complete a 3-month treatment.    Interval Problem Update    5/8/2021: The patient was seen and evaluated at bedside and appears comfortable.  Patient states that he is tolerating oral intake without epigastric pain.  He was initially admitted for further evaluation and management of possible upper GI bleed.  He did receive transfusion of 1 unit of packed RBCs however his hemoglobin had minimal decline.  He did have a repeat episode overnight.  At this point we are pending GI evaluation and  possible possible endoscopic intervention.  Patient denies any chest pains, palpitations, shortness of breath.  No other overnight events reported.    5/9/2021: The patient was seen and evaluated at bedside and appears comfortable.  He underwent upper endoscopy with GI this morning which revealed large clean-based ulcer in the duodenum.  We are pending results of H. pylori testing.  Ulceration and subsequent upper GI bleed likely secondary to Eliquis use for prior upper extremity DVT.  According the patient he does not have any personal history of malignancy, no recent surgery or immobilization, interestingly his son does have a history of DVTs and PE so I suspect that patient does have a familial component in his coagulation disorder.  In that case, he is at high risk for redeveloping DVT despite initially being told to complete therapy with Eliquis for only 3 months.  He is currently 2 months into treatment.  At this time we will reevaluate patient with upper and lower extremity venous Dopplers to evaluate for persistence of DVT prior to pursuing superior IVC filter placement while he is off anticoagulation.  He is at high risk for rebleeding on any anticoagulation. Will discuss with interventional radiology once doppler results are available. Of note he did receive 2 units of packed RBCs during this admission.  No other overnight events.    5/10/2021: Venous Doppler ultrasound of upper extremities shows resolution of right upper extremity thrombus however there is new thrombus found in the left brachial vein.  This case was discussed with interventional radiology who states that there is no role for IVC filter and upper extremity DVT and their recommendations are for prophylactic embolization of the GDA.  Possible treatment options were discussed with patient and family members at bedside and they would like to discuss how to proceed amongst themselves.  Patient still remains high risk for rebleed on  anticoagulation.  At this time patient is tolerating oral intake and denies any nausea/vomiting.  He denies any chest pains, palpitations, shortness of breath.  No other overnight events reported.  Of note, pending final PT/OT recommendations however patient states that he would refuse skilled nursing and would prefer to resume home health with [facility].  Case management aware.    5/11: Patient seen at bedside this morning.  Patient currently laying in bed comfortably with wife at his side.  We have consulted hematology to evaluate the need for further anticoagulation therapy, we appreciate further conditions.  As per nursing no other overnight events reported.    Consultants/Specialty  GI  Hematology    Code Status  Full Code    Disposition   PT/OT recommendations   Patient could potentially be discharged tomorrow if no further signs of bleeding.    Review of Systems  Review of Systems   Constitutional: Negative for chills and fever.   HENT: Negative for congestion, hearing loss and tinnitus.    Eyes: Negative for blurred vision, double vision and photophobia.   Respiratory: Negative for cough and hemoptysis.    Cardiovascular: Negative for chest pain and palpitations.   Gastrointestinal: Negative for blood in stool, diarrhea, heartburn, nausea and vomiting.   Genitourinary: Negative for dysuria, flank pain, hematuria and urgency.   Musculoskeletal: Negative for back pain and myalgias.   Skin: Negative for itching and rash.   Neurological: Negative for dizziness, sensory change, speech change and headaches.   Endo/Heme/Allergies: Does not bruise/bleed easily.   Psychiatric/Behavioral: Negative for depression and suicidal ideas.        Physical Exam  Temp:  [35.9 °C (96.6 °F)-36.4 °C (97.6 °F)] 36.4 °C (97.6 °F)  Pulse:  [] 90  Resp:  [16-18] 16  BP: (124-165)/(59-96) 165/82  SpO2:  [90 %-94 %] 93 %    Physical Exam  Vitals reviewed.   Constitutional:       Appearance: Normal appearance.   HENT:      Head:  Normocephalic and atraumatic.      Mouth/Throat:      Mouth: Mucous membranes are moist.      Pharynx: Oropharynx is clear.   Eyes:      General:         Right eye: No discharge.         Left eye: No discharge.      Extraocular Movements: Extraocular movements intact.      Pupils: Pupils are equal, round, and reactive to light.   Cardiovascular:      Rate and Rhythm: Normal rate and regular rhythm.      Pulses: Normal pulses.      Heart sounds: Normal heart sounds.   Pulmonary:      Effort: Pulmonary effort is normal.      Breath sounds: Normal breath sounds.   Abdominal:      General: Bowel sounds are normal.      Palpations: Abdomen is soft.   Musculoskeletal:         General: No tenderness.      Cervical back: Normal range of motion and neck supple. No rigidity. No muscular tenderness.   Lymphadenopathy:      Cervical: No cervical adenopathy.   Skin:     General: Skin is warm and dry.   Neurological:      General: No focal deficit present.      Mental Status: He is alert and oriented to person, place, and time. Mental status is at baseline.   Psychiatric:         Mood and Affect: Mood normal.         Behavior: Behavior normal.         Thought Content: Thought content normal.         Fluids    Intake/Output Summary (Last 24 hours) at 5/11/2021 1858  Last data filed at 5/11/2021 1517  Gross per 24 hour   Intake 600 ml   Output 1100 ml   Net -500 ml       Laboratory  Recent Labs     05/09/21  1352 05/10/21  0353 05/11/21  0230   WBC 8.8 7.2 6.6   RBC 2.81* 2.61* 2.67*   HEMOGLOBIN 8.3* 7.6* 7.8*   HEMATOCRIT 25.8* 24.4* 25.0*   MCV 93.6 93.5 93.6   MCH 29.2 29.1 29.2   MCHC 31.2* 31.1* 31.2*   RDW 68.0* 67.7* 67.9*   PLATELETCT 253 233 218   MPV 9.8 10.2 9.6     Recent Labs     05/09/21  0339 05/10/21  0353 05/11/21  0230   SODIUM 142 143 141   POTASSIUM 4.0 4.1 3.9   CHLORIDE 115* 113* 111   CO2 21 22 22   GLUCOSE 94 96 99   BUN 30* 23* 21   CREATININE 1.57* 1.44* 1.32   CALCIUM 7.8* 7.7* 7.8*                    Imaging  US-EXTREMITY VENOUS UPPER BILAT   Final Result      US-EXTREMITY VENOUS LOWER BILAT   Final Result      IR-MIDLINE CATHETER INSERTION WO GUIDANCE > AGE 3   Final Result                  Ultrasound-guided midline placement performed by qualified nursing staff    as above.          CT-HEAD W/O   Final Result      1.  No acute intracranial process.      2.  Atrophy and small vessel ischemic change.           Assessment/Plan  * Acute GI bleeding  Assessment & Plan  Likely upper GI bleeding   Transfuse  Hold eliquis  IV fluid  IV PPI  Diet as tolerated   Pending GI evaluation     5/11: It appears GI performed EGD on 5/9 and found a duodenal ulcer. We will consult Hematology to see whether patient actually needs to be on anticoagulation.    Acute embolism and thrombosis of deep veins of right upper extremity (HCC)- (present on admission)  Assessment & Plan  On eliquis 2.5mg bid, which is on hold due to GI bleeding    SCD    5/11: US of Upper extremity showed: Thrombus seen in one of the paired brachial veins around the PICC line. We have consulted hematology to evaluate the need of further anticoagulation moving forward.  I discussed the patient with Dr. Palomino and due to risk vs benefit, hematology recommends not continuing anticoagulation therapy at this time. We have discontinued midline as per hematology recommendations, and patient could be discharged tomorrow if no further signs of bleeding and to follow up as outpatient with PCP if patient develops extremity edema or further symptoms to re-evaluate need of anticoagulation.    History of pseudomembranous enterocolitis- (present on admission)  Assessment & Plan  Loperamide 1tab bid  Continue immunosuppressive therapy     Limited mobility- (present on admission)  Assessment & Plan  Likely due to age  Pending PT/OT evaluation     5/11: PT/OT recommended home health for which we have placed referral.    chronic kidney disease (HCC)- (present on  admission)  Assessment & Plan  Cr ~ 1.6; baseline 1.4-1.7  - Urine: non-oliguric  - BP: Goal < 140/90  - Volume: euvolemic  - Acid/Base: no sig. acid base disturbance  - Electrolytes: stable  - Anemia: Goal Hgb 10-11  - MBD: Ca normal, PO4 unknown  - dose medication based on GFR  - avoid nephrotoxicity  - nephrology consult as needed  - IVF    5/11: improving, Cr 1.32 today.      Neuropathy- (present on admission)  Assessment & Plan  Gabapentin 600mg pm, 300mg am    Hypertension  Assessment & Plan  We have started the patient on amlodipine 5 mg  Labetalol PRN  Monitor and make changes accordingly.    COPD (chronic obstructive pulmonary disease) (HCC)- (present on admission)  Assessment & Plan  symbicort and spiriva  On inhaler PRN  stable    GERD (gastroesophageal reflux disease)  Assessment & Plan  Continue PPI treatment BID for the next three months as per GI recommendations.    Chronic lower extremity edema- (present on admission)  Assessment & Plan  EF 60% In 9/8/2020      Hyperlipidemia- (present on admission)  Assessment & Plan  On rosuvastatin 5mg qd       VTE prophylaxis: SCDs

## 2021-05-19 ENCOUNTER — HOSPITAL ENCOUNTER (OUTPATIENT)
Dept: LAB | Facility: MEDICAL CENTER | Age: 81
End: 2021-05-19
Attending: STUDENT IN AN ORGANIZED HEALTH CARE EDUCATION/TRAINING PROGRAM
Payer: MEDICARE

## 2021-05-19 LAB
BASOPHILS # BLD AUTO: 1.6 % (ref 0–1.8)
BASOPHILS # BLD: 0.1 K/UL (ref 0–0.12)
EOSINOPHIL # BLD AUTO: 0.33 K/UL (ref 0–0.51)
EOSINOPHIL NFR BLD: 5.2 % (ref 0–6.9)
ERYTHROCYTE [DISTWIDTH] IN BLOOD BY AUTOMATED COUNT: 72.5 FL (ref 35.9–50)
FOLATE SERPL-MCNC: 10.2 NG/ML
HCT VFR BLD AUTO: 29.2 % (ref 42–52)
HGB BLD-MCNC: 8.8 G/DL (ref 14–18)
HGB RETIC QN AUTO: 31.6 PG/CELL (ref 29–35)
IMM GRANULOCYTES # BLD AUTO: 0.03 K/UL (ref 0–0.11)
IMM GRANULOCYTES NFR BLD AUTO: 0.5 % (ref 0–0.9)
IMM RETICS NFR: 34 % (ref 9.3–17.4)
LYMPHOCYTES # BLD AUTO: 1.5 K/UL (ref 1–4.8)
LYMPHOCYTES NFR BLD: 23.8 % (ref 22–41)
MCH RBC QN AUTO: 30 PG (ref 27–33)
MCHC RBC AUTO-ENTMCNC: 30.1 G/DL (ref 33.7–35.3)
MCV RBC AUTO: 99.7 FL (ref 81.4–97.8)
MONOCYTES # BLD AUTO: 0.83 K/UL (ref 0–0.85)
MONOCYTES NFR BLD AUTO: 13.2 % (ref 0–13.4)
NEUTROPHILS # BLD AUTO: 3.51 K/UL (ref 1.82–7.42)
NEUTROPHILS NFR BLD: 55.7 % (ref 44–72)
NRBC # BLD AUTO: 0 K/UL
NRBC BLD-RTO: 0 /100 WBC
PLATELET # BLD AUTO: 290 K/UL (ref 164–446)
PMV BLD AUTO: 10.8 FL (ref 9–12.9)
RBC # BLD AUTO: 2.93 M/UL (ref 4.7–6.1)
RETICS # AUTO: 0.09 M/UL (ref 0.04–0.06)
RETICS/RBC NFR: 3.2 % (ref 0.8–2.1)
T4 FREE SERPL-MCNC: 0.66 NG/DL (ref 0.93–1.7)
TSH SERPL DL<=0.005 MIU/L-ACNC: 5.34 UIU/ML (ref 0.38–5.33)
VIT B12 SERPL-MCNC: 872 PG/ML (ref 211–911)
WBC # BLD AUTO: 6.3 K/UL (ref 4.8–10.8)

## 2021-05-19 PROCEDURE — 82746 ASSAY OF FOLIC ACID SERUM: CPT

## 2021-05-19 PROCEDURE — 85046 RETICYTE/HGB CONCENTRATE: CPT

## 2021-05-19 PROCEDURE — 82607 VITAMIN B-12: CPT

## 2021-05-19 PROCEDURE — 85025 COMPLETE CBC W/AUTO DIFF WBC: CPT

## 2021-05-19 PROCEDURE — 84439 ASSAY OF FREE THYROXINE: CPT

## 2021-05-19 PROCEDURE — 84443 ASSAY THYROID STIM HORMONE: CPT

## 2021-05-19 PROCEDURE — 36415 COLL VENOUS BLD VENIPUNCTURE: CPT

## 2021-06-10 ENCOUNTER — HOSPITAL ENCOUNTER (OUTPATIENT)
Dept: LAB | Facility: MEDICAL CENTER | Age: 81
End: 2021-06-10
Attending: INTERNAL MEDICINE
Payer: MEDICARE

## 2021-06-10 LAB
ALBUMIN SERPL BCP-MCNC: 3.6 G/DL (ref 3.2–4.9)
ALBUMIN/GLOB SERPL: 1.3 G/DL
ALP SERPL-CCNC: 136 U/L (ref 30–99)
ALT SERPL-CCNC: 11 U/L (ref 2–50)
ANION GAP SERPL CALC-SCNC: 9 MMOL/L (ref 7–16)
AST SERPL-CCNC: 15 U/L (ref 12–45)
BILIRUB SERPL-MCNC: 0.3 MG/DL (ref 0.1–1.5)
BUN SERPL-MCNC: 19 MG/DL (ref 8–22)
CALCIUM SERPL-MCNC: 8.7 MG/DL (ref 8.5–10.5)
CHLORIDE SERPL-SCNC: 110 MMOL/L (ref 96–112)
CO2 SERPL-SCNC: 24 MMOL/L (ref 20–33)
CREAT SERPL-MCNC: 1.61 MG/DL (ref 0.5–1.4)
FERRITIN SERPL-MCNC: 105 NG/ML (ref 22–322)
FOLATE SERPL-MCNC: 12.8 NG/ML
GLOBULIN SER CALC-MCNC: 2.8 G/DL (ref 1.9–3.5)
GLUCOSE SERPL-MCNC: 99 MG/DL (ref 65–99)
IRON SATN MFR SERPL: 21 % (ref 15–55)
IRON SERPL-MCNC: 49 UG/DL (ref 50–180)
POTASSIUM SERPL-SCNC: 4.9 MMOL/L (ref 3.6–5.5)
PROT SERPL-MCNC: 6.4 G/DL (ref 6–8.2)
SODIUM SERPL-SCNC: 143 MMOL/L (ref 135–145)
TIBC SERPL-MCNC: 238 UG/DL (ref 250–450)
TRANSFERRIN SERPL-MCNC: 218 MG/DL (ref 200–370)
UIBC SERPL-MCNC: 189 UG/DL (ref 110–370)

## 2021-06-10 PROCEDURE — 82746 ASSAY OF FOLIC ACID SERUM: CPT

## 2021-06-10 PROCEDURE — 83540 ASSAY OF IRON: CPT

## 2021-06-10 PROCEDURE — 82728 ASSAY OF FERRITIN: CPT

## 2021-06-10 PROCEDURE — 36415 COLL VENOUS BLD VENIPUNCTURE: CPT

## 2021-06-10 PROCEDURE — 84466 ASSAY OF TRANSFERRIN: CPT

## 2021-06-10 PROCEDURE — 83550 IRON BINDING TEST: CPT

## 2021-06-10 PROCEDURE — 80053 COMPREHEN METABOLIC PANEL: CPT

## 2021-07-01 ENCOUNTER — HOSPITAL ENCOUNTER (OUTPATIENT)
Dept: LAB | Facility: MEDICAL CENTER | Age: 81
End: 2021-07-01
Attending: STUDENT IN AN ORGANIZED HEALTH CARE EDUCATION/TRAINING PROGRAM
Payer: MEDICARE

## 2021-07-01 LAB
ANISOCYTOSIS BLD QL SMEAR: ABNORMAL
BASOPHILS # BLD AUTO: 1.9 % (ref 0–1.8)
BASOPHILS # BLD: 0.1 K/UL (ref 0–0.12)
BURR CELLS BLD QL SMEAR: NORMAL
COMMENT 1642: NORMAL
EOSINOPHIL # BLD AUTO: 0.37 K/UL (ref 0–0.51)
EOSINOPHIL NFR BLD: 7.2 % (ref 0–6.9)
ERYTHROCYTE [DISTWIDTH] IN BLOOD BY AUTOMATED COUNT: 70.8 FL (ref 35.9–50)
HCT VFR BLD AUTO: 38 % (ref 42–52)
HGB BLD-MCNC: 11.4 G/DL (ref 14–18)
IMM GRANULOCYTES # BLD AUTO: 0.01 K/UL (ref 0–0.11)
IMM GRANULOCYTES NFR BLD AUTO: 0.2 % (ref 0–0.9)
LYMPHOCYTES # BLD AUTO: 1.97 K/UL (ref 1–4.8)
LYMPHOCYTES NFR BLD: 38.4 % (ref 22–41)
MACROCYTES BLD QL SMEAR: ABNORMAL
MCH RBC QN AUTO: 30.4 PG (ref 27–33)
MCHC RBC AUTO-ENTMCNC: 30 G/DL (ref 33.7–35.3)
MCV RBC AUTO: 101.3 FL (ref 81.4–97.8)
MONOCYTES # BLD AUTO: 0.93 K/UL (ref 0–0.85)
MONOCYTES NFR BLD AUTO: 18.1 % (ref 0–13.4)
MORPHOLOGY BLD-IMP: NORMAL
NEUTROPHILS # BLD AUTO: 1.75 K/UL (ref 1.82–7.42)
NEUTROPHILS NFR BLD: 34.2 % (ref 44–72)
NRBC # BLD AUTO: 0 K/UL
NRBC BLD-RTO: 0 /100 WBC
OVALOCYTES BLD QL SMEAR: NORMAL
PLATELET # BLD AUTO: 262 K/UL (ref 164–446)
PLATELET BLD QL SMEAR: NORMAL
PMV BLD AUTO: 10.2 FL (ref 9–12.9)
POIKILOCYTOSIS BLD QL SMEAR: NORMAL
RBC # BLD AUTO: 3.75 M/UL (ref 4.7–6.1)
RBC BLD AUTO: PRESENT
WBC # BLD AUTO: 5.1 K/UL (ref 4.8–10.8)

## 2021-07-01 PROCEDURE — 85025 COMPLETE CBC W/AUTO DIFF WBC: CPT

## 2021-07-01 PROCEDURE — 36415 COLL VENOUS BLD VENIPUNCTURE: CPT

## 2021-07-07 ENCOUNTER — HOSPITAL ENCOUNTER (OUTPATIENT)
Dept: LAB | Facility: MEDICAL CENTER | Age: 81
End: 2021-07-07
Attending: NURSE PRACTITIONER
Payer: MEDICARE

## 2021-07-07 LAB
ALBUMIN SERPL BCP-MCNC: 3.8 G/DL (ref 3.2–4.9)
ALP SERPL-CCNC: 115 U/L (ref 30–99)
ALT SERPL-CCNC: 9 U/L (ref 2–50)
AST SERPL-CCNC: 17 U/L (ref 12–45)
BASOPHILS # BLD AUTO: 1.7 % (ref 0–1.8)
BASOPHILS # BLD: 0.08 K/UL (ref 0–0.12)
BILIRUB CONJ SERPL-MCNC: 0.2 MG/DL (ref 0.1–0.5)
BILIRUB INDIRECT SERPL-MCNC: 0.2 MG/DL (ref 0–1)
BILIRUB SERPL-MCNC: 0.4 MG/DL (ref 0.1–1.5)
EOSINOPHIL # BLD AUTO: 0.3 K/UL (ref 0–0.51)
EOSINOPHIL NFR BLD: 6.4 % (ref 0–6.9)
ERYTHROCYTE [DISTWIDTH] IN BLOOD BY AUTOMATED COUNT: 68.5 FL (ref 35.9–50)
HCT VFR BLD AUTO: 37.5 % (ref 42–52)
HGB BLD-MCNC: 11.3 G/DL (ref 14–18)
IMM GRANULOCYTES # BLD AUTO: 0.01 K/UL (ref 0–0.11)
IMM GRANULOCYTES NFR BLD AUTO: 0.2 % (ref 0–0.9)
LYMPHOCYTES # BLD AUTO: 1.83 K/UL (ref 1–4.8)
LYMPHOCYTES NFR BLD: 38.8 % (ref 22–41)
MCH RBC QN AUTO: 30.6 PG (ref 27–33)
MCHC RBC AUTO-ENTMCNC: 30.1 G/DL (ref 33.7–35.3)
MCV RBC AUTO: 101.6 FL (ref 81.4–97.8)
MONOCYTES # BLD AUTO: 0.76 K/UL (ref 0–0.85)
MONOCYTES NFR BLD AUTO: 16.1 % (ref 0–13.4)
NEUTROPHILS # BLD AUTO: 1.74 K/UL (ref 1.82–7.42)
NEUTROPHILS NFR BLD: 36.8 % (ref 44–72)
NRBC # BLD AUTO: 0 K/UL
NRBC BLD-RTO: 0 /100 WBC
PLATELET # BLD AUTO: 265 K/UL (ref 164–446)
PMV BLD AUTO: 10.6 FL (ref 9–12.9)
PROT SERPL-MCNC: 6.6 G/DL (ref 6–8.2)
RBC # BLD AUTO: 3.69 M/UL (ref 4.7–6.1)
WBC # BLD AUTO: 4.7 K/UL (ref 4.8–10.8)

## 2021-07-07 PROCEDURE — 86038 ANTINUCLEAR ANTIBODIES: CPT

## 2021-07-07 PROCEDURE — 84075 ASSAY ALKALINE PHOSPHATASE: CPT | Mod: XU

## 2021-07-07 PROCEDURE — 80076 HEPATIC FUNCTION PANEL: CPT

## 2021-07-07 PROCEDURE — 84080 ASSAY ALKALINE PHOSPHATASES: CPT

## 2021-07-07 PROCEDURE — 83516 IMMUNOASSAY NONANTIBODY: CPT

## 2021-07-07 PROCEDURE — 36415 COLL VENOUS BLD VENIPUNCTURE: CPT

## 2021-07-07 PROCEDURE — 85025 COMPLETE CBC W/AUTO DIFF WBC: CPT

## 2021-07-09 LAB
MITOCHONDRIA M2 IGG SER-ACNC: 11.5 UNITS (ref 0–24.9)
NUCLEAR IGG SER QL IA: NORMAL

## 2021-07-11 LAB
ALP BONE SERPL-CCNC: 46 U/L (ref 0–55)
ALP ISOS SERPL HS-CCNC: 0 U/L
ALP LIVER SERPL-CCNC: 72 U/L (ref 0–94)
ALP SERPL-CCNC: 118 U/L (ref 40–120)

## 2021-10-12 RX ORDER — ROSUVASTATIN CALCIUM 5 MG/1
TABLET, COATED ORAL
Qty: 90 TABLET | Refills: 3 | Status: SHIPPED | OUTPATIENT
Start: 2021-10-12

## 2021-10-12 RX ORDER — TIOTROPIUM BROMIDE INHALATION SPRAY 3.12 UG/1
SPRAY, METERED RESPIRATORY (INHALATION)
Qty: 12 EACH | Refills: 3 | Status: SHIPPED | OUTPATIENT
Start: 2021-10-12

## 2021-10-12 NOTE — TELEPHONE ENCOUNTER
Last seen: 07/01/2021 by Dr. Harvey   Next appt: 10/18/2021 with Dr. Harvey     Was the patient seen in the last year in this department? Yes   Does patient have an active prescription for medications requested? No   Received Request Via: Pharmacy

## 2021-10-18 ENCOUNTER — OFFICE VISIT (OUTPATIENT)
Dept: INTERNAL MEDICINE | Facility: OTHER | Age: 81
End: 2021-10-18
Payer: MEDICARE

## 2021-10-18 VITALS
BODY MASS INDEX: 27.04 KG/M2 | TEMPERATURE: 97.8 F | OXYGEN SATURATION: 91 % | DIASTOLIC BLOOD PRESSURE: 76 MMHG | WEIGHT: 204 LBS | HEART RATE: 78 BPM | HEIGHT: 73 IN | SYSTOLIC BLOOD PRESSURE: 136 MMHG

## 2021-10-18 DIAGNOSIS — R79.89 ELEVATED TSH: ICD-10-CM

## 2021-10-18 DIAGNOSIS — D50.8 OTHER IRON DEFICIENCY ANEMIA: ICD-10-CM

## 2021-10-18 DIAGNOSIS — N18.31 STAGE 3A CHRONIC KIDNEY DISEASE: ICD-10-CM

## 2021-10-18 DIAGNOSIS — K21.9 GASTROESOPHAGEAL REFLUX DISEASE WITHOUT ESOPHAGITIS: ICD-10-CM

## 2021-10-18 DIAGNOSIS — Z86.718 HISTORY OF DVT (DEEP VEIN THROMBOSIS): ICD-10-CM

## 2021-10-18 DIAGNOSIS — Z87.19 HISTORY OF GI BLEED: ICD-10-CM

## 2021-10-18 DIAGNOSIS — R60.0 LOWER EXTREMITY EDEMA: Chronic | ICD-10-CM

## 2021-10-18 DIAGNOSIS — J44.9 CHRONIC OBSTRUCTIVE PULMONARY DISEASE, UNSPECIFIED COPD TYPE (HCC): ICD-10-CM

## 2021-10-18 PROBLEM — E87.6 HYPOKALEMIA: Status: RESOLVED | Noted: 2018-11-11 | Resolved: 2021-10-18

## 2021-10-18 PROBLEM — D72.829 LEUKOCYTOSIS: Status: RESOLVED | Noted: 2018-11-07 | Resolved: 2021-10-18

## 2021-10-18 PROBLEM — Z98.890 HISTORY OF SURGICAL PROCEDURE: Status: RESOLVED | Noted: 2020-12-17 | Resolved: 2021-10-18

## 2021-10-18 PROBLEM — M71.129 SEPTIC BURSITIS OF ELBOW: Status: RESOLVED | Noted: 2017-08-28 | Resolved: 2021-10-18

## 2021-10-18 PROBLEM — Z87.39 HISTORY OF BURSITIS: Status: ACTIVE | Noted: 2020-12-20

## 2021-10-18 PROBLEM — A41.9 SEPSIS (HCC): Status: RESOLVED | Noted: 2018-09-29 | Resolved: 2021-10-18

## 2021-10-18 PROBLEM — K92.2 ACUTE GI BLEEDING: Status: RESOLVED | Noted: 2021-05-07 | Resolved: 2021-10-18

## 2021-10-18 PROBLEM — J18.9 PNEUMONIA: Status: RESOLVED | Noted: 2020-12-17 | Resolved: 2021-10-18

## 2021-10-18 PROBLEM — J69.0 ASPIRATION PNEUMONIA (HCC): Status: RESOLVED | Noted: 2018-11-11 | Resolved: 2021-10-18

## 2021-10-18 PROBLEM — D50.9 IRON DEFICIENCY ANEMIA: Status: ACTIVE | Noted: 2021-10-18

## 2021-10-18 PROCEDURE — 99213 OFFICE O/P EST LOW 20 MIN: CPT | Mod: GE | Performed by: STUDENT IN AN ORGANIZED HEALTH CARE EDUCATION/TRAINING PROGRAM

## 2021-10-18 RX ORDER — TIOTROPIUM BROMIDE INHALATION SPRAY 3.12 UG/1
SPRAY, METERED RESPIRATORY (INHALATION)
Refills: 0 | OUTPATIENT
Start: 2021-10-18

## 2021-10-18 RX ORDER — GABAPENTIN 300 MG/1
300-600 CAPSULE ORAL 3 TIMES DAILY
Qty: 90 CAPSULE | Refills: 0
Start: 2021-10-18

## 2021-10-18 ASSESSMENT — ENCOUNTER SYMPTOMS
DIZZINESS: 0
SHORTNESS OF BREATH: 1
LOSS OF CONSCIOUSNESS: 0
FALLS: 0

## 2021-10-18 ASSESSMENT — FIBROSIS 4 INDEX: FIB4 SCORE: 1.75

## 2021-10-18 NOTE — PROGRESS NOTES
date  Chief Complaint   Patient presents with   • Follow-Up     In  103       History of Present Illness:  81 y.o. male established patient who presents today with his wife Lola for the following:  He has no acute complaints.    1. Other iron deficiency anemia  Secondary to blood loss from duodenal ulcer after GI bleed, which was a complication of anticoagulation from upper extremity DVT which was likely due to his PICC line that he needed for anti.  Has been taking iron supplementation every other day and his hemoglobin have been slowly improving.  Repeat EGD with Dr. Peacock showed healing, he is still on twice daily PPI.  No GERD symptoms    2. Chronic obstructive pulmonary disease, unspecified COPD type (HCC)  Unchanged, dyspnea only with significant exercise and can catch his breath.  Continues taking Spiriva and Symbicort daily as well as Allegra and fluticasone for allergies    3. Chronic lower extremity edema  Unchanged amount of edema, sometimes spots of clear drainage over left shin    4. Elevated TSH  Higher than 5, T4 slightly low at 0.66 states he has not had any increased fatigue or other endocrine symptoms.      Past Medical History:   Diagnosis Date   • Abnormal thyroid stimulating hormone (TSH) level    • Allergic rhinitis    • Asbestosis (HCC)    • Asthma    • Bronchitis    • Chronic diarrhea     declines eval; takes immodium once a day usually and sometimes less; see previous notes; aware of risk    • Chronic low back pain    • Chronic lung disease     asbestos related   • CKD (chronic kidney disease), stage III (HCC)     sees neph, one kidney   • COPD (chronic obstructive pulmonary disease) ([facility])    • Coronary artery calcification seen on CAT scan 8/2/2016    sees cards   • Epididymitis 2009    h/o listed in chart   • GERD (gastroesophageal reflux disease)    • History of hemorrhoids    • History of skin cancer     non melanoma   • [place] (hard of hearing)     declines hearing aids   •  Hypercholesteremia    • Hypertension    • Hypertriglyceridemia    • Indigestion    • Light headedness     2/2 orthostasis? now better off hctz   • Lightheadedness 6/23/2016   • Low back pain    • Nasal drainage    • Olecranon bursitis    • Orthostasis 6/23/2016    better off HCTZ   • Peripheral neuropathy    • Pleural plaque 2005     CT [place]   • Post-nasal drip    • Pulmonary emphysema (HCC)    • RA (rheumatoid arthritis) ([facility])     on meds, sees rheum   • Restless leg syndrome    • Rheumatoid arthritis ([facility])    • Sleep apnea     obstructive and central - not adherent to autopap   • Solitary kidney     history of kidney cyst 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-functioning kidney s/p nephrectomy        Patient Active Problem List    Diagnosis Date Noted   • Iron deficiency anemia 10/18/2021   • History of GI bleed 10/18/2021   • GERD (gastroesophageal reflux disease) 05/08/2021   • History of DVT (deep vein thrombosis) 02/16/2021   • Cellulitis 02/01/2021   • Pyogenic arthritis of right knee joint ([facility]) 12/20/2020   • History of bursitis 12/20/2020   • Oropharyngeal dysphagia 12/16/2020   • Knee effusion, right 11/30/2020   • chronic kidney disease ([facility]) 11/07/2018   • History of pseudomembranous enterocolitis 10/01/2018   • Limited mobility 09/30/2018   • Environmental and seasonal allergies 09/29/2018   • Neuropathy 09/29/2018   • Chronic lower extremity edema 08/14/2018   • Chronic respiratory failure with hypoxia ([facility]) 12/12/2017   • NYASIA (obstructive sleep apnea) 12/12/2017   • PVC (premature ventricular contraction) 08/14/2017   • Elevated TSH 12/06/2016   • Hypertension 08/02/2016   • Calcified LAD and LCx on CT chest 2015 08/02/2016   • Hyperlipidemia 06/03/2016   • Hx of septic arthritis 06/03/2016   • COPD (chronic obstructive pulmonary disease) ([facility]) 06/03/2016   • Stage 3 chronic kidney disease ([facility]) 11/03/2015   • Hypoxia 06/25/2013   • Pleural plaque 06/25/2013       Allergies:Ciprofloxacin, Levaquin, and  Penicillins    Current Outpatient Medications   Medication Sig Dispense Refill   • certolizumab pegol (CIMZIA) 2 X 200 MG/ML Kit Inject 200 mg under the skin every 14 days.     • gabapentin (NEURONTIN) 300 MG Cap Take 1-2 Capsules by mouth 3 times a day. 1 cap AM  2 caps PM 90 Capsule 0   • rosuvastatin (CRESTOR) 5 MG Tab TAKE 1 TABLET BY MOUTH EVERY EVENING 90 Tablet 3   • SPIRIVA RESPIMAT 2.5 MCG/ACT Aero Soln INHALE 2 PUFFS BY MOUTH DAILY 12 Each 3   • ferrous sulfate 325 (65 Fe) MG tablet Take 1 tablet by mouth every 48 hours. 30 tablet 0   • acetaminophen (TYLENOL) 325 MG Tab Take 650 mg by mouth every four hours as needed. Indications: Pain     • fluticasone (FLONASE) 50 MCG/ACT nasal spray Administer 2 Sprays into affected nostril(S) every day.     • guaiFENesin LA (MUCINEX) 600 MG TABLET SR 12 HR Take 1,200 mg by mouth every day.     • Loperamide HCl (IMODIUM A-D PO) Take 1 Tab by mouth 2 (two) times a day.     • budesonide-formoterol (SYMBICORT) 80-4.5 MCG/ACT Aerosol Inhale 2 Puffs 2 Times a Day.     • fexofenadine (ALLEGRA ALLERGY) 180 MG tablet Take 180 mg by mouth every day.     • leflunomide (ARAVA) 20 MG Tab Take 1 Tab by mouth every day. 30 Tab 0     No current facility-administered medications for this visit.       Social History     Tobacco Use   • Smoking status: Former Smoker     Packs/day: 1.00     Years: 40.00     Pack years: 40.00     Types: Cigarettes     Quit date: 1980     Years since quittin.8   • Smokeless tobacco: Never Used   • Tobacco comment: 1 pk a day for 40 yrs   Vaping Use   • Vaping Use: Never used   Substance Use Topics   • Alcohol use: No     Alcohol/week: 0.0 oz   • Drug use: No       Family History   Problem Relation Age of Onset   • Genetic Disorder Father         ALS   • No Known Problems Sister    • No Known Problems Son    • No Known Problems Daughter    • Heart Attack Neg Hx    • Heart Disease Neg Hx    • Heart Failure Neg Hx          Review of Systems   Review  "of Systems   Respiratory: Positive for shortness of breath (stable with heavy exertion).    Cardiovascular: Positive for leg swelling. Negative for chest pain.   Musculoskeletal: Negative for falls.   Neurological: Negative for dizziness and loss of consciousness.     See HPI.    Physical Exam:    /76   Pulse 78   Temp 36.6 °C (97.8 °F)   Ht 1.854 m (6' 1\")   Wt 92.5 kg (204 lb)   SpO2 91%  Body mass index is 26.91 kg/m².  Physical Exam  Vitals and nursing note reviewed.   Constitutional:       Appearance: Normal appearance. He is not toxic-appearing.   HENT:      Head: Normocephalic and atraumatic.   Cardiovascular:      Rate and Rhythm: Normal rate and regular rhythm.      Pulses: Normal pulses.      Heart sounds: Normal heart sounds. No murmur heard.   No friction rub. No gallop.    Pulmonary:      Effort: Pulmonary effort is normal. No respiratory distress.      Breath sounds: No wheezing or rales.   Abdominal:      General: There is no distension.      Palpations: Abdomen is soft.      Tenderness: There is no abdominal tenderness. There is no guarding or rebound.   Musculoskeletal:         General: No swelling or tenderness. Normal range of motion.      Right lower leg: Edema present.      Left lower leg: Edema present.   Skin:     General: Skin is warm and dry.      Capillary Refill: Capillary refill takes less than 2 seconds.      Findings: No lesion or rash.   Neurological:      General: No focal deficit present.      Mental Status: He is alert and oriented to person, place, and time.   Psychiatric:         Mood and Affect: Mood normal.         Behavior: Behavior normal.          Assessment/Plan:     Elevated TSH  No current symptoms, recheck TSH and T4 to see if treatment is indicated    Chronic lower extremity edema  Continue leg elevation, compression stockings as tolerated    COPD (chronic obstructive pulmonary disease) (HCC)  Stable dyspnea, no hypoxia at 91% today continue Symbicort and " Spiriva    Iron deficiency anemia  Continue iron supplementation, recheck CBC and iron panel, discontinue twice daily PPI due to healing seen on EGD.    GERD (gastroesophageal reflux disease)  PPI only as needed      All imaging results and lab results and consult notes are reviewed at this visit.  Followup: Return in about 4 months (around 2/18/2022).

## 2021-10-18 NOTE — ASSESSMENT & PLAN NOTE
Continue iron supplementation, recheck CBC and iron panel, discontinue twice daily PPI due to healing seen on EGD.

## 2021-10-18 NOTE — TELEPHONE ENCOUNTER
Last seen: 07/01/2021 by Dr. Harvey  Next appt: 10/18/2021    Was the patient seen in the last year in this department? Yes   Does patient have an active prescription for medications requested? No   Received Request Via: Pharmacy

## 2022-02-03 NOTE — ED TRIAGE NOTES
BIB ems for R leg swelling. Extensive hx of R leg infection. Endorses worsening fever, redness, and drainage today  
19.05

## 2022-02-10 ENCOUNTER — HOSPITAL ENCOUNTER (OUTPATIENT)
Dept: LAB | Facility: MEDICAL CENTER | Age: 82
End: 2022-02-10
Attending: STUDENT IN AN ORGANIZED HEALTH CARE EDUCATION/TRAINING PROGRAM
Payer: MEDICARE

## 2022-02-10 ENCOUNTER — HOSPITAL ENCOUNTER (OUTPATIENT)
Dept: LAB | Facility: MEDICAL CENTER | Age: 82
End: 2022-02-10
Attending: INTERNAL MEDICINE
Payer: MEDICARE

## 2022-02-10 DIAGNOSIS — N18.31 STAGE 3A CHRONIC KIDNEY DISEASE: ICD-10-CM

## 2022-02-10 DIAGNOSIS — D50.8 OTHER IRON DEFICIENCY ANEMIA: ICD-10-CM

## 2022-02-10 DIAGNOSIS — R79.89 ELEVATED TSH: ICD-10-CM

## 2022-02-10 LAB
ALBUMIN SERPL BCP-MCNC: 4 G/DL (ref 3.2–4.9)
ALT SERPL-CCNC: 34 U/L (ref 2–50)
ANION GAP SERPL CALC-SCNC: 10 MMOL/L (ref 7–16)
AST SERPL-CCNC: 35 U/L (ref 12–45)
BASOPHILS # BLD AUTO: 1.4 % (ref 0–1.8)
BASOPHILS # BLD AUTO: 1.4 % (ref 0–1.8)
BASOPHILS # BLD: 0.07 K/UL (ref 0–0.12)
BASOPHILS # BLD: 0.07 K/UL (ref 0–0.12)
BUN SERPL-MCNC: 24 MG/DL (ref 8–22)
CALCIUM SERPL-MCNC: 9 MG/DL (ref 8.5–10.5)
CHLORIDE SERPL-SCNC: 106 MMOL/L (ref 96–112)
CO2 SERPL-SCNC: 23 MMOL/L (ref 20–33)
CREAT SERPL-MCNC: 1.81 MG/DL (ref 0.5–1.4)
CREAT SERPL-MCNC: 1.83 MG/DL (ref 0.5–1.4)
CRP SERPL HS-MCNC: <0.3 MG/DL (ref 0–0.75)
EOSINOPHIL # BLD AUTO: 0.22 K/UL (ref 0–0.51)
EOSINOPHIL # BLD AUTO: 0.24 K/UL (ref 0–0.51)
EOSINOPHIL NFR BLD: 4.4 % (ref 0–6.9)
EOSINOPHIL NFR BLD: 4.7 % (ref 0–6.9)
ERYTHROCYTE [DISTWIDTH] IN BLOOD BY AUTOMATED COUNT: 55.1 FL (ref 35.9–50)
ERYTHROCYTE [DISTWIDTH] IN BLOOD BY AUTOMATED COUNT: 55.2 FL (ref 35.9–50)
ERYTHROCYTE [SEDIMENTATION RATE] IN BLOOD BY WESTERGREN METHOD: 7 MM/HOUR (ref 0–20)
FERRITIN SERPL-MCNC: 119 NG/ML (ref 22–322)
GLUCOSE SERPL-MCNC: 132 MG/DL (ref 65–99)
HCT VFR BLD AUTO: 44.2 % (ref 42–52)
HCT VFR BLD AUTO: 44.3 % (ref 42–52)
HGB BLD-MCNC: 14.2 G/DL (ref 14–18)
HGB BLD-MCNC: 14.4 G/DL (ref 14–18)
IMM GRANULOCYTES # BLD AUTO: 0.01 K/UL (ref 0–0.11)
IMM GRANULOCYTES # BLD AUTO: 0.01 K/UL (ref 0–0.11)
IMM GRANULOCYTES NFR BLD AUTO: 0.2 % (ref 0–0.9)
IMM GRANULOCYTES NFR BLD AUTO: 0.2 % (ref 0–0.9)
IRON SATN MFR SERPL: 36 % (ref 15–55)
IRON SERPL-MCNC: 86 UG/DL (ref 50–180)
LYMPHOCYTES # BLD AUTO: 1.86 K/UL (ref 1–4.8)
LYMPHOCYTES # BLD AUTO: 1.93 K/UL (ref 1–4.8)
LYMPHOCYTES NFR BLD: 36.8 % (ref 22–41)
LYMPHOCYTES NFR BLD: 38 % (ref 22–41)
MCH RBC QN AUTO: 31.6 PG (ref 27–33)
MCH RBC QN AUTO: 32.1 PG (ref 27–33)
MCHC RBC AUTO-ENTMCNC: 32.1 G/DL (ref 33.7–35.3)
MCHC RBC AUTO-ENTMCNC: 32.6 G/DL (ref 33.7–35.3)
MCV RBC AUTO: 98.4 FL (ref 81.4–97.8)
MCV RBC AUTO: 98.7 FL (ref 81.4–97.8)
MONOCYTES # BLD AUTO: 0.82 K/UL (ref 0–0.85)
MONOCYTES # BLD AUTO: 0.86 K/UL (ref 0–0.85)
MONOCYTES NFR BLD AUTO: 16.2 % (ref 0–13.4)
MONOCYTES NFR BLD AUTO: 16.9 % (ref 0–13.4)
NEUTROPHILS # BLD AUTO: 1.97 K/UL (ref 1.82–7.42)
NEUTROPHILS # BLD AUTO: 2.07 K/UL (ref 1.82–7.42)
NEUTROPHILS NFR BLD: 38.8 % (ref 44–72)
NEUTROPHILS NFR BLD: 41 % (ref 44–72)
NRBC # BLD AUTO: 0 K/UL
NRBC # BLD AUTO: 0 K/UL
NRBC BLD-RTO: 0 /100 WBC
NRBC BLD-RTO: 0 /100 WBC
PLATELET # BLD AUTO: 201 K/UL (ref 164–446)
PLATELET # BLD AUTO: 206 K/UL (ref 164–446)
PMV BLD AUTO: 10.1 FL (ref 9–12.9)
PMV BLD AUTO: 10.1 FL (ref 9–12.9)
POTASSIUM SERPL-SCNC: 4.8 MMOL/L (ref 3.6–5.5)
RBC # BLD AUTO: 4.48 M/UL (ref 4.7–6.1)
RBC # BLD AUTO: 4.5 M/UL (ref 4.7–6.1)
SODIUM SERPL-SCNC: 139 MMOL/L (ref 135–145)
T4 FREE SERPL-MCNC: 0.73 NG/DL (ref 0.93–1.7)
TIBC SERPL-MCNC: 236 UG/DL (ref 250–450)
TSH SERPL DL<=0.005 MIU/L-ACNC: 7.2 UIU/ML (ref 0.38–5.33)
UIBC SERPL-MCNC: 150 UG/DL (ref 110–370)
WBC # BLD AUTO: 5.1 K/UL (ref 4.8–10.8)
WBC # BLD AUTO: 5.1 K/UL (ref 4.8–10.8)

## 2022-02-10 PROCEDURE — 82728 ASSAY OF FERRITIN: CPT

## 2022-02-10 PROCEDURE — 85652 RBC SED RATE AUTOMATED: CPT

## 2022-02-10 PROCEDURE — 82040 ASSAY OF SERUM ALBUMIN: CPT

## 2022-02-10 PROCEDURE — 84443 ASSAY THYROID STIM HORMONE: CPT | Mod: GA

## 2022-02-10 PROCEDURE — 82565 ASSAY OF CREATININE: CPT | Mod: XU

## 2022-02-10 PROCEDURE — 84460 ALANINE AMINO (ALT) (SGPT): CPT

## 2022-02-10 PROCEDURE — 85025 COMPLETE CBC W/AUTO DIFF WBC: CPT

## 2022-02-10 PROCEDURE — 86140 C-REACTIVE PROTEIN: CPT

## 2022-02-10 PROCEDURE — 84450 TRANSFERASE (AST) (SGOT): CPT

## 2022-02-10 PROCEDURE — 83540 ASSAY OF IRON: CPT

## 2022-02-10 PROCEDURE — 84439 ASSAY OF FREE THYROXINE: CPT

## 2022-02-10 PROCEDURE — 80048 BASIC METABOLIC PNL TOTAL CA: CPT

## 2022-02-10 PROCEDURE — 83550 IRON BINDING TEST: CPT

## 2022-02-10 PROCEDURE — 85025 COMPLETE CBC W/AUTO DIFF WBC: CPT | Mod: 91

## 2022-02-10 PROCEDURE — 36415 COLL VENOUS BLD VENIPUNCTURE: CPT

## 2022-03-07 ENCOUNTER — OFFICE VISIT (OUTPATIENT)
Dept: INTERNAL MEDICINE | Facility: OTHER | Age: 82
End: 2022-03-07
Payer: MEDICARE

## 2022-03-07 VITALS
SYSTOLIC BLOOD PRESSURE: 125 MMHG | TEMPERATURE: 97.3 F | HEIGHT: 73 IN | BODY MASS INDEX: 28.1 KG/M2 | OXYGEN SATURATION: 93 % | HEART RATE: 99 BPM | WEIGHT: 212 LBS | DIASTOLIC BLOOD PRESSURE: 82 MMHG

## 2022-03-07 DIAGNOSIS — J96.11 CHRONIC RESPIRATORY FAILURE WITH HYPOXIA (HCC): ICD-10-CM

## 2022-03-07 DIAGNOSIS — N18.32 STAGE 3B CHRONIC KIDNEY DISEASE: Chronic | ICD-10-CM

## 2022-03-07 DIAGNOSIS — R79.89 ELEVATED TSH: ICD-10-CM

## 2022-03-07 DIAGNOSIS — R60.0 LOWER EXTREMITY EDEMA: Chronic | ICD-10-CM

## 2022-03-07 DIAGNOSIS — M06.9 RHEUMATOID ARTHRITIS INVOLVING MULTIPLE SITES, UNSPECIFIED WHETHER RHEUMATOID FACTOR PRESENT (HCC): ICD-10-CM

## 2022-03-07 DIAGNOSIS — J43.9 PULMONARY EMPHYSEMA, UNSPECIFIED EMPHYSEMA TYPE (HCC): Chronic | ICD-10-CM

## 2022-03-07 DIAGNOSIS — Z87.19 HISTORY OF GI BLEED: ICD-10-CM

## 2022-03-07 PROBLEM — L03.90 CELLULITIS: Status: RESOLVED | Noted: 2021-02-01 | Resolved: 2022-03-07

## 2022-03-07 PROCEDURE — 99213 OFFICE O/P EST LOW 20 MIN: CPT | Mod: GE | Performed by: STUDENT IN AN ORGANIZED HEALTH CARE EDUCATION/TRAINING PROGRAM

## 2022-03-07 ASSESSMENT — FIBROSIS 4 INDEX: FIB4 SCORE: 2.45

## 2022-03-07 ASSESSMENT — PATIENT HEALTH QUESTIONNAIRE - PHQ9: CLINICAL INTERPRETATION OF PHQ2 SCORE: 0

## 2022-03-08 PROBLEM — D50.9 IRON DEFICIENCY ANEMIA: Status: RESOLVED | Noted: 2021-10-18 | Resolved: 2022-03-08

## 2022-03-08 PROBLEM — M06.9 RHEUMATOID ARTHRITIS INVOLVING MULTIPLE SITES (HCC): Status: ACTIVE | Noted: 2022-03-08

## 2022-03-08 PROBLEM — M00.9 PYOGENIC ARTHRITIS OF RIGHT KNEE JOINT (HCC): Status: RESOLVED | Noted: 2020-12-20 | Resolved: 2022-03-08

## 2022-03-08 ASSESSMENT — ENCOUNTER SYMPTOMS
SHORTNESS OF BREATH: 1
PALPITATIONS: 0
LOSS OF CONSCIOUSNESS: 0
FALLS: 0
DIZZINESS: 0
HEADACHES: 0

## 2022-03-08 NOTE — PROGRESS NOTES
date  Chief Complaint   Patient presents with   • Follow-Up     Iron        History of Present Illness:  81 y.o. male established patient who presents today with his wife Lola and son Marc for the following:  He has no acute complaints.    No changes in dyspnea, O2 sat 93%  no palpitations or hyperthyroid symptoms    Past Medical History:   Diagnosis Date   • Abnormal thyroid stimulating hormone (TSH) level    • Allergic rhinitis    • Asbestosis ([facility])    • Asthma    • Bronchitis    • Chronic diarrhea     declines eval; takes immodium once a day usually and sometimes less; see previous notes; aware of risk    • Chronic low back pain    • Chronic lung disease     asbestos related   • CKD (chronic kidney disease), stage III ([facility])     sees neph, one kidney   • COPD (chronic obstructive pulmonary disease) ([facility])    • Coronary artery calcification seen on CAT scan 8/2/2016    sees cards   • Epididymitis 2009    h/o listed in chart   • GERD (gastroesophageal reflux disease)    • History of hemorrhoids    • History of skin cancer     non melanoma   • [place] (hard of hearing)     declines hearing aids   • Hypercholesteremia    • Hypertension    • Hypertriglyceridemia    • Indigestion    • Light headedness     2/2 orthostasis? now better off hctz   • Lightheadedness 6/23/2016   • Low back pain    • Nasal drainage    • Olecranon bursitis    • Orthostasis 6/23/2016    better off HCTZ   • Peripheral neuropathy    • Pleural plaque 2005     CT [place]   • Post-nasal drip    • Pulmonary emphysema ([facility])    • RA (rheumatoid arthritis) ([facility])     on meds, sees rheum   • Restless leg syndrome    • Rheumatoid arthritis ([facility])    • Sleep apnea     obstructive and central - not adherent to autopap   • Solitary kidney     history of kidney cyst 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-functioning kidney s/p nephrectomy        Patient Active Problem List    Diagnosis Date Noted   • Rheumatoid arthritis involving multiple sites ([facility]) 03/08/2022   • History of GI bleed  10/18/2021   • GERD (gastroesophageal reflux disease) 05/08/2021   • History of DVT (deep vein thrombosis) 02/16/2021   • History of bursitis 12/20/2020   • Oropharyngeal dysphagia 12/16/2020   • Knee effusion, right 11/30/2020   • History of pseudomembranous enterocolitis 10/01/2018   • Limited mobility 09/30/2018   • Environmental and seasonal allergies 09/29/2018   • Neuropathy 09/29/2018   • Chronic lower extremity edema 08/14/2018   • Chronic respiratory failure with hypoxia ([facility]) 12/12/2017   • NYASIA (obstructive sleep apnea) 12/12/2017   • PVC (premature ventricular contraction) 08/14/2017   • Elevated TSH 12/06/2016   • Hypertension 08/02/2016   • Calcified LAD and LCx on CT chest 2015 08/02/2016   • Hyperlipidemia 06/03/2016   • Hx of septic arthritis 06/03/2016   • COPD (chronic obstructive pulmonary disease) ([facility]) 06/03/2016   • Stage 3 chronic kidney disease ([facility]) 11/03/2015   • Hypoxia 06/25/2013   • Pleural plaque 06/25/2013       Allergies:Ciprofloxacin, Levaquin, and Penicillins    Current Outpatient Medications   Medication Sig Dispense Refill   • certolizumab pegol (CIMZIA) 2 X 200 MG/ML Kit Inject 200 mg under the skin every 14 days.     • gabapentin (NEURONTIN) 300 MG Cap Take 1-2 Capsules by mouth 3 times a day. 1 cap AM  2 caps PM 90 Capsule 0   • rosuvastatin (CRESTOR) 5 MG Tab TAKE 1 TABLET BY MOUTH EVERY EVENING 90 Tablet 3   • SPIRIVA RESPIMAT 2.5 MCG/ACT Aero Soln INHALE 2 PUFFS BY MOUTH DAILY 12 Each 3   • acetaminophen (TYLENOL) 325 MG Tab Take 650 mg by mouth every four hours as needed. Indications: Pain     • fluticasone (FLONASE) 50 MCG/ACT nasal spray Administer 2 Sprays into affected nostril(S) every day.     • guaiFENesin ER (MUCINEX) 600 MG TABLET SR 12 HR Take 1,200 mg by mouth every day.     • Loperamide HCl (IMODIUM A-D PO) Take 1 Tab by mouth 2 (two) times a day.     • budesonide-formoterol (SYMBICORT) 80-4.5 MCG/ACT Aerosol Inhale 2 Puffs 2 Times a Day.     • fexofenadine  "(ALLEGRA) 180 MG tablet Take 180 mg by mouth every day.     • leflunomide (ARAVA) 20 MG Tab Take 1 Tab by mouth every day. 30 Tab 0     No current facility-administered medications for this visit.       Social History     Tobacco Use   • Smoking status: Former Smoker     Packs/day: 1.00     Years: 40.00     Pack years: 40.00     Types: Cigarettes     Quit date: 1980     Years since quittin.2   • Smokeless tobacco: Never Used   • Tobacco comment: 1 pk a day for 40 yrs   Vaping Use   • Vaping Use: Never used   Substance Use Topics   • Alcohol use: No     Alcohol/week: 0.0 oz   • Drug use: No       Family History   Problem Relation Age of Onset   • Genetic Disorder Father         ALS   • No Known Problems Sister    • No Known Problems Son    • No Known Problems Daughter    • Heart Attack Neg Hx    • Heart Disease Neg Hx    • Heart Failure Neg Hx          Review of Systems   Review of Systems   Respiratory: Positive for shortness of breath (stable with heavy exertion).    Cardiovascular: Positive for leg swelling (chronic). Negative for chest pain and palpitations.   Musculoskeletal: Negative for falls.   Neurological: Negative for dizziness, loss of consciousness and headaches.     See HPI.    Physical Exam:    /82 (BP Location: Right arm, Patient Position: Sitting, BP Cuff Size: Adult)   Pulse 99   Temp 36.3 °C (97.3 °F) (Temporal)   Ht 1.854 m (6' 1\")   Wt 96.2 kg (212 lb)   SpO2 93%  Body mass index is 27.97 kg/m².  Physical Exam  Vitals and nursing note reviewed.   Constitutional:       Appearance: Normal appearance. He is not toxic-appearing.   HENT:      Head: Normocephalic and atraumatic.   Cardiovascular:      Rate and Rhythm: Normal rate and regular rhythm.      Pulses: Normal pulses.      Heart sounds: Normal heart sounds. No murmur heard.    No friction rub. No gallop.   Pulmonary:      Effort: Pulmonary effort is normal. No respiratory distress.      Breath sounds: Normal breath sounds. " No wheezing or rales.   Abdominal:      General: There is no distension.      Palpations: Abdomen is soft.      Tenderness: There is no abdominal tenderness. There is no guarding or rebound.   Musculoskeletal:         General: No swelling or tenderness. Normal range of motion.      Right lower leg: Edema (at ankles, 2+) present.      Left lower leg: Edema (at ankles, 2+) present.   Skin:     General: Skin is warm and dry.      Capillary Refill: Capillary refill takes less than 2 seconds.      Findings: No lesion or rash.   Neurological:      General: No focal deficit present.      Mental Status: He is alert and oriented to person, place, and time.      Gait: Gait (Walk steadily with walker) normal.   Psychiatric:         Mood and Affect: Mood normal.         Behavior: Behavior normal.          Assessment/Plan:     Stage 3 chronic kidney disease (HCC)  GFR in the mid 40s, high 30s recently, follows with Dr. Dye at [facility] nephrology  continue to monitor and avoid nephrotoxins    History of GI bleed  Iron deficiency anemia due to duodenal ulcer now resolved and repeat EGD last year showed healing of bleed, can stop iron supplementation    Elevated TSH  TSH slightly elevated from previous at 7 but subclinical he has no symptoms would not start thyroid hormone replacement at this time    Rheumatoid arthritis involving multiple sites ([facility])  [facility], on Cimzia injections    Chronic lower extremity edema  Discussed continuing activity, elevation when not active, wrap    COPD (chronic obstructive pulmonary disease) ([facility])  Stable on Spiriva and Symbicort    Chronic respiratory failure with hypoxia ([facility])  Secondary to COPD, O2 sats stable 93%, previously sat to the high 80s on exertion, declined oxygen previously and is not stable at this time      All imaging results and lab results and consult notes are reviewed at this visit.  Followup: Return in about 15 weeks (around 6/20/2022)  for annual wellness visit.

## 2022-03-08 NOTE — ASSESSMENT & PLAN NOTE
TSH slightly elevated from previous at 7 but subclinical he has no symptoms would not start thyroid hormone replacement at this time

## 2022-03-08 NOTE — ASSESSMENT & PLAN NOTE
GFR in the mid 40s, high 30s recently, follows with Dr. Dye at [facility] nephrology  continue to monitor and avoid nephrotoxins

## 2022-03-08 NOTE — ASSESSMENT & PLAN NOTE
Iron deficiency anemia due to duodenal ulcer now resolved and repeat EGD last year showed healing of bleed, can stop iron supplementation

## 2022-03-08 NOTE — ASSESSMENT & PLAN NOTE
Secondary to COPD, O2 sats stable 93%, previously sat to the high 80s on exertion, declined oxygen previously and is not stable at this time

## 2022-04-20 ENCOUNTER — APPOINTMENT (RX ONLY)
Dept: URBAN - METROPOLITAN AREA CLINIC 22 | Facility: CLINIC | Age: 82
Setting detail: DERMATOLOGY
End: 2022-04-20

## 2022-04-20 DIAGNOSIS — Z85.828 PERSONAL HISTORY OF OTHER MALIGNANT NEOPLASM OF SKIN: ICD-10-CM

## 2022-04-20 DIAGNOSIS — L81.4 OTHER MELANIN HYPERPIGMENTATION: ICD-10-CM

## 2022-04-20 DIAGNOSIS — I87.2 VENOUS INSUFFICIENCY (CHRONIC) (PERIPHERAL): ICD-10-CM

## 2022-04-20 DIAGNOSIS — L82.1 OTHER SEBORRHEIC KERATOSIS: ICD-10-CM

## 2022-04-20 DIAGNOSIS — D22 MELANOCYTIC NEVI: ICD-10-CM

## 2022-04-20 DIAGNOSIS — L57.0 ACTINIC KERATOSIS: ICD-10-CM

## 2022-04-20 PROBLEM — D22.5 MELANOCYTIC NEVI OF TRUNK: Status: ACTIVE | Noted: 2022-04-20

## 2022-04-20 PROCEDURE — ? COUNSELING

## 2022-04-20 PROCEDURE — 17003 DESTRUCT PREMALG LES 2-14: CPT

## 2022-04-20 PROCEDURE — 99213 OFFICE O/P EST LOW 20 MIN: CPT | Mod: 25

## 2022-04-20 PROCEDURE — ? LIQUID NITROGEN

## 2022-04-20 PROCEDURE — ? SUNSCREEN TREATMENT REGIMEN

## 2022-04-20 PROCEDURE — 17000 DESTRUCT PREMALG LESION: CPT

## 2022-04-20 ASSESSMENT — LOCATION DETAILED DESCRIPTION DERM
LOCATION DETAILED: LEFT INFERIOR POSTERIOR HELIX
LOCATION DETAILED: RIGHT PROXIMAL DORSAL FOREARM
LOCATION DETAILED: LEFT INFERIOR MEDIAL FOREHEAD
LOCATION DETAILED: LEFT SUPERIOR FOREHEAD
LOCATION DETAILED: RIGHT SCAPHA
LOCATION DETAILED: RIGHT MEDIAL UPPER BACK
LOCATION DETAILED: LEFT SUPERIOR CENTRAL MALAR CHEEK
LOCATION DETAILED: RIGHT DISTAL RADIAL DORSAL FOREARM
LOCATION DETAILED: SUPERIOR THORACIC SPINE
LOCATION DETAILED: LEFT VENTRAL PROXIMAL FOREARM
LOCATION DETAILED: RIGHT DISTAL PRETIBIAL REGION
LOCATION DETAILED: LEFT DORSAL WRIST
LOCATION DETAILED: LEFT DISTAL PRETIBIAL REGION
LOCATION DETAILED: STERNAL NOTCH
LOCATION DETAILED: LEFT MEDIAL ZYGOMA
LOCATION DETAILED: RIGHT VENTRAL PROXIMAL FOREARM
LOCATION DETAILED: LEFT LATERAL FRONTAL SCALP

## 2022-04-20 ASSESSMENT — LOCATION ZONE DERM
LOCATION ZONE: TRUNK
LOCATION ZONE: SCALP
LOCATION ZONE: FACE
LOCATION ZONE: EAR
LOCATION ZONE: LEG
LOCATION ZONE: ARM

## 2022-04-20 ASSESSMENT — LOCATION SIMPLE DESCRIPTION DERM
LOCATION SIMPLE: LEFT ZYGOMA
LOCATION SIMPLE: LEFT CHEEK
LOCATION SIMPLE: LEFT FOREHEAD
LOCATION SIMPLE: LEFT WRIST
LOCATION SIMPLE: LEFT EAR
LOCATION SIMPLE: RIGHT PRETIBIAL REGION
LOCATION SIMPLE: RIGHT FOREARM
LOCATION SIMPLE: LEFT SCALP
LOCATION SIMPLE: RIGHT EAR
LOCATION SIMPLE: CHEST
LOCATION SIMPLE: LEFT PRETIBIAL REGION
LOCATION SIMPLE: RIGHT UPPER BACK
LOCATION SIMPLE: LEFT FOREARM
LOCATION SIMPLE: UPPER BACK

## 2022-04-20 NOTE — PROCEDURE: LIQUID NITROGEN
Duration Of Freeze Thaw-Cycle (Seconds): 0
Post-Care Instructions: I reviewed with the patient in detail post-care instructions. Patient is to wear sunprotection, and avoid picking at any of the treated lesions. Pt may apply Vaseline to crusted or scabbing areas.
Render Post-Care Instructions In Note?: no
Number Of Freeze-Thaw Cycles: 2 freeze-thaw cycles
Consent: The patient's consent was obtained including but not limited to risks of crusting, scabbing, blistering, scarring, darker or lighter pigmentary change, recurrence, incomplete removal and infection.
Show Aperture Variable?: Yes
Detail Level: Detailed

## 2022-11-02 ENCOUNTER — PATIENT MESSAGE (OUTPATIENT)
Dept: HEALTH INFORMATION MANAGEMENT | Facility: OTHER | Age: 82
End: 2022-11-02

## 2022-11-21 NOTE — PROGRESS NOTES
Monitor summary: medical    No. ERNIE screening performed.  STOP BANG Legend: 0-2 = LOW Risk; 3-4 = INTERMEDIATE Risk; 5-8 = HIGH Risk

## 2023-09-11 NOTE — OR NURSING
"Pre-admit appointment completed. \"Preparing for your Procedure\" sheet given to Pt with verbal and emailed written instructions for telephone appointment. Pt states all instructions given are understood and to call pre-admit or Dr's office for additional questions or any symptoms of illness/covid develop prior to DOS. Medications the patient will take the morning of surgery per anesthesia protocol: None    Pt to get rapid covid test the day of surgery and told to arrive 3 hours prior to surgery time when checking in. Pt will call Surgeon's office to verify surgery time and check in time.       "
0949: To PACU post I&D right knee. Pt is extubated, breathing is spontaneous and unlabored. Denies pain at this time. IV is wrapped in coban d/t bleeding from around insertion site in O.R.  1002: Report given to ALICE Owusu RN.  1030: Pain is 2/10, no nausea.  1054: Meets criteria for stage ll.  
1002- report received from Rafy GONZALEZ. VSS. Pt resting comfortably at this time.  1020- VSS. Pt tolerating PO sips of water.  1026- Report to Rafy GONZALEZ.  
Pt discharged to the care of his wife following an uneventful stay in the discharge area.  
no...

## 2024-12-18 NOTE — PROGRESS NOTES
Arm sling was delivered and fitted to patient LUE.  If any further assistance needed, please call extension [phone] or place order for Ortho Technician assistance as a communication order in [employer].      LAAO candidate:    [x] AF diagnosis  [x] No Nickel Allergy  [x] No history of PFO or ASD closure device  [x] No history of RIMA ligation/clipping  [x] No known RIMA clot  [x] No mechanical valves  [x] No history of recurrent PE, DVT, clots (that would req pt to remain on AC)  [x] No contraindications for DAPT  [x] No HX bleeding (Urine, GI, hemorrhagic stroke)?       Recent Labs   Lab 09/27/24  1347 04/18/24  0623 04/17/24  0459 04/16/24  0833   Potassium 3.7 4.0   < > 3.7   Creatinine 0.73 0.72   < > 0.69   Glomerular Filtration Rate 90 >90   < > >90   GOT/AST 13  --   --   --    Magnesium  --  1.9   < > 1.9   GPT 24  --   --   --    INR  --   --   --  1.1   HGB 12.9  --   --   --    HCT 40.0  --   --   --     < > = values in this interval not displayed.

## (undated) DEVICE — KIT CUSTOM PROCEDURE SINGLE FOR ENDO  (15/CA)

## (undated) DEVICE — TUBE, CULTURE AEROBIC

## (undated) DEVICE — ELECTRODE DUAL RETURN W/ CORD - (50/PK)

## (undated) DEVICE — WRAP COBAN SELF-ADHERENT 6 IN X  5YDS STERILE TAN (12/CA)

## (undated) DEVICE — SUTURE 1 PROLENE CTX (36PK/BX)

## (undated) DEVICE — SET EXTENSION WITH 2 PORTS (48EA/CA) ***PART #2C8610 IS A SUBSTITUTE*****

## (undated) DEVICE — GLOVE BIOGEL SZ 8 SURGICAL PF LTX - (50PR/BX 4BX/CA)

## (undated) DEVICE — TOURNIQUET CUFF 34 X 4 ONE PORT DISP - STERILE (10/BX)

## (undated) DEVICE — DRAPE LARGE 3 QUARTER - (20/CA)

## (undated) DEVICE — SUCTION INSTRUMENT YANKAUER BULBOUS TIP W/O VENT (50EA/CA)

## (undated) DEVICE — SUTURE 4-0 ETHILON FS-1 18 (36PK/BX)"

## (undated) DEVICE — SODIUM CHL IRRIGATION 0.9% 1000ML (12EA/CA)

## (undated) DEVICE — SUTURE GENERAL

## (undated) DEVICE — SET LEADWIRE 5 LEAD BEDSIDE DISPOSABLE ECG (1SET OF 5/EA)

## (undated) DEVICE — MASK ANESTHESIA ADULT  - (100/CA)

## (undated) DEVICE — PACK LOWER EXTREMITY - (2/CA)

## (undated) DEVICE — SLEEVE, VASO, THIGH, MED

## (undated) DEVICE — LACTATED RINGERS INJ 1000 ML - (14EA/CA 60CA/PF)

## (undated) DEVICE — GLOVE BIOGEL INDICATOR SZ 8 SURGICAL PF LTX - (50/BX 4BX/CA)

## (undated) DEVICE — PROTECTOR ULNA NERVE - (36PR/CA)

## (undated) DEVICE — PACK UPPER EXTREMITY (2EA/CA)

## (undated) DEVICE — GLOVE BIOGEL SZ 7.5 SURGICAL PF LTX - (50PR/BX 4BX/CA)

## (undated) DEVICE — CATHETER IV 20 GA X 1-1/4 ---SURG.& SDS ONLY--- (50EA/BX)

## (undated) DEVICE — HEAD HOLDER JUNIOR/ADULT

## (undated) DEVICE — NEPTUNE 4 PORT MANIFOLD - (20/PK)

## (undated) DEVICE — KIT ANESTHESIA W/CIRCUIT & 3/LT BAG W/FILTER (20EA/CA)

## (undated) DEVICE — DRAPE IOBAN II INCISE 23X17 - (10EA/BX 4BX/CA)

## (undated) DEVICE — DRAPE STRLE REG TOWEL 18X24 - (10/BX 4BX/CA)"

## (undated) DEVICE — CANISTER SUCTION 3000ML MECHANICAL FILTER AUTO SHUTOFF MEDI-VAC NONSTERILE LF DISP  (40EA/CA)

## (undated) DEVICE — BANDAGE ELASTIC 6 HONEYCOMB - 6X5YD LF (20/CA)"

## (undated) DEVICE — FILM CASSETTE ENDO

## (undated) DEVICE — CONTAINER, SPECIMEN, STERILE

## (undated) DEVICE — TIP INTPLS HFLO ML ORFC BTRY - (12/CS)  FOR SURGILAV

## (undated) DEVICE — SWAB ANAEROBIC SPEC.COLLECTOR - (25/PK 4PK/CA 100EA/CA)

## (undated) DEVICE — HANDPIECE 10FT INTPLS SCT PLS IRRIGATION HAND CONTROL SET (6/PK)

## (undated) DEVICE — GLOVE BIOGEL PI ORTHO SZ 7.5 PF LF (40PR/BX)

## (undated) DEVICE — CUP DENTURE W/ LID - (200/CA)

## (undated) DEVICE — CHLORAPREP 26 ML APPLICATOR - ORANGE TINT(25/CA)

## (undated) DEVICE — ELECTRODE 850 FOAM ADHESIVE - HYDROGEL RADIOTRNSPRNT (50/PK)

## (undated) DEVICE — SENSOR SPO2 NEO LNCS ADHESIVE (20/BX) SEE USER NOTES

## (undated) DEVICE — DRESSING KIT V.A.C. SENSA T.R.A.C. SMALL (10EA/CA)

## (undated) DEVICE — SUTURE 4-0 ETHILON PS-2 18 BLACK (36PK/BX)

## (undated) DEVICE — GLOVE BIOGEL PI INDICATOR SZ 8.0 SURGICAL PF LF -(50/BX 4BX/CA)

## (undated) DEVICE — CANISTER SUCTION RIGID RED 1500CC (40EA/CA)

## (undated) DEVICE — IMMOBILIZER KNEE 20 INCH - (1/EA)

## (undated) DEVICE — CONTAINER SPECIMEN BAG OR - STERILE 4 OZ W/LID (100EA/CA)

## (undated) DEVICE — BOVIE BLADE COATED - (50/PK)

## (undated) DEVICE — TOWELS CLOTH SURGICAL - (4/PK 20PK/CA)

## (undated) DEVICE — SET IRRIGATION CYSTOSCOPY TUBE L80 IN (20EA/CA)

## (undated) DEVICE — PADDING CAST 6 IN STERILE - 6 X 4 YDS (24/CA)

## (undated) DEVICE — SUTURE 3-0 ETHILON PS-1 (36PK/BX)

## (undated) DEVICE — KIT ROOM DECONTAMINATION

## (undated) DEVICE — TUBING CLEARLINK DUO-VENT - C-FLO (48EA/CA)

## (undated) DEVICE — GLOVE, LITE (PAIR)

## (undated) DEVICE — GLOVE BIOGEL PI INDICATOR SZ 7.5 SURGICAL PF LF -(50/BX 4BX/CA)

## (undated) DEVICE — PAD LAP STERILE 18 X 18 - (5/PK 40PK/CA)

## (undated) DEVICE — BLADE SURGICAL #15 - (50/BX 3BX/CA)

## (undated) DEVICE — GLOVE BIOGEL PI ORTHO SZ 6 1/2 SURGICAL PF LF (40PR/BX)

## (undated) DEVICE — BLADE SURGICAL CLIPPER - (50EA/CA)

## (undated) DEVICE — SUTURE 3-0 SUPRAMID - (12/BX)

## (undated) DEVICE — STAPLER SKIN DISP - (6/BX 10BX/CA) VISISTAT

## (undated) DEVICE — MASK, LARYNGEAL AIRWAY #4

## (undated) DEVICE — MANIFOLD NEPTUNE 1 PORT (20/PK)

## (undated) DEVICE — DETERGENT RENUZYME PLUS 10 OZ PACKET (50/BX)

## (undated) DEVICE — BASIN EMESIS DISP. - (250/CA)

## (undated) DEVICE — TRAY SKIN SCRUB PVP WET (20EA/CA) PART #DYND70356 DISCONTINUED

## (undated) DEVICE — GOWN SURGEONS X-LARGE - DISP. (30/CA)

## (undated) DEVICE — GLOVE BIOGEL SZ 6 PF LATEX - (50EA/BX 4BX/CA)

## (undated) DEVICE — GLOVE BIOGEL PI INDICATOR SZ 6.5 SURGICAL PF LF - (50/BX 4BX/CA)

## (undated) DEVICE — VAC CANISTER W/GEL 500ML DUP

## (undated) DEVICE — GLOVE BIOGEL INDICATOR SZ 7SURGICAL PF LTX - (50/BX 4BX/CA)

## (undated) DEVICE — BITE BLOCK ADULT 60FR (100EA/CA)

## (undated) DEVICE — SUTURE 3-0 MONOCRYL PLUS PS-1 - 27 INCH (36/BX)

## (undated) DEVICE — GLOVE SZ 6 BIOGEL PI MICRO - PF LF (50PR/BX 4BX/CA)

## (undated) DEVICE — GOWN WARMING STANDARD FLEX - (30/CA)

## (undated) DEVICE — DRESSING 3X3 ADAPTIC GAUZE - (50EA/CT)

## (undated) DEVICE — GLOVE BIOGEL SZ 6.5 SURGICAL PF LTX (50PR/BX 4BX/CA)

## (undated) DEVICE — PAD PREP 24 X 48 CUFFED - (100/CA)

## (undated) DEVICE — GLOVE BIOGEL ECLIPSE PF LATEX SIZE 9.0

## (undated) DEVICE — WATER IRRIG. STER. 1500 ML - (9/CA)

## (undated) DEVICE — GLOVE SZ 8 BIOGEL PI MICRO - PF LF (50PR/BX)

## (undated) DEVICE — BOVIE NEEDLE TIP INSULATD NON-SAFETY 2CM (50/PK)

## (undated) DEVICE — GLOVE BIOGEL ECLIPSE  PF LATEX SIZE 6.5 (50PR/BX)

## (undated) DEVICE — GLOVE SZ 7.5 BIOGEL PI MICRO - PF LF (50PR/BX)

## (undated) DEVICE — SYSTEM PEEL & PLACE 13CM INCISIONS

## (undated) DEVICE — SODIUM CHL. IRRIGATION 0.9% 3000ML (4EA/CA 65CA/PF)

## (undated) DEVICE — TUBE NG SALEM SUMP 14FR (50EA/BX)

## (undated) DEVICE — STOCKINETTE, TUBULAR 4 COTTON

## (undated) DEVICE — CORDS BIPOLAR COAGULATION - 12FT STERILE DISP. (10EA/BX)

## (undated) DEVICE — TOWEL STOP TIMEOUT SAFETY FLAG (40EA/CA)

## (undated) DEVICE — SPONGE GAUZE NON-STERILE 4X4 - (2000/CA 10PK/CA)

## (undated) DEVICE — SUTURE 3-0 ETHILON FS-1 - (36/BX) 30 INCH

## (undated) DEVICE — SUTURE 4-0 ETHILON FS-2 18 (36PK/BX)"

## (undated) DEVICE — GLOVE BIOGEL ECLIPSE PF LATEX SIZE 7.5

## (undated) DEVICE — TUBE CONNECTING SUCTION - CLEAR PLASTIC STERILE 72 IN (50EA/CA)